# Patient Record
Sex: MALE | Race: WHITE | Employment: OTHER | URBAN - METROPOLITAN AREA
[De-identification: names, ages, dates, MRNs, and addresses within clinical notes are randomized per-mention and may not be internally consistent; named-entity substitution may affect disease eponyms.]

---

## 2018-05-14 LAB
CHOLESTEROL, TOTAL: 180 MG/DL
CHOLESTEROL/HDL RATIO: ABNORMAL
HDLC SERPL-MCNC: 69 MG/DL (ref 35–70)
LDL CHOLESTEROL CALCULATED: 100 MG/DL (ref 0–160)
TRIGL SERPL-MCNC: 54 MG/DL
VLDLC SERPL CALC-MCNC: 11 MG/DL

## 2018-10-22 ENCOUNTER — OFFICE VISIT (OUTPATIENT)
Dept: ORTHOPEDIC SURGERY | Age: 63
End: 2018-10-22
Payer: MEDICARE

## 2018-10-22 ENCOUNTER — TELEPHONE (OUTPATIENT)
Dept: ORTHOPEDIC SURGERY | Age: 63
End: 2018-10-22

## 2018-10-22 VITALS — WEIGHT: 142.8 LBS | OXYGEN SATURATION: 98 % | HEIGHT: 67 IN | HEART RATE: 78 BPM | BODY MASS INDEX: 22.41 KG/M2

## 2018-10-22 DIAGNOSIS — R52 PAIN: ICD-10-CM

## 2018-10-22 DIAGNOSIS — M19.011 LOCALIZED OSTEOARTHRITIS OF RIGHT SHOULDER: ICD-10-CM

## 2018-10-22 DIAGNOSIS — M75.81 ROTATOR CUFF TENDONITIS, RIGHT: Primary | ICD-10-CM

## 2018-10-22 PROCEDURE — 99203 OFFICE O/P NEW LOW 30 MIN: CPT | Performed by: ORTHOPAEDIC SURGERY

## 2018-10-22 ASSESSMENT — ENCOUNTER SYMPTOMS: COLOR CHANGE: 0

## 2018-11-01 ENCOUNTER — HOSPITAL ENCOUNTER (OUTPATIENT)
Dept: CT IMAGING | Age: 63
Discharge: HOME | End: 2018-11-01
Payer: MEDICARE

## 2018-11-01 DIAGNOSIS — M75.81 ROTATOR CUFF TENDONITIS, RIGHT: ICD-10-CM

## 2018-11-01 PROCEDURE — 73200 CT UPPER EXTREMITY W/O DYE: CPT

## 2018-11-06 ENCOUNTER — TELEPHONE (OUTPATIENT)
Dept: CARDIOLOGY CLINIC | Age: 63
End: 2018-11-06

## 2018-11-06 ENCOUNTER — OFFICE VISIT (OUTPATIENT)
Dept: CARDIOLOGY CLINIC | Age: 63
End: 2018-11-06
Payer: MEDICARE

## 2018-11-06 VITALS
HEART RATE: 67 BPM | WEIGHT: 146 LBS | BODY MASS INDEX: 23.46 KG/M2 | SYSTOLIC BLOOD PRESSURE: 120 MMHG | HEIGHT: 66 IN | DIASTOLIC BLOOD PRESSURE: 70 MMHG

## 2018-11-06 DIAGNOSIS — Z95.2 S/P AVR: Primary | ICD-10-CM

## 2018-11-06 PROCEDURE — 93000 ELECTROCARDIOGRAM COMPLETE: CPT | Performed by: INTERNAL MEDICINE

## 2018-11-06 RX ORDER — M-VIT,TX,IRON,MINS/CALC/FOLIC 27MG-0.4MG
1 TABLET ORAL DAILY
COMMUNITY
End: 2019-01-18

## 2018-11-06 RX ORDER — CHLORAL HYDRATE 500 MG
3000 CAPSULE ORAL 3 TIMES DAILY
COMMUNITY
End: 2019-01-18

## 2018-11-06 RX ORDER — OYSTER SHELL CALCIUM WITH VITAMIN D 500; 200 MG/1; [IU]/1
1 TABLET, FILM COATED ORAL DAILY
COMMUNITY
End: 2019-01-18

## 2018-11-09 ENCOUNTER — TELEPHONE (OUTPATIENT)
Dept: CARDIOLOGY CLINIC | Age: 63
End: 2018-11-09

## 2018-11-09 NOTE — TELEPHONE ENCOUNTER
Patient called stating he was here in office to see Dr. José Luis Vasquez but had to leave early before seeing Dr. José Luis Vasquez on 11/6/18 due to him having another appointment to get to. He wanted to know his result of his ekg. Please advise.  You can reach him at [phone]

## 2018-11-26 ENCOUNTER — OFFICE VISIT (OUTPATIENT)
Dept: ORTHOPEDIC SURGERY | Age: 63
End: 2018-11-26
Payer: MEDICARE

## 2018-11-26 ENCOUNTER — TELEPHONE (OUTPATIENT)
Dept: ORTHOPEDIC SURGERY | Age: 63
End: 2018-11-26

## 2018-11-26 VITALS — HEART RATE: 93 BPM | OXYGEN SATURATION: 95 % | WEIGHT: 146 LBS | BODY MASS INDEX: 23.57 KG/M2

## 2018-11-26 DIAGNOSIS — M19.011 LOCALIZED OSTEOARTHRITIS OF RIGHT SHOULDER: Primary | ICD-10-CM

## 2018-11-26 PROCEDURE — 99213 OFFICE O/P EST LOW 20 MIN: CPT | Performed by: ORTHOPAEDIC SURGERY

## 2018-11-26 ASSESSMENT — ENCOUNTER SYMPTOMS: COLOR CHANGE: 0

## 2018-11-26 NOTE — PROGRESS NOTES
Brooke Perez is a 60 y/o male coming in with right shoulder pain. Has been taking Tylenol for pain. Using [facility] for relief.

## 2018-12-04 ENCOUNTER — OFFICE VISIT (OUTPATIENT)
Dept: CARDIOLOGY CLINIC | Age: 63
End: 2018-12-04
Payer: MEDICARE

## 2018-12-04 VITALS
BODY MASS INDEX: 22.53 KG/M2 | HEIGHT: 66 IN | WEIGHT: 140.2 LBS | DIASTOLIC BLOOD PRESSURE: 84 MMHG | OXYGEN SATURATION: 96 % | SYSTOLIC BLOOD PRESSURE: 138 MMHG | HEART RATE: 72 BPM

## 2018-12-04 DIAGNOSIS — Z01.810 PREOP CARDIOVASCULAR EXAM: Primary | ICD-10-CM

## 2018-12-04 PROCEDURE — 99214 OFFICE O/P EST MOD 30 MIN: CPT | Performed by: INTERNAL MEDICINE

## 2018-12-04 PROCEDURE — G8484 FLU IMMUNIZE NO ADMIN: HCPCS | Performed by: INTERNAL MEDICINE

## 2018-12-04 PROCEDURE — 3017F COLORECTAL CA SCREEN DOC REV: CPT | Performed by: INTERNAL MEDICINE

## 2018-12-04 PROCEDURE — G8420 CALC BMI NORM PARAMETERS: HCPCS | Performed by: INTERNAL MEDICINE

## 2018-12-04 PROCEDURE — G8428 CUR MEDS NOT DOCUMENT: HCPCS | Performed by: INTERNAL MEDICINE

## 2018-12-04 NOTE — PROGRESS NOTES
Take 3,000 mg by mouth 3 times daily      warfarin (COUMADIN) 7.5 MG tablet Take by mouth daily 7.5 mg alternating with 5 mg per results of bld work      montelukast (SINGULAIR) 10 MG tablet Take 10 mg by mouth Two days in a row; then hold - take cetirizine hcl 10 mg. one tablet - then repeat.  albuterol (PROVENTIL) (2.5 MG/3ML) 0.083% nebulizer solution Take 2.5 mg by nebulization every 4 hours as needed for Wheezing      Albuterol Sulfate (PROAIR HFA IN) Inhale into the lungs       No current facility-administered medications for this visit. Review of Systems:   · Constitutional: No Fever or Weight Loss   · Eyes: No Decreased Vision  · ENT: No Headaches, Hearing Loss or Vertigo  · Cardiovascular: No chest pain, dyspnea on exertion, palpitations or loss of consciousness  · Respiratory: No cough or wheezing    · Gastrointestinal: No abdominal pain, appetite loss, blood in stools, constipation, diarrhea or heartburn  · Genitourinary: No dysuria, trouble voiding, or hematuria  · Musculoskeletal:  No gait disturbance, weakness or joint complaints  · Integumentary: No rash or pruritis  · Neurological: No TIA or stroke symptoms  · Psychiatric: No anxiety or depression  · Endocrine: No malaise, fatigue or temperature intolerance  · Hematologic/Lymphatic: No bleeding problems, blood clots or swollen lymph nodes  · Allergic/Immunologic: No nasal congestion or hives  All systems negative except as marked. ·   ·      Physical Examination:    Vitals:    12/04/18 1528   BP: 138/84   Pulse: 72   SpO2: 96%      Wt Readings from Last 3 Encounters:   12/04/18 140 lb 3.2 oz (63.6 kg)   11/26/18 146 lb (66.2 kg)   11/06/18 146 lb (66.2 kg)     Body mass index is 22.63 kg/m². General Appearance:  No distress, conversant    Constitutional:  Well developed, Well nourished, No acute distress, Non-toxic appearance.    HENT:  Normocephalic, Atraumatic, Bilateral external ears normal, Oropharynx moist, No oral exudates, Nose normal. Neck- Normal range of motion, No tenderness, Supple, No stridor,no apical-carotid delay, no carotid bruit  Eyes:  PERRL, EOMI, Conjunctiva normal, No discharge. Respiratory:  Normal breath sounds, No respiratory distress, No wheezing, No chest tenderness. ,no use of accessory muscles, diaphragm movement is normal  Cardiovascular: (PMI) apex non displaced,no lifts no thrills, no s3,no s4, Normal heart rate, Normal rhythm, No murmurs, No rubs, No gallops. Carotid arteries pulse and amplitude are normal no bruit, no abdominal bruit noted ( normal abdominal aorta ausculation), femoral arteries pulse and amplitude are normal no bruit, pedal pulses are normal  GI:  Bowel sounds normal, Soft, No tenderness, No masses, No pulsatile masses, no hepatosplenomegally, no bruits  : External genitalia appear normal, No masses or lesions. No discharge. No CVA tenderness. Musculoskeletal:  Intact distal pulses, No edema, No tenderness, No cyanosis, No clubbing. Good range of motion in all major joints. No tenderness to palpation or major deformities noted. Back- No tenderness. Integument:  Warm, Dry, No erythema, No rash. Skin: no rash, no ulcers  Lymphatic:  No lymphadenopathy noted. Neurologic:  Alert & oriented x 3, Normal motor function, Normal sensory function, No focal deficits noted. Psychiatric:  Affect normal, Judgment normal, Mood normal.   Lab Review   No results for input(s): WBC, HGB, HCT, PLT in the last 72 hours. No results for input(s): NA, K, CL, CO2, PHOS, BUN, CREATININE in the last 72 hours. Invalid input(s): CA  No results for input(s): AST, ALT, ALB, BILIDIR, BILITOT, ALKPHOS in the last 72 hours. No results for input(s): TROPONINI in the last 72 hours. No results found for: BNP  No results found for: INR, PROTIME      EKG:nsr    Chest Xray:pending    ECHO:pending  Labs, echo, meds reviewed  Assessment: 59 y. o.year old with PMH of  has a past medical history of COPD (chronic

## 2018-12-12 ENCOUNTER — PROCEDURE VISIT (OUTPATIENT)
Dept: CARDIOLOGY CLINIC | Age: 63
End: 2018-12-12
Payer: MEDICARE

## 2018-12-12 DIAGNOSIS — Z95.2 S/P AVR: ICD-10-CM

## 2018-12-12 DIAGNOSIS — Z01.810 PREOP CARDIOVASCULAR EXAM: Primary | ICD-10-CM

## 2018-12-12 LAB
LV EF: 48 %
LVEF MODALITY: NORMAL

## 2018-12-12 PROCEDURE — 93306 TTE W/DOPPLER COMPLETE: CPT | Performed by: INTERNAL MEDICINE

## 2018-12-19 ENCOUNTER — TELEPHONE (OUTPATIENT)
Dept: CARDIOLOGY CLINIC | Age: 63
End: 2018-12-19

## 2018-12-19 NOTE — TELEPHONE ENCOUNTER
Called patient for results.  Left voicemail.      Conclusions      Summary   Left ventricular systolic function is mildly depressed with an ejection   fraction of 45-50%.   Left Ventricular chamber size is dilated.   Normally functioning mechanical prosthetic valve in aortic position with a   mean pressure of 17 mmHg.   Doppler evaluation reveals mild aortic, moderate edmar, and mild tricuspid   regurgitation.   No evidence of pericardial effusion.      Signature

## 2019-01-10 ENCOUNTER — TELEPHONE (OUTPATIENT)
Dept: CARDIOLOGY CLINIC | Age: 64
End: 2019-01-10

## 2019-01-14 ENCOUNTER — TELEPHONE (OUTPATIENT)
Dept: CARDIOLOGY CLINIC | Age: 64
End: 2019-01-14

## 2019-01-15 ENCOUNTER — ANESTHESIA EVENT (OUTPATIENT)
Dept: OPERATING ROOM | Age: 64
End: 2019-01-15
Payer: MEDICARE

## 2019-01-17 ENCOUNTER — TELEPHONE (OUTPATIENT)
Dept: ORTHOPEDIC SURGERY | Age: 64
End: 2019-01-17

## 2019-01-18 ENCOUNTER — HOSPITAL ENCOUNTER (OUTPATIENT)
Dept: PREADMISSION TESTING | Age: 64
Discharge: HOME | End: 2019-01-22
Payer: MEDICARE

## 2019-01-18 VITALS
TEMPERATURE: 98.4 F | HEART RATE: 76 BPM | BODY MASS INDEX: 24.22 KG/M2 | HEIGHT: 65 IN | RESPIRATION RATE: 16 BRPM | OXYGEN SATURATION: 97 % | SYSTOLIC BLOOD PRESSURE: 156 MMHG | DIASTOLIC BLOOD PRESSURE: 80 MMHG | WEIGHT: 145.38 LBS

## 2019-01-18 LAB
ALBUMIN SERPL-MCNC: 4.8 GM/DL (ref 3.4–5)
ALP BLD-CCNC: 84 IU/L (ref 40–129)
ALT SERPL-CCNC: 25 U/L (ref 10–40)
ANION GAP SERPL CALCULATED.3IONS-SCNC: 15 MMOL/L (ref 4–16)
AST SERPL-CCNC: 41 IU/L (ref 15–37)
BASOPHILS ABSOLUTE: 0 K/CU MM
BASOPHILS RELATIVE PERCENT: 0.6 % (ref 0–1)
BILIRUB SERPL-MCNC: 0.6 MG/DL (ref 0–1)
BUN BLDV-MCNC: 11 MG/DL (ref 6–23)
CALCIUM SERPL-MCNC: 9.6 MG/DL (ref 8.3–10.6)
CHLORIDE BLD-SCNC: 96 MMOL/L (ref 99–110)
CO2: 23 MMOL/L (ref 21–32)
CREAT SERPL-MCNC: 0.8 MG/DL (ref 0.9–1.3)
DIFFERENTIAL TYPE: ABNORMAL
EOSINOPHILS ABSOLUTE: 0.1 K/CU MM
EOSINOPHILS RELATIVE PERCENT: 0.8 % (ref 0–3)
ERYTHROCYTE SEDIMENTATION RATE: 19 MM/HR (ref 0–20)
GFR AFRICAN AMERICAN: >60 ML/MIN/1.73M2
GFR NON-AFRICAN AMERICAN: >60 ML/MIN/1.73M2
GLUCOSE BLD-MCNC: 90 MG/DL (ref 70–99)
HCT VFR BLD CALC: 39.4 % (ref 42–52)
HEMOGLOBIN: 12.6 GM/DL (ref 13.5–18)
IMMATURE NEUTROPHIL %: 0.4 % (ref 0–0.43)
LYMPHOCYTES ABSOLUTE: 1.1 K/CU MM
LYMPHOCYTES RELATIVE PERCENT: 15.6 % (ref 24–44)
MCH RBC QN AUTO: 33.3 PG (ref 27–31)
MCHC RBC AUTO-ENTMCNC: 32 % (ref 32–36)
MCV RBC AUTO: 104.2 FL (ref 78–100)
MONOCYTES ABSOLUTE: 0.7 K/CU MM
MONOCYTES RELATIVE PERCENT: 9.9 % (ref 0–4)
NUCLEATED RBC %: 0 %
PDW BLD-RTO: 11.9 % (ref 11.7–14.9)
PLATELET # BLD: 272 K/CU MM (ref 140–440)
PMV BLD AUTO: 8.3 FL (ref 7.5–11.1)
POTASSIUM SERPL-SCNC: 4.2 MMOL/L (ref 3.5–5.1)
RBC # BLD: 3.78 M/CU MM (ref 4.6–6.2)
SEGMENTED NEUTROPHILS ABSOLUTE COUNT: 5.2 K/CU MM
SEGMENTED NEUTROPHILS RELATIVE PERCENT: 72.7 % (ref 36–66)
SODIUM BLD-SCNC: 134 MMOL/L (ref 135–145)
TOTAL IMMATURE NEUTOROPHIL: 0.03 K/CU MM
TOTAL NUCLEATED RBC: 0 K/CU MM
TOTAL PROTEIN: 7.7 GM/DL (ref 6.4–8.2)
WBC # BLD: 7.2 K/CU MM (ref 4–10.5)

## 2019-01-18 PROCEDURE — 85652 RBC SED RATE AUTOMATED: CPT

## 2019-01-18 PROCEDURE — 87086 URINE CULTURE/COLONY COUNT: CPT

## 2019-01-18 PROCEDURE — 36415 COLL VENOUS BLD VENIPUNCTURE: CPT

## 2019-01-18 PROCEDURE — 93005 ELECTROCARDIOGRAM TRACING: CPT

## 2019-01-18 PROCEDURE — 80053 COMPREHEN METABOLIC PANEL: CPT

## 2019-01-18 PROCEDURE — 85025 COMPLETE CBC W/AUTO DIFF WBC: CPT

## 2019-01-18 RX ORDER — CETIRIZINE HYDROCHLORIDE 10 MG/1
10 TABLET ORAL
COMMUNITY

## 2019-01-18 RX ORDER — SIMVASTATIN 40 MG
40 TABLET ORAL NIGHTLY
COMMUNITY
End: 2019-11-06 | Stop reason: SDUPTHER

## 2019-01-18 RX ORDER — MONTELUKAST SODIUM 10 MG/1
10 TABLET ORAL
COMMUNITY
End: 2019-07-31 | Stop reason: SDUPTHER

## 2019-01-18 RX ORDER — CEFAZOLIN SODIUM 1 G/50ML
1 INJECTION, SOLUTION INTRAVENOUS ONCE
Status: CANCELLED | OUTPATIENT
Start: 2019-01-29

## 2019-01-18 ASSESSMENT — ENCOUNTER SYMPTOMS: SHORTNESS OF BREATH: 1

## 2019-01-19 LAB
EKG ATRIAL RATE: 69 BPM
EKG DIAGNOSIS: NORMAL
EKG P AXIS: 71 DEGREES
EKG P-R INTERVAL: 146 MS
EKG Q-T INTERVAL: 392 MS
EKG QRS DURATION: 98 MS
EKG QTC CALCULATION (BAZETT): 420 MS
EKG R AXIS: 65 DEGREES
EKG T AXIS: 68 DEGREES
EKG VENTRICULAR RATE: 69 BPM

## 2019-01-20 LAB
CULTURE: NORMAL
Lab: NORMAL
REPORT STATUS: NORMAL
SPECIMEN: NORMAL

## 2019-01-21 ENCOUNTER — TELEPHONE (OUTPATIENT)
Dept: ORTHOPEDIC SURGERY | Age: 64
End: 2019-01-21

## 2019-01-23 ENCOUNTER — TELEPHONE (OUTPATIENT)
Dept: ORTHOPEDIC SURGERY | Age: 64
End: 2019-01-23

## 2019-01-28 ENCOUNTER — OFFICE VISIT (OUTPATIENT)
Dept: ORTHOPEDIC SURGERY | Age: 64
End: 2019-01-28
Payer: MEDICARE

## 2019-01-28 VITALS
BODY MASS INDEX: 24.79 KG/M2 | RESPIRATION RATE: 14 BRPM | OXYGEN SATURATION: 98 % | HEART RATE: 83 BPM | WEIGHT: 149 LBS

## 2019-01-28 DIAGNOSIS — M19.011 LOCALIZED OSTEOARTHRITIS OF RIGHT SHOULDER: Primary | ICD-10-CM

## 2019-01-28 PROCEDURE — 3017F COLORECTAL CA SCREEN DOC REV: CPT | Performed by: ORTHOPAEDIC SURGERY

## 2019-01-28 PROCEDURE — G8427 DOCREV CUR MEDS BY ELIG CLIN: HCPCS | Performed by: ORTHOPAEDIC SURGERY

## 2019-01-28 PROCEDURE — 4004F PT TOBACCO SCREEN RCVD TLK: CPT | Performed by: ORTHOPAEDIC SURGERY

## 2019-01-28 PROCEDURE — G8484 FLU IMMUNIZE NO ADMIN: HCPCS | Performed by: ORTHOPAEDIC SURGERY

## 2019-01-28 PROCEDURE — G8420 CALC BMI NORM PARAMETERS: HCPCS | Performed by: ORTHOPAEDIC SURGERY

## 2019-01-28 PROCEDURE — 99212 OFFICE O/P EST SF 10 MIN: CPT | Performed by: ORTHOPAEDIC SURGERY

## 2019-01-28 ASSESSMENT — ENCOUNTER SYMPTOMS: COLOR CHANGE: 0

## 2019-01-29 ENCOUNTER — APPOINTMENT (OUTPATIENT)
Dept: GENERAL RADIOLOGY | Age: 64
End: 2019-01-29
Attending: ORTHOPAEDIC SURGERY
Payer: MEDICARE

## 2019-01-29 ENCOUNTER — HOSPITAL ENCOUNTER (OUTPATIENT)
Age: 64
Setting detail: OBSERVATION
Discharge: HOME | End: 2019-01-30
Attending: ORTHOPAEDIC SURGERY | Admitting: ORTHOPAEDIC SURGERY
Payer: MEDICARE

## 2019-01-29 ENCOUNTER — ANESTHESIA (OUTPATIENT)
Dept: OPERATING ROOM | Age: 64
End: 2019-01-29
Payer: MEDICARE

## 2019-01-29 VITALS
DIASTOLIC BLOOD PRESSURE: 67 MMHG | SYSTOLIC BLOOD PRESSURE: 119 MMHG | OXYGEN SATURATION: 100 % | RESPIRATION RATE: 11 BRPM | TEMPERATURE: 96.6 F

## 2019-01-29 DIAGNOSIS — M19.011 LOCALIZED OSTEOARTHRITIS OF RIGHT SHOULDER: Primary | ICD-10-CM

## 2019-01-29 PROCEDURE — 3700000000 HC ANESTHESIA ATTENDED CARE: Performed by: ORTHOPAEDIC SURGERY

## 2019-01-29 PROCEDURE — 6360000002 HC RX W HCPCS: Performed by: ORTHOPAEDIC SURGERY

## 2019-01-29 PROCEDURE — 2500000003 HC RX 250 WO HCPCS: Performed by: ORTHOPAEDIC SURGERY

## 2019-01-29 PROCEDURE — 6360000002 HC RX W HCPCS: Performed by: ANESTHESIOLOGY

## 2019-01-29 PROCEDURE — 6370000000 HC RX 637 (ALT 250 FOR IP): Performed by: ORTHOPAEDIC SURGERY

## 2019-01-29 PROCEDURE — 94150 VITAL CAPACITY TEST: CPT

## 2019-01-29 PROCEDURE — 2700000000 HC OXYGEN THERAPY PER DAY

## 2019-01-29 PROCEDURE — 2709999900 HC NON-CHARGEABLE SUPPLY: Performed by: ORTHOPAEDIC SURGERY

## 2019-01-29 PROCEDURE — 6360000002 HC RX W HCPCS: Performed by: NURSE ANESTHETIST, CERTIFIED REGISTERED

## 2019-01-29 PROCEDURE — 6370000000 HC RX 637 (ALT 250 FOR IP): Performed by: NURSE PRACTITIONER

## 2019-01-29 PROCEDURE — 3600000005 HC SURGERY LEVEL 5 BASE: Performed by: ORTHOPAEDIC SURGERY

## 2019-01-29 PROCEDURE — 94010 BREATHING CAPACITY TEST: CPT

## 2019-01-29 PROCEDURE — 64415 NJX AA&/STRD BRCH PLXS IMG: CPT | Performed by: ANESTHESIOLOGY

## 2019-01-29 PROCEDURE — 2580000003 HC RX 258: Performed by: ORTHOPAEDIC SURGERY

## 2019-01-29 PROCEDURE — G0378 HOSPITAL OBSERVATION PER HR: HCPCS

## 2019-01-29 PROCEDURE — 86901 BLOOD TYPING SEROLOGIC RH(D): CPT

## 2019-01-29 PROCEDURE — 3700000001 HC ADD 15 MINUTES (ANESTHESIA): Performed by: ORTHOPAEDIC SURGERY

## 2019-01-29 PROCEDURE — 96372 THER/PROPH/DIAG INJ SC/IM: CPT

## 2019-01-29 PROCEDURE — 7100000000 HC PACU RECOVERY - FIRST 15 MIN: Performed by: ORTHOPAEDIC SURGERY

## 2019-01-29 PROCEDURE — 2580000003 HC RX 258

## 2019-01-29 PROCEDURE — 7100000001 HC PACU RECOVERY - ADDTL 15 MIN: Performed by: ORTHOPAEDIC SURGERY

## 2019-01-29 PROCEDURE — 23470 RECONSTRUCT SHOULDER JOINT: CPT | Performed by: ORTHOPAEDIC SURGERY

## 2019-01-29 PROCEDURE — 3600000015 HC SURGERY LEVEL 5 ADDTL 15MIN: Performed by: ORTHOPAEDIC SURGERY

## 2019-01-29 PROCEDURE — 94761 N-INVAS EAR/PLS OXIMETRY MLT: CPT

## 2019-01-29 PROCEDURE — 2500000003 HC RX 250 WO HCPCS: Performed by: NURSE ANESTHETIST, CERTIFIED REGISTERED

## 2019-01-29 PROCEDURE — L3650 SO 8 ABD RESTRAINT PRE OTS: HCPCS | Performed by: ORTHOPAEDIC SURGERY

## 2019-01-29 PROCEDURE — 73030 X-RAY EXAM OF SHOULDER: CPT

## 2019-01-29 PROCEDURE — C1776 JOINT DEVICE (IMPLANTABLE): HCPCS | Performed by: ORTHOPAEDIC SURGERY

## 2019-01-29 PROCEDURE — 86900 BLOOD TYPING SEROLOGIC ABO: CPT

## 2019-01-29 PROCEDURE — 86850 RBC ANTIBODY SCREEN: CPT

## 2019-01-29 DEVICE — IMPLANTABLE DEVICE: Type: IMPLANTABLE DEVICE | Site: SHOULDER | Status: FUNCTIONAL

## 2019-01-29 DEVICE — STEM HUM L83MM DIA15MM MINI UNIV CO CHROM POR PRI PRESSFIT: Type: IMPLANTABLE DEVICE | Site: SHOULDER | Status: FUNCTIONAL

## 2019-01-29 RX ORDER — ONDANSETRON 2 MG/ML
4 INJECTION INTRAMUSCULAR; INTRAVENOUS EVERY 6 HOURS PRN
Status: DISCONTINUED | OUTPATIENT
Start: 2019-01-29 | End: 2019-01-30 | Stop reason: HOSPADM

## 2019-01-29 RX ORDER — ACETAMINOPHEN 80 MG
TABLET,CHEWABLE ORAL
Status: DISPENSED
Start: 2019-01-29 | End: 2019-01-30

## 2019-01-29 RX ORDER — SIMVASTATIN 40 MG
40 TABLET ORAL NIGHTLY
Status: DISCONTINUED | OUTPATIENT
Start: 2019-01-30 | End: 2019-01-30 | Stop reason: HOSPADM

## 2019-01-29 RX ORDER — EPHEDRINE SULFATE 50 MG/ML
INJECTION INTRAVENOUS PRN
Status: DISCONTINUED | OUTPATIENT
Start: 2019-01-29 | End: 2019-01-29 | Stop reason: SDUPTHER

## 2019-01-29 RX ORDER — CEFAZOLIN SODIUM 2 G/100ML
2 INJECTION, SOLUTION INTRAVENOUS
Status: COMPLETED | OUTPATIENT
Start: 2019-01-29 | End: 2019-01-29

## 2019-01-29 RX ORDER — SODIUM CHLORIDE 0.9 % (FLUSH) 0.9 %
10 SYRINGE (ML) INJECTION PRN
Status: DISCONTINUED | OUTPATIENT
Start: 2019-01-29 | End: 2019-01-29 | Stop reason: HOSPADM

## 2019-01-29 RX ORDER — WARFARIN SODIUM 5 MG/1
5 TABLET ORAL DAILY
Status: DISCONTINUED | OUTPATIENT
Start: 2019-01-30 | End: 2019-01-30 | Stop reason: HOSPADM

## 2019-01-29 RX ORDER — ACETAMINOPHEN 325 MG/1
650 TABLET ORAL EVERY 4 HOURS PRN
Status: DISCONTINUED | OUTPATIENT
Start: 2019-01-29 | End: 2019-01-30 | Stop reason: HOSPADM

## 2019-01-29 RX ORDER — ROPIVACAINE HYDROCHLORIDE 5 MG/ML
INJECTION, SOLUTION EPIDURAL; INFILTRATION; PERINEURAL PRN
Status: DISCONTINUED | OUTPATIENT
Start: 2019-01-29 | End: 2019-01-29 | Stop reason: SDUPTHER

## 2019-01-29 RX ORDER — SODIUM CHLORIDE, SODIUM LACTATE, POTASSIUM CHLORIDE, CALCIUM CHLORIDE 600; 310; 30; 20 MG/100ML; MG/100ML; MG/100ML; MG/100ML
INJECTION, SOLUTION INTRAVENOUS
Status: COMPLETED
Start: 2019-01-29 | End: 2019-01-29

## 2019-01-29 RX ORDER — ROCURONIUM BROMIDE 10 MG/ML
INJECTION, SOLUTION INTRAVENOUS PRN
Status: DISCONTINUED | OUTPATIENT
Start: 2019-01-29 | End: 2019-01-29 | Stop reason: SDUPTHER

## 2019-01-29 RX ORDER — FAMOTIDINE 20 MG/1
10 TABLET, FILM COATED ORAL DAILY
Status: DISCONTINUED | OUTPATIENT
Start: 2019-01-29 | End: 2019-01-30 | Stop reason: HOSPADM

## 2019-01-29 RX ORDER — DIPHENHYDRAMINE HYDROCHLORIDE 50 MG/ML
12.5 INJECTION INTRAMUSCULAR; INTRAVENOUS
Status: DISCONTINUED | OUTPATIENT
Start: 2019-01-29 | End: 2019-01-29 | Stop reason: HOSPADM

## 2019-01-29 RX ORDER — FENTANYL CITRATE 50 UG/ML
50 INJECTION, SOLUTION INTRAMUSCULAR; INTRAVENOUS EVERY 5 MIN PRN
Status: DISCONTINUED | OUTPATIENT
Start: 2019-01-29 | End: 2019-01-29 | Stop reason: HOSPADM

## 2019-01-29 RX ORDER — LIDOCAINE HYDROCHLORIDE 20 MG/ML
INJECTION, SOLUTION INFILTRATION; PERINEURAL PRN
Status: DISCONTINUED | OUTPATIENT
Start: 2019-01-29 | End: 2019-01-29 | Stop reason: SDUPTHER

## 2019-01-29 RX ORDER — CETIRIZINE HYDROCHLORIDE 10 MG/1
10 TABLET ORAL DAILY
Status: DISCONTINUED | OUTPATIENT
Start: 2019-01-29 | End: 2019-01-30 | Stop reason: HOSPADM

## 2019-01-29 RX ORDER — SODIUM CHLORIDE 0.9 % (FLUSH) 0.9 %
10 SYRINGE (ML) INJECTION PRN
Status: DISCONTINUED | OUTPATIENT
Start: 2019-01-29 | End: 2019-01-30 | Stop reason: HOSPADM

## 2019-01-29 RX ORDER — CEFAZOLIN SODIUM 1 G/50ML
1 INJECTION, SOLUTION INTRAVENOUS ONCE
Status: DISCONTINUED | OUTPATIENT
Start: 2019-01-29 | End: 2019-01-29 | Stop reason: HOSPADM

## 2019-01-29 RX ORDER — HYDRALAZINE HYDROCHLORIDE 20 MG/ML
5 INJECTION INTRAMUSCULAR; INTRAVENOUS EVERY 10 MIN PRN
Status: DISCONTINUED | OUTPATIENT
Start: 2019-01-29 | End: 2019-01-29 | Stop reason: HOSPADM

## 2019-01-29 RX ORDER — HYDROMORPHONE HCL 110MG/55ML
0.5 PATIENT CONTROLLED ANALGESIA SYRINGE INTRAVENOUS EVERY 5 MIN PRN
Status: DISCONTINUED | OUTPATIENT
Start: 2019-01-29 | End: 2019-01-29 | Stop reason: HOSPADM

## 2019-01-29 RX ORDER — OXYCODONE HYDROCHLORIDE AND ACETAMINOPHEN 5; 325 MG/1; MG/1
1 TABLET ORAL EVERY 4 HOURS PRN
Status: DISCONTINUED | OUTPATIENT
Start: 2019-01-29 | End: 2019-01-30 | Stop reason: HOSPADM

## 2019-01-29 RX ORDER — FENTANYL CITRATE 50 UG/ML
INJECTION, SOLUTION INTRAMUSCULAR; INTRAVENOUS PRN
Status: DISCONTINUED | OUTPATIENT
Start: 2019-01-29 | End: 2019-01-29 | Stop reason: SDUPTHER

## 2019-01-29 RX ORDER — MIDAZOLAM HYDROCHLORIDE 1 MG/ML
INJECTION INTRAMUSCULAR; INTRAVENOUS PRN
Status: DISCONTINUED | OUTPATIENT
Start: 2019-01-29 | End: 2019-01-29 | Stop reason: SDUPTHER

## 2019-01-29 RX ORDER — LABETALOL HYDROCHLORIDE 5 MG/ML
5 INJECTION, SOLUTION INTRAVENOUS EVERY 10 MIN PRN
Status: DISCONTINUED | OUTPATIENT
Start: 2019-01-29 | End: 2019-01-29 | Stop reason: HOSPADM

## 2019-01-29 RX ORDER — SODIUM CHLORIDE, SODIUM LACTATE, POTASSIUM CHLORIDE, CALCIUM CHLORIDE 600; 310; 30; 20 MG/100ML; MG/100ML; MG/100ML; MG/100ML
INJECTION, SOLUTION INTRAVENOUS CONTINUOUS
Status: DISCONTINUED | OUTPATIENT
Start: 2019-01-29 | End: 2019-01-29

## 2019-01-29 RX ORDER — SODIUM CHLORIDE 0.9 % (FLUSH) 0.9 %
10 SYRINGE (ML) INJECTION EVERY 12 HOURS SCHEDULED
Status: DISCONTINUED | OUTPATIENT
Start: 2019-01-29 | End: 2019-01-29 | Stop reason: HOSPADM

## 2019-01-29 RX ORDER — DOCUSATE SODIUM 100 MG/1
100 CAPSULE, LIQUID FILLED ORAL 2 TIMES DAILY
Status: DISCONTINUED | OUTPATIENT
Start: 2019-01-29 | End: 2019-01-30 | Stop reason: HOSPADM

## 2019-01-29 RX ORDER — MONTELUKAST SODIUM 10 MG/1
10 TABLET ORAL NIGHTLY
Status: DISCONTINUED | OUTPATIENT
Start: 2019-01-30 | End: 2019-01-30 | Stop reason: HOSPADM

## 2019-01-29 RX ORDER — ALBUTEROL SULFATE 90 UG/1
1 AEROSOL, METERED RESPIRATORY (INHALATION) EVERY 4 HOURS PRN
Status: DISCONTINUED | OUTPATIENT
Start: 2019-01-29 | End: 2019-01-30 | Stop reason: HOSPADM

## 2019-01-29 RX ORDER — KETOROLAC TROMETHAMINE 30 MG/ML
15 INJECTION, SOLUTION INTRAMUSCULAR; INTRAVENOUS EVERY 6 HOURS
Status: DISCONTINUED | OUTPATIENT
Start: 2019-01-29 | End: 2019-01-30 | Stop reason: HOSPADM

## 2019-01-29 RX ORDER — ONDANSETRON 2 MG/ML
INJECTION INTRAMUSCULAR; INTRAVENOUS PRN
Status: DISCONTINUED | OUTPATIENT
Start: 2019-01-29 | End: 2019-01-29 | Stop reason: SDUPTHER

## 2019-01-29 RX ORDER — SODIUM CHLORIDE, SODIUM LACTATE, POTASSIUM CHLORIDE, CALCIUM CHLORIDE 600; 310; 30; 20 MG/100ML; MG/100ML; MG/100ML; MG/100ML
INJECTION, SOLUTION INTRAVENOUS CONTINUOUS
Status: DISCONTINUED | OUTPATIENT
Start: 2019-01-29 | End: 2019-01-30 | Stop reason: HOSPADM

## 2019-01-29 RX ORDER — CEFAZOLIN SODIUM 2 G/100ML
2 INJECTION, SOLUTION INTRAVENOUS EVERY 8 HOURS
Status: COMPLETED | OUTPATIENT
Start: 2019-01-29 | End: 2019-01-30

## 2019-01-29 RX ORDER — DEXAMETHASONE SODIUM PHOSPHATE 10 MG/ML
INJECTION, SOLUTION INTRAMUSCULAR; INTRAVENOUS PRN
Status: DISCONTINUED | OUTPATIENT
Start: 2019-01-29 | End: 2019-01-29 | Stop reason: SDUPTHER

## 2019-01-29 RX ORDER — MEPERIDINE HYDROCHLORIDE 25 MG/ML
12.5 INJECTION INTRAMUSCULAR; INTRAVENOUS; SUBCUTANEOUS EVERY 5 MIN PRN
Status: DISCONTINUED | OUTPATIENT
Start: 2019-01-29 | End: 2019-01-29 | Stop reason: HOSPADM

## 2019-01-29 RX ORDER — PROPOFOL 10 MG/ML
INJECTION, EMULSION INTRAVENOUS PRN
Status: DISCONTINUED | OUTPATIENT
Start: 2019-01-29 | End: 2019-01-29 | Stop reason: SDUPTHER

## 2019-01-29 RX ORDER — SODIUM CHLORIDE 0.9 % (FLUSH) 0.9 %
10 SYRINGE (ML) INJECTION EVERY 12 HOURS SCHEDULED
Status: DISCONTINUED | OUTPATIENT
Start: 2019-01-29 | End: 2019-01-30 | Stop reason: HOSPADM

## 2019-01-29 RX ORDER — PROMETHAZINE HYDROCHLORIDE 25 MG/ML
6.25 INJECTION, SOLUTION INTRAMUSCULAR; INTRAVENOUS
Status: DISCONTINUED | OUTPATIENT
Start: 2019-01-29 | End: 2019-01-29 | Stop reason: HOSPADM

## 2019-01-29 RX ORDER — ACETAMINOPHEN 10 MG/ML
1000 INJECTION, SOLUTION INTRAVENOUS ONCE
Status: COMPLETED | OUTPATIENT
Start: 2019-01-29 | End: 2019-01-29

## 2019-01-29 RX ADMIN — DEXAMETHASONE SODIUM PHOSPHATE 8 MG: 10 INJECTION, SOLUTION INTRAMUSCULAR; INTRAVENOUS at 07:33

## 2019-01-29 RX ADMIN — PHENYLEPHRINE HYDROCHLORIDE 100 MCG: 10 INJECTION INTRAVENOUS at 08:35

## 2019-01-29 RX ADMIN — EPHEDRINE SULFATE 10 MG: 50 INJECTION, SOLUTION INTRAVENOUS at 07:50

## 2019-01-29 RX ADMIN — EPHEDRINE SULFATE 10 MG: 50 INJECTION, SOLUTION INTRAVENOUS at 07:47

## 2019-01-29 RX ADMIN — CETIRIZINE HYDROCHLORIDE 10 MG: 10 TABLET, FILM COATED ORAL at 17:55

## 2019-01-29 RX ADMIN — ACETAMINOPHEN 1000 MG: 10 INJECTION, SOLUTION INTRAVENOUS at 07:46

## 2019-01-29 RX ADMIN — ROCURONIUM BROMIDE 20 MG: 50 INJECTION, SOLUTION INTRAVENOUS at 08:15

## 2019-01-29 RX ADMIN — SUGAMMADEX 100 MG: 100 INJECTION, SOLUTION INTRAVENOUS at 09:25

## 2019-01-29 RX ADMIN — SODIUM CHLORIDE, POTASSIUM CHLORIDE, SODIUM LACTATE AND CALCIUM CHLORIDE: 600; 310; 30; 20 INJECTION, SOLUTION INTRAVENOUS at 19:35

## 2019-01-29 RX ADMIN — OXYCODONE AND ACETAMINOPHEN 1 TABLET: 5; 325 TABLET ORAL at 20:32

## 2019-01-29 RX ADMIN — PROPOFOL 100 MG: 10 INJECTION, EMULSION INTRAVENOUS at 07:33

## 2019-01-29 RX ADMIN — KETOROLAC TROMETHAMINE 15 MG: 30 INJECTION, SOLUTION INTRAMUSCULAR; INTRAVENOUS at 17:56

## 2019-01-29 RX ADMIN — LIDOCAINE HYDROCHLORIDE 50 MG: 20 INJECTION, SOLUTION INFILTRATION; PERINEURAL at 07:33

## 2019-01-29 RX ADMIN — ROCURONIUM BROMIDE 10 MG: 50 INJECTION, SOLUTION INTRAVENOUS at 08:50

## 2019-01-29 RX ADMIN — ENOXAPARIN SODIUM 40 MG: 40 INJECTION SUBCUTANEOUS at 12:32

## 2019-01-29 RX ADMIN — DOCUSATE SODIUM 100 MG: 100 CAPSULE, LIQUID FILLED ORAL at 20:33

## 2019-01-29 RX ADMIN — CEFAZOLIN SODIUM 2 G: 2 INJECTION, SOLUTION INTRAVENOUS at 07:31

## 2019-01-29 RX ADMIN — SODIUM CHLORIDE, POTASSIUM CHLORIDE, SODIUM LACTATE AND CALCIUM CHLORIDE: 600; 310; 30; 20 INJECTION, SOLUTION INTRAVENOUS at 06:46

## 2019-01-29 RX ADMIN — ROCURONIUM BROMIDE 50 MG: 50 INJECTION, SOLUTION INTRAVENOUS at 07:33

## 2019-01-29 RX ADMIN — ROPIVACAINE HYDROCHLORIDE 150 MG: 5 INJECTION, SOLUTION EPIDURAL; INFILTRATION; PERINEURAL at 07:10

## 2019-01-29 RX ADMIN — TRANEXAMIC ACID 1 G: 100 INJECTION, SOLUTION INTRAVENOUS at 07:43

## 2019-01-29 RX ADMIN — SUGAMMADEX 100 MG: 100 INJECTION, SOLUTION INTRAVENOUS at 09:33

## 2019-01-29 RX ADMIN — FENTANYL CITRATE 100 MCG: 50 INJECTION INTRAMUSCULAR; INTRAVENOUS at 07:33

## 2019-01-29 RX ADMIN — EPHEDRINE SULFATE 10 MG: 50 INJECTION, SOLUTION INTRAVENOUS at 08:00

## 2019-01-29 RX ADMIN — ROCURONIUM BROMIDE 10 MG: 50 INJECTION, SOLUTION INTRAVENOUS at 08:26

## 2019-01-29 RX ADMIN — MIDAZOLAM HYDROCHLORIDE 2 MG: 1 INJECTION, SOLUTION INTRAMUSCULAR; INTRAVENOUS at 07:08

## 2019-01-29 RX ADMIN — CEFAZOLIN SODIUM 2 G: 2 INJECTION, SOLUTION INTRAVENOUS at 14:59

## 2019-01-29 RX ADMIN — SODIUM CHLORIDE, POTASSIUM CHLORIDE, SODIUM LACTATE AND CALCIUM CHLORIDE: 600; 310; 30; 20 INJECTION, SOLUTION INTRAVENOUS at 10:46

## 2019-01-29 RX ADMIN — FAMOTIDINE 10 MG: 20 TABLET, FILM COATED ORAL at 17:56

## 2019-01-29 RX ADMIN — ONDANSETRON HYDROCHLORIDE 4 MG: 2 SOLUTION INTRAMUSCULAR; INTRAVENOUS at 07:33

## 2019-01-29 RX ADMIN — EPHEDRINE SULFATE 10 MG: 50 INJECTION, SOLUTION INTRAVENOUS at 08:05

## 2019-01-29 RX ADMIN — DOCUSATE SODIUM 100 MG: 100 CAPSULE, LIQUID FILLED ORAL at 12:31

## 2019-01-29 RX ADMIN — PHENYLEPHRINE HYDROCHLORIDE 100 MCG: 10 INJECTION INTRAVENOUS at 09:10

## 2019-01-29 RX ADMIN — SODIUM CHLORIDE, POTASSIUM CHLORIDE, SODIUM LACTATE AND CALCIUM CHLORIDE: 600; 310; 30; 20 INJECTION, SOLUTION INTRAVENOUS at 07:31

## 2019-01-29 RX ADMIN — KETOROLAC TROMETHAMINE 15 MG: 30 INJECTION, SOLUTION INTRAMUSCULAR; INTRAVENOUS at 12:31

## 2019-01-29 ASSESSMENT — PULMONARY FUNCTION TESTS
PIF_VALUE: 21
PIF_VALUE: 17
PIF_VALUE: 15
PIF_VALUE: 17
PIF_VALUE: 18
PIF_VALUE: 4
PIF_VALUE: 17
PIF_VALUE: 18
PIF_VALUE: 17
PIF_VALUE: 18
PIF_VALUE: 18
PIF_VALUE: 17
PIF_VALUE: 18
PIF_VALUE: 2
PIF_VALUE: 16
PIF_VALUE: 16
PIF_VALUE: 17
PIF_VALUE: 21
PIF_VALUE: 21
PIF_VALUE: 2
PIF_VALUE: 17
PIF_VALUE: 17
PIF_VALUE: 19
PIF_VALUE: 20
PIF_VALUE: 16
PIF_VALUE: 31
PIF_VALUE: 17
PIF_VALUE: 19
PIF_VALUE: 15
PIF_VALUE: 19
PIF_VALUE: 18
PIF_VALUE: 17
PIF_VALUE: 17
PIF_VALUE: 15
PIF_VALUE: 17
PIF_VALUE: 17
PIF_VALUE: 16
PIF_VALUE: 15
PIF_VALUE: 17
PIF_VALUE: 17
PIF_VALUE: 18
PIF_VALUE: 15
PIF_VALUE: 16
PIF_VALUE: 15
PIF_VALUE: 18
PIF_VALUE: 17
PIF_VALUE: 19
PIF_VALUE: 2
PIF_VALUE: 15
PIF_VALUE: 18
PIF_VALUE: 17
PIF_VALUE: 18
PIF_VALUE: 16
PIF_VALUE: 1
PIF_VALUE: 6
PIF_VALUE: 17
PIF_VALUE: 18
PIF_VALUE: 16
PIF_VALUE: 17
PIF_VALUE: 16
PIF_VALUE: 17
PIF_VALUE: 19
PIF_VALUE: 20
PIF_VALUE: 17
PIF_VALUE: 1
PIF_VALUE: 16
PIF_VALUE: 17
PIF_VALUE: 19
PIF_VALUE: 18
PIF_VALUE: 17
PIF_VALUE: 7
PIF_VALUE: 18
PIF_VALUE: 20
PIF_VALUE: 21
PIF_VALUE: 17
PIF_VALUE: 18
PIF_VALUE: 17
PIF_VALUE: 4
PIF_VALUE: 16
PIF_VALUE: 15
PIF_VALUE: 21
PIF_VALUE: 17
PIF_VALUE: 1
PIF_VALUE: 17
PIF_VALUE: 1
PIF_VALUE: 15
PIF_VALUE: 17
PIF_VALUE: 15
PIF_VALUE: 17
PIF_VALUE: 5
PIF_VALUE: 17
PIF_VALUE: 21
PIF_VALUE: 16
PIF_VALUE: 18
PIF_VALUE: 17
PIF_VALUE: 16
PIF_VALUE: 17
PIF_VALUE: 0
PIF_VALUE: 19
PIF_VALUE: 18
PIF_VALUE: 19
PIF_VALUE: 15
PIF_VALUE: 20
PIF_VALUE: 16
PIF_VALUE: 17
PIF_VALUE: 0
PIF_VALUE: 17
PIF_VALUE: 17
PIF_VALUE: 16
PIF_VALUE: 16
PIF_VALUE: 17
PIF_VALUE: 15
PIF_VALUE: 17
PIF_VALUE: 15
PIF_VALUE: 15
PIF_VALUE: 17
PIF_VALUE: 19

## 2019-01-29 ASSESSMENT — PAIN SCALES - GENERAL
PAINLEVEL_OUTOF10: 0
PAINLEVEL_OUTOF10: 4
PAINLEVEL_OUTOF10: 0
PAINLEVEL_OUTOF10: 0
PAINLEVEL_OUTOF10: 2
PAINLEVEL_OUTOF10: 0

## 2019-01-29 ASSESSMENT — PAIN DESCRIPTION - ORIENTATION: ORIENTATION: RIGHT

## 2019-01-29 ASSESSMENT — PAIN DESCRIPTION - FREQUENCY: FREQUENCY: CONTINUOUS

## 2019-01-29 ASSESSMENT — PAIN DESCRIPTION - ONSET: ONSET: ON-GOING

## 2019-01-29 ASSESSMENT — PAIN - FUNCTIONAL ASSESSMENT
PAIN_FUNCTIONAL_ASSESSMENT: PREVENTS OR INTERFERES SOME ACTIVE ACTIVITIES AND ADLS
PAIN_FUNCTIONAL_ASSESSMENT: 0-10

## 2019-01-29 ASSESSMENT — PAIN DESCRIPTION - PROGRESSION: CLINICAL_PROGRESSION: RAPIDLY WORSENING

## 2019-01-29 ASSESSMENT — PAIN DESCRIPTION - PAIN TYPE: TYPE: ACUTE PAIN;SURGICAL PAIN

## 2019-01-29 ASSESSMENT — PAIN DESCRIPTION - DESCRIPTORS: DESCRIPTORS: ACHING;CONSTANT;DULL

## 2019-01-29 ASSESSMENT — PAIN DESCRIPTION - LOCATION: LOCATION: SHOULDER

## 2019-01-30 VITALS
HEART RATE: 60 BPM | BODY MASS INDEX: 24.22 KG/M2 | HEIGHT: 65 IN | DIASTOLIC BLOOD PRESSURE: 53 MMHG | RESPIRATION RATE: 14 BRPM | WEIGHT: 145.38 LBS | TEMPERATURE: 97.7 F | OXYGEN SATURATION: 97 % | SYSTOLIC BLOOD PRESSURE: 110 MMHG

## 2019-01-30 LAB
ANION GAP SERPL CALCULATED.3IONS-SCNC: 10 MMOL/L (ref 4–16)
BUN BLDV-MCNC: 20 MG/DL (ref 6–23)
CALCIUM SERPL-MCNC: 8 MG/DL (ref 8.3–10.6)
CHLORIDE BLD-SCNC: 101 MMOL/L (ref 99–110)
CO2: 24 MMOL/L (ref 21–32)
CREAT SERPL-MCNC: 0.7 MG/DL (ref 0.9–1.3)
GFR AFRICAN AMERICAN: >60 ML/MIN/1.73M2
GFR NON-AFRICAN AMERICAN: >60 ML/MIN/1.73M2
GLUCOSE BLD-MCNC: 116 MG/DL (ref 70–99)
HCT VFR BLD CALC: 27.2 % (ref 42–52)
HEMOGLOBIN: 8.5 GM/DL (ref 13.5–18)
INR BLD: 1.03 INDEX
POTASSIUM SERPL-SCNC: 4.3 MMOL/L (ref 3.5–5.1)
PROTHROMBIN TIME: 11.7 SECONDS (ref 9.12–12.5)
SODIUM BLD-SCNC: 135 MMOL/L (ref 135–145)

## 2019-01-30 PROCEDURE — 85014 HEMATOCRIT: CPT

## 2019-01-30 PROCEDURE — 6360000002 HC RX W HCPCS: Performed by: INTERNAL MEDICINE

## 2019-01-30 PROCEDURE — 36415 COLL VENOUS BLD VENIPUNCTURE: CPT

## 2019-01-30 PROCEDURE — 2580000003 HC RX 258: Performed by: ORTHOPAEDIC SURGERY

## 2019-01-30 PROCEDURE — G0378 HOSPITAL OBSERVATION PER HR: HCPCS

## 2019-01-30 PROCEDURE — 6360000002 HC RX W HCPCS: Performed by: ORTHOPAEDIC SURGERY

## 2019-01-30 PROCEDURE — 85610 PROTHROMBIN TIME: CPT

## 2019-01-30 PROCEDURE — 85018 HEMOGLOBIN: CPT

## 2019-01-30 PROCEDURE — 96374 THER/PROPH/DIAG INJ IV PUSH: CPT

## 2019-01-30 PROCEDURE — 96372 THER/PROPH/DIAG INJ SC/IM: CPT

## 2019-01-30 PROCEDURE — 6370000000 HC RX 637 (ALT 250 FOR IP): Performed by: ORTHOPAEDIC SURGERY

## 2019-01-30 PROCEDURE — 6370000000 HC RX 637 (ALT 250 FOR IP): Performed by: NURSE PRACTITIONER

## 2019-01-30 PROCEDURE — 80048 BASIC METABOLIC PNL TOTAL CA: CPT

## 2019-01-30 PROCEDURE — 96376 TX/PRO/DX INJ SAME DRUG ADON: CPT

## 2019-01-30 RX ORDER — OXYCODONE HYDROCHLORIDE AND ACETAMINOPHEN 5; 325 MG/1; MG/1
1 TABLET ORAL EVERY 4 HOURS PRN
Qty: 20 TABLET | Refills: 0 | Status: SHIPPED | OUTPATIENT
Start: 2019-01-30 | End: 2019-02-11 | Stop reason: SDUPTHER

## 2019-01-30 RX ADMIN — FAMOTIDINE 10 MG: 20 TABLET, FILM COATED ORAL at 08:15

## 2019-01-30 RX ADMIN — CETIRIZINE HYDROCHLORIDE 10 MG: 10 TABLET, FILM COATED ORAL at 08:14

## 2019-01-30 RX ADMIN — OXYCODONE AND ACETAMINOPHEN 1 TABLET: 5; 325 TABLET ORAL at 02:04

## 2019-01-30 RX ADMIN — OXYCODONE AND ACETAMINOPHEN 1 TABLET: 5; 325 TABLET ORAL at 08:48

## 2019-01-30 RX ADMIN — KETOROLAC TROMETHAMINE 15 MG: 30 INJECTION, SOLUTION INTRAMUSCULAR; INTRAVENOUS at 05:41

## 2019-01-30 RX ADMIN — CEFAZOLIN SODIUM 2 G: 2 INJECTION, SOLUTION INTRAVENOUS at 00:09

## 2019-01-30 RX ADMIN — SODIUM CHLORIDE, POTASSIUM CHLORIDE, SODIUM LACTATE AND CALCIUM CHLORIDE: 600; 310; 30; 20 INJECTION, SOLUTION INTRAVENOUS at 02:15

## 2019-01-30 RX ADMIN — ENOXAPARIN SODIUM 40 MG: 40 INJECTION SUBCUTANEOUS at 08:17

## 2019-01-30 RX ADMIN — DOCUSATE SODIUM 100 MG: 100 CAPSULE, LIQUID FILLED ORAL at 08:15

## 2019-01-30 RX ADMIN — ENOXAPARIN SODIUM 60 MG: 60 INJECTION SUBCUTANEOUS at 10:54

## 2019-01-30 RX ADMIN — KETOROLAC TROMETHAMINE 15 MG: 30 INJECTION, SOLUTION INTRAMUSCULAR; INTRAVENOUS at 00:12

## 2019-01-30 ASSESSMENT — PAIN SCALES - GENERAL
PAINLEVEL_OUTOF10: 4
PAINLEVEL_OUTOF10: 4
PAINLEVEL_OUTOF10: 5
PAINLEVEL_OUTOF10: 2

## 2019-01-30 ASSESSMENT — PAIN DESCRIPTION - ORIENTATION
ORIENTATION: RIGHT
ORIENTATION: RIGHT

## 2019-01-30 ASSESSMENT — PAIN DESCRIPTION - ONSET
ONSET: ON-GOING
ONSET: ON-GOING

## 2019-01-30 ASSESSMENT — PAIN DESCRIPTION - PROGRESSION
CLINICAL_PROGRESSION: NOT CHANGED
CLINICAL_PROGRESSION: NOT CHANGED

## 2019-01-30 ASSESSMENT — PAIN DESCRIPTION - PAIN TYPE
TYPE: ACUTE PAIN;SURGICAL PAIN
TYPE: ACUTE PAIN;SURGICAL PAIN

## 2019-01-30 ASSESSMENT — PAIN DESCRIPTION - DESCRIPTORS
DESCRIPTORS: ACHING;CONSTANT
DESCRIPTORS: ACHING;CONSTANT;DULL

## 2019-01-30 ASSESSMENT — PAIN DESCRIPTION - LOCATION
LOCATION: SHOULDER
LOCATION: SHOULDER

## 2019-01-30 ASSESSMENT — PAIN DESCRIPTION - FREQUENCY
FREQUENCY: CONTINUOUS
FREQUENCY: CONTINUOUS

## 2019-01-30 ASSESSMENT — PAIN - FUNCTIONAL ASSESSMENT
PAIN_FUNCTIONAL_ASSESSMENT: PREVENTS OR INTERFERES SOME ACTIVE ACTIVITIES AND ADLS
PAIN_FUNCTIONAL_ASSESSMENT: PREVENTS OR INTERFERES SOME ACTIVE ACTIVITIES AND ADLS

## 2019-02-05 ENCOUNTER — TELEPHONE (OUTPATIENT)
Dept: CARDIOLOGY CLINIC | Age: 64
End: 2019-02-05

## 2019-02-05 ENCOUNTER — OFFICE VISIT (OUTPATIENT)
Dept: ORTHOPEDIC SURGERY | Age: 64
End: 2019-02-05

## 2019-02-05 VITALS — BODY MASS INDEX: 23.54 KG/M2 | WEIGHT: 150 LBS | HEIGHT: 67 IN | RESPIRATION RATE: 14 BRPM

## 2019-02-05 DIAGNOSIS — Z09 POSTOP CHECK: ICD-10-CM

## 2019-02-05 DIAGNOSIS — Z96.611 S/P SHOULDER HEMIARTHROPLASTY, RIGHT: Primary | ICD-10-CM

## 2019-02-05 PROCEDURE — 99024 POSTOP FOLLOW-UP VISIT: CPT | Performed by: PHYSICIAN ASSISTANT

## 2019-02-05 RX ORDER — PAROXETINE HYDROCHLORIDE 20 MG/1
TABLET, FILM COATED ORAL
Refills: 0 | Status: ON HOLD | COMMUNITY
Start: 2019-01-15 | End: 2020-07-07 | Stop reason: HOSPADM

## 2019-02-05 RX ORDER — SODIUM CHLORIDE FOR INHALATION 3 %
VIAL, NEBULIZER (ML) INHALATION
COMMUNITY
Start: 2018-10-31 | End: 2022-03-30 | Stop reason: SDUPTHER

## 2019-02-05 RX ORDER — IBUPROFEN 200 MG/1
TABLET ORAL
Refills: 0 | COMMUNITY
Start: 2019-01-08 | End: 2019-08-02

## 2019-02-05 RX ORDER — POLYETHYLENE GLYCOL 3350, SODIUM CHLORIDE, SODIUM BICARBONATE, POTASSIUM CHLORIDE 420; 11.2; 5.72; 1.48 G/4L; G/4L; G/4L; G/4L
POWDER, FOR SOLUTION ORAL
Refills: 0 | COMMUNITY
Start: 2019-01-08 | End: 2019-08-02

## 2019-02-06 ENCOUNTER — TELEPHONE (OUTPATIENT)
Dept: ORTHOPEDIC SURGERY | Age: 64
End: 2019-02-06

## 2019-02-08 ENCOUNTER — TELEPHONE (OUTPATIENT)
Dept: ORTHOPEDIC SURGERY | Age: 64
End: 2019-02-08

## 2019-02-11 DIAGNOSIS — M19.011 LOCALIZED OSTEOARTHRITIS OF RIGHT SHOULDER: ICD-10-CM

## 2019-02-11 RX ORDER — OXYCODONE HYDROCHLORIDE AND ACETAMINOPHEN 5; 325 MG/1; MG/1
1 TABLET ORAL EVERY 6 HOURS PRN
Qty: 20 TABLET | Refills: 0 | Status: SHIPPED | OUTPATIENT
Start: 2019-02-11 | End: 2019-02-18

## 2019-02-18 ENCOUNTER — OFFICE VISIT (OUTPATIENT)
Dept: ORTHOPEDIC SURGERY | Age: 64
End: 2019-02-18

## 2019-02-18 VITALS
TEMPERATURE: 97.4 F | BODY MASS INDEX: 24.98 KG/M2 | WEIGHT: 149.91 LBS | OXYGEN SATURATION: 98 % | HEART RATE: 80 BPM | HEIGHT: 65 IN

## 2019-02-18 DIAGNOSIS — Z09 POSTOP CHECK: Primary | ICD-10-CM

## 2019-02-18 PROCEDURE — 99024 POSTOP FOLLOW-UP VISIT: CPT | Performed by: PHYSICIAN ASSISTANT

## 2019-02-19 ENCOUNTER — TELEPHONE (OUTPATIENT)
Dept: ORTHOPEDIC SURGERY | Age: 64
End: 2019-02-19

## 2019-03-04 ENCOUNTER — TELEPHONE (OUTPATIENT)
Dept: ORTHOPEDIC SURGERY | Age: 64
End: 2019-03-04

## 2019-03-07 ENCOUNTER — OFFICE VISIT (OUTPATIENT)
Dept: ORTHOPEDIC SURGERY | Age: 64
End: 2019-03-07

## 2019-03-07 DIAGNOSIS — Z09 POSTOP CHECK: Primary | ICD-10-CM

## 2019-03-07 PROCEDURE — 99024 POSTOP FOLLOW-UP VISIT: CPT | Performed by: PHYSICIAN ASSISTANT

## 2019-03-07 RX ORDER — DOXYCYCLINE HYCLATE 100 MG/1
100 CAPSULE ORAL 2 TIMES DAILY
COMMUNITY
End: 2019-07-31 | Stop reason: SDUPTHER

## 2019-03-11 ENCOUNTER — OFFICE VISIT (OUTPATIENT)
Dept: ORTHOPEDIC SURGERY | Age: 64
End: 2019-03-11

## 2019-03-11 VITALS — HEART RATE: 76 BPM | TEMPERATURE: 97.9 F | RESPIRATION RATE: 14 BRPM | OXYGEN SATURATION: 96 %

## 2019-03-11 VITALS — RESPIRATION RATE: 14 BRPM | OXYGEN SATURATION: 98 % | HEART RATE: 77 BPM

## 2019-03-11 DIAGNOSIS — Z96.611 S/P SHOULDER HEMIARTHROPLASTY, RIGHT: Primary | ICD-10-CM

## 2019-03-11 PROCEDURE — 99024 POSTOP FOLLOW-UP VISIT: CPT | Performed by: ORTHOPAEDIC SURGERY

## 2019-03-11 ASSESSMENT — ENCOUNTER SYMPTOMS: COLOR CHANGE: 0

## 2019-04-08 ENCOUNTER — OFFICE VISIT (OUTPATIENT)
Dept: CARDIOLOGY CLINIC | Age: 64
End: 2019-04-08
Payer: MEDICARE

## 2019-04-08 VITALS
SYSTOLIC BLOOD PRESSURE: 134 MMHG | HEIGHT: 66 IN | BODY MASS INDEX: 24.59 KG/M2 | WEIGHT: 153 LBS | HEART RATE: 68 BPM | DIASTOLIC BLOOD PRESSURE: 80 MMHG

## 2019-04-08 DIAGNOSIS — Z95.2 S/P AVR: ICD-10-CM

## 2019-04-08 DIAGNOSIS — R09.89 DEPRESSED LEFT VENTRICULAR EJECTION FRACTION: Primary | ICD-10-CM

## 2019-04-08 DIAGNOSIS — R06.02 SHORTNESS OF BREATH: ICD-10-CM

## 2019-04-08 PROCEDURE — G8420 CALC BMI NORM PARAMETERS: HCPCS | Performed by: INTERNAL MEDICINE

## 2019-04-08 PROCEDURE — 3017F COLORECTAL CA SCREEN DOC REV: CPT | Performed by: INTERNAL MEDICINE

## 2019-04-08 PROCEDURE — 99214 OFFICE O/P EST MOD 30 MIN: CPT | Performed by: INTERNAL MEDICINE

## 2019-04-08 PROCEDURE — 4004F PT TOBACCO SCREEN RCVD TLK: CPT | Performed by: INTERNAL MEDICINE

## 2019-04-08 PROCEDURE — G8427 DOCREV CUR MEDS BY ELIG CLIN: HCPCS | Performed by: INTERNAL MEDICINE

## 2019-04-08 NOTE — PROGRESS NOTES
(RHINOCORT ALLERGY) 32 MCG/ACT nasal spray 2 sprays by Nasal route daily      Acetaminophen (TYLENOL 8 HOUR PO) Take by mouth as needed Over The Counter      UNABLE TO FIND Take by mouth 2 times daily Over The Counter \"Oscal Calcium Supplement With Vitamin D3\"      UNABLE TO FIND Take by mouth daily Over The Counter \"Naure Made (Burb-Less) Ultra [place] 3 From Fish Oil (935 EPA +  mg + 252 DHA\"      Multiple Vitamins-Minerals (CENTRUM PO) Take by mouth daily      Albuterol Sulfate (PROAIR HFA IN) Inhale into the lungs as needed      Nebulizer MISC Inhale into the lungs as needed      RaNITidine HCl (ACID REDUCER PO) Take by mouth as needed Over The Counter       No current facility-administered medications for this visit. Allergies: Ciprofloxacin hcl; Levaquin [levofloxacin]; and Other  Past Medical History:   Diagnosis Date    Acid reflux     Alcohol abuse     \"I Drink Average 2 Beers A Day\"    Anesthesia     \" I get agitated\"    Anxiety     Arthritis     \"Right Shoulder, Neck, Left Knee\"    Atypical mycobacterial infection Dx 1996    Broken teeth     Upper And Lower     Bronchiectasis ([facility] [address] Dx 1996    COPD (chronic obstructive pulmonary disease) ([facility] [address]     Sees Dr. Jose Urban In [place], [address]     Cough     Frequent Cough With Green Sputum    H/O echocardiogram 05/22/2014    JH=>76-83%, LV systolic function is low normal, mild aortic valve calcification, no pericardial effusion    H/O echocardiogram 12/12/2018    EF 45-50%    History of 24 hour EKG monitoring 6/6/14    24 hr holter monitor. Ventricular ectopics were noted in single and couplet forms. Supraventricular ectopics were noted in single and pair forms.     [place] (hard of hearing)     Bilateral Ears    Hyperlipidemia     Hypertension     S/P AVR 12/2003    Mechanical valvue     Shortness of breath     Teeth missing     Upper And Lower     Past Surgical History:   Procedure Laterality Date    BRONCHOSCOPY 1996    \"Done Twice\"    CARDIAC VALVE REPLACEMENT  12/17/2003    \"Aortic Valve Replacement, Mechanical Valve\"    COLONOSCOPY  2006    Polyps Removed    DENTAL SURGERY      Teeth Extracted In Past    ENDOSCOPY, COLON, DIAGNOSTIC  In Past    HEMIARTHROPLASTY SHOULDER FRACTURE Right 1/29/2019    SHOULDER HEMIARTHROPLASTY performed by Mcihael Olivera DO at [facility] ARTHROSCOPY Left 1992    LUNG BIOPSY Right [facility]    \"Cauterized Because My Sperm Was Low\"    ROTATOR CUFF REPAIR Right 2008    TONSILLECTOMY  1959 Or 1960     Family History   Problem Relation Age of Onset    Cancer Mother         Lymphoma    Diabetes Mother     High Blood Pressure Mother     High Cholesterol Mother     Obesity Mother     Vision Loss Mother         Wore Glasses    Heart Disease Father         \"Heart Failure\"   [facility] COPD Father     Asthma Sister     High Blood Pressure Sister     High Cholesterol Sister     COPD Sister     Diabetes Sister         \"Pre Diabetes\"     Social History     Tobacco Use    Smoking status: Never Smoker    Smokeless tobacco: Current User     Types: Snuff, Chew   Substance Use Topics    Alcohol use: Yes     Comment:  \"Average 2 Beers A Day\"      [id number]  Review of Systems:   · Constitutional: No Fever or Weight Loss   · Eyes: No Decreased Vision  · ENT: No Headaches, Hearing Loss or Vertigo  · Cardiovascular: No chest pain, dyspnea on exertion, palpitations or loss of consciousness  · Respiratory: No cough or wheezing    · Gastrointestinal: No abdominal pain, appetite loss, blood in stools, constipation, diarrhea or heartburn  · Genitourinary: No dysuria, trouble voiding, or hematuria  · Musculoskeletal:  No gait disturbance, weakness or joint complaints  · Integumentary: No rash or pruritis  · Neurological: No TIA or stroke symptoms  · Psychiatric: No anxiety or depression  · Endocrine: No malaise, fatigue or temperature intolerance  · Hematologic/Lymphatic: No bleeding problems, blood clots or swollen lymph nodes  · Allergic/Immunologic: No nasal congestion or hives  All systems negative except as marked. Objective:  /80   Pulse 68   Ht 5' 6\" (1.676 m)   Wt 153 lb (69.4 kg)   BMI 24.69 kg/m²   Wt Readings from Last 3 Encounters:   04/08/19 153 lb (69.4 kg)   02/18/19 149 lb 14.6 oz (68 kg)   02/05/19 150 lb (68 kg)     Body mass index is 24.69 kg/m². GENERAL - Alert, oriented, pleasant, in no apparent distress,normal grooming  HEENT - pupils are reactive to light and accomodation, cornea intact, conjunctive normal color, ears are normal in exam,throat exam in normal, teeth, gum and palate are normal, oral mucosa is normal without any notation of pallor or cyanosis  Neck - Supple. No jugular venous distention noted. No carotid bruits, no apical -carotid delay  Respiratory:  Normal breath sounds, No respiratory distress, No wheezing, No chest tenderness. ,no use of accessory muscles, diaphragm movement is normal  Cardiovascular: (PMI) apex non displaced,no lifts no thrills, no s3,no s4, Normal heart rate, Normal rhythm, No murmurs, No rubs, No gallops. Carotid arteries pulse and amplitude are normal no bruit, no abdominal bruit noted ( normal abdominal aorta ausculation), femoral arteries pulse and amplitude are normal no bruit, pedal pulses are normal  Femoral pulses have normal amplitude, no bruits   Extremities - No cyanosis, clubbing, or significant edema, no varicose veins    Abdomen - No masses, tenderness, or organomegaly, no hepato-splenomegally, no bruits  Musculoskeletal - No significant edema, no kyphosis or scoliosis, no deformity in any extremity noted, muscle strength and tone are normal  Skin: no ulcer,no scar,no stasis dermatitis   Neurologic - alert oriented times 3,Cranial nerves II through XII are grossly intact. There were no gross focal neurologic abnormalities.  All sensory and motor nerves examined and were normal  Psychiatric: normal

## 2019-04-17 ENCOUNTER — TELEPHONE (OUTPATIENT)
Dept: CARDIOLOGY CLINIC | Age: 64
End: 2019-04-17

## 2019-04-17 NOTE — TELEPHONE ENCOUNTER
Called patient to schedule [place], no answer.  Left message for patient to call office to schedule [facility] Medicare AUTH# [id number] EXP 6/1/19 OK TO SCHEDULE

## 2019-04-18 LAB
INR BLD: 1.86 (ref 0.86–1.14)
PROTHROMBIN TIME: 21.2 SEC (ref 9.8–13)

## 2019-04-22 ENCOUNTER — TELEPHONE (OUTPATIENT)
Dept: CARDIOLOGY CLINIC | Age: 64
End: 2019-04-22

## 2019-04-22 NOTE — TELEPHONE ENCOUNTER
The patient called to set up [place] [id number] appointment the patient is schedule for 4/29/2019 @12:00 pm for this testing

## 2019-04-30 LAB
INR BLD: 2.21 (ref 0.86–1.14)
PROTHROMBIN TIME: 25.2 SEC (ref 9.8–13)

## 2019-05-06 ENCOUNTER — OFFICE VISIT (OUTPATIENT)
Dept: ORTHOPEDIC SURGERY | Age: 64
End: 2019-05-06
Payer: MEDICARE

## 2019-05-06 DIAGNOSIS — Z96.611 S/P SHOULDER HEMIARTHROPLASTY, RIGHT: Primary | ICD-10-CM

## 2019-05-06 PROBLEM — R04.2 HEMOPTYSIS: Status: ACTIVE | Noted: 2019-04-24

## 2019-05-06 PROBLEM — R06.00 DYSPNEA: Status: ACTIVE | Noted: 2018-02-23

## 2019-05-06 PROBLEM — I10 HTN (HYPERTENSION): Status: ACTIVE | Noted: 2018-02-20

## 2019-05-06 PROCEDURE — 3017F COLORECTAL CA SCREEN DOC REV: CPT | Performed by: ORTHOPAEDIC SURGERY

## 2019-05-06 PROCEDURE — 4004F PT TOBACCO SCREEN RCVD TLK: CPT | Performed by: ORTHOPAEDIC SURGERY

## 2019-05-06 PROCEDURE — G8420 CALC BMI NORM PARAMETERS: HCPCS | Performed by: ORTHOPAEDIC SURGERY

## 2019-05-06 PROCEDURE — 99212 OFFICE O/P EST SF 10 MIN: CPT | Performed by: ORTHOPAEDIC SURGERY

## 2019-05-06 PROCEDURE — G8428 CUR MEDS NOT DOCUMENT: HCPCS | Performed by: ORTHOPAEDIC SURGERY

## 2019-05-06 ASSESSMENT — ENCOUNTER SYMPTOMS: COLOR CHANGE: 0

## 2019-05-06 NOTE — PROGRESS NOTES
Subjective:      Patient ID: Pradeep Kinney is a 59 y.o. male. Lorrie Fitzgerald is here his follow up on his right shoulder. He comes in today for his 4 month postop recheck after right shoulder hemiarthroplasty. he states that overall he is feeling much better and is very happy with his progress at this point. In regards to his right shoulder he states that his pain is doing 100% better than it was before surgery. He is very happy with his progress at this point. He has been working on range of motion exercises on his. Patient denies any new injury to the involved extremity/ joint, denies numbness or tingling in the involved extremity and denies fever or chills. Shoulder Pain    Pertinent negatives include no fever or numbness. Review of Systems   Constitutional: Negative for activity change, chills and fever. Musculoskeletal: Negative for arthralgias, gait problem, joint swelling and myalgias. Skin: Negative for color change, pallor, rash and wound. Neurological: Negative for weakness and numbness. Objective:   Physical Exam   Constitutional: He is oriented to person, place, and time. Vital signs are normal. He appears well-developed and well-nourished. HENT:   Head: Normocephalic and atraumatic. Eyes: Pupils are equal, round, and reactive to light. Neck: Normal range of motion. Musculoskeletal: He exhibits no edema, tenderness or deformity. Right shoulder: He exhibits decreased range of motion. He exhibits no tenderness, no bony tenderness, no swelling, no effusion, no crepitus, no deformity, no laceration, no pain, no spasm, normal pulse and normal strength. Left shoulder: He exhibits normal range of motion, no tenderness, no bony tenderness, no swelling, no effusion, no crepitus, no deformity, no laceration, no pain, no spasm, normal pulse and normal strength. Neurological: He is alert and oriented to person, place, and time. He has normal strength.  No sensory deficit. Skin: Skin is warm and dry. No rash noted. No erythema. No pallor. Right shoulder-abduction 90°, forward flexion 140°, external rotation 45°, internal rotation 45°    Incision well healed, clean, dry, intact, with no erythema, no drainage, and no signs of infection. XRAY  X-ray 3 views of the right shoulder obtained and reviewed by me today in the office demonstrates a well-positioned right shoulder hemiarthroplasty, there is no change of position of components compared to postoperative x-rays, the humeral head remains well positioned on the glenoid, there does not appear to be any worsening of the wear or position of the glenoid fracture fragments seen intraoperatively, no acute osseous abnormalities. Assessment:      Right shoulder hemiarthroplasty, 3 months         Plan:      I discussed with him today his x-ray findings. I explained to him that his implants are stable and in good alignment. I discussed with him today that he is progressing extremely well. I discussed with the patient today that their are symptoms are normal and should improve with time. At this point he can resume all activities with no restrictions. Ice or heat as needed. Tylenol or Motrin as needed. Follow-up in 8 months for recheck with x-rays of the right shoulder.         Barbara Stone, DO

## 2019-05-06 NOTE — PATIENT INSTRUCTIONS
PROCEDURE:  1. Right shoulder hemiarthroplasty using Biomet comprehensive pressfit size 15 mini  humeral stem with size 54 mm x 21 mm humeral head.     FOLLOW UP IN 8 MONTHS

## 2019-05-06 NOTE — PROGRESS NOTES
Minnie Siemens is here today to follow up on his right shoulder pain. He had right shoulder thalia arthroplasty on 01/29/19. PROCEDURE:  1. Right shoulder hemiarthroplasty using Biomet comprehensive pressfit size 15 mini  humeral stem with size 54 mm x 21 mm humeral head. He is carrying a large briefcase in his right arm. He states he is doing well and needs to know what kind of metal is in his replacement as he is having heart surgery soon. Patient did not bring a med list and states no changes.

## 2019-05-14 ENCOUNTER — PROCEDURE VISIT (OUTPATIENT)
Dept: CARDIOLOGY CLINIC | Age: 64
End: 2019-05-14
Payer: MEDICARE

## 2019-05-14 DIAGNOSIS — R06.02 SHORTNESS OF BREATH: ICD-10-CM

## 2019-05-14 DIAGNOSIS — R09.89 DEPRESSED LEFT VENTRICULAR EJECTION FRACTION: ICD-10-CM

## 2019-05-14 DIAGNOSIS — Z95.2 S/P AVR: ICD-10-CM

## 2019-05-14 LAB
LV EF: 50 %
LVEF MODALITY: NORMAL

## 2019-05-14 PROCEDURE — 78472 GATED HEART PLANAR SINGLE: CPT | Performed by: INTERNAL MEDICINE

## 2019-05-14 PROCEDURE — A9560 TC99M LABELED RBC: HCPCS | Performed by: INTERNAL MEDICINE

## 2019-05-16 ENCOUNTER — TELEPHONE (OUTPATIENT)
Dept: CARDIOLOGY CLINIC | Age: 64
End: 2019-05-16

## 2019-05-16 ENCOUNTER — ANTI-COAG VISIT (OUTPATIENT)
Dept: FAMILY MEDICINE CLINIC | Age: 64
End: 2019-05-16
Payer: MEDICARE

## 2019-05-16 DIAGNOSIS — Z95.2 H/O AORTIC VALVE REPLACEMENT: Primary | ICD-10-CM

## 2019-05-16 LAB
INTERNATIONAL NORMALIZATION RATIO, POC: 1.1
INTERNATIONAL NORMALIZATION RATIO, POC: 1.1
PROTHROMBIN TIME, POC: ABNORMAL

## 2019-05-16 PROCEDURE — 85610 PROTHROMBIN TIME: CPT | Performed by: FAMILY MEDICINE

## 2019-05-23 DIAGNOSIS — F41.9 ANXIETY: ICD-10-CM

## 2019-05-23 DIAGNOSIS — Z92.89 H/O CARDIOVASCULAR STRESS TEST: ICD-10-CM

## 2019-05-23 DIAGNOSIS — F10.10 ALCOHOL ABUSE: ICD-10-CM

## 2019-05-23 DIAGNOSIS — R20.0 ANESTHESIA: ICD-10-CM

## 2019-05-23 DIAGNOSIS — M19.90 ARTHRITIS: ICD-10-CM

## 2019-05-23 DIAGNOSIS — A31.9 ATYPICAL MYCOBACTERIAL INFECTION: ICD-10-CM

## 2019-05-23 DIAGNOSIS — Z95.2 S/P AVR: ICD-10-CM

## 2019-05-23 DIAGNOSIS — R06.02 SHORTNESS OF BREATH: ICD-10-CM

## 2019-05-23 DIAGNOSIS — R05.9 COUGH: ICD-10-CM

## 2019-05-23 DIAGNOSIS — Z92.89: ICD-10-CM

## 2019-05-23 DIAGNOSIS — K08.109 TEETH MISSING: ICD-10-CM

## 2019-05-23 RX ORDER — MUPIROCIN CALCIUM 20 MG/G
CREAM TOPICAL 2 TIMES DAILY
COMMUNITY
End: 2019-08-02

## 2019-06-05 ENCOUNTER — NURSE ONLY (OUTPATIENT)
Dept: FAMILY MEDICINE CLINIC | Age: 64
End: 2019-06-05
Payer: MEDICARE

## 2019-06-05 DIAGNOSIS — Z79.01 LONG TERM (CURRENT) USE OF ANTICOAGULANTS: Primary | ICD-10-CM

## 2019-06-05 LAB
INTERNATIONAL NORMALIZATION RATIO, POC: 1.2
INTERNATIONAL NORMALIZATION RATIO, POC: 1.2
PROTHROMBIN TIME, POC: NORMAL

## 2019-06-05 PROCEDURE — 85610 PROTHROMBIN TIME: CPT | Performed by: PHYSICIAN ASSISTANT

## 2019-06-19 ENCOUNTER — NURSE ONLY (OUTPATIENT)
Dept: FAMILY MEDICINE CLINIC | Age: 64
End: 2019-06-19
Payer: MEDICARE

## 2019-06-19 DIAGNOSIS — Z95.2 AORTIC VALVE REPLACED: Primary | ICD-10-CM

## 2019-06-19 LAB — INTERNATIONAL NORMALIZATION RATIO, POC: 2.4

## 2019-06-19 PROCEDURE — 85610 PROTHROMBIN TIME: CPT | Performed by: FAMILY MEDICINE

## 2019-06-26 ENCOUNTER — TELEPHONE (OUTPATIENT)
Dept: FAMILY MEDICINE CLINIC | Age: 64
End: 2019-06-26

## 2019-06-26 DIAGNOSIS — I50.9 CONGESTIVE HEART FAILURE, UNSPECIFIED HF CHRONICITY, UNSPECIFIED HEART FAILURE TYPE (HCC): Primary | ICD-10-CM

## 2019-06-26 DIAGNOSIS — M19.90 ARTHRITIS: ICD-10-CM

## 2019-06-26 DIAGNOSIS — J44.9 CHRONIC OBSTRUCTIVE PULMONARY DISEASE, UNSPECIFIED COPD TYPE (HCC): ICD-10-CM

## 2019-06-26 DIAGNOSIS — M19.011 LOCALIZED OSTEOARTHRITIS OF RIGHT SHOULDER: ICD-10-CM

## 2019-06-26 NOTE — TELEPHONE ENCOUNTER
Patient called stating he only needs a Handi-cap placcard not a letter. Order signed and placed up front for pick-up. Patient advised.

## 2019-06-26 NOTE — TELEPHONE ENCOUNTER
----- Message from Gogo Ford sent at 6/26/2019 10:38 AM EDT -----  Contact: PATIENT  STATES HE NEEDS A LETTER TO [facility] TELLING WHY HE NEEDS HANDI CAP PLACARD SO HE CAN GET ONE.  ASKED IF HE MEANT A SCRIPT HE SAID HE NEEDED A LETTER STATING WHY HE NEEDS ONE?  [phone]

## 2019-07-02 ENCOUNTER — NURSE ONLY (OUTPATIENT)
Dept: FAMILY MEDICINE CLINIC | Age: 64
End: 2019-07-02
Payer: MEDICARE

## 2019-07-02 DIAGNOSIS — Z95.2 AORTIC VALVE REPLACED: Primary | ICD-10-CM

## 2019-07-02 DIAGNOSIS — Z79.01 LONG TERM (CURRENT) USE OF ANTICOAGULANTS: ICD-10-CM

## 2019-07-02 LAB
INTERNATIONAL NORMALIZATION RATIO, POC: 1.9
PROTHROMBIN TIME, POC: ABNORMAL

## 2019-07-02 PROCEDURE — 85610 PROTHROMBIN TIME: CPT | Performed by: FAMILY MEDICINE

## 2019-07-17 ENCOUNTER — NURSE ONLY (OUTPATIENT)
Dept: FAMILY MEDICINE CLINIC | Age: 64
End: 2019-07-17
Payer: MEDICARE

## 2019-07-17 DIAGNOSIS — Z79.01 LONG TERM (CURRENT) USE OF ANTICOAGULANTS: ICD-10-CM

## 2019-07-17 DIAGNOSIS — Z95.2 AORTIC VALVE REPLACED: ICD-10-CM

## 2019-07-17 LAB
INTERNATIONAL NORMALIZATION RATIO, POC: 1.7
PROTHROMBIN TIME, POC: ABNORMAL

## 2019-07-17 PROCEDURE — 85610 PROTHROMBIN TIME: CPT | Performed by: FAMILY MEDICINE

## 2019-07-31 ENCOUNTER — NURSE ONLY (OUTPATIENT)
Dept: FAMILY MEDICINE CLINIC | Age: 64
End: 2019-07-31
Payer: MEDICARE

## 2019-07-31 DIAGNOSIS — J30.1 SEASONAL ALLERGIC RHINITIS DUE TO POLLEN: ICD-10-CM

## 2019-07-31 DIAGNOSIS — Z79.01 ANTICOAGULANT LONG-TERM USE: Primary | ICD-10-CM

## 2019-07-31 LAB — INTERNATIONAL NORMALIZATION RATIO, POC: 2.5

## 2019-07-31 PROCEDURE — 85610 PROTHROMBIN TIME: CPT | Performed by: FAMILY MEDICINE

## 2019-07-31 RX ORDER — WARFARIN SODIUM 5 MG/1
TABLET ORAL
Qty: 60 TABLET | Refills: 1 | Status: SHIPPED | OUTPATIENT
Start: 2019-07-31 | End: 2019-10-20 | Stop reason: SDUPTHER

## 2019-07-31 RX ORDER — MONTELUKAST SODIUM 10 MG/1
10 TABLET ORAL NIGHTLY
Qty: 30 TABLET | Refills: 5 | Status: SHIPPED | OUTPATIENT
Start: 2019-07-31 | End: 2020-08-04 | Stop reason: SDUPTHER

## 2019-08-02 ENCOUNTER — OFFICE VISIT (OUTPATIENT)
Dept: FAMILY MEDICINE CLINIC | Age: 64
End: 2019-08-02
Payer: MEDICARE

## 2019-08-02 VITALS
SYSTOLIC BLOOD PRESSURE: 130 MMHG | DIASTOLIC BLOOD PRESSURE: 68 MMHG | OXYGEN SATURATION: 98 % | TEMPERATURE: 98.9 F | WEIGHT: 152.8 LBS | BODY MASS INDEX: 23.98 KG/M2 | HEIGHT: 67 IN | HEART RATE: 60 BPM

## 2019-08-02 DIAGNOSIS — J01.00 ACUTE MAXILLARY SINUSITIS, RECURRENCE NOT SPECIFIED: Primary | ICD-10-CM

## 2019-08-02 DIAGNOSIS — J30.1 SEASONAL ALLERGIC RHINITIS DUE TO POLLEN: ICD-10-CM

## 2019-08-02 PROCEDURE — 99213 OFFICE O/P EST LOW 20 MIN: CPT | Performed by: FAMILY MEDICINE

## 2019-08-02 RX ORDER — METHYLPREDNISOLONE 4 MG/1
TABLET ORAL
Qty: 1 KIT | Refills: 0 | Status: SHIPPED | OUTPATIENT
Start: 2019-08-02 | End: 2019-08-08

## 2019-08-02 RX ORDER — AMOXICILLIN AND CLAVULANATE POTASSIUM 875; 125 MG/1; MG/1
1 TABLET, FILM COATED ORAL 2 TIMES DAILY
Qty: 20 TABLET | Refills: 0 | Status: SHIPPED | OUTPATIENT
Start: 2019-08-02 | End: 2019-08-12

## 2019-08-02 RX ORDER — CALCIUM CARBONATE 200(500)MG
2 TABLET,CHEWABLE ORAL DAILY PRN
COMMUNITY
End: 2020-05-26

## 2019-08-02 ASSESSMENT — ENCOUNTER SYMPTOMS
SHORTNESS OF BREATH: 0
FACIAL SWELLING: 1
CHEST TIGHTNESS: 0
ABDOMINAL PAIN: 0

## 2019-08-21 ENCOUNTER — NURSE ONLY (OUTPATIENT)
Dept: FAMILY MEDICINE CLINIC | Age: 64
End: 2019-08-21
Payer: MEDICARE

## 2019-08-21 DIAGNOSIS — Z95.2 AORTIC VALVE REPLACED: ICD-10-CM

## 2019-08-21 DIAGNOSIS — Z79.01 LONG TERM (CURRENT) USE OF ANTICOAGULANTS: ICD-10-CM

## 2019-08-21 LAB
INTERNATIONAL NORMALIZATION RATIO, POC: 3.3
PROTHROMBIN TIME, POC: NORMAL

## 2019-08-21 PROCEDURE — 85610 PROTHROMBIN TIME: CPT | Performed by: FAMILY MEDICINE

## 2019-08-26 ENCOUNTER — OFFICE VISIT (OUTPATIENT)
Dept: FAMILY MEDICINE CLINIC | Age: 64
End: 2019-08-26
Payer: MEDICARE

## 2019-08-26 VITALS
HEIGHT: 67 IN | BODY MASS INDEX: 23.45 KG/M2 | DIASTOLIC BLOOD PRESSURE: 82 MMHG | HEART RATE: 76 BPM | SYSTOLIC BLOOD PRESSURE: 124 MMHG | WEIGHT: 149.4 LBS

## 2019-08-26 DIAGNOSIS — L24.5 IRRITANT CONTACT DERMATITIS DUE TO OTHER CHEMICAL PRODUCTS: ICD-10-CM

## 2019-08-26 PROCEDURE — 99213 OFFICE O/P EST LOW 20 MIN: CPT | Performed by: FAMILY MEDICINE

## 2019-08-26 RX ORDER — CALCIUM CARBONATE 500(1250)
500 TABLET ORAL DAILY
COMMUNITY
End: 2022-04-04

## 2019-08-26 RX ORDER — TRIAMCINOLONE ACETONIDE 1 MG/G
CREAM TOPICAL
Qty: 60 G | Refills: 1 | Status: SHIPPED | OUTPATIENT
Start: 2019-08-26 | End: 2020-11-09 | Stop reason: SDUPTHER

## 2019-08-26 SDOH — HEALTH STABILITY: MENTAL HEALTH: HOW MANY STANDARD DRINKS CONTAINING ALCOHOL DO YOU HAVE ON A TYPICAL DAY?: 1 OR 2

## 2019-08-26 ASSESSMENT — ENCOUNTER SYMPTOMS
SHORTNESS OF BREATH: 0
SINUS PRESSURE: 0
SORE THROAT: 0
CHEST TIGHTNESS: 0
RHINORRHEA: 0

## 2019-08-26 NOTE — PROGRESS NOTES
8/26/2019    Kesha Peralta    Chief Complaint   Patient presents with    Rash     red & burning sensation. right shin, bilatersl forearm. 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 rash occurred same day        HPI  Dutch Mercer is a 59 y.o. male who presents today with a 2 and half day history of rash noted above his being on both forearms and right shin. Patient has a history of reacting to concrete. He was working with concrete before the rash started. He is washed his arms off many times in the arms are doing much better. He was concerned about the appearance of the right lower leg. REVIEW OF SYMPTOMS  Review of Systems   Constitutional: Negative for chills and fever. HENT: Negative for ear pain, rhinorrhea, sinus pressure and sore throat. Respiratory: Negative for chest tightness and shortness of breath. Musculoskeletal: Negative for myalgias. PAST MEDICAL HISTORY  Past Medical History:   Diagnosis Date    Acid reflux     Alcohol abuse     \"I Drink Average 2 Beers A Day\"    Anesthesia     \" I get agitated\"    Anxiety     Arthritis     \"Right Shoulder, Neck, Left Knee\"    Broken teeth     Upper And Lower     CHF [facility]congestive heart failure) ([facility])     COPD (chronic obstructive pulmonary disease) ([facility])     Sees Dr. Jason Mistry [place], [address]     Cough     Frequent Cough With Aishwarya Fritch Sputum    Depression     H/O cardiovascular MUGA stress test 05/14/2019    EF 50%, NORMAL LVEF, NORMAL WALL MOTION.    H/O echocardiogram 05/22/2014    MN=>72-59%, LV systolic function is low normal, mild aortic valve calcification, no pericardial effusion    H/O echocardiogram 12/12/2018    EF 45-50%    History of 24 hour EKG monitoring 6/6/14    24 hr holter monitor. Ventricular ectopics were noted in single and couplet forms. Supraventricular ectopics were noted in single and pair forms.     [place] (hard of hearing)     Bilateral Ears    Hyperlipidemia     Hypertension     S/P Date    BRONCHOSCOPY  1996    \"Done Twice\"    CARDIAC VALVE REPLACEMENT  12/17/2003    \"Aortic Valve Replacement, Mechanical Valve\"    COLONOSCOPY  2006    Polyps Removed    DENTAL SURGERY      Teeth Extracted In Past    ENDOSCOPY, COLON, DIAGNOSTIC  In Past    HEMIARTHROPLASTY SHOULDER FRACTURE Right 1/29/2019    SHOULDER HEMIARTHROPLASTY performed by Ermias Martines DO at [address] [facility] Left 1992    LUNG BIOPSY Right 1996   [facility],  [address]    \"Cauterized Because My Sperm Was Low\"    ROTATOR CUFF REPAIR Right 2008    TONSILLECTOMY  1959 Or 1960       CURRENT MEDICATIONS  Current Outpatient Medications   Medication Sig Dispense Refill    calcium carbonate (OSCAL) 500 MG TABS tablet Take 500 mg by mouth daily      KRILL OIL OMEGA-3 PO Take by mouth      triamcinolone (KENALOG) 0.1 % cream Apply topically 2 times daily. 60 g 1    calcium carbonate (TUMS) 500 MG chewable tablet Take 2 tablets by mouth daily as needed for Heartburn      montelukast (SINGULAIR) 10 MG tablet Take 1 tablet by mouth nightly \"Alternate With Zyrtec\" 30 tablet 5    warfarin (COUMADIN) 5 MG tablet Take 1 or 2 daily as directed. 60 tablet 1    PARoxetine (PAXIL) 20 MG tablet take 1 tablet by mouth once daily  0    sodium chloride, Inhalant, 3 % nebulizer solution       simvastatin (ZOCOR) 40 MG tablet Take 40 mg by mouth nightly      cetirizine (ZYRTEC) 10 MG tablet Take 10 mg by mouth \"Alternate With Singulair\"      NASAL SPRAY SALINE NA by Nasal route daily Over The Counter      budesonide (RHINOCORT ALLERGY) 32 MCG/ACT nasal spray 2 sprays by Nasal route daily      Acetaminophen (TYLENOL 8 HOUR PO) Take by mouth as needed Over The Counter      Multiple Vitamins-Minerals (CENTRUM PO) Take by mouth daily      Albuterol Sulfate (PROAIR HFA IN) Inhale into the lungs as needed      Nebulizer MISC Inhale into the lungs as needed       No current facility-administered medications for this visit.

## 2019-09-11 ENCOUNTER — NURSE ONLY (OUTPATIENT)
Dept: FAMILY MEDICINE CLINIC | Age: 64
End: 2019-09-11
Payer: MEDICARE

## 2019-09-11 DIAGNOSIS — Z79.01 LONG TERM (CURRENT) USE OF ANTICOAGULANTS: ICD-10-CM

## 2019-09-11 DIAGNOSIS — Z95.2 AORTIC VALVE REPLACED: ICD-10-CM

## 2019-09-11 LAB
INTERNATIONAL NORMALIZATION RATIO, POC: 2.9
PROTHROMBIN TIME, POC: NORMAL

## 2019-09-11 PROCEDURE — 85610 PROTHROMBIN TIME: CPT | Performed by: FAMILY MEDICINE

## 2019-09-11 NOTE — PROGRESS NOTES
INR 2.9. CURRENT DOSE OF COUMADIN IS 7.5 MG X 4 DAYS AND 10 MG X 3 DAYS. CONTINUE WITH SAME DOSE AND RECHECK IN 3 WEEKS.

## 2019-09-17 ENCOUNTER — OFFICE VISIT (OUTPATIENT)
Dept: FAMILY MEDICINE CLINIC | Age: 64
End: 2019-09-17
Payer: MEDICARE

## 2019-09-17 VITALS
DIASTOLIC BLOOD PRESSURE: 60 MMHG | HEART RATE: 71 BPM | TEMPERATURE: 98 F | SYSTOLIC BLOOD PRESSURE: 124 MMHG | HEIGHT: 66 IN | WEIGHT: 150.8 LBS | BODY MASS INDEX: 24.23 KG/M2

## 2019-09-17 DIAGNOSIS — R35.0 URINARY FREQUENCY: Primary | ICD-10-CM

## 2019-09-17 LAB
BILIRUBIN, POC: NORMAL
BLOOD URINE, POC: NORMAL
CLARITY, POC: CLEAR
COLOR, POC: YELLOW
GLUCOSE URINE, POC: NORMAL
KETONES, POC: NORMAL
LEUKOCYTE EST, POC: NORMAL
NITRITE, POC: NORMAL
PH, POC: NORMAL
PROTEIN, POC: 30
SPECIFIC GRAVITY, POC: 1.01
UROBILINOGEN, POC: 0.2

## 2019-09-17 PROCEDURE — 3017F COLORECTAL CA SCREEN DOC REV: CPT | Performed by: NURSE PRACTITIONER

## 2019-09-17 PROCEDURE — G8420 CALC BMI NORM PARAMETERS: HCPCS | Performed by: NURSE PRACTITIONER

## 2019-09-17 PROCEDURE — G8427 DOCREV CUR MEDS BY ELIG CLIN: HCPCS | Performed by: NURSE PRACTITIONER

## 2019-09-17 PROCEDURE — 99213 OFFICE O/P EST LOW 20 MIN: CPT | Performed by: NURSE PRACTITIONER

## 2019-09-17 PROCEDURE — 4004F PT TOBACCO SCREEN RCVD TLK: CPT | Performed by: NURSE PRACTITIONER

## 2019-09-17 PROCEDURE — 81002 URINALYSIS NONAUTO W/O SCOPE: CPT | Performed by: NURSE PRACTITIONER

## 2019-09-17 ASSESSMENT — ENCOUNTER SYMPTOMS
VOMITING: 0
RESPIRATORY NEGATIVE: 1
DIARRHEA: 0
NAUSEA: 0

## 2019-09-18 LAB — URINE CULTURE, ROUTINE: NORMAL

## 2019-10-01 ENCOUNTER — NURSE ONLY (OUTPATIENT)
Dept: FAMILY MEDICINE CLINIC | Age: 64
End: 2019-10-01
Payer: MEDICARE

## 2019-10-01 DIAGNOSIS — Z95.2 AORTIC VALVE REPLACED: ICD-10-CM

## 2019-10-01 DIAGNOSIS — Z79.01 LONG TERM (CURRENT) USE OF ANTICOAGULANTS: ICD-10-CM

## 2019-10-01 LAB
INTERNATIONAL NORMALIZATION RATIO, POC: 3.7
INTERNATIONAL NORMALIZATION RATIO, POC: ABNORMAL
PROTHROMBIN TIME, POC: ABNORMAL

## 2019-10-01 PROCEDURE — 85610 PROTHROMBIN TIME: CPT | Performed by: FAMILY MEDICINE

## 2019-10-08 ENCOUNTER — NURSE ONLY (OUTPATIENT)
Dept: FAMILY MEDICINE CLINIC | Age: 64
End: 2019-10-08
Payer: MEDICARE

## 2019-10-08 ENCOUNTER — OFFICE VISIT (OUTPATIENT)
Dept: CARDIOLOGY CLINIC | Age: 64
End: 2019-10-08
Payer: MEDICARE

## 2019-10-08 VITALS
HEIGHT: 65 IN | HEART RATE: 70 BPM | SYSTOLIC BLOOD PRESSURE: 120 MMHG | DIASTOLIC BLOOD PRESSURE: 70 MMHG | BODY MASS INDEX: 25.33 KG/M2 | WEIGHT: 152 LBS

## 2019-10-08 DIAGNOSIS — Z95.2 AORTIC VALVE REPLACED: ICD-10-CM

## 2019-10-08 DIAGNOSIS — Z95.2 S/P AVR: Primary | ICD-10-CM

## 2019-10-08 DIAGNOSIS — Z79.01 LONG TERM (CURRENT) USE OF ANTICOAGULANTS: ICD-10-CM

## 2019-10-08 LAB
INTERNATIONAL NORMALIZATION RATIO, POC: 2.9
PROTHROMBIN TIME, POC: NORMAL

## 2019-10-08 PROCEDURE — G8419 CALC BMI OUT NRM PARAM NOF/U: HCPCS | Performed by: INTERNAL MEDICINE

## 2019-10-08 PROCEDURE — 99214 OFFICE O/P EST MOD 30 MIN: CPT | Performed by: INTERNAL MEDICINE

## 2019-10-08 PROCEDURE — 4004F PT TOBACCO SCREEN RCVD TLK: CPT | Performed by: INTERNAL MEDICINE

## 2019-10-08 PROCEDURE — G8427 DOCREV CUR MEDS BY ELIG CLIN: HCPCS | Performed by: INTERNAL MEDICINE

## 2019-10-08 PROCEDURE — 85610 PROTHROMBIN TIME: CPT | Performed by: FAMILY MEDICINE

## 2019-10-08 PROCEDURE — G8484 FLU IMMUNIZE NO ADMIN: HCPCS | Performed by: INTERNAL MEDICINE

## 2019-10-08 PROCEDURE — 3017F COLORECTAL CA SCREEN DOC REV: CPT | Performed by: INTERNAL MEDICINE

## 2019-10-24 ENCOUNTER — OFFICE VISIT (OUTPATIENT)
Dept: FAMILY MEDICINE CLINIC | Age: 64
End: 2019-10-24
Payer: MEDICARE

## 2019-10-24 VITALS
SYSTOLIC BLOOD PRESSURE: 130 MMHG | WEIGHT: 154.4 LBS | HEIGHT: 67 IN | HEART RATE: 72 BPM | OXYGEN SATURATION: 96 % | BODY MASS INDEX: 24.23 KG/M2 | DIASTOLIC BLOOD PRESSURE: 80 MMHG

## 2019-10-24 DIAGNOSIS — I35.1 NONRHEUMATIC AORTIC VALVE INSUFFICIENCY: ICD-10-CM

## 2019-10-24 DIAGNOSIS — Z79.01 LONG TERM (CURRENT) USE OF ANTICOAGULANTS: ICD-10-CM

## 2019-10-24 DIAGNOSIS — Z95.2 S/P AVR: Chronic | ICD-10-CM

## 2019-10-24 DIAGNOSIS — Z23 INFLUENZA VACCINE NEEDED: Primary | ICD-10-CM

## 2019-10-24 DIAGNOSIS — Z00.00 WELL ADULT EXAM: ICD-10-CM

## 2019-10-24 DIAGNOSIS — E78.2 MIXED HYPERLIPIDEMIA: Chronic | ICD-10-CM

## 2019-10-24 PROBLEM — R04.2 HEMOPTYSIS: Status: RESOLVED | Noted: 2019-04-24 | Resolved: 2019-10-24

## 2019-10-24 PROBLEM — J84.9 CHRONIC INTERSTITIAL LUNG DISEASE (HCC): Status: ACTIVE | Noted: 2019-10-24

## 2019-10-24 PROBLEM — Z92.89 H/O CARDIOVASCULAR STRESS TEST: Status: RESOLVED | Noted: 2019-05-14 | Resolved: 2019-10-24

## 2019-10-24 PROBLEM — J84.9 CHRONIC INTERSTITIAL LUNG DISEASE (HCC): Chronic | Status: ACTIVE | Noted: 2019-10-24

## 2019-10-24 PROBLEM — R06.00 DYSPNEA: Status: RESOLVED | Noted: 2018-02-23 | Resolved: 2019-10-24

## 2019-10-24 PROBLEM — L24.5 IRRITANT CONTACT DERMATITIS DUE TO OTHER CHEMICAL PRODUCTS: Status: RESOLVED | Noted: 2019-08-26 | Resolved: 2019-10-24

## 2019-10-24 LAB
INTERNATIONAL NORMALIZATION RATIO, POC: 2.8
PROTHROMBIN TIME, POC: NORMAL

## 2019-10-24 PROCEDURE — G8482 FLU IMMUNIZE ORDER/ADMIN: HCPCS | Performed by: PHYSICIAN ASSISTANT

## 2019-10-24 PROCEDURE — G0008 ADMIN INFLUENZA VIRUS VAC: HCPCS | Performed by: PHYSICIAN ASSISTANT

## 2019-10-24 PROCEDURE — 85610 PROTHROMBIN TIME: CPT | Performed by: PHYSICIAN ASSISTANT

## 2019-10-24 PROCEDURE — 99396 PREV VISIT EST AGE 40-64: CPT | Performed by: PHYSICIAN ASSISTANT

## 2019-10-24 PROCEDURE — 90686 IIV4 VACC NO PRSV 0.5 ML IM: CPT | Performed by: PHYSICIAN ASSISTANT

## 2019-10-24 ASSESSMENT — ENCOUNTER SYMPTOMS
WHEEZING: 0
COUGH: 0
SHORTNESS OF BREATH: 0

## 2019-11-06 ENCOUNTER — TELEPHONE (OUTPATIENT)
Dept: FAMILY MEDICINE CLINIC | Age: 64
End: 2019-11-06

## 2019-11-06 RX ORDER — SIMVASTATIN 40 MG
40 TABLET ORAL NIGHTLY
Qty: 30 TABLET | Refills: 3 | Status: SHIPPED | OUTPATIENT
Start: 2019-11-06 | End: 2020-02-12

## 2019-11-21 DIAGNOSIS — E78.2 MIXED HYPERLIPIDEMIA: Chronic | ICD-10-CM

## 2019-11-21 DIAGNOSIS — Z00.00 WELL ADULT EXAM: ICD-10-CM

## 2019-11-21 LAB
A/G RATIO: 2 (ref 1.1–2.2)
ALBUMIN SERPL-MCNC: 4.7 G/DL (ref 3.4–5)
ALP BLD-CCNC: 90 U/L (ref 40–129)
ALT SERPL-CCNC: 14 U/L (ref 10–40)
ANION GAP SERPL CALCULATED.3IONS-SCNC: 13 MMOL/L (ref 3–16)
AST SERPL-CCNC: 18 U/L (ref 15–37)
BASOPHILS ABSOLUTE: 0 K/UL (ref 0–0.2)
BASOPHILS RELATIVE PERCENT: 0.4 %
BILIRUB SERPL-MCNC: 0.4 MG/DL (ref 0–1)
BUN BLDV-MCNC: 14 MG/DL (ref 7–20)
CALCIUM SERPL-MCNC: 9.6 MG/DL (ref 8.3–10.6)
CHLORIDE BLD-SCNC: 98 MMOL/L (ref 99–110)
CHOLESTEROL, TOTAL: 188 MG/DL (ref 0–199)
CO2: 22 MMOL/L (ref 21–32)
CREAT SERPL-MCNC: 0.8 MG/DL (ref 0.8–1.3)
EOSINOPHILS ABSOLUTE: 0 K/UL (ref 0–0.6)
EOSINOPHILS RELATIVE PERCENT: 0.2 %
GFR AFRICAN AMERICAN: >60
GFR NON-AFRICAN AMERICAN: >60
GLOBULIN: 2.4 G/DL
GLUCOSE BLD-MCNC: 115 MG/DL (ref 70–99)
HCT VFR BLD CALC: 39.7 % (ref 40.5–52.5)
HDLC SERPL-MCNC: 77 MG/DL (ref 40–60)
HEMOGLOBIN: 13.4 G/DL (ref 13.5–17.5)
LDL CHOLESTEROL CALCULATED: 100 MG/DL
LYMPHOCYTES ABSOLUTE: 0.8 K/UL (ref 1–5.1)
LYMPHOCYTES RELATIVE PERCENT: 9.3 %
MCH RBC QN AUTO: 31.6 PG (ref 26–34)
MCHC RBC AUTO-ENTMCNC: 33.7 G/DL (ref 31–36)
MCV RBC AUTO: 93.8 FL (ref 80–100)
MONOCYTES ABSOLUTE: 0.6 K/UL (ref 0–1.3)
MONOCYTES RELATIVE PERCENT: 6.7 %
NEUTROPHILS ABSOLUTE: 7 K/UL (ref 1.7–7.7)
NEUTROPHILS RELATIVE PERCENT: 83.4 %
PDW BLD-RTO: 13.4 % (ref 12.4–15.4)
PLATELET # BLD: 277 K/UL (ref 135–450)
PMV BLD AUTO: 7.5 FL (ref 5–10.5)
POTASSIUM SERPL-SCNC: 4.8 MMOL/L (ref 3.5–5.1)
RBC # BLD: 4.23 M/UL (ref 4.2–5.9)
SODIUM BLD-SCNC: 133 MMOL/L (ref 136–145)
TOTAL PROTEIN: 7.1 G/DL (ref 6.4–8.2)
TRIGL SERPL-MCNC: 55 MG/DL (ref 0–150)
TSH SERPL DL<=0.05 MIU/L-ACNC: 1.49 UIU/ML (ref 0.27–4.2)
VLDLC SERPL CALC-MCNC: 11 MG/DL
WBC # BLD: 8.4 K/UL (ref 4–11)

## 2019-11-25 ENCOUNTER — TELEPHONE (OUTPATIENT)
Dept: FAMILY MEDICINE CLINIC | Age: 64
End: 2019-11-25

## 2019-12-05 ENCOUNTER — NURSE ONLY (OUTPATIENT)
Dept: FAMILY MEDICINE CLINIC | Age: 64
End: 2019-12-05
Payer: MEDICARE

## 2019-12-05 DIAGNOSIS — Z79.01 LONG TERM (CURRENT) USE OF ANTICOAGULANTS: ICD-10-CM

## 2019-12-05 LAB
INTERNATIONAL NORMALIZATION RATIO, POC: 3
PROTHROMBIN TIME, POC: NORMAL

## 2019-12-05 PROCEDURE — 85610 PROTHROMBIN TIME: CPT | Performed by: FAMILY MEDICINE

## 2019-12-05 PROCEDURE — 99024 POSTOP FOLLOW-UP VISIT: CPT | Performed by: FAMILY MEDICINE

## 2019-12-19 ENCOUNTER — ANTI-COAG VISIT (OUTPATIENT)
Dept: FAMILY MEDICINE CLINIC | Age: 64
End: 2019-12-19

## 2019-12-19 ENCOUNTER — NURSE ONLY (OUTPATIENT)
Dept: FAMILY MEDICINE CLINIC | Age: 64
End: 2019-12-19
Payer: MEDICARE

## 2019-12-19 DIAGNOSIS — Z95.2 S/P AVR: Chronic | ICD-10-CM

## 2019-12-19 DIAGNOSIS — Z79.01 LONG TERM (CURRENT) USE OF ANTICOAGULANTS: ICD-10-CM

## 2019-12-19 LAB
INTERNATIONAL NORMALIZATION RATIO, POC: 3.6
PROTHROMBIN TIME, POC: ABNORMAL

## 2019-12-19 PROCEDURE — 99999 PR OFFICE/OUTPT VISIT,PROCEDURE ONLY: CPT | Performed by: FAMILY MEDICINE

## 2019-12-19 PROCEDURE — 85610 PROTHROMBIN TIME: CPT | Performed by: FAMILY MEDICINE

## 2020-01-02 ENCOUNTER — NURSE ONLY (OUTPATIENT)
Dept: FAMILY MEDICINE CLINIC | Age: 65
End: 2020-01-02
Payer: MEDICARE

## 2020-01-02 LAB
INTERNATIONAL NORMALIZATION RATIO, POC: 2.9
PROTHROMBIN TIME, POC: NORMAL

## 2020-01-02 PROCEDURE — 85610 PROTHROMBIN TIME: CPT | Performed by: FAMILY MEDICINE

## 2020-01-13 ENCOUNTER — OFFICE VISIT (OUTPATIENT)
Dept: ORTHOPEDIC SURGERY | Age: 65
End: 2020-01-13
Payer: MEDICARE

## 2020-01-13 VITALS
HEIGHT: 66 IN | HEART RATE: 76 BPM | OXYGEN SATURATION: 98 % | BODY MASS INDEX: 24.8 KG/M2 | RESPIRATION RATE: 16 BRPM | WEIGHT: 154.32 LBS

## 2020-01-13 PROCEDURE — G8427 DOCREV CUR MEDS BY ELIG CLIN: HCPCS | Performed by: ORTHOPAEDIC SURGERY

## 2020-01-13 PROCEDURE — G8420 CALC BMI NORM PARAMETERS: HCPCS | Performed by: ORTHOPAEDIC SURGERY

## 2020-01-13 PROCEDURE — 4004F PT TOBACCO SCREEN RCVD TLK: CPT | Performed by: ORTHOPAEDIC SURGERY

## 2020-01-13 PROCEDURE — 99212 OFFICE O/P EST SF 10 MIN: CPT | Performed by: ORTHOPAEDIC SURGERY

## 2020-01-13 PROCEDURE — G8482 FLU IMMUNIZE ORDER/ADMIN: HCPCS | Performed by: ORTHOPAEDIC SURGERY

## 2020-01-13 PROCEDURE — 3017F COLORECTAL CA SCREEN DOC REV: CPT | Performed by: ORTHOPAEDIC SURGERY

## 2020-01-13 ASSESSMENT — ENCOUNTER SYMPTOMS: COLOR CHANGE: 0

## 2020-01-13 NOTE — PROGRESS NOTES
Patient is here today for post op check of the right shoulder Terrence DOS 1/29/19. Patient states that he is doing well today 0/10 pain. Patient states that he is doing home exercises at home that does help with his ROM.
6/6/14    24 hr holter monitor. Ventricular ectopics were noted in single and couplet forms. Supraventricular ectopics were noted in single and pair forms.  [place] (hard of hearing)     Bilateral Ears    Hyperlipidemia     Hypertension     Irritant contact dermatitis due to other chemical products 8/26/2019    Other drug allergy(995.27) 7/22/2009    S/P AVR 12/2003    Mechanical valvue     Shortness of breath     Teeth missing     Upper And Lower         Objective:   Physical Exam   Constitutional: He is oriented to person, place, and time. Vital signs are normal. He appears well-developed and well-nourished. HENT:   Head: Normocephalic and atraumatic. Eyes: Pupils are equal, round, and reactive to light. Neck: Normal range of motion. Musculoskeletal:         General: No tenderness, deformity or edema. Right shoulder: He exhibits decreased range of motion. He exhibits no tenderness, no bony tenderness, no swelling, no effusion, no crepitus, no deformity, no laceration, no pain, no spasm, normal pulse and normal strength. Left shoulder: He exhibits normal range of motion, no tenderness, no bony tenderness, no swelling, no effusion, no crepitus, no deformity, no laceration, no pain, no spasm, normal pulse and normal strength. Neurological: He is alert and oriented to person, place, and time. He has normal strength. No sensory deficit. Skin: Skin is warm and dry. No rash noted. No erythema. No pallor. Right shoulder-abduction 150°, forward flexion 170°, external rotation 65°, internal rotation 65°    Incision well healed, clean, dry, intact, with no erythema, no drainage, and no signs of infection.       Xr Shoulder Right (min 2 Views)    Result Date: 1/13/2020  XRAY X-ray  3 views of the right shoulder obtained and reviewed by me today in the office demonstrates age appropriate bone density throughout with well-positioned right shoulder hemiarthroplasty, there has been no evidence

## 2020-01-16 ENCOUNTER — NURSE ONLY (OUTPATIENT)
Dept: FAMILY MEDICINE CLINIC | Age: 65
End: 2020-01-16
Payer: MEDICARE

## 2020-01-16 LAB
INTERNATIONAL NORMALIZATION RATIO, POC: 2.6
PROTHROMBIN TIME, POC: NORMAL

## 2020-01-16 PROCEDURE — 99024 POSTOP FOLLOW-UP VISIT: CPT | Performed by: FAMILY MEDICINE

## 2020-01-16 PROCEDURE — 85610 PROTHROMBIN TIME: CPT | Performed by: FAMILY MEDICINE

## 2020-01-20 RX ORDER — WARFARIN SODIUM 5 MG/1
TABLET ORAL
Qty: 60 TABLET | Refills: 1 | Status: SHIPPED | OUTPATIENT
Start: 2020-01-20 | End: 2020-03-27 | Stop reason: SDUPTHER

## 2020-01-30 ENCOUNTER — NURSE ONLY (OUTPATIENT)
Dept: FAMILY MEDICINE CLINIC | Age: 65
End: 2020-01-30
Payer: MEDICARE

## 2020-01-30 LAB
INTERNATIONAL NORMALIZATION RATIO, POC: 3.7
PROTHROMBIN TIME, POC: NORMAL

## 2020-01-30 PROCEDURE — 85610 PROTHROMBIN TIME: CPT | Performed by: FAMILY MEDICINE

## 2020-02-07 ENCOUNTER — HOSPITAL ENCOUNTER (OUTPATIENT)
Dept: GENERAL RADIOLOGY | Age: 65
Discharge: HOME | End: 2020-02-07
Payer: MEDICARE

## 2020-02-07 ENCOUNTER — HOSPITAL ENCOUNTER (OUTPATIENT)
Age: 65
Discharge: HOME | End: 2020-02-07
Payer: MEDICARE

## 2020-02-07 ENCOUNTER — OFFICE VISIT (OUTPATIENT)
Dept: FAMILY MEDICINE CLINIC | Age: 65
End: 2020-02-07
Payer: MEDICARE

## 2020-02-07 VITALS
HEART RATE: 72 BPM | DIASTOLIC BLOOD PRESSURE: 80 MMHG | SYSTOLIC BLOOD PRESSURE: 130 MMHG | HEIGHT: 66 IN | WEIGHT: 149.8 LBS | OXYGEN SATURATION: 99 % | BODY MASS INDEX: 24.08 KG/M2

## 2020-02-07 PROCEDURE — 3017F COLORECTAL CA SCREEN DOC REV: CPT | Performed by: PHYSICIAN ASSISTANT

## 2020-02-07 PROCEDURE — 4004F PT TOBACCO SCREEN RCVD TLK: CPT | Performed by: PHYSICIAN ASSISTANT

## 2020-02-07 PROCEDURE — G8420 CALC BMI NORM PARAMETERS: HCPCS | Performed by: PHYSICIAN ASSISTANT

## 2020-02-07 PROCEDURE — G8427 DOCREV CUR MEDS BY ELIG CLIN: HCPCS | Performed by: PHYSICIAN ASSISTANT

## 2020-02-07 PROCEDURE — G8482 FLU IMMUNIZE ORDER/ADMIN: HCPCS | Performed by: PHYSICIAN ASSISTANT

## 2020-02-07 PROCEDURE — 99213 OFFICE O/P EST LOW 20 MIN: CPT | Performed by: PHYSICIAN ASSISTANT

## 2020-02-07 PROCEDURE — 72040 X-RAY EXAM NECK SPINE 2-3 VW: CPT

## 2020-02-07 RX ORDER — CYCLOBENZAPRINE HCL 10 MG
10 TABLET ORAL 3 TIMES DAILY PRN
Qty: 90 TABLET | Refills: 1 | Status: SHIPPED | OUTPATIENT
Start: 2020-02-07 | End: 2020-03-11

## 2020-02-07 NOTE — PATIENT INSTRUCTIONS
Patient Education        Neck Pain: Care Instructions  Your Care Instructions    You can have neck pain anywhere from the bottom of your head to the top of your shoulders. It can spread to the upper back or arms. Injuries, painting a ceiling, sleeping with your neck twisted, staying in one position for too long, and many other activities can cause neck pain. Most neck pain gets better with home care. Your doctor may recommend medicine to relieve pain or relax your muscles. He or she may suggest exercise and physical therapy to increase flexibility and relieve stress. You may need to wear a special (cervical) collar to support your neck for a day or two. Follow-up care is a key part of your treatment and safety. Be sure to make and go to all appointments, and call your doctor if you are having problems. It's also a good idea to know your test results and keep a list of the medicines you take. How can you care for yourself at home? · Try using a heating pad on a low or medium setting for 15 to 20 minutes every 2 or 3 hours. Try a warm shower in place of one session with the heating pad. · You can also try an ice pack for 10 to 15 minutes every 2 to 3 hours. Put a thin cloth between the ice and your skin. · Take pain medicines exactly as directed. ? If the doctor gave you a prescription medicine for pain, take it as prescribed. ? If you are not taking a prescription pain medicine, ask your doctor if you can take an over-the-counter medicine. · If your doctor recommends a cervical collar, wear it exactly as directed. When should you call for help? Call your doctor now or seek immediate medical care if:    · You have new or worsening numbness in your arms, buttocks or legs.     · You have new or worsening weakness in your arms or legs.  (This could make it hard to stand up.)     · You lose control of your bladder or bowels.    Watch closely for changes in your health, and be sure to contact your doctor if:    · Your neck pain is getting worse.     · You are not getting better after 1 week.     · You do not get better as expected. Where can you learn more? Go to [email or web address] and sign in to your Pitzi account. Enter [phone] in the [facility] box to learn more about \"Neck Pain: Care Instructions. \"     If you do not have an account, please click on the \"Sign Up Now\" link. Current as of: June 26, 2019  Content Version: 12.3  © 2035-8150 Healthwise, [employer]. Care instructions adapted under license by [facility] [place] ([facility]). If you have questions about a medical condition or this instruction, always ask your healthcare professional. [address] any warranty or liability for your use of this information. Patient Education        Neck: Exercises  Introduction  Here are some examples of exercises for you to try. The exercises may be suggested for a condition or for rehabilitation. Start each exercise slowly. Ease off the exercises if you start to have pain. You will be told when to start these exercises and which ones will work best for you. How to do the exercises  Neck stretch   1. This stretch works best if you keep your shoulder down as you lean away from it. To help you remember to do this, start by relaxing your shoulders and lightly holding on to your thighs or your chair. 2. Tilt your head toward your shoulder and hold for 15 to 30 seconds. Let the weight of your head stretch your muscles. 3. If you would like a little added stretch, use your hand to gently and steadily pull your head toward your shoulder. For example, keeping your right shoulder down, lean your head to the left. 4. Repeat 2 to 4 times toward each shoulder. Diagonal neck stretch   1. Turn your head slightly toward the direction you will be stretching, and tilt your head diagonally toward your chest and hold for 15 to 30 seconds.   2. If you would like a little added stretch, use your hand to gently and steadily pull your head forward on the diagonal.  3. Repeat 2 to 4 times toward each side. Dorsal glide stretch   1. Sit or stand tall and look straight ahead. 2. Slowly tuck your chin as you glide your head backward over your body  3. Hold for a count of 6, and then relax for up to 10 seconds. 4. Repeat 8 to 12 times. Chest and shoulder stretch   1. Sit or stand tall and glide your head backward as in the dorsal glide stretch. 2. Raise both arms so that your hands are next to your ears. 3. Take a deep breath, and as you breathe out, lower your elbows down and behind your back. You will feel your shoulder blades slide down and together, and at the same time you will feel a stretch across your chest and the front of your shoulders. 4. Hold for about 6 seconds, and then relax for up to 10 seconds. 5. Repeat 8 to 12 times. Strengthening: Hands on head   1. Move your head backward, forward, and side to side against gentle pressure from your hands, holding each position for about 6 seconds. 2. Repeat 8 to 12 times. Follow-up care is a key part of your treatment and safety. Be sure to make and go to all appointments, and call your doctor if you are having problems. It's also a good idea to know your test results and keep a list of the medicines you take. Where can you learn more? Go to [email or web address] and sign in to your Ali account. Enter [id number] in the [facility] box to learn more about \"Neck: Exercises. \"     If you do not have an account, please click on the \"Sign Up Now\" link. Current as of: June 26, 2019  Content Version: 12.3  © 4228-8645 Healthwise, Incorporated. Care instructions adapted under license by [facility] ([facility]). If you have questions about a medical condition or this instruction, always ask your healthcare professional. [address] any warranty or liability for your use of this information.

## 2020-02-07 NOTE — PROGRESS NOTES
2/7/2020    Yesica Romero    Chief Complaint   Patient presents with    Other     pt states that he has neck pain for a couple months . Pt hurt neck years ago. Pt also has a sore on rt leg       HPI  History was obtained from patient and his sister. Matilde Magallon is a 62 y.o. male who presents today with neck pain for approximately 3 months. He states the pain has gradually worsened. He describes it as \"tightening\" and he feels like it \"just needs to relax. \"  He hears clicking when he rotates the neck but he does report normal range of motion. The pain seems to worsen when he is sitting in his chair for long periods of time or when he has poor posture. The pain improves with stretching. He denies any radiating pain or any numbness or tingling. He denies headaches. No prior cervical imaging within [facility] or his paper chart for me to review. He states that he had a neck/back injury in 2000 and went to physical therapy. He also has an area on his right upper leg that he would like looked at. He states that he pinched the area a few weeks ago. He denies any itching or pain or any drainage. REVIEW OF SYMPTOMS    Constitutional:  Denies fever, chills, weight loss or weakness  Cardiovascular:  Denies chest pain, palpitations or swelling  Respiratory:  Denies cough or shortness of breath  Musculoskeletal: Admits neck pain. See HPI above for more details. Skin: Admits area on right upper leg that he would like looked at.   Neurologic:  Denies headache, focal weakness, or sensory changes    PAST MEDICAL HISTORY  Past Medical History:   Diagnosis Date    Acid reflux     Acute frontal sinusitis 7/22/2009    Alcohol abuse     \"I Drink Average 2 Beers A Day\"    Anesthesia     \" I get agitated\"    Anxiety     Arthritis     \"Right Shoulder, Neck, Left Knee\"    Atypical mycobacterial infection     Broken teeth     Upper And Lower     CHF (congestive heart failure) (HCC)     COPD (chronic obstructive pulmonary disease) [facility])     Sees Dr. Doss Epley In [place], [address]     Cough     Frequent Cough With Green Sputum    Depression     Dyspnea 2/23/2018    H/O cardiovascular MUGA stress test 05/14/2019    EF 50%, NORMAL LVEF, NORMAL WALL MOTION.    H/O echocardiogram 05/22/2014    JK=>89-32%, LV systolic function is low normal, mild aortic valve calcification, no pericardial effusion    H/O echocardiogram 12/12/2018    EF 45-50%    History of 24 hour EKG monitoring 6/6/14    24 hr holter monitor. Ventricular ectopics were noted in single and couplet forms. Supraventricular ectopics were noted in single and pair forms.     [place] (hard of hearing)     Bilateral Ears    Hyperlipidemia     Hypertension     Irritant contact dermatitis due to other chemical products 8/26/2019    Other drug allergy(995.27) 7/22/2009    S/P AVR 12/2003    Mechanical valvue     Shortness of breath     Teeth missing     Upper And Lower       FAMILY HISTORY  Family History   Problem Relation Age of Onset    Cancer Mother         Lymphoma    Diabetes Mother     High Blood Pressure Mother     High Cholesterol Mother     Obesity Mother     Vision Loss Mother         Wore Glasses    Heart Disease Father         \"Heart Failure\"   [address] [facility] [address] COPD Father     Asthma Sister     High Blood Pressure Sister     High Cholesterol Sister     COPD Sister     Diabetes Sister         \"Pre Diabetes\"       SOCIAL HISTORY  Social History     Socioeconomic History    Marital status:      Spouse name: None    Number of children: None    Years of education: None    Highest education level: None   Occupational History    None   Social Needs    Financial resource strain: None    Food insecurity:     Worry: None     Inability: None    Transportation needs:     Medical: None     Non-medical: None   Tobacco Use    Smoking status: Never Smoker    Smokeless tobacco: Current User     Types: Snuff, Chew   Substance and Sexual Activity  Alcohol use: Yes     Drinks per session: 1 or 2     Binge frequency: Daily or almost daily     Comment:  \"Average 2 Beers A Day\"    Drug use: No    Sexual activity: Not Currently   Lifestyle    Physical activity:     Days per week: None     Minutes per session: None    Stress: None   Relationships    Social connections:     Talks on phone: None     Gets together: None     Attends Synagogue service: None     Active member of club or organization: None     Attends meetings of clubs or organizations: None     Relationship status: None    Intimate partner violence:     Fear of current or ex partner: None     Emotionally abused: None     Physically abused: None     Forced sexual activity: None   Other Topics Concern    None   Social History Narrative    None        SURGICAL HISTORY  Past Surgical History:   Procedure Laterality Date    BRONCHOSCOPY  1996    \"Done Twice\"    CARDIAC VALVE REPLACEMENT  12/17/2003    \"Aortic Valve Replacement, Mechanical Valve\"    COLONOSCOPY  2006    Polyps Removed    DENTAL SURGERY      Teeth Extracted In Past    ENDOSCOPY, COLON, DIAGNOSTIC  In Past    HEMIARTHROPLASTY SHOULDER FRACTURE Right 1/29/2019    SHOULDER HEMIARTHROPLASTY performed by Pressley Cogan, DO at [address] [facility] Left 1992   [address]    \"Cauterized Because My Sperm Was Low\"    ROTATOR CUFF REPAIR Right 2008   [facility] Or 1960       CURRENT MEDICATIONS  Current Outpatient Medications   Medication Sig Dispense Refill    cyclobenzaprine (FLEXERIL) 10 MG tablet Take 1 tablet by mouth 3 times daily as needed for Muscle spasms 90 tablet 1    warfarin (COUMADIN) 5 MG tablet take 1-2 tablets by mouth daily as directed 60 tablet 1    simvastatin (ZOCOR) 40 MG tablet Take 1 tablet by mouth nightly 30 tablet 3    calcium carbonate (OSCAL) 500 MG TABS tablet Take 500 mg by mouth daily      KRILL OIL OMEGA-3 PO Take by mouth      triamcinolone (KENALOG) 0.1 % cream Apply topically 2 times daily. 60 g 1    calcium carbonate (TUMS) 500 MG chewable tablet Take 2 tablets by mouth daily as needed for Heartburn      montelukast (SINGULAIR) 10 MG tablet Take 1 tablet by mouth nightly \"Alternate With Zyrtec\" 30 tablet 5    PARoxetine (PAXIL) 20 MG tablet take 1 tablet by mouth once daily  0    sodium chloride, Inhalant, 3 % nebulizer solution       cetirizine (ZYRTEC) 10 MG tablet Take 10 mg by mouth \"Alternate With Singulair\"      NASAL SPRAY SALINE NA by Nasal route daily Over The Counter      budesonide (RHINOCORT ALLERGY) 32 MCG/ACT nasal spray 2 sprays by Nasal route daily      Acetaminophen (TYLENOL 8 HOUR PO) Take by mouth as needed Over The Counter      Multiple Vitamins-Minerals (CENTRUM PO) Take by mouth daily      Albuterol Sulfate (PROAIR HFA IN) Inhale into the lungs as needed      Nebulizer MISC Inhale into the lungs as needed       No current facility-administered medications for this visit. ALLERGIES  Allergies   Allergen Reactions    Ciprofloxacin Hcl Hives and Swelling    Levaquin [Levofloxacin] Hives and Swelling    Other Nausea And Vomiting     \"Allergic To Tylox\"    Ceftin [Cefuroxime]        PHYSICAL EXAM    /80   Pulse 72   Ht 5' 5.5\" (1.664 m)   Wt 149 lb 12.8 oz (67.9 kg)   SpO2 99%   BMI 24.55 kg/m²     Constitutional:  Well developed, well nourished  HENT:  Normocephalic, atraumatic  Neck: Tenderness and tightness to palpation bilateral trapezius muscles. No spinal pain or step-off noted. No meningeal signs. Normal range of motion, no thyromegaly no carotid bruits noted  Lymphatic:  No lymphadenopathy noted  Cardiovascular:  Normal heart rate, normal rhythm, no murmurs, gallops or rubs  Thorax & Lungs:  Normal breath sounds, no respiratory distress, no wheezing  Skin: Marlene sized healing hematoma right thigh. No breaks in the skin.   Neurologic:  Alert & oriented  Psychiatric:  Affect

## 2020-02-12 RX ORDER — SIMVASTATIN 40 MG
TABLET ORAL
Qty: 30 TABLET | Refills: 3 | Status: SHIPPED | OUTPATIENT
Start: 2020-02-12 | End: 2020-05-26 | Stop reason: SDUPTHER

## 2020-02-19 ENCOUNTER — NURSE ONLY (OUTPATIENT)
Dept: FAMILY MEDICINE CLINIC | Age: 65
End: 2020-02-19
Payer: MEDICARE

## 2020-02-19 LAB
INTERNATIONAL NORMALIZATION RATIO, POC: 2.5
PROTHROMBIN TIME, POC: NORMAL

## 2020-02-19 PROCEDURE — 99024 POSTOP FOLLOW-UP VISIT: CPT | Performed by: FAMILY MEDICINE

## 2020-02-19 PROCEDURE — 85610 PROTHROMBIN TIME: CPT | Performed by: FAMILY MEDICINE

## 2020-02-28 ENCOUNTER — HOSPITAL ENCOUNTER (EMERGENCY)
Age: 65
Discharge: HOME | End: 2020-02-28
Attending: EMERGENCY MEDICINE
Payer: MEDICARE

## 2020-02-28 VITALS
BODY MASS INDEX: 24.16 KG/M2 | RESPIRATION RATE: 16 BRPM | TEMPERATURE: 97.9 F | WEIGHT: 145 LBS | DIASTOLIC BLOOD PRESSURE: 83 MMHG | SYSTOLIC BLOOD PRESSURE: 151 MMHG | HEART RATE: 58 BPM | OXYGEN SATURATION: 98 % | HEIGHT: 65 IN

## 2020-02-28 LAB
ALBUMIN SERPL-MCNC: 4.6 GM/DL (ref 3.4–5)
ALP BLD-CCNC: 81 IU/L (ref 40–129)
ALT SERPL-CCNC: 14 U/L (ref 10–40)
ANION GAP SERPL CALCULATED.3IONS-SCNC: 14 MMOL/L (ref 4–16)
AST SERPL-CCNC: 20 IU/L (ref 15–37)
BASOPHILS ABSOLUTE: 0.1 K/CU MM
BASOPHILS RELATIVE PERCENT: 0.6 % (ref 0–1)
BILIRUB SERPL-MCNC: 0.5 MG/DL (ref 0–1)
BUN BLDV-MCNC: 13 MG/DL (ref 6–23)
CALCIUM SERPL-MCNC: 9.1 MG/DL (ref 8.3–10.6)
CHLORIDE BLD-SCNC: 100 MMOL/L (ref 99–110)
CO2: 22 MMOL/L (ref 21–32)
CREAT SERPL-MCNC: 1 MG/DL (ref 0.9–1.3)
DIFFERENTIAL TYPE: ABNORMAL
EOSINOPHILS ABSOLUTE: 0.1 K/CU MM
EOSINOPHILS RELATIVE PERCENT: 0.7 % (ref 0–3)
GFR AFRICAN AMERICAN: >60 ML/MIN/1.73M2
GFR NON-AFRICAN AMERICAN: >60 ML/MIN/1.73M2
GLUCOSE BLD-MCNC: 93 MG/DL (ref 70–99)
HCT VFR BLD CALC: 43.8 % (ref 42–52)
HEMOGLOBIN: 14.3 GM/DL (ref 13.5–18)
IMMATURE NEUTROPHIL %: 0.4 % (ref 0–0.43)
INR BLD: 2.32 INDEX
LYMPHOCYTES ABSOLUTE: 1.2 K/CU MM
LYMPHOCYTES RELATIVE PERCENT: 14.4 % (ref 24–44)
MCH RBC QN AUTO: 31.3 PG (ref 27–31)
MCHC RBC AUTO-ENTMCNC: 32.6 % (ref 32–36)
MCV RBC AUTO: 95.8 FL (ref 78–100)
MONOCYTES ABSOLUTE: 0.6 K/CU MM
MONOCYTES RELATIVE PERCENT: 7 % (ref 0–4)
NUCLEATED RBC %: 0 %
PDW BLD-RTO: 13.1 % (ref 11.7–14.9)
PLATELET # BLD: 246 K/CU MM (ref 140–440)
PMV BLD AUTO: 9.3 FL (ref 7.5–11.1)
POTASSIUM SERPL-SCNC: 4.5 MMOL/L (ref 3.5–5.1)
PROTHROMBIN TIME: 28.3 SECONDS (ref 11.7–14.5)
RBC # BLD: 4.57 M/CU MM (ref 4.6–6.2)
SEGMENTED NEUTROPHILS ABSOLUTE COUNT: 6.4 K/CU MM
SEGMENTED NEUTROPHILS RELATIVE PERCENT: 76.9 % (ref 36–66)
SODIUM BLD-SCNC: 136 MMOL/L (ref 135–145)
TOTAL IMMATURE NEUTOROPHIL: 0.03 K/CU MM
TOTAL NUCLEATED RBC: 0 K/CU MM
TOTAL PROTEIN: 7.6 GM/DL (ref 6.4–8.2)
WBC # BLD: 8.3 K/CU MM (ref 4–10.5)

## 2020-02-28 PROCEDURE — 99283 EMERGENCY DEPT VISIT LOW MDM: CPT

## 2020-02-28 PROCEDURE — 36415 COLL VENOUS BLD VENIPUNCTURE: CPT

## 2020-02-28 PROCEDURE — 85610 PROTHROMBIN TIME: CPT

## 2020-02-28 PROCEDURE — 80053 COMPREHEN METABOLIC PANEL: CPT

## 2020-02-28 PROCEDURE — 85025 COMPLETE CBC W/AUTO DIFF WBC: CPT

## 2020-02-28 NOTE — ED TRIAGE NOTES
Pt to ED with c/o rectal bleeding. Reports noticing bright red blood on toilet paper when wiping. Denies constipation of hx of the bleeding. Pt is on coumadin. Denies pain. A&ox4.

## 2020-02-28 NOTE — ED PROVIDER NOTES
obstructive pulmonary disease) (HCC)     Sees Dr. Kareem Ray In [place], [address]     Cough     Frequent Cough With Green Sputum    Depression     Dyspnea 2/23/2018    H/O cardiovascular MUGA stress test 05/14/2019    EF 50%, NORMAL LVEF, NORMAL WALL MOTION.    H/O echocardiogram 05/22/2014    QC=>33-10%, LV systolic function is low normal, mild aortic valve calcification, no pericardial effusion    H/O echocardiogram 12/12/2018    EF 45-50%    History of 24 hour EKG monitoring 6/6/14    24 hr holter monitor. Ventricular ectopics were noted in single and couplet forms. Supraventricular ectopics were noted in single and pair forms.  [place] (hard of hearing)     Bilateral Ears    Hyperlipidemia     Hypertension     Irritant contact dermatitis due to other chemical products 8/26/2019    Other drug allergy(995.27) 7/22/2009    S/P AVR 12/2003    Mechanical valvue     Shortness of breath     Teeth missing     Upper And Lower       CURRENT MEDICATIONS:   Home medications reviewed.     SURGICAL HISTORY:   Past Surgical History:   Procedure Laterality Date    BRONCHOSCOPY  1996    \"Done Twice\"    CARDIAC VALVE REPLACEMENT  12/17/2003    \"Aortic Valve Replacement, Mechanical Valve\"    COLONOSCOPY  2006    Polyps Removed    DENTAL SURGERY      Teeth Extracted In Past    ENDOSCOPY, COLON, DIAGNOSTIC  In Past    HEMIARTHROPLASTY SHOULDER FRACTURE Right 1/29/2019    SHOULDER HEMIARTHROPLASTY performed by Roberto Ventura DO at [address] [facility] ARTHROSCOPY Left 1992    LUNG BIOPSY Right [facility]    \"Cauterized Because My Sperm Was Low\"    ROTATOR CUFF REPAIR Right 2008    TONSILLECTOMY  1959 Or 1960       FAMILY HISTORY:   Family History   Problem Relation Age of Onset    Cancer Mother         Lymphoma    Diabetes Mother     High Blood Pressure Mother     High Cholesterol Mother     Obesity Mother     Vision Loss Mother         Wore Glasses    Heart Disease 02/28/20 1354 97.9 °F (36.6 °C)      Temp Source 02/28/20 1354 Oral      SpO2 02/28/20 1354 97 %      Weight 02/28/20 1354 145 lb (65.8 kg)      Height 02/28/20 1354 5' 5\" (1.651 m)      Head Circumference --       Peak Flow --       Pain Score --       Pain Loc --       Pain Edu? --       Excl. in [address]? --      Constitutional:  Non-toxic appearance  HENT: Normocephalic, Atraumatic, Bilateral external ears normal, Oropharynx moist, No oral exudates, Nose normal.  Eyes:  PERRL, EOMI, Conjunctiva normal, No discharge. Neck: Normal range of motion, No tenderness, Supple, No stridor, No lymphadenopathy. Cardiovascular:  Normal heart rate, Normal rhythm  Pulmonary/Chest:  Normal breath sounds, No respiratory distress, No wheezing  Abdomen: Bowel sounds normal, Soft, No tenderness, No masses, No pulsatile masses  Back:  No tenderness, No CVA tenderness  Extremities:  Normal range of motion, Intact distal pulses, No edema, No tenderness  Neurologic:  Alert & oriented x 3, Normal motor function, Sensation intact to light touch throughout, No focal deficits  Skin:  Warm, Dry, No erythema, No rash      EKG Interpretation  None    Cardiac Monitor Strip Interpretation  Interpreted by me  Monitor strip interpreted for greater than 10 seconds  Rhythm: normal sinus   Rate: normal  Ectopy: none  ST Segments: normal      Radiology / Procedures:  Labs Reviewed   CBC WITH AUTO DIFFERENTIAL - Abnormal; Notable for the following components:       Result Value    RBC 4.57 (*)     MCH 31.3 (*)     Segs Relative 76.9 (*)     Lymphocytes % 14.4 (*)     Monocytes % 7.0 (*)     All other components within normal limits   PROTIME-INR - Abnormal; Notable for the following components:    Protime 28.3 (*)     All other components within normal limits   COMPREHENSIVE METABOLIC PANEL W/ REFLEX TO MG FOR LOW K       ED COURSE & MEDICAL DECISION MAKING:  Pertinent Labs & Imaging studies reviewed.  (See chart for details)  On exam, the patient is afebrile and nontoxic appearing. He is hemodynamically stable and neurologically intact. Abdomen is benign. Labs are obtained and are significant for a hemoglobin of 14.3 and an INR of 2.32 with no other clinically significant lab abnormalities. I suspect that the patient had an episode of rectal bleeding after passing a hard stool. This may be due to bleeding hemorrhoids. Diverticular bleed is also considered, however the patient does not have a history of diverticular disease and has no abdominal pain. I have a low suspicion for upper GI bleed, hemorrhage, acute blood loss anemia, hemodynamic instability or shock. I feel that the patient is stable for outpatient management follow up in 2-3 days. He states that he has a gastroenterologist in town, but cannot remember his name. He has his information at home. The  patient is given return precautions. The patient verbalized understanding, was agreeable with plan, and the patient was discharged home in stable condition. Clinical Impression:  1. Rectal bleeding        Disposition referral (if applicable): Your gastroenterologist    Schedule an appointment as soon as possible for a visit in 3 days      [facility] Emergency Department  [address] [phone]  [phone]  Go to   If symptoms worsen      Disposition medications (if applicable):  Discharge Medication List as of 2/28/2020  5:21 PM            Comment: Please note this report has been produced using speech recognition software and may contain errors related to that system including errors in grammar, punctuation, and spelling, as well as words and phrases that may be inappropriate. If there are any questions or concerns please feel free to contact the dictating provider for clarification.         Sanchez Shaw MD  02/29/20 2409

## 2020-02-28 NOTE — ED PROVIDER NOTES
As physician assistant-in-triage, I performed a medical screening history and physical exam on this patient. HISTORY OF PRESENT ILLNESS  Ladarius Crowder is a 62 y.o. male who presents to the ED after an episode of bright red rectal bleeding. Patient reports that he thought need to have a bowel movement and was straining but he only passed bright red blood. He does not think he is still bleeding. Denies history of hemorrhoids. Of note, patient is on Coumadin for heart valve replacement. He reports his last Coumadin level was 2.5. His goal level is 2.5-3.5. Patient denies lightheadedness, dizziness, syncope, abdominal pain, rectal pain, melena, nausea, vomiting. PHYSICAL EXAM  BP (!) 144/82   Pulse 63   Temp 97.9 °F (36.6 °C) (Oral)   Resp 16   Ht 5' 5\" (1.651 m)   Wt 145 lb (65.8 kg)   SpO2 97%   BMI 24.13 kg/m²     On exam, the patient appears well-hydrated, well-nourished, and in no acute distress. Mucous membranes are moist. Speech is clear. Breathing is unlabored. Skin is dry. Mental status is normal. Moves all extremities, and is without facial droop. PLAN:  Diagnostics and treatment initiated as needed. Please see the full history, physical exam, and final disposition note by the other provider in the main emergency department. Comment: Please note this report has been produced using speech recognition software and may contain errors related to that system including errors in grammar, punctuation, and spelling, as well as words and phrases that may be inappropriate. If there are any questions or concerns please feel free to contact the dictating provider for clarification.            Joey Lau PA-C  02/28/20 [id number]

## 2020-02-28 NOTE — ED NOTES
Discharge instructions and follow up reviewed with patient. Patient verbalized understanding and denies questions. Patient appears to be in no distress, A/O x 4, respirations equal and unlabored. Patient denies pain. Patient with all belongings ambulated from the ED to the waiting area with a steady gait without incident.      Noreen Yanez RN  02/28/20 [id number]

## 2020-03-03 ENCOUNTER — ANTI-COAG VISIT (OUTPATIENT)
Dept: FAMILY MEDICINE CLINIC | Age: 65
End: 2020-03-03

## 2020-03-03 ENCOUNTER — NURSE ONLY (OUTPATIENT)
Dept: FAMILY MEDICINE CLINIC | Age: 65
End: 2020-03-03
Payer: MEDICARE

## 2020-03-03 LAB
INTERNATIONAL NORMALIZATION RATIO, POC: 2.3
PROTHROMBIN TIME, POC: ABNORMAL

## 2020-03-03 PROCEDURE — 85610 PROTHROMBIN TIME: CPT | Performed by: FAMILY MEDICINE

## 2020-03-03 NOTE — PROGRESS NOTES
Confirmed pt taking 7.5/7.5/10mg coumadin.  inr 2.3. per Dr Plata Batter change to 10/10/7.5mg recheck in 2 weeks

## 2020-03-11 RX ORDER — CYCLOBENZAPRINE HCL 10 MG
TABLET ORAL
Qty: 90 TABLET | Refills: 1 | Status: SHIPPED | OUTPATIENT
Start: 2020-03-11 | End: 2020-07-30 | Stop reason: ALTCHOICE

## 2020-03-17 ENCOUNTER — NURSE ONLY (OUTPATIENT)
Dept: FAMILY MEDICINE CLINIC | Age: 65
End: 2020-03-17
Payer: MEDICARE

## 2020-03-17 ENCOUNTER — ANTI-COAG VISIT (OUTPATIENT)
Dept: FAMILY MEDICINE CLINIC | Age: 65
End: 2020-03-17
Payer: MEDICARE

## 2020-03-17 LAB
INTERNATIONAL NORMALIZATION RATIO, POC: 2.3
PROTHROMBIN TIME, POC: ABNORMAL

## 2020-03-17 PROCEDURE — 85610 PROTHROMBIN TIME: CPT | Performed by: FAMILY MEDICINE

## 2020-03-17 PROCEDURE — 93793 ANTICOAG MGMT PT WARFARIN: CPT | Performed by: FAMILY MEDICINE

## 2020-03-17 NOTE — PROGRESS NOTES
Confirmed 10/10/7.5mg. inr 2.3. per florentin gallardo change to 10mg qd except 7.5mg on Sunday recheck in 1 week

## 2020-03-27 RX ORDER — WARFARIN SODIUM 5 MG/1
TABLET ORAL
Qty: 60 TABLET | Refills: 1 | Status: SHIPPED | OUTPATIENT
Start: 2020-03-27 | End: 2020-05-26 | Stop reason: SDUPTHER

## 2020-03-31 ENCOUNTER — ANTI-COAG VISIT (OUTPATIENT)
Dept: FAMILY MEDICINE CLINIC | Age: 65
End: 2020-03-31
Payer: MEDICARE

## 2020-03-31 ENCOUNTER — NURSE ONLY (OUTPATIENT)
Dept: FAMILY MEDICINE CLINIC | Age: 65
End: 2020-03-31
Payer: MEDICARE

## 2020-03-31 LAB
INTERNATIONAL NORMALIZATION RATIO, POC: 2.5
PROTHROMBIN TIME, POC: NORMAL

## 2020-03-31 PROCEDURE — 93793 ANTICOAG MGMT PT WARFARIN: CPT | Performed by: FAMILY MEDICINE

## 2020-03-31 PROCEDURE — 85610 PROTHROMBIN TIME: CPT | Performed by: FAMILY MEDICINE

## 2020-04-14 ENCOUNTER — NURSE ONLY (OUTPATIENT)
Dept: FAMILY MEDICINE CLINIC | Age: 65
End: 2020-04-14
Payer: MEDICARE

## 2020-04-14 LAB
INTERNATIONAL NORMALIZATION RATIO, POC: 2.8
PROTHROMBIN TIME, POC: NORMAL

## 2020-04-14 PROCEDURE — 85610 PROTHROMBIN TIME: CPT | Performed by: FAMILY MEDICINE

## 2020-04-14 PROCEDURE — 99024 POSTOP FOLLOW-UP VISIT: CPT | Performed by: FAMILY MEDICINE

## 2020-04-28 ENCOUNTER — NURSE ONLY (OUTPATIENT)
Dept: FAMILY MEDICINE CLINIC | Age: 65
End: 2020-04-28
Payer: MEDICARE

## 2020-04-28 LAB
INTERNATIONAL NORMALIZATION RATIO, POC: 2.6
PROTHROMBIN TIME, POC: NORMAL

## 2020-04-28 PROCEDURE — 99024 POSTOP FOLLOW-UP VISIT: CPT | Performed by: FAMILY MEDICINE

## 2020-04-28 PROCEDURE — 85610 PROTHROMBIN TIME: CPT | Performed by: FAMILY MEDICINE

## 2020-05-12 ENCOUNTER — NURSE ONLY (OUTPATIENT)
Dept: FAMILY MEDICINE CLINIC | Age: 65
End: 2020-05-12

## 2020-05-26 ENCOUNTER — NURSE ONLY (OUTPATIENT)
Dept: FAMILY MEDICINE CLINIC | Age: 65
End: 2020-05-26
Payer: MEDICARE

## 2020-05-26 ENCOUNTER — ANTI-COAG VISIT (OUTPATIENT)
Dept: FAMILY MEDICINE CLINIC | Age: 65
End: 2020-05-26

## 2020-05-26 LAB
INTERNATIONAL NORMALIZATION RATIO, POC: 2.6
PROTHROMBIN TIME, POC: NORMAL

## 2020-05-26 PROCEDURE — 85610 PROTHROMBIN TIME: CPT | Performed by: FAMILY MEDICINE

## 2020-05-26 RX ORDER — SIMVASTATIN 40 MG
TABLET ORAL
Qty: 30 TABLET | Refills: 2 | Status: SHIPPED | OUTPATIENT
Start: 2020-05-26 | End: 2020-07-14 | Stop reason: SDUPTHER

## 2020-05-26 RX ORDER — WARFARIN SODIUM 5 MG/1
TABLET ORAL
Qty: 60 TABLET | Refills: 1 | Status: SHIPPED | OUTPATIENT
Start: 2020-05-26 | End: 2020-07-14 | Stop reason: SDUPTHER

## 2020-05-27 RX ORDER — WARFARIN SODIUM 5 MG/1
TABLET ORAL
Qty: 60 TABLET | Refills: 1 | OUTPATIENT
Start: 2020-05-27

## 2020-05-28 ENCOUNTER — VIRTUAL VISIT (OUTPATIENT)
Dept: FAMILY MEDICINE CLINIC | Age: 65
End: 2020-05-28
Payer: MEDICARE

## 2020-05-28 ENCOUNTER — TELEPHONE (OUTPATIENT)
Dept: FAMILY MEDICINE CLINIC | Age: 65
End: 2020-05-28

## 2020-05-28 VITALS — TEMPERATURE: 97.6 F | HEIGHT: 65 IN | BODY MASS INDEX: 22.49 KG/M2 | WEIGHT: 135 LBS

## 2020-05-28 PROCEDURE — 4004F PT TOBACCO SCREEN RCVD TLK: CPT | Performed by: PHYSICIAN ASSISTANT

## 2020-05-28 PROCEDURE — G2012 BRIEF CHECK IN BY MD/QHP: HCPCS | Performed by: PHYSICIAN ASSISTANT

## 2020-05-28 PROCEDURE — G8427 DOCREV CUR MEDS BY ELIG CLIN: HCPCS | Performed by: PHYSICIAN ASSISTANT

## 2020-05-28 PROCEDURE — 99213 OFFICE O/P EST LOW 20 MIN: CPT | Performed by: PHYSICIAN ASSISTANT

## 2020-05-28 PROCEDURE — 3017F COLORECTAL CA SCREEN DOC REV: CPT | Performed by: PHYSICIAN ASSISTANT

## 2020-05-28 PROCEDURE — G8420 CALC BMI NORM PARAMETERS: HCPCS | Performed by: PHYSICIAN ASSISTANT

## 2020-05-28 RX ORDER — SUMATRIPTAN 100 MG/1
100 TABLET, FILM COATED ORAL PRN
Qty: 9 TABLET | Refills: 0 | Status: CANCELLED | OUTPATIENT
Start: 2020-05-28

## 2020-05-28 RX ORDER — IBUPROFEN 800 MG/1
800 TABLET ORAL EVERY 8 HOURS PRN
Qty: 90 TABLET | Refills: 0 | Status: CANCELLED | OUTPATIENT
Start: 2020-05-28

## 2020-05-28 NOTE — TELEPHONE ENCOUNTER
To Dr. Brianna Brothers--    Patient states he is dizzy---he has severe headache ---this started today.

## 2020-05-28 NOTE — PROGRESS NOTES
Jose Flores is a 62 y.o. male evaluated via telephone on 5/28/2020. Consent:  He and/or health care decision maker is aware that that he may receive a bill for this telephone service, depending on his insurance coverage, and has provided verbal consent to proceed: Yes    Documentation:  I communicated with the patient and/or health care decision maker about headache. Details of this discussion including any medical advice provided:     Tuesday night 5/26/20, he started having a headache. On Wednesday morning, his headache was worse. Denies fever or chills. He's taken Tylenol, two tablets once, which helped. \"It definitely took the tightness away,\" he states. \"It gets to the point where I feel wobbly on my feet. \" States that the pain is around his forehead and on the sides. Denies nausea and vomiting, diarrhea, constipation. Rates severity of pain as 4 out of 10. He is on warfarin daily following valve surgery and cannot take NSAIDs. Recommended Tylenol 1 gram every 6 hours (TID), as well as rest. If no improvement in several days, he should call back and let us know. I affirm this is a Patient Initiated Episode with a Patient who has not had a related appointment within my department in the past 7 days or scheduled within the next 24 hours.     Patient identification was verified at the start of the visit: Yes    Total Time: minutes: 5-10 minutes    Note: not billable if this call serves to triage the patient into an appointment for the relevant concern      [facility]

## 2020-06-10 ENCOUNTER — NURSE ONLY (OUTPATIENT)
Dept: FAMILY MEDICINE CLINIC | Age: 65
End: 2020-06-10
Payer: MEDICARE

## 2020-06-10 LAB
INTERNATIONAL NORMALIZATION RATIO, POC: 2.5
PROTHROMBIN TIME, POC: NORMAL

## 2020-06-10 PROCEDURE — 85610 PROTHROMBIN TIME: CPT | Performed by: FAMILY MEDICINE

## 2020-06-10 PROCEDURE — 99024 POSTOP FOLLOW-UP VISIT: CPT | Performed by: FAMILY MEDICINE

## 2020-06-23 ENCOUNTER — NURSE ONLY (OUTPATIENT)
Dept: FAMILY MEDICINE CLINIC | Age: 65
End: 2020-06-23

## 2020-06-23 ENCOUNTER — ANTI-COAG VISIT (OUTPATIENT)
Dept: FAMILY MEDICINE CLINIC | Age: 65
End: 2020-06-23
Payer: MEDICARE

## 2020-06-23 LAB
INTERNATIONAL NORMALIZATION RATIO, POC: 2.6
PROTHROMBIN TIME, POC: NORMAL

## 2020-06-23 PROCEDURE — 85610 PROTHROMBIN TIME: CPT | Performed by: FAMILY MEDICINE

## 2020-06-27 ENCOUNTER — APPOINTMENT (OUTPATIENT)
Dept: GENERAL RADIOLOGY | Age: 65
DRG: 641 | End: 2020-06-27
Payer: MEDICARE

## 2020-06-27 ENCOUNTER — HOSPITAL ENCOUNTER (INPATIENT)
Age: 65
LOS: 3 days | Discharge: PSYCHIATRIC OR SUBSTANCE USE FACILITY | DRG: 641 | End: 2020-06-30
Attending: EMERGENCY MEDICINE | Admitting: INTERNAL MEDICINE
Payer: MEDICARE

## 2020-06-27 ENCOUNTER — APPOINTMENT (OUTPATIENT)
Dept: CT IMAGING | Age: 65
DRG: 641 | End: 2020-06-27
Payer: MEDICARE

## 2020-06-27 PROBLEM — E87.1 HYPONATREMIA: Status: ACTIVE | Noted: 2020-06-27

## 2020-06-27 LAB
ACETAMINOPHEN LEVEL: <5 UG/ML (ref 15–30)
ALBUMIN SERPL-MCNC: 4.3 GM/DL (ref 3.4–5)
ALCOHOL SCREEN SERUM: <0.01 %WT/VOL
ALP BLD-CCNC: 68 IU/L (ref 40–128)
ALT SERPL-CCNC: 27 U/L (ref 10–40)
AMPHETAMINES: NEGATIVE
ANION GAP SERPL CALCULATED.3IONS-SCNC: 14 MMOL/L (ref 4–16)
APTT: 32.7 SECONDS (ref 25.1–37.1)
AST SERPL-CCNC: 53 IU/L (ref 15–37)
BACTERIA: NEGATIVE /HPF
BARBITURATE SCREEN URINE: NEGATIVE
BASOPHILS ABSOLUTE: 0 K/CU MM
BASOPHILS RELATIVE PERCENT: 0.1 % (ref 0–1)
BENZODIAZEPINE SCREEN, URINE: NEGATIVE
BILIRUB SERPL-MCNC: 0.8 MG/DL (ref 0–1)
BILIRUBIN URINE: NEGATIVE MG/DL
BLOOD, URINE: NEGATIVE
BUN BLDV-MCNC: 12 MG/DL (ref 6–23)
CALCIUM SERPL-MCNC: 9.4 MG/DL (ref 8.3–10.6)
CANNABINOID SCREEN URINE: NEGATIVE
CHLORIDE BLD-SCNC: 82 MMOL/L (ref 99–110)
CHLORIDE URINE RANDOM: 10 MMOL/L (ref 43–210)
CLARITY: CLEAR
CO2: 18 MMOL/L (ref 21–32)
COCAINE METABOLITE: NEGATIVE
COLOR: YELLOW
CREAT SERPL-MCNC: 1.1 MG/DL (ref 0.9–1.3)
DIFFERENTIAL TYPE: ABNORMAL
DOSE AMOUNT: ABNORMAL
DOSE AMOUNT: ABNORMAL
DOSE TIME: ABNORMAL
DOSE TIME: ABNORMAL
EOSINOPHILS ABSOLUTE: 0 K/CU MM
EOSINOPHILS RELATIVE PERCENT: 0 % (ref 0–3)
GFR AFRICAN AMERICAN: >60 ML/MIN/1.73M2
GFR NON-AFRICAN AMERICAN: >60 ML/MIN/1.73M2
GLUCOSE BLD-MCNC: 130 MG/DL (ref 70–99)
GLUCOSE, URINE: NEGATIVE MG/DL
HCT VFR BLD CALC: 35.4 % (ref 42–52)
HEMOGLOBIN: 12.6 GM/DL (ref 13.5–18)
IMMATURE NEUTROPHIL %: 0.7 % (ref 0–0.43)
INR BLD: 3.59 INDEX
KETONES, URINE: ABNORMAL MG/DL
LEUKOCYTE ESTERASE, URINE: NEGATIVE
LYMPHOCYTES ABSOLUTE: 1 K/CU MM
LYMPHOCYTES RELATIVE PERCENT: 4.6 % (ref 24–44)
MCH RBC QN AUTO: 30.9 PG (ref 27–31)
MCHC RBC AUTO-ENTMCNC: 35.6 % (ref 32–36)
MCV RBC AUTO: 86.8 FL (ref 78–100)
MONOCYTES ABSOLUTE: 1.5 K/CU MM
MONOCYTES RELATIVE PERCENT: 7.1 % (ref 0–4)
NITRITE URINE, QUANTITATIVE: NEGATIVE
NUCLEATED RBC %: 0 %
OPIATES, URINE: NEGATIVE
OSMOLALITY URINE: 188 MOS/L (ref 292–1090)
OSMOLALITY: 243 MOS/L (ref 280–300)
OXYCODONE: NEGATIVE
PDW BLD-RTO: 12.7 % (ref 11.7–14.9)
PH, URINE: 6 (ref 5–8)
PHENCYCLIDINE, URINE: NEGATIVE
PLATELET # BLD: 337 K/CU MM (ref 140–440)
PMV BLD AUTO: 9.1 FL (ref 7.5–11.1)
POTASSIUM SERPL-SCNC: 4.3 MMOL/L (ref 3.5–5.1)
POTASSIUM, UR: 18.8 MMOL/L (ref 22–119)
PROTEIN UA: NEGATIVE MG/DL
PROTHROMBIN TIME: 44 SECONDS (ref 11.7–14.5)
RBC # BLD: 4.08 M/CU MM (ref 4.6–6.2)
RBC URINE: 1 /HPF (ref 0–3)
SALICYLATE LEVEL: <0.3 MG/DL (ref 15–30)
SEGMENTED NEUTROPHILS ABSOLUTE COUNT: 18.4 K/CU MM
SEGMENTED NEUTROPHILS RELATIVE PERCENT: 87.5 % (ref 36–66)
SODIUM BLD-SCNC: 114 MMOL/L (ref 135–145)
SODIUM BLD-SCNC: 122 MMOL/L (ref 135–145)
SODIUM URINE: 10 MMOL/L (ref 35–167)
SPECIFIC GRAVITY UA: 1.01 (ref 1–1.03)
TOTAL IMMATURE NEUTOROPHIL: 0.14 K/CU MM
TOTAL NUCLEATED RBC: 0 K/CU MM
TOTAL PROTEIN: 7.3 GM/DL (ref 6.4–8.2)
TRICHOMONAS: ABNORMAL /HPF
UROBILINOGEN, URINE: NORMAL MG/DL (ref 0.2–1)
WBC # BLD: 21 K/CU MM (ref 4–10.5)
WBC UA: 1 /HPF (ref 0–2)

## 2020-06-27 PROCEDURE — 85610 PROTHROMBIN TIME: CPT

## 2020-06-27 PROCEDURE — 84300 ASSAY OF URINE SODIUM: CPT

## 2020-06-27 PROCEDURE — 85025 COMPLETE CBC W/AUTO DIFF WBC: CPT

## 2020-06-27 PROCEDURE — 82436 ASSAY OF URINE CHLORIDE: CPT

## 2020-06-27 PROCEDURE — 83935 ASSAY OF URINE OSMOLALITY: CPT

## 2020-06-27 PROCEDURE — 73100 X-RAY EXAM OF WRIST: CPT

## 2020-06-27 PROCEDURE — 80307 DRUG TEST PRSMV CHEM ANLYZR: CPT

## 2020-06-27 PROCEDURE — 6370000000 HC RX 637 (ALT 250 FOR IP): Performed by: EMERGENCY MEDICINE

## 2020-06-27 PROCEDURE — 83930 ASSAY OF BLOOD OSMOLALITY: CPT

## 2020-06-27 PROCEDURE — 84295 ASSAY OF SERUM SODIUM: CPT

## 2020-06-27 PROCEDURE — 81001 URINALYSIS AUTO W/SCOPE: CPT

## 2020-06-27 PROCEDURE — 84133 ASSAY OF URINE POTASSIUM: CPT

## 2020-06-27 PROCEDURE — 70498 CT ANGIOGRAPHY NECK: CPT

## 2020-06-27 PROCEDURE — 99285 EMERGENCY DEPT VISIT HI MDM: CPT

## 2020-06-27 PROCEDURE — 2580000003 HC RX 258: Performed by: EMERGENCY MEDICINE

## 2020-06-27 PROCEDURE — G0480 DRUG TEST DEF 1-7 CLASSES: HCPCS

## 2020-06-27 PROCEDURE — 85730 THROMBOPLASTIN TIME PARTIAL: CPT

## 2020-06-27 PROCEDURE — 6360000004 HC RX CONTRAST MEDICATION: Performed by: EMERGENCY MEDICINE

## 2020-06-27 PROCEDURE — 36415 COLL VENOUS BLD VENIPUNCTURE: CPT

## 2020-06-27 PROCEDURE — 1200000000 HC SEMI PRIVATE

## 2020-06-27 PROCEDURE — 80053 COMPREHEN METABOLIC PANEL: CPT

## 2020-06-27 RX ORDER — DOXYCYCLINE HYCLATE 100 MG
100 TABLET ORAL ONCE
Status: COMPLETED | OUTPATIENT
Start: 2020-06-27 | End: 2020-06-27

## 2020-06-27 RX ORDER — WARFARIN SODIUM 5 MG/1
5 TABLET ORAL DAILY
Status: DISCONTINUED | OUTPATIENT
Start: 2020-06-28 | End: 2020-06-28

## 2020-06-27 RX ORDER — ACETAMINOPHEN 325 MG/1
650 TABLET ORAL EVERY 6 HOURS PRN
Status: DISCONTINUED | OUTPATIENT
Start: 2020-06-27 | End: 2020-06-30 | Stop reason: HOSPADM

## 2020-06-27 RX ORDER — SODIUM CHLORIDE 0.9 % (FLUSH) 0.9 %
10 SYRINGE (ML) INJECTION EVERY 12 HOURS SCHEDULED
Status: DISCONTINUED | OUTPATIENT
Start: 2020-06-27 | End: 2020-06-30 | Stop reason: HOSPADM

## 2020-06-27 RX ORDER — POLYETHYLENE GLYCOL 3350 17 G/17G
17 POWDER, FOR SOLUTION ORAL DAILY PRN
Status: DISCONTINUED | OUTPATIENT
Start: 2020-06-27 | End: 2020-06-30 | Stop reason: HOSPADM

## 2020-06-27 RX ORDER — DOXYCYCLINE HYCLATE 100 MG
100 TABLET ORAL EVERY 12 HOURS SCHEDULED
Status: DISCONTINUED | OUTPATIENT
Start: 2020-06-28 | End: 2020-06-30 | Stop reason: HOSPADM

## 2020-06-27 RX ORDER — DIAPER,BRIEF,INFANT-TODD,DISP
EACH MISCELLANEOUS ONCE
Status: COMPLETED | OUTPATIENT
Start: 2020-06-27 | End: 2020-06-27

## 2020-06-27 RX ORDER — DIAPER,BRIEF,INFANT-TODD,DISP
EACH MISCELLANEOUS 2 TIMES DAILY
Status: DISCONTINUED | OUTPATIENT
Start: 2020-06-27 | End: 2020-06-30 | Stop reason: HOSPADM

## 2020-06-27 RX ORDER — LIDOCAINE HYDROCHLORIDE AND EPINEPHRINE 10; 10 MG/ML; UG/ML
20 INJECTION, SOLUTION INFILTRATION; PERINEURAL ONCE
Status: DISCONTINUED | OUTPATIENT
Start: 2020-06-27 | End: 2020-06-30 | Stop reason: HOSPADM

## 2020-06-27 RX ORDER — SODIUM CHLORIDE 9 MG/ML
INJECTION, SOLUTION INTRAVENOUS CONTINUOUS
Status: DISCONTINUED | OUTPATIENT
Start: 2020-06-27 | End: 2020-06-30 | Stop reason: HOSPADM

## 2020-06-27 RX ORDER — ACETAMINOPHEN 650 MG/1
650 SUPPOSITORY RECTAL EVERY 6 HOURS PRN
Status: DISCONTINUED | OUTPATIENT
Start: 2020-06-27 | End: 2020-06-30 | Stop reason: HOSPADM

## 2020-06-27 RX ORDER — SODIUM CHLORIDE 0.9 % (FLUSH) 0.9 %
10 SYRINGE (ML) INJECTION PRN
Status: DISCONTINUED | OUTPATIENT
Start: 2020-06-27 | End: 2020-06-30 | Stop reason: HOSPADM

## 2020-06-27 RX ORDER — PROMETHAZINE HYDROCHLORIDE 25 MG/1
12.5 TABLET ORAL EVERY 6 HOURS PRN
Status: DISCONTINUED | OUTPATIENT
Start: 2020-06-27 | End: 2020-06-30 | Stop reason: HOSPADM

## 2020-06-27 RX ORDER — SODIUM CHLORIDE 0.9 % (FLUSH) 0.9 %
10 SYRINGE (ML) INJECTION 2 TIMES DAILY
Status: DISCONTINUED | OUTPATIENT
Start: 2020-06-27 | End: 2020-06-30 | Stop reason: HOSPADM

## 2020-06-27 RX ORDER — DOXYCYCLINE HYCLATE 100 MG/1
100 CAPSULE ORAL EVERY 12 HOURS SCHEDULED
Status: DISCONTINUED | OUTPATIENT
Start: 2020-06-28 | End: 2020-06-27

## 2020-06-27 RX ORDER — ONDANSETRON 2 MG/ML
4 INJECTION INTRAMUSCULAR; INTRAVENOUS EVERY 6 HOURS PRN
Status: DISCONTINUED | OUTPATIENT
Start: 2020-06-27 | End: 2020-06-30 | Stop reason: HOSPADM

## 2020-06-27 RX ADMIN — SODIUM CHLORIDE: 9 INJECTION, SOLUTION INTRAVENOUS at 19:30

## 2020-06-27 RX ADMIN — SODIUM CHLORIDE, PRESERVATIVE FREE 10 ML: 5 INJECTION INTRAVENOUS at 19:32

## 2020-06-27 RX ADMIN — BACITRACIN: 500 OINTMENT TOPICAL at 19:15

## 2020-06-27 RX ADMIN — IOPAMIDOL 80 ML: 755 INJECTION, SOLUTION INTRAVENOUS at 19:32

## 2020-06-27 RX ADMIN — DOXYCYCLINE HYCLATE 100 MG: 100 TABLET, COATED ORAL at 20:43

## 2020-06-27 ASSESSMENT — PAIN DESCRIPTION - ORIENTATION: ORIENTATION: RIGHT;LEFT

## 2020-06-27 ASSESSMENT — PAIN SCALES - GENERAL: PAINLEVEL_OUTOF10: 4

## 2020-06-27 ASSESSMENT — PAIN DESCRIPTION - PAIN TYPE: TYPE: ACUTE PAIN

## 2020-06-27 NOTE — ED PROVIDER NOTES
H/O cardiovascular MUGA stress test 05/14/2019    EF 50%, NORMAL LVEF, NORMAL WALL MOTION.    H/O echocardiogram 05/22/2014    BX=>30-24%, LV systolic function is low normal, mild aortic valve calcification, no pericardial effusion    H/O echocardiogram 12/12/2018    EF 45-50%    History of 24 hour EKG monitoring 6/6/14    24 hr holter monitor. Ventricular ectopics were noted in single and couplet forms. Supraventricular ectopics were noted in single and pair forms.     [place] (hard of hearing)     Bilateral Ears    Hyperlipidemia     Hypertension     Irritant contact dermatitis due to other chemical products 8/26/2019    Other drug allergy(995.27) 7/22/2009    S/P AVR 12/2003    Mechanical valvue     Shortness of breath     Teeth missing     Upper And Lower     Past Surgical History:   Procedure Laterality Date    BRONCHOSCOPY  1996    \"Done Twice\"    CARDIAC VALVE REPLACEMENT  12/17/2003    \"Aortic Valve Replacement, Mechanical Valve\"    COLONOSCOPY  2006    Polyps Removed    DENTAL SURGERY      Teeth Extracted In Past    ENDOSCOPY, COLON, DIAGNOSTIC  In Past    HEMIARTHROPLASTY SHOULDER FRACTURE Right 1/29/2019    SHOULDER HEMIARTHROPLASTY performed by Justo Méndez DO at [address] [facility] [address] [facility]    \"Cauterized Because My Sperm Was Low\"    ROTATOR CUFF REPAIR Right 2008    TONSILLECTOMY  1959 Or 1960     Family History   Problem Relation Age of Onset    Cancer Mother         Lymphoma    Diabetes Mother     High Blood Pressure Mother     High Cholesterol Mother     Obesity Mother     Vision Loss Mother         Wore Glasses    Heart Disease Father         \"Heart Failure\"   [facility] COPD Father     Asthma Sister     High Blood Pressure Sister     High Cholesterol Sister     COPD Sister     Diabetes Sister         \"Pre Diabetes\"     Social History     Socioeconomic History    Marital status:      Spouse name: warfarin (COUMADIN) 5 MG tablet take 1-2 tablets by mouth daily as directed 60 tablet 1    cyclobenzaprine (FLEXERIL) 10 MG tablet take 1 tablet by mouth three times a day if needed for muscle spasm (Patient not taking: Reported on 5/28/2020) 90 tablet 1    calcium carbonate (OSCAL) 500 MG TABS tablet Take 500 mg by mouth daily      KRILL OIL OMEGA-3 PO Take by mouth      triamcinolone (KENALOG) 0.1 % cream Apply topically 2 times daily. (Patient not taking: Reported on 5/28/2020) 60 g 1    montelukast (SINGULAIR) 10 MG tablet Take 1 tablet by mouth nightly \"Alternate With Zyrtec\" (Patient not taking: Reported on 5/28/2020) 30 tablet 5    PARoxetine (PAXIL) 20 MG tablet take 1 tablet by mouth once daily  0    sodium chloride, Inhalant, 3 % nebulizer solution       cetirizine (ZYRTEC) 10 MG tablet Take 10 mg by mouth \"Alternate With Singulair\"      NASAL SPRAY SALINE NA by Nasal route daily Over The Counter      budesonide (RHINOCORT ALLERGY) 32 MCG/ACT nasal spray 2 sprays by Nasal route daily      Acetaminophen (TYLENOL 8 HOUR PO) Take by mouth as needed Over The Counter      Multiple Vitamins-Minerals (CENTRUM PO) Take by mouth daily      Albuterol Sulfate (PROAIR HFA IN) Inhale into the lungs as needed      Nebulizer MISC Inhale into the lungs as needed       Allergies   Allergen Reactions    Ciprofloxacin Hcl Hives and Swelling    Levaquin [Levofloxacin] Hives and Swelling    Other Nausea And Vomiting     \"Allergic To Tylox\"    Ceftin [Cefuroxime]        Nursing Notes Reviewed    Physical Exam:  ED Triage Vitals   Enc Vitals Group      BP       Pulse       Resp       Temp       Temp src       SpO2       Weight       Height       Head Circumference       Peak Flow       Pain Score       Pain Loc       Pain Edu? Excl. in [address]? General appearance:  No acute distress. Covered in dried blood. Very pleasant. Skin:  Warm.  Dry. 2 cm full-thickness laceration to left lateral neck with some venous oozing present. 4 cm full-thickness laceration horizontally across left distal forearm near the wrist.  No active bleeding. There is some surrounding erythema that is mild. No significant induration or underlying fluctuance. No pustular drainage. No tendon lacerations. No foreign bodies. Eye:  Extraocular movements intact. Pupils equal round react to light. Ears, nose, mouth and throat:  Oral mucosa moist.  No cephalhematoma, bolaños sign or raccoon eyes. Midface is stable. Neck:  Trachea midline. No bony midline cervical tenderness to palpation. Lacerations as above. No rapid expanding hematoma. Normal phonation and tolerating oral secretions. Extremity: Normal ROM. Extremities are nontender. Heart:  Regular  Perfusion:  Intact. Bilateral radials are intact. Capillary refill intact to bilateral hands. Respiratory:   Respirations nonlabored. Speaking clearly in full sentences. Abdominal:  Non distended. Neurological:  Alert and oriented times 3. No focal neuro deficits.              Psychiatric:  Flat, + depression, + suicidal ideations, + suicide attempt, no homicidal ideations, concrete thoughts, good eye contact    I have reviewed and interpreted all of the currently available lab results from this visit (if applicable):  Results for orders placed or performed during the hospital encounter of 06/27/20   CBC auto diff   Result Value Ref Range    WBC 21.0 (H) 4.0 - 10.5 K/CU MM    RBC 4.08 (L) 4.6 - 6.2 M/CU MM    Hemoglobin 12.6 (L) 13.5 - 18.0 GM/DL    Hematocrit 35.4 (L) 42 - 52 %    MCV 86.8 78 - 100 FL    MCH 30.9 27 - 31 PG    MCHC 35.6 32.0 - 36.0 %    RDW 12.7 11.7 - 14.9 %    Platelets 239 311 - 167 K/CU MM    MPV 9.1 7.5 - 11.1 FL    Differential Type AUTOMATED DIFFERENTIAL     Segs Relative 87.5 (H) 36 - 66 %    Lymphocytes % 4.6 (L) 24 - 44 %    Monocytes % 7.1 (H) 0 - 4 %    Eosinophils % 0.0 0 - 3 %    Basophils % 0.1 0 - 1 %    Segs Absolute 18.4 K/CU MM UA YELLOW YELLOW    Clarity, UA CLEAR CLEAR    Glucose, Urine NEGATIVE NEGATIVE MG/DL    Bilirubin Urine NEGATIVE NEGATIVE MG/DL    Ketones, Urine SMALL (A) NEGATIVE MG/DL    Specific Gravity, UA 1.009 1.001 - 1.035    Blood, Urine NEGATIVE NEGATIVE    pH, Urine 6.0 5.0 - 8.0    Protein, UA NEGATIVE NEGATIVE MG/DL    Urobilinogen, Urine NORMAL 0.2 - 1.0 MG/DL    Nitrite Urine, Quantitative NEGATIVE NEGATIVE    Leukocyte Esterase, Urine NEGATIVE NEGATIVE    RBC, UA 1 0 - 3 /HPF    WBC, UA 1 0 - 2 /HPF    Bacteria, UA NEGATIVE NEGATIVE /HPF    Trichomonas, UA NONE SEEN NONE SEEN /HPF   Electrolytes urine random   Result Value Ref Range    Sodium, Ur 10 (L) 35 - 167 MMOL/L    Potassium, Ur 18.8 (L) 22 - 119 MMOL/L    Chloride 10 (L) 43 - 210 MMOL/L   Osmolality, Urine   Result Value Ref Range    Osmolality, Ur 188 (L) 292 - 1090 MOS/L      Radiographs (if obtained):  [] The following radiograph was interpreted by myself in the absence of a radiologist:   [x] Radiologist's Report Reviewed:  CTA NECK W CONTRAST   Final Result   No acute arterial injury visualized. XR WRIST LEFT (2 VIEWS)   Final Result   No acute osseous or soft tissue abnormality. EKG (if obtained): (All EKG's are interpreted by myself in the absence of a cardiologist)    Chart review shows recent radiographs:  No results found. Procedure Note - Laceration repair (neck):  Questions were sought and answered and verbal consent was given by patient for the procedure. The area was prepped and draped in standard bedside fashion. The wound area was anesthetized with 3ml of Lidocaine 1% with epinephrine without added sodium bicarbonate. The wound was explored with No foreign bodies found. The wound was repaired with 5-0 Ethilon; 3 simple interrupted sutures were used. The patient tolerated the procedure well without complications and my repeat neurovascular exam post-procedure is unchanged.     Wound care and scar minimization education was provided. Instructions were given to return for increasing pain, redness, streaking, discharge, or any other worsening or worrisome concerns. Procedure Note - Laceration repair (wrist):  Questions were sought and answered and verbal consent was given by patient for the procedure. The area was prepped and draped in standard bedside fashion. The wound area was anesthetized with 6ml of Lidocaine 1% with epinephrine without added sodium bicarbonate. The wound was explored with No foreign bodies found. The wound was repaired with 4-0 Ethilon; 3 simple interrupted sutures were used (wound closed loosely given the delay in overall presentation but the need to approximate the gaping nature of the wound). The patient tolerated the procedure well without complications and my repeat neurovascular exam post-procedure is unchanged. Wound care and scar minimization education was provided. Instructions were given to return for increasing pain, redness, streaking, discharge, or any other worsening or worrisome concerns. MDM:  Patient presenting for depression as well as thoughts of suicide. The patient was placed in suicide precautions, patient's clothing and belongings were removed, documented and stored in the emergency department. Patient's workup was initiated lab results as above. CTA imaging of patient's head and neck was obtained. Wounds were cleansed. Despite the delay in presentation I did primarily close patient's left  neck wound as it is actively bleeding after thorough cleansing. Additionally, patient's left wrist laceration was closed loosely after significant irrigation and cleansing with Betadine as above as it was gaping. Doxycycline was given for antibiotic prophylaxis. CTA imaging of patient's neck is negative for acute process or vascular injury. CMP with severe hyponatremia and mild hyperglycemia and mild metabolic acidosis. Urinalysis is negative for urinary tract infection. INR is slightly elevated at 3.5. APTT is within normal limits. Urine drug screen negative. Acetaminophen and salicylate levels are negative. EtOH negative. CBC with marked leukocytosis and mild anemia. Patient will need admission for his severe hyponatremia. I did initiate IV fluid infusion of 50 cc an hour of normal saline after discussion with nephrology. I did speak with Dr. Vivian Ahn who will order additional labs and agrees with admission in the infusion. She will see the patient as an inpatient. Patient does tell me that he has had problems with low sodium secondary to increased water drinking and he thinks this might be going on again. Patient to be discussed with hospitalist team patient to be admitted to medicine. Patient will remain on suicide precautions with a sitter. CRITICAL CARE NOTE:  There was a high probability of clinically significant life-threatening deterioration of the patient's condition requiring my urgent intervention due to hyponatremia, SI with attempt. IV normal saline infusion management, subspecialty consultation, made assessment and bleeding control and frequent reassessments was performed to address this. Total critical care time is AT LEAST 45 minutes. This includes vital sign monitoring, pulse oximetry monitoring, telemetry monitoring, clinical response to the IV medications, reviewing the nursing notes, consultation time, dictation/documentation time, and interpretation of the lab work. This time excludes time spent performing procedures and separately billable procedures and family discussion time. Clinical Impression:  1. Hyponatremia    2. Laceration of neck, initial encounter    3. Laceration of left wrist, initial encounter      Disposition referral (if applicable):  No follow-up provider specified.   Disposition medications (if applicable):  New Prescriptions    No medications on file       Comment: Please note this report has been produced using speech recognition software and may contain errors related to that system including errors in grammar, punctuation, and spelling, as well as words and phrases that may be inappropriate. If there are any questions or concerns please feel free to contact the dictating provider for clarification.        Anabela Young MD  06/27/20 [id number]

## 2020-06-27 NOTE — PROGRESS NOTES
NEPHROLOGY:  - SODIUM 114  - SUiCIDAL ATTEMPTS BY WRIST CUTTING AND ANTERIOR NECK TOO  - OBTAIN SERUM OSMOLALITY, URINE OSMOLALITY AND ELECTROLYTES, URINALYSIS  - NS AT 50ML/HR FOR NOW AND SERUM SODIUM LEVEL EVERY 6HRS    FULL NOTE TO FOLLOW  THANK YOU

## 2020-06-27 NOTE — ED TRIAGE NOTES
Pt to ED after suicide attempt. Pt reports cutting bilateral sides of his neck and bilateral wrists with a knife. Pt states he was upset because he thought he could be losing his apartment and \"couldn't face telling my sister\".

## 2020-06-28 LAB
ALBUMIN SERPL-MCNC: 4 GM/DL (ref 3.4–5)
ALP BLD-CCNC: 63 IU/L (ref 40–128)
ALT SERPL-CCNC: 26 U/L (ref 10–40)
ANION GAP SERPL CALCULATED.3IONS-SCNC: 9 MMOL/L (ref 4–16)
AST SERPL-CCNC: 48 IU/L (ref 15–37)
BASOPHILS ABSOLUTE: 0 K/CU MM
BASOPHILS RELATIVE PERCENT: 0.2 % (ref 0–1)
BILIRUB SERPL-MCNC: 0.7 MG/DL (ref 0–1)
BUN BLDV-MCNC: 10 MG/DL (ref 6–23)
CALCIUM SERPL-MCNC: 8.8 MG/DL (ref 8.3–10.6)
CHLORIDE BLD-SCNC: 90 MMOL/L (ref 99–110)
CO2: 24 MMOL/L (ref 21–32)
CREAT SERPL-MCNC: 1 MG/DL (ref 0.9–1.3)
DIFFERENTIAL TYPE: ABNORMAL
EOSINOPHILS ABSOLUTE: 0 K/CU MM
EOSINOPHILS RELATIVE PERCENT: 0.1 % (ref 0–3)
GFR AFRICAN AMERICAN: >60 ML/MIN/1.73M2
GFR NON-AFRICAN AMERICAN: >60 ML/MIN/1.73M2
GLUCOSE BLD-MCNC: 88 MG/DL (ref 70–99)
HCT VFR BLD CALC: 32.1 % (ref 42–52)
HEMOGLOBIN: 11.3 GM/DL (ref 13.5–18)
IMMATURE NEUTROPHIL %: 0.9 % (ref 0–0.43)
INR BLD: 4.21 INDEX
INR BLD: 4.5 INDEX
LYMPHOCYTES ABSOLUTE: 1.1 K/CU MM
LYMPHOCYTES RELATIVE PERCENT: 10.1 % (ref 24–44)
MCH RBC QN AUTO: 31.3 PG (ref 27–31)
MCHC RBC AUTO-ENTMCNC: 35.2 % (ref 32–36)
MCV RBC AUTO: 88.9 FL (ref 78–100)
MONOCYTES ABSOLUTE: 1.4 K/CU MM
MONOCYTES RELATIVE PERCENT: 12.9 % (ref 0–4)
NUCLEATED RBC %: 0 %
PDW BLD-RTO: 13.3 % (ref 11.7–14.9)
PLATELET # BLD: 265 K/CU MM (ref 140–440)
PMV BLD AUTO: 9.1 FL (ref 7.5–11.1)
POTASSIUM SERPL-SCNC: 4.4 MMOL/L (ref 3.5–5.1)
PROTHROMBIN TIME: 51.7 SECONDS (ref 11.7–14.5)
PROTHROMBIN TIME: 55.3 SECONDS (ref 11.7–14.5)
RBC # BLD: 3.61 M/CU MM (ref 4.6–6.2)
SEGMENTED NEUTROPHILS ABSOLUTE COUNT: 8.3 K/CU MM
SEGMENTED NEUTROPHILS RELATIVE PERCENT: 75.8 % (ref 36–66)
SODIUM BLD-SCNC: 122 MMOL/L (ref 135–145)
SODIUM BLD-SCNC: 123 MMOL/L (ref 135–145)
SODIUM BLD-SCNC: 124 MMOL/L (ref 135–145)
SODIUM BLD-SCNC: 125 MMOL/L (ref 135–145)
TOTAL IMMATURE NEUTOROPHIL: 0.1 K/CU MM
TOTAL NUCLEATED RBC: 0 K/CU MM
TOTAL PROTEIN: 6.1 GM/DL (ref 6.4–8.2)
WBC # BLD: 11 K/CU MM (ref 4–10.5)

## 2020-06-28 PROCEDURE — 94761 N-INVAS EAR/PLS OXIMETRY MLT: CPT

## 2020-06-28 PROCEDURE — 85025 COMPLETE CBC W/AUTO DIFF WBC: CPT

## 2020-06-28 PROCEDURE — 85610 PROTHROMBIN TIME: CPT

## 2020-06-28 PROCEDURE — 6370000000 HC RX 637 (ALT 250 FOR IP): Performed by: INTERNAL MEDICINE

## 2020-06-28 PROCEDURE — 1200000000 HC SEMI PRIVATE

## 2020-06-28 PROCEDURE — 84295 ASSAY OF SERUM SODIUM: CPT

## 2020-06-28 PROCEDURE — 80053 COMPREHEN METABOLIC PANEL: CPT

## 2020-06-28 PROCEDURE — 94010 BREATHING CAPACITY TEST: CPT

## 2020-06-28 PROCEDURE — 2580000003 HC RX 258: Performed by: NURSE PRACTITIONER

## 2020-06-28 PROCEDURE — 36415 COLL VENOUS BLD VENIPUNCTURE: CPT

## 2020-06-28 PROCEDURE — 94150 VITAL CAPACITY TEST: CPT

## 2020-06-28 PROCEDURE — 6370000000 HC RX 637 (ALT 250 FOR IP): Performed by: NURSE PRACTITIONER

## 2020-06-28 RX ADMIN — SODIUM CHLORIDE, PRESERVATIVE FREE 10 ML: 5 INJECTION INTRAVENOUS at 11:41

## 2020-06-28 RX ADMIN — DOXYCYCLINE HYCLATE 100 MG: 100 TABLET, COATED ORAL at 11:41

## 2020-06-28 RX ADMIN — BACITRACIN ZINC: 500 OINTMENT TOPICAL at 11:41

## 2020-06-28 ASSESSMENT — PAIN DESCRIPTION - ORIENTATION: ORIENTATION: LEFT

## 2020-06-28 ASSESSMENT — PAIN SCALES - GENERAL: PAINLEVEL_OUTOF10: 4

## 2020-06-28 ASSESSMENT — PAIN DESCRIPTION - LOCATION: LOCATION: WRIST

## 2020-06-28 ASSESSMENT — PAIN DESCRIPTION - PAIN TYPE: TYPE: ACUTE PAIN

## 2020-06-28 NOTE — CARE COORDINATION
Psych consult pending at this time. If inpatient psych placement is recommended [facility] can be contacted to initiate transfer once deemed medically stable.

## 2020-06-28 NOTE — FLOWSHEET NOTE
Perfect serve message sent to Dr. Strauss Spittle him that patient takes albuterol, rhinocort, and singulair and is not ordered on any of them. Also notified that he has a very productive cough and had mild wheezing to rt. lung. Message shows that it was read, but no new orders placed into epic.

## 2020-06-28 NOTE — PROGRESS NOTES
Hospitalist Progress Note      Name:  Alma Scott /Age/Sex: 1955  (62 y.o. male)   MRN & CSN:  [id number] & [id number] Admission Date/Time: 2020  5:02 PM   [facility]  [facility] PCP: Nitin Leslie, [address] Day: 2    Assessment and Plan:   Alma Scott is a 62 y.o.  male  who presents with <principal problem not specified>    >Hyponatremia (114) on adm  - IVF, urine indices  - Nephrology consulted, fluid restriction  - Na improving     >Suicide attempt  -Psych consult  -Suicide precautions     >Laceration of neck, Laceration of left wrist, initial encounter  -Wound care  -Empiric antibiotics- Doxycyline                >VHD s/p AVR ()  -On chronic AC- coumadin  -Daily PT/INR  -Pharmacy to dose     >HFrEF- appears stable. Last TTE 2018 EF 45-50%, MR/TR     >COPD- stable continue PRN breathing treatments    Diet DIET GENERAL;   DVT Prophylaxis ? pt on warfarin   Code Status Full Code   Disposition  pending clinical improvement and consult rec     History of Present Illness:     Pt S&E. No chest pain, no dyspnea, no abd pain, no n/V, no F/C, admit depression and trying to kill himself. Has not been eating much due to depression    10-14 point ROS reviewed negative, unless as noted above    Objective:   No intake or output data in the 24 hours ending 20 0926   Vitals:   Vitals:    20 0234   BP: 115/64   Pulse: 69   Resp: 24   Temp: 98.2 °F (36.8 °C)   SpO2: 97%     Physical Exam:    GEN Awake male, cooperative, no apparent distress. RESP Clear to auscultation, no wheezes, rales or rhonchi. Symmetric chest movement . CARDIO/VASC S1/S2 auscultated. Regular rate. GI Abdomen is soft without significant tenderness, Bowel sounds are normoactive. MSK No gross joint deformities. Spontaneous movement of all extremities  SKIN  warm, dry. NEURO normal speech, no lateralizing weakness. PSYCH Awake, alert, oriented x 4.      Medications:   Medications:   

## 2020-06-28 NOTE — PROGRESS NOTES
Skin assessment completed upon arrival to room with Rell Church. Pt has wounds to bilateral wrists and sides of neck with dressings placed by the ED. Bilateral neck dressings changed at this time. Per ED, pt's belongings are downstairs with security. Paperwork for belongings placed in pt chart.

## 2020-06-28 NOTE — H&P
currently on Paxil. While in the emergency room he was found to be significantly hyponatremic. Ten point ROS: reviewed negative, unless as noted in above HPI. Objective:   No intake or output data in the 24 hours ending 06/27/20 2106     Vitals:   Vitals:    06/27/20 1728 06/27/20 1805 06/27/20 1830 06/27/20 1936   BP: 125/82 138/84 120/79    Pulse: 112 107 97 108   Resp: 18 13 19    Temp: 98 °F (36.7 °C)      TempSrc: Oral      SpO2: 96% 97% 96%    Weight: 135 lb (61.2 kg)      Height: 5' 5.5\" (1.664 m)          Physical Exam: 06/27/20     GEN -Awake nontoxic appearing male, sitting upright in bed , NAD. Normal body habitus. Appears given age. EYES -PERRLA. No scleral erythema, discharge, or conjunctivitis. HENT -MM are moist. Oral pharynx without exudates, no evidence of thrush. NECK -Supple, no apparent thyromegaly or masses. RESP -CTA, no wheezes, rales or rhonchi. Symmetric chest movement while on RA.   C/V -S1/S2 auscultated. RRR without appreciable M/R/G. No JVD or carotid bruits. Peripheral pulses equal bilaterally and palpable. Cap refill <3 sec. no peripheral edema. GI -Abdomen is soft non distended and without significant TTP. + BS. No masses or guarding. Rectal exam deferred. No HSM   -No CVA/ flank tenderness. De Leon catheter is not present. LYMPH-No palpable cervical lymphadenopathy and no hepatosplenomegaly. No petechiae or ecchymoses. MS -No gross joint deformities. SKIN -Normal coloration, warm, dry. Bilateral wrist laceration-repaired in emergency room. Bilateral neck wounds with current dressing in place continues to have some minor bleeding. NEURO-Cranial nerves appear grossly intact, normal speech, no lateralizing weakness. PSYC-Awake, alert, oriented x 4- person, place, time, situation, flat/depressed affect.     Past Medical History:      Past Medical History:   Diagnosis Date    Acid reflux     Acute frontal sinusitis 7/22/2009    Alcohol abuse     \"I Drink in his mother; Vision Loss in his mother. Soc HX:   Social History     Socioeconomic History    Marital status:      Spouse name: None    Number of children: None    Years of education: None    Highest education level: None   Occupational History    None   Social Needs    Financial resource strain: None    Food insecurity     Worry: None     Inability: None    Transportation needs     Medical: None     Non-medical: None   Tobacco Use    Smoking status: Never Smoker    Smokeless tobacco: Current User     Types: Snuff, Chew   Substance and Sexual Activity    Alcohol use: Yes     Drinks per session: 1 or 2     Binge frequency: Daily or almost daily     Comment: \"Average 2 Beers A Day\"    Drug use: No    Sexual activity: Not Currently   Lifestyle    Physical activity     Days per week: None     Minutes per session: None    Stress: None   Relationships    Social connections     Talks on phone: None     Gets together: None     Attends Yazidi service: None     Active member of club or organization: None     Attends meetings of clubs or organizations: None     Relationship status: None    Intimate partner violence     Fear of current or ex partner: None     Emotionally abused: None     Physically abused: None     Forced sexual activity: None   Other Topics Concern    None   Social History Narrative    None     TOBACCO:   reports that he has never smoked. His smokeless tobacco use includes snuff and chew. ETOH:   reports current alcohol use. Drugs:  reports no history of drug use. Allergies: Allergies   Allergen Reactions    Ciprofloxacin Hcl Hives and Swelling    Levaquin [Levofloxacin] Hives and Swelling    Other Nausea And Vomiting     \"Allergic To Tylox\"    Ceftin [Cefuroxime]        Home Medications:     Prior to Admission medications    Medication Sig Start Date End Date Taking?  Authorizing Provider   simvastatin (ZOCOR) 40 MG tablet take 1 tablet by mouth at bedtime 5/26/20 Gaurav Ornelas MD   warfarin (COUMADIN) 5 MG tablet take 1-2 tablets by mouth daily as directed 5/26/20   Gaurav Ornelas MD   cyclobenzaprine (FLEXERIL) 10 MG tablet take 1 tablet by mouth three times a day if needed for muscle spasm  Patient not taking: Reported on 5/28/2020 3/11/20   Gaurav Ornelas MD   calcium carbonate (OSCAL) 500 MG TABS tablet Take 500 mg by mouth daily    Historical Provider, MD   KRILL OIL OMEGA-3 PO Take by mouth    Historical Provider, MD   triamcinolone (KENALOG) 0.1 % cream Apply topically 2 times daily.   Patient not taking: Reported on 5/28/2020 8/26/19   Saurabh Wetzel MD   montelukast (SINGULAIR) 10 MG tablet Take 1 tablet by mouth nightly \"Alternate With Zyrtec\"  Patient not taking: Reported on 5/28/2020 7/31/19   Gaurav Ornelas MD   PARoxetine (PAXIL) 20 MG tablet take 1 tablet by mouth once daily 1/15/19   Historical Provider, MD   sodium chloride, Inhalant, 3 % nebulizer solution  10/31/18   Historical Provider, MD   cetirizine (ZYRTEC) 10 MG tablet Take 10 mg by mouth \"Alternate With Singulair\"    Historical Provider, MD   NASAL SPRAY SALINE NA by Nasal route daily Over The Counter    Historical Provider, MD   budesonide (RHINOCORT ALLERGY) 32 MCG/ACT nasal spray 2 sprays by Nasal route daily    Historical Provider, MD   Acetaminophen (TYLENOL 8 HOUR PO) Take by mouth as needed Over The Counter    Historical Provider, MD   Multiple Vitamins-Minerals (CENTRUM PO) Take by mouth daily    Historical Provider, MD   Albuterol Sulfate (PROAIR HFA IN) Inhale into the lungs as needed    Historical Provider, MD   Nebulizer MISC Inhale into the lungs as needed    Historical Provider, MD         Medications:   Medications:    lidocaine-EPINEPHrine  20 mL Intradermal Once    sodium chloride flush  10 mL Intravenous BID      Infusions:    sodium chloride 50 mL/hr at 06/27/20 1930     PRN Meds:      Data:     Laboratory this visit:  Reviewed  Recent Labs 06/27/20  1715   WBC 21.0*   HGB 12.6*   HCT 35.4*         Recent Labs     06/27/20  1715   *   K 4.3   CL 82*   CO2 18*   BUN 12   CREATININE 1.1     Recent Labs     06/27/20  1715   AST 53*   ALT 27   BILITOT 0.8   ALKPHOS 68     Recent Labs     06/27/20  1715   INR 3.59         Radiology this visit:  Reviewed. Xr Wrist Left (2 Views)    Result Date: 6/27/2020  EXAMINATION: 3 XRAY VIEWS OF THE LEFT WRIST 6/27/2020 5:21 pm COMPARISON: None. HISTORY: ORDERING SYSTEM PROVIDED HISTORY: deep laceration, si attempt, r/o fb TECHNOLOGIST PROVIDED HISTORY: Reason for exam:->deep laceration, si attempt, r/o fb Reason for Exam: deep laceration, si attempt, r/o fb Acuity: Acute Type of Exam: Initial FINDINGS: No fracture or dislocation. Normal carpal alignment is maintained. Severe atherosclerotic vascular calcifications. No soft tissue gas or foreign body. No acute osseous or soft tissue abnormality. Cta Neck W Contrast    Result Date: 6/27/2020  EXAMINATION: CTA OF THE NECK 6/27/2020 6:06 pm TECHNIQUE: CTA of the neck was performed with the administration of intravenous contrast. Multiplanar reformatted images are provided for review. MIP images are provided for review. Stenosis of the internal carotid arteries measured using NASCET criteria. Dose modulation, iterative reconstruction, and/or weight based adjustment of the mA/kV was utilized to reduce the radiation dose to as low as reasonably achievable. COMPARISON: None. HISTORY: ORDERING SYSTEM PROVIDED HISTORY: bilateral neck cuts in SI attempt, left side with venous bleeding present, r/o deep vessel/structure injury TECHNOLOGIST PROVIDED HISTORY: Reason for exam:->bilateral neck cuts in SI attempt, left side with venous bleeding present, r/o deep vessel/structure injury Has a \"code stroke\" or \"stroke alert\" been called? ->No Reason for Exam: attempted suicide Acuity: Acute Type of Exam: Initial Mechanism of Injury: bilateral neck cuts in

## 2020-06-28 NOTE — CONSULTS
Ventricular ectopics were noted in single and couplet forms. Supraventricular ectopics were noted in single and pair forms.  [place] (hard of hearing)     Bilateral Ears    Hyperlipidemia     Hypertension     Irritant contact dermatitis due to other chemical products 8/26/2019    Other drug allergy(995.27) 7/22/2009    S/P AVR 12/2003    Mechanical valvue     Shortness of breath     Teeth missing     Upper And Lower       Past Surgical History:        Procedure Laterality Date    BRONCHOSCOPY  1996    \"Done Twice\"    CARDIAC VALVE REPLACEMENT  12/17/2003    \"Aortic Valve Replacement, Mechanical Valve\"    COLONOSCOPY  2006    Polyps Removed    DENTAL SURGERY      Teeth Extracted In Past    ENDOSCOPY, COLON, DIAGNOSTIC  In Past    HEMIARTHROPLASTY SHOULDER FRACTURE Right 1/29/2019    SHOULDER HEMIARTHROPLASTY performed by Aaron Cason, DO at [facility] ARTHROSCOPY Left 1992    LUNG BIOPSY Right 1996   [address] [facility], [address]    \"Cauterized Because My Sperm Was Low\"    ROTATOR CUFF REPAIR Right 2008    TONSILLECTOMY  1959 Or 1960       Medications:   Prior to Admission medications    Medication Sig Start Date End Date Taking?  Authorizing Provider   simvastatin (ZOCOR) 40 MG tablet take 1 tablet by mouth at bedtime 5/26/20  Yes Vanita Pak MD   warfarin (COUMADIN) 5 MG tablet take 1-2 tablets by mouth daily as directed  Patient taking differently: 10 mg take 1-2 tablets by mouth daily as directed  INR 2.5-3.5 5/26/20  Yes Vanita Pak MD   calcium carbonate (OSCAL) 500 MG TABS tablet Take 500 mg by mouth daily   Yes Historical Provider, MD   KRILL OIL OMEGA-3 PO Take by mouth   Yes Historical Provider, MD   montelukast (SINGULAIR) 10 MG tablet Take 1 tablet by mouth nightly \"Alternate With Zyrtec\" 7/31/19  Yes Vanita Pak MD   PARoxetine (PAXIL) 20 MG tablet take 1 tablet by mouth once daily 1/15/19  Yes Historical Provider, MD   sodium chloride, Inhalant, 3 % nebulizer solution  10/31/18  Yes Historical Provider, MD   cetirizine (ZYRTEC) 10 MG tablet Take 10 mg by mouth \"Alternate With Singulair\"   Yes Historical Provider, MD   NASAL SPRAY SALINE NA by Nasal route daily Over The Counter   Yes Historical Provider, MD   budesonide (RHINOCORT ALLERGY) 32 MCG/ACT nasal spray 2 sprays by Nasal route daily   Yes Historical Provider, MD   Multiple Vitamins-Minerals (CENTRUM PO) Take by mouth daily   Yes Historical Provider, MD   Albuterol Sulfate (PROAIR HFA IN) Inhale into the lungs as needed   Yes Historical Provider, MD   cyclobenzaprine (FLEXERIL) 10 MG tablet take 1 tablet by mouth three times a day if needed for muscle spasm  Patient not taking: Reported on 5/28/2020 3/11/20   Shirley Reyes MD   triamcinolone (KENALOG) 0.1 % cream Apply topically 2 times daily. Patient not taking: Reported on 5/28/2020 8/26/19   Everlyn Essex, MD   Acetaminophen (TYLENOL 8 HOUR PO) Take by mouth as needed Over The Counter    Historical Provider, MD   Nebulizer MISC Inhale into the lungs as needed    Historical Provider, MD        Allergies:  Ciprofloxacin hcl; Levaquin [levofloxacin]; Other; and Ceftin [cefuroxime]    Social History:   TOBACCO:   reports that he has never smoked. His smokeless tobacco use includes snuff and chew. ETOH:   reports current alcohol use.   OCCUPATION:      Family History:       Problem Relation Age of Onset    Cancer Mother         Lymphoma    Diabetes Mother     High Blood Pressure Mother     High Cholesterol Mother     Obesity Mother     Vision Loss Mother         Wore Glasses    Heart Disease Father         \"Heart Failure\"   [place] COPD Father     Asthma Sister     High Blood Pressure Sister     High Cholesterol Sister     COPD Sister     Diabetes Sister         \"Pre Diabetes\"           Physical Exam:    Vitals: /64   Pulse 69   Temp 98.2 °F (36.8 °C) (Oral)   Resp 24   Ht 5' 5\" (1.651 m)   Wt 123 lb 7.3 oz (56 kg)   SpO2 97%   BMI 20.54 kg/m²   General appearance: in no acute distress, appears stated age  Skin: Skin color, texture, turgor normal. No rashes or lesions  HEENT: normocephalic, atraumatic, anterior neck covered with dressings  Neck: supple, trachea midline  Lungs: clear to auscultation bilaterally, breathing comfortably  Heart[de-identified] regular rate and rhythm, S1, S2 normal,  Abdomen: soft, non-tender; bowel sounds normal; no masses,   Extremities: extremities normal, atraumatic, no cyanosis or edema  Neurologic: Mental status: alert, oriented, interactive, following commands  Psychiatric: mood and affect appropriate    CBC:   Recent Labs     06/27/20 1715 06/28/20 0315   WBC 21.0* 11.0*   HGB 12.6* 11.3*    265     BMP:    Recent Labs     06/27/20 1715 06/27/20  2300 06/28/20 0315 06/28/20  0843   * 122* 123*  122* 124*   K 4.3  --  4.4  --    CL 82*  --  90*  --    CO2 18*  --  24  --    BUN 12  --  10  --    CREATININE 1.1  --  1.0  --    GLUCOSE 130*  --  88  --      Hepatic:   Recent Labs     06/27/20 1715 06/28/20 0315   AST 53* 48*   ALT 27 26   BILITOT 0.8 0.7   ALKPHOS 68 63     Troponin: No results for input(s): TROPONINI in the last 72 hours. BNP: No results for input(s): BNP in the last 72 hours. Lipids: No results for input(s): CHOL, HDL in the last 72 hours. Invalid input(s): LDLCALCU  ABGs: No results found for: PHART, PO2ART, ECF5PJG  INR:   Recent Labs     06/27/20 1715 06/28/20 0315   INR 3.59 4.21       No intake/output data recorded. No intake/output data recorded.      -----------------------------------------------------------------      Assessment and Recommendations     Patient Active Problem List   Diagnosis    Localized osteoarthritis of right shoulder    S/P AVR    Hyperlipidemia    [place] (hard of hearing)    Depression    COPD (chronic obstructive pulmonary disease) (HCC)    Bronchiectasis (HCC)    Anxiety    Alcohol abuse    Acid reflux    Chronic interstitial lung disease (HCC)    Hyponatremia     - Severe hyponatremia; sodium 114 on admission//ddx; volume depletion vs polydipsia//Sosm 243, Uosm 188, Abby 10, UCl 10, USG 1.009  - Suicide attempts with wrist and neck cuttings  - AVR; on coumadin    Suggest:  - Sodium  124 as of 8am from 114 yesterday at 5pm so will hold NS for now  - Repeat sodium level at 3pm and will decide if there is a need to resume the NS or not  - encouraged him to eat well and not overdrink  - continue sodium level monitoring  - close follow up     Thank you        Electronically signed by Alta Nava DO on 6/28/2020 at 10:24 AM    [address] MD  [id number]  [address], DO [address],  [address]  [facility], [place] [address]  PHONE: [phone]  FAX: [phone]

## 2020-06-28 NOTE — FLOWSHEET NOTE
Perfect serve message sent to Stacie Padilla NP for consult, she replied and informed this nurse that Dr. Marisol Blair was on call, but she would let him know.

## 2020-06-28 NOTE — FLOWSHEET NOTE
Perfect serve message sent to Dr. Ronit Torres verifying that he received the information for the consult on patient. Message read viewed at [phone], but no reply.

## 2020-06-29 PROBLEM — F33.2 SEVERE EPISODE OF RECURRENT MAJOR DEPRESSIVE DISORDER, WITHOUT PSYCHOTIC FEATURES (HCC): Chronic | Status: ACTIVE | Noted: 2020-06-29

## 2020-06-29 LAB
BASOPHILS ABSOLUTE: 0 K/CU MM
BASOPHILS RELATIVE PERCENT: 0.3 % (ref 0–1)
DIFFERENTIAL TYPE: ABNORMAL
EOSINOPHILS ABSOLUTE: 0 K/CU MM
EOSINOPHILS RELATIVE PERCENT: 0.2 % (ref 0–3)
HCT VFR BLD CALC: 34.2 % (ref 42–52)
HEMOGLOBIN: 11.8 GM/DL (ref 13.5–18)
IMMATURE NEUTROPHIL %: 0.6 % (ref 0–0.43)
INR BLD: 2.14 INDEX
LYMPHOCYTES ABSOLUTE: 1.2 K/CU MM
LYMPHOCYTES RELATIVE PERCENT: 12.1 % (ref 24–44)
MCH RBC QN AUTO: 31.4 PG (ref 27–31)
MCHC RBC AUTO-ENTMCNC: 34.5 % (ref 32–36)
MCV RBC AUTO: 91 FL (ref 78–100)
MONOCYTES ABSOLUTE: 1.2 K/CU MM
MONOCYTES RELATIVE PERCENT: 11.9 % (ref 0–4)
NUCLEATED RBC %: 0 %
PDW BLD-RTO: 13.8 % (ref 11.7–14.9)
PLATELET # BLD: 286 K/CU MM (ref 140–440)
PMV BLD AUTO: 9.4 FL (ref 7.5–11.1)
PROTHROMBIN TIME: 26.1 SECONDS (ref 11.7–14.5)
RBC # BLD: 3.76 M/CU MM (ref 4.6–6.2)
SEGMENTED NEUTROPHILS ABSOLUTE COUNT: 7.4 K/CU MM
SEGMENTED NEUTROPHILS RELATIVE PERCENT: 74.9 % (ref 36–66)
SODIUM BLD-SCNC: 127 MMOL/L (ref 135–145)
SODIUM BLD-SCNC: 127 MMOL/L (ref 135–145)
SODIUM BLD-SCNC: 128 MMOL/L (ref 135–145)
SODIUM BLD-SCNC: 128 MMOL/L (ref 135–145)
TOTAL IMMATURE NEUTOROPHIL: 0.06 K/CU MM
TOTAL NUCLEATED RBC: 0 K/CU MM
WBC # BLD: 9.9 K/CU MM (ref 4–10.5)

## 2020-06-29 PROCEDURE — 6370000000 HC RX 637 (ALT 250 FOR IP): Performed by: NURSE PRACTITIONER

## 2020-06-29 PROCEDURE — 2580000003 HC RX 258: Performed by: NURSE PRACTITIONER

## 2020-06-29 PROCEDURE — 84295 ASSAY OF SERUM SODIUM: CPT

## 2020-06-29 PROCEDURE — 2580000003 HC RX 258: Performed by: INTERNAL MEDICINE

## 2020-06-29 PROCEDURE — U0002 COVID-19 LAB TEST NON-CDC: HCPCS

## 2020-06-29 PROCEDURE — 36415 COLL VENOUS BLD VENIPUNCTURE: CPT

## 2020-06-29 PROCEDURE — 85025 COMPLETE CBC W/AUTO DIFF WBC: CPT

## 2020-06-29 PROCEDURE — 99221 1ST HOSP IP/OBS SF/LOW 40: CPT | Performed by: NURSE PRACTITIONER

## 2020-06-29 PROCEDURE — 6370000000 HC RX 637 (ALT 250 FOR IP): Performed by: INTERNAL MEDICINE

## 2020-06-29 PROCEDURE — 2580000003 HC RX 258: Performed by: EMERGENCY MEDICINE

## 2020-06-29 PROCEDURE — 85610 PROTHROMBIN TIME: CPT

## 2020-06-29 PROCEDURE — 1200000000 HC SEMI PRIVATE

## 2020-06-29 PROCEDURE — 6370000000 HC RX 637 (ALT 250 FOR IP): Performed by: HOSPITALIST

## 2020-06-29 RX ORDER — SENNA AND DOCUSATE SODIUM 50; 8.6 MG/1; MG/1
1 TABLET, FILM COATED ORAL 2 TIMES DAILY
Status: DISCONTINUED | OUTPATIENT
Start: 2020-06-29 | End: 2020-06-30 | Stop reason: HOSPADM

## 2020-06-29 RX ORDER — SODIUM CHLORIDE 1000 MG
1 TABLET, SOLUBLE MISCELLANEOUS
Status: DISCONTINUED | OUTPATIENT
Start: 2020-06-29 | End: 2020-06-30 | Stop reason: HOSPADM

## 2020-06-29 RX ADMIN — BACITRACIN ZINC: 500 OINTMENT TOPICAL at 11:09

## 2020-06-29 RX ADMIN — BACITRACIN ZINC: 500 OINTMENT TOPICAL at 00:23

## 2020-06-29 RX ADMIN — DOXYCYCLINE HYCLATE 100 MG: 100 TABLET, COATED ORAL at 09:02

## 2020-06-29 RX ADMIN — SODIUM CHLORIDE, PRESERVATIVE FREE 10 ML: 5 INJECTION INTRAVENOUS at 21:16

## 2020-06-29 RX ADMIN — DOXYCYCLINE HYCLATE 100 MG: 100 TABLET, COATED ORAL at 00:23

## 2020-06-29 RX ADMIN — SODIUM CHLORIDE: 9 INJECTION, SOLUTION INTRAVENOUS at 16:56

## 2020-06-29 RX ADMIN — ACETAMINOPHEN 650 MG: 325 TABLET ORAL at 09:04

## 2020-06-29 RX ADMIN — Medication 1 G: at 16:56

## 2020-06-29 RX ADMIN — SODIUM CHLORIDE: 9 INJECTION, SOLUTION INTRAVENOUS at 11:20

## 2020-06-29 RX ADMIN — SODIUM CHLORIDE, PRESERVATIVE FREE 10 ML: 5 INJECTION INTRAVENOUS at 09:08

## 2020-06-29 RX ADMIN — Medication 1 G: at 11:42

## 2020-06-29 RX ADMIN — POLYETHYLENE GLYCOL (3350) 17 G: 17 POWDER, FOR SOLUTION ORAL at 09:04

## 2020-06-29 RX ADMIN — SODIUM CHLORIDE, PRESERVATIVE FREE 10 ML: 5 INJECTION INTRAVENOUS at 00:24

## 2020-06-29 RX ADMIN — SENNOSIDES AND DOCUSATE SODIUM 1 TABLET: 8.6; 5 TABLET ORAL at 13:56

## 2020-06-29 RX ADMIN — BACITRACIN ZINC: 500 OINTMENT TOPICAL at 21:16

## 2020-06-29 RX ADMIN — DOXYCYCLINE HYCLATE 100 MG: 100 TABLET, COATED ORAL at 21:16

## 2020-06-29 RX ADMIN — SENNOSIDES AND DOCUSATE SODIUM 1 TABLET: 8.6; 5 TABLET ORAL at 21:16

## 2020-06-29 ASSESSMENT — PAIN DESCRIPTION - ORIENTATION
ORIENTATION: RIGHT;LEFT
ORIENTATION: LEFT;RIGHT

## 2020-06-29 ASSESSMENT — PAIN SCALES - GENERAL
PAINLEVEL_OUTOF10: 0
PAINLEVEL_OUTOF10: 3
PAINLEVEL_OUTOF10: 4
PAINLEVEL_OUTOF10: 0

## 2020-06-29 ASSESSMENT — PAIN DESCRIPTION - PAIN TYPE
TYPE: ACUTE PAIN
TYPE: ACUTE PAIN

## 2020-06-29 ASSESSMENT — PAIN DESCRIPTION - LOCATION
LOCATION: NECK;WRIST
LOCATION: WRIST

## 2020-06-29 NOTE — PROGRESS NOTES
Telepsych computer placed in room and plugged in. Camera pointed toward patient. Miquel Palencia to meet with patient over telepsych at 1230.

## 2020-06-29 NOTE — CARE COORDINATION
Pt has been seen by Psych and they are recommending inpt MH.  LSW spoke with the doctor and pt is not medically stable at this time.

## 2020-06-29 NOTE — FLOWSHEET NOTE
Pt states that he felt bad and ashamed to continue asking for help from family and friends. Additionally pt was frustrated with physical health problems, disabilities, and emotional isolation from the virus that he was attempting to call out by cutting himself. Want to reconnect to [employer] community and become stronger by any means.  offered prayer and spiritual, emotional support. Pt could benefit from mental health evaluation, but don't he will try and hurt himself again. Pt promised that he will reach out to the staff for any needs.

## 2020-06-29 NOTE — CONSULTS
 Heart Disease Father         \"Heart Failure\"   Harjinder Bach COPD Father     Asthma Sister     High Blood Pressure Sister     High Cholesterol Sister     COPD Sister     Diabetes Sister         \"Pre Diabetes\"        Allergies:   Allergies   Allergen Reactions    Ciprofloxacin Hcl Hives and Swelling    Levaquin [Levofloxacin] Hives and Swelling    Other Nausea And Vomiting     \"Allergic To Tylox\"    Ceftin [Cefuroxime]         OBJECTIVE  Vital Signs:  Vitals:    06/29/20 1053   BP:    Pulse: 65   Resp:    Temp:    SpO2:        Labs:  Recent Results (from the past 48 hour(s))   CBC auto diff    Collection Time: 06/27/20  5:15 PM   Result Value Ref Range    WBC 21.0 (H) 4.0 - 10.5 K/CU MM    RBC 4.08 (L) 4.6 - 6.2 M/CU MM    Hemoglobin 12.6 (L) 13.5 - 18.0 GM/DL    Hematocrit 35.4 (L) 42 - 52 %    MCV 86.8 78 - 100 FL    MCH 30.9 27 - 31 PG    MCHC 35.6 32.0 - 36.0 %    RDW 12.7 11.7 - 14.9 %    Platelets 547 475 - 775 K/CU MM    MPV 9.1 7.5 - 11.1 FL    Differential Type AUTOMATED DIFFERENTIAL     Segs Relative 87.5 (H) 36 - 66 %    Lymphocytes % 4.6 (L) 24 - 44 %    Monocytes % 7.1 (H) 0 - 4 %    Eosinophils % 0.0 0 - 3 %    Basophils % 0.1 0 - 1 %    Segs Absolute 18.4 K/CU MM    Lymphocytes Absolute 1.0 K/CU MM    Monocytes Absolute 1.5 K/CU MM    Eosinophils Absolute 0.0 K/CU MM    Basophils Absolute 0.0 K/CU MM    Nucleated RBC % 0.0 %    Total Nucleated RBC 0.0 K/CU MM    Total Immature Neutrophil 0.14 K/CU MM    Immature Neutrophil % 0.7 (H) 0 - 0.43 %   CMP    Collection Time: 06/27/20  5:15 PM   Result Value Ref Range    Sodium 114 (LL) 135 - 145 MMOL/L    Potassium 4.3 3.5 - 5.1 MMOL/L    Chloride 82 (L) 99 - 110 mMol/L    CO2 18 (L) 21 - 32 MMOL/L    BUN 12 6 - 23 MG/DL    CREATININE 1.1 0.9 - 1.3 MG/DL    Glucose 130 (H) 70 - 99 MG/DL    Calcium 9.4 8.3 - 10.6 MG/DL    Alb 4.3 3.4 - 5.0 GM/DL    Total Protein 7.3 6.4 - 8.2 GM/DL    Total Bilirubin 0.8 0.0 - 1.0 MG/DL    ALT 27 10 - 40 U/L    AST 53 (H) 15 - 37 IU/L    Alkaline Phosphatase 68 40 - 128 IU/L    GFR Non-African American >60 >60 mL/min/1.73m2    GFR African American >60 >60 mL/min/1.73m2    Anion Gap 14 4 - 16   ETOH Blood    Collection Time: 06/27/20  5:15 PM   Result Value Ref Range    Alcohol Scrn <0.01 <3.25 %WT/VOL   Salicylate Level    Collection Time: 06/27/20  5:15 PM   Result Value Ref Range    Salicylate Lvl <3.0 (L) 15 - 30 MG/DL    DOSE AMOUNT DOSE AMT. GIVEN - UNKNOWN     DOSE TIME DOSE TIME GIVEN - UNKNOWN    Acetaminophen Level    Collection Time: 06/27/20  5:15 PM   Result Value Ref Range    Acetaminophen Level <5.0 (L) 15 - 30 ug/ml    DOSE AMOUNT DOSE AMT.  GIVEN - UNKNOWN     DOSE TIME DOSE TIME GIVEN - UNKNOWN    PT - INR    Collection Time: 06/27/20  5:15 PM   Result Value Ref Range    Protime 44.0 (H) 11.7 - 14.5 SECONDS    INR 3.59 INDEX   PTT    Collection Time: 06/27/20  5:15 PM   Result Value Ref Range    aPTT 32.7 25.1 - 37.1 SECONDS   Osmolality    Collection Time: 06/27/20  5:15 PM   Result Value Ref Range    Osmolality 243 (L) 280 - 300 MOS/L   Drug screen multi urine    Collection Time: 06/27/20  7:30 PM   Result Value Ref Range    Cannabinoid Scrn, Ur NEGATIVE NEGATIVE    Amphetamines NEGATIVE NEGATIVE    Cocaine Metabolite NEGATIVE NEGATIVE    Benzodiazepine Screen, Urine NEGATIVE NEGATIVE    Barbiturate Screen, Ur NEGATIVE NEGATIVE    Opiates, Urine NEGATIVE NEGATIVE    Phencyclidine, Urine NEGATIVE NEGATIVE    Oxycodone NEGATIVE NEGATIVE   Urinalysis    Collection Time: 06/27/20  7:30 PM   Result Value Ref Range    Color, UA YELLOW YELLOW    Clarity, UA CLEAR CLEAR    Glucose, Urine NEGATIVE NEGATIVE MG/DL    Bilirubin Urine NEGATIVE NEGATIVE MG/DL    Ketones, Urine SMALL (A) NEGATIVE MG/DL    Specific Gravity, UA 1.009 1.001 - 1.035    Blood, Urine NEGATIVE NEGATIVE    pH, Urine 6.0 5.0 - 8.0    Protein, UA NEGATIVE NEGATIVE MG/DL    Urobilinogen, Urine NORMAL 0.2 - 1.0 MG/DL    Nitrite Urine, Quantitative NEGATIVE NEGATIVE    Leukocyte Esterase, Urine NEGATIVE NEGATIVE    RBC, UA 1 0 - 3 /HPF    WBC, UA 1 0 - 2 /HPF    Bacteria, UA NEGATIVE NEGATIVE /HPF    Trichomonas, UA NONE SEEN NONE SEEN /HPF   Electrolytes urine random    Collection Time: 06/27/20  7:30 PM   Result Value Ref Range    Sodium, Ur 10 (L) 35 - 167 MMOL/L    Potassium, Ur 18.8 (L) 22 - 119 MMOL/L    Chloride 10 (L) 43 - 210 MMOL/L   Osmolality, Urine    Collection Time: 06/27/20  7:30 PM   Result Value Ref Range    Osmolality, Ur 188 (L) 292 - 1090 MOS/L   Sodium    Collection Time: 06/27/20 11:00 PM   Result Value Ref Range    Sodium 122 (L) 135 - 145 MMOL/L   Comprehensive Metabolic Panel w/ Reflex to MG    Collection Time: 06/28/20  3:15 AM   Result Value Ref Range    Sodium 123 (L) 135 - 145 MMOL/L    Potassium 4.4 3.5 - 5.1 MMOL/L    Chloride 90 (L) 99 - 110 mMol/L    CO2 24 21 - 32 MMOL/L    BUN 10 6 - 23 MG/DL    CREATININE 1.0 0.9 - 1.3 MG/DL    Glucose 88 70 - 99 MG/DL    Calcium 8.8 8.3 - 10.6 MG/DL    Alb 4.0 3.4 - 5.0 GM/DL    Total Protein 6.1 (L) 6.4 - 8.2 GM/DL    Total Bilirubin 0.7 0.0 - 1.0 MG/DL    ALT 26 10 - 40 U/L    AST 48 (H) 15 - 37 IU/L    Alkaline Phosphatase 63 40 - 128 IU/L    GFR Non-African American >60 >60 mL/min/1.73m2    GFR African American >60 >60 mL/min/1.73m2    Anion Gap 9 4 - 16   CBC auto differential    Collection Time: 06/28/20  3:15 AM   Result Value Ref Range    WBC 11.0 (H) 4.0 - 10.5 K/CU MM    RBC 3.61 (L) 4.6 - 6.2 M/CU MM    Hemoglobin 11.3 (L) 13.5 - 18.0 GM/DL    Hematocrit 32.1 (L) 42 - 52 %    MCV 88.9 78 - 100 FL    MCH 31.3 (H) 27 - 31 PG    MCHC 35.2 32.0 - 36.0 %    RDW 13.3 11.7 - 14.9 %    Platelets 183 084 - 902 K/CU MM    MPV 9.1 7.5 - 11.1 FL    Differential Type AUTOMATED DIFFERENTIAL     Segs Relative 75.8 (H) 36 - 66 %    Lymphocytes % 10.1 (L) 24 - 44 %    Monocytes % 12.9 (H) 0 - 4 %    Eosinophils % 0.1 0 - 3 %    Basophils % 0.2 0 - 1 %    Segs Absolute 8.3 K/CU MM    Lymphocytes Absolute 1.1 K/CU MM    Monocytes Absolute 1.4 K/CU MM    Eosinophils Absolute 0.0 K/CU MM    Basophils Absolute 0.0 K/CU MM    Nucleated RBC % 0.0 %    Total Nucleated RBC 0.0 K/CU MM    Total Immature Neutrophil 0.10 K/CU MM    Immature Neutrophil % 0.9 (H) 0 - 0.43 %   Protime-INR    Collection Time: 06/28/20  3:15 AM   Result Value Ref Range    Protime 51.7 (H) 11.7 - 14.5 SECONDS    INR 4.21 INDEX   Sodium    Collection Time: 06/28/20  3:15 AM   Result Value Ref Range    Sodium 122 (L) 135 - 145 MMOL/L   Protime-INR    Collection Time: 06/28/20  3:15 AM   Result Value Ref Range    Protime 55.3 (H) 11.7 - 14.5 SECONDS    INR 4.50 INDEX   Sodium    Collection Time: 06/28/20  8:43 AM   Result Value Ref Range    Sodium 124 (L) 135 - 145 MMOL/L   Sodium    Collection Time: 06/28/20  8:24 PM   Result Value Ref Range    Sodium 125 (L) 135 - 145 MMOL/L   Sodium    Collection Time: 06/29/20 12:57 AM   Result Value Ref Range    Sodium 128 (L) 135 - 145 MMOL/L   CBC auto differential    Collection Time: 06/29/20 12:57 AM   Result Value Ref Range    WBC 9.9 4.0 - 10.5 K/CU MM    RBC 3.76 (L) 4.6 - 6.2 M/CU MM    Hemoglobin 11.8 (L) 13.5 - 18.0 GM/DL    Hematocrit 34.2 (L) 42 - 52 %    MCV 91.0 78 - 100 FL    MCH 31.4 (H) 27 - 31 PG    MCHC 34.5 32.0 - 36.0 %    RDW 13.8 11.7 - 14.9 %    Platelets 794 517 - 829 K/CU MM    MPV 9.4 7.5 - 11.1 FL    Differential Type AUTOMATED DIFFERENTIAL     Segs Relative 74.9 (H) 36 - 66 %    Lymphocytes % 12.1 (L) 24 - 44 %    Monocytes % 11.9 (H) 0 - 4 %    Eosinophils % 0.2 0 - 3 %    Basophils % 0.3 0 - 1 %    Segs Absolute 7.4 K/CU MM    Lymphocytes Absolute 1.2 K/CU MM    Monocytes Absolute 1.2 K/CU MM    Eosinophils Absolute 0.0 K/CU MM    Basophils Absolute 0.0 K/CU MM    Nucleated RBC % 0.0 %    Total Nucleated RBC 0.0 K/CU MM    Total Immature Neutrophil 0.06 K/CU MM    Immature Neutrophil % 0.6 (H) 0 - 0.43 %   Protime-INR    Collection Time: 06/29/20 12:57 AM   Result Value Ref Range Protime 26.1 (H) 11.7 - 14.5 SECONDS    INR 2.14 INDEX   Sodium    Collection Time: 06/29/20  6:43 AM   Result Value Ref Range    Sodium 128 (L) 135 - 145 MMOL/L       Review of Systems:  Reports of no current cardiovascular, respiratory, gastrointestinal, genitourinary, integumentary, neurological, musculoskeletal, or immunological symptoms today. PSYCHIATRIC: See HPI above. PSYCHIATRIC EXAMINATION / MENTAL STATUS EXAM    CONSTITUTIONAL:    Vitals:   Vitals:    06/29/20 1053   BP:    Pulse: 65   Resp:    Temp:    SpO2:       General appearance: [x] appears age, []  appears older than stated age,               [x]  adequately dressed and groomed, [] disheveled,               [x]  in no acute distress, [] appears mildly distressed, [] other           MUSCULOSKELETAL:   Gait:   [] normal, [] antalgic, [] unsteady, [x] gait not evaluated   Station:             [] erect, [] sitting, [] recumbent, [] other        Strength/tone:  [x] muscle strength and tone appear consistent with age and condition     [] atrophy      [] abnormal movements  PSYCHIATRIC:    Appearance: Appears stated age. Alert and oriented to person, place, time & situation. No acute distress. Adequate grooming and hygiene. Good eye contact. No prominent physical abnormalities. Attitude: Manner is cooperative and pleasant  Motor: No psychomotor agitation, retardation or abnormal movements noted  Speech: Clearly articulated; normal rate, volume, tone & amount. Language: intact understanding and production  Mood: depressed  Affect: depressed, decreased range, non-labile, congruent with mood and content of speech  Thought Production: Spontaneous. Thought Form: Coherent, linear, logical & goal-directed. No tangentiality or circumstantiality. No flight of ideas or loosening of associations.   Thought Content/Perceptions: No SWETA, no AVH, no delusion  Insight: limited  Judgment: limited  Memory: Immediate, recent, and remote appear intact, though not formally tested. Attention: maintained throughout interview  Fund of knowledge: Average  Gait/Balance: not assessed    Impression:   Severe episode of recurrent major depressive disorder, without psychotic features    Problem List:   Hyponatremia    Plan:  1. Patient is at very high risk of self harm. Maintain sitter and suicide precautions  2. Once medically stable, patient is willing to go to inpatient psychiatric unit voluntarily  3. If patient should become unwilling, please call Psychiatry immediately at [phone]  4. Please call [facility] to arrange transfer to [facility]  5. Agree with current medications    Thank you very much for the consult.     Electronically signed by VANGIE Barr CNP on 6/29/2020 at 11:59 AM

## 2020-06-29 NOTE — PROGRESS NOTES
Nephrology Progress Note        [address], [address] [facility], [place] [address]  Phone: [phone]  Office Hours: 8:30AM - 4:30PM  Monday - Friday       [facility]  MD Abhinav Guerrero DO [address]  [place], [place] [address]  PHONE: [phone]  FAX: [phone]    6/29/2020 8:18 AM  Subjective:   Admit Date: 6/27/2020  PCP: Micah Bragg MD  Interval History: doing ok on room air    Diet: DIET GENERAL; Daily Fluid Restriction: 1800 ml      Data:   Scheduled Meds:   warfarin (COUMADIN) daily dosing (placeholder)   Other RX Placeholder    lidocaine-EPINEPHrine  20 mL Intradermal Once    sodium chloride flush  10 mL Intravenous BID    bacitracin zinc   Topical BID    sodium chloride flush  10 mL Intravenous 2 times per day    doxycycline hyclate  100 mg Oral 2 times per day     Continuous Infusions:   [Held by provider] sodium chloride 50 mL/hr at 06/27/20 1930     PRN Meds:sodium chloride flush, acetaminophen **OR** acetaminophen, polyethylene glycol, promethazine **OR** ondansetron  I/O last 3 completed shifts: In: 240 [P.O.:240]  Out: 0   I/O this shift: In: 480 [P.O.:480]  Out: -     Intake/Output Summary (Last 24 hours) at 6/29/2020 0818  Last data filed at 6/29/2020 0752  Gross per 24 hour   Intake 720 ml   Output 0 ml   Net 720 ml       CBC:   Recent Labs     06/27/20 1715 06/28/20 0315 06/29/20  0057   WBC 21.0* 11.0* 9.9   HGB 12.6* 11.3* 11.8*    265 286       BMP:    Recent Labs     06/27/20 1715 06/28/20 0315 06/28/20  0843 06/28/20 2024 06/29/20  0057   *   < > 123*  122* 124* 125* 128*   K 4.3  --  4.4  --   --   --    CL 82*  --  90*  --   --   --    CO2 18*  --  24  --   --   --    BUN 12  --  10  --   --   --    CREATININE 1.1  --  1.0  --   --   --    GLUCOSE 130*  --  88  --   --   --     < > = values in this interval not displayed.      Hepatic:   Recent Labs     06/27/20  3182 06/28/20  0315   AST 53* 48*   ALT 27 26   BILITOT 0.8 0.7   ALKPHOS 68 63     Troponin: No results for input(s): TROPONINI in the last 72 hours. BNP: No results for input(s): BNP in the last 72 hours. Lipids: No results for input(s): CHOL, HDL in the last 72 hours.     Invalid input(s): LDLCALCU  ABGs: No results found for: PHART, PO2ART, [id number]  INR:   Recent Labs     06/27/20  1715 06/28/20  0315 06/29/20  0057   INR 3.59 4.50  4.21 2.14       Objective:   Vitals: /75   Pulse 58   Temp 98.2 °F (36.8 °C) (Oral)   Resp 24   Ht 5' 5\" (1.651 m)   Wt 123 lb 6.4 oz (56 kg)   SpO2 98%   BMI 20.53 kg/m²   General appearance: alert and cooperative with exam, in no acute distress  HEENT: normocephalic, atraumatic,   Neck: supple, trachea midline  Lungs:breathing comfortably on room air  Abdomen: non distended,   Extremities: extremities atraumatic, no cyanosis or edema  Neurologic: alert, oriented, follows commands, interactive    Assessment and Plan:     Patient Active Problem List   Diagnosis    Localized osteoarthritis of right shoulder    S/P AVR    Hyperlipidemia    [place] (hard of hearing)    Depression    [facility]ch[facility]) ([facility])    Bronchiectasis ([facility] [address]    Anxiety    Alcohol abuse    Acid reflux    Chronic interstitial lung disease (HCC)    Hyponatremia      - Severe hyponatremia; sodium 114 on admission//ddx; volume depletion vs polydipsia//Sosm 243, Uosm 188, Abby 10, UCl 10, USG 1.009  - Suicide attempts with wrist and neck cuttings  - AVR; on coumadin     Suggest:  - sodium 128 at midnight  - as long as sodium level is better this am then ok to dc to psych  - encouraged him to eat well   - limit oral fluids to 1800-2000ml per day                         Electronically signed by Alex Cash DO on 6/29/2020 at 8:18 AM    [address] MD  [address]  [place] ,  [place]

## 2020-06-29 NOTE — PLAN OF CARE
Problem: Skin Integrity:  Goal: Will show no infection signs and symptoms  Description: Will show no infection signs and symptoms  Outcome: Ongoing  Goal: Absence of new skin breakdown  Description: Absence of new skin breakdown  Outcome: Ongoing     Problem: Suicide risk  Goal: Provide patient with safe environment  Description: Provide patient with safe environment  Outcome: Ongoing     Problem: Pain:  Goal: Pain level will decrease  Description: Pain level will decrease  Outcome: Ongoing  Goal: Control of acute pain  Description: Control of acute pain  Outcome: Ongoing  Goal: Control of chronic pain  Description: Control of chronic pain  Outcome: Ongoing

## 2020-06-29 NOTE — PROGRESS NOTES
Hospitalist Progress Note      Name:  Ashok Hawley /Age/Sex: 1955  (62 y.o. male)   MRN & CSN:  [id number] & [id number] Admission Date/Time: 2020  5:02 PM   [facility]  [facility] [place] [place] PCP: Goldie Nam, [address] Day: 3    Assessment and Plan:   Ashok Hawley is a 62 y.o.  male  who presents with Hyponatremia    >Hyponatremia (114) on adm  - IVF, urine indices  - Nephrology consulted, fluid restriction  - Na 128 today,      >Suicide attempt  -Psych consult  -Suicide precautions  - to [facility] once medically cleared pending improving sodium above 128     >Laceration of neck, Laceration of left wrist, initial encounter  -Wound care  -Empiric antibiotics- Doxycyline                >VHD s/p AVR ()  -On chronic AC- coumadin  -Daily PT/INR  -Pharmacy to dose     >HFrEF- appears stable. Last TTE 2018 EF 45-50%, MR/TR     >COPD- stable continue PRN breathing treatments    Diet DIET GENERAL; Daily Fluid Restriction: 1800 ml   DVT Prophylaxis ? pt on warfarin   Code Status Full Code   Disposition  to [facility] pending nephrology clearance. Anticipate tomorrow . Screening covid pending     History of Present Illness:     Pt S&E. Reports constipation, will order senna-s, no chest pain, feels depressed, no abd pain, no N/V, no F/C.     10-14 point ROS reviewed negative, unless as noted above    Objective: Intake/Output Summary (Last 24 hours) at 2020 1249  Last data filed at 2020 0908  Gross per 24 hour   Intake 730 ml   Output 0 ml   Net 730 ml      Vitals:   Vitals:    20 1053   BP:    Pulse: 65   Resp:    Temp:    SpO2:      Physical Exam:    GEN Awake male, cooperative, no apparent distress. RESP Clear to auscultation, no wheezes, rales or rhonchi. Symmetric chest movement . CARDIO/VASC S1/S2 auscultated. Regular rate. GI Abdomen is soft without significant tenderness, Bowel sounds are normoactive. MSK No gross joint deformities.  Spontaneous movement of all extremities  SKIN  warm, dry. NEURO normal speech, no lateralizing weakness. PSYCH Awake, alert, oriented x 4.      Medications:   Medications:    sodium chloride  1 g Oral TID WC    warfarin (COUMADIN) daily dosing (placeholder)   Other RX Placeholder    lidocaine-EPINEPHrine  20 mL Intradermal Once    sodium chloride flush  10 mL Intravenous BID    bacitracin zinc   Topical BID    sodium chloride flush  10 mL Intravenous 2 times per day    doxycycline hyclate  100 mg Oral 2 times per day      Infusions:    sodium chloride 50 mL/hr at 06/29/20 1120     PRN Meds: sodium chloride flush, 10 mL, PRN  acetaminophen, 650 mg, Q6H PRN    Or  acetaminophen, 650 mg, Q6H PRN  polyethylene glycol, 17 g, Daily PRN  promethazine, 12.5 mg, Q6H PRN    Or  ondansetron, 4 mg, Q6H PRN        Electronically signed by Candy Schultz MD on 6/29/2020 at 12:49 PM

## 2020-06-29 NOTE — PROGRESS NOTES
PHARMACY ANTICOAGULATION MONITORING SERVICE    Jordin Castillo is a 62 y.o. male on warfarin therapy for AVR. Pharmacy consulted by Dr. Melvin Roy for monitoring and adjustment of treatment. Indication for anticoagulation: s/p AVR  INR goal: 2.5-3.5  Warfarin dose prior to admission: Reported as 10 mg daily    Pertinent Laboratory Values   Recent Labs     06/27/20  1715 06/28/20  0315 06/29/20  0057   INR 3.59 4.50  4.21 2.14   HGB 12.6* 11.3* 11.8*   HCT 35.4* 32.1* 34.2*    265 286       Assessment/Plan:   Drug Interactions: Doxycycline - may increase INR   INR was slightly supra-therapeutic on admission. Warfarin held the last 2 days and is now subtherapeutic @2. 14.   Resume warfarin 10mg daily tonight. If INR does not respond quickly, may need to consider bridge therapy until therapeutic. [address] [place] [address] will continue to monitor and adjust warfarin therapy as indicated    Thank you for the consult. Jayna Nolasco  6/29/2020 12:07 PM

## 2020-06-30 ENCOUNTER — HOSPITAL ENCOUNTER (INPATIENT)
Age: 65
LOS: 7 days | Discharge: HOME | DRG: 885 | End: 2020-07-07
Attending: PSYCHIATRY & NEUROLOGY | Admitting: PSYCHIATRY & NEUROLOGY
Payer: MEDICARE

## 2020-06-30 VITALS
TEMPERATURE: 97.4 F | OXYGEN SATURATION: 97 % | HEART RATE: 66 BPM | SYSTOLIC BLOOD PRESSURE: 128 MMHG | RESPIRATION RATE: 21 BRPM | DIASTOLIC BLOOD PRESSURE: 60 MMHG | BODY MASS INDEX: 20.57 KG/M2 | HEIGHT: 65 IN | WEIGHT: 123.46 LBS

## 2020-06-30 PROBLEM — F32.2 SEVERE MAJOR DEPRESSION, SINGLE EPISODE, WITHOUT PSYCHOTIC FEATURES (HCC): Status: ACTIVE | Noted: 2020-06-30

## 2020-06-30 PROBLEM — F33.2 SEVERE RECURRENT MAJOR DEPRESSION WITHOUT PSYCHOTIC FEATURES (HCC): Chronic | Status: ACTIVE | Noted: 2020-06-30

## 2020-06-30 PROBLEM — I05.1 RHEUMATIC MITRAL REGURGITATION: Chronic | Status: ACTIVE | Noted: 2020-06-30

## 2020-06-30 PROBLEM — I06.9 RHEUMATIC AORTIC VALVE DISEASE: Status: ACTIVE | Noted: 2020-06-30

## 2020-06-30 PROBLEM — I50.22 CHRONIC SYSTOLIC HEART FAILURE (HCC): Chronic | Status: ACTIVE | Noted: 2020-06-30

## 2020-06-30 LAB
BASOPHILS ABSOLUTE: 0 K/CU MM
BASOPHILS RELATIVE PERCENT: 0.4 % (ref 0–1)
DIFFERENTIAL TYPE: ABNORMAL
EOSINOPHILS ABSOLUTE: 0 K/CU MM
EOSINOPHILS RELATIVE PERCENT: 0.4 % (ref 0–3)
HCT VFR BLD CALC: 29.7 % (ref 42–52)
HEMOGLOBIN: 9.9 GM/DL (ref 13.5–18)
IMMATURE NEUTROPHIL %: 1 % (ref 0–0.43)
INR BLD: 1.33 INDEX
LYMPHOCYTES ABSOLUTE: 1.3 K/CU MM
LYMPHOCYTES RELATIVE PERCENT: 17.7 % (ref 24–44)
MCH RBC QN AUTO: 30.9 PG (ref 27–31)
MCHC RBC AUTO-ENTMCNC: 33.3 % (ref 32–36)
MCV RBC AUTO: 92.8 FL (ref 78–100)
MONOCYTES ABSOLUTE: 0.8 K/CU MM
MONOCYTES RELATIVE PERCENT: 10.9 % (ref 0–4)
NUCLEATED RBC %: 0 %
PDW BLD-RTO: 13.8 % (ref 11.7–14.9)
PLATELET # BLD: 231 K/CU MM (ref 140–440)
PMV BLD AUTO: 9 FL (ref 7.5–11.1)
PROTHROMBIN TIME: 16.2 SECONDS (ref 11.7–14.5)
RBC # BLD: 3.2 M/CU MM (ref 4.6–6.2)
SARS-COV-2, NAAT: NOT DETECTED
SARS-COV-2: NOT DETECTED
SEGMENTED NEUTROPHILS ABSOLUTE COUNT: 5 K/CU MM
SEGMENTED NEUTROPHILS RELATIVE PERCENT: 69.6 % (ref 36–66)
SODIUM BLD-SCNC: 130 MMOL/L (ref 135–145)
SODIUM BLD-SCNC: 132 MMOL/L (ref 135–145)
SOURCE: NORMAL
TOTAL IMMATURE NEUTOROPHIL: 0.07 K/CU MM
TOTAL NUCLEATED RBC: 0 K/CU MM
WBC # BLD: 7.2 K/CU MM (ref 4–10.5)

## 2020-06-30 PROCEDURE — 90792 PSYCH DIAG EVAL W/MED SRVCS: CPT | Performed by: NURSE PRACTITIONER

## 2020-06-30 PROCEDURE — 6370000000 HC RX 637 (ALT 250 FOR IP): Performed by: INTERNAL MEDICINE

## 2020-06-30 PROCEDURE — 36415 COLL VENOUS BLD VENIPUNCTURE: CPT

## 2020-06-30 PROCEDURE — 6370000000 HC RX 637 (ALT 250 FOR IP): Performed by: PSYCHIATRY & NEUROLOGY

## 2020-06-30 PROCEDURE — 1240000000 HC EMOTIONAL WELLNESS R&B

## 2020-06-30 PROCEDURE — 85025 COMPLETE CBC W/AUTO DIFF WBC: CPT

## 2020-06-30 PROCEDURE — 84295 ASSAY OF SERUM SODIUM: CPT

## 2020-06-30 PROCEDURE — 6370000000 HC RX 637 (ALT 250 FOR IP): Performed by: NURSE PRACTITIONER

## 2020-06-30 PROCEDURE — 6370000000 HC RX 637 (ALT 250 FOR IP): Performed by: HOSPITALIST

## 2020-06-30 PROCEDURE — 85610 PROTHROMBIN TIME: CPT

## 2020-06-30 PROCEDURE — U0002 COVID-19 LAB TEST NON-CDC: HCPCS

## 2020-06-30 RX ORDER — ACETAMINOPHEN 325 MG/1
650 TABLET ORAL EVERY 6 HOURS PRN
Status: DISCONTINUED | OUTPATIENT
Start: 2020-06-30 | End: 2020-06-30

## 2020-06-30 RX ORDER — ACETAMINOPHEN 325 MG/1
325 TABLET ORAL EVERY 6 HOURS PRN
Status: DISCONTINUED | OUTPATIENT
Start: 2020-06-30 | End: 2020-07-07 | Stop reason: HOSPADM

## 2020-06-30 RX ORDER — SENNA AND DOCUSATE SODIUM 50; 8.6 MG/1; MG/1
1 TABLET, FILM COATED ORAL 2 TIMES DAILY
Status: CANCELLED | OUTPATIENT
Start: 2020-06-30

## 2020-06-30 RX ORDER — ACETAMINOPHEN 650 MG/1
650 SUPPOSITORY RECTAL EVERY 6 HOURS PRN
Status: CANCELLED | OUTPATIENT
Start: 2020-06-30

## 2020-06-30 RX ORDER — DIAPER,BRIEF,INFANT-TODD,DISP
EACH MISCELLANEOUS 2 TIMES DAILY
Status: DISCONTINUED | OUTPATIENT
Start: 2020-06-30 | End: 2020-06-30 | Stop reason: CLARIF

## 2020-06-30 RX ORDER — GUAIFENESIN 600 MG/1
600 TABLET, EXTENDED RELEASE ORAL 2 TIMES DAILY
Status: DISCONTINUED | OUTPATIENT
Start: 2020-06-30 | End: 2020-07-07 | Stop reason: HOSPADM

## 2020-06-30 RX ORDER — ONDANSETRON 2 MG/ML
4 INJECTION INTRAMUSCULAR; INTRAVENOUS EVERY 6 HOURS PRN
Status: DISCONTINUED | OUTPATIENT
Start: 2020-06-30 | End: 2020-07-07 | Stop reason: HOSPADM

## 2020-06-30 RX ORDER — ACETAMINOPHEN 650 MG/1
650 SUPPOSITORY RECTAL EVERY 6 HOURS PRN
Status: DISCONTINUED | OUTPATIENT
Start: 2020-06-30 | End: 2020-07-07 | Stop reason: HOSPADM

## 2020-06-30 RX ORDER — WARFARIN SODIUM 10 MG/1
10 TABLET ORAL
Status: DISCONTINUED | OUTPATIENT
Start: 2020-06-30 | End: 2020-06-30 | Stop reason: HOSPADM

## 2020-06-30 RX ORDER — PAROXETINE HYDROCHLORIDE 20 MG/1
20 TABLET, FILM COATED ORAL DAILY
Status: DISCONTINUED | OUTPATIENT
Start: 2020-07-01 | End: 2020-07-04

## 2020-06-30 RX ORDER — WARFARIN SODIUM 10 MG/1
10 TABLET ORAL DAILY
Status: DISCONTINUED | OUTPATIENT
Start: 2020-06-30 | End: 2020-07-02 | Stop reason: DRUGHIGH

## 2020-06-30 RX ORDER — LORAZEPAM 1 MG/1
1 TABLET ORAL EVERY 4 HOURS PRN
Status: DISCONTINUED | OUTPATIENT
Start: 2020-06-30 | End: 2020-07-07 | Stop reason: HOSPADM

## 2020-06-30 RX ORDER — DIPHENHYDRAMINE HYDROCHLORIDE 50 MG/ML
50 INJECTION INTRAMUSCULAR; INTRAVENOUS EVERY 4 HOURS PRN
Status: DISCONTINUED | OUTPATIENT
Start: 2020-06-30 | End: 2020-07-07 | Stop reason: HOSPADM

## 2020-06-30 RX ORDER — CYCLOBENZAPRINE HCL 10 MG
10 TABLET ORAL 3 TIMES DAILY PRN
Status: DISCONTINUED | OUTPATIENT
Start: 2020-06-30 | End: 2020-07-07 | Stop reason: HOSPADM

## 2020-06-30 RX ORDER — CETIRIZINE HYDROCHLORIDE 10 MG/1
10 TABLET ORAL DAILY
Status: DISCONTINUED | OUTPATIENT
Start: 2020-07-01 | End: 2020-07-07 | Stop reason: HOSPADM

## 2020-06-30 RX ORDER — DOXYCYCLINE HYCLATE 100 MG
100 TABLET ORAL EVERY 12 HOURS SCHEDULED
Status: CANCELLED | OUTPATIENT
Start: 2020-06-30 | End: 2020-07-05

## 2020-06-30 RX ORDER — ACETAMINOPHEN 650 MG/1
650 SUPPOSITORY RECTAL EVERY 6 HOURS PRN
Status: DISCONTINUED | OUTPATIENT
Start: 2020-06-30 | End: 2020-06-30

## 2020-06-30 RX ORDER — HALOPERIDOL 5 MG
5 TABLET ORAL EVERY 4 HOURS PRN
Status: DISCONTINUED | OUTPATIENT
Start: 2020-06-30 | End: 2020-07-07 | Stop reason: HOSPADM

## 2020-06-30 RX ORDER — ACETAMINOPHEN 325 MG/1
650 TABLET ORAL EVERY 4 HOURS PRN
Status: DISCONTINUED | OUTPATIENT
Start: 2020-06-30 | End: 2020-07-07 | Stop reason: HOSPADM

## 2020-06-30 RX ORDER — LORAZEPAM 2 MG/ML
1 INJECTION INTRAMUSCULAR EVERY 4 HOURS PRN
Status: DISCONTINUED | OUTPATIENT
Start: 2020-06-30 | End: 2020-07-07 | Stop reason: HOSPADM

## 2020-06-30 RX ORDER — DIAPER,BRIEF,INFANT-TODD,DISP
EACH MISCELLANEOUS 2 TIMES DAILY
Status: CANCELLED | OUTPATIENT
Start: 2020-06-30

## 2020-06-30 RX ORDER — DOXYCYCLINE HYCLATE 100 MG
100 TABLET ORAL EVERY 12 HOURS SCHEDULED
Status: DISPENSED | OUTPATIENT
Start: 2020-06-30 | End: 2020-07-05

## 2020-06-30 RX ORDER — TRIAMCINOLONE ACETONIDE 1 MG/G
CREAM TOPICAL 2 TIMES DAILY
Status: DISCONTINUED | OUTPATIENT
Start: 2020-07-01 | End: 2020-07-04

## 2020-06-30 RX ORDER — M-VIT,TX,IRON,MINS/CALC/FOLIC 27MG-0.4MG
1 TABLET ORAL DAILY
Status: DISCONTINUED | OUTPATIENT
Start: 2020-07-01 | End: 2020-07-07 | Stop reason: HOSPADM

## 2020-06-30 RX ORDER — FLUTICASONE PROPIONATE 50 MCG
2 SPRAY, SUSPENSION (ML) NASAL DAILY
Status: DISCONTINUED | OUTPATIENT
Start: 2020-07-01 | End: 2020-07-07 | Stop reason: HOSPADM

## 2020-06-30 RX ORDER — ACETAMINOPHEN 500 MG
500 TABLET ORAL EVERY 4 HOURS PRN
Status: DISCONTINUED | OUTPATIENT
Start: 2020-06-30 | End: 2020-07-07 | Stop reason: HOSPADM

## 2020-06-30 RX ORDER — ACETAMINOPHEN 325 MG/1
650 TABLET ORAL EVERY 6 HOURS PRN
Status: CANCELLED | OUTPATIENT
Start: 2020-06-30

## 2020-06-30 RX ORDER — HALOPERIDOL 5 MG/ML
5 INJECTION INTRAMUSCULAR EVERY 4 HOURS PRN
Status: DISCONTINUED | OUTPATIENT
Start: 2020-06-30 | End: 2020-07-07 | Stop reason: HOSPADM

## 2020-06-30 RX ORDER — SENNA AND DOCUSATE SODIUM 50; 8.6 MG/1; MG/1
1 TABLET, FILM COATED ORAL 2 TIMES DAILY
Status: DISCONTINUED | OUTPATIENT
Start: 2020-06-30 | End: 2020-07-07 | Stop reason: HOSPADM

## 2020-06-30 RX ORDER — OMEGA-3-ACID ETHYL ESTERS 1 G/1
2 CAPSULE, LIQUID FILLED ORAL 2 TIMES DAILY
Status: DISCONTINUED | OUTPATIENT
Start: 2020-07-01 | End: 2020-07-07 | Stop reason: HOSPADM

## 2020-06-30 RX ORDER — PROMETHAZINE HYDROCHLORIDE 12.5 MG/1
12.5 TABLET ORAL EVERY 6 HOURS PRN
Status: DISCONTINUED | OUTPATIENT
Start: 2020-06-30 | End: 2020-07-07 | Stop reason: HOSPADM

## 2020-06-30 RX ORDER — PROMETHAZINE HYDROCHLORIDE 25 MG/1
12.5 TABLET ORAL EVERY 6 HOURS PRN
Status: CANCELLED | OUTPATIENT
Start: 2020-06-30

## 2020-06-30 RX ORDER — MONTELUKAST SODIUM 10 MG/1
10 TABLET ORAL NIGHTLY
Status: DISCONTINUED | OUTPATIENT
Start: 2020-06-30 | End: 2020-07-07 | Stop reason: HOSPADM

## 2020-06-30 RX ORDER — CALCIUM CARBONATE 500(1250)
500 TABLET ORAL DAILY
Status: DISCONTINUED | OUTPATIENT
Start: 2020-07-01 | End: 2020-07-07 | Stop reason: HOSPADM

## 2020-06-30 RX ORDER — GINSENG 100 MG
CAPSULE ORAL 2 TIMES DAILY
Status: DISCONTINUED | OUTPATIENT
Start: 2020-06-30 | End: 2020-07-07 | Stop reason: HOSPADM

## 2020-06-30 RX ORDER — WARFARIN SODIUM 5 MG/1
5 TABLET ORAL DAILY
Status: DISCONTINUED | OUTPATIENT
Start: 2020-07-01 | End: 2020-06-30 | Stop reason: ALTCHOICE

## 2020-06-30 RX ORDER — POLYETHYLENE GLYCOL 3350 17 G/17G
17 POWDER, FOR SOLUTION ORAL DAILY PRN
Status: DISCONTINUED | OUTPATIENT
Start: 2020-06-30 | End: 2020-07-07 | Stop reason: HOSPADM

## 2020-06-30 RX ORDER — POLYETHYLENE GLYCOL 3350 17 G/17G
17 POWDER, FOR SOLUTION ORAL DAILY PRN
Status: CANCELLED | OUTPATIENT
Start: 2020-06-30

## 2020-06-30 RX ORDER — TRAZODONE HYDROCHLORIDE 50 MG/1
50 TABLET ORAL NIGHTLY
Status: DISCONTINUED | OUTPATIENT
Start: 2020-06-30 | End: 2020-07-07 | Stop reason: HOSPADM

## 2020-06-30 RX ORDER — ONDANSETRON 2 MG/ML
4 INJECTION INTRAMUSCULAR; INTRAVENOUS EVERY 6 HOURS PRN
Status: CANCELLED | OUTPATIENT
Start: 2020-06-30

## 2020-06-30 RX ORDER — SIMVASTATIN 40 MG
40 TABLET ORAL NIGHTLY
Status: DISCONTINUED | OUTPATIENT
Start: 2020-06-30 | End: 2020-07-07 | Stop reason: HOSPADM

## 2020-06-30 RX ADMIN — SENNOSIDES AND DOCUSATE SODIUM 1 TABLET: 8.6; 5 TABLET ORAL at 08:45

## 2020-06-30 RX ADMIN — WARFARIN SODIUM 10 MG: 5 TABLET ORAL at 21:01

## 2020-06-30 RX ADMIN — Medication 1 G: at 08:45

## 2020-06-30 RX ADMIN — SIMVASTATIN 40 MG: 40 TABLET, FILM COATED ORAL at 21:01

## 2020-06-30 RX ADMIN — BACITRACIN ZINC 14 G: 500 OINTMENT TOPICAL at 21:00

## 2020-06-30 RX ADMIN — TRAZODONE HYDROCHLORIDE 50 MG: 50 TABLET ORAL at 21:01

## 2020-06-30 RX ADMIN — DOXYCYCLINE HYCLATE 100 MG: 100 TABLET, COATED ORAL at 08:45

## 2020-06-30 RX ADMIN — BACITRACIN ZINC: 500 OINTMENT TOPICAL at 08:45

## 2020-06-30 ASSESSMENT — PAIN SCALES - GENERAL
PAINLEVEL_OUTOF10: 0
PAINLEVEL_OUTOF10: 0

## 2020-06-30 ASSESSMENT — SLEEP AND FATIGUE QUESTIONNAIRES
AVERAGE NUMBER OF SLEEP HOURS: 8
DO YOU HAVE DIFFICULTY SLEEPING: NO
DO YOU USE A SLEEP AID: NO

## 2020-06-30 ASSESSMENT — PATIENT HEALTH QUESTIONNAIRE - PHQ9: SUM OF ALL RESPONSES TO PHQ QUESTIONS 1-9: 11

## 2020-06-30 ASSESSMENT — LIFESTYLE VARIABLES: HISTORY_ALCOHOL_USE: NO

## 2020-06-30 NOTE — DISCHARGE SUMMARY
Discharge Summary    Name:  Abdullahi Cruz /Age/Sex: 1955  (62 y.o. male)   MRN & CSN:  [id number] & [id number] Admission Date/Time: 2020  5:02 PM   Attending:  Kevin Bernheim, MD Discharging Physician: Kevin Bernheim, MD     Hospital Course:   Abdullahi Cruz is a 62 y.o.  male  who presents with Hyponatremia    --- Asymptomatic severe hyponatremia (114) on admission -improving at discharge. Suspected to be due to poor p.o. intake. Sodium levels gradually improved with careful resuscitation with normal saline. Sodium was at 130 on day of discharge. He was prescribed salt tablets for another 3 days after discharge. Nephrologist was on board.     --- Suicide attempt -he inflicted lacerations to his bilateral wrist and neck at home prior to hospitalization. CT of the neck was negative for any vascular injury. He was monitored with a sitter at bedside and other suicide precautions were instituted. Psychiatry was consulted and recommended inpatient psych when medically stable.     --- Laceration of neck, Laceration of left wrist, initial encounter  -Wound care  -Empiric antibiotics- Doxycyline X 7 days. --- Acute on chronic anemia due to hemodilution  He p/w Hb 11.8 g/dL which trended down to 9.9 g/dL with IV fluids. There was no concern for bleeding.     --- VHD s/p AVR ()  -On chronic AC- coumadin  -Daily PT/INR  -Pharmacy to dose warfarin.     >HFrEF- appears stable. Last TTE 2018 EF 45-50%, MR/TR     >COPD- stable continue PRN breathing treatments    The patient expressed appropriate understanding of and agreement with the discharge recommendations, medications, and plan. Consults this admission:  IP CONSULT TO NEPHROLOGY  IP CONSULT TO HOSPITALIST  IP CONSULT TO PHARMACY  IP CONSULT TO SPIRITUAL SERVICES  IP CONSULT TO SOCIAL WORK  IP CONSULT TO PSYCHIATRY  IP CONSULT TO PHARMACY    Discharge Instruction:   Follow up appointments:    To be arranged after discharge from inpatient psych    Diet:  regular diet   Activity: activity as tolerated  Disposition: Discharged to:   [x] Inpatient psych  Condition on discharge: Stable    Discharge Medications:     Current Facility-Administered Medications   Medication Dose Route Frequency Provider Last Rate Last Dose    sodium chloride tablet 1 g  1 g Oral TID WC Stanley Richard, DO   1 g at 06/30/20 0845    sennosides-docusate sodium (SENOKOT-S) 8.6-50 MG tablet 1 tablet  1 tablet Oral BID Ronda Smith MD   1 tablet at 06/30/20 0845    warfarin (COUMADIN) daily dosing (placeholder)   Other RX Placeholder Richard Ware MD   Stopped at 06/28/20 1854    lidocaine-EPINEPHrine 1 percent-[id number] injection 20 mL  20 mL Intradermal Once VANGIE Hollis - CNP        0.9 % sodium chloride infusion   Intravenous Continuous Stanley Richard,  100 mL/hr at 06/30/20 0528      sodium chloride flush 0.9 % injection 10 mL  10 mL Intravenous BID VANGIE Hollis - CNP   10 mL at 06/29/20 2116    bacitracin zinc ointment   Topical BID VANGIE Hollis - CNP        sodium chloride flush 0.9 % injection 10 mL  10 mL Intravenous 2 times per day VANGIE Hollis - CNP   10 mL at 06/29/20 2116    sodium chloride flush 0.9 % injection 10 mL  10 mL Intravenous PRN Petra Bond APRN - CNP        acetaminophen (TYLENOL) tablet 650 mg  650 mg Oral Q6H PRN Petra Bond APRN - CNP   650 mg at 06/29/20 9311    Or    acetaminophen (TYLENOL) suppository 650 mg  650 mg Rectal Q6H PRN Petra Bond APRN - CNP        polyethylene glycol (GLYCOLAX) packet 17 g  17 g Oral Daily PRN Petra Bond APRN - CNP   17 g at 06/29/20 0904    promethazine (PHENERGAN) tablet 12.5 mg  12.5 mg Oral Q6H PRN Petra Bond APRN - CNP        Or    ondansetron (ZOFRAN) injection 4 mg  4 mg Intravenous Q6H PRN Petra Bond APRN - CNP        doxycycline hyclate (VIBRA-TABS) tablet 100 mg  100 mg Oral 2 times per day Richard Ware MD   100 mg at 06/30/20 0845 Objective Findings at Discharge:   /67   Pulse 74   Temp 97.7 °F (36.5 °C) (Oral)   Resp 12   Ht 5' 5\" (1.651 m)   Wt 123 lb 7.3 oz (56 kg)   SpO2 97%   BMI 20.54 kg/m²            PHYSICAL EXAM   GEN    Awake male, cooperative, no apparent distress. RESP  Clear to auscultation, no wheezes, rales or rhonchi. Symmetric chest movement . CARDIO/VASC           S1/S2 auscultated. Regular rate. GI        Abdomen is soft without significant tenderness, Bowel sounds are normoactive. MSK    No gross joint deformities. Spontaneous movement of all extremities  SKIN     warm, dry. NEURO           normal speech, no lateralizing weakness. PSYCH            Awake, alert, oriented x 4. BMP/CBC  Recent Labs     06/27/20  1715  06/28/20  0315  06/29/20  0057  06/29/20  1204 06/29/20  1940 06/30/20  0301   *   < > 123*  122*   < > 128*   < > 127* 127* 130*   K 4.3  --  4.4  --   --   --   --   --   --    CL 82*  --  90*  --   --   --   --   --   --    CO2 18*  --  24  --   --   --   --   --   --    BUN 12  --  10  --   --   --   --   --   --    CREATININE 1.1  --  1.0  --   --   --   --   --   --    WBC 21.0*  --  11.0*  --  9.9  --   --   --  7.2   HCT 35.4*  --  32.1*  --  34.2*  --   --   --  29.7*     --  265  --  286  --   --   --  231    < > = values in this interval not displayed. IMAGING:  CTA neck with contrast  No acute arterial injury visualized.     Discharge Time of 35 minutes    Electronically signed by Monie Bolton MD on 6/30/2020 at 10:20 AM

## 2020-06-30 NOTE — PROGRESS NOTES
- doing ok  - sodium 130 as of 3am, better  - ok to dc on salt tab 1g bid for 3 days  - continue oral fluid restriction 1800ml per day upon dc  - needs good oral intake of foods      A&P:  Patient Active Problem List   Diagnosis    Localized osteoarthritis of right shoulder    S/P AVR    Hyperlipidemia    [place] (hard of hearing)    Depression    COPD (chronic obstructive pulmonary disease) (HCC)    Bronchiectasis (HCC)    Anxiety    Alcohol abuse    Acid reflux    Chronic interstitial lung disease (HCC)    Hyponatremia      - Severe hyponatremia; sodium 114 on admission//ddx; volume depletion vs polydipsia//Sosm 243, Uosm 188, Abby 10, UCl 10, USG 1.009  - Suicide attempts with wrist and neck cuttings  - AVR; on coumadin

## 2020-06-30 NOTE — CARE COORDINATION
LSW was informed that pt needs another bag of fluids and will possibly be ready to discharge today. LSW called [facility] and they can take pt once stable.

## 2020-06-30 NOTE — H&P
Initial Psychiatric History and Physical    Kathy Pillai  [id number]  6/30/2020 06/30/20    ID: Patient is a 62 yrs y.o. male    CC: I am severely depressed     HPI: Jacque uBrnett a 62 y. o. male who presents after suicide attempt.  The patient reports feeling shame/embarrassment over social situations to include losing apartment again. [facility] has self-inflicted lacerations to bilateral wrists and bilateral sides of neck.  He states 2 different attempts yesterday and upon waking up in the bathtub after second attempt called family to seek some help. The patient denies any drug or alcohol use.  Patient denies previous suicide attempts. [facility] does report increased depression and currently on Paxil. While in the emergency room he was found to be significantly hyponatremic.     During today's interview the patient was alert & oriented x 3. He denies SI/HI, but notes \"I feel confused. \" States \"I feel ashamed about how I've treated other people. \" He denies AVH. He endorses depression as \"7\" on a scale of 0 to 10 with 0 being none and 10 being horrible. He rates anxiety as \"7\" on the same scale. He notes he is having trouble sleeping and that his appetite is \"so-so. \" He endorses a history of issues with his temper and engaging in risky behaviors as well as having multiple episodes where he had a decreased need for sleep. He also notes that he is on disability for interstitial lung disease. He does not currently require any oxygen. During the interview, patient's eye contact was fleeting. Cognitive processing appears to be slowed. It took time for the patient to think of his responses. Thought process was at times tangential.     He has been  x 1,  x 1. He has one son. He has 2 sisters. He is estranged from one sister Raulito Chaney). Highest level of education was high school graduate. Pt noted he currently feels safe and comfortable on the unit.   Pt was in agreement with treatment team.  Pt was polite and cordial - 18.0 GM/DL    Hematocrit 34.2 (L) 42 - 52 %    MCV 91.0 78 - 100 FL    MCH 31.4 (H) 27 - 31 PG    MCHC 34.5 32.0 - 36.0 %    RDW 13.8 11.7 - 14.9 %    Platelets 835 895 - 915 K/CU MM    MPV 9.4 7.5 - 11.1 FL    Differential Type AUTOMATED DIFFERENTIAL     Segs Relative 74.9 (H) 36 - 66 %    Lymphocytes % 12.1 (L) 24 - 44 %    Monocytes % 11.9 (H) 0 - 4 %    Eosinophils % 0.2 0 - 3 %    Basophils % 0.3 0 - 1 %    Segs Absolute 7.4 K/CU MM    Lymphocytes Absolute 1.2 K/CU MM    Monocytes Absolute 1.2 K/CU MM    Eosinophils Absolute 0.0 K/CU MM    Basophils Absolute 0.0 K/CU MM    Nucleated RBC % 0.0 %    Total Nucleated RBC 0.0 K/CU MM    Total Immature Neutrophil 0.06 K/CU MM    Immature Neutrophil % 0.6 (H) 0 - 0.43 %   Protime-INR    Collection Time: 06/29/20 12:57 AM   Result Value Ref Range    Protime 26.1 (H) 11.7 - 14.5 SECONDS    INR 2.14 INDEX   Sodium    Collection Time: 06/29/20  6:43 AM   Result Value Ref Range    Sodium 128 (L) 135 - 145 MMOL/L   Sodium    Collection Time: 06/29/20 12:04 PM   Result Value Ref Range    Sodium 127 (L) 135 - 145 MMOL/L   Sodium    Collection Time: 06/29/20  7:40 PM   Result Value Ref Range    Sodium 127 (L) 135 - 145 MMOL/L   Sodium    Collection Time: 06/30/20  3:01 AM   Result Value Ref Range    Sodium 130 (L) 135 - 145 MMOL/L   CBC auto differential    Collection Time: 06/30/20  3:01 AM   Result Value Ref Range    WBC 7.2 4.0 - 10.5 K/CU MM    RBC 3.20 (L) 4.6 - 6.2 M/CU MM    Hemoglobin 9.9 (L) 13.5 - 18.0 GM/DL    Hematocrit 29.7 (L) 42 - 52 %    MCV 92.8 78 - 100 FL    MCH 30.9 27 - 31 PG    MCHC 33.3 32.0 - 36.0 %    RDW 13.8 11.7 - 14.9 %    Platelets 506 659 - 029 K/CU MM    MPV 9.0 7.5 - 11.1 FL    Differential Type AUTOMATED DIFFERENTIAL     Segs Relative 69.6 (H) 36 - 66 %    Lymphocytes % 17.7 (L) 24 - 44 %    Monocytes % 10.9 (H) 0 - 4 %    Eosinophils % 0.4 0 - 3 %    Basophils % 0.4 0 - 1 %    Segs Absolute 5.0 K/CU MM    Lymphocytes Absolute 1.3 K/CU MM Monocytes Absolute 0.8 K/CU MM    Eosinophils Absolute 0.0 K/CU MM    Basophils Absolute 0.0 K/CU MM    Nucleated RBC % 0.0 %    Total Nucleated RBC 0.0 K/CU MM    Total Immature Neutrophil 0.07 K/CU MM    Immature Neutrophil % 1.0 (H) 0 - 0.43 %   Protime-INR    Collection Time: 06/30/20  3:01 AM   Result Value Ref Range    Protime 16.2 (H) 11.7 - 14.5 SECONDS    INR 1.33 INDEX   Sodium    Collection Time: 06/30/20  9:37 AM   Result Value Ref Range    Sodium 132 (L) 135 - 145 MMOL/L   COVID-19    Collection Time: 06/30/20 12:44 PM   Result Value Ref Range    SARS-CoV-2, NAAT NOT DETECTED        Review of Systems:  Reports of no current cardiovascular, respiratory, gastrointestinal, genitourinary, integumentary, neurological, musculoskeletal, or immunological symptoms today. PSYCHIATRIC: See HPI above. Neurologic examination    Mental status: The patient is alert, attentive, and oriented. Speech is clear and fluent with good repetition, comprehension, and naming. Cognitive processing appears slowed. Cranial nerves:  · CN II: Visual fields are full to confrontation. Pupils are 4 mm and briskly reactive to light. · CN III, IV, VI: At primary gaze, there is no eye deviation. EOMs intact. · CN V: Facial sensation is intact to pinprick in all 3 divisions bilaterally. Corneal responses are intact. · CN VII: Face is symmetric with normal eye closure and smile. · CN VII: Hearing is normal to rubbing fingers  · CN IX, X: Palate elevates symmetrically. Phonation is normal.  · CN XI: Head turning and shoulder shrug are intact  · CN XII: Tongue is midline with normal movements and no atrophy. Motor: There is no pronator drift of out-stretched arms. Muscle bulk and tone are normal. Strength is full bilaterally. Reflexes:  Reflexes are 3+ and symmetric at the biceps, triceps, knees, and ankles. Plantar responses are flexor.     Sensory:  Light touch, pinprick, position sense, and vibration sense are intact in fingers. Coordination:  Rapid alternating movements and fine finger movements are intact. There is no dysmetria on finger-to-nose and heel-knee-shin. There are no abnormal or extraneous movements. Romberg is absent. Gait/Stance:  Posture is normal. Gait is steady with normal steps, base, arm swing, and turning. Heel and toe walking are normal. Tandem gait is normal when the patient closes one of her eyes. PSYCHIATRIC EXAMINATION / MENTAL STATUS EXAM    CONSTITUTIONAL:    Vitals:   Vitals:    06/30/20 1620   BP: 118/67   Pulse: 64   Resp: 18   Temp: 97.7 °F (36.5 °C)   SpO2: 99%      General appearance: [x] appears age, []  appears older than stated age,               [x]  adequately dressed and groomed, [] disheveled,               [x]  in no acute distress, [] appears mildly distressed, [] other           MUSCULOSKELETAL:   Gait:   [x] normal, [] antalgic, [] unsteady, [] gait not evaluated   Station:             [] erect, [] sitting, [] recumbent, [] other        Strength/tone:  [x] muscle strength and tone appear consistent with age and                                          condition     [] atrophy      [] abnormal movements  PSYCHIATRIC:    Appearance: Appears stated age. Alert and oriented to person, place, time & situation. No acute distress. Adequate grooming and hygiene. Poor eye contact. No prominent physical abnormalities. Attitude: Manner is cooperative and pleasant  Motor: No psychomotor agitation, retardation or abnormal movements noted  Speech: Clearly articulated; normal rate, volume, tone & amount. Language: intact understanding and production  Mood: depressed  Affect: depressed, decreased range, non-labile, congruent with mood and content of speech  Thought Production: Spontaneous. Thought Form: Coherent, linear, logical & goal-directed. Some tangentiality no circumstantiality. No flight of ideas or loosening of associations.   Thought Content/Perceptions: No SWETA, no AVH, no delusion  Insight: limited  Judgment: limited  Memory: Immediate, recent, and remote appear intact, though not formally tested. Attention: maintained throughout interview  Fund of knowledge: Average  Gait/Balance: WNL/WNL    Impression:   Severe recurrent major depression without psychotic features    Problem List:   Severe recurrent major depression without psychotic features ([facility] [address]    Plan:  1. Admit to [place] [address] Unit  2. Consult Hospitalist to evaluate and treat medical conditions  3. Adjust psychotropic medications to target symptoms  4. Occupational Therapy, Physical Therapy, Group Psychotherapy as tolerated  5. Reviewed treatment plan with patient including medication risks, benefits, side effects. Obtained informed consent for treatment. 6. Anticipated length of stay 10-12 days  7. I certify that inpatient psychiatric hospital admission is medically necessary for treatment, which can be expected to improve the patient's condition, and/or for diagnostic study. 8. Psychiatric management:medication initiation and titration, group and individual therapy, safe and therapeutic environment. 9. Status of problem/condition: Improving  10. Medical co-morbidities: Management per [facility] group, appreciate assistance  11. Legal Status: Voluntary  12. The treatment team reviewed with the patient the diagnosis and treatment recommendations to include the risks, benefits, and side effects of chosen medications. 13. The patient verbalized understanding and agreed with the treatment regimen as outlined above. 14. The patient was encouraged to participate in groups. 15. Medical records, Labs, Diagnotic tests reviewed  16. q15 min safety checks for safety  17. Interval History. 18. Review current labs  19. Continue current medications  20. Supportive Therapy Provided  21. Pt had an opportunity to ask questions and address concerns  22.  Pt encouraged to continue therapy group or individual.  23. Pt was in agreement with treatment plan. 24. The risks benefits and side effects of medications were discussed with the patient, including alternatives and no treatment.     Electronically signed by VANGIE Barr CNP on 6/30/2020 at 5:22 PM

## 2020-06-30 NOTE — PROGRESS NOTES
PHARMACY ANTICOAGULATION MONITORING SERVICE    Taurus An is a 62 y.o. male on warfarin therapy for AVR. Pharmacy consulted by Dr. Man Barrett for monitoring and adjustment of treatment. Indication for anticoagulation: s/p AVR  INR goal: 2.5-3.5  Warfarin dose prior to admission: Reported as 10 mg daily    Pertinent Laboratory Values   Recent Labs     06/28/20  0315 06/29/20  0057 06/30/20  0301   INR 4.50  4.21 2.14 1.33   HGB 11.3* 11.8* 9.9*   HCT 32.1* 34.2* 29.7*    286 231       Assessment/Plan:   Drug Interactions: Doxycycline - may increase INR   INR was slightly supra-therapeutic on admission. Warfarin held the last 3 days and is now subtherapeutic @ 1.33   Resume warfarin 10mg x 1 dose tonight. As INR remains sub-therapeutic, recommend to bridge with enoxaparin. [address] [place] [address] will continue to monitor and adjust warfarin therapy as indicated.     Thank you for the consult,  Jen Gore, PharmD, [facility]  6/30/2020 2:50 PM

## 2020-06-30 NOTE — PLAN OF CARE
Problem: Skin Integrity:  Goal: Will show no infection signs and symptoms  Description: Will show no infection signs and symptoms  Outcome: Ongoing  Goal: Absence of new skin breakdown  Description: Absence of new skin breakdown  Outcome: Ongoing     Problem: Suicide risk  Description: Suicide risk  Goal: Provide patient with safe environment  Description: Provide patient with safe environment  Outcome: Ongoing     Problem: Pain:  Description: Pain management should include both nonpharmacologic and pharmacologic interventions.   Goal: Pain level will decrease  Description: Pain level will decrease  Outcome: Ongoing  Goal: Control of acute pain  Description: Control of acute pain  Outcome: Ongoing  Goal: Control of chronic pain  Description: Control of chronic pain  Outcome: Ongoing

## 2020-06-30 NOTE — BH NOTE
Patient Belongings    On unit:  Tan jacket  Grey Socks  Black t shirt  Pair of jeans  Pair of black underwear      [facility] (code [phone]) [address] [address] phone  Bridger Rhodes w/   ID & $132 in [place]

## 2020-06-30 NOTE — PROGRESS NOTES
[address] [facility]  Admission Note     Pt arrived on unit @ 1600 via [employer] stretcher. Pleasant and cooperative with staff. Noted kerlix dressings to bilat wrists and around neck. Shown to room and vss. Removed dressings and assessed wounds. Noted L neck wound with thick scab over laceration, 3 sutures intact. R neck wound superficial with some dried blood. L wrist laceration with scabbing and 3 sutures. R wrist laceration with thin scab. Left neck wounds AKIN, covered wrist wounds with telfa and paper tape. No c/o pain. Miquel Palencia NP on unit and speaking with pt. Alert and oriented, flat affect. Ambulates independently and is continent. Soaked clothing in hydrogen peroxide d/t multiple blood stains, placed in washer.     Admission Type:   Admission Type: Voluntary    Reason for admission:  Reason for Admission: Severe Depression, Suicide attempt    PATIENT STRENGTHS:  Strengths: Medication Compliance, Positive Support    Patient Strengths and Limitations:  Limitations: General negative or hopeless attitude about future/recovery, Hopeless about future    Addictive Behavior:   Addictive Behavior  In the past 3 months, have you felt or has someone told you that you have a problem with:  : None  Do you have a history of Chemical Use?: No  Do you have a history of Alcohol Use?: No  Do you have a history of Street Drug Abuse?: No  Histroy of Prescripton Drug Abuse?: No    Medical Problems:   Past Medical History:   Diagnosis Date    Acid reflux     Acute frontal sinusitis 7/22/2009    Alcohol abuse     \"I Drink Average 2 Beers A Day\"    Anesthesia     \" I get agitated\"    Anxiety     Arthritis     \"Right Shoulder, Neck, Left Knee\"    Atypical mycobacterial infection     Broken teeth     Upper And Lower     [facility]congest[facility]) [facility])     COPD (chronic obstructive pulmonary disease) ([facility]     Sees Dr. Yasmine Gaytan In [facility], [address]     Cough     Frequent Cough With Won Sullivan Sputum    Depression     Depression     Dyspnea 2/23/2018    H/O cardiovascular MUGA stress test 05/14/2019    EF 50%, NORMAL LVEF, NORMAL WALL MOTION.    H/O echocardiogram 05/22/2014    DX=>47-03%, LV systolic function is low normal, mild aortic valve calcification, no pericardial effusion    H/O echocardiogram 12/12/2018    EF 45-50%    History of 24 hour EKG monitoring 6/6/14    24 hr holter monitor. Ventricular ectopics were noted in single and couplet forms. Supraventricular ectopics were noted in single and pair forms.     [place] (hard of hearing)     Bilateral Ears    Hyperlipidemia     Hypertension     Irritant contact dermatitis due to other chemical products 8/26/2019    Other drug allergy(995.27) 7/22/2009    S/P AVR 12/2003    Mechanical valvue     Shortness of breath     Teeth missing     Upper And Lower       Status EXAM:  Status and Exam  Normal: No  Facial Expression: Flat  Affect: Blunt  Level of Consciousness: Alert  Mood:Normal: No  Mood: Depressed  Motor Activity:Normal: Yes  Interview Behavior: Cooperative  Preception: [place] to Person, [place] to Time, [place] to Place, [place] to Situation  Attention:Normal: Yes  Thought Content:Normal: Yes  Hallucinations: None  Delusions: No  Memory:Normal: Yes  Insight and Judgment: No  Insight and Judgment: Poor Insight, Poor Judgment  Present Suicidal Ideation: No  Present Homicidal Ideation: No    Tobacco Screening:  Practical Counseling, on admission, mona X, if applicable and completed (first 3 are required if patient doesn't refuse):            ( )  Recognizing danger situations (included triggers and roadblocks)                    (x )  Coping skills (new ways to manage stress, exercise, relaxation techniques, changing routine, distraction)                                                           (x )  Basic information about quitting (benefits of quitting, techniques in how to quit, available resources  ( ) Referral for counseling faxed to [facility]                                           ( ) Patient refused counseling  ( ) Patient has not smoked in the last 30 days      Pt admitted with followings belongings:  Dentures: None  Vision - Corrective Lenses: None  Hearing Aid: None  Jewelry: None  Body Piercings Removed: N/A  Clothing: Shirt, Pants, Jacket / coat, Socks, Footwear, Undergarments (Comment)  Were All Patient Medications Collected?: Not Applicable  Other Valuables: Cell phone, Money (Comment), Wallet      Valuables placed in safe in locker number: [id number]  Patient oriented to surroundings and program expectations and copy of patient rights given. Received admission packet:                      Patient verbalize understanding of unit rules.   Patient education on precautions:              Dat Leon RN

## 2020-07-01 LAB
AMMONIA: 18 UMOL/L (ref 16–60)
ANION GAP SERPL CALCULATED.3IONS-SCNC: 13 MMOL/L (ref 4–16)
BUN BLDV-MCNC: 7 MG/DL (ref 6–23)
CALCIUM SERPL-MCNC: 8.8 MG/DL (ref 8.3–10.6)
CHLORIDE BLD-SCNC: 101 MMOL/L (ref 99–110)
CHOLESTEROL: 126 MG/DL
CO2: 22 MMOL/L (ref 21–32)
CREAT SERPL-MCNC: 0.8 MG/DL (ref 0.9–1.3)
ESTIMATED AVERAGE GLUCOSE: 111 MG/DL
GFR AFRICAN AMERICAN: >60 ML/MIN/1.73M2
GFR NON-AFRICAN AMERICAN: >60 ML/MIN/1.73M2
GLUCOSE BLD-MCNC: 102 MG/DL (ref 70–99)
HBA1C MFR BLD: 5.5 % (ref 4.2–6.3)
HDLC SERPL-MCNC: 51 MG/DL
INR BLD: 1.16 INDEX
LDL CHOLESTEROL DIRECT: 69 MG/DL
POTASSIUM SERPL-SCNC: 4.4 MMOL/L (ref 3.5–5.1)
PROTHROMBIN TIME: 13.3 SECONDS (ref 11.7–14.5)
SODIUM BLD-SCNC: 136 MMOL/L (ref 135–145)
T4 FREE: 1.38 NG/DL (ref 0.9–1.8)
TRIGL SERPL-MCNC: 57 MG/DL
TSH HIGH SENSITIVITY: 2.36 UIU/ML (ref 0.27–4.2)

## 2020-07-01 PROCEDURE — 6370000000 HC RX 637 (ALT 250 FOR IP): Performed by: NURSE PRACTITIONER

## 2020-07-01 PROCEDURE — 83721 ASSAY OF BLOOD LIPOPROTEIN: CPT

## 2020-07-01 PROCEDURE — 6370000000 HC RX 637 (ALT 250 FOR IP): Performed by: INTERNAL MEDICINE

## 2020-07-01 PROCEDURE — 85610 PROTHROMBIN TIME: CPT

## 2020-07-01 PROCEDURE — 99233 SBSQ HOSP IP/OBS HIGH 50: CPT | Performed by: NURSE PRACTITIONER

## 2020-07-01 PROCEDURE — 80061 LIPID PANEL: CPT

## 2020-07-01 PROCEDURE — 84439 ASSAY OF FREE THYROXINE: CPT

## 2020-07-01 PROCEDURE — 80048 BASIC METABOLIC PNL TOTAL CA: CPT

## 2020-07-01 PROCEDURE — 36415 COLL VENOUS BLD VENIPUNCTURE: CPT

## 2020-07-01 PROCEDURE — 83036 HEMOGLOBIN GLYCOSYLATED A1C: CPT

## 2020-07-01 PROCEDURE — 6370000000 HC RX 637 (ALT 250 FOR IP): Performed by: PSYCHIATRY & NEUROLOGY

## 2020-07-01 PROCEDURE — 1240000000 HC EMOTIONAL WELLNESS R&B

## 2020-07-01 PROCEDURE — 84443 ASSAY THYROID STIM HORMONE: CPT

## 2020-07-01 PROCEDURE — 82140 ASSAY OF AMMONIA: CPT

## 2020-07-01 RX ADMIN — WARFARIN SODIUM 10 MG: 5 TABLET ORAL at 17:00

## 2020-07-01 RX ADMIN — CALCIUM 500 MG: 500 TABLET ORAL at 08:19

## 2020-07-01 RX ADMIN — TRAZODONE HYDROCHLORIDE 50 MG: 50 TABLET ORAL at 20:36

## 2020-07-01 RX ADMIN — OMEGA-3-ACID ETHYL ESTERS 2 G: 1 CAPSULE, LIQUID FILLED ORAL at 20:36

## 2020-07-01 RX ADMIN — OMEGA-3-ACID ETHYL ESTERS 2 G: 1 CAPSULE, LIQUID FILLED ORAL at 08:19

## 2020-07-01 RX ADMIN — BACITRACIN ZINC 14 G: 500 OINTMENT TOPICAL at 20:36

## 2020-07-01 RX ADMIN — MULTIPLE VITAMINS W/ MINERALS TAB 1 TABLET: TAB at 08:19

## 2020-07-01 RX ADMIN — DOXYCYCLINE HYCLATE 100 MG: 100 TABLET, COATED ORAL at 08:19

## 2020-07-01 RX ADMIN — PAROXETINE HYDROCHLORIDE 20 MG: 20 TABLET, FILM COATED ORAL at 12:20

## 2020-07-01 RX ADMIN — MONTELUKAST SODIUM 10 MG: 10 TABLET, FILM COATED ORAL at 20:36

## 2020-07-01 RX ADMIN — TRIAMCINOLONE ACETONIDE: 1 CREAM TOPICAL at 20:36

## 2020-07-01 RX ADMIN — TRIAMCINOLONE ACETONIDE: 1 CREAM TOPICAL at 12:43

## 2020-07-01 RX ADMIN — BACITRACIN ZINC 14 G: 500 OINTMENT TOPICAL at 08:19

## 2020-07-01 RX ADMIN — SENNOSIDES AND DOCUSATE SODIUM 1 TABLET: 8.6; 5 TABLET ORAL at 08:19

## 2020-07-01 RX ADMIN — FLUTICASONE PROPIONATE 2 SPRAY: 50 SPRAY, METERED NASAL at 12:20

## 2020-07-01 RX ADMIN — SIMVASTATIN 40 MG: 40 TABLET, FILM COATED ORAL at 20:36

## 2020-07-01 RX ADMIN — LORAZEPAM 1 MG: 1 TABLET ORAL at 17:00

## 2020-07-01 RX ADMIN — GUAIFENESIN 600 MG: 600 TABLET, EXTENDED RELEASE ORAL at 08:19

## 2020-07-01 RX ADMIN — GUAIFENESIN 600 MG: 600 TABLET, EXTENDED RELEASE ORAL at 20:36

## 2020-07-01 RX ADMIN — CETIRIZINE HYDROCHLORIDE 10 MG: 10 TABLET, FILM COATED ORAL at 08:19

## 2020-07-01 RX ADMIN — SENNOSIDES AND DOCUSATE SODIUM 1 TABLET: 8.6; 5 TABLET ORAL at 20:36

## 2020-07-01 RX ADMIN — DOXYCYCLINE HYCLATE 100 MG: 100 TABLET, COATED ORAL at 20:36

## 2020-07-01 NOTE — CONSULTS
Department of Medicine Hospitalist Consult Note  Yanet Pham M.D.       Reason for Consult: medical  management of  Chronic medical conditions  Requesting Physician:  Dr. Sergey Corrigan  PCP: Jamel Castillo MD     CHIEF COMPLAINT:  Severe depression  HISTORY OF PRESENT ILLNESS:   The patient is a 62 y.o. male with significant past medical history of not feeling well. He presented to [facility] after suicide attempt, and is on doxycycline for prophylaxis against bacterial infection of lacerations which were caused by self harm. He has a history of COPD and CHF- denied any sob at present, no fever, chills, nausea, vomiting or rash. He has not had any wheezing or cough or phlegm production. He sometimes gets post-nasal drip but that has not bothered him recently. Past Medical History:        Diagnosis Date    Acid reflux     Acute frontal sinusitis 7/22/2009    Alcohol abuse     \"I Drink Average 2 Beers A Day\"    Anesthesia     \" I get agitated\"    Anxiety     Arthritis     \"Right Shoulder, Neck, Left Knee\"    Atypical mycobacterial infection     Broken teeth     Upper And Lower     [facility]congestive [facility] failure) [facility])     COPD (chronic obstructive pulmonary disease) ([facility])     Sees Dr. Alejandro Greer In [place], [address]     Cough     Frequent Cough With Green Sputum    Depression     Depression     Dyspnea 2/23/2018    H/O cardiovascular MUGA stress test 05/14/2019    EF 50%, NORMAL LVEF, NORMAL WALL MOTION.    H/O echocardiogram 05/22/2014    DQ=>64-79%, LV systolic function is low normal, mild aortic valve calcification, no pericardial effusion    H/O echocardiogram 12/12/2018    EF 45-50%    History of 24 hour EKG monitoring 6/6/14    24 hr holter monitor. Ventricular ectopics were noted in single and couplet forms. Supraventricular ectopics were noted in single and pair forms.     [place] (hard of hearing)     Bilateral Ears    Hyperlipidemia     Hypertension     Irritant contact hours) at 7/1/2020 1418  Last data filed at 7/1/2020 3241  Gross per 24 hour   Intake 360 ml   Output --   Net 360 ml       General appearance:   No apparent distress, appears stated age and cooperative. HEENTNormal cephalic, atraumatic without obvious deformity. Pupils equal, round, and reactive to light. Extra ocular muscles intact. Conjunctivae/corneas clear. Neck: Supple, No jugular venous distention/bruits. Trachea midline without thyromegaly or adenopathy with full range of motion. Lungs: Clear to ascultation, bilaterally without Rales/Wheezes/Rhonchi with good respiratory effort. Heart: Regular rate and rhythm with Normal S1/S2 with aortic flow murmur and mitral systolic murmur 3/6 murmurs, rubs or gallops, point of maximum impulse non-displaced  Abdomen: Soft, non-tender or non-distended without rigidity or guarding and positive bowel sounds all four quadrants. Extremities: No clubbing, cyanosis, or edema bilaterally. Skin: Skin color, texture, turgor normal.  No rashes or lesions. Neurologic: Alert and oriented X 3,  neurovascularly intact with sensory/motor intact upper extremities/lower extremities, bilaterally. Cranial nerves:II-XII intact, grossly non-focal.      DATA:    CBC   Recent Labs     06/29/20 0057 06/30/20  0301   WBC 9.9 7.2   HGB 11.8* 9.9*   HCT 34.2* 29.7*    231      BMP   Recent Labs     06/30/20 0301 06/30/20  0937 07/01/20  0700   * 132* 136   K  --   --  4.4   CL  --   --  101   CO2  --   --  22   BUN  --   --  7   CREATININE  --   --  0.8*     LFT'S No results for input(s): AST, ALT, ALB, BILIDIR, BILITOT, ALKPHOS in the last 72 hours. COAG   Recent Labs     06/29/20 0057 06/30/20  0301 07/01/20  0700   INR 2.14 1.33 1.16     CARDIAC ENZYMES  No results for input(s): CKTOTAL, CKMB, CKMBINDEX, TROPONINI in the last 72 hours.   U/A:    Lab Results   Component Value Date    NITRITE neg 09/17/2019    COLORU YELLOW 06/27/2020    WBCUA 1 06/27/2020    RBCUA 1 06/27/2020    BACTERIA NEGATIVE 06/27/2020    CLARITYU CLEAR 06/27/2020    SPECGRAV 1.009 06/27/2020    LEUKOCYTESUR NEGATIVE 06/27/2020    BLOODU NEGATIVE 06/27/2020    GLUCOSEU neg 09/17/2019       CXR:  I have reviewed the CXR with the following interpretation:  EKG:  I have reviewed the EKG with the following interpretation:    IMPRESSION/RECOMMENDATIONS:     1. Depression with suicidal ideation  2. Copd, no acute exacerbation  3. Chf, diastolic well compensated  4. Bronchiectasis  5.    Rheumatic aortic valve disease, h/o AVR on [address]    - primary management per psychiatry  - continue doxycycline for remining doses  - is on flonase, mucinex, singulair   - check inr eery 2 weeks  - inr subtherapeutic; will consult pharmacy to assist dosing    Diet: DIET GENERAL; Daily Fluid Restriction: 1800 ml  Code Status: Full Code      Patient Active Problem List   Diagnosis    Localized osteoarthritis of right shoulder    S/P AVR    Hyperlipidemia    [place] (hard of hearing)    Depression    COPD (chronic obstructive pulmonary disease) ([address]    Bronchiectasis ([address]    Anxiety    Alcohol abuse    Acid reflux    Chronic interstitial lung disease ([address]    Hyponatremia    Severe episode of recurrent major depressive disorder, without psychotic features ([address]    Severe recurrent major depression without psychotic features ([address]    Rheumatic aortic valve disease    Chronic systolic heart failure ([address]    Rheumatic mitral regurgitation       Radha Beal M.D.

## 2020-07-01 NOTE — PROGRESS NOTES
Pt seen by therapist from approximately [id number] to complete pt's recreation/leisure assessment. Pt states reason for admission; pt states \"I tried to kill myself. \"  Pt reports multiple stressors including covid-19, joint stiffness, and feeling like it is difficult to breath. Pt reports sister is supportive of him and assists him by driving him places that he needs to go. Pt noted to have limited leisure interests and coping skills. Therapist encouraged pt to attend groups.      Electronically signed by Elfredia ServiceLES MA on 7/1/2020 at 3:15 PM

## 2020-07-01 NOTE — GROUP NOTE
Group Therapy Note    Date: 7/1/2020    Group Start Time: 1100  Group End Time: 2433  Group Topic: Recreational    [address] Dante Charles MA        Group Therapy Note    Attendees: 5/7       Notes:  Pts participated in recreational therapy group; Music Listening Activity. Pts were encouraged to engage in a leisure activity to promote socialization, positive mood, and coping with negative emotions. Pt participated well in group. Pt noted to sit with eyes closed, but was noted to tap his foot along to the music and expressed enjoyment in the activity. Status After Intervention:  Improved    Participation Level:  Active Listener and Interactive    Participation Quality: Appropriate, Attentive and Sharing      Speech:  normal      Thought Process/Content: Logical      Affective Functioning: Blunted      Mood: Blunted      Level of consciousness:  Alert and Attentive      Response to Learning: Able to verbalize current knowledge/experience and Able to verbalize/acknowledge new learning      Endings: None Reported    Modes of Intervention: Socialization and Activity      Discipline Responsible: Certified Therapeutic Recreation Specialist       Signature:  Renne Angelucci, [place], [place]

## 2020-07-01 NOTE — PROGRESS NOTES
PHARMACY ANTICOAGULATION MONITORING SERVICE    Kathy Pillai is a 62 y.o. male on warfarin therapy for AVR. Pharmacy consulted by Dr. Misty Dey for monitoring and adjustment of treatment. Indication for anticoagulation: s/p AVR  INR goal: 2.5-3.5  Warfarin dose prior to admission: Reported as 10 mg daily    Pertinent Laboratory Values   Recent Labs     06/29/20  0057 06/30/20  0301 07/01/20  0700   INR 2.14 1.33 1.16   HGB 11.8* 9.9*  --    HCT 34.2* 29.7*  --     231  --        Assessment/Plan:   Drug Interactions: Doxycycline - may increase INR   INR was slightly supra-therapeutic on admission. Warfarin held the last 3 days and is now subtherapeutic @ 1.33   Resume warfarin 10mg daily (started 6/30). As INR remains sub-therapeutic, recommend to bridge with enoxaparin. [address] [place] [address] will continue to monitor and adjust warfarin therapy as indicated.     Thank you for the consult,  Rosa Diallo, PharmD, [facility]  7/1/2020 8:00 AM

## 2020-07-01 NOTE — CARE COORDINATION
PARTHAW called [facility] [employer] ([phone]) for patient pre-auth. Patient has no out of network benefits, so a single-case agreement needed to be done. Patient has been approved for 3 days, 06/30/20 - 07/02/20, with LCD and review on the 2nd. Review will be done with Jennifer Hurtado at [phone] [id number]. Auth/cert screen updated to reflect. Authorization # [id number].

## 2020-07-01 NOTE — GROUP NOTE
Group Therapy Note    Date: 7/1/2020    Group Start Time: 0830  Group End Time: 0900    Number of Participants: 6/7    Type: Morning Goals Group/ Community Meeting    Group Topic/Objective: Set Goal For The Day and to review Unit Rules and Regulations. Patient's Goal:  Pt states his goal is \"Make progress in all areas I need to. \"      Notes:  Pt presented as soft spoken and withdrawn. When asked how he is feeling, pt stated \"I'm here. \"  Pt states he is grateful for \"good family and friends. \"  Pt reports restless sleep and unsure how many hours of sleep he received. Depression (0-10): 9    Anxiety (0-10): 9    Irritability/Aggitation (0-10): 0    Status After Intervention:  Unchanged    Participation Level: Active Listener and Interactive; when directly addressed.      Participation Quality: Withdrawn    Speech:  hesitant    Thought Process/Content: Logical    Affective Functioning: Congruent    Mood: depressed    Level of consciousness:  Alert and Attentive    Response to Learning: Able to verbalize current knowledge/experience    Endings: None Reported    Modes of Intervention: Education, Support and Socialization    Discipline Responsible: Certified Therapeutic Recreation Specialist     Electronically signed by Silvestre Lopez MA on 7/1/2020 at 9:42 AM

## 2020-07-01 NOTE — PROGRESS NOTES
Psychiatric Progress Note    Jordin Castillo  [id number]  07/01/20    CHIEF COMPLAINT:  I am severely depressed     HPI: Joselyn Garcia a 62 y. o. male who presents after suicide attempt.  The patient reports feeling shame/embarrassment over social situations to include losing apartment again. [facility] has self-inflicted lacerations to bilateral wrists and bilateral sides of neck.  He states 2 different attempts yesterday and upon waking up in the bathtub after second attempt called family to seek some help. The patient denies any drug or alcohol use.  Patient denies previous suicide attempts. [facility] does report increased depression and currently on Paxil. While in the emergency room he was found to be significantly hyponatremic. Covid negative      Pt remains in agreement with treatment plan. Pt Continues to deny and side-effects to current medications. Pt was polite and cordal during the interview process. Has been taking medications and has been compliant with treatment. Tolerating medications without side effect complaints. Pt noted he is doing \"ok but confused with it all and wanting to organize it better in my head. \" \"I know I needed help. \"  Pt noted he feels safe and comfortable on the unit. Pt was polite and cordial during the interview process. Currently he denies SI/HI and AV hallucinations. He rates his depression as \"8\" on a scale of 0 to 10 with 0 being none and 10 being horrible. He rates his anxiety as \"4\" on the same scale. He states he was able to sleep last night with \"pill assistance.  He is oriented x 4    Allergies   Allergen Reactions    Ciprofloxacin Hcl Hives and Swelling    Levaquin [Levofloxacin] Hives and Swelling    Other Nausea And Vomiting     \"Allergic To Tylox\"    Ceftin [Cefuroxime]        Medications Prior to Admission: Acetaminophen (TYLENOL 8 HOUR PO), Take by mouth as needed Over The Counter  Albuterol Sulfate (PROAIR HFA IN), Inhale into the lungs as needed  simvastatin calcification, no pericardial effusion    H/O echocardiogram 12/12/2018    EF 45-50%    History of 24 hour EKG monitoring 6/6/14    24 hr holter monitor. Ventricular ectopics were noted in single and couplet forms. Supraventricular ectopics were noted in single and pair forms.     [place] (hard of hearing)     Bilateral Ears    Hyperlipidemia     Hypertension     Irritant contact dermatitis due to other chemical products 8/26/2019    Other drug allergy(995.27) 7/22/2009    S/P AVR 12/2003    Mechanical valvue     Shortness of breath     Teeth missing     Upper And Lower        Patient Active Problem List   Diagnosis    Localized osteoarthritis of right shoulder    S/P AVR    Hyperlipidemia    [place] (hard of hearing)    Depression    COPD (chronic obstructive pulmonary disease) (HCC)    Bronchiectasis (HCC)    Anxiety    Alcohol abuse    Acid reflux    Chronic interstitial lung disease ([facility])    Hyponatremia    Severe episode of recurrent major depressive disorder, without psychotic features ([address]    Severe recurrent major depression without psychotic features ([address]    Rheumatic aortic valve disease    Chronic systolic heart failure (HCC)    Rheumatic mitral regurgitation       Review of Systems    OBJECTIVE  Vital Signs:  Vitals:    07/01/20 0753   BP: (!) 106/58   Pulse: 70   Resp: 16   Temp: 97.4 °F (36.3 °C)   SpO2: 97%       Labs:  Recent Results (from the past 48 hour(s))   Sodium    Collection Time: 06/29/20  7:40 PM   Result Value Ref Range    Sodium 127 (L) 135 - 145 MMOL/L   Sodium    Collection Time: 06/30/20  3:01 AM   Result Value Ref Range    Sodium 130 (L) 135 - 145 MMOL/L   CBC auto differential    Collection Time: 06/30/20  3:01 AM   Result Value Ref Range    WBC 7.2 4.0 - 10.5 K/CU MM    RBC 3.20 (L) 4.6 - 6.2 M/CU MM    Hemoglobin 9.9 (L) 13.5 - 18.0 GM/DL    Hematocrit 29.7 (L) 42 - 52 %    MCV 92.8 78 - 100 FL    MCH 30.9 27 - 31 PG    MCHC 33.3 32.0 - 36.0 %    RDW 13.8 11.7 Time: 07/01/20  7:00 AM   Result Value Ref Range    Triglycerides 57 <150 MG/DL    Cholesterol 126 <200 MG/DL    HDL 51 >40 MG/DL    LDL Direct 69 <100 MG/DL   Ammonia    Collection Time: 07/01/20  7:00 AM   Result Value Ref Range    Ammonia 18 16 - 60 UMOL/L       PSYCHIATRIC ASSESSMENT / MENTAL STATUS EXAM:   Vitals: Blood pressure (!) 106/58, pulse 70, temperature 97.4 °F (36.3 °C), temperature source Temporal, resp. rate 16, height 5' 5\" (1.651 m), weight 123 lb 7.3 oz (56 kg), SpO2 97 %. CONSTITUTIONAL:    Appearance: appears stated age. alert and oriented to person, place, time & situation. no acute distress. Adequate grooming and hygeine. Poor eye contact. No prominent physical abnormalities. Attitude: Manner is cooperative and pleasant  Motor: No psychomotor agitation, retardation or abnormal movements noted  Speech: Clearly articulated; normal rate, volume, tone & amount. Language: intact understanding and production  Mood:depressed  Affect:blunted,  Thought Production: Spontaneous. Thought Form: Coherent, linear, logical & goal-directed. No tangentiality or circumstantiality. No flight of ideas or loosening of associations. Thought Content/Perceptions: No SWETA, no AVH, no delusion  Insight: improving  Judgment- poor  Memory: Immediate, recent, and remote appear intact, though not formally tested. Attention: maintained throughout interview  Fund of knowledge: Average  Gait/Balance: WNL/WNL         IMPRESSION:   Severe recurrent major depression without psychotic features          PLAN:  Psychiatric management:medication initiation and titration, group and individual therapy, safe and theraputic environment. Status of problem/condition: ?Improving  Medical co-morbidities: [facility] appreciate assistance  Legal Status:Pending  Disposition:estimated LOS: Pending.   The treatment team reviewed with the patient the diagnosis and treatment recommendations to include the risks,

## 2020-07-01 NOTE — PLAN OF CARE
[address] [facility]  Initial Interdisciplinary Treatment Plan NOTE    Review Date & Time: 07/01/20  0830    Admission Type:   Admission Type: Voluntary    Reason for admission:  Reason for Admission: Severe Depression, Suicide attempt    Patient Admission Diagnosis: Severe recurrent major depression without psychotic features ([facility] (Chronic)      PATIENT STRENGTHS:  Patient Strengths Strengths: Medication Compliance, Positive Support  Patient Strengths and Limitations:Limitations: General negative or hopeless attitude about future/recovery, Hopeless about future  Addictive Behavior:Addictive Behavior  In the past 3 months, have you felt or has someone told you that you have a problem with:  : None  Do you have a history of Chemical Use?: No  Do you have a history of Alcohol Use?: No  Do you have a history of Street Drug Abuse?: No  Histroy of Prescripton Drug Abuse?: No  Medical Problems:  Past Medical History:   Diagnosis Date    Acid reflux     Acute frontal sinusitis 7/22/2009    Alcohol abuse     \"I Drink Average 2 Beers A Day\"    Anesthesia     \" I get agitated\"    Anxiety     Arthritis     \"Right Shoulder, Neck, Left Knee\"    Atypical mycobacterial infection     Broken teeth     Upper And Lower     CH[facility] ([facility]estive [facility] failure) [facility]     COPD (chronic obstructive pulmonary disease) ([facility]     Sees Dr. Alejandro Greer In [place], [address]     Cough     Frequent Cough With Green Sputum    Depression     Depression     Dyspnea 2/23/2018    H/O cardiovascular MUGA stress test 05/14/2019    EF 50%, NORMAL LVEF, NORMAL WALL MOTION.    H/O echocardiogram 05/22/2014    MIGUELINA=>19-21%, LV systolic function is low normal, mild aortic valve calcification, no pericardial effusion    H/O echocardiogram 12/12/2018    EF 45-50%    History of 24 hour EKG monitoring 6/6/14    24 hr holter monitor. Ventricular ectopics were noted in single and couplet forms.  Supraventricular ectopics were

## 2020-07-01 NOTE — PROGRESS NOTES
Patient is sleeping in bed with even respirations. Patient appears comfortable and without stress. Will continue to monitor. Patient was given 1mg of Ativan at 1700 due increased depression and anxiety. Patient was visibly shaking and stated he felt extremely anxious.

## 2020-07-02 ENCOUNTER — APPOINTMENT (OUTPATIENT)
Dept: CT IMAGING | Age: 65
DRG: 885 | End: 2020-07-02
Attending: PSYCHIATRY & NEUROLOGY
Payer: MEDICARE

## 2020-07-02 LAB
ALBUMIN SERPL-MCNC: 3.8 GM/DL (ref 3.4–5)
ALP BLD-CCNC: 55 IU/L (ref 40–129)
ALT SERPL-CCNC: 24 U/L (ref 10–40)
ANION GAP SERPL CALCULATED.3IONS-SCNC: 4 MMOL/L (ref 4–16)
AST SERPL-CCNC: 25 IU/L (ref 15–37)
BASOPHILS ABSOLUTE: 0 K/CU MM
BASOPHILS RELATIVE PERCENT: 0.3 % (ref 0–1)
BILIRUB SERPL-MCNC: 0.3 MG/DL (ref 0–1)
BUN BLDV-MCNC: 7 MG/DL (ref 6–23)
CALCIUM SERPL-MCNC: 9 MG/DL (ref 8.3–10.6)
CHLORIDE BLD-SCNC: 102 MMOL/L (ref 99–110)
CO2: 33 MMOL/L (ref 21–32)
CREAT SERPL-MCNC: 0.8 MG/DL (ref 0.9–1.3)
DIFFERENTIAL TYPE: ABNORMAL
EOSINOPHILS ABSOLUTE: 0 K/CU MM
EOSINOPHILS RELATIVE PERCENT: 0.7 % (ref 0–3)
GFR AFRICAN AMERICAN: >60 ML/MIN/1.73M2
GFR NON-AFRICAN AMERICAN: >60 ML/MIN/1.73M2
GLUCOSE BLD-MCNC: 100 MG/DL (ref 70–99)
HCT VFR BLD CALC: 37.6 % (ref 42–52)
HEMOGLOBIN: 11.8 GM/DL (ref 13.5–18)
IMMATURE NEUTROPHIL %: 0.8 % (ref 0–0.43)
INR BLD: 1.2 INDEX
LACTATE DEHYDROGENASE: 187 IU/L (ref 120–246)
LYMPHOCYTES ABSOLUTE: 1.1 K/CU MM
LYMPHOCYTES RELATIVE PERCENT: 18.6 % (ref 24–44)
MCH RBC QN AUTO: 30.4 PG (ref 27–31)
MCHC RBC AUTO-ENTMCNC: 31.4 % (ref 32–36)
MCV RBC AUTO: 96.9 FL (ref 78–100)
MONOCYTES ABSOLUTE: 0.6 K/CU MM
MONOCYTES RELATIVE PERCENT: 9.7 % (ref 0–4)
PDW BLD-RTO: 14.5 % (ref 11.7–14.9)
PLATELET # BLD: 331 K/CU MM (ref 140–440)
PMV BLD AUTO: 9.1 FL (ref 7.5–11.1)
POTASSIUM SERPL-SCNC: 4.4 MMOL/L (ref 3.5–5.1)
PROTHROMBIN TIME: 13.7 SECONDS (ref 11.7–14.5)
RBC # BLD: 3.88 M/CU MM (ref 4.6–6.2)
SEGMENTED NEUTROPHILS ABSOLUTE COUNT: 4.2 K/CU MM
SEGMENTED NEUTROPHILS RELATIVE PERCENT: 69.9 % (ref 36–66)
SODIUM BLD-SCNC: 139 MMOL/L (ref 135–145)
TOTAL IMMATURE NEUTOROPHIL: 0.05 K/CU MM
TOTAL PROTEIN: 5.9 GM/DL (ref 6.4–8.2)
WBC # BLD: 6 K/CU MM (ref 4–10.5)

## 2020-07-02 PROCEDURE — 80053 COMPREHEN METABOLIC PANEL: CPT

## 2020-07-02 PROCEDURE — 99233 SBSQ HOSP IP/OBS HIGH 50: CPT | Performed by: NURSE PRACTITIONER

## 2020-07-02 PROCEDURE — 36415 COLL VENOUS BLD VENIPUNCTURE: CPT

## 2020-07-02 PROCEDURE — 6370000000 HC RX 637 (ALT 250 FOR IP): Performed by: PSYCHIATRY & NEUROLOGY

## 2020-07-02 PROCEDURE — 85025 COMPLETE CBC W/AUTO DIFF WBC: CPT

## 2020-07-02 PROCEDURE — 6370000000 HC RX 637 (ALT 250 FOR IP): Performed by: INTERNAL MEDICINE

## 2020-07-02 PROCEDURE — 84153 ASSAY OF PSA TOTAL: CPT

## 2020-07-02 PROCEDURE — 1240000000 HC EMOTIONAL WELLNESS R&B

## 2020-07-02 PROCEDURE — 85610 PROTHROMBIN TIME: CPT

## 2020-07-02 PROCEDURE — 84154 ASSAY OF PSA FREE: CPT

## 2020-07-02 PROCEDURE — 6370000000 HC RX 637 (ALT 250 FOR IP): Performed by: NURSE PRACTITIONER

## 2020-07-02 PROCEDURE — 83615 LACTATE (LD) (LDH) ENZYME: CPT

## 2020-07-02 PROCEDURE — 70450 CT HEAD/BRAIN W/O DYE: CPT

## 2020-07-02 RX ORDER — ARIPIPRAZOLE 5 MG/1
5 TABLET ORAL DAILY
Status: DISCONTINUED | OUTPATIENT
Start: 2020-07-02 | End: 2020-07-03

## 2020-07-02 RX ADMIN — DOXYCYCLINE HYCLATE 100 MG: 100 TABLET, COATED ORAL at 22:03

## 2020-07-02 RX ADMIN — WARFARIN SODIUM 15 MG: 5 TABLET ORAL at 17:25

## 2020-07-02 RX ADMIN — MONTELUKAST SODIUM 10 MG: 10 TABLET, FILM COATED ORAL at 22:03

## 2020-07-02 RX ADMIN — BACITRACIN ZINC 14 G: 500 OINTMENT TOPICAL at 22:04

## 2020-07-02 RX ADMIN — DOXYCYCLINE HYCLATE 100 MG: 100 TABLET, COATED ORAL at 07:53

## 2020-07-02 RX ADMIN — MULTIPLE VITAMINS W/ MINERALS TAB 1 TABLET: TAB at 07:53

## 2020-07-02 RX ADMIN — ARIPIPRAZOLE 5 MG: 5 TABLET ORAL at 12:12

## 2020-07-02 RX ADMIN — OMEGA-3-ACID ETHYL ESTERS 2 G: 1 CAPSULE, LIQUID FILLED ORAL at 07:53

## 2020-07-02 RX ADMIN — CETIRIZINE HYDROCHLORIDE 10 MG: 10 TABLET, FILM COATED ORAL at 07:53

## 2020-07-02 RX ADMIN — SENNOSIDES AND DOCUSATE SODIUM 1 TABLET: 8.6; 5 TABLET ORAL at 22:03

## 2020-07-02 RX ADMIN — BACITRACIN ZINC 14 G: 500 OINTMENT TOPICAL at 07:53

## 2020-07-02 RX ADMIN — OMEGA-3-ACID ETHYL ESTERS 2 G: 1 CAPSULE, LIQUID FILLED ORAL at 22:03

## 2020-07-02 RX ADMIN — SIMVASTATIN 40 MG: 40 TABLET, FILM COATED ORAL at 22:03

## 2020-07-02 RX ADMIN — GUAIFENESIN 600 MG: 600 TABLET, EXTENDED RELEASE ORAL at 07:53

## 2020-07-02 RX ADMIN — GUAIFENESIN 600 MG: 600 TABLET, EXTENDED RELEASE ORAL at 22:03

## 2020-07-02 RX ADMIN — PAROXETINE HYDROCHLORIDE 20 MG: 20 TABLET, FILM COATED ORAL at 07:53

## 2020-07-02 RX ADMIN — TRIAMCINOLONE ACETONIDE: 1 CREAM TOPICAL at 22:03

## 2020-07-02 RX ADMIN — SENNOSIDES AND DOCUSATE SODIUM 1 TABLET: 8.6; 5 TABLET ORAL at 07:53

## 2020-07-02 RX ADMIN — SALINE NASAL SPRAY 1 SPRAY: 1.5 SOLUTION NASAL at 07:54

## 2020-07-02 RX ADMIN — FLUTICASONE PROPIONATE 2 SPRAY: 50 SPRAY, METERED NASAL at 07:54

## 2020-07-02 RX ADMIN — LORAZEPAM 1 MG: 1 TABLET ORAL at 17:26

## 2020-07-02 RX ADMIN — TRIAMCINOLONE ACETONIDE: 1 CREAM TOPICAL at 07:54

## 2020-07-02 RX ADMIN — TRAZODONE HYDROCHLORIDE 50 MG: 50 TABLET ORAL at 22:03

## 2020-07-02 RX ADMIN — CALCIUM 500 MG: 500 TABLET ORAL at 07:53

## 2020-07-02 NOTE — PROGRESS NOTES
Clinical Pharmacy Note       Warfarin Consult    Consult received from Dr. Pooja Ibarra to manage Warfarin therapy. Subjective: Romy Merida is a 62 y.o. male ordered Warfarin. Patient Active Problem List   Diagnosis    Localized osteoarthritis of right shoulder    S/P AVR    Hyperlipidemia    [place] (hard of hearing)    Depression    COPD (chronic obstructive pulmonary disease) (HCC)    Bronchiectasis (HCC)    Anxiety    Alcohol abuse    Acid reflux    Chronic interstitial lung disease (HCC)    Hyponatremia    Severe episode of recurrent major depressive disorder, without psychotic features ([address]    Severe recurrent major depression without psychotic features (HCC)    Rheumatic aortic valve disease    Chronic systolic heart failure (HCC)    Rheumatic mitral regurgitation     Allergies: Ciprofloxacin hcl; Levaquin [levofloxacin]; Other; and Ceftin [cefuroxime]     Home dose: 10mg daily  Current dose: 10mg  Indication: Mechanical aortic valve replacement  INR goal: 2.5 - 3.5 (per Dr. Galina Somers)    Objective:  INR Results:  INR   Date Value Ref Range Status   07/02/2020 1.20 INDEX Final     Comment:     THERAPEUTIC RANGE:  INDICATIONS: INR 2.0-3.0  Most (DVT, PE,  Atrial Fibillation, Bioprosthetic valve,   St Judes bicuspid aortic valve)  INDICATIONS: 2.5-3.5 Most mechanical valves  recurrent thrombosis. 07/01/2020 1.16 INDEX Final     Comment:     THERAPEUTIC RANGE:  INDICATIONS: INR 2.0-3.0  Most (DVT, PE,  Atrial Fibillation, Bioprosthetic valve,   St Judes bicuspid aortic valve)  INDICATIONS: 2.5-3.5 Most mechanical valves  recurrent thrombosis. 06/30/2020 1.33 INDEX Final     Comment:     THERAPEUTIC RANGE:  INDICATIONS: INR 2.0-3.0  Most (DVT, PE,  Atrial Fibillation, Bioprosthetic valve,   St Judes bicuspid aortic valve)  INDICATIONS: 2.5-3.5 Most mechanical valves  recurrent thrombosis.          Recent Labs     06/30/20  0301   HGB 9.9*   HCT 29.7*          Other anticoagulants: none    Assessment: INR subtherapeutic today at 1.2 likely due to previously held doses for supratherapeutic INR. Will give one time booster dose today and monitor closely. Daily INR ordered? yes    Plan: Warfarin 15mg tonight. Thank you for the consult. Pharmacy will continue to follow.     Manuel Zhang PharmD, [facility] 7/2/2020 at 8:07 AM

## 2020-07-02 NOTE — PROGRESS NOTES
Consult received for weight loss/possible food insecurity at home. Pt unable to tell me how much weight he has lost, but says he knows he probably wasn't eating well at home. Inquired about food security at home - states he is on disability and \"qualifies for assistance\". Also that his sister is very helpful as well. Agreed to try ensure clear with his meals. Will follow up/be available if needed.

## 2020-07-02 NOTE — PROGRESS NOTES
This nurse spent time with patient this afternoon talking about making amends with family and friends. Patient states he feels like he has done irreparable damage that is unforgivable. I encouraged patient to take the time he is here to work on healing his body, improving his nutrition and take small steps. We talked about writing letters versus in person talks with family members so that they can have a chance to process things. He called his sister and told him what he had done and stated that she told him she loved him and would be there for him when he got out. Patient felt encouraged by that conversation and was grateful that he made the call. At supper patient stated he felt anxious and shaky, 1mg of Ativan given po with his supper medications. After supper patient went to bed for the night.

## 2020-07-02 NOTE — PROGRESS NOTES
Pt sister called and said she would bring in some more clothing and slip on tennis shoes for pt, also will take pt cell phone, keys, wallet and boots home with her if pt would like her to. Spoke with pt and he said he would like her to take his belongings home and check on his apartment. Sister Sherice Lebron) states she will come sometime this afternoon with belongings.

## 2020-07-02 NOTE — PROGRESS NOTES
Assessment completed. Pt sleeping when RN woke him up for medication and assessment, slow to arouse. Pt oriented x 4, denies pain. Pt does state he is having thoughts of trying to hurt himself again, but does not have a plan or intent and contracts for safety. Pt denies AV but does state he sees flickering. He rates his anxiety and his depression a 5/10 and states it has improved considerably. Incisions on neck and wrist are approximated and healing. No needs at this time.

## 2020-07-02 NOTE — BH NOTE
Pt sister brought belongings to pt.      White, Black and [employer], Light blue and [employer] Briefs  Two pairs of jeans  Three pairs of long white faye socks   One pair of of black slip on sandals       Blue, and Red Handkerchief  5 more pair of socks placed in pt locker [phone]

## 2020-07-02 NOTE — PROGRESS NOTES
Psychiatric Progress Note    Ashok Hawley  [id number]  07/02/20    CHIEF COMPLAINT:  I am severely depressed     HPI: Gayatri Quintero a 62 y. o. male who presents after suicide attempt.  The patient reports feeling shame/embarrassment over social situations to include losing apartment again. José Miguel Valentine has self-inflicted lacerations to bilateral wrists and bilateral sides of neck.  He states 2 different attempts yesterday and upon waking up in the bathtub after second attempt called family to seek some help. The patient denies any drug or alcohol use.  Patient denies previous suicide attempts. José Miguel Valentine does report increased depression and currently on Paxil. While in the emergency room he was found to be significantly hyponatremic. Covid negative    Pt remains in agreement with treatment plan. Pt continues to deny and side-effects to current medications. Pt was polite and cordial during the interview process. Has been taking medications and has been compliant with treatment. Tolerating medications without side effect complaints. Pt noted he is \"not doing good today\" Pt noted he feels safe and comfortable on the unit. Pt was polite and cordial during the interview process. Currently he endorses SI but denies plan. He denies HI and AV hallucinations. He rates his depression as \"10\" on a scale of 0 to 10 with 0 being none and 10 being horrible. He rates his anxiety as \"10\" on the same scale. He states he was able to sleep last night with \"sleep aid\" assistance. He is oriented x 4 He is complaining of a \"throbbing, pounding headache\" which has been going on and off for the last several days. Due to cognitive impairment Home Health Care is needed for psychiatric nursing, and medication management.     Allergies   Allergen Reactions    Ciprofloxacin Hcl Hives and Swelling    Levaquin [Levofloxacin] Hives and Swelling    Other Nausea And Vomiting     \"Allergic To Tylox\"    Ceftin [Cefuroxime]        Medications Prior to Admission: Acetaminophen (TYLENOL 8 HOUR PO), Take by mouth as needed Over The Counter  Albuterol Sulfate (PROAIR HFA IN), Inhale into the lungs as needed  simvastatin (ZOCOR) 40 MG tablet, take 1 tablet by mouth at bedtime  warfarin (COUMADIN) 5 MG tablet, take 1-2 tablets by mouth daily as directed (Patient taking differently: 10 mg take 1-2 tablets by mouth daily as directed INR 2.5-3.5)  cyclobenzaprine (FLEXERIL) 10 MG tablet, take 1 tablet by mouth three times a day if needed for muscle spasm (Patient not taking: Reported on 5/28/2020)  calcium carbonate (OSCAL) 500 MG TABS tablet, Take 500 mg by mouth daily  KRILL OIL OMEGA-3 PO, Take by mouth  triamcinolone (KENALOG) 0.1 % cream, Apply topically 2 times daily.  (Patient not taking: Reported on 5/28/2020)  montelukast (SINGULAIR) 10 MG tablet, Take 1 tablet by mouth nightly \"Alternate With Zyrtec\"  PARoxetine (PAXIL) 20 MG tablet, take 1 tablet by mouth once daily  sodium chloride, Inhalant, 3 % nebulizer solution,   cetirizine (ZYRTEC) 10 MG tablet, Take 10 mg by mouth \"Alternate With Singulair\"  NASAL SPRAY SALINE NA, by Nasal route daily Over The Counter  budesonide (RHINOCORT ALLERGY) 32 MCG/ACT nasal spray, 2 sprays by Nasal route daily  Multiple Vitamins-Minerals (CENTRUM PO), Take by mouth daily  Nebulizer MISC, Inhale into the lungs as needed    Past Medical History:   Diagnosis Date    Acid reflux     Acute frontal sinusitis 7/22/2009    Alcohol abuse     \"I Drink Average 2 Beers A Day\"    Anesthesia     \" I get agitated\"    Anxiety     Arthritis     \"Right Shoulder, Neck, Left Knee\"    Atypical mycobacterial infection     Broken teeth     Upper And Lower     CHF [facility]congestive heart failure) ([facility])     COPD (chronic obstructive pulmonary disease) ([facility])     Sees Dr. Jayla Howell [place], [address]     Cough     Frequent Cough With Green Sputum    Depression     Depression     Dyspnea 2/23/2018    H/O cardiovascular MUGA stress test 05/14/2019    EF 50%, NORMAL LVEF, NORMAL WALL MOTION.    H/O echocardiogram 05/22/2014    ED=>84-78%, LV systolic function is low normal, mild aortic valve calcification, no pericardial effusion    H/O echocardiogram 12/12/2018    EF 45-50%    History of 24 hour EKG monitoring 6/6/14    24 hr holter monitor. Ventricular ectopics were noted in single and couplet forms. Supraventricular ectopics were noted in single and pair forms.     [place] (hard of hearing)     Bilateral Ears    Hyperlipidemia     Hypertension     Irritant contact dermatitis due to other chemical products 8/26/2019    Other drug allergy(995.27) 7/22/2009    S/P AVR 12/2003    Mechanical valvue     Shortness of breath     Teeth missing     Upper And Lower        Patient Active Problem List   Diagnosis    Localized osteoarthritis of right shoulder    S/P AVR    Hyperlipidemia    [place] (hard of hearing)    Depression    COPD (chronic obstructive pulmonary disease) (HCC)    Bronchiectasis (HCC)    Anxiety    Alcohol abuse    Acid reflux    Chronic interstitial lung disease (HCC)    Hyponatremia    Severe episode of recurrent major depressive disorder, without psychotic features ([address]    Severe recurrent major depression without psychotic features ([address]    Rheumatic aortic valve disease    Chronic systolic heart failure ([address]    Rheumatic mitral regurgitation       Review of Systems    OBJECTIVE  Vital Signs:  Vitals:    07/02/20 0745   BP: 116/66   Pulse: 65   Resp: 17   Temp: 97.3 °F (36.3 °C)   SpO2: 100%       Labs:  Recent Results (from the past 48 hour(s))   COVID-19    Collection Time: 06/30/20 12:44 PM   Result Value Ref Range    SARS-CoV-2, NAAT NOT DETECTED    Protime-INR    Collection Time: 07/01/20  7:00 AM   Result Value Ref Range    Protime 13.3 11.7 - 14.5 SECONDS    INR 1.16 INDEX   Basic Metabolic Panel w/ Reflex to MG    Collection Time: 07/01/20  7:00 AM   Result Value Ref Range    Sodium 136 135 - 145 MMOL/L    Potassium 4.4 3.5 - 5.1 MMOL/L    Chloride 101 99 - 110 mMol/L    CO2 22 21 - 32 MMOL/L    Anion Gap 13 4 - 16    BUN 7 6 - 23 MG/DL    CREATININE 0.8 (L) 0.9 - 1.3 MG/DL    Glucose 102 (H) 70 - 99 MG/DL    Calcium 8.8 8.3 - 10.6 MG/DL    GFR Non-African American >60 >60 mL/min/1.73m2    GFR African American >60 >60 mL/min/1.73m2   Hemoglobin A1c    Collection Time: 07/01/20  7:00 AM   Result Value Ref Range    Hemoglobin A1C 5.5 4.2 - 6.3 %    eAG 111 mg/dL   TSH without Reflex    Collection Time: 07/01/20  7:00 AM   Result Value Ref Range    TSH, High Sensitivity 2.360 0.270 - 4.20 uIu/ml   T4, free    Collection Time: 07/01/20  7:00 AM   Result Value Ref Range    T4 Free 1.38 0.9 - 1.8 NG/DL   Lipid panel - fasting    Collection Time: 07/01/20  7:00 AM   Result Value Ref Range    Triglycerides 57 <150 MG/DL    Cholesterol 126 <200 MG/DL    HDL 51 >40 MG/DL    LDL Direct 69 <100 MG/DL   Ammonia    Collection Time: 07/01/20  7:00 AM   Result Value Ref Range    Ammonia 18 16 - 60 UMOL/L   Protime-INR    Collection Time: 07/02/20  7:00 AM   Result Value Ref Range    Protime 13.7 11.7 - 14.5 SECONDS    INR 1.20 INDEX       PSYCHIATRIC ASSESSMENT / MENTAL STATUS EXAM:   Vitals: Blood pressure 116/66, pulse 65, temperature 97.3 °F (36.3 °C), temperature source Temporal, resp. rate 17, height 5' 5\" (1.651 m), weight 123 lb 7.3 oz (56 kg), SpO2 100 %. CONSTITUTIONAL:    Appearance: appears stated age. alert and oriented to person, place, time & situation. no acute distress. Adequate grooming and hygeine. Poor eye contact. No prominent physical abnormalities. Attitude: Manner is cooperative and pleasant  Motor: No psychomotor agitation, retardation or abnormal movements noted  Speech: Clearly articulated; normal rate, volume, tone & amount. Language: intact understanding and production  Mood: profoundly depressed  Affect: blunted,  Thought Production: Spontaneous.   Thought Form: Coherent, linear, logical & goal-directed. No tangentiality or circumstantiality. No flight of ideas or loosening of associations. Thought Content/Perceptions:  Endorses SI, no plan, no HI, no AVH, no delusion  Insight: improving  Judgment: poor  Memory: Immediate, recent, and remote appear intact, though not formally tested. Attention: maintained throughout interview  Fund of knowledge: Average  Gait/Balance: WNL/WNL    IMPRESSION:   Severe recurrent major depression without psychotic features        PLAN:  Psychiatric management:medication initiation and titration, group and individual therapy, safe and theraputic environment. Status of problem/condition: Improving  Medical co-morbidities: [facility], appreciate assistance  Legal Status: Pending  Disposition:estimated LOS: Pending. The treatment team reviewed with the patient the diagnosis and treatment recommendations to include the risks, benefits, and side effects of chosen medications. The patient verbalized understanding and agreed with the treatment regimen as outlined above. The patient was encouraged to participate in groups. Medical records, Labs, Diagnotic tests reviewed  q15 min safety checks for safety  Interval History. Review current labs - check CMP, CBC, LDH, PSA  CT head for headache over past 3 days  Dietary consult due to weight loss > 20 pounds over past month  Continue current medications - add Abilify 5 mg daily  Supportive Therapy Provided  Pt had an opportunity to ask questions and address concerns  Pt encouraged to continue therapy group or individual.  Pt was in agreement with treatment plan. The risks benefits and side effects of medications were discussed with the patient, including alternatives and no treatment.     Electronically signed by VANGIE Braxton CNP on 7/2/2020 at 10:30 AM

## 2020-07-02 NOTE — GROUP NOTE
Group Therapy Note    Date: 7/2/2020    Group Start Time: 0992  Group End Time: 1600    Number of Participants: 3/8    Type: Exercise/Recreation Group    Group Topic/Objective: Chair Exercises     Notes:  Pt encouraged to attend, pt declined.      Discipline Responsible: Certified Therapeutic Recreation Specialist     Electronically signed by Timi Salmeron, [address] [place] on 7/2/2020 at 4:16 PM

## 2020-07-02 NOTE — GROUP NOTE
Group Therapy Note    Date: 7/2/2020    Group Start Time: 1115  Group End Time: 1150  Group Topic: Psychoeducation    [address] [place], [place]        Group Therapy Note    Attendees: 6/8       Notes:  Pts participated in recreational therapy group; Theme Song Quiz. Pts were given various tv show theme songs from the 76s. As they listened they had to guess the show the song came from. Purpose was to encourage reminiscence discussion. Pt encouraged to attend, pt declined.        Discipline Responsible: Certified Therapeutic Recreation Specialist       Signature:  Pat Sebastian, [address] [place] [address]

## 2020-07-02 NOTE — GROUP NOTE
Group Therapy Note    Date: 7/2/2020    Group Start Time: 6846  Group End Time: 8884    Number of Participants: 4/8    Type: Spirituality    Group Topic/Objective: Religous discussion with [employer]. Notes:  Pt arrived to group ~15 minutes late d/t being off unit. Pt in and out of group d/t not wanting to cough in group. Status After Intervention:  Unchanged    Participation Level: Minimal    Participation Quality: Pt in and out of group.      Speech:  normal    Thought Process/Content: Logical    Affective Functioning: Blunted    Mood: Blunted    Level of consciousness:  Alert    Response to Learning: Able to verbalize current knowledge/experience    Endings: None Reported    Modes of Intervention: Education, Support and Socialization    Discipline Responsible: Certified Therapeutic Recreation Specialist     Electronically signed by Ada Souza MA on 7/2/2020 at 2:04 PM

## 2020-07-02 NOTE — GROUP NOTE
Group Therapy Note    Date: 7/2/2020    Group Start Time: 0830  Group End Time: 5468    Number of Participants: 4/8    Type: Morning Goals Group/ Community Meeting    Group Topic/Objective: Set Goal For The Day and to review Unit Rules and Regulations. Notes:  Pt encouraged to attend, pt declined.      Discipline Responsible: Certified Therapeutic Recreation Specialist     Electronically signed by Sierra Hunter [place] on 7/2/2020 at 9:05 AM

## 2020-07-02 NOTE — CARE COORDINATION
7/1/20, 3:00 PM EDT    DISCHARGE BARRIERS    Reason for Referral: [facility] initial assessment  Mental Status: alert and oriented  Decision Making Ability: Yes  Family/Social/Home Environment: spoke with patient who states the patient currently resides with Alone  in an apartment   and their support system includes  Family Members, Friends/Neighbors  Current Services/ Equipment:   None  Patient Income source:Government aid, and Income meets expenses. Concerns or Barriers to Discharge: no  Patient and/or family verbalized understanding of the plan of care and contributed to goal setting. Social/ Functional History    Lives With:  Alone  Type of Home: Apartment  Home Layout: One level  Home Access: Level entry  Bathroom Shower/ Tub: Tub/Shower unit  Bathroom Toilet: Standard  Other [address]:    Home Equipment[de-identified]    ADL Assistance: Independent  Homemaking Assistance: Independent  Ambulation Assistance: Independent  Transfer Assistance: Independent  Additional Comments:      Anticipated Needs per Patient:     Potential Assistance Needed: N/A   Type of Home Care Services: None  Patient expects to be discharged to: Home        Discharge Plan:  D/C home, no needs  F/U [facility], no transport needed.     Cristiano Beckwith, MSW, LSW

## 2020-07-03 LAB
INR BLD: 1.32 INDEX
PROSTATE SPECIFIC ANTIGEN FREE: 0.1 NG/ML
PROSTATE SPECIFIC ANTIGEN PERCENT FREE: 20 %
PROSTATE SPECIFIC ANTIGEN: 0.5 NG/ML (ref 0–4)
PROTHROMBIN TIME: 15.1 SECONDS (ref 11.7–14.5)

## 2020-07-03 PROCEDURE — 6370000000 HC RX 637 (ALT 250 FOR IP): Performed by: NURSE PRACTITIONER

## 2020-07-03 PROCEDURE — 6370000000 HC RX 637 (ALT 250 FOR IP): Performed by: INTERNAL MEDICINE

## 2020-07-03 PROCEDURE — 85610 PROTHROMBIN TIME: CPT

## 2020-07-03 PROCEDURE — 36415 COLL VENOUS BLD VENIPUNCTURE: CPT

## 2020-07-03 PROCEDURE — 6370000000 HC RX 637 (ALT 250 FOR IP): Performed by: PSYCHIATRY & NEUROLOGY

## 2020-07-03 PROCEDURE — 1240000000 HC EMOTIONAL WELLNESS R&B

## 2020-07-03 PROCEDURE — 99232 SBSQ HOSP IP/OBS MODERATE 35: CPT | Performed by: PSYCHIATRY & NEUROLOGY

## 2020-07-03 RX ORDER — ARIPIPRAZOLE 10 MG/1
10 TABLET ORAL DAILY
Status: DISCONTINUED | OUTPATIENT
Start: 2020-07-04 | End: 2020-07-04

## 2020-07-03 RX ORDER — WARFARIN SODIUM 10 MG/1
10 TABLET ORAL
Status: COMPLETED | OUTPATIENT
Start: 2020-07-03 | End: 2020-07-03

## 2020-07-03 RX ADMIN — BACITRACIN ZINC 14 G: 500 OINTMENT TOPICAL at 08:54

## 2020-07-03 RX ADMIN — TRAZODONE HYDROCHLORIDE 50 MG: 50 TABLET ORAL at 20:14

## 2020-07-03 RX ADMIN — GUAIFENESIN 600 MG: 600 TABLET, EXTENDED RELEASE ORAL at 20:14

## 2020-07-03 RX ADMIN — BACITRACIN ZINC 14 G: 500 OINTMENT TOPICAL at 20:13

## 2020-07-03 RX ADMIN — SENNOSIDES AND DOCUSATE SODIUM 1 TABLET: 8.6; 5 TABLET ORAL at 20:14

## 2020-07-03 RX ADMIN — ARIPIPRAZOLE 5 MG: 5 TABLET ORAL at 08:55

## 2020-07-03 RX ADMIN — GUAIFENESIN 600 MG: 600 TABLET, EXTENDED RELEASE ORAL at 08:55

## 2020-07-03 RX ADMIN — PAROXETINE HYDROCHLORIDE 20 MG: 20 TABLET, FILM COATED ORAL at 08:55

## 2020-07-03 RX ADMIN — FLUTICASONE PROPIONATE 2 SPRAY: 50 SPRAY, METERED NASAL at 08:54

## 2020-07-03 RX ADMIN — TRIAMCINOLONE ACETONIDE: 1 CREAM TOPICAL at 08:54

## 2020-07-03 RX ADMIN — OMEGA-3-ACID ETHYL ESTERS 2 G: 1 CAPSULE, LIQUID FILLED ORAL at 08:55

## 2020-07-03 RX ADMIN — ACETAMINOPHEN 650 MG: 325 TABLET ORAL at 20:49

## 2020-07-03 RX ADMIN — CETIRIZINE HYDROCHLORIDE 10 MG: 10 TABLET, FILM COATED ORAL at 08:55

## 2020-07-03 RX ADMIN — DOXYCYCLINE HYCLATE 100 MG: 100 TABLET, COATED ORAL at 08:55

## 2020-07-03 RX ADMIN — SENNOSIDES AND DOCUSATE SODIUM 1 TABLET: 8.6; 5 TABLET ORAL at 08:55

## 2020-07-03 RX ADMIN — MULTIPLE VITAMINS W/ MINERALS TAB 1 TABLET: TAB at 08:55

## 2020-07-03 RX ADMIN — MONTELUKAST SODIUM 10 MG: 10 TABLET, FILM COATED ORAL at 20:14

## 2020-07-03 RX ADMIN — DOXYCYCLINE HYCLATE 100 MG: 100 TABLET, COATED ORAL at 20:14

## 2020-07-03 RX ADMIN — SIMVASTATIN 40 MG: 40 TABLET, FILM COATED ORAL at 20:14

## 2020-07-03 RX ADMIN — OMEGA-3-ACID ETHYL ESTERS 2 G: 1 CAPSULE, LIQUID FILLED ORAL at 20:14

## 2020-07-03 RX ADMIN — TRIAMCINOLONE ACETONIDE: 1 CREAM TOPICAL at 20:14

## 2020-07-03 RX ADMIN — LORAZEPAM 1 MG: 1 TABLET ORAL at 20:49

## 2020-07-03 RX ADMIN — WARFARIN SODIUM 10 MG: 10 TABLET ORAL at 17:06

## 2020-07-03 RX ADMIN — CALCIUM 500 MG: 500 TABLET ORAL at 08:55

## 2020-07-03 ASSESSMENT — PAIN SCALES - GENERAL
PAINLEVEL_OUTOF10: 0
PAINLEVEL_OUTOF10: 9

## 2020-07-03 ASSESSMENT — PAIN DESCRIPTION - LOCATION: LOCATION: HEAD

## 2020-07-03 ASSESSMENT — PAIN DESCRIPTION - PAIN TYPE: TYPE: ACUTE PAIN

## 2020-07-03 NOTE — PROGRESS NOTES
Clinical Pharmacy Note       Warfarin Consult    Consult received from Dr. Sena Gaucher to manage Warfarin therapy. Subjective: Mc Khanna is a 62 y.o. male ordered Warfarin. Patient Active Problem List   Diagnosis    Localized osteoarthritis of right shoulder    S/P AVR    Hyperlipidemia    [place] (hard of hearing)    Depression    COPD (chronic obstructive pulmonary disease) (HCC)    Bronchiectasis (HCC)    Anxiety    Alcohol abuse    Acid reflux    Chronic interstitial lung disease (HCC)    Hyponatremia    Severe episode of recurrent major depressive disorder, without psychotic features ([address]    Severe recurrent major depression without psychotic features ([address]    Rheumatic aortic valve disease    Chronic systolic heart failure ([address]    Rheumatic mitral regurgitation     Allergies: Ciprofloxacin hcl; Levaquin [levofloxacin]; Other; and Ceftin [cefuroxime]     Home dose: 10mg daily  Current dose: 10mg  Indication: Mechanical aortic valve replacement  INR goal: 2.5 - 3.5 (per Dr. Nanci Pineda)    Objective:  INR Results:  INR   Date Value Ref Range Status   07/03/2020 1.32 INDEX Final     Comment:     THERAPEUTIC RANGE:  INDICATIONS: INR 2.0-3.0  Most (DVT, PE,  Atrial Fibillation, Bioprosthetic valve,   St Judes bicuspid aortic valve)  INDICATIONS: 2.5-3.5 Most mechanical valves  recurrent thrombosis. 07/02/2020 1.20 INDEX Final     Comment:     THERAPEUTIC RANGE:  INDICATIONS: INR 2.0-3.0  Most (DVT, PE,  Atrial Fibillation, Bioprosthetic valve,   St Judes bicuspid aortic valve)  INDICATIONS: 2.5-3.5 Most mechanical valves  recurrent thrombosis. 07/01/2020 1.16 INDEX Final     Comment:     THERAPEUTIC RANGE:  INDICATIONS: INR 2.0-3.0  Most (DVT, PE,  Atrial Fibillation, Bioprosthetic valve,   St Judes bicuspid aortic valve)  INDICATIONS: 2.5-3.5 Most mechanical valves  recurrent thrombosis.          Recent Labs     07/02/20  0730   HGB 11.8*   HCT 37.6*          Other anticoagulants: none    Assessment: INR subtherapeutic today at 1.32 likely due to previously held doses for supratherapeutic INR. A 15mg booster dose was given yesterday. Daily INR ordered? yes    Plan: Warfarin 10mg tonight and check INR on 7/4/20. Thank you for the consult. Pharmacy will continue to follow.     Chyna Vidal PharmD, [facility] 7/3/2020 at 8:18 AM

## 2020-07-03 NOTE — GROUP NOTE
Group Therapy Note    Date: 7/3/2020    Group Start Time: 1030  Group End Time: 1130  Group Topic: Psychoeducation    [address]    True Shruti, GRACIE SALDANA        Group Therapy Note    Attendees: 4/8       Notes:  Pts participated in recreational therapy group; Self-Care Discussion. Pts and therapist reviewed a handout that discussed ways to increase positive self-care. These tips included sleep hygiene, healthy eating, importance of exercise, taking time for themselves each day, speaking up for themselves, and the benefits of leisure. Discussion centered around how each self-care tip can help with their mental health . Pt encouraged to attend, pt declined.        Discipline Responsible: Certified Therapeutic Recreation Specialist       Signature:  Simone [place]

## 2020-07-03 NOTE — GROUP NOTE
Group Therapy Note    Date: 7/3/2020    Group Start Time: 0830  Group End Time: 6255    Number of Participants: 4/8    Type: Morning Goals Group/ Community Meeting    Group Topic/Objective: Set Goal For The Day and to review Unit Rules and Regulations. Notes:  Pt encouraged to attend, pt declined.      Discipline Responsible: Certified Therapeutic Recreation Specialist     Electronically signed by Jenetta Grandy, [place], [address] [place] on 7/3/2020 at 9:45 AM

## 2020-07-03 NOTE — PROGRESS NOTES
Psychiatric Progress Note    Rui Rivero  [id number]  07/03/20    CHIEF COMPLAINT:  I am severely depressed     HPI: Jalen Bello a 62 y. o. male who presents after suicide attempt.  The patient reports feeling shame/embarrassment over social situations to include losing apartment again. Lynn Benjamin has self-inflicted lacerations to bilateral wrists and bilateral sides of neck.  He states 2 different attempts yesterday and upon waking up in the bathtub after second attempt called family to seek some help. The patient denies any drug or alcohol use.  Patient denies previous suicide attempts. Lynn Benjamin does report increased depression and currently on Paxil. While in the emergency room he was found to be significantly hyponatremic. Covid negative    Pt noted he is \"better today\" Pt noted he feels safe and comfortable on the unit. Pt was polite and cordial during the interview process. Currently he endorses SI but denies plan. He denies HI and AV hallucinations. He rates his depression as \"9\" on a scale of 0 to 10 with 0 being none and 10 being horrible. He rates his anxiety as \"9\" on the same scale. He states he was able to sleep last night with \"sleep aid\" assistance. He is oriented x 4. Due to cognitive impairment Home Health Care is needed for psychiatric nursing, and medication management.     Allergies   Allergen Reactions    Ciprofloxacin Hcl Hives and Swelling    Levaquin [Levofloxacin] Hives and Swelling    Other Nausea And Vomiting     \"Allergic To Tylox\"    Ceftin [Cefuroxime]        Medications Prior to Admission: Acetaminophen (TYLENOL 8 HOUR PO), Take by mouth as needed Over The Counter  Albuterol Sulfate (PROAIR HFA IN), Inhale into the lungs as needed  simvastatin (ZOCOR) 40 MG tablet, take 1 tablet by mouth at bedtime  warfarin (COUMADIN) 5 MG tablet, take 1-2 tablets by mouth daily as directed (Patient taking differently: 10 mg take 1-2 tablets by mouth daily as directed INR 2.5-3.5)  cyclobenzaprine were noted in single and pair forms.     [place] (hard of hearing)     Bilateral Ears    Hyperlipidemia     Hypertension     Irritant contact dermatitis due to other chemical products 8/26/2019    Other drug allergy(995.27) 7/22/2009    S/P AVR 12/2003    Mechanical valvue     Shortness of breath     Teeth missing     Upper And Lower        Patient Active Problem List   Diagnosis    Localized osteoarthritis of right shoulder    S/P AVR    Hyperlipidemia    [place] (hard of hearing)    Depression    COPD (chronic obstructive pulmonary disease) (HCC)    Bronchiectasis (HCC)    Anxiety    Alcohol abuse    Acid reflux    Chronic interstitial lung disease (HCC)    Hyponatremia    Severe episode of recurrent major depressive disorder, without psychotic features ([address]    Severe recurrent major depression without psychotic features ([address]    Rheumatic aortic valve disease    Chronic systolic heart failure (HCC)    Rheumatic mitral regurgitation       Review of Systems    OBJECTIVE  Vital Signs:  Vitals:    07/03/20 0803   BP: (!) 95/48   Pulse: 63   Resp: 16   Temp: 97.7 °F (36.5 °C)   SpO2: 100%       Labs:  Recent Results (from the past 48 hour(s))   Protime-INR    Collection Time: 07/02/20  7:00 AM   Result Value Ref Range    Protime 13.7 11.7 - 14.5 SECONDS    INR 1.20 INDEX   Comprehensive Metabolic Panel w/ Reflex to MG    Collection Time: 07/02/20  7:30 AM   Result Value Ref Range    Sodium 139 135 - 145 MMOL/L    Potassium 4.4 3.5 - 5.1 MMOL/L    Chloride 102 99 - 110 mMol/L    CO2 33 (H) 21 - 32 MMOL/L    BUN 7 6 - 23 MG/DL    CREATININE 0.8 (L) 0.9 - 1.3 MG/DL    Glucose 100 (H) 70 - 99 MG/DL    Calcium 9.0 8.3 - 10.6 MG/DL    Alb 3.8 3.4 - 5.0 GM/DL    Total Protein 5.9 (L) 6.4 - 8.2 GM/DL    Total Bilirubin 0.3 0.0 - 1.0 MG/DL    ALT 24 10 - 40 U/L    AST 25 15 - 37 IU/L    Alkaline Phosphatase 55 40 - 129 IU/L    GFR Non-African American >60 >60 mL/min/1.73m2    GFR African American >60 >60 mL/min/1.73m2    Anion Gap 4 4 - 16   Lactate dehydrogenase    Collection Time: 07/02/20  7:30 AM   Result Value Ref Range     120 - 246 IU/L   CBC auto differential    Collection Time: 07/02/20  7:30 AM   Result Value Ref Range    WBC 6.0 4.0 - 10.5 K/CU MM    RBC 3.88 (L) 4.6 - 6.2 M/CU MM    Hemoglobin 11.8 (L) 13.5 - 18.0 GM/DL    Hematocrit 37.6 (L) 42 - 52 %    MCV 96.9 78 - 100 FL    MCH 30.4 27 - 31 PG    MCHC 31.4 (L) 32.0 - 36.0 %    RDW 14.5 11.7 - 14.9 %    Platelets 567 422 - 118 K/CU MM    MPV 9.1 7.5 - 11.1 FL    Differential Type AUTOMATED DIFFERENTIAL     Segs Relative 69.9 (H) 36 - 66 %    Lymphocytes % 18.6 (L) 24 - 44 %    Monocytes % 9.7 (H) 0 - 4 %    Eosinophils % 0.7 0 - 3 %    Basophils % 0.3 0 - 1 %    Segs Absolute 4.2 K/CU MM    Lymphocytes Absolute 1.1 K/CU MM    Monocytes Absolute 0.6 K/CU MM    Eosinophils Absolute 0.0 K/CU MM    Basophils Absolute 0.0 K/CU MM    Immature Neutrophil % 0.8 (H) 0 - 0.43 %    Total Immature Neutrophil 0.05 K/CU MM   Protime-INR    Collection Time: 07/03/20  7:00 AM   Result Value Ref Range    Protime 15.1 (H) 11.7 - 14.5 SECONDS    INR 1.32 INDEX       PSYCHIATRIC ASSESSMENT / MENTAL STATUS EXAM:   Vitals: Blood pressure (!) 95/48, pulse 63, temperature 97.7 °F (36.5 °C), temperature source Temporal, resp. rate 16, height 5' 5\" (1.651 m), weight 123 lb 7.3 oz (56 kg), SpO2 100 %. CONSTITUTIONAL:    Appearance: appears stated age. alert and oriented to person, place, time & situation. no acute distress. Adequate grooming and hygeine. Poor eye contact. No prominent physical abnormalities. Attitude: Manner is cooperative and pleasant  Motor: No psychomotor agitation, retardation or abnormal movements noted  Speech: Clearly articulated; normal rate, volume, tone & amount. Language: intact understanding and production  Mood: profoundly depressed  Affect: blunted,  Thought Production: Spontaneous.   Thought Form: Coherent, linear, logical & goal-directed. No tangentiality or circumstantiality. No flight of ideas or loosening of associations. Thought Content/Perceptions:  Endorses SI, no plan, no HI, no AVH, no delusion  Insight: improving  Judgment: poor  Memory: Immediate, recent, and remote appear intact, though not formally tested. Attention: maintained throughout interview  Fund of knowledge: Average  Gait/Balance: WNL/WNL    IMPRESSION:   Severe recurrent major depression without psychotic features        PLAN:  Psychiatric management:medication initiation and titration, group and individual therapy, safe and theraputic environment. Status of problem/condition: Improving  Medical co-morbidities: [facility] appreciate assistance  Legal Status: Pending  Disposition:estimated LOS: Pending. The treatment team reviewed with the patient the diagnosis and treatment recommendations to include the risks, benefits, and side effects of chosen medications. The patient verbalized understanding and agreed with the treatment regimen as outlined above. The patient was encouraged to participate in groups. Medical records, Labs, Diagnotic tests reviewed  q15 min safety checks for safety  Interval History. Review current labs - check CMP, CBC, LDH, PSA  CT head for headache over past 3 days  Dietary consult due to weight loss > 20 pounds over past month  Continue current medications - add Abilify 5 mg daily  Supportive Therapy Provided  Pt had an opportunity to ask questions and address concerns  Pt encouraged to continue therapy group or individual.  Pt was in agreement with treatment plan. The risks benefits and side effects of medications were discussed with the patient, including alternatives and no treatment.     Electronically signed by Gaby Prado DO on 7/3/2020 at 11:32 AM

## 2020-07-03 NOTE — PLAN OF CARE
[facility]  Day 3 Interdisciplinary Treatment Plan NOTE    Review Date & Time: 07/03/20  0830    Admission Type:   Admission Type: Voluntary    Reason for admission:  Reason for Admission: Severe Depression, Suicide attempt    Patient Diagnosis: Severe recurrent major depression without psychotic features ([facility] (Chronic)    PATIENT STRENGTHS:  Patient Strengths Strengths: Medication Compliance, Positive Support  Patient Strengths and Limitations:Limitations: General negative or hopeless attitude about future/recovery, Hopeless about future  Addictive Behavior:Addictive Behavior  In the past 3 months, have you felt or has someone told you that you have a problem with:  : None  Do you have a history of Chemical Use?: No  Do you have a history of Alcohol Use?: No  Do you have a history of Street Drug Abuse?: No  Histroy of Prescripton Drug Abuse?: No  Medical Problems:  Past Medical History:   Diagnosis Date    Acid reflux     Acute frontal sinusitis 7/22/2009    Alcohol abuse     \"I Drink Average 2 Beers A Day\"    Anesthesia     \" I get agitated\"    Anxiety     Arthritis     \"Right Shoulder, Neck, Left Knee\"    Atypical mycobacterial infection     Broken teeth     Upper And Lower     CHF (congestive heart failure) ([facility]     COPD (chronic obstructive pulmonary disease) ([facility]     Sees Dr. Colletta Berger In [place], [address]     Cough     Frequent Cough With Green Sputum    Depression     Depression     Dyspnea 2/23/2018    H/O cardiovascular MUGA stress test 05/14/2019    EF 50%, NORMAL LVEF, NORMAL WALL MOTION.    H/O echocardiogram 05/22/2014    YA=>37-78%, LV systolic function is low normal, mild aortic valve calcification, no pericardial effusion    H/O echocardiogram 12/12/2018    EF 45-50%    History of 24 hour EKG monitoring 6/6/14    24 hr holter monitor. Ventricular ectopics were noted in single and couplet forms.  Supraventricular ectopics were noted in single and pair forms.  [place] (hard of hearing)     Bilateral Ears    Hyperlipidemia     Hypertension     Irritant contact dermatitis due to other chemical products 8/26/2019    Other drug allergy(995.27) 7/22/2009    S/P AVR 12/2003    Mechanical valvue     Shortness of breath     Teeth missing     Upper And Lower       Risk:  Fall Risk Total: 65  Kam Scale Kam Scale Score: 21  BVC Total: 0    Status EXAM:   Status and Exam  Normal: No  Facial Expression: Sad  Affect: Blunt  Level of Consciousness: Alert  Mood:Normal: No  Mood: Depressed, Anxious, Sad, Worthless, low self-esteem  Motor Activity:Normal: No  Motor Activity: Decreased  Interview Behavior: Cooperative  Preception: [place] to Person, [place] to Time, [place] to Place, [place] to Situation  Attention:Normal: Yes  Thought Content:Normal: Yes  Thought Content: Poverty of Content  Hallucinations: None  Delusions: No  Memory:Normal: Yes  Insight and Judgment: No  Insight and Judgment: Poor Judgment, Poor Insight  Present Suicidal Ideation: No  Present Homicidal Ideation: No    Daily Assessment Last Entry:   Daily Sleep (WDL): Within Defined Limits  Patient Currently in Pain: Denies  Daily Nutrition (WDL): Within Defined Limits    Patient Monitoring:  Frequency of Checks: 4 times per hour, close    Psychiatric Symptoms:   Depression Symptoms  Depression Symptoms: Feelings of hopelessess, Feelings of worthlessness, Psychomotor retardation, Loss of interest  Anxiety Symptoms  Anxiety Symptoms: Generalized  Angie Symptoms  Angie Symptoms: No problems reported or observed. Psychosis Symptoms  Delusion Type: No problems reported or observed.     Suicide Risk CSSR-S:  1) Within the past month, have you wished you were dead or wished you could go to sleep and not wake up? : Yes  2) Have you actually had any thoughts of killing yourself? : Yes  3) Have you been thinking about how you might kill yourself? : Yes  4) Have you had these thoughts and had some intention of acting on them? : Yes  5) Have you started to work out or worked out the details of how to kill yourself?  Do you intend to carry out this plan? : Yes  6) Have you ever done anything, started to do anything, or prepared to do anything to end your life?: Yes      EDUCATION:   Learner Progress Toward Treatment Goals: Reviewed goals and plan of care    Method: Group    Outcome: Verbalized understanding    PATIENT GOALS: Med titration, compliance with unit programming    PLAN/TREATMENT RECOMMENDATIONS UPDATE: Med titration    Estimated Length of Stay Update:  4 days  Estimated Discharge Date Update: 07/07/20  Discharge criteria: Med titration      SHORT-TERM GOALS UPDATE:   Time frame for Short-Term Goals: 4 days    LONG-TERM GOALS UPDATE:   Time frame for Long-Term Goals: 4 days  Members Present in Team Meeting: See Laura Kaur MSW, LSW

## 2020-07-04 LAB
INR BLD: 1.6 INDEX
PROTHROMBIN TIME: 18.4 SECONDS (ref 11.7–14.5)

## 2020-07-04 PROCEDURE — 6370000000 HC RX 637 (ALT 250 FOR IP): Performed by: NURSE PRACTITIONER

## 2020-07-04 PROCEDURE — 6370000000 HC RX 637 (ALT 250 FOR IP): Performed by: PSYCHIATRY & NEUROLOGY

## 2020-07-04 PROCEDURE — 1240000000 HC EMOTIONAL WELLNESS R&B

## 2020-07-04 PROCEDURE — 36415 COLL VENOUS BLD VENIPUNCTURE: CPT

## 2020-07-04 PROCEDURE — 99232 SBSQ HOSP IP/OBS MODERATE 35: CPT | Performed by: PSYCHIATRY & NEUROLOGY

## 2020-07-04 PROCEDURE — 6370000000 HC RX 637 (ALT 250 FOR IP): Performed by: INTERNAL MEDICINE

## 2020-07-04 PROCEDURE — 85610 PROTHROMBIN TIME: CPT

## 2020-07-04 RX ORDER — ARIPIPRAZOLE 5 MG/1
5 TABLET ORAL DAILY
Status: DISCONTINUED | OUTPATIENT
Start: 2020-07-05 | End: 2020-07-07 | Stop reason: HOSPADM

## 2020-07-04 RX ORDER — WARFARIN SODIUM 2.5 MG/1
2.5 TABLET ORAL
Status: COMPLETED | OUTPATIENT
Start: 2020-07-04 | End: 2020-07-04

## 2020-07-04 RX ORDER — METHYLPHENIDATE HYDROCHLORIDE 5 MG/1
10 TABLET ORAL EVERY MORNING
Status: DISCONTINUED | OUTPATIENT
Start: 2020-07-04 | End: 2020-07-05

## 2020-07-04 RX ADMIN — DOXYCYCLINE HYCLATE 100 MG: 100 TABLET, COATED ORAL at 20:44

## 2020-07-04 RX ADMIN — WARFARIN SODIUM 2.5 MG: 2.5 TABLET ORAL at 17:08

## 2020-07-04 RX ADMIN — OMEGA-3-ACID ETHYL ESTERS 2 G: 1 CAPSULE, LIQUID FILLED ORAL at 08:46

## 2020-07-04 RX ADMIN — CETIRIZINE HYDROCHLORIDE 10 MG: 10 TABLET, FILM COATED ORAL at 08:47

## 2020-07-04 RX ADMIN — FLUTICASONE PROPIONATE 2 SPRAY: 50 SPRAY, METERED NASAL at 08:47

## 2020-07-04 RX ADMIN — TRAZODONE HYDROCHLORIDE 50 MG: 50 TABLET ORAL at 20:45

## 2020-07-04 RX ADMIN — SIMVASTATIN 40 MG: 40 TABLET, FILM COATED ORAL at 20:45

## 2020-07-04 RX ADMIN — ARIPIPRAZOLE 10 MG: 10 TABLET ORAL at 08:47

## 2020-07-04 RX ADMIN — SENNOSIDES AND DOCUSATE SODIUM 1 TABLET: 8.6; 5 TABLET ORAL at 20:45

## 2020-07-04 RX ADMIN — BACITRACIN ZINC 14 G: 500 OINTMENT TOPICAL at 20:45

## 2020-07-04 RX ADMIN — MONTELUKAST SODIUM 10 MG: 10 TABLET, FILM COATED ORAL at 20:45

## 2020-07-04 RX ADMIN — CALCIUM 500 MG: 500 TABLET ORAL at 08:46

## 2020-07-04 RX ADMIN — SENNOSIDES AND DOCUSATE SODIUM 1 TABLET: 8.6; 5 TABLET ORAL at 08:46

## 2020-07-04 RX ADMIN — DOXYCYCLINE HYCLATE 100 MG: 100 TABLET, COATED ORAL at 08:47

## 2020-07-04 RX ADMIN — BACITRACIN ZINC 14 G: 500 OINTMENT TOPICAL at 08:47

## 2020-07-04 RX ADMIN — GUAIFENESIN 600 MG: 600 TABLET, EXTENDED RELEASE ORAL at 08:47

## 2020-07-04 RX ADMIN — MULTIPLE VITAMINS W/ MINERALS TAB 1 TABLET: TAB at 08:47

## 2020-07-04 RX ADMIN — GUAIFENESIN 600 MG: 600 TABLET, EXTENDED RELEASE ORAL at 20:44

## 2020-07-04 RX ADMIN — PAROXETINE HYDROCHLORIDE 20 MG: 20 TABLET, FILM COATED ORAL at 08:47

## 2020-07-04 RX ADMIN — OMEGA-3-ACID ETHYL ESTERS 2 G: 1 CAPSULE, LIQUID FILLED ORAL at 20:44

## 2020-07-04 ASSESSMENT — PAIN SCALES - GENERAL: PAINLEVEL_OUTOF10: 0

## 2020-07-04 NOTE — PROGRESS NOTES
Psychiatric Progress Note    Fidelia Mcdaniel  [id number]  07/04/20    CHIEF COMPLAINT:  I am severely depressed     HPI: Jerri Gee a 62 y. o. male who presents after suicide attempt.  The patient reports feeling shame/embarrassment over social situations to include losing apartment again. Gretchen Palomares has self-inflicted lacerations to bilateral wrists and bilateral sides of neck.  He states 2 different attempts yesterday and upon waking up in the bathtub after second attempt called family to seek some help. The patient denies any drug or alcohol use.  Patient denies previous suicide attempts. Gretchen Palomares does report increased depression and currently on Paxil. While in the emergency room he was found to be significantly hyponatremic. Covid negative    Pt noted he is \"better today\" Pt noted he feels safe and comfortable on the unit. Pt was polite and cordial during the interview process. Currently he endorses SI but denies plan. He denies HI and AV hallucinations. He rates his depression as \"9\" on a scale of 0 to 10 with 0 being none and 10 being the worst. He rates his anxiety as \"9\" on the same scale. He states he was able to sleep last night with \"sleep aid\" assistance. He is oriented x 4. Due to cognitive impairment Home Health Care is needed for psychiatric nursing, and medication management.     Allergies   Allergen Reactions    Ciprofloxacin Hcl Hives and Swelling    Levaquin [Levofloxacin] Hives and Swelling    Other Nausea And Vomiting     \"Allergic To Tylox\"    Ceftin [Cefuroxime]        Medications Prior to Admission: Acetaminophen (TYLENOL 8 HOUR PO), Take by mouth as needed Over The Counter  Albuterol Sulfate (PROAIR HFA IN), Inhale into the lungs as needed  simvastatin (ZOCOR) 40 MG tablet, take 1 tablet by mouth at bedtime  warfarin (COUMADIN) 5 MG tablet, take 1-2 tablets by mouth daily as directed (Patient taking differently: 10 mg take 1-2 tablets by mouth daily as directed INR 2.5-3.5)  cyclobenzaprine (FLEXERIL) 10 MG tablet, take 1 tablet by mouth three times a day if needed for muscle spasm (Patient not taking: Reported on 5/28/2020)  calcium carbonate (OSCAL) 500 MG TABS tablet, Take 500 mg by mouth daily  KRILL OIL OMEGA-3 PO, Take by mouth  triamcinolone (KENALOG) 0.1 % cream, Apply topically 2 times daily. (Patient not taking: Reported on 5/28/2020)  montelukast (SINGULAIR) 10 MG tablet, Take 1 tablet by mouth nightly \"Alternate With Zyrtec\"  PARoxetine (PAXIL) 20 MG tablet, take 1 tablet by mouth once daily  sodium chloride, Inhalant, 3 % nebulizer solution,   cetirizine (ZYRTEC) 10 MG tablet, Take 10 mg by mouth \"Alternate With Singulair\"  NASAL SPRAY SALINE NA, by Nasal route daily Over The Counter  budesonide (RHINOCORT ALLERGY) 32 MCG/ACT nasal spray, 2 sprays by Nasal route daily  Multiple Vitamins-Minerals (CENTRUM PO), Take by mouth daily  Nebulizer MISC, Inhale into the lungs as needed    Past Medical History:   Diagnosis Date    Acid reflux     Acute frontal sinusitis 7/22/2009    Alcohol abuse     \"I Drink Average 2 Beers A Day\"    Anesthesia     \" I get agitated\"    Anxiety     Arthritis     \"Right Shoulder, Neck, Left Knee\"    Atypical mycobacterial infection     Broken teeth     Upper And Lower     CHF (congestive heart failure) (HCC)     COPD (chronic obstructive pulmonary disease) ([facility]     Sees Dr. Allen Urrutia In [place], [address] [facility]     Cough     Frequent Cough With Green Sputum    Depression     Depression     Dyspnea 2/23/2018    H/O cardiovascular MUGA stress test 05/14/2019    EF 50%, NORMAL LVEF, NORMAL WALL MOTION.    H/O echocardiogram 05/22/2014    WM=>59-02%, LV systolic function is low normal, mild aortic valve calcification, no pericardial effusion    H/O echocardiogram 12/12/2018    EF 45-50%    History of 24 hour EKG monitoring 6/6/14    24 hr holter monitor. Ventricular ectopics were noted in single and couplet forms. Supraventricular ectopics were noted in single and pair forms.  [place] (hard of hearing)     Bilateral Ears    Hyperlipidemia     Hypertension     Irritant contact dermatitis due to other chemical products 8/26/2019    Other drug allergy(995.27) 7/22/2009    S/P AVR 12/2003    Mechanical valvue     Shortness of breath     Teeth missing     Upper And Lower        Patient Active Problem List   Diagnosis    Localized osteoarthritis of right shoulder    S/P AVR    Hyperlipidemia    [place] (hard of hearing)    Depression    COPD (chronic obstructive pulmonary disease) (HCC)    Bronchiectasis (HCC)    Anxiety    Alcohol abuse    Acid reflux    Chronic interstitial lung disease (HCC)    Hyponatremia    Severe episode of recurrent major depressive disorder, without psychotic features ([address]    Severe recurrent major depression without psychotic features ([address]    Rheumatic aortic valve disease    Chronic systolic heart failure (HCC)    Rheumatic mitral regurgitation       Review of Systems    OBJECTIVE  Vital Signs:  Vitals:    07/04/20 0750   BP: (!) 110/56   Pulse: 57   Resp: 20   Temp: 98.1 °F (36.7 °C)   SpO2: 100%       Labs:  Recent Results (from the past 48 hour(s))   Protime-INR    Collection Time: 07/03/20  7:00 AM   Result Value Ref Range    Protime 15.1 (H) 11.7 - 14.5 SECONDS    INR 1.32 INDEX   Protime-INR    Collection Time: 07/04/20  5:42 AM   Result Value Ref Range    Protime 18.4 (H) 11.7 - 14.5 SECONDS    INR 1.60 INDEX       PSYCHIATRIC ASSESSMENT / MENTAL STATUS EXAM:   Vitals: Blood pressure (!) 110/56, pulse 57, temperature 98.1 °F (36.7 °C), temperature source Temporal, resp. rate 20, height 5' 5\" (1.651 m), weight 123 lb 7.3 oz (56 kg), SpO2 100 %. CONSTITUTIONAL:    Appearance: appears stated age. alert and oriented to person, place, time & situation. no acute distress. Adequate grooming and hygeine. Poor eye contact. No prominent physical abnormalities. Attitude:  Manner is cooperative and pleasant  Motor: No psychomotor agitation, retardation or abnormal movements noted  Speech: Clearly articulated; normal rate, volume, tone & amount. Language: intact understanding and production  Mood: profoundly depressed  Affect: blunted,  Thought Production: Spontaneous. Thought Form: Coherent, linear, logical & goal-directed. No tangentiality or circumstantiality. No flight of ideas or loosening of associations. Thought Content/Perceptions:  Endorses SI, no plan, no HI, no AVH, no delusion  Insight: improving  Judgment: poor  Memory: Immediate, recent, and remote appear intact, though not formally tested. Attention: maintained throughout interview  Fund of knowledge: Average  Gait/Balance: WNL/WNL    IMPRESSION:   Severe recurrent major depression without psychotic features        PLAN:  Psychiatric management:medication initiation and titration, group and individual therapy, safe and theraputic environment. Status of problem/condition: Improving  Medical co-morbidities: [facility], appreciate assistance  Legal Status: Pending  Disposition:estimated LOS: Pending. The treatment team reviewed with the patient the diagnosis and treatment recommendations to include the risks, benefits, and side effects of chosen medications. The patient verbalized understanding and agreed with the treatment regimen as outlined above. The patient was encouraged to participate in groups. Medical records, Labs, Diagnotic tests reviewed  q15 min safety checks for safety  Interval History. Review current labs - check CMP, CBC, LDH, PSA  CT head for headache over past 3 days  Dietary consult due to weight loss > 20 pounds over past month  Continue current medications - reduce Abilify to 5 mg daily with plan to D/C start methylphenidate 10 mg PO QAM for mood.   Supportive Therapy Provided  Pt had an opportunity to ask questions and address concerns  Pt encouraged to continue therapy group

## 2020-07-04 NOTE — GROUP NOTE
Group Therapy Note    Date: 7/4/2020    Group Start Time: 1115  Group End Time: 1200  Group Topic: Recreational    [address], [place]    Group Therapy Note    Attendees: 7/8       Patient's Goal: Rowdy Pop a better day then yesterday\"    Notes:  Patient participated in group. Open discussion with peers about music and mental health. Listened to music with peers.      Status After Intervention:  Improved    Participation Level: Interactive    Participation Quality: Appropriate    Speech:  normal    Thought Process/Content: Linear    Affective Functioning: Flat    Mood: unchanged    Level of consciousness:  Alert and Attentive    Response to Learning: Able to verbalize current knowledge/experience    Endings: None Reported    Modes of Intervention: Socialization and Activity    Discipline Responsible: /Counselor    Signature: MAINE Stroud LSW

## 2020-07-04 NOTE — BH NOTE
Group Therapy Note    Date: 7/3/2020  Start Time: 1930   End Time:  2000  Number of Participants: 1      Type of Group: Nursing Education      Notes:  Discussed medications and side effects, discussed coping mechanisms and family supports. Status After Intervention:  Improved    Participation Level:  Active Listener and Interactive    Participation Quality: Appropriate and Attentive    Speech:  hesitant    Thought Process/Content: Logical    Affective Functioning: Blunted and Flat    Mood: depressed    Level of consciousness:  Oriented x4    Response to Learning: Able to verbalize current knowledge/experience    Endings: None Reported    Modes of Intervention: Education and Support      Discipline Responsible: Registered Nurse      Signature:  Mary Vaughan RN

## 2020-07-04 NOTE — BH NOTE
Pt alert and oriented, flat affect. States depression 8 anxiety 9. Denies current feelings of SI, HI, AVH. Has been isolating in room. Bacitracin applied to bilat wrists and neck wounds.  Complains of headache, tylenol given as well as Ativan 1mg per request to help rest.

## 2020-07-04 NOTE — GROUP NOTE
Group Therapy Note    Date: 7/4/2020    Group Start Time: 0830  Group End Time: 0930  Group Topic: Community Meeting/ AM Goals Group    [facility]    MAINE Restrepo LSW    Group Therapy Note    Attendees: 8/8       Patient's Goal: [place] a better day then yesterday\"    Notes: Patient participated in group. Patient stated that he was feeling \"wheezy\" and grateful \"for being here\". Patient reported getting restful sleep last night after \"taking the med\". Patient rated his depression and anxiety as a 9, and irritability/agitation as \"getting up there\", on a scale of 0 to 10, with 10 being the worst and 0 being not at all. Reviewed goal, meal selection, and unit schedule for the day.      Status After Intervention:  Unchanged    Participation Level: Interactive    Participation Quality: Appropriate    Speech:  normal    Thought Process/Content: Linear    Affective Functioning: Congruent    Mood: irritable    Level of consciousness:  Alert and Attentive    Response to Learning: Able to verbalize current knowledge/experience    Endings: None Reported    Modes of Intervention: Exploration and Clarifying    Discipline Responsible: /Counselor    Signature: MAINE Restrepo LSW

## 2020-07-04 NOTE — BH NOTE
Pt rested quietly rest of shift, no distress noted. Lab in to draw blood this am, pt denies any needs from this RN.

## 2020-07-05 LAB
INR BLD: 1.45 INDEX
PROTHROMBIN TIME: 16.6 SECONDS (ref 11.7–14.5)

## 2020-07-05 PROCEDURE — 36415 COLL VENOUS BLD VENIPUNCTURE: CPT

## 2020-07-05 PROCEDURE — 6370000000 HC RX 637 (ALT 250 FOR IP): Performed by: NURSE PRACTITIONER

## 2020-07-05 PROCEDURE — 1240000000 HC EMOTIONAL WELLNESS R&B

## 2020-07-05 PROCEDURE — 6370000000 HC RX 637 (ALT 250 FOR IP): Performed by: INTERNAL MEDICINE

## 2020-07-05 PROCEDURE — 85610 PROTHROMBIN TIME: CPT

## 2020-07-05 PROCEDURE — 99232 SBSQ HOSP IP/OBS MODERATE 35: CPT | Performed by: PSYCHIATRY & NEUROLOGY

## 2020-07-05 PROCEDURE — 6370000000 HC RX 637 (ALT 250 FOR IP): Performed by: PSYCHIATRY & NEUROLOGY

## 2020-07-05 RX ORDER — WARFARIN SODIUM 5 MG/1
5 TABLET ORAL
Status: COMPLETED | OUTPATIENT
Start: 2020-07-05 | End: 2020-07-05

## 2020-07-05 RX ORDER — METHYLPHENIDATE HYDROCHLORIDE 5 MG/1
10 TABLET ORAL EVERY MORNING
Status: DISCONTINUED | OUTPATIENT
Start: 2020-07-06 | End: 2020-07-07 | Stop reason: HOSPADM

## 2020-07-05 RX ORDER — WARFARIN SODIUM 10 MG/1
10 TABLET ORAL
Status: COMPLETED | OUTPATIENT
Start: 2020-07-05 | End: 2020-07-05

## 2020-07-05 RX ADMIN — CETIRIZINE HYDROCHLORIDE 10 MG: 10 TABLET, FILM COATED ORAL at 08:32

## 2020-07-05 RX ADMIN — WARFARIN SODIUM 5 MG: 5 TABLET ORAL at 17:43

## 2020-07-05 RX ADMIN — SALINE NASAL SPRAY 1 SPRAY: 1.5 SOLUTION NASAL at 08:32

## 2020-07-05 RX ADMIN — ACETAMINOPHEN 650 MG: 325 TABLET ORAL at 15:17

## 2020-07-05 RX ADMIN — WARFARIN SODIUM 10 MG: 10 TABLET ORAL at 17:42

## 2020-07-05 RX ADMIN — MONTELUKAST SODIUM 10 MG: 10 TABLET, FILM COATED ORAL at 21:01

## 2020-07-05 RX ADMIN — BACITRACIN ZINC 14 G: 500 OINTMENT TOPICAL at 08:32

## 2020-07-05 RX ADMIN — OMEGA-3-ACID ETHYL ESTERS 2 G: 1 CAPSULE, LIQUID FILLED ORAL at 21:01

## 2020-07-05 RX ADMIN — TRAZODONE HYDROCHLORIDE 50 MG: 50 TABLET ORAL at 21:02

## 2020-07-05 RX ADMIN — CALCIUM 500 MG: 500 TABLET ORAL at 08:32

## 2020-07-05 RX ADMIN — SENNOSIDES AND DOCUSATE SODIUM 1 TABLET: 8.6; 5 TABLET ORAL at 08:32

## 2020-07-05 RX ADMIN — ARIPIPRAZOLE 5 MG: 5 TABLET ORAL at 08:32

## 2020-07-05 RX ADMIN — PAROXETINE HYDROCHLORIDE 30 MG: 20 TABLET, FILM COATED ORAL at 08:32

## 2020-07-05 RX ADMIN — SIMVASTATIN 40 MG: 40 TABLET, FILM COATED ORAL at 21:01

## 2020-07-05 RX ADMIN — BACITRACIN ZINC 14 G: 500 OINTMENT TOPICAL at 21:01

## 2020-07-05 RX ADMIN — METHYLPHENIDATE HYDROCHLORIDE 10 MG: 5 TABLET ORAL at 12:32

## 2020-07-05 RX ADMIN — SENNOSIDES AND DOCUSATE SODIUM 1 TABLET: 8.6; 5 TABLET ORAL at 21:02

## 2020-07-05 RX ADMIN — GUAIFENESIN 600 MG: 600 TABLET, EXTENDED RELEASE ORAL at 21:01

## 2020-07-05 RX ADMIN — MULTIPLE VITAMINS W/ MINERALS TAB 1 TABLET: TAB at 08:32

## 2020-07-05 RX ADMIN — FLUTICASONE PROPIONATE 2 SPRAY: 50 SPRAY, METERED NASAL at 08:32

## 2020-07-05 RX ADMIN — GUAIFENESIN 600 MG: 600 TABLET, EXTENDED RELEASE ORAL at 08:32

## 2020-07-05 RX ADMIN — OMEGA-3-ACID ETHYL ESTERS 2 G: 1 CAPSULE, LIQUID FILLED ORAL at 08:32

## 2020-07-05 ASSESSMENT — PAIN SCALES - GENERAL
PAINLEVEL_OUTOF10: 7
PAINLEVEL_OUTOF10: 0
PAINLEVEL_OUTOF10: 2

## 2020-07-05 NOTE — GROUP NOTE
Group Therapy Note    Date: 7/5/2020    Group Start Time: 5937  Group End Time: 1600  Group Topic: Healthy Living/Wellness/ Exercise Group    [address]    MAINE Leach LSW    Group Therapy Note    Attendees: 7/8       Patient's Goal: \"to enjoy the day and not just exist\"    Notes:  Patient participated in group. Participated in a card exercise game with peers.      Status After Intervention:  Improved    Participation Level: Interactive    Participation Quality: Appropriate    Speech:  normal    Thought Process/Content: Linear    Affective Functioning: Congruent    Mood: elevated    Level of consciousness:  Alert and Attentive    Response to Learning: Able to verbalize current knowledge/experience    Endings: None Reported    Modes of Intervention: Activity and Movement    Discipline Responsible: /Counselor    Signature: MAINE Leach LSW

## 2020-07-05 NOTE — GROUP NOTE
Group Therapy Note    Date: 7/5/2020    Group Start Time: 1400  Group End Time: 1500  Group Topic: Psychotherapy    [address] [facility]    MAINE Thakur LSW    Group Therapy Note    Attendees: 6/8       Patient's Goal: \"to enjoy the day and not just exist\"    Notes: Patient participated in group. Open discussion with peers about ADLs and maintaining mobility. Participated in a game of [id number] with peers. Status After Intervention:  Improved    Participation Level:  Active Listener and Interactive    Participation Quality: Appropriate, Attentive, Sharing and Supportive    Speech:  normal    Thought Process/Content: Linear    Affective Functioning: Congruent    Mood: elevated    Level of consciousness:  Alert and Attentive    Response to Learning: Able to verbalize current knowledge/experience    Endings: None Reported    Modes of Intervention: Education, Support, Socialization and Activity    Discipline Responsible: /Counselor    Signature: MAINE Thakur LSW

## 2020-07-05 NOTE — GROUP NOTE
Group Therapy Note    Date: 7/5/2020    Group Start Time: 4332  Group End Time: 1200  Group Topic: Recreational    [address], MSW, [place]    Group Therapy Note    Attendees: 6/8       Patient's Goal: \"to enjoy the day and not just exist\"    Notes: Patient was offered group. Patient declined to participate and remained in his room during session.      Discipline Responsible: /Counselor    Signature: MAINE Arana, CARLOS

## 2020-07-05 NOTE — PLAN OF CARE
Problem: Depressive Behavior With or Without Suicide Precautions:  Goal: Able to verbalize acceptance of life and situations over which he or she has no control  Description: Able to verbalize acceptance of life and situations over which he or she has no control  7/5/2020 1007 by Spike Tellez LPN  Outcome: Ongoing  7/4/2020 2223 by Luís Campos LPN  Outcome: Ongoing  Goal: Able to verbalize and/or display a decrease in depressive symptoms  Description: Able to verbalize and/or display a decrease in depressive symptoms  7/5/2020 1007 by Spike Tellez LPN  Outcome: Ongoing  7/4/2020 2223 by Luís Campos LPN  Outcome: Ongoing  Goal: Ability to disclose and discuss suicidal ideas will improve  Description: Ability to disclose and discuss suicidal ideas will improve  7/5/2020 1007 by Spike Tellez LPN  Outcome: Ongoing  7/4/2020 2223 by Luís Campos LPN  Outcome: Ongoing  Goal: Able to verbalize support systems  Description: Able to verbalize support systems  7/5/2020 1007 by Spike Tellez LPN  Outcome: Ongoing  7/4/2020 2223 by Luís Campos LPN  Outcome: Ongoing  Goal: Absence of self-harm  Description: Absence of self-harm  7/5/2020 1007 by Spike Tellez LPN  Outcome: Ongoing  7/4/2020 2223 by Luís Campos LPN  Outcome: Met This Shift  Goal: Patient specific goal  Description: Patient specific goal  7/5/2020 1007 by Spike Tellez LPN  Outcome: Ongoing  7/4/2020 2223 by Luís Campos LPN  Outcome: Ongoing  Goal: Participates in care planning  Description: Participates in care planning  7/5/2020 1007 by Spike Tellez LPN  Outcome: Ongoing  7/4/2020 2223 by Luís Campos LPN  Outcome: Ongoing

## 2020-07-05 NOTE — GROUP NOTE
Group Therapy Note    Date: 7/5/2020    Group Start Time: 0830  Group End Time: 0930  Group Topic: Community Meeting/ AM [address] [facility]    MAINE Rodarte LSW    Group Therapy Note    Attendees: 7/8       Patient's Goal: \"to enjoy the day and not just exist\"    Notes: Patient participated in group. Patient stated he was feeling \"better than I did\" and grateful for \"being on this earth, help here, my family\". Patient reported restless and \"I don't think I did\" get any sleep last night. Patient rated his depression, anxiety, and irritability/agitation as an 8, on a scale of 0 to 10, with 10 being the worst and 0 being not at all. Reviewed goal, meal selection, and unit schedule for the day. Status After Intervention:  Unchanged    Participation Level:  Active Listener and Interactive    Participation Quality: Appropriate and Sharing    Speech:  normal    Thought Process/Content: Linear    Affective Functioning: Flat    Mood: Indifferent    Level of consciousness:  Alert and Attentive    Response to Learning: Able to verbalize current knowledge/experience    Endings: None Reported    Modes of Intervention: Exploration and Clarifying    Discipline Responsible: /Counselor    Signature: MAINE Rodarte LSW

## 2020-07-05 NOTE — PROGRESS NOTES
Psychiatric Progress Note    Magdalena Marte  [id number]  07/05/20    CHIEF COMPLAINT:  I am severely depressed     HPI: Sherif Rivas a 62 y. o. male who presents after suicide attempt.  The patient reports feeling shame/embarrassment over social situations to include losing apartment again. [facility] has self-inflicted lacerations to bilateral wrists and bilateral sides of neck.  He states 2 different attempts yesterday and upon waking up in the bathtub after second attempt called family to seek some help. The patient denies any drug or alcohol use.  Patient denies previous suicide attempts. [facility] does report increased depression and currently on Paxil. While in the emergency room he was found to be significantly hyponatremic. Covid negative    Pt noted he is \"better today\" Pt noted he feels safe and comfortable on the unit. Pt was polite and cordial during the interview process. Currently he endorses SI but denies plan. He denies HI and AV hallucinations. He rates his depression as \"8\" on a scale of 0 to 10 with 0 being none and 10 being the worst. He rates his anxiety as \"8\" on the same scale. He states he was able to sleep last night with \"sleep aid\" assistance. He is oriented x 4. Due to cognitive impairment Home Health Care is needed for psychiatric nursing, and medication management.     Allergies   Allergen Reactions    Ciprofloxacin Hcl Hives and Swelling    Levaquin [Levofloxacin] Hives and Swelling    Other Nausea And Vomiting     \"Allergic To Tylox\"    Ceftin [Cefuroxime]        Medications Prior to Admission: Acetaminophen (TYLENOL 8 HOUR PO), Take by mouth as needed Over The Counter  Albuterol Sulfate (PROAIR HFA IN), Inhale into the lungs as needed  simvastatin (ZOCOR) 40 MG tablet, take 1 tablet by mouth at bedtime  warfarin (COUMADIN) 5 MG tablet, take 1-2 tablets by mouth daily as directed (Patient taking differently: 10 mg take 1-2 tablets by mouth daily as directed INR 2.5-3.5)  cyclobenzaprine (FLEXERIL) 10 MG tablet, take 1 tablet by mouth three times a day if needed for muscle spasm (Patient not taking: Reported on 5/28/2020)  calcium carbonate (OSCAL) 500 MG TABS tablet, Take 500 mg by mouth daily  KRILL OIL OMEGA-3 PO, Take by mouth  triamcinolone (KENALOG) 0.1 % cream, Apply topically 2 times daily. (Patient not taking: Reported on 5/28/2020)  montelukast (SINGULAIR) 10 MG tablet, Take 1 tablet by mouth nightly \"Alternate With Zyrtec\"  PARoxetine (PAXIL) 20 MG tablet, take 1 tablet by mouth once daily  sodium chloride, Inhalant, 3 % nebulizer solution,   cetirizine (ZYRTEC) 10 MG tablet, Take 10 mg by mouth \"Alternate With Singulair\"  NASAL SPRAY SALINE NA, by Nasal route daily Over The Counter  budesonide (RHINOCORT ALLERGY) 32 MCG/ACT nasal spray, 2 sprays by Nasal route daily  Multiple Vitamins-Minerals (CENTRUM PO), Take by mouth daily  Nebulizer MISC, Inhale into the lungs as needed    Past Medical History:   Diagnosis Date    Acid reflux     Acute frontal sinusitis 7/22/2009    Alcohol abuse     \"I Drink Average 2 Beers A Day\"    Anesthesia     \" I get agitated\"    Anxiety     Arthritis     \"Right Shoulder, Neck, Left Knee\"    Atypical mycobacterial infection     Broken teeth     Upper And Lower     CHF (congestive heart failure) (HCC)     COPD (chronic obstructive pulmonary disease) ([facility]     Sees Dr. Brianne Vargas In [place] [address] [address] [facility]     Cough     Frequent Cough With Green Sputum    Depression     Depression     Dyspnea 2/23/2018    H/O cardiovascular MUGA stress test 05/14/2019    EF 50%, NORMAL LVEF, NORMAL WALL MOTION.    H/O echocardiogram 05/22/2014    CV=>08-25%, LV systolic function is low normal, mild aortic valve calcification, no pericardial effusion    H/O echocardiogram 12/12/2018    EF 45-50%    History of 24 hour EKG monitoring 6/6/14    24 hr holter monitor. Ventricular ectopics were noted in single and couplet forms. Supraventricular ectopics were noted in single and pair forms.  [place] (hard of hearing)     Bilateral Ears    Hyperlipidemia     Hypertension     Irritant contact dermatitis due to other chemical products 8/26/2019    Other drug allergy(995.27) 7/22/2009    S/P AVR 12/2003    Mechanical valvue     Shortness of breath     Teeth missing     Upper And Lower        Patient Active Problem List   Diagnosis    Localized osteoarthritis of right shoulder    S/P AVR    Hyperlipidemia    [place] (hard of hearing)    Depression    COPD (chronic obstructive pulmonary disease) (HCC)    Bronchiectasis (HCC)    Anxiety    Alcohol abuse    Acid reflux    Chronic interstitial lung disease (HCC)    Hyponatremia    Severe episode of recurrent major depressive disorder, without psychotic features ([address]    Severe recurrent major depression without psychotic features ([address]    Rheumatic aortic valve disease    Chronic systolic heart failure (HCC)    Rheumatic mitral regurgitation       Review of Systems    OBJECTIVE  Vital Signs:  Vitals:    07/05/20 0745   BP: (!) 94/53   Pulse: 64   Resp: 18   Temp: 97.9 °F (36.6 °C)   SpO2: 99%       Labs:  Recent Results (from the past 48 hour(s))   Protime-INR    Collection Time: 07/04/20  5:42 AM   Result Value Ref Range    Protime 18.4 (H) 11.7 - 14.5 SECONDS    INR 1.60 INDEX   Protime-INR    Collection Time: 07/05/20  5:12 AM   Result Value Ref Range    Protime 16.6 (H) 11.7 - 14.5 SECONDS    INR 1.45 INDEX       PSYCHIATRIC ASSESSMENT / MENTAL STATUS EXAM:   Vitals: Blood pressure (!) 94/53, pulse 64, temperature 97.9 °F (36.6 °C), temperature source Temporal, resp. rate 18, height 5' 5\" (1.651 m), weight 123 lb 7.3 oz (56 kg), SpO2 99 %. CONSTITUTIONAL:    Appearance: appears stated age. alert and oriented to person, place, time & situation. no acute distress. Adequate grooming and hygeine. Poor eye contact. No prominent physical abnormalities. Attitude:  Manner is cooperative and pleasant  Motor: No psychomotor agitation, retardation or abnormal movements noted  Speech: Clearly articulated; normal rate, volume, tone & amount. Language: intact understanding and production  Mood: profoundly depressed  Affect: blunted,  Thought Production: Spontaneous. Thought Form: Coherent, linear, logical & goal-directed. No tangentiality or circumstantiality. No flight of ideas or loosening of associations. Thought Content/Perceptions:  Endorses SI, no plan, no HI, no AVH, no delusion  Insight: improving  Judgment: poor  Memory: Immediate, recent, and remote appear intact, though not formally tested. Attention: maintained throughout interview  Fund of knowledge: Average  Gait/Balance: WNL/WNL    IMPRESSION:   Severe recurrent major depression without psychotic features        PLAN:  Psychiatric management:medication initiation and titration, group and individual therapy, safe and theraputic environment. Status of problem/condition: Improving  Medical co-morbidities: [facility], appreciate assistance  Legal Status: Pending  Disposition:estimated LOS: Pending. The treatment team reviewed with the patient the diagnosis and treatment recommendations to include the risks, benefits, and side effects of chosen medications. The patient verbalized understanding and agreed with the treatment regimen as outlined above. The patient was encouraged to participate in groups. Medical records, Labs, Diagnotic tests reviewed  q15 min safety checks for safety  Interval History. Review current labs - check CMP, CBC, LDH, PSA  CT head for headache over past 3 days  Dietary consult due to weight loss > 20 pounds over past month  Continue current medications - reduce Abilify to 5 mg daily with plan to D/C start methylphenidate 10 mg PO QAM for mood.   Supportive Therapy Provided  Pt had an opportunity to ask questions and address concerns  Pt encouraged to continue therapy group or individual.  Pt was in agreement with treatment plan. The risks benefits and side effects of medications were discussed with the patient, including alternatives and no treatment.     Electronically signed by Luis Coleman DO on 7/5/2020 at 9:09 AM

## 2020-07-05 NOTE — PLAN OF CARE
Problem: Depressive Behavior With or Without Suicide Precautions:  Goal: Absence of self-harm  Description: Absence of self-harm  7/4/2020 2223 by Bassam Ramirez LPN  Outcome: Met This Shift  7/4/2020 1311 by Gabi Marie RN  Outcome: Ongoing     Problem: Depressive Behavior With or Without Suicide Precautions:  Goal: Able to verbalize acceptance of life and situations over which he or she has no control  Description: Able to verbalize acceptance of life and situations over which he or she has no control  7/4/2020 2223 by Bassam Ramirez LPN  Outcome: Ongoing  7/4/2020 1311 by Gabi Marie RN  Outcome: Ongoing  Goal: Able to verbalize and/or display a decrease in depressive symptoms  Description: Able to verbalize and/or display a decrease in depressive symptoms  7/4/2020 2223 by Bassam Ramirez LPN  Outcome: Ongoing  7/4/2020 1311 by Gabi Marie RN  Outcome: Ongoing  Goal: Ability to disclose and discuss suicidal ideas will improve  Description: Ability to disclose and discuss suicidal ideas will improve  7/4/2020 2223 by Bassam Ramirez LPN  Outcome: Ongoing  7/4/2020 1311 by Gabi Marie RN  Outcome: Ongoing  Goal: Able to verbalize support systems  Description: Able to verbalize support systems  7/4/2020 2223 by Bassam Ramirez LPN  Outcome: Ongoing  7/4/2020 1311 by Gabi Marie RN  Outcome: Ongoing  Goal: Patient specific goal  Description: Patient specific goal  7/4/2020 2223 by Bassam Ramirez LPN  Outcome: Ongoing  7/4/2020 1311 by Gabi Marie RN  Outcome: Ongoing  Goal: Participates in care planning  Description: Participates in care planning  7/4/2020 [address] by Bassma Ramirez LPN  Outcome: Ongoing  7/4/2020 1311 by Gabi Marie RN  Outcome: Ongoing

## 2020-07-05 NOTE — BH NOTE
Patient has been in bed the entire shift. He has been pleasant and took his meds without any problem. Alert an oriented x 4. He rated his depression an anxiety as an 8 on a 0-10 scale with zero being none and ten being the worst.  Patient is flat and appears sad. Laceration to left side of neck and left wrist maintain three sutures to each site. They are opened to air. Laceration to right side of neck and right wrist are scabbed an opened to air. He denies SI,HI AVH. Denies pain and has call light with reach. Rounds continue for safety.

## 2020-07-05 NOTE — BH NOTE
Group Therapy Note    Date: 7/4/2020  Start Time: 1930  End Time:  2000  Number of Participants: 1      Type of Group: Nursing Education      Notes:  Educated on safety     Status After Intervention:  Unchanged    Participation Level:  Active Listener    Participation Quality: Appropriate      Speech:  normal      Thought Process/Content: Logical      Affective Functioning: Congruent      Mood: depressed      Level of consciousness:  Alert      Response to Learning: Able to verbalize current knowledge/experience      Endings: None Reported    Modes of Intervention: Education      Discipline Responsible: Licensed Practical Nurse      Signature:  Luís Campos LPN

## 2020-07-06 ENCOUNTER — TELEPHONE (OUTPATIENT)
Dept: FAMILY MEDICINE CLINIC | Age: 65
End: 2020-07-06

## 2020-07-06 LAB
INR BLD: 1.32 INDEX
PROTHROMBIN TIME: 15.1 SECONDS (ref 11.7–14.5)

## 2020-07-06 PROCEDURE — 6370000000 HC RX 637 (ALT 250 FOR IP): Performed by: NURSE PRACTITIONER

## 2020-07-06 PROCEDURE — 6370000000 HC RX 637 (ALT 250 FOR IP): Performed by: INTERNAL MEDICINE

## 2020-07-06 PROCEDURE — 6360000002 HC RX W HCPCS: Performed by: INTERNAL MEDICINE

## 2020-07-06 PROCEDURE — 1240000000 HC EMOTIONAL WELLNESS R&B

## 2020-07-06 PROCEDURE — 36415 COLL VENOUS BLD VENIPUNCTURE: CPT

## 2020-07-06 PROCEDURE — 6370000000 HC RX 637 (ALT 250 FOR IP): Performed by: PSYCHIATRY & NEUROLOGY

## 2020-07-06 PROCEDURE — 85610 PROTHROMBIN TIME: CPT

## 2020-07-06 PROCEDURE — 99233 SBSQ HOSP IP/OBS HIGH 50: CPT | Performed by: NURSE PRACTITIONER

## 2020-07-06 RX ADMIN — LORAZEPAM 1 MG: 1 TABLET ORAL at 22:47

## 2020-07-06 RX ADMIN — BACITRACIN ZINC 14 G: 500 OINTMENT TOPICAL at 07:57

## 2020-07-06 RX ADMIN — ENOXAPARIN SODIUM 60 MG: 60 INJECTION SUBCUTANEOUS at 20:50

## 2020-07-06 RX ADMIN — ARIPIPRAZOLE 5 MG: 5 TABLET ORAL at 07:56

## 2020-07-06 RX ADMIN — FLUTICASONE PROPIONATE 2 SPRAY: 50 SPRAY, METERED NASAL at 07:57

## 2020-07-06 RX ADMIN — METHYLPHENIDATE HYDROCHLORIDE 10 MG: 5 TABLET ORAL at 08:04

## 2020-07-06 RX ADMIN — OMEGA-3-ACID ETHYL ESTERS 2 G: 1 CAPSULE, LIQUID FILLED ORAL at 07:56

## 2020-07-06 RX ADMIN — SENNOSIDES AND DOCUSATE SODIUM 1 TABLET: 8.6; 5 TABLET ORAL at 20:47

## 2020-07-06 RX ADMIN — ENOXAPARIN SODIUM 60 MG: 60 INJECTION SUBCUTANEOUS at 08:04

## 2020-07-06 RX ADMIN — SIMVASTATIN 40 MG: 40 TABLET, FILM COATED ORAL at 20:47

## 2020-07-06 RX ADMIN — CETIRIZINE HYDROCHLORIDE 10 MG: 10 TABLET, FILM COATED ORAL at 07:57

## 2020-07-06 RX ADMIN — GUAIFENESIN 600 MG: 600 TABLET, EXTENDED RELEASE ORAL at 07:57

## 2020-07-06 RX ADMIN — WARFARIN SODIUM 15 MG: 5 TABLET ORAL at 16:59

## 2020-07-06 RX ADMIN — MONTELUKAST SODIUM 10 MG: 10 TABLET, FILM COATED ORAL at 20:47

## 2020-07-06 RX ADMIN — BACITRACIN ZINC 14 G: 500 OINTMENT TOPICAL at 20:49

## 2020-07-06 RX ADMIN — MULTIPLE VITAMINS W/ MINERALS TAB 1 TABLET: TAB at 07:57

## 2020-07-06 RX ADMIN — CALCIUM 500 MG: 500 TABLET ORAL at 07:57

## 2020-07-06 RX ADMIN — TRAZODONE HYDROCHLORIDE 50 MG: 50 TABLET ORAL at 20:47

## 2020-07-06 RX ADMIN — GUAIFENESIN 600 MG: 600 TABLET, EXTENDED RELEASE ORAL at 20:47

## 2020-07-06 RX ADMIN — OMEGA-3-ACID ETHYL ESTERS 2 G: 1 CAPSULE, LIQUID FILLED ORAL at 20:47

## 2020-07-06 RX ADMIN — PAROXETINE HYDROCHLORIDE 30 MG: 20 TABLET, FILM COATED ORAL at 07:56

## 2020-07-06 RX ADMIN — SENNOSIDES AND DOCUSATE SODIUM 1 TABLET: 8.6; 5 TABLET ORAL at 07:56

## 2020-07-06 ASSESSMENT — PAIN SCALES - GENERAL: PAINLEVEL_OUTOF10: 0

## 2020-07-06 NOTE — GROUP NOTE
Group Therapy Note    Date: 7/6/2020    Group Start Time: 1030  Group End Time: 1050  Group Topic: Psychoeducation    [address] [place], [place]        Group Therapy Note    Attendees: 7/9       Notes:  Pts participated in recreational therapy group; Safety Plan. Pts worked on creating safety plans that helped them identify negative behaviors/thoughts they engage in when not well mentally, coping skills they can utilized, and people they can talk to when they need help. Discussion centered around pts responses. Pt participated actively in the group. Pt showed insight into how recognizing his s/s quicker will help him improve his mental health. Pt reports feeling worksheet will be beneficial to him. Status After Intervention:  Improved    Participation Level:  Active Listener and Interactive    Participation Quality: Appropriate, Attentive and Sharing      Speech:  normal      Thought Process/Content: Logical      Affective Functioning: Congruent      Mood: Bright      Level of consciousness:  Alert and Attentive      Response to Learning: Able to verbalize current knowledge/experience, Able to verbalize/acknowledge new learning and Able to retain information      Endings: None Reported    Modes of Intervention: Education, Support, Socialization, Exploration and Activity      Discipline Responsible: Certified Therapeutic Recreation Specialist       Signature:  Peggy Oneil

## 2020-07-06 NOTE — BH NOTE
Patient has been in room this shift. Alert an oriented x 4 and pleasant. Took meds without any issues. Rated his depression and his anxiety as an 8 on an 0-10 scale with zero being none and ten being the worst.  He says he had a good day today and denies pain,SI,HI,AVH. His right neck is healed and left has three sutures. Bilateral wrists are scabbed and look good. Pt is in bed call light is within reach and rounds done to assure safety.

## 2020-07-06 NOTE — TELEPHONE ENCOUNTER
Patient is at the [facility] in [facility] ----admitted last Tuesday. Betty Alvarenga    Patient tried to take his own life 2 weeks ago. (Patient was extremely scared of getting the Covid-19 virus----very stressed out about the virus. Patient had a CAT scan---doing blood work----had a Negative Result for the COVID-19. Trang had to cancel his Protime appt.  Here (Trang thinks this facility is a part of [facility] ([facility]    Phone:  [phone]

## 2020-07-06 NOTE — BH NOTE
Group Therapy Note    Date: 7/5/2020  Start Time: 1930  End Time:  2000  Number of Participants: 1      Type of Group: Nursing Education      Notes:  Educated on medication    Status After Intervention:  Unchanged    Participation Level:  Active Listener    Participation Quality: Appropriate      Speech:  normal      Thought Process/Content: Logical      Affective Functioning: Congruent      Mood: euthymic      Level of consciousness:  Alert and Oriented x4      Response to Learning: Able to verbalize current knowledge/experience      Endings: None Reported    Modes of Intervention: Education      Discipline Responsible: Licensed Practical Nurse      Signature:  Adrian Willson LPN

## 2020-07-06 NOTE — BH NOTE
Group Therapy Note    Date: 7/5/2020  Start Time: 2000  End Time:  2030  Number of Participants: 1      Type of Group: Wrap Up    Patient's Goal:  To enjoy the day    Notes:  Has been in his room this shift    Status After Intervention:  Unchanged    Participation Level:  Active Listener and Interactive    Participation Quality: Appropriate      Speech:  normal      Thought Process/Content: Logical      Affective Functioning: Congruent      Mood: euthymic      Level of consciousness:  Alert and Oriented x4      Response to Learning: Able to verbalize current knowledge/experience      Endings: None Reported    Modes of Intervention: Support and Socialization      Discipline Responsible: Licensed Practical Nurse      Signature:  Teresa Gandara LPN

## 2020-07-06 NOTE — PLAN OF CARE
Problem: Depressive Behavior With or Without Suicide Precautions:  Goal: Absence of self-harm  Description: Absence of self-harm  7/5/2020 2356 by Emil Jones LPN  Outcome: Met This Shift  7/5/2020 1007 by Neema Contreras LPN  Outcome: Ongoing     Problem: Depressive Behavior With or Without Suicide Precautions:  Goal: Able to verbalize acceptance of life and situations over which he or she has no control  Description: Able to verbalize acceptance of life and situations over which he or she has no control  7/5/2020 2356 by Emil Jones LPN  Outcome: Ongoing  7/5/2020 1007 by Neema Contreras LPN  Outcome: Ongoing  Goal: Able to verbalize and/or display a decrease in depressive symptoms  Description: Able to verbalize and/or display a decrease in depressive symptoms  7/5/2020 2356 by Emil Jones LPN  Outcome: Ongoing  7/5/2020 1007 by Neema Contreras LPN  Outcome: Ongoing  Goal: Ability to disclose and discuss suicidal ideas will improve  Description: Ability to disclose and discuss suicidal ideas will improve  7/5/2020 2356 by Emil Jones LPN  Outcome: Ongoing  7/5/2020 1007 by Neema Contreras LPN  Outcome: Ongoing  Goal: Able to verbalize support systems  Description: Able to verbalize support systems  7/5/2020 2356 by Emil Jones LPN  Outcome: Ongoing  7/5/2020 1007 by Neema Contreras LPN  Outcome: Ongoing  Goal: Patient specific goal  Description: Patient specific goal  7/5/2020 2356 by Emil Jones LPN  Outcome: Ongoing  7/5/2020 1007 by Neema Contreras LPN  Outcome: Ongoing  Goal: Participates in care planning  Description: Participates in care planning  7/5/2020 2356 by Emil Jones LPN  Outcome: Ongoing  7/5/2020 1007 by Neema Contreras LPN  Outcome: Ongoing

## 2020-07-06 NOTE — GROUP NOTE
Group Therapy Note    Date: 7/6/2020    Group Start Time: 0830  Group End Time: 7821    Number of Participants: 7/8    Type: Morning Goals Group/ Community Meeting    Group Topic/Objective: Set Goal For The Day and to review Unit Rules and Regulations. Patient's Goal:  Pt states his goal is \"Get a little rest.\"    Notes:  Pt arrived ~20 minutes late to group. When asked how he was feeling; pt stated \"having problems with the physical\" and states he is grateful \"I'm here. \"\"  Pt reports he did not sleep all night. When asked to rate his depression and anxiety, pt talked around the questions and would not directly answer. Depression (0-10): Pt would not give an answer the the question. Anxiety (0-10): Pt would not give an answer the the question. Irritability/Aggitation (0-10): 10    Status After Intervention:  Unchanged    Participation Level:  Active Listener and Interactive    Participation Quality: Appropriate and Attentive    Speech:  normal    Thought Process/Content: Logical    Affective Functioning: Congruent    Mood: depressed    Level of consciousness:  Alert and Attentive    Response to Learning: Able to verbalize current knowledge/experience    Endings: None Reported    Modes of Intervention: Education, Support and Socialization    Discipline Responsible: Certified Therapeutic Recreation Specialist     Electronically signed by Kourtney Hampton MA on 7/6/2020 at 9:43 AM

## 2020-07-06 NOTE — GROUP NOTE
Group Therapy Note    Date: 7/6/2020    Group Start Time: 1540  Group End Time: 7539    Number of Participants: 7/8    Type: Exercise/Recreation Group    Group Topic/Objective: Chair Exercises     Notes:  Pt participated actively in group. Pt completed all exercises. Pt noted to express interest in learning how exercise helps with his mental health. Status After Intervention:  Improved    Participation Level:  Active Listener and Interactive    Participation Quality: Appropriate, Attentive and Sharing    Speech:  normal    Thought Process/Content: Logical    Affective Functioning: Congruent    Mood: Bright    Level of consciousness:  Alert and Attentive    Response to Learning: Able to verbalize current knowledge/experience, Able to verbalize/acknowledge new learning and Able to retain information    Endings: None Reported    Modes of Intervention: Activity and Movement    Discipline Responsible: Certified Therapeutic Recreation Specialist     Electronically signed by Marva Gusman MA on 7/6/2020 at 4:15 PM

## 2020-07-06 NOTE — GROUP NOTE
Group Therapy Note    Date: 7/6/2020    Group Start Time: 1050  Group End Time: 1120  Group Topic: Recreational    [address] GRACIE [address]        Group Therapy Note    Attendees: 7/8       Notes:  Pts participated in recreational therapy group; Bingo Activity. Pts were encouraged to engage in a leisure activity to promote socialization, positive mood, and coping with negative emotions. Pt participated actively in the group and noted to socialize well with peers. Status After Intervention:  Improved    Participation Level:  Active Listener and Interactive    Participation Quality: Appropriate, Attentive and Sharing      Speech:  normal      Thought Process/Content: Logical      Affective Functioning: Congruent      Mood: Bright      Level of consciousness:  Alert and Attentive      Response to Learning: Able to verbalize current knowledge/experience and Able to verbalize/acknowledge new learning      Endings: None Reported    Modes of Intervention: Socialization and Activity      Discipline Responsible: Certified Therapeutic Recreation Specialist       Signature:  Henri Silva [place], [place]

## 2020-07-06 NOTE — PROGRESS NOTES
Psychiatric Progress Note    Mita Haines  [id number]  07/06/20    CHIEF COMPLAINT:  I am severely depressed     HPI: Pradeep Diallo a 62 y. o. male who presents after suicide attempt.  The patient reports feeling shame/embarrassment over social situations to include losing apartment again. [facility] has self-inflicted lacerations to bilateral wrists and bilateral sides of neck.  He states 2 different attempts yesterday and upon waking up in the bathtub after second attempt called family to seek some help. The patient denies any drug or alcohol use.  Patient denies previous suicide attempts. [facility] does report increased depression and currently on Paxil. While in the emergency room he was found to be significantly hyponatremic. Covid negative    Pt noted he is \"getting better\" Pt noted he feels safe and comfortable on the unit. Pt was polite and cordial during the interview process. Currently he denies SI/HI and AV hallucinations. He rates his depression as \"8\" on a scale of 0 to 10 with 0 being none and 10 being the worst. He rates his anxiety as \"8\" on the same scale. He states he was able to sleep last night with \"sleep aid\" assistance. He is oriented x 4. He is agreeable to stay on the [facility] on a voluntary basis for continued acute psychiatric stabilization. He signed a form for voluntary stay. His affect is brighter since Ritalin has been started and his cognitive processing is not as slowed. He does continue to complain of an intermittent headache which he describes as \"a tight band around my head. \" Review of the CT scan of the head does not show any acute process. He does note feeling chilled and fatigued. Due to cognitive impairment Home Health Care is needed for psychiatric nursing, and medication management.     Allergies   Allergen Reactions    Ciprofloxacin Hcl Hives and Swelling    Levaquin [Levofloxacin] Hives and Swelling    Other Nausea And Vomiting     \"Allergic To Tylox\"    Ceftin [Cefuroxime] H/O cardiovascular MUGA stress test 05/14/2019    EF 50%, NORMAL LVEF, NORMAL WALL MOTION.    H/O echocardiogram 05/22/2014    GG=>25-98%, LV systolic function is low normal, mild aortic valve calcification, no pericardial effusion    H/O echocardiogram 12/12/2018    EF 45-50%    History of 24 hour EKG monitoring 6/6/14    24 hr holter monitor. Ventricular ectopics were noted in single and couplet forms. Supraventricular ectopics were noted in single and pair forms.     [place] (hard of hearing)     Bilateral Ears    Hyperlipidemia     Hypertension     Irritant contact dermatitis due to other chemical products 8/26/2019    Other drug allergy(995.27) 7/22/2009    S/P AVR 12/2003    Mechanical valvue     Shortness of breath     Teeth missing     Upper And Lower        Patient Active Problem List   Diagnosis    Localized osteoarthritis of right shoulder    S/P AVR    Hyperlipidemia    [place] [employer] (hard of hearing)    Depression    COPD (chronic obstructive pulmonary disease) (HCC)    Bronchiectasis (HCC)    Anxiety    Alcohol abuse    Acid reflux    Chronic interstitial lung disease (HCC)    Hyponatremia    Severe episode of recurrent major depressive disorder, without psychotic features ([address]    Severe recurrent major depression without psychotic features ([address]    Rheumatic aortic valve disease    Chronic systolic heart failure (HCC)    Rheumatic mitral regurgitation       Review of Systems    OBJECTIVE  Vital Signs:  Vitals:    07/06/20 0735   BP: 112/60   Pulse: 53   Resp: 16   Temp: 98 °F (36.7 °C)   SpO2: 98%       Labs:  Recent Results (from the past 48 hour(s))   Protime-INR    Collection Time: 07/05/20  5:12 AM   Result Value Ref Range    Protime 16.6 (H) 11.7 - 14.5 SECONDS    INR 1.45 INDEX   Protime-INR    Collection Time: 07/06/20  6:11 AM   Result Value Ref Range    Protime 15.1 (H) 11.7 - 14.5 SECONDS    INR 1.32 INDEX       PSYCHIATRIC ASSESSMENT / MENTAL STATUS EXAM:   Vitals: Blood pressure 112/60, pulse 53, temperature 98 °F (36.7 °C), temperature source Temporal, resp. rate 16, height 5' 5\" (1.651 m), weight 123 lb 7.3 oz (56 kg), SpO2 98 %. CONSTITUTIONAL:    Appearance: appears stated age. alert and oriented to person, place, time & situation. no acute distress. Adequate grooming and hygeine. Poor eye contact. No prominent physical abnormalities. Attitude: Manner is cooperative and pleasant  Motor: No psychomotor agitation, retardation or abnormal movements noted  Speech: Clearly articulated; normal rate, volume, tone & amount. Language: intact understanding and production  Mood: profoundly depressed  Affect: blunted,  Thought Production: Spontaneous. Thought Form: Coherent, linear, logical & goal-directed. No tangentiality or circumstantiality. No flight of ideas or loosening of associations. Thought Content/Perceptions:  Endorses SI, no plan, no HI, no AVH, no delusion  Insight: improving  Judgment: improving  Memory: Immediate, recent, and remote appear intact, though not formally tested. Attention: maintained throughout interview  Fund of knowledge: Average  Gait/Balance: WNL/WNL    IMPRESSION:   Severe recurrent major depression without psychotic features        PLAN:  Psychiatric management:medication initiation and titration, group and individual therapy, safe and theraputic environment. Status of problem/condition: Improving  Medical co-morbidities: [facility], appreciate assistance  Legal Status: Voluntary  Disposition:estimated LOS: Pending. The treatment team reviewed with the patient the diagnosis and treatment recommendations to include the risks, benefits, and side effects of chosen medications. The patient verbalized understanding and agreed with the treatment regimen as outlined above. The patient was encouraged to participate in groups.   Medical records, Labs, Diagnotic tests reviewed  q15 min safety checks for safety  Interval History. Review current labs  Dietary consult due to weight loss > 20 pounds over past month  Continue current medications  Supportive Therapy Provided  Pt had an opportunity to ask questions and address concerns  Pt encouraged to continue therapy group or individual.  Pt was in agreement with treatment plan. The risks benefits and side effects of medications were discussed with the patient, including alternatives and no treatment.     Electronically signed by VANGIE Kaur CNP on 7/6/2020 at 11:07 AM

## 2020-07-06 NOTE — PROGRESS NOTES
Clinical Pharmacy Note       Warfarin Consult    Consult received from Dr. Fredo Mejía to manage Warfarin therapy. Subjective: Francisca Eddy is a 62 y.o. male ordered Warfarin. Patient Active Problem List   Diagnosis    Localized osteoarthritis of right shoulder    S/P AVR    Hyperlipidemia    [place] (hard of hearing)    Depression    COPD (chronic obstructive pulmonary disease) (HCC)    Bronchiectasis (HCC)    Anxiety    Alcohol abuse    Acid reflux    Chronic interstitial lung disease (HCC)    Hyponatremia    Severe episode of recurrent major depressive disorder, without psychotic features ([facility]    Severe recurrent major depression without psychotic features (HCC)    Rheumatic aortic valve disease    Chronic systolic heart failure (HCC)    Rheumatic mitral regurgitation     Allergies: Ciprofloxacin hcl; Levaquin [levofloxacin]; Other; and Ceftin [cefuroxime]     Home dose: 10mg daily  Current dose: 15mg  Indication: Mechanical aortic valve replacement  INR goal: 2.5-3.5 (per Dr. Serge Caruso)    Objective:  INR Results:  INR   Date Value Ref Range Status   07/06/2020 1.32 INDEX Final     Comment:     THERAPEUTIC RANGE:  INDICATIONS: INR 2.0-3.0  Most (DVT, PE,  Atrial Fibillation, Bioprosthetic valve,   [place] bicuspid aortic valve)  INDICATIONS: 2.5-3.5 Most mechanical valves  recurrent thrombosis. 07/05/2020 1.45 INDEX Final     Comment:     THERAPEUTIC RANGE:  INDICATIONS: INR 2.0-3.0  Most (DVT, PE,  Atrial Fibillation, Bioprosthetic valve,   [place] bicuspid aortic valve)  INDICATIONS: 2.5-3.5 Most mechanical valves  recurrent thrombosis. 07/04/2020 1.60 INDEX Final     Comment:     THERAPEUTIC RANGE:  INDICATIONS: INR 2.0-3.0  Most (DVT, PE,  Atrial Fibillation, Bioprosthetic valve,   [place] bicuspid aortic valve)  INDICATIONS: 2.5-3.5 Most mechanical valves  recurrent thrombosis. No results for input(s): HGB, HCT, PLT in the last 72 hours.     Other anticoagulants: Lovenox 1mg/kg until INR therapeutic. Assessment: INR remains subtherapeutic today at 1.32. Partial dose was given 7/4 which is likely impacting the INR. Will give another booster dose today and monitor closely. Continue Lovenox until INR therapeutic. Daily INR ordered? yes    Plan: Warfarin 15mg tonight. Thank you for the consult. Pharmacy will continue to follow.     Herve BaldwinD, [facility] 7/6/2020 at 7:26 AM

## 2020-07-07 VITALS
RESPIRATION RATE: 16 BRPM | DIASTOLIC BLOOD PRESSURE: 53 MMHG | SYSTOLIC BLOOD PRESSURE: 95 MMHG | HEIGHT: 65 IN | WEIGHT: 123.46 LBS | BODY MASS INDEX: 20.57 KG/M2 | OXYGEN SATURATION: 98 % | HEART RATE: 56 BPM | TEMPERATURE: 98.7 F

## 2020-07-07 PROBLEM — F33.41 RECURRENT MAJOR DEPRESSIVE DISORDER, IN PARTIAL REMISSION (HCC): Chronic | Status: ACTIVE | Noted: 2020-06-30

## 2020-07-07 LAB
INR BLD: 1.9 INDEX
PROTHROMBIN TIME: 22 SECONDS (ref 11.7–14.5)

## 2020-07-07 PROCEDURE — 6360000002 HC RX W HCPCS: Performed by: INTERNAL MEDICINE

## 2020-07-07 PROCEDURE — 6370000000 HC RX 637 (ALT 250 FOR IP): Performed by: INTERNAL MEDICINE

## 2020-07-07 PROCEDURE — 99239 HOSP IP/OBS DSCHRG MGMT >30: CPT | Performed by: NURSE PRACTITIONER

## 2020-07-07 PROCEDURE — 6370000000 HC RX 637 (ALT 250 FOR IP): Performed by: NURSE PRACTITIONER

## 2020-07-07 PROCEDURE — 85610 PROTHROMBIN TIME: CPT

## 2020-07-07 PROCEDURE — 36415 COLL VENOUS BLD VENIPUNCTURE: CPT

## 2020-07-07 PROCEDURE — 6370000000 HC RX 637 (ALT 250 FOR IP): Performed by: PSYCHIATRY & NEUROLOGY

## 2020-07-07 RX ORDER — TRAZODONE HYDROCHLORIDE 100 MG/1
100 TABLET ORAL NIGHTLY
Qty: 30 TABLET | Refills: 1 | Status: SHIPPED | OUTPATIENT
Start: 2020-07-07 | End: 2020-07-30 | Stop reason: ALTCHOICE

## 2020-07-07 RX ORDER — POLYETHYLENE GLYCOL 3350 17 G/17G
17 POWDER, FOR SOLUTION ORAL DAILY PRN
Qty: 527 G | Refills: 1 | Status: SHIPPED | OUTPATIENT
Start: 2020-07-07 | End: 2020-10-08 | Stop reason: CLARIF

## 2020-07-07 RX ORDER — GINSENG 100 MG
CAPSULE ORAL
Qty: 28 G | Refills: 1 | Status: SHIPPED | OUTPATIENT
Start: 2020-07-07 | End: 2020-07-17

## 2020-07-07 RX ORDER — WARFARIN SODIUM 10 MG/1
10 TABLET ORAL
Status: DISCONTINUED | OUTPATIENT
Start: 2020-07-07 | End: 2020-07-07 | Stop reason: HOSPADM

## 2020-07-07 RX ORDER — METHYLPHENIDATE HYDROCHLORIDE 10 MG/1
10 TABLET ORAL EVERY MORNING
Qty: 30 TABLET | Refills: 0 | Status: SHIPPED | OUTPATIENT
Start: 2020-07-08 | End: 2020-07-30 | Stop reason: ALTCHOICE

## 2020-07-07 RX ORDER — PAROXETINE 30 MG/1
30 TABLET, FILM COATED ORAL DAILY
Qty: 30 TABLET | Refills: 1 | Status: SHIPPED | OUTPATIENT
Start: 2020-07-08 | End: 2021-06-11 | Stop reason: ALTCHOICE

## 2020-07-07 RX ORDER — ARIPIPRAZOLE 5 MG/1
5 TABLET ORAL DAILY
Qty: 30 TABLET | Refills: 1 | Status: SHIPPED | OUTPATIENT
Start: 2020-07-08 | End: 2021-09-14

## 2020-07-07 RX ORDER — GUAIFENESIN 600 MG/1
600 TABLET, EXTENDED RELEASE ORAL 2 TIMES DAILY
Qty: 60 TABLET | Refills: 1 | Status: SHIPPED | OUTPATIENT
Start: 2020-07-07 | End: 2020-09-22

## 2020-07-07 RX ADMIN — ARIPIPRAZOLE 5 MG: 5 TABLET ORAL at 08:40

## 2020-07-07 RX ADMIN — OMEGA-3-ACID ETHYL ESTERS 2 G: 1 CAPSULE, LIQUID FILLED ORAL at 08:40

## 2020-07-07 RX ADMIN — FLUTICASONE PROPIONATE 2 SPRAY: 50 SPRAY, METERED NASAL at 08:42

## 2020-07-07 RX ADMIN — MULTIPLE VITAMINS W/ MINERALS TAB 1 TABLET: TAB at 08:40

## 2020-07-07 RX ADMIN — BACITRACIN ZINC 14 G: 500 OINTMENT TOPICAL at 08:41

## 2020-07-07 RX ADMIN — GUAIFENESIN 600 MG: 600 TABLET, EXTENDED RELEASE ORAL at 08:40

## 2020-07-07 RX ADMIN — PAROXETINE HYDROCHLORIDE 30 MG: 20 TABLET, FILM COATED ORAL at 08:40

## 2020-07-07 RX ADMIN — SENNOSIDES AND DOCUSATE SODIUM 1 TABLET: 8.6; 5 TABLET ORAL at 08:40

## 2020-07-07 RX ADMIN — CETIRIZINE HYDROCHLORIDE 10 MG: 10 TABLET, FILM COATED ORAL at 08:39

## 2020-07-07 RX ADMIN — CALCIUM 500 MG: 500 TABLET ORAL at 08:39

## 2020-07-07 RX ADMIN — ENOXAPARIN SODIUM 60 MG: 60 INJECTION SUBCUTANEOUS at 08:41

## 2020-07-07 RX ADMIN — METHYLPHENIDATE HYDROCHLORIDE 10 MG: 5 TABLET ORAL at 08:43

## 2020-07-07 RX ADMIN — SALINE NASAL SPRAY 1 SPRAY: 1.5 SOLUTION NASAL at 08:41

## 2020-07-07 ASSESSMENT — PAIN SCALES - GENERAL: PAINLEVEL_OUTOF10: 0

## 2020-07-07 NOTE — PLAN OF CARE
[facility] Interdisciplinary Treatment Plan Note     Review Date & Time: 07/07/20  0830    Admission Type:   Admission Type: Voluntary    Reason for admission:  Reason for Admission: Severe Depression, Suicide attempt    Patient Diagnosis: Severe recurrent major depression without psychotic features ([facility] (Chronic)      PATIENT STRENGTHS:  Patient Strengths:Strengths: Medication Compliance, Positive Support  Patient Strengths and Limitations:Limitations: General negative or hopeless attitude about future/recovery, Hopeless about future  Addictive Behavior:Addictive Behavior  In the past 3 months, have you felt or has someone told you that you have a problem with:  : None  Do you have a history of Chemical Use?: No  Do you have a history of Alcohol Use?: No  Do you have a history of Street Drug Abuse?: No  Histroy of Prescripton Drug Abuse?: No  Medical Problems:   Past Medical History:   Diagnosis Date    Acid reflux     Acute frontal sinusitis 7/22/2009    Alcohol abuse     \"I Drink Average 2 Beers A Day\"    Anesthesia     \" I get agitated\"    Anxiety     Arthritis     \"Right Shoulder, Neck, Left Knee\"    Atypical mycobacterial infection     Broken teeth     Upper And Lower     CHF (congestive heart failure) ([facility]     COPD (chronic obstructive pulmonary disease) ([facility]     Sees Dr. Romana Busing In [place], [address]     Cough     Frequent Cough With Green Sputum    Depression     Depression     Dyspnea 2/23/2018    H/O cardiovascular MUGA stress test 05/14/2019    EF 50%, NORMAL LVEF, NORMAL WALL MOTION.    H/O echocardiogram 05/22/2014    JU=>11-33%, LV systolic function is low normal, mild aortic valve calcification, no pericardial effusion    H/O echocardiogram 12/12/2018    EF 45-50%    History of 24 hour EKG monitoring 6/6/14    24 hr holter monitor. Ventricular ectopics were noted in single and couplet forms.  Supraventricular ectopics were noted in single and pair forms.  [place] (hard of hearing)     Bilateral Ears    Hyperlipidemia     Hypertension     Irritant contact dermatitis due to other chemical products 8/26/2019    Other drug allergy(995.27) 7/22/2009    S/P AVR 12/2003    Mechanical valvue     Shortness of breath     Teeth missing     Upper And Lower       Risk:  Fall RiskTotal: 69  Kam Scale Kam Scale Score: 22  BVC Total: 0    Status EXAM:   Status and Exam  Normal: Yes  Facial Expression: Brightened  Affect: Appropriate  Level of Consciousness: Alert  Mood:Normal: No  Mood: Depressed  Motor Activity:Normal: Yes  Motor Activity: Decreased  Interview Behavior: Cooperative  Preception: [place] to Person, Peggyann Dynes to Time, [place] to Place, [place] to Situation  Attention:Normal: Yes  Thought Processes: Circumstantial  Thought Content:Normal: Yes  Thought Content: Poverty of Content  Hallucinations: None  Delusions: No  Memory:Normal: Yes  Memory: Poor Recent  Insight and Judgment: Yes  Insight and Judgment: Unmotivated  Present Suicidal Ideation: No  Present Homicidal Ideation: No    Daily Assessment Last Entry:   Daily Sleep (WDL): Within Defined Limits  Patient Currently in Pain: Denies  Daily Nutrition (WDL): Within Defined Limits    Patient Monitoring:  Frequency of Checks: 4 times per hour, close    Psychiatric Symptoms:   Depression Symptoms  Depression Symptoms: Change in energy level, Sleep disturbance  Anxiety Symptoms  Anxiety Symptoms: No problems reported or observed. Angie Symptoms  Angie Symptoms: No problems reported or observed. Psychosis Symptoms  Delusion Type: No problems reported or observed.     Suicide Risk CSSR-S:  1) Within the past month, have you wished you were dead or wished you could go to sleep and not wake up? : Yes  2) Have you actually had any thoughts of killing yourself? : Yes  3) Have you been thinking about how you might kill yourself? : Yes  4) Have you had these thoughts and had some intention of acting on

## 2020-07-07 NOTE — DISCHARGE INSTR - COC
Continuity of Care Form    Patient Name: Alma Scott   :  1955  MRN:  [id number]    Admit date:  2020  Discharge date:  2020    Code Status Order: Full Code   Advance Directives:   Advance Care Flowsheet Documentation     Date/Time Healthcare Directive Type of Healthcare Directive Copy in [address] Agent's Name Healthcare Agent's Phone Number    20 1529  No, patient does not have an advance directive for healthcare treatment -- -- -- -- --          Admitting Physician:  Jerri Cameron DO  PCP: Nitin Leslie MD    Discharging Nurse: [facility] Unit/Room#: 7058/0579-71  Discharging Unit Phone Number: ***    Emergency Contact:   Extended Emergency Contact Information  Primary Emergency Contact: Jovanna Bailey [address] Phone: [phone]  Mobile Phone: [phone]  Relation: Brother/Sister  Secondary Emergency Contact: Michael Walters  [place] Phone: [phone]  Relation: Child   needed?  No    Past Surgical History:  Past Surgical History:   Procedure Laterality Date    BRONCHOSCOPY      \"Done Twice\"    CARDIAC VALVE REPLACEMENT  2003    \"Aortic Valve Replacement, Mechanical Valve\"    COLONOSCOPY  2006    Polyps Removed    DENTAL SURGERY      Teeth Extracted In Past    ENDOSCOPY, COLON, DIAGNOSTIC  In Past    HEMIARTHROPLASTY SHOULDER FRACTURE Right 2019    SHOULDER HEMIARTHROPLASTY performed by Rajinder Roberson DO at [facility] ARTHROSCOPY Left     LUNG BIOPSY Right    [facility], [address]    \"Cauterized Because My Sperm Was Low\"    ROTATOR CUFF REPAIR Right     TONSILLECTOMY   Or        Immunization History:   Immunization History   Administered Date(s) Administered    DTaP, 5 Pertussis Antigens (Daptacel) 2009, 2018    Influenza Virus Vaccine 2016, 2016, 10/06/2017, 10/01/2018    Influenza, Quadv, IM, PF (6 mo and older Fluzone, Flulaval, Fluarix, and 3 yrs and older Afluria) 10/01/2018, 10/24/2019    Pneumococcal Conjugate 13-valent (Drsprkt79) 05/11/2017    Pneumococcal Polysaccharide (Jdtpttmpd01) 01/01/2007    Tdap (Boostrix, Adacel) 07/01/2018       Active Problems:  Patient Active Problem List   Diagnosis Code    Localized osteoarthritis of right shoulder M19.011    S/P AVR Z95.2    Hyperlipidemia E78.5    [place] (hard of hearing) H91.90    COPD (chronic obstructive pulmonary disease) ([facility] J44.9    Bronchiectasis ([facility]) J47.9    Anxiety F41.9    Alcohol abuse F10.10    Acid reflux K21.9    Chronic interstitial lung disease ([facility]) J84.9    Hyponatremia E87.1    Severe episode of recurrent major depressive disorder, without psychotic features ([facility] F33.2    Recurrent major depressive disorder, in partial remission ([facility] F33.41    Rheumatic aortic valve disease I06.9    Chronic systolic heart failure ([facility]) I50.22    Rheumatic mitral regurgitation I05.1       Isolation/Infection:   Isolation          No Isolation        Patient Infection Status     Infection Onset Added Last Indicated Last Indicated By Review Planned Expiration Resolved Resolved By    None active    Resolved    COVID-19 Rule Out 06/29/20 06/29/20 06/29/20 Covid-19 Ambulatory (Ordered)   06/30/20 Rule-Out Test Resulted    C-diff Rule Out 06/27/20 06/27/20 06/27/20 C difficile Molecular/PCR (Ordered)   07/04/20 Lm Bangura LPN          Nurse Assessment:  Last Vital Signs: BP (!) 95/53   Pulse 68   Temp 98.7 °F (37.1 °C) (Oral)   Resp 16   Ht 5' 5\" (1.651 m)   Wt 123 lb 7.3 oz (56 kg)   SpO2 98%   BMI 20.54 kg/m²     Last documented pain score (0-10 scale): Pain Level: 0  Last Weight:   Wt Readings from Last 1 Encounters:   06/30/20 123 lb 7.3 oz (56 kg)     Mental Status:  {IP PT MENTAL STATUS:16800}    IV Access:  {[facility] ACCESS:172696386}    Nursing Mobility/ADLs:  Walking   {[facility] DME PEIN:690797441}  Transfer  {[facility] Drainage Description Serosanguinous 7/3/2020  8:20 PM   Odor None 7/3/2020  8:20 PM   Margins Attached edges 7/3/2020  8:20 PM   Number of days: 9       Wound 06/27/20 Wrist Anterior;Right (Active)   Wound Traumatic 6/29/2020 12:24 AM   Dressing Status Clean;Dry; Intact 7/3/2020  8:20 PM   Dressing Changed Changed/New 7/3/2020  8:20 PM   Dressing/Treatment Antibacterial ointment 7/3/2020  8:20 PM   Wound Cleansed Rinsed/Irrigated with saline 6/29/2020 11:30 AM   Dressing Change Due 07/01/20 7/3/2020  8:20 PM   Wound Assessment Clean;Dry; Intact;Edges well approximated 7/3/2020  8:20 PM   Drainage Amount None 7/3/2020  8:20 PM   Drainage Description Serosanguinous 7/3/2020  8:20 PM   Odor None 7/3/2020  8:20 PM   Number of days: 9        Elimination:  Continence:   · Bowel: {YES / BJ:20606}  · Bladder: {YES / QM:64322}  Urinary Catheter: {Urinary Catheter:713375498}   Colostomy/Ileostomy/Ileal Conduit: {YES / AP:37986}       Date of Last BM: ***    Intake/Output Summary (Last 24 hours) at 7/7/2020 1120  Last data filed at 7/7/2020 0929  Gross per 24 hour   Intake 240 ml   Output --   Net 240 ml     I/O last 3 completed shifts:   In: 240 [P.O.:240]  Out: -     Safety Concerns:     [employer] Safety Concerns:469482757}    Impairments/Disabilities:      [employer] Impairments/Disabilities:278924468}    Nutrition Therapy:  Current Nutrition Therapy:   [employer] Diet List:096569282}    Routes of Feeding: {CHP DME Other Feedings:487744000}  Liquids: {Slp liquid thickness:11038}  Daily Fluid Restriction: {CHP DME Yes amt example:569531945}  Last Modified Barium Swallow with Video (Video Swallowing Test): {Done Not Done NRXC:798573699}    Treatments at the Time of Hospital Discharge:   Respiratory Treatments: ***  Oxygen Therapy:  {Therapy; copd oxygen:55837}  Ventilator:    { CC Vent UCQL:531753375}    Rehab Therapies: {THERAPEUTIC INTERVENTION:3160770287}  Weight Bearing Status/Restrictions: { CC Weight Bearin}  Other Medical Equipment (for information only, NOT a DME order):  {EQUIPMENT:739723314}  Other Treatments: ***    Patient's personal belongings (please select all that are sent with patient):  {CHP DME Belongings:902900961}    RN SIGNATURE:  {Esignature:172105879}    CASE MANAGEMENT/SOCIAL WORK SECTION    Inpatient Status Date: ***    Readmission Risk Assessment Score:  Readmission Risk              Risk of Unplanned Readmission:        19           Discharging to Facility/ Agency   · Name:   · Address:  · Phone:  · Fax:    Dialysis Facility (if applicable)   · Name:  · Address:  · Dialysis Schedule:  · Phone:  · Fax:    / signature: {Esignature:515443529}    PHYSICIAN SECTION    Prognosis: Good    Condition at Discharge: Stable    Rehab Potential (if transferring to Rehab): Good    Recommended Labs or Other Treatments After Discharge: as recommended by PCP    Physician Certification: I certify the above information and transfer of Rubio Morse  is necessary for the continuing treatment of the diagnosis listed and that he requires [address] for greater 30 days.      Update Admission H&P: No change in H&P    PHYSICIAN SIGNATURE:  Electronically signed by VANGIE Barr CNP on 20 at 11:20 AM EDT

## 2020-07-07 NOTE — BH NOTE
Group Therapy Note    Date: 7/6/2020  Start Time: 1930  End Time:  2000  Number of Participants: 1      Type of Group: Nursing Education      Notes:  Reviewed medication with Марина Torres and he voiced understanding. Discussed coping skills and he spoke of what he has learned here in groups. Status After Intervention:  Improved    Participation Level:  Active Listener and Interactive    Participation Quality: Appropriate, Attentive and Sharing      Speech:  normal      Thought Process/Content: Logical      Affective Functioning: Congruent      Mood: depressed and euthymic      Level of consciousness:  Alert, Oriented x4 and Attentive      Response to Learning: Able to verbalize current knowledge/experience, Able to verbalize/acknowledge new learning, Able to retain information, Capable of insight and Progressing to goal      Endings: None Reported    Modes of Intervention: Education      Discipline Responsible: Registered Nurse      Signature:  Jett Caceres RN

## 2020-07-07 NOTE — PROGRESS NOTES
Type and Reason for Visit: Consult    Nutrition Screen:   · Have you recently lost weight without trying? - 0 to 1 pound (0 points)   · Have you been eating poorly because of a decreased appetite? - No (0 points)   · Malnutrition Screening Tool Score - 0    Dietitian Assessment of Nutrition Re-Screen: Consult completed for weight loss, Mr Ramos Lin has lost weight of approximately 20# but unable to determine time frame. He was only eating 1-2 meals daily at home, \"just not taking care of myself\". We discussed the importance of 3 meals daily, use of whole milk, and peanut butter crackers between meals. His sister picks him up for doctor appointments and takes him to the grocery store. He uses frozen meals for most lunch and dinners and supplements additional meals from his friend that brings meals and takes him out for meals. Discussed the use of a timer to make sure he doesn't forget to eat; therefore, skipping meals.        Electronically signed by Lloyd Dejesus RD, LD on 7/7/20 at 9:12 AM EDT    Contact Number: [phone]

## 2020-07-07 NOTE — DISCHARGE SUMMARY
Department of Psychiatry    Discharge Summary    Joseph Mckay  [id number]    Admission date:   6/30/2020    Discharge:   Date: 07/07/20   Location: [facility]    Inpatient Provider: Jazmyne Garrett D.O. and Feliciano VENCES CNP-BC  Unit: [facility]    Diagnosis on Discharge: Active Hospital Problems    Diagnosis Date Noted    Recurrent major depressive disorder, in partial remission ([facility] [F33.41] 06/30/2020     Priority: High     Class: Acute    Rheumatic aortic valve disease [I06.9] 06/30/2020     Priority: Medium    Chronic interstitial lung disease ([facility] [J84.9] 10/24/2019     Priority: Medium    COPD (chronic obstructive pulmonary disease) ([facility] [J44.9]      Priority: Medium    Anxiety [F41.9]      Priority: Medium    S/P AVR [Z95.2] 12/01/2003     Priority: Medium    Chronic systolic heart failure ([facility] [I50.22] 06/30/2020     Priority: Low     Class: Chronic    Rheumatic mitral regurgitation [I05.1] 06/30/2020     Priority: Low     Class: Chronic    Hyperlipidemia [E78.5]      Priority: Low       Reason for Admission:  Severe episode of recurrent major depressive disorder, without psychotic features    Hospital Course:   Patient was seen and evaluated by a multidisciplinary treatment team, in this evaluation patient was able to contribute freely. Patient was able to provide informed consent and outline the risks and benefits of medications. Joseph Mckay was always compliant with medications. Pt noted a significant reduction in his depression and anxiety during his  stay on [facility]. Pt noted that he slowly improved to the point that he   was comfortable and safe to return home. Pt noted he felt his medications were  working well and denied any current side effects. Treatment team encouraged  patient to stay out of bed and try to find activities to do, verbalized  understanding. Patient reported that he felt safe on the unit and comfortable  for discharge.  Pt Denied suicidal/homicidal ideations, denied any problems  or concerns with medications or side effects. patient voiced progression towards  treatment goals and was offered a copy of updated treatment plan completed  during visit today. Denied any immediate needs or concerns. Pt throughout his stay on [facility] pt felt like his medications were working and  felt comfortable being discharged on these medications. Pt was advised to take  all medications as prescribed, follow up with all scheduled appointments and  abstain from any alcohol or illicit substances. Pt was in agreement. Pt felt  safe and comfortable to be discharge and to follow up with outpatient mental health. Pt was very optimistic about his D/C and returning to his home. Pt stated that he   was doing \"good\" today. Pt stated that he slept \"OK\" last night. Pt stated that his appetite is \"good. \"  Pt stated that he rates his depression a  \"4-5\" on a scale of 0-10 with 10 being the worst and 0 being none. Pt stated  that he rates his anxiety an \"5\" on the same scale. Pt denies any auditory  or visual hallucinations. Pt denied any thoughts to harm himself or anyone else. Pt felt safe and comfortable for D/C. The Pt was educated primarily by verbal means about his diagnoses and their  manifestations in his life. The option for treatment including group individual  therapy programming was offered to him and the use of medications with all their  potential risks, benefits, and side-effects were discussed with the pt at  length. Pt was given the opportunity to ask questions and he participated in the  treatment and planning process. Pt felt ready and eager to be discharged from  the from the [facility] unit. Pt felt he was safe for this disposition. Pt was considered to be able to  participate in informed consent and decision-making with respect to medical,  legal and financial issues at the time of his discharge from the [facility].     Complications: none    Treatment options, medications and alternatives reviewed with Mita Haines and they agree with the plan to discharge. Discharge on regular diet, continue activity as tolerated. Patient appears to be in stable condition and close to their baseline functioning. The patient denies suicidal or homicidal ideations and is showing future orientation. Patient no longer presented an imminent risk of danger to themselves and/or others. At the time of discharge it appears that the patient has received the maximum medical benefit from this hospitalization and can be appropriately managed with community treatment. Medication List      START taking these medications    ARIPiprazole 5 MG tablet  Commonly known as:  ABILIFY  Take 1 tablet by mouth daily  Start taking on:  July 8, 2020     bacitracin 500 UNIT/GM ointment  Apply topically 2 times daily. guaiFENesin 600 MG extended release tablet  Commonly known as:  MUCINEX  Take 1 tablet by mouth 2 times daily     methylphenidate 10 MG tablet  Commonly known as:  RITALIN  Take 1 tablet by mouth every morning for 30 days. Start taking on:  July 8, 2020     polyethylene glycol 17 g packet  Commonly known as:  GLYCOLAX  Take 17 g by mouth daily as needed for Constipation     traZODone 100 MG tablet  Commonly known as:  DESYREL  Take 1 tablet by mouth nightly        CHANGE how you take these medications    PARoxetine 30 MG tablet  Commonly known as:  PAXIL  Take 1 tablet by mouth daily  Start taking on:  July 8, 2020  What changed:    · medication strength  · See the new instructions. warfarin 5 MG tablet  Commonly known as:  COUMADIN  Take as directed. If you are unsure how to take this medication, talk to your nurse or doctor.   Original instructions:  take 1-2 tablets by mouth daily as directed  What changed:    · how much to take  · additional instructions        CONTINUE taking these medications    calcium carbonate 500 MG Tabs tablet  Commonly known as:  OSCAL CENTRUM PO     cetirizine 10 MG tablet  Commonly known as:  ZYRTEC     cyclobenzaprine 10 MG tablet  Commonly known as:  FLEXERIL  take 1 tablet by mouth three times a day if needed for muscle spasm     KRILL OIL OMEGA-3 PO     montelukast 10 MG tablet  Commonly known as:  SINGULAIR  Take 1 tablet by mouth nightly \"Alternate With Zyrtec\"     NASAL SPRAY SALINE NA     Nebulizer Misc     PROAIR HFA IN     Rhinocort Allergy 32 MCG/ACT nasal spray  Generic drug:  budesonide     simvastatin 40 MG tablet  Commonly known as:  ZOCOR  take 1 tablet by mouth at bedtime     sodium chloride (Inhalant) 3 % nebulizer solution     triamcinolone 0.1 % cream  Commonly known as:  KENALOG  Apply topically 2 times daily. TYLENOL 8 HOUR PO           Where to Get Your Medications      These medications were sent to [address] [facility] [phone]  [address] [place] [id number]    Phone:  [phone]   · ARIPiprazole 5 MG tablet  · bacitracin 500 UNIT/GM ointment  · guaiFENesin 600 MG extended release tablet  · methylphenidate 10 MG tablet  · PARoxetine 30 MG tablet  · polyethylene glycol 17 g packet  · traZODone 100 MG tablet         Social History     Socioeconomic History    Marital status:      Spouse name: Not on file    Number of children: 1    Years of education: 12    Highest education level: Not on file   Occupational History    Not on file   Social Needs    Financial resource strain: Not on file    Food insecurity     Worry: Not on file     Inability: Not on file   [employer] needs     Medical: Not on file     Non-medical: Not on file   Tobacco Use    Smoking status: Never Smoker    Smokeless tobacco: Current User     Types: Snuff, Chew   Substance and Sexual Activity    Alcohol use: Yes     Drinks per session: 1 or 2     Binge frequency: Daily or almost daily     Comment:  \"Average 2 Beers A Day\"  Drug use: No    Sexual activity: Not Currently   Lifestyle    Physical activity     Days per week: Not on file     Minutes per session: Not on file    Stress: Not on file   Relationships    Social connections     Talks on phone: Not on file     Gets together: Not on file     Attends Roman [employer] service: Not on file     Active member of club or organization: Not on file     Attends meetings of clubs or organizations: Not on file     Relationship status: Not on file    Intimate partner violence     Fear of current or ex partner: Not on file     Emotionally abused: Not on file     Physically abused: Not on file     Forced sexual activity: Not on file   Other Topics Concern    Not on file   Social History Narrative    Not on file       Past Medical History:   Diagnosis Date    Acid reflux     Acute frontal sinusitis 7/22/2009    Alcohol abuse     \"I Drink Average 2 Beers A Day\"    Anesthesia     \" I get agitated\"    Anxiety     Arthritis     \"Right Shoulder, Neck, Left Knee\"    Atypical mycobacterial infection     Broken teeth     Upper And Lower     [facility]congestive [facility] failure) [facility]     COPD (chronic obstructive pulmonary disease) ([facility]     Sees Dr. Miquel Crowe [place], [facility]     Cough     Frequent Cough With Green Sputum    Depression     Depression     Dyspnea 2/23/2018    H/O cardiovascular MUGA stress test 05/14/2019    EF 50%, NORMAL LVEF, NORMAL WALL MOTION.    H/O echocardiogram 05/22/2014    HILTON=>65-14%, LV systolic function is low normal, mild aortic valve calcification, no pericardial effusion    H/O echocardiogram 12/12/2018    EF 45-50%    History of 24 hour EKG monitoring 6/6/14    24 hr holter monitor. Ventricular ectopics were noted in single and couplet forms. Supraventricular ectopics were noted in single and pair forms.     [place] (hard of hearing)     Bilateral Ears    Hyperlipidemia     Hypertension     Irritant contact dermatitis due to other chemical products 8/26/2019    Other drug allergy(995.27) 7/22/2009    S/P AVR 12/2003    Mechanical valvue     Shortness of breath     Teeth missing     Upper And Lower      Past Surgical History:   Procedure Laterality Date    BRONCHOSCOPY  1996    \"Done Twice\"    CARDIAC VALVE REPLACEMENT  12/17/2003    \"Aortic Valve Replacement, Mechanical Valve\"    COLONOSCOPY  2006    Polyps Removed    DENTAL SURGERY      Teeth Extracted In Past    ENDOSCOPY, COLON, DIAGNOSTIC  In Past    HEMIARTHROPLASTY SHOULDER FRACTURE Right 1/29/2019    SHOULDER HEMIARTHROPLASTY performed by Rajinder Roberson DO at [facility] ARTHROSCOPY Left 1992    LUNG BIOPSY Right [facility]    \"Cauterized Because My Sperm Was Low\"    ROTATOR CUFF REPAIR Right 2008    TONSILLECTOMY  1959 Or 1960      Social History     Tobacco Use    Smoking status: Never Smoker    Smokeless tobacco: Current User     Types: Snuff, Chew   Substance Use Topics    Alcohol use: Yes     Drinks per session: 1 or 2     Binge frequency: Daily or almost daily     Comment:  \"Average 2 Beers A Day\"      Family History   Problem Relation Age of Onset    Cancer Mother         Lymphoma    Diabetes Mother     High Blood Pressure Mother     High Cholesterol Mother     Obesity Mother     Vision Loss Mother         Wore Glasses    Heart Disease Father         \"Heart Failure\"   [facility] COPD Father     Asthma Sister     High Blood Pressure Sister     High Cholesterol Sister     Other Sister         pre diabetic    COPD Sister     Diabetes Sister         \"Pre Diabetes\"    No Known Problems Son         Medications Prior to Admission: Acetaminophen (TYLENOL 8 HOUR PO), Take by mouth as needed Over The Counter  Albuterol Sulfate (PROAIR HFA IN), Inhale into the lungs as needed  simvastatin (ZOCOR) 40 MG tablet, take 1 tablet by mouth at bedtime  warfarin (COUMADIN) 5 MG tablet, take 1-2 tablets by mouth daily as directed (Patient taking Relative 87.5 (H) 36 - 66 %    Lymphocytes % 4.6 (L) 24 - 44 %    Monocytes % 7.1 (H) 0 - 4 %    Eosinophils % 0.0 0 - 3 %    Basophils % 0.1 0 - 1 %    Segs Absolute 18.4 K/CU MM    Lymphocytes Absolute 1.0 K/CU MM    Monocytes Absolute 1.5 K/CU MM    Eosinophils Absolute 0.0 K/CU MM    Basophils Absolute 0.0 K/CU MM    Nucleated RBC % 0.0 %    Total Nucleated RBC 0.0 K/CU MM    Total Immature Neutrophil 0.14 K/CU MM    Immature Neutrophil % 0.7 (H) 0 - 0.43 %   CMP    Collection Time: 06/27/20  5:15 PM   Result Value Ref Range    Sodium 114 (LL) 135 - 145 MMOL/L    Potassium 4.3 3.5 - 5.1 MMOL/L    Chloride 82 (L) 99 - 110 mMol/L    CO2 18 (L) 21 - 32 MMOL/L    BUN 12 6 - 23 MG/DL    CREATININE 1.1 0.9 - 1.3 MG/DL    Glucose 130 (H) 70 - 99 MG/DL    Calcium 9.4 8.3 - 10.6 MG/DL    Alb 4.3 3.4 - 5.0 GM/DL    Total Protein 7.3 6.4 - 8.2 GM/DL    Total Bilirubin 0.8 0.0 - 1.0 MG/DL    ALT 27 10 - 40 U/L    AST 53 (H) 15 - 37 IU/L    Alkaline Phosphatase 68 40 - 128 IU/L    GFR Non-African American >60 >60 mL/min/1.73m2    GFR African American >60 >60 mL/min/1.73m2    Anion Gap 14 4 - 16   ETOH Blood    Collection Time: 06/27/20  5:15 PM   Result Value Ref Range    Alcohol Scrn <0.01 <2.09 %WT/VOL   Salicylate Level    Collection Time: 06/27/20  5:15 PM   Result Value Ref Range    Salicylate Lvl <3.0 (L) 15 - 30 MG/DL    DOSE AMOUNT DOSE AMT. GIVEN - UNKNOWN     DOSE TIME DOSE TIME GIVEN - UNKNOWN    Acetaminophen Level    Collection Time: 06/27/20  5:15 PM   Result Value Ref Range    Acetaminophen Level <5.0 (L) 15 - 30 ug/ml    DOSE AMOUNT DOSE AMT.  GIVEN - UNKNOWN     DOSE TIME DOSE TIME GIVEN - UNKNOWN    PT - INR    Collection Time: 06/27/20  5:15 PM   Result Value Ref Range    Protime 44.0 (H) 11.7 - 14.5 SECONDS    INR 3.59 INDEX   PTT    Collection Time: 06/27/20  5:15 PM   Result Value Ref Range    aPTT 32.7 25.1 - 37.1 SECONDS   Osmolality    Collection Time: 06/27/20  5:15 PM   Result Value Ref Range Osmolality 243 (L) 280 - 300 MOS/L   Drug screen multi urine    Collection Time: 06/27/20  7:30 PM   Result Value Ref Range    Cannabinoid Scrn, Ur NEGATIVE NEGATIVE    Amphetamines NEGATIVE NEGATIVE    Cocaine Metabolite NEGATIVE NEGATIVE    Benzodiazepine Screen, Urine NEGATIVE NEGATIVE    Barbiturate Screen, Ur NEGATIVE NEGATIVE    Opiates, Urine NEGATIVE NEGATIVE    Phencyclidine, Urine NEGATIVE NEGATIVE    Oxycodone NEGATIVE NEGATIVE   Urinalysis    Collection Time: 06/27/20  7:30 PM   Result Value Ref Range    Color, UA YELLOW YELLOW    Clarity, UA CLEAR CLEAR    Glucose, Urine NEGATIVE NEGATIVE MG/DL    Bilirubin Urine NEGATIVE NEGATIVE MG/DL    Ketones, Urine SMALL (A) NEGATIVE MG/DL    Specific Gravity, UA 1.009 1.001 - 1.035    Blood, Urine NEGATIVE NEGATIVE    pH, Urine 6.0 5.0 - 8.0    Protein, UA NEGATIVE NEGATIVE MG/DL    Urobilinogen, Urine NORMAL 0.2 - 1.0 MG/DL    Nitrite Urine, Quantitative NEGATIVE NEGATIVE    Leukocyte Esterase, Urine NEGATIVE NEGATIVE    RBC, UA 1 0 - 3 /HPF    WBC, UA 1 0 - 2 /HPF    Bacteria, UA NEGATIVE NEGATIVE /HPF    Trichomonas, UA NONE SEEN NONE SEEN /HPF   Electrolytes urine random    Collection Time: 06/27/20  7:30 PM   Result Value Ref Range    Sodium, Ur 10 (L) 35 - 167 MMOL/L    Potassium, Ur 18.8 (L) 22 - 119 MMOL/L    Chloride 10 (L) 43 - 210 MMOL/L   Osmolality, Urine    Collection Time: 06/27/20  7:30 PM   Result Value Ref Range    Osmolality, Ur 188 (L) 292 - 1090 MOS/L   Sodium    Collection Time: 06/27/20 11:00 PM   Result Value Ref Range    Sodium 122 (L) 135 - 145 MMOL/L   Comprehensive Metabolic Panel w/ Reflex to MG    Collection Time: 06/28/20  3:15 AM   Result Value Ref Range    Sodium 123 (L) 135 - 145 MMOL/L    Potassium 4.4 3.5 - 5.1 MMOL/L    Chloride 90 (L) 99 - 110 mMol/L    CO2 24 21 - 32 MMOL/L    BUN 10 6 - 23 MG/DL    CREATININE 1.0 0.9 - 1.3 MG/DL    Glucose 88 70 - 99 MG/DL    Calcium 8.8 8.3 - 10.6 MG/DL    Alb 4.0 3.4 - 5.0 GM/DL    Total Protein 6.1 (L) 6.4 - 8.2 GM/DL    Total Bilirubin 0.7 0.0 - 1.0 MG/DL    ALT 26 10 - 40 U/L    AST 48 (H) 15 - 37 IU/L    Alkaline Phosphatase 63 40 - 128 IU/L    GFR Non-African American >60 >60 mL/min/1.73m2    GFR African American >60 >60 mL/min/1.73m2    Anion Gap 9 4 - 16   CBC auto differential    Collection Time: 06/28/20  3:15 AM   Result Value Ref Range    WBC 11.0 (H) 4.0 - 10.5 K/CU MM    RBC 3.61 (L) 4.6 - 6.2 M/CU MM    Hemoglobin 11.3 (L) 13.5 - 18.0 GM/DL    Hematocrit 32.1 (L) 42 - 52 %    MCV 88.9 78 - 100 FL    MCH 31.3 (H) 27 - 31 PG    MCHC 35.2 32.0 - 36.0 %    RDW 13.3 11.7 - 14.9 %    Platelets 111 546 - 627 K/CU MM    MPV 9.1 7.5 - 11.1 FL    Differential Type AUTOMATED DIFFERENTIAL     Segs Relative 75.8 (H) 36 - 66 %    Lymphocytes % 10.1 (L) 24 - 44 %    Monocytes % 12.9 (H) 0 - 4 %    Eosinophils % 0.1 0 - 3 %    Basophils % 0.2 0 - 1 %    Segs Absolute 8.3 K/CU MM    Lymphocytes Absolute 1.1 K/CU MM    Monocytes Absolute 1.4 K/CU MM    Eosinophils Absolute 0.0 K/CU MM    Basophils Absolute 0.0 K/CU MM    Nucleated RBC % 0.0 %    Total Nucleated RBC 0.0 K/CU MM    Total Immature Neutrophil 0.10 K/CU MM    Immature Neutrophil % 0.9 (H) 0 - 0.43 %   Protime-INR    Collection Time: 06/28/20  3:15 AM   Result Value Ref Range    Protime 51.7 (H) 11.7 - 14.5 SECONDS    INR 4.21 INDEX   Sodium    Collection Time: 06/28/20  3:15 AM   Result Value Ref Range    Sodium 122 (L) 135 - 145 MMOL/L   Protime-INR    Collection Time: 06/28/20  3:15 AM   Result Value Ref Range    Protime 55.3 (H) 11.7 - 14.5 SECONDS    INR 4.50 INDEX   Sodium    Collection Time: 06/28/20  8:43 AM   Result Value Ref Range    Sodium 124 (L) 135 - 145 MMOL/L   Sodium    Collection Time: 06/28/20  8:24 PM   Result Value Ref Range    Sodium 125 (L) 135 - 145 MMOL/L   Sodium    Collection Time: 06/29/20 12:57 AM   Result Value Ref Range    Sodium 128 (L) 135 - 145 MMOL/L   CBC auto differential    Collection Time: 06/29/20 12:57 AM   Result Value Ref Range    WBC 9.9 4.0 - 10.5 K/CU MM    RBC 3.76 (L) 4.6 - 6.2 M/CU MM    Hemoglobin 11.8 (L) 13.5 - 18.0 GM/DL    Hematocrit 34.2 (L) 42 - 52 %    MCV 91.0 78 - 100 FL    MCH 31.4 (H) 27 - 31 PG    MCHC 34.5 32.0 - 36.0 %    RDW 13.8 11.7 - 14.9 %    Platelets 535 517 - 533 K/CU MM    MPV 9.4 7.5 - 11.1 FL    Differential Type AUTOMATED DIFFERENTIAL     Segs Relative 74.9 (H) 36 - 66 %    Lymphocytes % 12.1 (L) 24 - 44 %    Monocytes % 11.9 (H) 0 - 4 %    Eosinophils % 0.2 0 - 3 %    Basophils % 0.3 0 - 1 %    Segs Absolute 7.4 K/CU MM    Lymphocytes Absolute 1.2 K/CU MM    Monocytes Absolute 1.2 K/CU MM    Eosinophils Absolute 0.0 K/CU MM    Basophils Absolute 0.0 K/CU MM    Nucleated RBC % 0.0 %    Total Nucleated RBC 0.0 K/CU MM    Total Immature Neutrophil 0.06 K/CU MM    Immature Neutrophil % 0.6 (H) 0 - 0.43 %   Protime-INR    Collection Time: 06/29/20 12:57 AM   Result Value Ref Range    Protime 26.1 (H) 11.7 - 14.5 SECONDS    INR 2.14 INDEX   Sodium    Collection Time: 06/29/20  6:43 AM   Result Value Ref Range    Sodium 128 (L) 135 - 145 MMOL/L   Sodium    Collection Time: 06/29/20 12:04 PM   Result Value Ref Range    Sodium 127 (L) 135 - 145 MMOL/L   Covid-19 Ambulatory    Collection Time: 06/29/20  2:10 PM   Result Value Ref Range    Source VIRAL SWAB     SARS-CoV-2 NOT DETECTED NOT DETECTED   Sodium    Collection Time: 06/29/20  7:40 PM   Result Value Ref Range    Sodium 127 (L) 135 - 145 MMOL/L   Sodium    Collection Time: 06/30/20  3:01 AM   Result Value Ref Range    Sodium 130 (L) 135 - 145 MMOL/L   CBC auto differential    Collection Time: 06/30/20  3:01 AM   Result Value Ref Range    WBC 7.2 4.0 - 10.5 K/CU MM    RBC 3.20 (L) 4.6 - 6.2 M/CU MM    Hemoglobin 9.9 (L) 13.5 - 18.0 GM/DL    Hematocrit 29.7 (L) 42 - 52 %    MCV 92.8 78 - 100 FL    MCH 30.9 27 - 31 PG    MCHC 33.3 32.0 - 36.0 %    RDW 13.8 11.7 - 14.9 %    Platelets 831 334 - 738 K/CU MM    MPV 9.0 7.5 - 11.1 FL Differential Type AUTOMATED DIFFERENTIAL     Segs Relative 69.6 (H) 36 - 66 %    Lymphocytes % 17.7 (L) 24 - 44 %    Monocytes % 10.9 (H) 0 - 4 %    Eosinophils % 0.4 0 - 3 %    Basophils % 0.4 0 - 1 %    Segs Absolute 5.0 K/CU MM    Lymphocytes Absolute 1.3 K/CU MM    Monocytes Absolute 0.8 K/CU MM    Eosinophils Absolute 0.0 K/CU MM    Basophils Absolute 0.0 K/CU MM    Nucleated RBC % 0.0 %    Total Nucleated RBC 0.0 K/CU MM    Total Immature Neutrophil 0.07 K/CU MM    Immature Neutrophil % 1.0 (H) 0 - 0.43 %   Protime-INR    Collection Time: 06/30/20  3:01 AM   Result Value Ref Range    Protime 16.2 (H) 11.7 - 14.5 SECONDS    INR 1.33 INDEX   Sodium    Collection Time: 06/30/20  9:37 AM   Result Value Ref Range    Sodium 132 (L) 135 - 145 MMOL/L   COVID-19    Collection Time: 06/30/20 12:44 PM   Result Value Ref Range    SARS-CoV-2, NAAT NOT DETECTED    Protime-INR    Collection Time: 07/01/20  7:00 AM   Result Value Ref Range    Protime 13.3 11.7 - 14.5 SECONDS    INR 1.16 INDEX   Basic Metabolic Panel w/ Reflex to MG    Collection Time: 07/01/20  7:00 AM   Result Value Ref Range    Sodium 136 135 - 145 MMOL/L    Potassium 4.4 3.5 - 5.1 MMOL/L    Chloride 101 99 - 110 mMol/L    CO2 22 21 - 32 MMOL/L    Anion Gap 13 4 - 16    BUN 7 6 - 23 MG/DL    CREATININE 0.8 (L) 0.9 - 1.3 MG/DL    Glucose 102 (H) 70 - 99 MG/DL    Calcium 8.8 8.3 - 10.6 MG/DL    GFR Non-African American >60 >60 mL/min/1.73m2    GFR African American >60 >60 mL/min/1.73m2   Hemoglobin A1c    Collection Time: 07/01/20  7:00 AM   Result Value Ref Range    Hemoglobin A1C 5.5 4.2 - 6.3 %    eAG 111 mg/dL   TSH without Reflex    Collection Time: 07/01/20  7:00 AM   Result Value Ref Range    TSH, High Sensitivity 2.360 0.270 - 4.20 uIu/ml   T4, free    Collection Time: 07/01/20  7:00 AM   Result Value Ref Range    T4 Free 1.38 0.9 - 1.8 NG/DL   Lipid panel - fasting    Collection Time: 07/01/20  7:00 AM   Result Value Ref Range    Triglycerides 57 <150 MG/DL    Cholesterol 126 <200 MG/DL    HDL 51 >40 MG/DL    LDL Direct 69 <100 MG/DL   Ammonia    Collection Time: 07/01/20  7:00 AM   Result Value Ref Range    Ammonia 18 16 - 60 UMOL/L   Protime-INR    Collection Time: 07/02/20  7:00 AM   Result Value Ref Range    Protime 13.7 11.7 - 14.5 SECONDS    INR 1.20 INDEX   Comprehensive Metabolic Panel w/ Reflex to MG    Collection Time: 07/02/20  7:30 AM   Result Value Ref Range    Sodium 139 135 - 145 MMOL/L    Potassium 4.4 3.5 - 5.1 MMOL/L    Chloride 102 99 - 110 mMol/L    CO2 33 (H) 21 - 32 MMOL/L    BUN 7 6 - 23 MG/DL    CREATININE 0.8 (L) 0.9 - 1.3 MG/DL    Glucose 100 (H) 70 - 99 MG/DL    Calcium 9.0 8.3 - 10.6 MG/DL    Alb 3.8 3.4 - 5.0 GM/DL    Total Protein 5.9 (L) 6.4 - 8.2 GM/DL    Total Bilirubin 0.3 0.0 - 1.0 MG/DL    ALT 24 10 - 40 U/L    AST 25 15 - 37 IU/L    Alkaline Phosphatase 55 40 - 129 IU/L    GFR Non-African American >60 >60 mL/min/1.73m2    GFR African American >60 >60 mL/min/1.73m2    Anion Gap 4 4 - 16   PSA, total and free    Collection Time: 07/02/20  7:30 AM   Result Value Ref Range    PSA, Free 0.1 ng/mL    PSA 0.5 0.0 - 4.0 ng/mL    PSA, Free Pct 20 %   Lactate dehydrogenase    Collection Time: 07/02/20  7:30 AM   Result Value Ref Range     120 - 246 IU/L   CBC auto differential    Collection Time: 07/02/20  7:30 AM   Result Value Ref Range    WBC 6.0 4.0 - 10.5 K/CU MM    RBC 3.88 (L) 4.6 - 6.2 M/CU MM    Hemoglobin 11.8 (L) 13.5 - 18.0 GM/DL    Hematocrit 37.6 (L) 42 - 52 %    MCV 96.9 78 - 100 FL    MCH 30.4 27 - 31 PG    MCHC 31.4 (L) 32.0 - 36.0 %    RDW 14.5 11.7 - 14.9 %    Platelets 617 586 - 582 K/CU MM    MPV 9.1 7.5 - 11.1 FL    Differential Type AUTOMATED DIFFERENTIAL     Segs Relative 69.9 (H) 36 - 66 %    Lymphocytes % 18.6 (L) 24 - 44 %    Monocytes % 9.7 (H) 0 - 4 %    Eosinophils % 0.7 0 - 3 %    Basophils % 0.3 0 - 1 %    Segs Absolute 4.2 K/CU MM    Lymphocytes Absolute 1.1 K/CU MM    Monocytes Absolute 0.6 K/CU MM    Eosinophils Absolute 0.0 K/CU MM    Basophils Absolute 0.0 K/CU MM    Immature Neutrophil % 0.8 (H) 0 - 0.43 %    Total Immature Neutrophil 0.05 K/CU MM   Protime-INR    Collection Time: 07/03/20  7:00 AM   Result Value Ref Range    Protime 15.1 (H) 11.7 - 14.5 SECONDS    INR 1.32 INDEX   Protime-INR    Collection Time: 07/04/20  5:42 AM   Result Value Ref Range    Protime 18.4 (H) 11.7 - 14.5 SECONDS    INR 1.60 INDEX   Protime-INR    Collection Time: 07/05/20  5:12 AM   Result Value Ref Range    Protime 16.6 (H) 11.7 - 14.5 SECONDS    INR 1.45 INDEX   Protime-INR    Collection Time: 07/06/20  6:11 AM   Result Value Ref Range    Protime 15.1 (H) 11.7 - 14.5 SECONDS    INR 1.32 INDEX   Protime-INR    Collection Time: 07/07/20  6:47 AM   Result Value Ref Range    Protime 22.0 (H) 11.7 - 14.5 SECONDS    INR 1.90 INDEX       40 Minutes    Electronically signed by VANGIE Leblanc CNP on 7/7/2020 at 11:10 AM

## 2020-07-07 NOTE — PLAN OF CARE
Problem: Depressive Behavior With or Without Suicide Precautions:  Goal: Able to verbalize acceptance of life and situations over which he or she has no control  Description: Able to verbalize acceptance of life and situations over which he or she has no control  7/7/2020 1324 by Ramiro Galvan RN  Outcome: Completed  7/7/2020 0201 by Bismark Taveras RN  Outcome: Ongoing  Goal: Able to verbalize and/or display a decrease in depressive symptoms  Description: Able to verbalize and/or display a decrease in depressive symptoms  7/7/2020 1324 by Ramiro Galvan RN  Outcome: Completed  7/7/2020 0201 by Bismark Taveras RN  Outcome: Met This Shift  Goal: Ability to disclose and discuss suicidal ideas will improve  Description: Ability to disclose and discuss suicidal ideas will improve  7/7/2020 1324 by Ramiro Galvan RN  Outcome: Completed  7/7/2020 0201 by Bismark Taveras RN  Outcome: Met This Shift  Goal: Able to verbalize support systems  Description: Able to verbalize support systems  7/7/2020 1324 by Ramiro Galvan RN  Outcome: Completed  7/7/2020 0201 by Bismark Taveras RN  Outcome: Ongoing  Goal: Absence of self-harm  Description: Absence of self-harm  7/7/2020 1324 by Ramiro Galvan RN  Outcome: Completed  7/7/2020 0201 by Bismark Taveras RN  Outcome: Met This Shift  Goal: Patient specific goal  Description: Patient specific goal  7/7/2020 1324 by Ramiro Galvan RN  Outcome: Completed  7/7/2020 0201 by Bismark Taveras RN  Outcome: Ongoing  Goal: Participates in care planning  Description: Participates in care planning  7/7/2020 1324 by Ramiro Galvan RN  Outcome: Completed  7/7/2020 0201 by Bismark Taveras RN  Outcome: Ongoing

## 2020-07-07 NOTE — CARE COORDINATION
LSW called [facility] to schedule patient follow-ups. Voicemail left and LSW awaits call back. 12:25 PM  Patient follow-ups set with [facility]. Patient has a med management telehealth with Dr. Jose Ramon Flood on 07/30/20 at 11:15 AM and a counseling appointment on 08/06/20 at 1 PM with [facility]. If any counseling spots open before this, they will contact patient to move him up. AVS updated to reflect. 1:00 PM  Discharge summary faxed to [facility]. 3:30 PM  Discharge clinicals left on confidential voicemail of Bon Lieberman with [facility] at [phone].

## 2020-07-07 NOTE — CARE COORDINATION
LSW called [facility] to schedule patient follow-ups. Voicemail left and LSW awaits call back.

## 2020-07-07 NOTE — GROUP NOTE
Group Therapy Note    Date: 7/7/2020    Group Start Time: 0830  Group End Time: 2598    Number of Participants: 8/9    Type: Morning Goals Group/ Community Meeting    Group Topic/Objective: Set Goal For The Day and to review Unit Rules and Regulations. Patient's Goal:  Pt stats his goal \"Relax and try to soak it in. \"    Notes:  Pt states he is feeling \"good\" d/t sleeping well and is grateful for \"caring people in my life. \"  Pt reports restful sleep, but is unsure how many hours. Depression (0-10): Pt declined to rate. Anxiety (0-10): Pt declined to rate    Irritability/Aggitation (0-10): Pt declined to rate. Status After Intervention:  Improved    Participation Level:  Active Listener and Interactive    Participation Quality: Appropriate, Attentive and Sharing    Speech:  normal    Thought Process/Content: Logical    Affective Functioning: Congruent    Mood: Bright    Level of consciousness:  Alert and Attentive    Response to Learning: Able to verbalize current knowledge/experience    Endings: None Reported    Modes of Intervention: Education, Support and Socialization    Discipline Responsible: Certified Therapeutic Recreation Specialist     Electronically signed by Samantha Hodgkin, MA on 7/7/2020 at 9:46 AM

## 2020-07-07 NOTE — GROUP NOTE
Group Therapy Note    Date: 7/7/2020    Group Start Time: 1030  Group End Time: 0355  Group Topic: Recreational    [address]    Ivana Stauffer, PAULETTES, MA        Group Therapy Note    Attendees: 7/9       Notes:  Pts participated in recreational therapy group; Relaxation Through Music. Pts were each allowed to pick a song they could listen to that will help them relax. Pts were encouraged to relax and socialize as the music was playing. Pt participated actively in the group. Pt noted to be frustrated with female peer. Pt expressed enjoyment in listening to music. Status After Intervention:  Improved    Participation Level:  Active Listener and Interactive    Participation Quality: Appropriate, Attentive and Sharing      Speech:  normal      Thought Process/Content: Logical      Affective Functioning: Congruent      Mood: Bright      Level of consciousness:  Alert and Attentive      Response to Learning: Able to verbalize current knowledge/experience and Able to verbalize/acknowledge new learning      Endings: None Reported    Modes of Intervention: Socialization and Activity      Discipline Responsible: Certified Therapeutic Recreation Specialist       Signature:  Ivana Stauffer [place], [place]

## 2020-07-07 NOTE — PROGRESS NOTES
General Observations: Pt appears to be Cooperative and Pleasant during the shift today Pt has been compliant with medication and denies side effects     Amount of sleep: 7.5 hours    Appetite: normal    Suicidal Ideations: No    Homicidal Ideations: No    Hallucinations:  absent      Delusions:  absent          Taylor Inman RN

## 2020-07-07 NOTE — PROGRESS NOTES
INR therapeutic    Assessment: INR subtherapeutic today at 1.9 but rising appropriately after 2 booster doses. Return to home dose and will monitor closely. Daily INR ordered? yes    Plan: Warfarin 10mg tonight. Thank you for the consult. Pharmacy will continue to follow.     Mayra BaldwinD, [facility] 7/7/2020 at 8:46 AM

## 2020-07-07 NOTE — PROGRESS NOTES
Discharge instructions given to patient, all questions answered, verbalized understanding, instructed to call PCP for lab monitoring and instructions on coumadin, he states \"he has been on coumadin for a while now and understands the dangers of high and low INR's \" , skin assessment complete with Alana PCA, no changes since morning assessment.

## 2020-07-09 ENCOUNTER — NURSE ONLY (OUTPATIENT)
Dept: FAMILY MEDICINE CLINIC | Age: 65
End: 2020-07-09
Payer: MEDICARE

## 2020-07-09 LAB
INTERNATIONAL NORMALIZATION RATIO, POC: 1.5
PROTHROMBIN TIME, POC: NORMAL

## 2020-07-09 PROCEDURE — 85610 PROTHROMBIN TIME: CPT | Performed by: FAMILY MEDICINE

## 2020-07-14 ENCOUNTER — OFFICE VISIT (OUTPATIENT)
Dept: FAMILY MEDICINE CLINIC | Age: 65
End: 2020-07-14
Payer: MEDICARE

## 2020-07-14 VITALS
BODY MASS INDEX: 20.39 KG/M2 | TEMPERATURE: 98.2 F | OXYGEN SATURATION: 92 % | HEART RATE: 58 BPM | SYSTOLIC BLOOD PRESSURE: 130 MMHG | WEIGHT: 126.9 LBS | HEIGHT: 66 IN | DIASTOLIC BLOOD PRESSURE: 70 MMHG

## 2020-07-14 PROCEDURE — 99214 OFFICE O/P EST MOD 30 MIN: CPT | Performed by: FAMILY MEDICINE

## 2020-07-14 PROCEDURE — G8420 CALC BMI NORM PARAMETERS: HCPCS | Performed by: FAMILY MEDICINE

## 2020-07-14 PROCEDURE — 1111F DSCHRG MED/CURRENT MED MERGE: CPT | Performed by: FAMILY MEDICINE

## 2020-07-14 PROCEDURE — 3017F COLORECTAL CA SCREEN DOC REV: CPT | Performed by: FAMILY MEDICINE

## 2020-07-14 PROCEDURE — 4004F PT TOBACCO SCREEN RCVD TLK: CPT | Performed by: FAMILY MEDICINE

## 2020-07-14 PROCEDURE — G8427 DOCREV CUR MEDS BY ELIG CLIN: HCPCS | Performed by: FAMILY MEDICINE

## 2020-07-14 RX ORDER — WARFARIN SODIUM 5 MG/1
TABLET ORAL
Qty: 60 TABLET | Refills: 1 | Status: SHIPPED | OUTPATIENT
Start: 2020-07-14 | End: 2020-09-15 | Stop reason: SDUPTHER

## 2020-07-14 RX ORDER — SIMVASTATIN 40 MG
TABLET ORAL
Qty: 90 TABLET | Refills: 0 | Status: SHIPPED | OUTPATIENT
Start: 2020-07-14 | End: 2020-11-24 | Stop reason: SDUPTHER

## 2020-07-14 ASSESSMENT — ENCOUNTER SYMPTOMS
ABDOMINAL PAIN: 0
SHORTNESS OF BREATH: 1
COUGH: 1

## 2020-07-14 NOTE — PROGRESS NOTES
7/14/2020     Adela Lutz is a 62 y.o. male who presents today with:  Chief Complaint   Patient presents with    Other     pt here to have stitches removed in his wrists and the ones in his neck he got out using a razor . Pt started lovenox . Pt states he is on new meds from mental health and is doing better. .    /70   Pulse 58   Temp 98.2 °F (36.8 °C)   Ht 5' 5.5\" (1.664 m)   Wt 126 lb 14.4 oz (57.6 kg)   SpO2 92%   BMI 20.80 kg/m²     HPI  This is a follow-up from patient's stay in an inpatient mental facility. History was obtained from the pt. Reports that he is \"healing mentally and physically. \"  States that he went to the ED on Saturday, 6/27/20, due to a suicide attempt where he tried to slit his wrists and his neck. He did receive stitches in the wound on his left wrist and in his left neck. He reports that he is unsure of how many stitches were placed in those wounds. Believes only 2 were placed in his left neck and 3 in his left wrist.  He notes that the stitches and the laceration on the left side of his neck once came out when he was shaving. Denies suture placement for the right sided wounds. The last wrist does not look like the wound has completely closed but is well-healing with no signs of infection. He is still using Bactroban cream on the areas. While staying inpatient, he was started on Paxil 30mg 1 tablet po qd, Abilify 5mg, 1 tablet po daily, Ritalin 10mg, 1 tablet po daily, and trazodone 100mg 1 tablet p.o. nightly to help with his sleep. Patient states that he is doing much better since starting these medications. He states that he still has some anxiety particularly with the state of the world during this pandemic, but denies any suicidal ideations, homicidal ideations, or thoughts of self harm.     I did confirm that he had follow-up with mental health, and per the patient in his discharge papers, he is to call mental health of [address] [place] [address] on 7/30/20 for a medication check telehealth visit. 8/6/20 has an appointment Mike rosenthal at 1 pm.     Patient is on warfarin typically, but is currently being bridged to Lovenox in preparation for colonoscopy that is to be completed later this week  By Dr. Blanca Velazquez, gastroenterology. Will need to recheck his Coumadin level approximately 1 week after Dr. Blanca Velazquez allows the patient to restart Coumadin. We will reviewing his ROS, patient complains of a chronic cough due to his history of COPD as well as shortness of breath which is at baseline for him also due to COPD. He does report palpitations and had bouts of tachycardia while staying at the inpatient psych facility. He is following up with cardiology on 7/23/2020. Does have intermittent sinus headaches and lightheadedness due to allergies which is helped with Tylenol. Denies any fevers, chills, increased fatigue, blurred vision or double vision, chest pain, leg swelling, abdominal pain, or dizziness. REVIEW OF SYMPTOMS    Review of Systems   Constitutional: Negative for chills, fatigue and fever. Eyes: Negative for visual disturbance (no blurred or double vision. ). Respiratory: Positive for cough (chronic - COPD hx.) and shortness of breath (at baseline for COPD). Cardiovascular: Positive for palpitations (had bouts of tachycardia - following with cardiology 7/23/20). Negative for chest pain and leg swelling. Gastrointestinal: Negative for abdominal pain. Neurological: Positive for light-headedness (sinus HA) and headaches (allergies - helped with tylenol). Negative for dizziness. Psychiatric/Behavioral: Negative for self-injury, sleep disturbance (improved from previous stay. ) and suicidal ideas. The patient is nervous/anxious (still present but improved since inpatient stay). No homicidal ideations.        PAST MEDICAL HISTORY  Past Medical History:   Diagnosis Date    Acid reflux     Acute frontal sinusitis 7/22/2009    Alcohol abuse     \"I Drink Average 2 Beers A Day\"    Anesthesia     \" I get agitated\"    Anxiety     Arthritis     \"Right Shoulder, Neck, Left Knee\"    Atypical mycobacterial infection     Broken teeth     Upper And Lower     CHF [facility]congestive heart failure) [facility])     COPD (chronic obstructive pulmonary disease) ([facility])     Se[place] Dr. Quoc Mcclain [place], [address]     Cough     Frequent Cough With Green Sputum    Depression     Depression     Dyspnea 2/23/2018    H/O cardiovascular MUGA stress test 05/14/2019    EF 50%, NORMAL LVEF, NORMAL WALL MOTION.    H/O echocardiogram 05/22/2014    TRACY=>48-91%, LV systolic function is low normal, mild aortic valve calcification, no pericardial effusion    H/O echocardiogram 12/12/2018    EF 45-50%    History of 24 hour EKG monitoring 6/6/14    24 hr holter monitor. Ventricular ectopics were noted in single and couplet forms. Supraventricular ectopics were noted in single and pair forms.     [place] (hard of hearing)     Bilateral Ears    Hyperlipidemia     Hypertension     Irritant contact dermatitis due to other chemical products 8/26/2019    Other drug allergy(995.27) 7/22/2009    S/P AVR 12/2003    Mechanical valvue     Shortness of breath     Teeth missing     Upper And Lower       FAMILY HISTORY  Family History   Problem Relation Age of Onset    Cancer Mother         Lymphoma    Diabetes Mother     High Blood Pressure Mother     High Cholesterol Mother     Obesity Mother     Vision Loss Mother         Wore Glasses    Heart Disease Father         \"Heart Failure\"   [facility] COPD Father     Asthma Sister     High Blood Pressure Sister     High Cholesterol Sister     Other Sister         pre diabetic    COPD Sister     Diabetes Sister         \"Pre Diabetes\"    No Known Problems Son        SOCIAL HISTORY  Social History     Socioeconomic History    Marital status:      Spouse name: None    Number of children: 1    Years of education: 12    Highest education level: None   Occupational History    None   Social Needs    Financial resource strain: None    Food insecurity     Worry: None     Inability: None    Transportation needs     Medical: None     Non-medical: None   Tobacco Use    Smoking status: Never Smoker    Smokeless tobacco: Current User     Types: Snuff, Chew   Substance and Sexual Activity    Alcohol use: Yes     Drinks per session: 1 or 2     Binge frequency: Daily or almost daily     Comment:  \"Average 2 Beers A Day\"    Drug use: No    Sexual activity: Not Currently   Lifestyle    Physical activity     Days per week: None     Minutes per session: None    Stress: None   Relationships    Social connections     Talks on phone: None     Gets together: None     Attends Rastafari service: None     Active member of club or organization: None     Attends meetings of clubs or organizations: None     Relationship status: None    Intimate partner violence     Fear of current or ex partner: None     Emotionally abused: None     Physically abused: None     Forced sexual activity: None   Other Topics Concern    None   Social History Narrative    None        SURGICAL HISTORY  Past Surgical History:   Procedure Laterality Date    BRONCHOSCOPY  1996    \"Done Twice\"    CARDIAC VALVE REPLACEMENT  12/17/2003    \"Aortic Valve Replacement, Mechanical Valve\"    COLONOSCOPY  2006    Polyps Removed    DENTAL SURGERY      Teeth Extracted In Past    ENDOSCOPY, COLON, DIAGNOSTIC  In Past    HEMIARTHROPLASTY SHOULDER FRACTURE Right 1/29/2019    SHOULDER HEMIARTHROPLASTY performed by Tiki Skinner DO at [facility] ARTHROSCOPY Left 1992   [address]    \"Cauterized Because My Sperm Was Low\"    ROTATOR CUFF REPAIR Right 2008    TONSILLECTOMY  1959 Or 1960       CURRENT MEDICATIONS  Current Outpatient Medications   Medication Sig Dispense Refill    enoxaparin (LOVENOX) 80 MG/0.8ML injection Inject 0.7 mg/kg into the skin 2 times daily      PARoxetine (PAXIL) 30 MG tablet Take 1 tablet by mouth daily 30 tablet 1    traZODone (DESYREL) 100 MG tablet Take 1 tablet by mouth nightly 30 tablet 1    ARIPiprazole (ABILIFY) 5 MG tablet Take 1 tablet by mouth daily 30 tablet 1    guaiFENesin (MUCINEX) 600 MG extended release tablet Take 1 tablet by mouth 2 times daily 60 tablet 1    bacitracin 500 UNIT/GM ointment Apply topically 2 times daily. 28 g 1    polyethylene glycol (GLYCOLAX) 17 g packet Take 17 g by mouth daily as needed for Constipation 527 g 1    methylphenidate (RITALIN) 10 MG tablet Take 1 tablet by mouth every morning for 30 days. 30 tablet 0    simvastatin (ZOCOR) 40 MG tablet take 1 tablet by mouth at bedtime 30 tablet 2    calcium carbonate (OSCAL) 500 MG TABS tablet Take 500 mg by mouth daily      KRILL OIL OMEGA-3 PO Take by mouth      montelukast (SINGULAIR) 10 MG tablet Take 1 tablet by mouth nightly \"Alternate With Zyrtec\" 30 tablet 5    sodium chloride, Inhalant, 3 % nebulizer solution       cetirizine (ZYRTEC) 10 MG tablet Take 10 mg by mouth \"Alternate With Singulair\"      NASAL SPRAY SALINE NA by Nasal route daily Over The Counter      budesonide (RHINOCORT ALLERGY) 32 MCG/ACT nasal spray 2 sprays by Nasal route daily      Acetaminophen (TYLENOL 8 HOUR PO) Take by mouth as needed Over The Counter      Multiple Vitamins-Minerals (CENTRUM PO) Take by mouth daily      Albuterol Sulfate (PROAIR HFA IN) Inhale into the lungs as needed      Nebulizer MISC Inhale into the lungs as needed      warfarin (COUMADIN) 5 MG tablet take 1-2 tablets by mouth daily as directed (Patient not taking: Reported on 7/14/2020) 60 tablet 1    cyclobenzaprine (FLEXERIL) 10 MG tablet take 1 tablet by mouth three times a day if needed for muscle spasm (Patient not taking: Reported on 5/28/2020) 90 tablet 1    triamcinolone (KENALOG) 0.1 % cream Apply topically 2 times daily.  (Patient not taking: Reported on 5/28/2020) 60 g 1     No current facility-administered medications for this visit. ALLERGIES  Allergies   Allergen Reactions    Ciprofloxacin Hcl Hives and Swelling    Levaquin [Levofloxacin] Hives and Swelling    Other Nausea And Vomiting     \"Allergic To Tylox\"    Ceftin [Cefuroxime]        PHYSICAL EXAM    Physical Exam  Vitals signs and nursing note reviewed. Constitutional:       General: He is not in acute distress. Appearance: He is well-developed. He is not diaphoretic. HENT:      Head: Normocephalic and atraumatic. Cardiovascular:      Rate and Rhythm: Normal rate and regular rhythm. Heart sounds: Normal heart sounds. Pulmonary:      Effort: Pulmonary effort is normal. No respiratory distress. Breath sounds: Normal breath sounds. Abdominal:      General: Bowel sounds are normal.      Palpations: Abdomen is soft. Tenderness: There is no abdominal tenderness. Skin:     General: Skin is warm and dry. Findings: Laceration present. Comments: Well-healing lacerations of his right wrist and right side of his neck as well as the left side of his neck. The left wrist appears to be healing with no signs of infection, but did not have good healing with approximation of the wound on his left wrist.  No signs of cellulitis or other infection. No warmth to the touch of the area. Neurological:      Mental Status: He is alert and oriented to person, place, and time. Psychiatric:         Attention and Perception: Attention normal.         Mood and Affect: Mood normal.         Speech: Speech normal.         Behavior: Behavior normal.         Thought Content: Thought content normal.         ASSESSMENT & PLAN    1. Hospital discharge follow-up  Patient had 3 sutures removed from his left anterior wrist, and Steri-Strips were placed to help keep the area closed and clean.   Patient was instructed to allow these to fall off on their own and he was to keep the area clean and dry. Patient verbalized understanding of and agreement with current POC for the assessment and plan dictated above. 2. Encounter for removal of sutures  See above (#1). 3. Severe episode of recurrent major depressive disorder, without psychotic features ([address]  Continue following with mental health as discussed above. 4. Anxiety  Continue following with mental health as discussed above. 5. Anticoagulant long-term use  Patient is to continue holding warfarin until instructed to restart from his gastroenterologist status post colonoscopy. We will recheck his INR approximately 1 week after he restarts the warfarin.  - warfarin (COUMADIN) 5 MG tablet; take 1-2 tablets by mouth daily as directed  Dispense: 60 tablet; Refill: 1    6. Mixed hyperlipidemia  Issue is stable on current medications. Continue taking medications as prescribed and refills will be provided as necessary.  - simvastatin (ZOCOR) 40 MG tablet; take 1 tablet by mouth at bedtime  Dispense: 90 tablet; Refill: 0      Patient is to continue following with mental health as scheduled, and is to follow-up with  this office in approximately as scheduled with Dr. Akil Pacheco in approximately 2 months, unless otherwise needed in that time. Pt is to call with any questions, concerns, or increase in symptoms. Pt verbalized understanding of and agreement with current plan of care. Electronically signed by MICHAEL Serrano on 7/14/2020      Please note that this chart was generated using dragon dictation software. Although every effort was made to ensure the accuracy of this automated transcription, some errors in transcription may have occurred.

## 2020-07-28 ENCOUNTER — NURSE ONLY (OUTPATIENT)
Dept: FAMILY MEDICINE CLINIC | Age: 65
End: 2020-07-28
Payer: MEDICARE

## 2020-07-28 LAB
INTERNATIONAL NORMALIZATION RATIO, POC: 1.1
PROTHROMBIN TIME, POC: NORMAL

## 2020-07-28 PROCEDURE — 85610 PROTHROMBIN TIME: CPT | Performed by: FAMILY MEDICINE

## 2020-07-28 PROCEDURE — 99024 POSTOP FOLLOW-UP VISIT: CPT | Performed by: FAMILY MEDICINE

## 2020-07-29 ENCOUNTER — TELEPHONE (OUTPATIENT)
Dept: FAMILY MEDICINE CLINIC | Age: 65
End: 2020-07-29

## 2020-07-29 NOTE — TELEPHONE ENCOUNTER
Pt was here yesterday for his PT INR and he has no more orders in his chart for this so I can put his info in . Could you please put the orders in for this .  Thank you , Sugar Mckinney

## 2020-07-30 ENCOUNTER — VIRTUAL VISIT (OUTPATIENT)
Dept: CARDIOLOGY CLINIC | Age: 65
End: 2020-07-30
Payer: MEDICARE

## 2020-07-30 PROBLEM — I38 VALVULAR HEART DISEASE: Status: ACTIVE | Noted: 2020-07-30

## 2020-07-30 PROCEDURE — 1111F DSCHRG MED/CURRENT MED MERGE: CPT | Performed by: NURSE PRACTITIONER

## 2020-07-30 PROCEDURE — G8420 CALC BMI NORM PARAMETERS: HCPCS | Performed by: NURSE PRACTITIONER

## 2020-07-30 PROCEDURE — 4004F PT TOBACCO SCREEN RCVD TLK: CPT | Performed by: NURSE PRACTITIONER

## 2020-07-30 PROCEDURE — G8427 DOCREV CUR MEDS BY ELIG CLIN: HCPCS | Performed by: NURSE PRACTITIONER

## 2020-07-30 PROCEDURE — 3017F COLORECTAL CA SCREEN DOC REV: CPT | Performed by: NURSE PRACTITIONER

## 2020-07-30 PROCEDURE — 99214 OFFICE O/P EST MOD 30 MIN: CPT | Performed by: NURSE PRACTITIONER

## 2020-07-30 ASSESSMENT — ENCOUNTER SYMPTOMS
WHEEZING: 0
SHORTNESS OF BREATH: 1
CHEST TIGHTNESS: 0

## 2020-07-30 NOTE — PROGRESS NOTES
[facility]iologist of [place] and [place]                    2020    TELEHEALTH EVALUATION -- Audio/Visual (During O- public health emergency)    HPI:    Taurus An (:  1955) has requested an audio/video evaluation for : mechanical valve, chf, HLD. Patient was recently hospitalized for suicide attempt. Reports that he is doing much better now. Review of Systems   Constitutional: Negative for unexpected weight change. HENT: Negative for congestion. Eyes: Negative for visual disturbance. Respiratory: Positive for shortness of breath. Negative for chest tightness and wheezing. Cardiovascular: Negative for chest pain, palpitations and leg swelling. Endocrine: Negative for polyuria. Genitourinary: Negative for difficulty urinating. Musculoskeletal: Positive for arthralgias. Neurological: Negative for dizziness. Prior to Visit Medications    Medication Sig Taking? Authorizing Provider   simvastatin (ZOCOR) 40 MG tablet take 1 tablet by mouth at bedtime Yes Claudeen Angles, PA   warfarin (COUMADIN) 5 MG tablet take 1-2 tablets by mouth daily as directed  Patient taking differently: 10 mg take 1-2 tablets by mouth daily as directed Yes Claudeen Angles, PA   PARoxetine (PAXIL) 30 MG tablet Take 1 tablet by mouth daily Yes VANGIE Cesar CNP   ARIPiprazole (ABILIFY) 5 MG tablet Take 1 tablet by mouth daily Yes VANGIE Cesar CNP   guaiFENesin (MUCINEX) 600 MG extended release tablet Take 1 tablet by mouth 2 times daily Yes VANGIE Cesar CNP   polyethylene glycol (GLYCOLAX) 17 g packet Take 17 g by mouth daily as needed for Constipation Yes VANGIE Cesar CNP   calcium carbonate (OSCAL) 500 MG TABS tablet Take 500 mg by mouth daily Yes Historical Provider, MD   KRILL OIL OMEGA-3 PO Take by mouth Yes Historical Provider, MD   triamcinolone (KENALOG) 0.1 % cream Apply topically 2 times daily.  Yes MD jose Woods (SINGULAIR) 10 MG tablet Take 1 tablet by mouth nightly \"Alternate With Zyrtec\" Yes Mary Richards MD   sodium chloride, Inhalant, 3 % nebulizer solution  Yes Historical Provider, MD   cetirizine (ZYRTEC) 10 MG tablet Take 10 mg by mouth \"Alternate With Singulair\" Yes Historical Provider, MD   NASAL SPRAY SALINE NA by Nasal route daily Over The Counter Yes Historical Provider, MD   budesonide (RHINOCORT ALLERGY) 32 MCG/ACT nasal spray 2 sprays by Nasal route daily Yes Historical Provider, MD   Acetaminophen (TYLENOL 8 HOUR PO) Take by mouth as needed Over The Counter Yes Historical Provider, MD   Multiple Vitamins-Minerals (CENTRUM PO) Take by mouth daily Yes Historical Provider, MD   Nebulizer MISC Inhale into the lungs as needed Yes Historical Provider, MD   Albuterol Sulfate (PROAIR HFA IN) Inhale into the lungs as needed  Historical Provider, MD       Social History     Tobacco Use    Smoking status: Never Smoker    Smokeless tobacco: Current User     Types: Snuff, Chew   Substance Use Topics    Alcohol use: Yes     Drinks per session: 1 or 2     Binge frequency: Daily or almost daily     Comment:  \"Average 2 Beers A Day\"    Drug use: No        Allergies   Allergen Reactions    Ciprofloxacin Hcl Hives and Swelling    Levaquin [Levofloxacin] Hives and Swelling    Other Nausea And Vomiting     \"Allergic To Tylox\"    Ceftin [Cefuroxime]    ,   Past Medical History:   Diagnosis Date    Acid reflux     Acute frontal sinusitis 7/22/2009    Alcohol abuse     \"I Drink Average 2 Beers A Day\"    Anesthesia     \" I get agitated\"    Anxiety     Arthritis     \"Right Shoulder, Neck, Left Knee\"    Atypical mycobacterial infection     Broken teeth     Upper And Lower     [facility]     COPD (chronic obstructive pulmonary disease) ([facility])     Sees Dr. Erihc Chaney In [place], [address]     Cough     Frequent Cough With Green Sputum    Depression     Depression     Dyspnea 2/23/2018    H/O cardiovascular MUGA stress test 05/14/2019    EF 50%, NORMAL LVEF, NORMAL WALL MOTION.    H/O echocardiogram 05/22/2014    HC=>45-73%, LV systolic function is low normal, mild aortic valve calcification, no pericardial effusion    H/O echocardiogram 12/12/2018    EF 45-50%    History of 24 hour EKG monitoring 6/6/14    24 hr holter monitor. Ventricular ectopics were noted in single and couplet forms. Supraventricular ectopics were noted in single and pair forms.     [place] (hard of hearing)     Bilateral Ears    Hyperlipidemia     Hypertension     Irritant contact dermatitis due to other chemical products 8/26/2019    Other drug allergy(995.27) 7/22/2009    S/P AVR 12/2003    Mechanical valvue     Shortness of breath     Teeth missing     Upper And Lower   ,   Family History   Problem Relation Age of Onset    Cancer Mother         Lymphoma    Diabetes Mother     High Blood Pressure Mother     High Cholesterol Mother     Obesity Mother     Vision Loss Mother         Wore Glasses    Heart Disease Father         \"Heart Failure\"   [facility] COPD Father     Asthma Sister     High Blood Pressure Sister     High Cholesterol Sister     Other Sister         pre diabetic    COPD Sister     Diabetes Sister         \"Pre Diabetes\"    No Known Problems Son    ,   Health Maintenance   Topic Date Due    Colon cancer screen colonoscopy  10/29/2006    Annual Wellness Visit (AWV)  06/19/2019    Shingles Vaccine (1 of 2) 10/24/2020 (Originally 10/29/2006)    Flu vaccine (1) 09/01/2020    Lipid screen  07/01/2021    DTaP/Tdap/Td vaccine (4 - Td) 09/14/2028    Pneumococcal 0-64 years Vaccine  Completed    Hepatitis A vaccine  Aged Out    Hepatitis B vaccine  Aged Out    Hib vaccine  Aged Out    Meningococcal (ACWY) vaccine  Aged Out    Hepatitis C screen  Discontinued    HIV screen  Discontinued       PHYSICAL EXAMINATION:  [ INSTRUCTIONS:  \"[x]\" Indicates a positive item  \"[]\" Indicates a negative item  -- DELETE ALL ITEMS NOT EXAMINED]  Vital Signs: (As obtained by patient/caregiver or practitioner observation)      Constitutional: [x] Appears well-developed and well-nourished [] No apparent distress      [x] Abnormal-   Mental status  [x] Alert and awake  [x] Oriented to person/place/time [x]Able to follow commands      Eyes:  EOM    [x]  Normal  [] Abnormal-  Sclera  [x]  Normal  [] Abnormal -         Discharge [x]  None visible  [] Abnormal -    HENT:   [x] Normocephalic, atraumatic. [] Abnormal   [x] Mouth/Throat: Mucous membranes are moist.     External Ears [x] Normal  [] Abnormal-     Neck: [x] No visualized mass     Pulmonary/Chest: [x] Respiratory effort normal.  [x] No visualized signs of difficulty breathing or respiratory distress        [] Abnormal-      Musculoskeletal:   [x] Normal gait with no signs of ataxia         [x] Normal range of motion of neck        [] Abnormal-       Neurological:        [] No Facial Asymmetry (Cranial nerve 7 motor function) (limited exam to video visit)          [x] No gaze palsy        [] Abnormal-         Skin:        [x] No significant exanthematous lesions or discoloration noted on facial skin         [] Abnormal-            Psychiatric:       [x] Normal Affect [x] No Hallucinations        [] Abnormal-     Other pertinent observable physical exam findings-     Due to this being a TeleHealth encounter, evaluation of the following organ systems is limited: Vitals/Constitutional/EENT/Resp/CV/GI//MS/Neuro/Skin/Heme-Lymph-Imm. Echo: 12/12/18   Left ventricular systolic function is mildly depressed with an ejection   fraction of 45-50%. Left Ventricular chamber size is dilated. Normally functioning mechanical prosthetic valve in aortic position with a   mean pressure of 17 mmHg. Doppler evaluation reveals mild aortic, moderate edmar, and mild tricuspid   regurgitation. No evidence of pericardial effusion.     Stress test:

## 2020-08-13 ENCOUNTER — NURSE ONLY (OUTPATIENT)
Dept: FAMILY MEDICINE CLINIC | Age: 65
End: 2020-08-13
Payer: MEDICARE

## 2020-08-13 LAB
INTERNATIONAL NORMALIZATION RATIO, POC: 5.5
PROTHROMBIN TIME, POC: NORMAL

## 2020-08-13 PROCEDURE — 85610 PROTHROMBIN TIME: CPT | Performed by: FAMILY MEDICINE

## 2020-08-13 NOTE — PROGRESS NOTES
INR @ 5.5 today, per Dr Toscano Michaela hold X 3 days then take 7.5 mg daily and re check in 2 weeks.

## 2020-08-18 ENCOUNTER — HOSPITAL ENCOUNTER (OUTPATIENT)
Dept: CT IMAGING | Age: 65
Discharge: HOME | End: 2020-08-18
Payer: MEDICARE

## 2020-08-18 PROCEDURE — 71250 CT THORAX DX C-: CPT

## 2020-08-27 ENCOUNTER — ANTI-COAG VISIT (OUTPATIENT)
Dept: FAMILY MEDICINE CLINIC | Age: 65
End: 2020-08-27

## 2020-08-27 ENCOUNTER — NURSE ONLY (OUTPATIENT)
Dept: FAMILY MEDICINE CLINIC | Age: 65
End: 2020-08-27
Payer: MEDICARE

## 2020-08-27 LAB
INTERNATIONAL NORMALIZATION RATIO, POC: 1.8
PROTHROMBIN TIME, POC: ABNORMAL

## 2020-08-27 PROCEDURE — 85610 PROTHROMBIN TIME: CPT | Performed by: FAMILY MEDICINE

## 2020-09-08 ENCOUNTER — NURSE ONLY (OUTPATIENT)
Dept: FAMILY MEDICINE CLINIC | Age: 65
End: 2020-09-08
Payer: MEDICARE

## 2020-09-08 LAB
INTERNATIONAL NORMALIZATION RATIO, POC: 3.1
PROTHROMBIN TIME, POC: NORMAL

## 2020-09-08 PROCEDURE — 85610 PROTHROMBIN TIME: CPT | Performed by: FAMILY MEDICINE

## 2020-09-15 RX ORDER — WARFARIN SODIUM 5 MG/1
TABLET ORAL
Qty: 60 TABLET | Refills: 2 | Status: SHIPPED | OUTPATIENT
Start: 2020-09-15 | End: 2021-02-03 | Stop reason: SDUPTHER

## 2020-09-22 ENCOUNTER — OFFICE VISIT (OUTPATIENT)
Dept: FAMILY MEDICINE CLINIC | Age: 65
End: 2020-09-22
Payer: MEDICARE

## 2020-09-22 VITALS
HEART RATE: 63 BPM | BODY MASS INDEX: 24.66 KG/M2 | SYSTOLIC BLOOD PRESSURE: 128 MMHG | WEIGHT: 148 LBS | DIASTOLIC BLOOD PRESSURE: 78 MMHG | OXYGEN SATURATION: 96 % | HEIGHT: 65 IN | TEMPERATURE: 98.2 F

## 2020-09-22 PROCEDURE — G0438 PPPS, INITIAL VISIT: HCPCS | Performed by: FAMILY MEDICINE

## 2020-09-22 PROCEDURE — 3017F COLORECTAL CA SCREEN DOC REV: CPT | Performed by: FAMILY MEDICINE

## 2020-09-22 ASSESSMENT — PATIENT HEALTH QUESTIONNAIRE - PHQ9
SUM OF ALL RESPONSES TO PHQ QUESTIONS 1-9: 0
1. LITTLE INTEREST OR PLEASURE IN DOING THINGS: 0
SUM OF ALL RESPONSES TO PHQ QUESTIONS 1-9: 0
2. FEELING DOWN, DEPRESSED OR HOPELESS: 0
SUM OF ALL RESPONSES TO PHQ9 QUESTIONS 1 & 2: 0

## 2020-09-22 ASSESSMENT — LIFESTYLE VARIABLES
HOW OFTEN DURING THE LAST YEAR HAVE YOU BEEN UNABLE TO REMEMBER WHAT HAPPENED THE NIGHT BEFORE BECAUSE YOU HAD BEEN DRINKING: NEVER
HOW OFTEN DO YOU HAVE A DRINK CONTAINING ALCOHOL: TWO TO THREE TIMES A WEEK
HOW OFTEN DURING THE LAST YEAR HAVE YOU HAD A FEELING OF GUILT OR REMORSE AFTER DRINKING: 0
AUDIT TOTAL SCORE: 0
HOW MANY STANDARD DRINKS CONTAINING ALCOHOL DO YOU HAVE ON A TYPICAL DAY: 0
HOW OFTEN DURING THE LAST YEAR HAVE YOU NEEDED AN ALCOHOLIC DRINK FIRST THING IN THE MORNING TO GET YOURSELF GOING AFTER A NIGHT OF HEAVY DRINKING: NEVER
HAS A RELATIVE, FRIEND, DOCTOR, OR ANOTHER HEALTH PROFESSIONAL EXPRESSED CONCERN ABOUT YOUR DRINKING OR SUGGESTED YOU CUT DOWN: 0
HAS A RELATIVE, FRIEND, DOCTOR, OR ANOTHER HEALTH PROFESSIONAL EXPRESSED CONCERN ABOUT YOUR DRINKING OR SUGGESTED YOU CUT DOWN: NO
AUDIT-C TOTAL SCORE: 0
HOW OFTEN DO YOU HAVE A DRINK CONTAINING ALCOHOL: 3
AUDIT-C TOTAL SCORE: 4
HOW OFTEN DURING THE LAST YEAR HAVE YOU FAILED TO DO WHAT WAS NORMALLY EXPECTED FROM YOU BECAUSE OF DRINKING: NEVER
HOW OFTEN DO YOU HAVE SIX OR MORE DRINKS ON ONE OCCASION: 1
HAVE YOU OR SOMEONE ELSE BEEN INJURED AS A RESULT OF YOUR DRINKING: 0
HOW OFTEN DURING THE LAST YEAR HAVE YOU BEEN UNABLE TO REMEMBER WHAT HAPPENED THE NIGHT BEFORE BECAUSE YOU HAD BEEN DRINKING: 0
HOW OFTEN DURING THE LAST YEAR HAVE YOU NEEDED AN ALCOHOLIC DRINK FIRST THING IN THE MORNING TO GET YOURSELF GOING AFTER A NIGHT OF HEAVY DRINKING: 0
HOW OFTEN DURING THE LAST YEAR HAVE YOU FAILED TO DO WHAT WAS NORMALLY EXPECTED FROM YOU BECAUSE OF DRINKING: 0
AUDIT TOTAL SCORE: 4
HOW OFTEN DURING THE LAST YEAR HAVE YOU FOUND THAT YOU WERE NOT ABLE TO STOP DRINKING ONCE YOU HAD STARTED: 0
HOW OFTEN DURING THE LAST YEAR HAVE YOU HAD A FEELING OF GUILT OR REMORSE AFTER DRINKING: NEVER
HOW MANY STANDARD DRINKS CONTAINING ALCOHOL DO YOU HAVE ON A TYPICAL DAY: ONE OR TWO
HOW OFTEN DO YOU HAVE SIX OR MORE DRINKS ON ONE OCCASION: LESS THAN MONTHLY
HAVE YOU OR SOMEONE ELSE BEEN INJURED AS A RESULT OF YOUR DRINKING: NO
HOW OFTEN DURING THE LAST YEAR HAVE YOU FOUND THAT YOU WERE NOT ABLE TO STOP DRINKING ONCE YOU HAD STARTED: NEVER

## 2020-09-22 NOTE — PROGRESS NOTES
Medicare Annual Wellness Visit  Name: Alcario Barthel Date: 2020   MRN: [id number] Sex: Male   Age: 62 y.o. Ethnicity: Non-/Non    : 1955 Race: Jose Manuel Banda is here for Medicare AWV (PT 2.2  Wants bloodwork done) and Flu Vaccine    Screenings for behavioral, psychosocial and functional/safety risks, and cognitive dysfunction are all negative except as indicated below. These results, as well as other patient data from the [address] form, are documented in Flowsheets linked to this Encounter. Allergies   Allergen Reactions    Ciprofloxacin Hcl Hives and Swelling    Levaquin [Levofloxacin] Hives and Swelling    Other Nausea And Vomiting     \"Allergic To Tylox\"    Ceftin [Cefuroxime]        Prior to Visit Medications    Medication Sig Taking?  Authorizing Provider   warfarin (COUMADIN) 5 MG tablet take 1-2 tablets by mouth daily as directed Yes MICHAEL Cuevas   ipratropium-albuterol (DUONEB) 0.5-2.5 (3) MG/3ML SOLN nebulizer solution Inhale 3 mLs into the lungs 4 times daily Yes Radha Gibson MD   guaiFENesin (MUCINEX) 600 MG extended release tablet Take 1 tablet by mouth 3 times daily Yes Radha Gibson MD   traZODone (DESYREL) 100 MG tablet Take 100 mg by mouth nightly Yes Historical Provider, MD   albuterol (ACCUNEB) 0.63 MG/3ML nebulizer solution Take 1 ampule by nebulization every 6 hours as needed for Wheezing Yes Historical Provider, MD   albuterol sulfate HFA (PROAIR HFA) 108 (90 Base) MCG/ACT inhaler Inhale 2 puffs into the lungs every 4 hours as needed for Wheezing or Shortness of Breath Yes Radha Gibson MD   montelukast (SINGULAIR) 10 MG tablet Take 1 tablet by mouth nightly \"Alternate With Zyrtec\" Yes Radha Gibson MD   simvastatin (ZOCOR) 40 MG tablet take 1 tablet by mouth at bedtime Yes MICHAEL Cuevas   PARoxetine (PAXIL) 30 MG tablet Take 1 tablet by mouth daily Yes Cassandra Rubalcava, APRN - CNP ARIPiprazole (ABILIFY) 5 MG tablet Take 1 tablet by mouth daily Yes VANGIE Demarco CNP   calcium carbonate (OSCAL) 500 MG TABS tablet Take 500 mg by mouth daily Yes Historical Provider, MD   KRILL OIL OMEGA-3 PO Take by mouth Yes Historical Provider, MD   cetirizine (ZYRTEC) 10 MG tablet Take 10 mg by mouth \"Alternate With Singulair\" Yes Historical Provider, MD   NASAL SPRAY SALINE NA by Nasal route daily Over The Counter Yes Historical Provider, MD   budesonide (RHINOCORT ALLERGY) 32 MCG/ACT nasal spray 2 sprays by Nasal route daily Yes Historical Provider, MD   Acetaminophen (TYLENOL 8 HOUR PO) Take by mouth as needed Over The Counter Yes Historical Provider, MD   Multiple Vitamins-Minerals (CENTRUM PO) Take by mouth daily Yes Historical Provider, MD   cyclobenzaprine (FLEXERIL) 10 MG tablet Take 10 mg by mouth 3 times daily as needed for Muscle spasms  Historical Provider, MD   polyethylene glycol (GLYCOLAX) 17 g packet Take 17 g by mouth daily as needed for Constipation  Patient not taking: Reported on 9/22/2020  VANGIE Demarco CNP   triamcinolone (KENALOG) 0.1 % cream Apply topically 2 times daily.   Nelly Steve MD   sodium chloride, Inhalant, 3 % nebulizer solution   Historical Provider, MD   Nebulizer MISC Inhale into the lungs as needed  Historical Provider, MD       Past Medical History:   Diagnosis Date    Acid reflux     Acute frontal sinusitis 7/22/2009    Alcohol abuse     \"I Drink Average 2 Beers A Day\"    Anesthesia     \" I get agitated\"    Anxiety     Arthritis     \"Right Shoulder, Neck, Left Knee\"    Atypical mycobacterial infection     Broken teeth     Upper And Lower     [facility] failure) [facility] [address]     COPD (chronic obstructive pulmonary disease) ([facility] [address]     Se[facility] Dr. Helen Galvan In [facility], [address] [facility]     Cough     Frequent Cough With Green Sputum    Depression     Depression     Dyspnea 2/23/2018    H/O cardiovascular MUGA stress test 05/14/2019    EF 50%, NORMAL LVEF, NORMAL WALL MOTION.    H/O echocardiogram 05/22/2014    GG=>28-99%, LV systolic function is low normal, mild aortic valve calcification, no pericardial effusion    H/O echocardiogram 12/12/2018    EF 45-50%    History of 24 hour EKG monitoring 6/6/14    24 hr holter monitor. Ventricular ectopics were noted in single and couplet forms. Supraventricular ectopics were noted in single and pair forms.     [place] (hard of hearing)     Bilateral Ears    Hyperlipidemia     Hypertension     Irritant contact dermatitis due to other chemical products 8/26/2019    Other drug allergy(995.27) 7/22/2009    S/P AVR 12/2003    Mechanical valvue     Shortness of breath     Teeth missing     Upper And Lower       Past Surgical History:   Procedure Laterality Date    BRONCHOSCOPY  1996    \"Done Twice\"    CARDIAC VALVE REPLACEMENT  12/17/2003    \"Aortic Valve Replacement, Mechanical Valve\"    COLONOSCOPY  2006    Polyps Removed    DENTAL SURGERY      Teeth Extracted In Past    ENDOSCOPY, COLON, DIAGNOSTIC  In Past    HEMIARTHROPLASTY SHOULDER FRACTURE Right 1/29/2019    SHOULDER HEMIARTHROPLASTY performed by Karel Pedro DO at [address] [facility] [address] [facility] [address] [facility]    \"Cauterized Because My Sperm Was Low\"    ROTATOR CUFF REPAIR Right 2008    TONSILLECTOMY  1959 Or 1960       Family History   Problem Relation Age of Onset    Cancer Mother         Lymphoma    Diabetes Mother     High Blood Pressure Mother     High Cholesterol Mother     Obesity Mother     Vision Loss Mother         Wore Glasses    Heart Disease Father         \"Heart Failure\"   Mad[facility] Guard COPD Father     Asthma Sister     High Blood Pressure Sister     High Cholesterol Sister     Other Sister         pre diabetic    COPD Sister     Diabetes Sister         \"Pre Diabetes\"    No Known Problems Son        CareTeam (Including outside providers/suppliers regularly involved in providing care):   Patient Care Team:  Laura Wynne MD as PCP - General (Family Medicine)  Laura Wynne MD as PCP - Suresh Kendrick Provider    Wt Readings from Last 3 Encounters:   09/22/20 148 lb (67.1 kg)   08/27/20 132 lb (59.9 kg)   08/04/20 134 lb (60.8 kg)     Vitals:    09/22/20 1402   BP: 128/78   Site: Right Upper Arm   Position: Sitting   Cuff Size: Large Adult   Pulse: 63   Temp: 98.2 °F (36.8 °C)   SpO2: 96%   Weight: 148 lb (67.1 kg)   Height: 5' 5\" (1.651 m)     Body mass index is 24.63 kg/m². Based upon direct observation of the patient, evaluation of cognition reveals recent and remote memory intact. Patient's complete Health Risk Assessment and screening values have been reviewed and are found in Flowsheets. The following problems were reviewed today and where indicated follow up appointments were made and/or referrals ordered. Positive Risk Factor Screenings with Interventions:     Substance History:  Social History     Tobacco History     Smoking Status  Never Smoker    Smokeless Tobacco Use  Current User Smokeless Tobacco Type  Snuff, Chew          Alcohol History     Alcohol Use Status  Yes Comment  \"Average 2 Beers A Day\"          Drug Use     Drug Use Status  No          Sexual Activity     Sexually Active  Not Currently               Alcohol Screening: Audit-C Score: 4  Total Score: 4    A score of 8 or more is associated with harmful or hazardous drinking. A score of 13 or more in women, and 15 or more in men, is likely to indicate alcohol dependence. Substance Abuse Interventions:  · Tobacco abuse:  stop dippin tobacco    General Health:  General  In general, how would you say your health is?: Very Good  In the past 7 days, have you experienced any of the following?  New or Increased Pain, New or Increased Fatigue, Loneliness, Social Isolation, Stress or Anger?: None of These  Do you get the social and emotional support that you need?: Yes  Do you have a Living Will?: (!) No  General Health Risk Interventions:  · adviced drawing up Living Will    Health Habits/Nutrition:  Health Habits/Nutrition  Do you exercise for at least 20 minutes 2-3 times per week?: Yes  Have you lost any weight without trying in the past 3 months?: No  Do you eat fewer than 2 meals per day?: No  Have you seen a dentist within the past year?: (!) No  Body mass index is 24.63 kg/m². Health Habits/Nutrition Interventions:  · work on exercise and healthy life style.     Hearing/Vision:  No exam data present  Hearing/Vision  Do you or your family notice any trouble with your hearing?: No  Do you have difficulty driving, watching TV, or doing any of your daily activities because of your eyesight?: No  Have you had an eye exam within the past year?: (!) No  Hearing/Vision Interventions:  · to get eye exam and dental exam    Personalized Preventive Plan   Current Health Maintenance Status  Immunization History   Administered Date(s) Administered    DTaP, 5 Pertussis Antigens (Daptacel) 06/26/2009, 09/14/2018    Influenza Virus Vaccine 09/18/2016, 12/02/2016, 10/06/2017, 10/01/2018    Influenza, Quadv, IM, PF (6 mo and older Fluzone, Flulaval, Fluarix, and 3 yrs and older Afluria) 10/01/2018, 10/24/2019    Pneumococcal Conjugate 13-valent ([id number]) 05/11/2017    Pneumococcal Polysaccharide ([id number]) 01/01/2007    Tdap (Boostrix, Adacel) 07/01/2018        Health Maintenance   Topic Date Due    Colon cancer screen colonoscopy  10/29/2006    Annual Wellness Visit (AWV)  06/19/2019    Flu vaccine (1) 09/01/2020    Shingles Vaccine (1 of 2) 10/24/2020 (Originally 10/29/2006)    Lipid screen  07/01/2021    DTaP/Tdap/Td vaccine (4 - Td) 09/14/2028    Pneumococcal 0-64 years Vaccine  Completed    Hepatitis A vaccine  Aged Out    Hepatitis B vaccine  Aged Out    Hib vaccine  Aged Out    Meningococcal (ACWY) vaccine  Aged Out    Hepatitis C screen  Discontinued   

## 2020-09-22 NOTE — PATIENT INSTRUCTIONS
Personalized Preventive Plan for Joseph Mckay - 9/22/2020  Medicare offers a range of preventive health benefits. Some of the tests and screenings are paid in full while other may be subject to a deductible, co-insurance, and/or copay. Some of these benefits include a comprehensive review of your medical history including lifestyle, illnesses that may run in your family, and various assessments and screenings as appropriate. After reviewing your medical record and screening and assessments performed today your provider may have ordered immunizations, labs, imaging, and/or referrals for you. A list of these orders (if applicable) as well as your Preventive Care list are included within your After Visit Summary for your review. Other Preventive Recommendations:    · A preventive eye exam performed by an eye specialist is recommended every 1-2 years to screen for glaucoma; cataracts, macular degeneration, and other eye disorders. · A preventive dental visit is recommended every 6 months. · Try to get at least 150 minutes of exercise per week or 10,000 steps per day on a pedometer . · Order or download the FREE \"Exercise & Physical Activity: Your Everyday Guide\" from The [employer]. Call 4-876.472.7736 or search The [employer] online. · You need 9762-8969 mg of calcium and 9723-2264 IU of vitamin D per day. It is possible to meet your calcium requirement with diet alone, but a vitamin D supplement is usually necessary to meet this goal.  · When exposed to the sun, use a sunscreen that protects against both UVA and UVB radiation with an SPF of 30 or greater. Reapply every 2 to 3 hours or after sweating, drying off with a towel, or swimming. · Always wear a seat belt when traveling in a car. Always wear a helmet when riding a bicycle or motorcycle.

## 2020-09-23 PROCEDURE — G0008 ADMIN INFLUENZA VIRUS VAC: HCPCS | Performed by: FAMILY MEDICINE

## 2020-09-23 PROCEDURE — 90686 IIV4 VACC NO PRSV 0.5 ML IM: CPT | Performed by: FAMILY MEDICINE

## 2020-09-25 ENCOUNTER — HOSPITAL ENCOUNTER (OUTPATIENT)
Dept: GENERAL RADIOLOGY | Age: 65
Discharge: HOME | End: 2020-09-25
Payer: MEDICARE

## 2020-09-25 PROCEDURE — 74248 X-RAY SM INT F-THRU STD: CPT

## 2020-10-06 ENCOUNTER — ANTI-COAG VISIT (OUTPATIENT)
Dept: FAMILY MEDICINE CLINIC | Age: 65
End: 2020-10-06

## 2020-10-06 ENCOUNTER — NURSE ONLY (OUTPATIENT)
Dept: FAMILY MEDICINE CLINIC | Age: 65
End: 2020-10-06
Payer: MEDICARE

## 2020-10-06 DIAGNOSIS — Z95.2 S/P AVR: Chronic | ICD-10-CM

## 2020-10-06 DIAGNOSIS — F33.41 RECURRENT MAJOR DEPRESSIVE DISORDER, IN PARTIAL REMISSION (HCC): Chronic | ICD-10-CM

## 2020-10-06 LAB
A/G RATIO: 1.8 (ref 1.1–2.2)
ALBUMIN SERPL-MCNC: 4.8 G/DL (ref 3.4–5)
ALP BLD-CCNC: 98 U/L (ref 40–129)
ALT SERPL-CCNC: 20 U/L (ref 10–40)
ANION GAP SERPL CALCULATED.3IONS-SCNC: 11 MMOL/L (ref 3–16)
AST SERPL-CCNC: 28 U/L (ref 15–37)
BILIRUB SERPL-MCNC: 0.5 MG/DL (ref 0–1)
BUN BLDV-MCNC: 18 MG/DL (ref 7–20)
CALCIUM SERPL-MCNC: 9.4 MG/DL (ref 8.3–10.6)
CHLORIDE BLD-SCNC: 97 MMOL/L (ref 99–110)
CO2: 26 MMOL/L (ref 21–32)
CREAT SERPL-MCNC: 0.9 MG/DL (ref 0.8–1.3)
GFR AFRICAN AMERICAN: >60
GFR NON-AFRICAN AMERICAN: >60
GLOBULIN: 2.6 G/DL
GLUCOSE BLD-MCNC: 93 MG/DL (ref 70–99)
HCT VFR BLD CALC: 41.5 % (ref 40.5–52.5)
HEMOGLOBIN: 13.9 G/DL (ref 13.5–17.5)
INTERNATIONAL NORMALIZATION RATIO, POC: 2.5
INTERNATIONAL NORMALIZATION RATIO, POC: NORMAL
MCH RBC QN AUTO: 31.7 PG (ref 26–34)
MCHC RBC AUTO-ENTMCNC: 33.5 G/DL (ref 31–36)
MCV RBC AUTO: 94.5 FL (ref 80–100)
PDW BLD-RTO: 14.4 % (ref 12.4–15.4)
PLATELET # BLD: 279 K/UL (ref 135–450)
PMV BLD AUTO: 7.6 FL (ref 5–10.5)
POTASSIUM SERPL-SCNC: 4.5 MMOL/L (ref 3.5–5.1)
PROTHROMBIN TIME, POC: NORMAL
PROTHROMBIN TIME, POC: NORMAL
RBC # BLD: 4.4 M/UL (ref 4.2–5.9)
SODIUM BLD-SCNC: 134 MMOL/L (ref 136–145)
TOTAL PROTEIN: 7.4 G/DL (ref 6.4–8.2)
WBC # BLD: 9.2 K/UL (ref 4–11)

## 2020-10-06 PROCEDURE — 85610 PROTHROMBIN TIME: CPT | Performed by: FAMILY MEDICINE

## 2020-11-03 ENCOUNTER — ANTI-COAG VISIT (OUTPATIENT)
Dept: FAMILY MEDICINE CLINIC | Age: 65
End: 2020-11-03

## 2020-11-03 ENCOUNTER — TELEPHONE (OUTPATIENT)
Dept: FAMILY MEDICINE CLINIC | Age: 65
End: 2020-11-03

## 2020-11-03 ENCOUNTER — NURSE ONLY (OUTPATIENT)
Dept: FAMILY MEDICINE CLINIC | Age: 65
End: 2020-11-03
Payer: MEDICARE

## 2020-11-03 LAB
INTERNATIONAL NORMALIZATION RATIO, POC: 1.9
PROTHROMBIN TIME, POC: ABNORMAL

## 2020-11-03 PROCEDURE — 85610 PROTHROMBIN TIME: CPT | Performed by: FAMILY MEDICINE

## 2020-11-03 NOTE — PROGRESS NOTES
Current coumadin 7.5/7.5/10mg. inr 1.9. per dr Massimo Sahu change coumadin to 7.5/10mg recheck in 2 weeks

## 2020-11-03 NOTE — TELEPHONE ENCOUNTER
[facility]-- he had a rash-- Pt stated he needed the Dr. Gabo Roa to get the test

## 2020-11-04 ENCOUNTER — HOSPITAL ENCOUNTER (OUTPATIENT)
Age: 65
Setting detail: SPECIMEN
Discharge: HOME | End: 2020-11-04
Payer: MEDICARE

## 2020-11-04 PROCEDURE — U0002 COVID-19 LAB TEST NON-CDC: HCPCS

## 2020-11-05 LAB
SARS-COV-2: NOT DETECTED
SOURCE: NORMAL

## 2020-11-09 ENCOUNTER — OFFICE VISIT (OUTPATIENT)
Dept: FAMILY MEDICINE CLINIC | Age: 65
End: 2020-11-09
Payer: MEDICARE

## 2020-11-09 VITALS
HEART RATE: 74 BPM | DIASTOLIC BLOOD PRESSURE: 80 MMHG | BODY MASS INDEX: 27.02 KG/M2 | HEIGHT: 65 IN | OXYGEN SATURATION: 96 % | WEIGHT: 162.2 LBS | SYSTOLIC BLOOD PRESSURE: 112 MMHG | TEMPERATURE: 99.2 F

## 2020-11-09 PROCEDURE — 99213 OFFICE O/P EST LOW 20 MIN: CPT | Performed by: PHYSICIAN ASSISTANT

## 2020-11-09 RX ORDER — AMMONIUM LACTATE 12 G/100G
LOTION TOPICAL
Qty: 225 G | Refills: 1 | Status: SHIPPED | OUTPATIENT
Start: 2020-11-09 | End: 2022-03-11 | Stop reason: SDUPTHER

## 2020-11-09 RX ORDER — TRIAMCINOLONE ACETONIDE 1 MG/G
CREAM TOPICAL
Qty: 453.6 G | Refills: 1 | Status: SHIPPED | OUTPATIENT
Start: 2020-11-09 | End: 2022-03-08 | Stop reason: SDUPTHER

## 2020-11-09 NOTE — PROGRESS NOTES
11/9/2020    Rhea Swanson    Chief Complaint   Patient presents with    Rash     pt has a rash on both arms and both legs  and has been there 2weeks and has got worse . Jade Rodas Pt states it itches . Pt has used cortisone cream and it did not seem to do much . HPI  History was obtained from the patient. Herbert Vázquez is a 60 y.o. male who presents today for SOV. Rash: Patient complains of rash involving the bilateral arm, bilateral hand and bilateral lower leg. Rash started 2 weeks ago. Appearance of rash at onset: Color of lesion(s): light red, Texture of lesion(s): slightly raised and rough. Rash has changed over time Initial distribution: right hand. Has progressed to having splitting skin over knuckles. Discomfort associated with rash: is pruritic. Associated symptoms: none. Denies: fever, arthralgia, myalgia. Patient has not had previous evaluation of rash. Patient has not had previous treatment aside from trying cortisone 10 cream OTC. Response to treatment: none. Patient has not had contacts with similar rash. Patient has not identified precipitant. Patient has had new exposures (rubber gloves he wears when he disinfects items in his home, but has been wearing them since march; also switched soaps but has already switched back)      REVIEW OF SYMPTOMS    Review of Systems   Constitutional: Negative for activity change, chills and fever. Musculoskeletal: Negative for arthralgias, joint swelling and myalgias. Skin: Positive for rash. Negative for wound.        SOCIAL HISTORY  Social History     Socioeconomic History    Marital status:      Spouse name: None    Number of children: 1    Years of education: 12    Highest education level: None   Occupational History    None   Social Needs    Financial resource strain: None    Food insecurity     Worry: None     Inability: None    Transportation needs     Medical: None     Non-medical: None   Tobacco Use    Smoking status: Never Smoker    Smokeless tobacco: Current User     Types: Snuff, Chew   Substance and Sexual Activity    Alcohol use: Yes     Drinks per session: 1 or 2     Binge frequency: Daily or almost daily     Comment: \"Average 2 Beers A Day\"    Drug use: No    Sexual activity: Not Currently   Lifestyle    Physical activity     Days per week: None     Minutes per session: None    Stress: None   Relationships    Social connections     Talks on phone: None     Gets together: None     Attends Orthodoxy service: None     Active member of club or organization: None     Attends meetings of clubs or organizations: None     Relationship status: None    Intimate partner violence     Fear of current or ex partner: None     Emotionally abused: None     Physically abused: None     Forced sexual activity: None   Other Topics Concern    None   Social History Narrative    None        CURRENT MEDICATIONS  Current Outpatient Medications   Medication Sig Dispense Refill    triamcinolone (KENALOG) 0.1 % cream Apply topically 2 times daily. 453.6 g 1    ammonium lactate (LAC-HYDRIN) 12 % lotion Apply topically daily.  225 g 1    warfarin (COUMADIN) 5 MG tablet take 1-2 tablets by mouth daily as directed 60 tablet 2    ipratropium-albuterol (DUONEB) 0.5-2.5 (3) MG/3ML SOLN nebulizer solution Inhale 3 mLs into the lungs 4 times daily 360 mL 5    traZODone (DESYREL) 100 MG tablet Take 100 mg by mouth nightly      cyclobenzaprine (FLEXERIL) 10 MG tablet Take 10 mg by mouth 3 times daily as needed for Muscle spasms      albuterol (ACCUNEB) 0.63 MG/3ML nebulizer solution Take 1 ampule by nebulization every 6 hours as needed for Wheezing      albuterol sulfate HFA (PROAIR HFA) 108 (90 Base) MCG/ACT inhaler Inhale 2 puffs into the lungs every 4 hours as needed for Wheezing or Shortness of Breath 1 Inhaler 3    montelukast (SINGULAIR) 10 MG tablet Take 1 tablet by mouth nightly \"Alternate With Zyrtec\" 30 tablet 5    simvastatin (ZOCOR) 40 MG tablet take 1 tablet by mouth at bedtime 90 tablet 0    PARoxetine (PAXIL) 30 MG tablet Take 1 tablet by mouth daily 30 tablet 1    ARIPiprazole (ABILIFY) 5 MG tablet Take 1 tablet by mouth daily 30 tablet 1    calcium carbonate (OSCAL) 500 MG TABS tablet Take 500 mg by mouth daily      KRILL OIL OMEGA-3 PO Take by mouth      sodium chloride, Inhalant, 3 % nebulizer solution       cetirizine (ZYRTEC) 10 MG tablet Take 10 mg by mouth \"Alternate With Singulair\"      NASAL SPRAY SALINE NA by Nasal route daily Over The Counter      budesonide (RHINOCORT ALLERGY) 32 MCG/ACT nasal spray 2 sprays by Nasal route daily      Acetaminophen (TYLENOL 8 HOUR PO) Take by mouth as needed Over The Counter      Multiple Vitamins-Minerals (CENTRUM PO) Take by mouth daily      Nebulizer MISC Inhale into the lungs as needed       No current facility-administered medications for this visit. PHYSICAL EXAM    /80   Pulse 74   Temp 99.2 °F (37.3 °C)   Ht 5' 5\" (1.651 m)   Wt 162 lb 3.2 oz (73.6 kg)   SpO2 96%   BMI 26.99 kg/m²     Physical Exam  Vitals signs and nursing note reviewed. Constitutional:       Appearance: Normal appearance. HENT:      Head: Normocephalic and atraumatic. Eyes:      Extraocular Movements: Extraocular movements intact. Conjunctiva/sclera: Conjunctivae normal.   Cardiovascular:      Rate and Rhythm: Normal rate. Pulmonary:      Effort: Pulmonary effort is normal.   Skin:     Findings: Erythema and rash present. Rash is scaling. Comments: Fissures over knuckles. Distribution extends from fingers up past the wrist, sparing palms. There is scattered patches over upper arms and shins. Skin over all looks very dry. Neurological:      General: No focal deficit present. Mental Status: He is alert and oriented to person, place, and time. Psychiatric:         Mood and Affect: Mood normal.         Behavior: Behavior normal.         ASSESSMENT & PLAN    1.

## 2020-11-18 ENCOUNTER — NURSE ONLY (OUTPATIENT)
Dept: FAMILY MEDICINE CLINIC | Age: 65
End: 2020-11-18
Payer: MEDICARE

## 2020-11-18 LAB
INTERNATIONAL NORMALIZATION RATIO, POC: 2.4
PROTHROMBIN TIME, POC: ABNORMAL

## 2020-11-18 PROCEDURE — 85610 PROTHROMBIN TIME: CPT | Performed by: FAMILY MEDICINE

## 2020-11-24 RX ORDER — SIMVASTATIN 40 MG
TABLET ORAL
Qty: 90 TABLET | Refills: 0 | Status: SHIPPED | OUTPATIENT
Start: 2020-11-24 | End: 2021-03-01 | Stop reason: SDUPTHER

## 2020-12-01 ENCOUNTER — ANTI-COAG VISIT (OUTPATIENT)
Dept: FAMILY MEDICINE CLINIC | Age: 65
End: 2020-12-01
Payer: MEDICARE

## 2020-12-01 ENCOUNTER — NURSE ONLY (OUTPATIENT)
Dept: FAMILY MEDICINE CLINIC | Age: 65
End: 2020-12-01
Payer: MEDICARE

## 2020-12-01 LAB
INTERNATIONAL NORMALIZATION RATIO, POC: 2.7
PROTHROMBIN TIME, POC: NORMAL

## 2020-12-01 PROCEDURE — 85610 PROTHROMBIN TIME: CPT | Performed by: FAMILY MEDICINE

## 2020-12-01 PROCEDURE — 93793 ANTICOAG MGMT PT WARFARIN: CPT | Performed by: FAMILY MEDICINE

## 2020-12-30 ENCOUNTER — NURSE ONLY (OUTPATIENT)
Dept: FAMILY MEDICINE CLINIC | Age: 65
End: 2020-12-30
Payer: MEDICARE

## 2020-12-30 LAB
INTERNATIONAL NORMALIZATION RATIO, POC: 4
PROTHROMBIN TIME, POC: NORMAL

## 2020-12-30 PROCEDURE — 85610 PROTHROMBIN TIME: CPT | Performed by: FAMILY MEDICINE

## 2021-02-03 ENCOUNTER — NURSE ONLY (OUTPATIENT)
Dept: FAMILY MEDICINE CLINIC | Age: 66
End: 2021-02-03
Payer: MEDICARE

## 2021-02-03 DIAGNOSIS — Z95.2 S/P AVR: Chronic | ICD-10-CM

## 2021-02-03 DIAGNOSIS — Z79.01 ANTICOAGULANT LONG-TERM USE: ICD-10-CM

## 2021-02-03 LAB
INTERNATIONAL NORMALIZATION RATIO, POC: 1.5
PROTHROMBIN TIME, POC: NORMAL

## 2021-02-03 PROCEDURE — 99024 POSTOP FOLLOW-UP VISIT: CPT | Performed by: FAMILY MEDICINE

## 2021-02-03 PROCEDURE — 85610 PROTHROMBIN TIME: CPT | Performed by: FAMILY MEDICINE

## 2021-02-03 RX ORDER — WARFARIN SODIUM 5 MG/1
TABLET ORAL
Qty: 60 TABLET | Refills: 2 | Status: SHIPPED | OUTPATIENT
Start: 2021-02-03 | End: 2021-04-26 | Stop reason: SDUPTHER

## 2021-02-17 ENCOUNTER — NURSE ONLY (OUTPATIENT)
Dept: FAMILY MEDICINE CLINIC | Age: 66
End: 2021-02-17
Payer: MEDICARE

## 2021-02-17 DIAGNOSIS — Z95.2 S/P AVR: Chronic | ICD-10-CM

## 2021-02-17 LAB
INTERNATIONAL NORMALIZATION RATIO, POC: 3.4
PROTHROMBIN TIME, POC: NORMAL

## 2021-02-17 PROCEDURE — 85610 PROTHROMBIN TIME: CPT | Performed by: FAMILY MEDICINE

## 2021-02-17 PROCEDURE — 99024 POSTOP FOLLOW-UP VISIT: CPT | Performed by: FAMILY MEDICINE

## 2021-03-01 DIAGNOSIS — E78.2 MIXED HYPERLIPIDEMIA: ICD-10-CM

## 2021-03-01 RX ORDER — SIMVASTATIN 40 MG
TABLET ORAL
Qty: 90 TABLET | Refills: 0 | Status: SHIPPED | OUTPATIENT
Start: 2021-03-01 | End: 2021-06-11 | Stop reason: SDUPTHER

## 2021-03-03 ENCOUNTER — NURSE ONLY (OUTPATIENT)
Dept: FAMILY MEDICINE CLINIC | Age: 66
End: 2021-03-03
Payer: MEDICARE

## 2021-03-03 DIAGNOSIS — Z95.2 S/P AVR: Chronic | ICD-10-CM

## 2021-03-03 LAB
INTERNATIONAL NORMALIZATION RATIO, POC: 3.9
PROTHROMBIN TIME, POC: NORMAL

## 2021-03-03 PROCEDURE — 99024 POSTOP FOLLOW-UP VISIT: CPT | Performed by: FAMILY MEDICINE

## 2021-03-03 PROCEDURE — 85610 PROTHROMBIN TIME: CPT | Performed by: FAMILY MEDICINE

## 2021-03-17 ENCOUNTER — NURSE ONLY (OUTPATIENT)
Dept: FAMILY MEDICINE CLINIC | Age: 66
End: 2021-03-17
Payer: MEDICARE

## 2021-03-17 DIAGNOSIS — Z95.2 S/P AVR: Chronic | ICD-10-CM

## 2021-03-17 LAB
INTERNATIONAL NORMALIZATION RATIO, POC: 2.9
PROTHROMBIN TIME, POC: NORMAL

## 2021-03-17 PROCEDURE — 85610 PROTHROMBIN TIME: CPT | Performed by: FAMILY MEDICINE

## 2021-03-31 ENCOUNTER — NURSE ONLY (OUTPATIENT)
Dept: FAMILY MEDICINE CLINIC | Age: 66
End: 2021-03-31
Payer: MEDICARE

## 2021-03-31 DIAGNOSIS — Z95.2 S/P AVR: ICD-10-CM

## 2021-03-31 LAB
INTERNATIONAL NORMALIZATION RATIO, POC: 3.4
PROTHROMBIN TIME, POC: NORMAL

## 2021-03-31 PROCEDURE — 99024 POSTOP FOLLOW-UP VISIT: CPT | Performed by: FAMILY MEDICINE

## 2021-03-31 PROCEDURE — 85610 PROTHROMBIN TIME: CPT | Performed by: FAMILY MEDICINE

## 2021-04-14 ENCOUNTER — NURSE ONLY (OUTPATIENT)
Dept: FAMILY MEDICINE CLINIC | Age: 66
End: 2021-04-14
Payer: MEDICARE

## 2021-04-14 DIAGNOSIS — Z95.2 S/P AVR: Chronic | ICD-10-CM

## 2021-04-14 LAB
INTERNATIONAL NORMALIZATION RATIO, POC: 3.6
PROTHROMBIN TIME, POC: ABNORMAL

## 2021-04-14 PROCEDURE — 85610 PROTHROMBIN TIME: CPT | Performed by: FAMILY MEDICINE

## 2021-04-26 DIAGNOSIS — Z79.01 ANTICOAGULANT LONG-TERM USE: ICD-10-CM

## 2021-04-26 RX ORDER — WARFARIN SODIUM 5 MG/1
TABLET ORAL
Qty: 60 TABLET | Refills: 2 | Status: SHIPPED | OUTPATIENT
Start: 2021-04-26 | End: 2021-07-21 | Stop reason: SDUPTHER

## 2021-04-28 ENCOUNTER — NURSE ONLY (OUTPATIENT)
Dept: FAMILY MEDICINE CLINIC | Age: 66
End: 2021-04-28
Payer: MEDICARE

## 2021-04-28 DIAGNOSIS — Z95.2 S/P AVR: Chronic | ICD-10-CM

## 2021-04-28 LAB
INTERNATIONAL NORMALIZATION RATIO, POC: 4.4
PROTHROMBIN TIME, POC: NORMAL

## 2021-04-28 PROCEDURE — 85610 PROTHROMBIN TIME: CPT | Performed by: FAMILY MEDICINE

## 2021-04-28 PROCEDURE — 99024 POSTOP FOLLOW-UP VISIT: CPT | Performed by: FAMILY MEDICINE

## 2021-05-12 ENCOUNTER — NURSE ONLY (OUTPATIENT)
Dept: FAMILY MEDICINE CLINIC | Age: 66
End: 2021-05-12
Payer: MEDICARE

## 2021-05-12 DIAGNOSIS — Z95.2 S/P AVR: Chronic | ICD-10-CM

## 2021-05-12 LAB
INTERNATIONAL NORMALIZATION RATIO, POC: 3.7
PROTHROMBIN TIME, POC: ABNORMAL

## 2021-05-12 PROCEDURE — 85610 PROTHROMBIN TIME: CPT | Performed by: FAMILY MEDICINE

## 2021-05-26 ENCOUNTER — NURSE ONLY (OUTPATIENT)
Dept: FAMILY MEDICINE CLINIC | Age: 66
End: 2021-05-26
Payer: MEDICARE

## 2021-05-26 DIAGNOSIS — Z95.2 S/P AVR: Chronic | ICD-10-CM

## 2021-05-26 LAB
INTERNATIONAL NORMALIZATION RATIO, POC: 3.5
PROTHROMBIN TIME, POC: NORMAL

## 2021-05-26 PROCEDURE — 85610 PROTHROMBIN TIME: CPT | Performed by: FAMILY MEDICINE

## 2021-06-01 DIAGNOSIS — Z79.01 ANTICOAGULANT LONG-TERM USE: ICD-10-CM

## 2021-06-01 DIAGNOSIS — E78.2 MIXED HYPERLIPIDEMIA: ICD-10-CM

## 2021-06-01 RX ORDER — SIMVASTATIN 40 MG
TABLET ORAL
Qty: 90 TABLET | Refills: 0 | OUTPATIENT
Start: 2021-06-01

## 2021-06-01 RX ORDER — WARFARIN SODIUM 5 MG/1
TABLET ORAL
Qty: 60 TABLET | Refills: 2 | OUTPATIENT
Start: 2021-06-01

## 2021-06-11 ENCOUNTER — OFFICE VISIT (OUTPATIENT)
Dept: FAMILY MEDICINE CLINIC | Age: 66
End: 2021-06-11
Payer: MEDICARE

## 2021-06-11 VITALS
WEIGHT: 188.9 LBS | HEART RATE: 68 BPM | SYSTOLIC BLOOD PRESSURE: 120 MMHG | BODY MASS INDEX: 31.47 KG/M2 | DIASTOLIC BLOOD PRESSURE: 84 MMHG | HEIGHT: 65 IN | OXYGEN SATURATION: 95 %

## 2021-06-11 DIAGNOSIS — R53.83 FATIGUE, UNSPECIFIED TYPE: Primary | ICD-10-CM

## 2021-06-11 DIAGNOSIS — J44.9 CHRONIC OBSTRUCTIVE PULMONARY DISEASE, UNSPECIFIED COPD TYPE (HCC): ICD-10-CM

## 2021-06-11 DIAGNOSIS — E78.2 MIXED HYPERLIPIDEMIA: ICD-10-CM

## 2021-06-11 DIAGNOSIS — F33.41 RECURRENT MAJOR DEPRESSIVE DISORDER, IN PARTIAL REMISSION (HCC): ICD-10-CM

## 2021-06-11 DIAGNOSIS — R53.83 FATIGUE, UNSPECIFIED TYPE: ICD-10-CM

## 2021-06-11 DIAGNOSIS — Z79.01 ANTICOAGULANT LONG-TERM USE: ICD-10-CM

## 2021-06-11 DIAGNOSIS — F51.01 PRIMARY INSOMNIA: ICD-10-CM

## 2021-06-11 DIAGNOSIS — I50.22 CHRONIC SYSTOLIC HEART FAILURE (HCC): ICD-10-CM

## 2021-06-11 LAB
A/G RATIO: 1.5 (ref 1.1–2.2)
ALBUMIN SERPL-MCNC: 4.5 G/DL (ref 3.4–5)
ALP BLD-CCNC: 96 U/L (ref 40–129)
ALT SERPL-CCNC: 10 U/L (ref 10–40)
ANION GAP SERPL CALCULATED.3IONS-SCNC: 14 MMOL/L (ref 3–16)
AST SERPL-CCNC: 21 U/L (ref 15–37)
BASOPHILS ABSOLUTE: 0 K/UL (ref 0–0.2)
BASOPHILS RELATIVE PERCENT: 0.5 %
BILIRUB SERPL-MCNC: 0.4 MG/DL (ref 0–1)
BUN BLDV-MCNC: 12 MG/DL (ref 7–20)
CALCIUM SERPL-MCNC: 9.2 MG/DL (ref 8.3–10.6)
CHLORIDE BLD-SCNC: 103 MMOL/L (ref 99–110)
CHOLESTEROL, FASTING: 200 MG/DL (ref 0–199)
CO2: 21 MMOL/L (ref 21–32)
CREAT SERPL-MCNC: 0.8 MG/DL (ref 0.8–1.3)
EOSINOPHILS ABSOLUTE: 0 K/UL (ref 0–0.6)
EOSINOPHILS RELATIVE PERCENT: 0.4 %
GFR AFRICAN AMERICAN: >60
GFR NON-AFRICAN AMERICAN: >60
GLOBULIN: 3 G/DL
GLUCOSE BLD-MCNC: 89 MG/DL (ref 70–99)
HCT VFR BLD CALC: 42 % (ref 40.5–52.5)
HDLC SERPL-MCNC: 45 MG/DL (ref 40–60)
HEMOGLOBIN: 14.7 G/DL (ref 13.5–17.5)
LDL CHOLESTEROL CALCULATED: 133 MG/DL
LYMPHOCYTES ABSOLUTE: 1 K/UL (ref 1–5.1)
LYMPHOCYTES RELATIVE PERCENT: 11.4 %
MCH RBC QN AUTO: 31.8 PG (ref 26–34)
MCHC RBC AUTO-ENTMCNC: 34.9 G/DL (ref 31–36)
MCV RBC AUTO: 91.1 FL (ref 80–100)
MONOCYTES ABSOLUTE: 0.6 K/UL (ref 0–1.3)
MONOCYTES RELATIVE PERCENT: 7.4 %
NEUTROPHILS ABSOLUTE: 7 K/UL (ref 1.7–7.7)
NEUTROPHILS RELATIVE PERCENT: 80.3 %
PDW BLD-RTO: 14 % (ref 12.4–15.4)
PLATELET # BLD: 250 K/UL (ref 135–450)
PMV BLD AUTO: 7.2 FL (ref 5–10.5)
POTASSIUM SERPL-SCNC: 3.9 MMOL/L (ref 3.5–5.1)
RBC # BLD: 4.61 M/UL (ref 4.2–5.9)
SODIUM BLD-SCNC: 138 MMOL/L (ref 136–145)
T4 FREE: 1.4 NG/DL (ref 0.9–1.8)
TOTAL PROTEIN: 7.5 G/DL (ref 6.4–8.2)
TRIGLYCERIDE, FASTING: 110 MG/DL (ref 0–150)
TSH SERPL DL<=0.05 MIU/L-ACNC: 1.57 UIU/ML (ref 0.27–4.2)
VLDLC SERPL CALC-MCNC: 22 MG/DL
WBC # BLD: 8.7 K/UL (ref 4–11)

## 2021-06-11 PROCEDURE — 99214 OFFICE O/P EST MOD 30 MIN: CPT | Performed by: PHYSICIAN ASSISTANT

## 2021-06-11 RX ORDER — SIMVASTATIN 40 MG
TABLET ORAL
Qty: 90 TABLET | Refills: 1 | Status: SHIPPED | OUTPATIENT
Start: 2021-06-11 | End: 2022-02-07 | Stop reason: SDUPTHER

## 2021-06-11 RX ORDER — TRAZODONE HYDROCHLORIDE 100 MG/1
100 TABLET ORAL NIGHTLY
Qty: 90 TABLET | Refills: 1 | Status: SHIPPED | OUTPATIENT
Start: 2021-06-11 | End: 2021-09-05

## 2021-06-11 ASSESSMENT — ENCOUNTER SYMPTOMS
SINUS PRESSURE: 0
BLOOD IN STOOL: 0
DIARRHEA: 0
SHORTNESS OF BREATH: 0
SORE THROAT: 0
FACIAL SWELLING: 0
WHEEZING: 0
EYE PAIN: 0
CONSTIPATION: 0
RHINORRHEA: 0
VOMITING: 0
NAUSEA: 0
COUGH: 0
ABDOMINAL PAIN: 0
EYE REDNESS: 0
BACK PAIN: 0
CHEST TIGHTNESS: 0
TROUBLE SWALLOWING: 0

## 2021-06-11 NOTE — PROGRESS NOTES
6/11/2021    [facility]    Chief Complaint   Patient presents with    Check-Up    Dizziness     started when he started to gain more weight.  Fatigue     no energy        HPI  History was obtained from pt. Jh Harvey is a 60 y.o. male with a PMHx as listed below who presents today for med refill/check up  - pt reports large weight gain over past year, thinks his diet has changed also very fatigued and lightheaded. Lightheadedness is better when seated, worse with heat and humidity. Does not say he has any orthostatic symptoms-  no dizziness or syncope. Patient denies history of thyroid issue. No swelling or edema, no fevers or recent illnesses. [facility]    Has been depressed - isnt taking paxil, not sure why he stopped taking it. Stopped taking trazadone and not sure why, but has been having a lot of trouble getting to sleep - not sleeping well at all    Patient's breathing has been okay, he is taking his inhalers as prescribed. He denies any increase shortness of breath also denies any chest pain ,  Wheezing or lower extremity edema. Follows with [facility]. Had colonoscopy last summer. Repeat q5 years. Haven't had shingles vaccine, is going to get it. 1. Fatigue, unspecified type    2. Anticoagulant long-term use    3. Mixed hyperlipidemia    4. Chronic obstructive pulmonary disease, unspecified COPD type ([address]    5. Chronic systolic heart failure ([address]    6. Recurrent major depressive disorder, in partial remission ([address]    7. Primary insomnia         REVIEW OF SYMPTOMS    Review of Systems   Constitutional: Positive for fatigue and unexpected weight change. Negative for fever. HENT: Negative for congestion, dental problem, facial swelling, hearing loss, rhinorrhea, sinus pressure, sore throat and trouble swallowing. Eyes: Negative for pain, redness and visual disturbance. Respiratory: Negative for cough, chest tightness, shortness of breath and wheezing. Cardiovascular: Negative for chest pain, palpitations and leg swelling. Gastrointestinal: Negative for abdominal pain, blood in stool, constipation, diarrhea, nausea and vomiting. Endocrine: Negative for cold intolerance, heat intolerance, polydipsia and polyuria. Genitourinary: Negative for dysuria, flank pain, frequency, genital sores, hematuria and urgency. Musculoskeletal: Negative for arthralgias, back pain, gait problem, joint swelling, neck pain and neck stiffness. Skin: Negative for rash. Allergic/Immunologic: Negative for environmental allergies, food allergies and immunocompromised state. Neurological: Positive for light-headedness. Negative for dizziness, seizures, syncope, weakness, numbness and headaches. Hematological: Negative for adenopathy. Does not bruise/bleed easily. Psychiatric/Behavioral: Positive for dysphoric mood and sleep disturbance. Negative for confusion and suicidal ideas. The patient is not nervous/anxious. PAST MEDICAL HISTORY  Past Medical History:   Diagnosis Date    Acid reflux     Acute frontal sinusitis 7/22/2009    Alcohol abuse     \"I Drink Average 2 Beers A Day\"    Anesthesia     \" I get agitated\"    Anxiety     Arthritis     \"Right Shoulder, Neck, Left Knee\"    Atypical mycobacterial infection     Broken teeth     Upper And Lower     CH[facility]congest[facility] failure) ([facility]     COPD (chronic obstructive pulmonary disease) ([facility])     Sees Dr. Kirkland Aubrey In [place], [address] Cough     Frequent Cough With Green Sputum    Depression     Depression     Dyspnea 2/23/2018    H/O cardiovascular MUGA stress test 05/14/2019    EF 50%, NORMAL LVEF, NORMAL WALL MOTION.    H/O echocardiogram 05/22/2014    VK=>27-91%, LV systolic function is low normal, mild aortic valve calcification, no pericardial effusion    H/O echocardiogram 12/12/2018    EF 45-50%    History of 24 hour EKG monitoring 6/6/14    24 hr holter monitor. MG/3ML nebulizer solution Take 1 ampule by nebulization every 6 hours as needed for Wheezing      albuterol sulfate HFA (PROAIR HFA) 108 (90 Base) MCG/ACT inhaler Inhale 2 puffs into the lungs every 4 hours as needed for Wheezing or Shortness of Breath 1 Inhaler 3    montelukast (SINGULAIR) 10 MG tablet Take 1 tablet by mouth nightly \"Alternate With Zyrtec\" 30 tablet 5    ARIPiprazole (ABILIFY) 5 MG tablet Take 1 tablet by mouth daily 30 tablet 1    calcium carbonate (OSCAL) 500 MG TABS tablet Take 500 mg by mouth daily      KRILL OIL OMEGA-3 PO Take by mouth      sodium chloride, Inhalant, 3 % nebulizer solution       cetirizine (ZYRTEC) 10 MG tablet Take 10 mg by mouth \"Alternate With Singulair\"      NASAL SPRAY SALINE NA by Nasal route daily Over The Counter      budesonide (RHINOCORT ALLERGY) 32 MCG/ACT nasal spray 2 sprays by Nasal route daily      Acetaminophen (TYLENOL 8 HOUR PO) Take by mouth as needed Over The Counter      Multiple Vitamins-Minerals (CENTRUM PO) Take by mouth daily      Nebulizer MISC Inhale into the lungs as needed       No current facility-administered medications for this visit. ALLERGIES  Allergies   Allergen Reactions    Ciprofloxacin Hcl Hives and Swelling    Levaquin [Levofloxacin] Hives and Swelling    Other Nausea And Vomiting     \"Allergic To Tylox\"    Ceftin [Cefuroxime]        PHYSICAL EXAM    /84 (Site: Right Upper Arm, Position: Sitting, Cuff Size: Medium Adult)   Pulse 68   Ht 5' 5\" (1.651 m)   Wt 188 lb 14.4 oz (85.7 kg)   SpO2 95%   BMI 31.43 kg/m²     Physical Exam  Constitutional:       Appearance: Normal appearance. He is obese. HENT:      Head: Normocephalic and atraumatic. Right Ear: Tympanic membrane and external ear normal.      Left Ear: Tympanic membrane and external ear normal.      Nose: No rhinorrhea. Mouth/Throat:      Mouth: Mucous membranes are moist.      Pharynx: Oropharynx is clear.  No oropharyngeal exudate or posterior oropharyngeal erythema. Eyes:      General: No scleral icterus. Extraocular Movements: Extraocular movements intact. Conjunctiva/sclera: Conjunctivae normal.      Pupils: Pupils are equal, round, and reactive to light. Cardiovascular:      Rate and Rhythm: Normal rate and regular rhythm. Pulses: Normal pulses. Heart sounds: Normal heart sounds. No murmur heard. No friction rub. No gallop. Pulmonary:      Effort: Pulmonary effort is normal.      Breath sounds: Normal breath sounds. No wheezing, rhonchi or rales. Abdominal:      General: Bowel sounds are normal. There is no distension. Palpations: Abdomen is soft. There is no mass. Tenderness: There is no abdominal tenderness. There is no right CVA tenderness, left CVA tenderness, guarding or rebound. Musculoskeletal:         General: No deformity. Normal range of motion. Cervical back: Normal range of motion and neck supple. No rigidity. No muscular tenderness. Right lower leg: No edema. Left lower leg: No edema. Skin:     General: Skin is warm and dry. Capillary Refill: Capillary refill takes less than 2 seconds. Findings: No bruising, erythema or rash. Neurological:      General: No focal deficit present. Mental Status: He is alert and oriented to person, place, and time. Coordination: Coordination normal.      Gait: Gait normal.   Psychiatric:         Behavior: Behavior normal.      Comments: Mood seemed depressed, flat affect         ASSESSMENT & PLAN    1. Anticoagulant long-term use  Monthly PT and INR check    2. Mixed hyperlipidemia  Refill meds redo labs  - simvastatin (ZOCOR) 40 MG tablet; take 1 tablet by mouth at bedtime  Dispense: 90 tablet; Refill: 1  - Lipid, Fasting; Future    3.  Fatigue, unspecified type  Work-up to include blood count, chemistries and thyroid function, I think it is likely related to recent weight gain, depression and sleep disturbance, possibly a confluence of interrelated factors  - CBC Auto Differential; Future  - Comprehensive Metabolic Panel; Future  - TSH without Reflex; Future  - T4, Free; Future    4. Chronic obstructive pulmonary disease, unspecified COPD type ([facility]  Stable    5. Chronic systolic heart failure (HCC)  Stable    6. Recurrent major depressive disorder, in partial remission ([facility]  Discussed restarting Paxil with another sleep aid like hydroxyzine but decided on trazodone to see if we can get antidepressive and sleep benefit at the same time. Can go to [facility] with the trazadone if he gets some benefit and wants to increase dose. He will call and we can order the extra pills. 7. Primary insomnia  Discussed sleep hygiene  - traZODone (DESYREL) 100 MG tablet; Take 1 tablet by mouth nightly  Dispense: 90 tablet; Refill: 1      Return in about 6 months (around 12/11/2021). Electronically signed by MICHAEL Graham on 6/11/2021      Comment: Please note this report has been produced using speech recognition software and may contain errors related to that system including errors in grammar, punctuation, and spelling, as well as words and phrases that may be inappropriate. If there are any questions or concerns please feel free to contact the dictating provider for clarification.

## 2021-06-23 ENCOUNTER — NURSE ONLY (OUTPATIENT)
Dept: FAMILY MEDICINE CLINIC | Age: 66
End: 2021-06-23
Payer: MEDICARE

## 2021-06-23 DIAGNOSIS — Z95.2 S/P AVR: Chronic | ICD-10-CM

## 2021-06-23 LAB
INTERNATIONAL NORMALIZATION RATIO, POC: 3.2
PROTHROMBIN TIME, POC: NORMAL

## 2021-06-23 PROCEDURE — 85610 PROTHROMBIN TIME: CPT | Performed by: FAMILY MEDICINE

## 2021-07-21 ENCOUNTER — OFFICE VISIT (OUTPATIENT)
Dept: FAMILY MEDICINE CLINIC | Age: 66
End: 2021-07-21
Payer: MEDICARE

## 2021-07-21 VITALS
SYSTOLIC BLOOD PRESSURE: 152 MMHG | BODY MASS INDEX: 29.96 KG/M2 | DIASTOLIC BLOOD PRESSURE: 90 MMHG | HEART RATE: 101 BPM | WEIGHT: 179.8 LBS | OXYGEN SATURATION: 96 % | HEIGHT: 65 IN

## 2021-07-21 DIAGNOSIS — R03.0 ELEVATED BLOOD PRESSURE READING: ICD-10-CM

## 2021-07-21 DIAGNOSIS — F33.41 RECURRENT MAJOR DEPRESSIVE DISORDER, IN PARTIAL REMISSION (HCC): Primary | ICD-10-CM

## 2021-07-21 DIAGNOSIS — Z95.2 S/P AVR: Chronic | ICD-10-CM

## 2021-07-21 DIAGNOSIS — Z79.01 ANTICOAGULANT LONG-TERM USE: ICD-10-CM

## 2021-07-21 LAB
INTERNATIONAL NORMALIZATION RATIO, POC: 5
PROTHROMBIN TIME, POC: ABNORMAL

## 2021-07-21 PROCEDURE — 99214 OFFICE O/P EST MOD 30 MIN: CPT | Performed by: PHYSICIAN ASSISTANT

## 2021-07-21 PROCEDURE — 85610 PROTHROMBIN TIME: CPT | Performed by: FAMILY MEDICINE

## 2021-07-21 RX ORDER — VENLAFAXINE HYDROCHLORIDE 75 MG/1
75 CAPSULE, EXTENDED RELEASE ORAL DAILY
Qty: 90 CAPSULE | Refills: 0 | Status: SHIPPED | OUTPATIENT
Start: 2021-07-21 | End: 2021-09-05

## 2021-07-21 RX ORDER — WARFARIN SODIUM 5 MG/1
TABLET ORAL
Qty: 60 TABLET | Refills: 2 | Status: SHIPPED | OUTPATIENT
Start: 2021-07-21 | End: 2022-02-07 | Stop reason: SDUPTHER

## 2021-07-21 NOTE — PROGRESS NOTES
Current coumadin 7.5mg QD. INR 5.0 per Dr. Rhys Wells he is to hold for one day and recheck on Friday.

## 2021-07-21 NOTE — PROGRESS NOTES
7/21/2021    Cristin Vero    Chief Complaint   Patient presents with    Depression     noticed it getting worse a couple months ago.  Office Visit for Anticoagulation Management     5.0       HPI  History was obtained from pt. Jessica Morgan is a 60 y.o. male with a PMHx as listed below who presents today for worsening depression and for elevated PT/INR. He is post.  2.5-3.5 but he is at a 5.0. Patient has had depression anxiety in the past, he was on Paxil and for that he was on Prozac, he had taken himself off Paxil for couple of months to see how he would do off the medicine but has started taking it every other day for the past couple of weeks due to increasing symptoms. He says he has both depression symptoms and anxiety and he is to see Dr. Manny Valdivia at [facility]. Also his blood pressure is elevated today. He thinks he was on blood pressure medicine earlier in his life when he was obese but has not been an issue since he has lost weight. He denies any headache, blurry vision, chest pain or shortness of breath. 1. Recurrent major depressive disorder, in partial remission ([facility]    2. S/P AVR    3.  Anticoagulant long-term use           REVIEW OF SYMPTOMS    Review of Systems    PAST MEDICAL HISTORY  Past Medical History:   Diagnosis Date    Acid reflux     Acute frontal sinusitis 7/22/2009    Alcohol abuse     \"I Drink Average 2 Beers A Day\"    Anesthesia     \" I get agitated\"    Anxiety     Arthritis     \"Right Shoulder, Neck, Left Knee\"    Atypical mycobacterial infection     Broken teeth     Upper And Lower     [facility]     COPD (chronic obstructive pulmonary disease) ([facility])     Sees Dr. Valarie Munoz In [place], [address]     Cough     Frequent Cough With Green Sputum    Depression     Depression     Dyspnea 2/23/2018    H/O cardiovascular MUGA stress test 05/14/2019    EF 50%, NORMAL LVEF, NORMAL WALL MOTION.    H/O echocardiogram 05/22/2014    XY=>19-05%, LV systolic function is low normal, mild aortic valve calcification, no pericardial effusion    H/O echocardiogram 12/12/2018    EF 45-50%    History of 24 hour EKG monitoring 6/6/14    24 hr holter monitor. Ventricular ectopics were noted in single and couplet forms. Supraventricular ectopics were noted in single and pair forms.  [place] (hard of hearing)     Bilateral Ears    Hyperlipidemia     Hypertension     Irritant contact dermatitis due to other chemical products 8/26/2019    Other drug allergy(995.27) 7/22/2009    S/P AVR 12/2003    Mechanical valvue     Shortness of breath     Teeth missing     Upper And Lower       FAMILY HISTORY  Family History   Problem Relation Age of Onset    Cancer Mother         Lymphoma    Diabetes Mother     High Blood Pressure Mother     High Cholesterol Mother     Obesity Mother     Vision Loss Mother         Wore Glasses    Heart Disease Father         \"Heart Failure\"   [employer] COPD Father     Asthma Sister     High Blood Pressure Sister     High Cholesterol Sister     Other Sister         pre diabetic    COPD Sister    [employer] Diabetes Sister         \"Pre Diabetes\"    No Known Problems Son        SOCIAL HISTORY  Social History     Socioeconomic History    Marital status:      Spouse name: Not on file    Number of children: 1    Years of education: 15    Highest education level: Not on file   Occupational History    Not on file   Tobacco Use    Smoking status: Never Smoker    Smokeless tobacco: Current User     Types: Snuff, Chew   Vaping Use    Vaping Use: Never used   Substance and Sexual Activity    Alcohol use: Yes     Comment:  \"Average 2 Beers A Day\"    Drug use: No    Sexual activity: Not Currently   Other Topics Concern    Not on file   Social History Narrative    Not on file     Social Determinants of Health     Financial Resource Strain:     Difficulty of Paying Living Expenses:    Food tablet by mouth nightly 90 tablet 1    triamcinolone (KENALOG) 0.1 % cream Apply topically 2 times daily. 453.6 g 1    ammonium lactate (LAC-HYDRIN) 12 % lotion Apply topically daily. 225 g 1    ipratropium-albuterol (DUONEB) 0.5-2.5 (3) MG/3ML SOLN nebulizer solution Inhale 3 mLs into the lungs 4 times daily 360 mL 5    cyclobenzaprine (FLEXERIL) 10 MG tablet Take 10 mg by mouth 3 times daily as needed for Muscle spasms      albuterol (ACCUNEB) 0.63 MG/3ML nebulizer solution Take 1 ampule by nebulization every 6 hours as needed for Wheezing      albuterol sulfate HFA (PROAIR HFA) 108 (90 Base) MCG/ACT inhaler Inhale 2 puffs into the lungs every 4 hours as needed for Wheezing or Shortness of Breath 1 Inhaler 3    montelukast (SINGULAIR) 10 MG tablet Take 1 tablet by mouth nightly \"Alternate With Zyrtec\" 30 tablet 5    ARIPiprazole (ABILIFY) 5 MG tablet Take 1 tablet by mouth daily 30 tablet 1    calcium carbonate (OSCAL) 500 MG TABS tablet Take 500 mg by mouth daily      KRILL OIL OMEGA-3 PO Take by mouth      sodium chloride, Inhalant, 3 % nebulizer solution       cetirizine (ZYRTEC) 10 MG tablet Take 10 mg by mouth \"Alternate With Singulair\"      NASAL SPRAY SALINE NA by Nasal route daily Over The Counter      budesonide (RHINOCORT ALLERGY) 32 MCG/ACT nasal spray 2 sprays by Nasal route daily      Acetaminophen (TYLENOL 8 HOUR PO) Take by mouth as needed Over The Counter      Multiple Vitamins-Minerals (CENTRUM PO) Take by mouth daily      Nebulizer MISC Inhale into the lungs as needed       No current facility-administered medications for this visit.        ALLERGIES  Allergies   Allergen Reactions    Ciprofloxacin Hcl Hives and Swelling    Levaquin [Levofloxacin] Hives and Swelling    Other Nausea And Vomiting     \"Allergic To Tylox\"    Ceftin [Cefuroxime]        PHYSICAL EXAM    BP (!) 152/90   Pulse 101   Ht 5' 5\" (1.651 m)   Wt 179 lb 12.8 oz (81.6 kg)   SpO2 96%   BMI 29.92 kg/m² Physical Exam    ASSESSMENT & PLAN    1. S/P AVR  INR elevated, patient to hold dose tomorrow and come in Friday for recheck  - POCT INR    2. Anticoagulant long-term use  Patient to hold dose of Coumadin tomorrow and come in Friday for recheck of INR  - warfarin (COUMADIN) 5 MG tablet; take 1-2 tablets by mouth daily as directed  Dispense: 60 tablet; Refill: 2    3. Recurrent major depressive disorder, in partial remission (HCC)  D/c paxil  - venlafaxine (EFFEXOR XR) 75 MG extended release capsule; Take 1 capsule by mouth daily Take 1 capsule daily for 7 days, if tolerated add 1 pill per week up to a max of 3 capsules daily  Dispense: 90 capsule; Refill: 0    4. Elevated blood pressure reading  Nurse visit for blood pressure check Friday    Return in about 2 days (around 7/23/2021), or for INR and blood pressure check. Electronically signed by MICHAEL Dumont on 7/21/2021      Comment: Please note this report has been produced using speech recognition software and may contain errors related to that system including errors in grammar, punctuation, and spelling, as well as words and phrases that may be inappropriate. If there are any questions or concerns please feel free to contact the dictating provider for clarification.

## 2021-07-23 ENCOUNTER — NURSE ONLY (OUTPATIENT)
Dept: FAMILY MEDICINE CLINIC | Age: 66
End: 2021-07-23
Payer: MEDICARE

## 2021-07-23 VITALS — HEART RATE: 88 BPM | DIASTOLIC BLOOD PRESSURE: 92 MMHG | OXYGEN SATURATION: 96 % | SYSTOLIC BLOOD PRESSURE: 148 MMHG

## 2021-07-23 DIAGNOSIS — Z95.2 S/P AVR: Chronic | ICD-10-CM

## 2021-07-23 LAB
INTERNATIONAL NORMALIZATION RATIO, POC: 3.7
PROTHROMBIN TIME, POC: ABNORMAL

## 2021-07-23 PROCEDURE — 85610 PROTHROMBIN TIME: CPT | Performed by: FAMILY MEDICINE

## 2021-07-23 NOTE — PATIENT INSTRUCTIONS
Current coumadin 7.5mg QD.  INR 3.7 per Dr. Kristofer Gan pt to decrease dose to 7.5/7.5/2.5mg alternating and recheck INR 3 weeks

## 2021-08-11 ENCOUNTER — NURSE ONLY (OUTPATIENT)
Dept: FAMILY MEDICINE CLINIC | Age: 66
End: 2021-08-11
Payer: MEDICARE

## 2021-08-11 DIAGNOSIS — Z95.2 S/P AVR: Chronic | ICD-10-CM

## 2021-08-11 LAB
INTERNATIONAL NORMALIZATION RATIO, POC: 2.1
PROTHROMBIN TIME, POC: NORMAL

## 2021-08-11 PROCEDURE — 85610 PROTHROMBIN TIME: CPT | Performed by: FAMILY MEDICINE

## 2021-08-11 PROCEDURE — 99024 POSTOP FOLLOW-UP VISIT: CPT | Performed by: FAMILY MEDICINE

## 2021-08-25 ENCOUNTER — NURSE ONLY (OUTPATIENT)
Dept: FAMILY MEDICINE CLINIC | Age: 66
End: 2021-08-25
Payer: MEDICARE

## 2021-08-25 DIAGNOSIS — Z95.2 S/P AVR: Chronic | ICD-10-CM

## 2021-08-25 LAB
INTERNATIONAL NORMALIZATION RATIO, POC: 2.8
PROTHROMBIN TIME, POC: NORMAL

## 2021-08-25 PROCEDURE — 85610 PROTHROMBIN TIME: CPT | Performed by: FAMILY MEDICINE

## 2021-08-25 PROCEDURE — 99024 POSTOP FOLLOW-UP VISIT: CPT | Performed by: FAMILY MEDICINE

## 2021-09-02 ENCOUNTER — TELEPHONE (OUTPATIENT)
Dept: FAMILY MEDICINE CLINIC | Age: 66
End: 2021-09-02

## 2021-09-02 NOTE — TELEPHONE ENCOUNTER
Patient is having a sleeping issue and feels that is worse on the Venlafaxine 75 patient wants a call back from [facility] today 1-18-21

## 2021-09-02 NOTE — TELEPHONE ENCOUNTER
----- Message from Rommel Woodruff sent at 8/31/2021  4:10 PM EDT -----  Subject: Medication Problem    QUESTIONS  Name of Medication? venlafaxine (EFFEXOR XR) 75 MG extended release   capsule  Patient-reported dosage and instructions? 1 qd  What question or problem do you have with the medication? Pt wants to   discuss this medication. Also wants to discuss Trazodone, 100mg tab qd. Preferred Pharmacy? [address] [place] [address]  Pharmacy phone number (if available)? [phone]  Additional Information for Provider?   ---------------------------------------------------------------------------  --------------  [address]  What is the best way for the office to contact you? OK to leave message on   voicemail  Preferred Call Back Phone Number? [phone]  ---------------------------------------------------------------------------  --------------  SCRIPT ANSWERS  Relationship to Patient?  Self

## 2021-09-03 ENCOUNTER — TELEPHONE (OUTPATIENT)
Dept: FAMILY MEDICINE CLINIC | Age: 66
End: 2021-09-03

## 2021-09-03 NOTE — TELEPHONE ENCOUNTER
Have patient make an appointment to be seen. He can try an OTC sleep aid like doxylamine or melatonin.

## 2021-09-03 NOTE — TELEPHONE ENCOUNTER
Patient is not sleeping taking his \"depression medicine\"-----he is taking Venlafaxine 75 mg--------please help him understand what he should do----he thinks that not being able to sleep might be contributing to his depression.     [facility] on [id number]

## 2021-09-03 NOTE — TELEPHONE ENCOUNTER
Spoke to patient regarding colon screening and he voiced he had his colonoscopy on 6/2020. He does not remember who had done but knew he was on [address].

## 2021-09-05 ENCOUNTER — HOSPITAL ENCOUNTER (EMERGENCY)
Age: 66
Discharge: HOME | End: 2021-09-05
Payer: MEDICARE

## 2021-09-05 VITALS
TEMPERATURE: 97.6 F | HEART RATE: 105 BPM | RESPIRATION RATE: 19 BRPM | WEIGHT: 178 LBS | SYSTOLIC BLOOD PRESSURE: 180 MMHG | BODY MASS INDEX: 29.66 KG/M2 | OXYGEN SATURATION: 99 % | HEIGHT: 65 IN | DIASTOLIC BLOOD PRESSURE: 96 MMHG

## 2021-09-05 DIAGNOSIS — G47.00 INSOMNIA, UNSPECIFIED TYPE: Primary | ICD-10-CM

## 2021-09-05 PROCEDURE — 93005 ELECTROCARDIOGRAM TRACING: CPT | Performed by: PHYSICIAN ASSISTANT

## 2021-09-05 PROCEDURE — 99283 EMERGENCY DEPT VISIT LOW MDM: CPT

## 2021-09-05 RX ORDER — VENLAFAXINE HYDROCHLORIDE 150 MG/1
150 CAPSULE, EXTENDED RELEASE ORAL DAILY
Qty: 7 CAPSULE | Refills: 0 | Status: SHIPPED | OUTPATIENT
Start: 2021-09-05 | End: 2021-11-16

## 2021-09-05 RX ORDER — TRAZODONE HYDROCHLORIDE 100 MG/1
200 TABLET ORAL NIGHTLY PRN
Qty: 14 TABLET | Refills: 0 | Status: SHIPPED | OUTPATIENT
Start: 2021-09-05 | End: 2021-09-14 | Stop reason: SDUPTHER

## 2021-09-05 NOTE — ED PROVIDER NOTES
EKG Interpretation    Interpreted by emergency department physician    Sinus tachycardia with ventricular rate of 100 bpm.  Normal axis. Normal R wave progression. Flattening of the T wave in aVL. Previous EKG from 11/6/2018 shows normal sinus rhythm.       Clinical Impression: sinus tachycardia    DO Jerome Lizarraga DO  09/05/21 1444

## 2021-09-05 NOTE — ED PROVIDER NOTES
Patient Identification  Los Mo is a 60 y.o. male    Chief Complaint  Anxiety      HPI  (History provided by patient)  This is a 60 y.o. male who was brought in by self for chief complaint of anxiety and insomnia. Patient reports a longstanding history of anxiety, also has been having insomnia for the last several months. He was started on trazodone 100 mg nightly and venlafaxine. States that trazodone did help calm him down but still was not getting more than a few hours of sleep. He was supposed to be escalating his venlafaxine dose but states that due to his lack of sleep he has been unable to do so, currently on 75 mg and was told to increase it to 150 and then to 225 if symptoms not controlled. Has been trying to contact PCP repeatedly throughout the week without success until Friday when he spoke with a nurse who changed his trazodone to melatonin. He denies any suicidal or homicidal ideation. REVIEW OF SYSTEMS    Constitutional:  Denies fever, chills  HENT:  Denies sore throat or ear pain   Eyes: Denies vision changes, eye pain  Cardiovascular:  Denies chest pain, syncope  Respiratory:  Denies shortness of breath, cough   GI:  Denies abdominal pain, nausea, vomiting  :  Denies dysuria, discharge  Musculoskeletal:  Denies back pain, joint pain  Skin:  Denies rash, pruritis  Neurologic:  Denies headache, focal weakness, or sensory changes     See HPI and nursing notes for additional information     I have reviewed the following nursing documentation:  Allergies:    Allergies   Allergen Reactions    Ciprofloxacin Hcl Hives and Swelling    Levaquin [Levofloxacin] Hives and Swelling    Other Nausea And Vomiting     \"Allergic To Tylox\"    Ceftin [Cefuroxime]        Past medical history:  has a past medical history of Acid reflux, Acute frontal sinusitis (7/22/2009), Alcohol abuse, Anesthesia, Anxiety, Arthritis, Atypical mycobacterial infection, Broken teeth, CHF (congestive heart failure) [facility]), COPD (chronic obstructive pulmonary disease) ([facility] [address] Cough, Depression, Depression, Dyspnea (2/23/2018), H/O cardiovascular MUGA stress test (05/14/2019), H/O echocardiogram (05/22/2014), H/O echocardiogram (12/12/2018), History of 24 hour EKG monitoring (6/6/14), [place] (hard of hearing), Hyperlipidemia, Hypertension, Irritant contact dermatitis due to other chemical products (8/26/2019), Other drug allergy(995.27) (7/22/2009), S/P AVR (12/2003), Shortness of breath, and Teeth missing. Past surgical history:  has a past surgical history that includes Lung biopsy (Right, 1996); Rotator cuff repair (Right, 2008); Dental surgery; Cardiac valve replacement (12/17/2003); Colonoscopy (2006); Endoscopy, colon, diagnostic (In Past); Tonsillectomy (1959 Or 1960); bronchoscopy (1996); Knee arthroscopy (Left, 1992); other surgical history (1992); and HEMIARTHROPLASTY SHOULDER FRACTURE (Right, 1/29/2019). Home medications:   Prior to Admission medications    Medication Sig Start Date End Date Taking? Authorizing Provider   venlafaxine (EFFEXOR XR) 150 MG extended release capsule Take 1 capsule by mouth daily for 7 days 9/5/21 9/12/21 Yes Yfn Coleman PA-C   traZODone (DESYREL) 100 MG tablet Take 2 tablets by mouth nightly as needed for Sleep 9/5/21 9/12/21 Yes Yfn Coleman PA-C   warfarin (COUMADIN) 5 MG tablet take 1-2 tablets by mouth daily as directed 7/21/21   MICHAEL Howard   simvastatin (ZOCOR) 40 MG tablet take 1 tablet by mouth at bedtime 6/11/21   MICHAEL Howard   triamcinolone (KENALOG) 0.1 % cream Apply topically 2 times daily. 11/9/20   Rachel Gutierrez PA-C   ammonium lactate (LAC-HYDRIN) 12 % lotion Apply topically daily.  11/9/20   Rachel Gutierrez PA-C   ipratropium-albuterol (DUONEB) 0.5-2.5 (3) MG/3ML SOLN nebulizer solution Inhale 3 mLs into the lungs 4 times daily 8/27/20   Andre Maynard MD   cyclobenzaprine (FLEXERIL) 10 MG tablet Take 10 mg by mouth 3 times daily as needed for Muscle spasms    Historical Provider, MD   albuterol (ACCUNEB) 0.63 MG/3ML nebulizer solution Take 1 ampule by nebulization every 6 hours as needed for Wheezing    Historical Provider, MD   albuterol sulfate HFA (PROAIR HFA) 108 (90 Base) MCG/ACT inhaler Inhale 2 puffs into the lungs every 4 hours as needed for Wheezing or Shortness of Breath 8/4/20   Iraj Cowart MD   montelukast (SINGULAIR) 10 MG tablet Take 1 tablet by mouth nightly \"Alternate With Zyrtec\" 8/4/20   Iraj Cowart MD   ARIPiprazole (ABILIFY) 5 MG tablet Take 1 tablet by mouth daily 7/8/20   VANGIE Arriola CNP   calcium carbonate (OSCAL) 500 MG TABS tablet Take 500 mg by mouth daily    Historical Provider, MD   KRILL OIL OMEGA-3 PO Take by mouth    Historical Provider, MD   sodium chloride, Inhalant, 3 % nebulizer solution  10/31/18   Historical Provider, MD   cetirizine (ZYRTEC) 10 MG tablet Take 10 mg by mouth \"Alternate With Singulair\"    Historical Provider, MD   NASAL SPRAY SALINE NA by Nasal route daily Over The Counter    Historical Provider, MD   budesonide (RHINOCORT ALLERGY) 32 MCG/ACT nasal spray 2 sprays by Nasal route daily    Historical Provider, MD   Acetaminophen (TYLENOL 8 HOUR PO) Take by mouth as needed Over The Counter    Historical Provider, MD   Multiple Vitamins-Minerals (CENTRUM PO) Take by mouth daily    Historical Provider, MD   Nebulizer MISC Inhale into the lungs as needed    Historical Provider, MD       Social history:  reports that he has never smoked. His smokeless tobacco use includes snuff and chew. He reports current alcohol use. He reports that he does not use drugs.     Family history:    Family History   Problem Relation Age of Onset    Cancer Mother         Lymphoma    Diabetes Mother     High Blood Pressure Mother     High Cholesterol Mother     Obesity Mother     Vision Loss Mother         Wore Glasses    Heart Disease Father         \"Heart Failure\"    COPD Father     Asthma Sister     High Blood Pressure Sister     High Cholesterol Sister     Other Sister         pre diabetic    COPD Sister     Diabetes Sister         \"Pre Diabetes\"    No Known Problems Son          Exam  BP (!) 180/96   Pulse 105   Temp 97.6 °F (36.4 °C) (Oral)   Resp 19   Ht 5' 5\" (1.651 m)   Wt 178 lb (80.7 kg)   SpO2 99%   BMI 29.62 kg/m²   Nursing note and vitals reviewed. Constitutional: Well developed, well nourished. No acute distress. HENT:      Head: Normocephalic and atraumatic. Ears: External ears normal.      Nose: Nose normal.     Mouth: Membrane mucosa moist and pink. No posterior oropharynx erythema or tonsillar edema  Eyes: Anicteric sclera. No discharge, PERRL  Neck: Supple. Trachea midline. Cardiovascular: RRR, no murmurs, rubs, or gallops, radial pulses 2+ bilaterally. Pulmonary/Chest: Effort normal. No respiratory distress. CTAB. No stridor. No wheezes. No rales. Abdominal: Soft. Nontender to palpation. No distension. No guarding, rebound tenderness, or evidence of ascites. : No CVA tenderness. Musculoskeletal: Moves all extremities. No gross deformity. Neurological: Alert and oriented to person, place, and time. Normal muscle tone. Skin: Warm and dry. No rash. Psychiatric: Anxious, disorganized thoughts. EKG   Please see Dr. Tanya Vincent note for EKG read. Radiographs (if obtained):  [] The following radiograph was interpreted by myself in the absence of a radiologist:   [x] Radiologist's Report Reviewed:  No orders to display          Labs  No results found for this visit on 09/05/21. MDM  Patient presents for anxiety and insomnia. He has no other complaints with me. EKG was performed by triage staff for unknown reason, shows sinus tachycardia rate of 100 with some flattening of aVL. Patient admits the trazodone was calming him down but was not providing full assistance with sleeping, then was changed to melatonin.   Symptoms seem primarily related to anxiety, I think trazodone is a better choice given that he was having some improvement, will escalate dose to 200 mg nightly, discontinue melatonin. Also instructed to follow PCP plan for escalating his venlafaxine dose, now should be at 150 mg daily. Patient is comfortable with this plan. Return to ED for any new or worsening symptoms. Follow-up with PCP, call Monday for earliest appointment, currently has appointment on the 14th. Blood pressure elevated here, no history of this in the past per patient but does have HTN listed in medical record, very anxious, recommended recheck with PCP on next visit. I have independently evaluated this patient. Final Impression  1. Insomnia, unspecified type        Blood pressure (!) 180/96, pulse 105, temperature 97.6 °F (36.4 °C), temperature source Oral, resp. rate 19, height 5' 5\" (1.651 m), weight 178 lb (80.7 kg), SpO2 99 %. Disposition:  Discharge to home in stable condition. Patient was given scripts for the following medications. I counseled patient how to take these medications. Discharge Medication List as of 9/5/2021  2:59 PM          This chart was generated using the [address] [place] [address] dictation system. I created this record but it may contain dictation errors given the limitations of this technology.        Erick Hatfield PA-C  09/05/21 6208

## 2021-09-07 LAB
EKG ATRIAL RATE: 100 BPM
EKG DIAGNOSIS: NORMAL
EKG P AXIS: 79 DEGREES
EKG P-R INTERVAL: 134 MS
EKG Q-T INTERVAL: 378 MS
EKG QRS DURATION: 98 MS
EKG QTC CALCULATION (BAZETT): 487 MS
EKG R AXIS: 57 DEGREES
EKG T AXIS: 69 DEGREES
EKG VENTRICULAR RATE: 100 BPM

## 2021-09-07 PROCEDURE — 93010 ELECTROCARDIOGRAM REPORT: CPT | Performed by: INTERNAL MEDICINE

## 2021-09-08 ENCOUNTER — NURSE ONLY (OUTPATIENT)
Dept: FAMILY MEDICINE CLINIC | Age: 66
End: 2021-09-08
Payer: MEDICARE

## 2021-09-08 DIAGNOSIS — Z95.2 S/P AVR: Chronic | ICD-10-CM

## 2021-09-08 LAB
INTERNATIONAL NORMALIZATION RATIO, POC: 2.2
PROTHROMBIN TIME, POC: NORMAL

## 2021-09-08 PROCEDURE — 99024 POSTOP FOLLOW-UP VISIT: CPT | Performed by: FAMILY MEDICINE

## 2021-09-08 PROCEDURE — 85610 PROTHROMBIN TIME: CPT | Performed by: FAMILY MEDICINE

## 2021-09-14 ENCOUNTER — TELEPHONE (OUTPATIENT)
Dept: FAMILY MEDICINE CLINIC | Age: 66
End: 2021-09-14

## 2021-09-14 ENCOUNTER — OFFICE VISIT (OUTPATIENT)
Dept: FAMILY MEDICINE CLINIC | Age: 66
End: 2021-09-14
Payer: MEDICARE

## 2021-09-14 VITALS
SYSTOLIC BLOOD PRESSURE: 152 MMHG | WEIGHT: 162 LBS | HEART RATE: 115 BPM | OXYGEN SATURATION: 93 % | DIASTOLIC BLOOD PRESSURE: 82 MMHG | HEIGHT: 65 IN | BODY MASS INDEX: 26.99 KG/M2

## 2021-09-14 DIAGNOSIS — J44.9 CHRONIC OBSTRUCTIVE PULMONARY DISEASE, UNSPECIFIED COPD TYPE (HCC): Chronic | ICD-10-CM

## 2021-09-14 DIAGNOSIS — F41.9 ANXIETY: Chronic | ICD-10-CM

## 2021-09-14 DIAGNOSIS — F33.41 RECURRENT MAJOR DEPRESSIVE DISORDER, IN PARTIAL REMISSION (HCC): Primary | Chronic | ICD-10-CM

## 2021-09-14 DIAGNOSIS — J40 BRONCHITIS: ICD-10-CM

## 2021-09-14 DIAGNOSIS — F51.04 PSYCHOPHYSIOLOGICAL INSOMNIA: ICD-10-CM

## 2021-09-14 DIAGNOSIS — Z95.2 S/P AVR: Chronic | ICD-10-CM

## 2021-09-14 PROCEDURE — G8427 DOCREV CUR MEDS BY ELIG CLIN: HCPCS | Performed by: FAMILY MEDICINE

## 2021-09-14 PROCEDURE — 3017F COLORECTAL CA SCREEN DOC REV: CPT | Performed by: FAMILY MEDICINE

## 2021-09-14 PROCEDURE — G8926 SPIRO NO PERF OR DOC: HCPCS | Performed by: FAMILY MEDICINE

## 2021-09-14 PROCEDURE — 4004F PT TOBACCO SCREEN RCVD TLK: CPT | Performed by: FAMILY MEDICINE

## 2021-09-14 PROCEDURE — 99214 OFFICE O/P EST MOD 30 MIN: CPT | Performed by: FAMILY MEDICINE

## 2021-09-14 PROCEDURE — 3023F SPIROM DOC REV: CPT | Performed by: FAMILY MEDICINE

## 2021-09-14 PROCEDURE — G8417 CALC BMI ABV UP PARAM F/U: HCPCS | Performed by: FAMILY MEDICINE

## 2021-09-14 RX ORDER — AZITHROMYCIN 500 MG/1
500 TABLET, FILM COATED ORAL DAILY
Qty: 1 TABLET | Refills: 0 | Status: SHIPPED | OUTPATIENT
Start: 2021-09-14 | End: 2021-09-21

## 2021-09-14 RX ORDER — TRAZODONE HYDROCHLORIDE 100 MG/1
200 TABLET ORAL NIGHTLY
Qty: 30 TABLET | Refills: 3 | Status: SHIPPED | OUTPATIENT
Start: 2021-09-14 | End: 2021-11-16

## 2021-09-14 ASSESSMENT — ENCOUNTER SYMPTOMS
NAUSEA: 0
TROUBLE SWALLOWING: 0
EYE PAIN: 0
ABDOMINAL PAIN: 0
BLOOD IN STOOL: 0
SHORTNESS OF BREATH: 0
VOMITING: 0
COUGH: 1
WHEEZING: 1
CHEST TIGHTNESS: 0
DIARRHEA: 0

## 2021-09-14 NOTE — PROGRESS NOTES
9/14/2021    [facility]    Chief Complaint   Patient presents with    Follow-up     sob    Cough     productive, gets up flem    Medication Problem     trazadone prob.  Medication Problem     jittery, shakey, flight of thought since med change. venlafaxine and trazadone. HPI  History was obtained from the patient and his sister although a bit jittery today he certainly looks better and less angry than many visits in the past.  Still fighting some insomnia is anxiety depression is actually doing a bit better. He has a history of COPD and is now bringing up some purulent sputum. Patient has had Covid vaccinations. He admits he is upset because he often does not take his medicine correctly and this adds to his problems and change in mood. We went over his proper dosing the fact he needs to really work on healthy lifestyle, diet, and some exercise. He needs to get be back in touch with his counselor at mental health. He does have a as needed inhaler for use at home. Patient does get regular INRs because of history of aortic valve replacement on Coumadin. Labs in June of this year for lipids thyroid and CMP and CBC looked reasonable. REVIEW OF SYMPTOMS    Review of Systems   Constitutional: Negative for activity change and fatigue. HENT: Negative for congestion, hearing loss, mouth sores and trouble swallowing. Eyes: Negative for pain and visual disturbance. Respiratory: Positive for cough and wheezing. Negative for chest tightness and shortness of breath. Cardiovascular: Negative for chest pain and palpitations. Gastrointestinal: Negative for abdominal pain, blood in stool, diarrhea, nausea and vomiting. Endocrine: Negative for polydipsia and polyuria. Genitourinary: Negative for dysuria, frequency and urgency. Musculoskeletal: Negative for arthralgias, gait problem and neck stiffness. Skin: Negative for rash. Allergic/Immunologic: Negative for environmental allergies. Neurological: Negative for dizziness, seizures, speech difficulty and weakness. Hematological: Does not bruise/bleed easily. Psychiatric/Behavioral: Positive for dysphoric mood. Negative for agitation, confusion, hallucinations and suicidal ideas. The patient is nervous/anxious. Patient not as jittery or angry as normal.  Has been trying to take medications a bit more reliably. PAST MEDICAL HISTORY  Past Medical History:   Diagnosis Date    Acid reflux     Acute frontal sinusitis 7/22/2009    Alcohol abuse     \"I Drink Average 2 Beers A Day\"    Anesthesia     \" I get agitated\"    Anxiety     Arthritis     \"Right Shoulder, Neck, Left Knee\"    Atypical mycobacterial infection     Broken teeth     Upper And Lower     [facility]congest[facility]) [facility]     COPD (chronic obstructive pulmonary disease) ([facility])     Sees Dr. Adali Oakes In [place], [facility] Cough     Frequent Cough With Green Sputum    Depression     Depression     Dyspnea 2/23/2018    H/O cardiovascular MUGA stress test 05/14/2019    EF 50%, NORMAL LVEF, NORMAL WALL MOTION.    H/O echocardiogram 05/22/2014    ZA=>30-84%, LV systolic function is low normal, mild aortic valve calcification, no pericardial effusion    H/O echocardiogram 12/12/2018    EF 45-50%    History of 24 hour EKG monitoring 6/6/14    24 hr holter monitor. Ventricular ectopics were noted in single and couplet forms. Supraventricular ectopics were noted in single and pair forms.     [place] (hard of hearing)     Bilateral Ears    Hyperlipidemia     Hypertension     Irritant contact dermatitis due to other chemical products 8/26/2019    Other drug allergy(995.27) 7/22/2009    S/P AVR 12/2003    Mechanical valvue     Shortness of breath     Teeth missing     Upper And Lower       FAMILY HISTORY  Family History   Problem Relation Age of Onset    Cancer Mother         Lymphoma    Diabetes Mother     High Blood Pressure Mother     High Cholesterol Mother     Obesity Mother     Vision Loss Mother         Wore Glasses    Heart Disease Father         \"Heart Failure\"   [employer] COPD Father     Asthma Sister     High Blood Pressure Sister     High Cholesterol Sister     Other Sister         pre diabetic    COPD Sister    [employer] Diabetes Sister         \"Pre Diabetes\"    No Known Problems Son        SOCIAL HISTORY  Social History     Socioeconomic History    Marital status:      Spouse name: None    Number of children: 1    Years of education: 15    Highest education level: None   Occupational History    None   Tobacco Use    Smoking status: Never Smoker    Smokeless tobacco: Current User     Types: Snuff, Chew   Vaping Use    Vaping Use: Never used   Substance and Sexual Activity    Alcohol use: Yes     Comment: \"Average 2 Beers A Day\"    Drug use: No    Sexual activity: Not Currently   Other Topics Concern    None   Social History Narrative    None     Social Determinants of Health     Financial Resource Strain:     Difficulty of Paying Living Expenses:    Food Insecurity:     Worried About Running Out of Food in the Last Year:     Ran Out of Food in the Last Year:    Transportation Needs:     Lack of Transportation (Medical):      Lack of Transportation (Non-Medical):    Physical Activity:     Days of Exercise per Week:     Minutes of Exercise per Session:    Stress:     Feeling of Stress :    Social Connections:     Frequency of Communication with Friends and Family:     Frequency of Social Gatherings with Friends and Family:     Attends Nondenominational Services:     Active Member of Clubs or Organizations:     Attends Club or Organization Meetings:     Marital Status:    Intimate Partner Violence:     Fear of Current or Ex-Partner:     Emotionally Abused:     Physically Abused:     Sexually Abused:         SURGICAL HISTORY  Past Surgical History:   Procedure Laterality Date   [address]    \"Done Twice\"    CARDIAC VALVE REPLACEMENT  12/17/2003    \"Aortic Valve Replacement, Mechanical Valve\"    COLONOSCOPY  2006    Polyps Removed    DENTAL SURGERY      Teeth Extracted In Past    ENDOSCOPY, COLON, DIAGNOSTIC  In Past    HEMIARTHROPLASTY SHOULDER FRACTURE Right 1/29/2019    SHOULDER HEMIARTHROPLASTY performed by Na Barrios DO at [facility] Left 1992    LUNG BIOPSY Right [facility]    \"Cauterized Because My Sperm Was Low\"    ROTATOR CUFF REPAIR Right 2008    TONSILLECTOMY  1959 Or 1960                 CURRENT MEDICATIONS  Current Outpatient Medications   Medication Sig Dispense Refill    traZODone (DESYREL) 100 MG tablet Take 2 tablets by mouth nightly 30 tablet 3    azithromycin (ZITHROMAX) 500 MG tablet Take 1 tablet by mouth daily for 7 days 1 tablet 0    warfarin (COUMADIN) 5 MG tablet take 1-2 tablets by mouth daily as directed 60 tablet 2    simvastatin (ZOCOR) 40 MG tablet take 1 tablet by mouth at bedtime 90 tablet 1    triamcinolone (KENALOG) 0.1 % cream Apply topically 2 times daily. 453.6 g 1    ammonium lactate (LAC-HYDRIN) 12 % lotion Apply topically daily.  225 g 1    ipratropium-albuterol (DUONEB) 0.5-2.5 (3) MG/3ML SOLN nebulizer solution Inhale 3 mLs into the lungs 4 times daily 360 mL 5    cyclobenzaprine (FLEXERIL) 10 MG tablet Take 10 mg by mouth 3 times daily as needed for Muscle spasms      albuterol (ACCUNEB) 0.63 MG/3ML nebulizer solution Take 1 ampule by nebulization every 6 hours as needed for Wheezing      albuterol sulfate HFA (PROAIR HFA) 108 (90 Base) MCG/ACT inhaler Inhale 2 puffs into the lungs every 4 hours as needed for Wheezing or Shortness of Breath 1 Inhaler 3    montelukast (SINGULAIR) 10 MG tablet Take 1 tablet by mouth nightly \"Alternate With Zyrtec\" 30 tablet 5    calcium carbonate (OSCAL) 500 MG TABS tablet Take 500 mg by mouth daily      KRILL OIL OMEGA-3 PO Take by mouth      sodium chloride, Inhalant, 3 % cranial nerve deficit. Motor: No weakness. Gait: Gait normal.   Psychiatric:         Mood and Affect: Mood normal.         Behavior: Behavior normal.         Thought Content: Thought content normal.         ASSESSMENT & PLAN     Diagnosis Orders   1. Recurrent major depressive disorder, in partial remission ([facility] [address]     2. Psychophysiological insomnia     3. Anxiety     4. Chronic obstructive pulmonary disease, unspecified COPD type ([facility] [address]     5. S/P AVR     6. Bronchitis  azithromycin (ZITHROMAX) 500 MG tablet     Encouraged to restart counseling through mental health patient and sister do have the name of the counselor. They will contact that office. He is to work on healthy lifestyle and increase in exercise. Take his meds as directed instructed to get a med minder that might be helpful for them. He is to get a flu shot next month we will treat with Z-Emanuel for bronchitis. He is to push fluids, rest, take Tylenol as needed. Refillson other meds provided as needed also. If cough and wheezing worsens-he will need to go to urgent care walk-in clinic for Covid testing. Recheck on INR as per Coumadin flowsheet. Return in about 3 months (around 12/14/2021), or if symptoms worsen or fail to improve.          Electronically signed by Gabriel Davis MD on 9/14/2021

## 2021-09-17 ENCOUNTER — NURSE ONLY (OUTPATIENT)
Dept: FAMILY MEDICINE CLINIC | Age: 66
End: 2021-09-17
Payer: MEDICARE

## 2021-09-17 DIAGNOSIS — Z95.2 S/P AVR: Chronic | ICD-10-CM

## 2021-09-17 LAB — INTERNATIONAL NORMALIZATION RATIO, POC: 3.6

## 2021-09-17 PROCEDURE — 85610 PROTHROMBIN TIME: CPT | Performed by: FAMILY MEDICINE

## 2021-09-17 NOTE — PROGRESS NOTES
Pt INR today was 3.6  pt to do 5mg on Monday and Friday and 7.5mg all other days and recheck in a week Per Dr. Devon Smith.

## 2021-09-24 ENCOUNTER — NURSE ONLY (OUTPATIENT)
Dept: FAMILY MEDICINE CLINIC | Age: 66
End: 2021-09-24
Payer: MEDICARE

## 2021-09-24 DIAGNOSIS — Z95.2 S/P AVR: Chronic | ICD-10-CM

## 2021-09-24 LAB — INTERNATIONAL NORMALIZATION RATIO, POC: 2.4

## 2021-09-24 PROCEDURE — 85610 PROTHROMBIN TIME: CPT | Performed by: FAMILY MEDICINE

## 2021-09-24 NOTE — PROGRESS NOTES
Pt INR today was 2.4 pt to do 5mg on Monday and Friday and 7.5mg all other days and recheck in 2 weeks.  Julienne Hairston

## 2021-10-06 ENCOUNTER — NURSE ONLY (OUTPATIENT)
Dept: FAMILY MEDICINE CLINIC | Age: 66
End: 2021-10-06
Payer: MEDICARE

## 2021-10-06 DIAGNOSIS — Z95.2 S/P AVR: Chronic | ICD-10-CM

## 2021-10-06 LAB
INTERNATIONAL NORMALIZATION RATIO, POC: 1.6
PROTHROMBIN TIME, POC: ABNORMAL

## 2021-10-06 PROCEDURE — 85610 PROTHROMBIN TIME: CPT | Performed by: FAMILY MEDICINE

## 2021-10-22 ENCOUNTER — HOSPITAL ENCOUNTER (INPATIENT)
Age: 66
LOS: 9 days | Discharge: NURSING, REHAB OR LONG-TERM CARE | DRG: 522 | End: 2021-11-03
Attending: HOSPITALIST | Admitting: INTERNAL MEDICINE
Payer: OTHER GOVERNMENT

## 2021-10-22 ENCOUNTER — APPOINTMENT (OUTPATIENT)
Dept: GENERAL RADIOLOGY | Age: 66
DRG: 522 | End: 2021-10-22
Payer: OTHER GOVERNMENT

## 2021-10-22 DIAGNOSIS — E87.1 HYPONATREMIA: ICD-10-CM

## 2021-10-22 DIAGNOSIS — S72.031A: Primary | ICD-10-CM

## 2021-10-22 PROBLEM — M84.353A FEMORAL NECK STRESS FRACTURE, INITIAL ENCOUNTER: Status: ACTIVE | Noted: 2021-10-22

## 2021-10-22 LAB
ALBUMIN SERPL-MCNC: 4.2 GM/DL (ref 3.4–5)
ALP BLD-CCNC: 79 IU/L (ref 40–129)
ALT SERPL-CCNC: 16 U/L (ref 10–40)
ANION GAP SERPL CALCULATED.3IONS-SCNC: 16 MMOL/L (ref 4–16)
APTT: 35.2 SECONDS (ref 25.1–37.1)
APTT: 37.8 SECONDS (ref 25.1–37.1)
AST SERPL-CCNC: 28 IU/L (ref 15–37)
BASOPHILS ABSOLUTE: 0 K/CU MM
BASOPHILS RELATIVE PERCENT: 0.2 % (ref 0–1)
BILIRUB SERPL-MCNC: 0.5 MG/DL (ref 0–1)
BUN BLDV-MCNC: 15 MG/DL (ref 6–23)
CALCIUM SERPL-MCNC: 9.3 MG/DL (ref 8.3–10.6)
CHLORIDE BLD-SCNC: 92 MMOL/L (ref 99–110)
CO2: 19 MMOL/L (ref 21–32)
CREAT SERPL-MCNC: 0.5 MG/DL (ref 0.9–1.3)
DIFFERENTIAL TYPE: ABNORMAL
EOSINOPHILS ABSOLUTE: 0 K/CU MM
EOSINOPHILS RELATIVE PERCENT: 0 % (ref 0–3)
GFR AFRICAN AMERICAN: >60 ML/MIN/1.73M2
GFR NON-AFRICAN AMERICAN: >60 ML/MIN/1.73M2
GLUCOSE BLD-MCNC: 109 MG/DL (ref 70–99)
HCT VFR BLD CALC: 37.7 % (ref 42–52)
HCT VFR BLD CALC: 42.9 % (ref 42–52)
HEMOGLOBIN: 12.5 GM/DL (ref 13.5–18)
HEMOGLOBIN: 13.9 GM/DL (ref 13.5–18)
IMMATURE NEUTROPHIL %: 0.5 % (ref 0–0.43)
INR BLD: 2.99 INDEX
LYMPHOCYTES ABSOLUTE: 0.8 K/CU MM
LYMPHOCYTES RELATIVE PERCENT: 6.5 % (ref 24–44)
MCH RBC QN AUTO: 30.5 PG (ref 27–31)
MCH RBC QN AUTO: 31.1 PG (ref 27–31)
MCHC RBC AUTO-ENTMCNC: 32.4 % (ref 32–36)
MCHC RBC AUTO-ENTMCNC: 33.2 % (ref 32–36)
MCV RBC AUTO: 93.8 FL (ref 78–100)
MCV RBC AUTO: 94.3 FL (ref 78–100)
MONOCYTES ABSOLUTE: 1.4 K/CU MM
MONOCYTES RELATIVE PERCENT: 11 % (ref 0–4)
NUCLEATED RBC %: 0 %
PDW BLD-RTO: 14.6 % (ref 11.7–14.9)
PDW BLD-RTO: 14.6 % (ref 11.7–14.9)
PLATELET # BLD: 377 K/CU MM (ref 140–440)
PLATELET # BLD: 393 K/CU MM (ref 140–440)
PMV BLD AUTO: 8.7 FL (ref 7.5–11.1)
PMV BLD AUTO: 8.8 FL (ref 7.5–11.1)
POTASSIUM SERPL-SCNC: 3.8 MMOL/L (ref 3.5–5.1)
PROTHROMBIN TIME: 39 SECONDS (ref 11.7–14.5)
RBC # BLD: 4.02 M/CU MM (ref 4.6–6.2)
RBC # BLD: 4.55 M/CU MM (ref 4.6–6.2)
SEGMENTED NEUTROPHILS ABSOLUTE COUNT: 10.3 K/CU MM
SEGMENTED NEUTROPHILS RELATIVE PERCENT: 81.8 % (ref 36–66)
SODIUM BLD-SCNC: 127 MMOL/L (ref 135–145)
TOTAL IMMATURE NEUTOROPHIL: 0.06 K/CU MM
TOTAL NUCLEATED RBC: 0 K/CU MM
TOTAL PROTEIN: 7.3 GM/DL (ref 6.4–8.2)
WBC # BLD: 12.6 K/CU MM (ref 4–10.5)
WBC # BLD: 9.9 K/CU MM (ref 4–10.5)

## 2021-10-22 PROCEDURE — 80053 COMPREHEN METABOLIC PANEL: CPT

## 2021-10-22 PROCEDURE — 96366 THER/PROPH/DIAG IV INF ADDON: CPT

## 2021-10-22 PROCEDURE — 6370000000 HC RX 637 (ALT 250 FOR IP): Performed by: HOSPITALIST

## 2021-10-22 PROCEDURE — G0378 HOSPITAL OBSERVATION PER HR: HCPCS

## 2021-10-22 PROCEDURE — 6360000002 HC RX W HCPCS: Performed by: PHYSICIAN ASSISTANT

## 2021-10-22 PROCEDURE — 85025 COMPLETE CBC W/AUTO DIFF WBC: CPT

## 2021-10-22 PROCEDURE — 2580000003 HC RX 258: Performed by: HOSPITALIST

## 2021-10-22 PROCEDURE — 71045 X-RAY EXAM CHEST 1 VIEW: CPT

## 2021-10-22 PROCEDURE — 94640 AIRWAY INHALATION TREATMENT: CPT

## 2021-10-22 PROCEDURE — 94761 N-INVAS EAR/PLS OXIMETRY MLT: CPT

## 2021-10-22 PROCEDURE — 85610 PROTHROMBIN TIME: CPT

## 2021-10-22 PROCEDURE — 99285 EMERGENCY DEPT VISIT HI MDM: CPT

## 2021-10-22 PROCEDURE — 72100 X-RAY EXAM L-S SPINE 2/3 VWS: CPT

## 2021-10-22 PROCEDURE — 73502 X-RAY EXAM HIP UNI 2-3 VIEWS: CPT

## 2021-10-22 PROCEDURE — 96365 THER/PROPH/DIAG IV INF INIT: CPT

## 2021-10-22 PROCEDURE — 85027 COMPLETE CBC AUTOMATED: CPT

## 2021-10-22 PROCEDURE — 85730 THROMBOPLASTIN TIME PARTIAL: CPT

## 2021-10-22 RX ORDER — SODIUM CHLORIDE 9 MG/ML
25 INJECTION, SOLUTION INTRAVENOUS PRN
Status: DISCONTINUED | OUTPATIENT
Start: 2021-10-22 | End: 2021-10-25

## 2021-10-22 RX ORDER — SODIUM CHLORIDE 9 MG/ML
INJECTION, SOLUTION INTRAVENOUS CONTINUOUS
Status: DISCONTINUED | OUTPATIENT
Start: 2021-10-22 | End: 2021-10-22

## 2021-10-22 RX ORDER — HEPARIN SODIUM 1000 [USP'U]/ML
30 INJECTION, SOLUTION INTRAVENOUS; SUBCUTANEOUS PRN
Status: CANCELLED | OUTPATIENT
Start: 2021-10-22

## 2021-10-22 RX ORDER — ATORVASTATIN CALCIUM 40 MG/1
40 TABLET, FILM COATED ORAL DAILY
Status: DISCONTINUED | OUTPATIENT
Start: 2021-10-22 | End: 2021-11-03 | Stop reason: HOSPADM

## 2021-10-22 RX ORDER — HEPARIN SODIUM 1000 [USP'U]/ML
60 INJECTION, SOLUTION INTRAVENOUS; SUBCUTANEOUS PRN
Status: DISCONTINUED | OUTPATIENT
Start: 2021-10-22 | End: 2021-10-24 | Stop reason: ALTCHOICE

## 2021-10-22 RX ORDER — SODIUM CHLORIDE 9 MG/ML
INJECTION, SOLUTION INTRAVENOUS CONTINUOUS
Status: DISCONTINUED | OUTPATIENT
Start: 2021-10-22 | End: 2021-10-24

## 2021-10-22 RX ORDER — ONDANSETRON 2 MG/ML
4 INJECTION INTRAMUSCULAR; INTRAVENOUS EVERY 6 HOURS PRN
Status: DISCONTINUED | OUTPATIENT
Start: 2021-10-22 | End: 2021-11-03 | Stop reason: HOSPADM

## 2021-10-22 RX ORDER — ONDANSETRON 4 MG/1
4 TABLET, ORALLY DISINTEGRATING ORAL EVERY 8 HOURS PRN
Status: DISCONTINUED | OUTPATIENT
Start: 2021-10-22 | End: 2021-11-03 | Stop reason: HOSPADM

## 2021-10-22 RX ORDER — HEPARIN SODIUM 1000 [USP'U]/ML
30 INJECTION, SOLUTION INTRAVENOUS; SUBCUTANEOUS PRN
Status: DISCONTINUED | OUTPATIENT
Start: 2021-10-22 | End: 2021-10-24 | Stop reason: ALTCHOICE

## 2021-10-22 RX ORDER — ACETAMINOPHEN 325 MG/1
650 TABLET ORAL EVERY 6 HOURS PRN
Status: DISCONTINUED | OUTPATIENT
Start: 2021-10-22 | End: 2021-10-25 | Stop reason: SDUPTHER

## 2021-10-22 RX ORDER — HYDRALAZINE HYDROCHLORIDE 20 MG/ML
10 INJECTION INTRAMUSCULAR; INTRAVENOUS EVERY 4 HOURS PRN
Status: DISCONTINUED | OUTPATIENT
Start: 2021-10-22 | End: 2021-11-03 | Stop reason: HOSPADM

## 2021-10-22 RX ORDER — HEPARIN SODIUM 10000 [USP'U]/100ML
5-30 INJECTION, SOLUTION INTRAVENOUS CONTINUOUS
Status: CANCELLED | OUTPATIENT
Start: 2021-10-22

## 2021-10-22 RX ORDER — HEPARIN SODIUM 10000 [USP'U]/100ML
5-30 INJECTION, SOLUTION INTRAVENOUS CONTINUOUS
Status: DISCONTINUED | OUTPATIENT
Start: 2021-10-22 | End: 2021-10-24

## 2021-10-22 RX ORDER — CYCLOBENZAPRINE HCL 10 MG
10 TABLET ORAL 3 TIMES DAILY PRN
Status: DISCONTINUED | OUTPATIENT
Start: 2021-10-22 | End: 2021-11-03 | Stop reason: HOSPADM

## 2021-10-22 RX ORDER — IPRATROPIUM BROMIDE AND ALBUTEROL SULFATE 2.5; .5 MG/3ML; MG/3ML
1 SOLUTION RESPIRATORY (INHALATION)
Status: DISCONTINUED | OUTPATIENT
Start: 2021-10-22 | End: 2021-10-24

## 2021-10-22 RX ORDER — IPRATROPIUM BROMIDE AND ALBUTEROL SULFATE 2.5; .5 MG/3ML; MG/3ML
1 SOLUTION RESPIRATORY (INHALATION) 4 TIMES DAILY
Status: DISCONTINUED | OUTPATIENT
Start: 2021-10-22 | End: 2021-10-22 | Stop reason: SDUPTHER

## 2021-10-22 RX ORDER — MORPHINE SULFATE 4 MG/ML
4 INJECTION, SOLUTION INTRAMUSCULAR; INTRAVENOUS EVERY 30 MIN PRN
Status: DISCONTINUED | OUTPATIENT
Start: 2021-10-22 | End: 2021-11-03 | Stop reason: HOSPADM

## 2021-10-22 RX ORDER — POLYETHYLENE GLYCOL 3350 17 G/17G
17 POWDER, FOR SOLUTION ORAL DAILY PRN
Status: DISCONTINUED | OUTPATIENT
Start: 2021-10-22 | End: 2021-11-03 | Stop reason: HOSPADM

## 2021-10-22 RX ORDER — MONTELUKAST SODIUM 10 MG/1
10 TABLET ORAL NIGHTLY
Status: DISCONTINUED | OUTPATIENT
Start: 2021-10-22 | End: 2021-11-03 | Stop reason: HOSPADM

## 2021-10-22 RX ORDER — HEPARIN SODIUM 1000 [USP'U]/ML
60 INJECTION, SOLUTION INTRAVENOUS; SUBCUTANEOUS ONCE
Status: CANCELLED | OUTPATIENT
Start: 2021-10-22 | End: 2021-10-22

## 2021-10-22 RX ORDER — SODIUM CHLORIDE 0.9 % (FLUSH) 0.9 %
5-40 SYRINGE (ML) INJECTION EVERY 12 HOURS SCHEDULED
Status: DISCONTINUED | OUTPATIENT
Start: 2021-10-22 | End: 2021-11-03 | Stop reason: HOSPADM

## 2021-10-22 RX ORDER — CALCIUM CARBONATE 500(1250)
500 TABLET ORAL DAILY
Status: DISCONTINUED | OUTPATIENT
Start: 2021-10-22 | End: 2021-11-03 | Stop reason: HOSPADM

## 2021-10-22 RX ORDER — MORPHINE SULFATE/0.9% NACL/PF 1 MG/ML
2 SYRINGE (ML) INJECTION EVERY 4 HOURS PRN
Status: DISCONTINUED | OUTPATIENT
Start: 2021-10-22 | End: 2021-11-03 | Stop reason: HOSPADM

## 2021-10-22 RX ORDER — VENLAFAXINE HYDROCHLORIDE 150 MG/1
150 CAPSULE, EXTENDED RELEASE ORAL DAILY
Status: DISCONTINUED | OUTPATIENT
Start: 2021-10-22 | End: 2021-11-03 | Stop reason: HOSPADM

## 2021-10-22 RX ORDER — ACETAMINOPHEN 650 MG/1
650 SUPPOSITORY RECTAL EVERY 6 HOURS PRN
Status: DISCONTINUED | OUTPATIENT
Start: 2021-10-22 | End: 2021-11-03 | Stop reason: HOSPADM

## 2021-10-22 RX ORDER — HEPARIN SODIUM 1000 [USP'U]/ML
60 INJECTION, SOLUTION INTRAVENOUS; SUBCUTANEOUS PRN
Status: CANCELLED | OUTPATIENT
Start: 2021-10-22

## 2021-10-22 RX ORDER — ACETAMINOPHEN 325 MG/1
650 TABLET ORAL ONCE
Status: COMPLETED | OUTPATIENT
Start: 2021-10-22 | End: 2021-11-02

## 2021-10-22 RX ORDER — SODIUM CHLORIDE 0.9 % (FLUSH) 0.9 %
5-40 SYRINGE (ML) INJECTION PRN
Status: DISCONTINUED | OUTPATIENT
Start: 2021-10-22 | End: 2021-11-03 | Stop reason: HOSPADM

## 2021-10-22 RX ADMIN — HEPARIN SODIUM 12 UNITS/KG/HR: 10000 INJECTION, SOLUTION INTRAVENOUS at 18:56

## 2021-10-22 RX ADMIN — IPRATROPIUM BROMIDE AND ALBUTEROL SULFATE 1 AMPULE: .5; 3 SOLUTION RESPIRATORY (INHALATION) at 19:30

## 2021-10-22 RX ADMIN — ACETAMINOPHEN 650 MG: 325 TABLET ORAL at 20:09

## 2021-10-22 RX ADMIN — SODIUM CHLORIDE: 9 INJECTION, SOLUTION INTRAVENOUS at 18:56

## 2021-10-22 RX ADMIN — VENLAFAXINE HYDROCHLORIDE 150 MG: 150 CAPSULE, EXTENDED RELEASE ORAL at 21:57

## 2021-10-22 ASSESSMENT — ENCOUNTER SYMPTOMS
CHEST TIGHTNESS: 0
BACK PAIN: 0
COLOR CHANGE: 0

## 2021-10-22 ASSESSMENT — PAIN SCALES - GENERAL
PAINLEVEL_OUTOF10: 4
PAINLEVEL_OUTOF10: 10
PAINLEVEL_OUTOF10: 2

## 2021-10-22 ASSESSMENT — PAIN DESCRIPTION - PAIN TYPE: TYPE: ACUTE PAIN

## 2021-10-22 NOTE — CONSULTS
in his father and sister; Cancer in his mother; Diabetes in his mother and sister; Heart Disease in his father; High Blood Pressure in his mother and sister; High Cholesterol in his mother and sister; No Known Problems in his son; Obesity in his mother; Other in his sister; Vision Loss in his mother.     Allergies   Allergen Reactions    Ciprofloxacin Hcl Hives and Swelling    Levaquin [Levofloxacin] Hives and Swelling    Other Nausea And Vomiting     \"Allergic To Tylox\"    Ceftin [Cefuroxime]        acetaminophen (TYLENOL) tablet 650 mg, Once  morphine sulfate (PF) injection 4 mg, Q30 Min PRN  calcium elemental (OSCAL) tablet 500 mg, Daily  cyclobenzaprine (FLEXERIL) tablet 10 mg, TID PRN  montelukast (SINGULAIR) tablet 10 mg, Nightly  atorvastatin (LIPITOR) tablet 40 mg, Daily  venlafaxine (EFFEXOR XR) extended release capsule 150 mg, Daily  sodium chloride flush 0.9 % injection 5-40 mL, 2 times per day  sodium chloride flush 0.9 % injection 5-40 mL, PRN  0.9 % sodium chloride infusion, PRN  ondansetron (ZOFRAN-ODT) disintegrating tablet 4 mg, Q8H PRN   Or  ondansetron (ZOFRAN) injection 4 mg, Q6H PRN  polyethylene glycol (GLYCOLAX) packet 17 g, Daily PRN  acetaminophen (TYLENOL) tablet 650 mg, Q6H PRN   Or  acetaminophen (TYLENOL) suppository 650 mg, Q6H PRN  heparin (porcine) injection 4,360 Units, PRN  heparin (porcine) injection 2,180 Units, PRN  heparin 25,000 units in dextrose 5% 250 mL (premix) infusion, Continuous  morphine injection 2 mg, Q4H PRN  0.9 % sodium chloride infusion, Continuous  ipratropium-albuterol (DUONEB) nebulizer solution 1 ampule, Q4H WA  hydrALAZINE (APRESOLINE) injection 10 mg, Q4H PRN      Current Facility-Administered Medications   Medication Dose Route Frequency Provider Last Rate Last Admin    acetaminophen (TYLENOL) tablet 650 mg  650 mg Oral Once Korinne Lusterio, PA-C        morphine sulfate (PF) injection 4 mg  4 mg IntraVENous Q30 Min PRN Greg Gómez PA-C        calcium elemental (OSCAL) tablet 500 mg  500 mg Oral Daily David Pacheco MD        cyclobenzaprine (FLEXERIL) tablet 10 mg  10 mg Oral TID PRN David Pacheco MD        montelukast (SINGULAIR) tablet 10 mg  10 mg Oral Nightly David Pacheco MD        atorvastatin (LIPITOR) tablet 40 mg  40 mg Oral Daily David Pacheco MD        venlafaxine [facility]) extended release capsule 150 mg  150 mg Oral Daily David Pacheco MD   150 mg at 10/22/21 2157    sodium chloride flush 0.9 % injection 5-40 mL  5-40 mL IntraVENous 2 times per day David Pacheco MD        sodium chloride flush 0.9 % injection 5-40 mL  5-40 mL IntraVENous PRN David Pacheco MD        0.9 % sodium chloride infusion  25 mL IntraVENous PRN David Pacheco MD        ondansetron (ZOFRAN-ODT) disintegrating tablet 4 mg  4 mg Oral Q8H PRN David Pacheco MD        Or    ondansetron [facility]) injection 4 mg  4 mg IntraVENous Q6H PRN David Pacheco MD        polyethylene glycol [facility]) packet 17 g  17 g Oral Daily PRN David Pacheco MD        acetaminophen (TYLENOL) tablet 650 mg  650 mg Oral Q6H PRN David Pacheco MD   650 mg at 10/23/21 0459    Or    acetaminophen (TYLENOL) suppository 650 mg  650 mg Rectal Q6H PRN David Pacheco MD        heparin (porcine) injection 4,360 Units  60 Units/kg IntraVENous PRN Chaav Olivo PA-C        heparin (porcine) injection 2,180 Units  30 Units/kg IntraVENous PRN Chava Olivo PA-C   2,180 Units at 10/23/21 0001    heparin 25,000 units in dextrose 5% 250 mL (premix) infusion  5-30 Units/kg/hr IntraVENous Continuous Korinne Lusterio, PA-C 10.9 mL/hr at 10/23/21 0026 15 Units/kg/hr at 10/23/21 0026    morphine injection 2 mg  2 mg IntraVENous Q4H PRN David Pacheco MD        0.9 % sodium chloride infusion   IntraVENous Continuous David Pacheco MD 75 mL/hr at 10/23/21 0026 Rate Verify at 10/23/21 0026    ipratropium-albuterol (DUONEB) nebulizer solution 1 ampule  1 ampule Inhalation Q4H DACIA Pacheco MD   1 ampule at 10/22/21 1930    hydrALAZINE (APRESOLINE) injection 10 mg  10 mg IntraVENous Q4H PRN Chas Dominique MD         Review of Systems:  All 14 systems reviewed, all negative except for  fall    Physical Examination:    /61   Pulse 76   Temp 98 °F (36.7 °C) (Oral)   Resp 18   Ht 5' 4.5\" (1.638 m)   Wt 160 lb (72.6 kg)   SpO2 96%   BMI 27.04 kg/m²      Wt Readings from Last 3 Encounters:   10/22/21 160 lb (72.6 kg)   09/14/21 162 lb (73.5 kg)   09/05/21 178 lb (80.7 kg)     Body mass index is 27.04 kg/m². General Appearance:  fair  Head: normocephalic     Eyes: normal, noninjected conjunctiva    ENT: normal mucosa, noninjected throat, normal     NECK: No JVP  No thyromegaly        Cardiovascular: No thrills palpated   Auscultation: Normal S1 and S2,  no murmur   carotid bruit no   Abdominal Aorta no bruit    Respiratory:    Breath sounds Clear = 0    Extremities:  none Edema clubbing ,   no cyanosis    SKIN: Warm and well perfused, no pallor or cyanosis    Vascular exam:  Pedal Pulses: palp  bilaterally        Abdomen:  No masses or tenderness. No organomegaly noted. Neurological:  Oriented to time, place, and person   No focal neurological deficit noted. Psychiatric:normal mood, no anxiety    Lab Review   Recent Labs     10/22/21  1821   WBC 9.9   HGB 13.9   HCT 42.9         Recent Labs     10/22/21  1428   *   K 3.8   CL 92*   CO2 19*   BUN 15   CREATININE 0.5*     Recent Labs     10/22/21  1428   AST 28   ALT 16   BILITOT 0.5   ALKPHOS 79     No results for input(s): TROPONINI in the last 72 hours.   No results found for: BNP  Lab Results   Component Value Date    INR 2.99 10/22/2021    PROTIME 39.0 (H) 10/22/2021           Assessment/Recommendations:     - preop: moderate cardiac risk for surgery  - can hold coumadin, on heparin  - neede SBE prophylaxis  - No further cardiac rothman needed         Pawan Pike MD, 10/23/2021 5:55 AM

## 2021-10-22 NOTE — ED PROVIDER NOTES
eMERGENCY dEPARTMENT eNCOUnter         [address] [facility]    PCP: Dallas Mayo MD    [address] [facility]    Chief Complaint   Patient presents with    Hip Pain     right hip goes to right leg/thigh       HPI    Regla Owens is a 60 y.o. male who presents with Right hip pain. Onset was x6 days ago. The context was patient states that he was excited in his apartment and fell, landed on his right hip/leg. Denies any head injury loss of conscious. Was able to actually stand and weight-bear but has had progressive worsening pain and difficulty weightbearing on the right leg. Has been utilizing a grocery cart provided by his apartment building however sister did bring him a 4 wheeled walker today. Is having difficulty lifting his right hip secondary to pain. No ipsilateral knee, ankle or foot pain. No previous history of right hip fracture surgery. Does have history of previous mechanical aortic valve replacement and is currently on Coumadin, unknown last INR. Denying any headache, chest pain, shortness of breath, nausea or vomiting. No other pain trauma or injury to the other extremities. He is not currently established with orthopedics. The pain is aggravated by hip movement, rotation, and weightbearing. REVIEW OF SYSTEMS    General: Denies fever or chills  Cardiac: Denies chest pain  Pulmonary: Denies shortness of breath  GI: Denies abdominal pain, vomiting, or diarrhea  : No dysuria or hematuria  Neuro:  Denies preceding or subsequent numbness, tingling, weakness. Denies headache  Denies any other muscles skeletal injuries, including chest wall and back.     All other review of systems are negative  See HPI and nursing notes for additional information       PAST MEDICAL & SURGICAL HISTORY    Past Medical History:   Diagnosis Date    Acid reflux     Acute frontal sinusitis 7/22/2009    Alcohol abuse     \"I Drink Average 2 Beers A Day\"    Anesthesia     \" I get agitated\"    Anxiety     Arthritis     \"Right Shoulder, Neck, Left Knee\"    Atypical mycobacterial infection     Broken teeth     Upper And Lower     [facility]congestive heart failure[facility]     COPD (chronic obstructive pulmonary disease) ([facility])     Sees Dr. Carmen Late In [place], [address]     Cough     Frequent Cough With Green Sputum    Depression     Depression     Dyspnea 2/23/2018    H/O cardiovascular MUGA stress test 05/14/2019    EF 50%, NORMAL LVEF, NORMAL WALL MOTION.    H/O echocardiogram 05/22/2014    TD=>97-89%, LV systolic function is low normal, mild aortic valve calcification, no pericardial effusion    H/O echocardiogram 12/12/2018    EF 45-50%    History of 24 hour EKG monitoring 6/6/14    24 hr holter monitor. Ventricular ectopics were noted in single and couplet forms. Supraventricular ectopics were noted in single and pair forms.     Kaw (hard of hearing)     Bilateral Ears    Hyperlipidemia     Hypertension     Irritant contact dermatitis due to other chemical products 8/26/2019    Other drug allergy(995.27) 7/22/2009    S/P AVR 12/2003    Mechanical valvue     Shortness of breath     Teeth missing     Upper And Lower     Past Surgical History:   Procedure Laterality Date    BRONCHOSCOPY  1996    \"Done Twice\"    CARDIAC VALVE REPLACEMENT  12/17/2003    \"Aortic Valve Replacement, Mechanical Valve\"    COLONOSCOPY  2006    Polyps Removed    DENTAL SURGERY      Teeth Extracted In Past    ENDOSCOPY, COLON, DIAGNOSTIC  In Past    HEMIARTHROPLASTY SHOULDER FRACTURE Right 1/29/2019    SHOULDER HEMIARTHROPLASTY performed by David Sneed DO at [address] [facility]ROS[facility] Left 1992    LUNG BIOPSY Right 1996   [address] [facility], [address]    \"Cauterized Because My Sperm Was Low\"    ROTATOR CUFF REPAIR Right 2008    TONSILLECTOMY  1959 Or 1960       CURRENT MEDICATIONS    Current Outpatient Rx   Medication Sig Dispense Refill    traZODone (DESYREL) 100 MG tablet Take 2 tablets by mouth nightly 30 tablet 3    venlafaxine (EFFEXOR XR) 150 MG extended release capsule Take 1 capsule by mouth daily for 7 days 7 capsule 0    warfarin (COUMADIN) 5 MG tablet take 1-2 tablets by mouth daily as directed 60 tablet 2    simvastatin (ZOCOR) 40 MG tablet take 1 tablet by mouth at bedtime 90 tablet 1    triamcinolone (KENALOG) 0.1 % cream Apply topically 2 times daily. 453.6 g 1    ammonium lactate (LAC-HYDRIN) 12 % lotion Apply topically daily.  225 g 1    ipratropium-albuterol (DUONEB) 0.5-2.5 (3) MG/3ML SOLN nebulizer solution Inhale 3 mLs into the lungs 4 times daily 360 mL 5    cyclobenzaprine (FLEXERIL) 10 MG tablet Take 10 mg by mouth 3 times daily as needed for Muscle spasms      albuterol (ACCUNEB) 0.63 MG/3ML nebulizer solution Take 1 ampule by nebulization every 6 hours as needed for Wheezing      albuterol sulfate HFA (PROAIR HFA) 108 (90 Base) MCG/ACT inhaler Inhale 2 puffs into the lungs every 4 hours as needed for Wheezing or Shortness of Breath 1 Inhaler 3    montelukast (SINGULAIR) 10 MG tablet Take 1 tablet by mouth nightly \"Alternate With Zyrtec\" 30 tablet 5    calcium carbonate (OSCAL) 500 MG TABS tablet Take 500 mg by mouth daily      KRILL OIL OMEGA-3 PO Take by mouth      sodium chloride, Inhalant, 3 % nebulizer solution       cetirizine (ZYRTEC) 10 MG tablet Take 10 mg by mouth \"Alternate With Singulair\"      NASAL SPRAY SALINE NA by Nasal route daily Over The Counter      budesonide (RHINOCORT ALLERGY) 32 MCG/ACT nasal spray 2 sprays by Nasal route daily      Acetaminophen (TYLENOL 8 HOUR PO) Take by mouth as needed Over The Counter      Multiple Vitamins-Minerals (CENTRUM PO) Take by mouth daily      Nebulizer MISC Inhale into the lungs as needed         ALLERGIES    Allergies   Allergen Reactions    Ciprofloxacin Hcl Hives and Swelling    Levaquin [Levofloxacin] Hives and Swelling    Other Nausea And Vomiting     \"Allergic To Tylox\"    Ceftin [Cefuroxime]        SOCIAL & FAMILY HISTORY    Social History     Socioeconomic History    Marital status:      Spouse name: None    Number of children: 1    Years of education: 12    Highest education level: None   Occupational History    None   Tobacco Use    Smoking status: Never Smoker    Smokeless tobacco: Current User     Types: Snuff, Chew   Vaping Use    Vaping Use: Never used   Substance and Sexual Activity    Alcohol use: Yes     Comment: \"Average 2 Beers A Day\"    Drug use: No    Sexual activity: Not Currently   Other Topics Concern    None   Social History Narrative    None     Social Determinants of Health     Financial Resource Strain:     Difficulty of Paying Living Expenses:    Food Insecurity:     Worried About Running Out of Food in the Last Year:     Ran Out of Food in the Last Year:    Transportation Needs:     Lack of Transportation (Medical):      Lack of Transportation (Non-Medical):    Physical Activity:     Days of Exercise per Week:     Minutes of Exercise per Session:    Stress:     Feeling of Stress :    Social Connections:     Frequency of Communication with Friends and Family:     Frequency of Social Gatherings with Friends and Family:     Attends [employer] Services:     Active Member of Clubs or Organizations:     Attends Club or Organization Meetings:     Marital Status:    Intimate Partner Violence:     Fear of Current or Ex-Partner:     Emotionally Abused:     Physically Abused:     Sexually Abused:      Family History   Problem Relation Age of Onset    Cancer Mother         Lymphoma    Diabetes Mother     High Blood Pressure Mother     High Cholesterol Mother     Obesity Mother     Vision Loss Mother         Wore Glasses    Heart Disease Father         \"Heart Failure\"    COPD Father     Asthma Sister     High Blood Pressure Sister     High Cholesterol Sister     Other Sister         pre diabetic    COPD Sister     Diabetes Sister         \"Pre Diabetes\"    No Known Problems Son            PHYSICAL EXAM    VITAL SIGNS: BP (!) 173/88   Pulse 89   Temp 98.5 °F (36.9 °C) (Oral)   Resp 17   Ht 5' 4.5\" (1.638 m)   Wt 160 lb (72.6 kg)   SpO2 98%   BMI 27.04 kg/m²   Constitutional:  Well developed, Appears comfortable    Cardiac: Regular rate and rhythm. Normal S1/S2. Lungs: 99% on room air. Speaking full sentence without difficulty. Nonlabored breathing. Lungs clear to auscultation. Abdomen: No gross discoloration or bruising. No flank discoloration. Abdomen soft nondistended nontender. Musculoskeletal:    Right HIP exam:   -Inspection:   Skin intact. No gross bony deformities. Affected leg held in appropriate position in wheelchair, no abnormal rotation or limb shortening of the right lower leg compared to the left. Position   - Palpation: + Moderate to severe tenderness palpation over the anterolateral right hip, greatest over the greater trochanteric region  without palpable bony or soft tissue defects. No redness, or warmth or edema. Compartments are soft throughout the right  lower extremity. -ROM:  Very limited active range of motion secondary pain especially hip flexion and rotation    No swelling, discoloration, or tenderness to palpation or ROM deficits of the ipsilateral knee    Vascular: Distal pulses (DP, PT) intact on affected side. Integument:  Well hydrated, no rash   Neurologic:  Awake and alert, normal flow ofspeech. Cranial nerves II through XII intact. No obvious focal deficits. Distal sensation, motor, capillary refill intact. No foot drop of the affected extremity. DTRs and distal sensation intact/equal.  5/5 dorsi/plantar flexion against resistance.   Psychiatric: Cooperative, pleasant affect        LABS:  Results for orders placed or performed during the hospital encounter of 10/22/21   PT - INR   Result Value Ref Range    Protime 39.0 (H) 11.7 - 14.5 SECONDS    INR 2.99 INDEX   CBC auto diff   Result Value Ref Range    WBC 12.6 (H) 4.0 - 10.5 K/CU MM    RBC 4.02 (L) 4.6 - 6.2 M/CU MM    Hemoglobin 12.5 (L) 13.5 - 18.0 GM/DL    Hematocrit 37.7 (L) 42 - 52 %    MCV 93.8 78 - 100 FL    MCH 31.1 (H) 27 - 31 PG    MCHC 33.2 32.0 - 36.0 %    RDW 14.6 11.7 - 14.9 %    Platelets 113 764 - 257 K/CU MM    MPV 8.7 7.5 - 11.1 FL    Differential Type AUTOMATED DIFFERENTIAL     Segs Relative 81.8 (H) 36 - 66 %    Lymphocytes % 6.5 (L) 24 - 44 %    Monocytes % 11.0 (H) 0 - 4 %    Eosinophils % 0.0 0 - 3 %    Basophils % 0.2 0 - 1 %    Segs Absolute 10.3 K/CU MM    Lymphocytes Absolute 0.8 K/CU MM    Monocytes Absolute 1.4 K/CU MM    Eosinophils Absolute 0.0 K/CU MM    Basophils Absolute 0.0 K/CU MM    Nucleated RBC % 0.0 %    Total Nucleated RBC 0.0 K/CU MM    Total Immature Neutrophil 0.06 K/CU MM    Immature Neutrophil % 0.5 (H) 0 - 0.43 %   CMP   Result Value Ref Range    Sodium 127 (L) 135 - 145 MMOL/L    Potassium 3.8 3.5 - 5.1 MMOL/L    Chloride 92 (L) 99 - 110 mMol/L    CO2 19 (L) 21 - 32 MMOL/L    BUN 15 6 - 23 MG/DL    CREATININE 0.5 (L) 0.9 - 1.3 MG/DL    Glucose 109 (H) 70 - 99 MG/DL    Calcium 9.3 8.3 - 10.6 MG/DL    Albumin 4.2 3.4 - 5.0 GM/DL    Total Protein 7.3 6.4 - 8.2 GM/DL    Total Bilirubin 0.5 0.0 - 1.0 MG/DL    ALT 16 10 - 40 U/L    AST 28 15 - 37 IU/L    Alkaline Phosphatase 79 40 - 129 IU/L    GFR Non-African American >60 >60 mL/min/1.73m2    GFR African American >60 >60 mL/min/1.73m2    Anion Gap 16 4 - 16       RADIOLOGY/PROCEDURES        XR CHEST PORTABLE (Final result)  Result time 10/22/21 16:04:22  Final result by Gilson Merida (10/22/21 16:04:22)                Impression:    No evidence of acute cardiopulmonary disease.              Narrative:    EXAMINATION:   ONE XRAY VIEW OF THE CHEST     10/22/2021 3:27 pm     COMPARISON:   05/05/2018 chest radiograph     HISTORY:   ORDERING SYSTEM PROVIDED HISTORY: hip fracture   TECHNOLOGIST PROVIDED HISTORY:   Reason for exam:->hip fracture   Reason for Exam: hip fracture   Acuity: Acute   Type of Exam: Initial     FINDINGS:   Prior median sternotomy and aortic valve replacement.  The cardiomediastinal   silhouette is normal in size and contour.  Atherosclerotic calcifications of   the aortic arch.  No focal airspace disease.  No pleural effusion or   pneumothorax.  Bilateral remote rib fractures.  Right shoulder   hemiarthroplasty.                     XR LUMBAR SPINE (2-3 VIEWS) (Final result)  Result time 10/22/21 16:09:30  Final result by Yahaira Raygoza (10/22/21 16:09:30)                Impression:    Age-indeterminate anterior compression deformity of the L4 vertebral body. Mild anterior wedging of the T12 and L1 vertebral bodies could represent   additional age-indeterminate compression deformities, although wedging at   these levels could represent normal variants.  Correlate with clinical   symptoms. Multilevel degenerative changes of the spine. Narrative:    EXAMINATION:   THREE XRAY VIEWS OF THE LUMBAR SPINE     10/22/2021 2:12 pm     COMPARISON:   None. HISTORY:   ORDERING SYSTEM PROVIDED HISTORY: fall, trauma   TECHNOLOGIST PROVIDED HISTORY:   Reason for exam:->fall, trauma   Reason for Exam: fall trauma, xtable due to hip fracture   Acuity: Acute   Type of Exam: Initial     FINDINGS:   Five non-rib-bearing lumbar type vertebral bodies are identified.  Multilevel   degenerative changes of spine, including mild-to-moderate disc height loss at   L3-L4 and L4-L5.  There is an age-indeterminate compression deformity of the   L4 vertebral body.  Mild anterior wedging of the T12 and L1 vertebral bodies   could also represent compression deformities, although wedging at these   levels could represent normal variants.                     XR HIP 2-3 VW W PELVIS RIGHT (Final result)  Result time 10/22/21 15:11:58  Final result by Masood Aquino MD (10/22/21 15:11:58) Impression:    1. Acute and displaced transcervical right femoral neck fracture. Narrative:    EXAMINATION:   ONE XRAY VIEW OF THE PELVIS AND TWO XRAY VIEWS RIGHT HIP     10/22/2021 2:11 pm     COMPARISON:   None. HISTORY:   ORDERING SYSTEM PROVIDED HISTORY: fall, pain, trauma   TECHNOLOGIST PROVIDED HISTORY:   Reason for exam:->fall, pain, trauma   Reason for Exam: right hip pain   Acuity: Acute   Type of Exam: Initial   Mechanism of Injury: fall   Relevant Medical/Surgical History: na     FINDINGS:   Three views of the pelvis and right hip were reviewed. Luke Miranda is an acute   transcervical femoral neck fracture of the right femur with displacement at   the fracture site.  No additional fractures are identified.  Multilevel   degenerative changes of the imaged lower lumbar spine.  Atherosclerotic   vascular calcifications noted.  Surgical clips project over the region of the   left hip.                         ED COURSE & MEDICAL DECISION MAKING       Vital signs and nursing notes reviewed during ED course. I have independently evaluated this patient . Supervising MD - Dr Cadence Carter - present in the Emergency Department, available for consultation, throughout entirety of  patient care. All pertinent Lab data and radiographic results reviewed with patient at bedside. The patient and/or the family were informed of the results of any tests/labs/imaging, the treatment plan, and time was allotted to answer questions. Differential diagnosis: includes but not limited to ligamentous injury, tendon injury, soft tissue contusion/hematoma, fracture, dislocation, Infection, Neurologic Deficit/Injury, dislocation, Arterial Injury/Ischemia. Clinical  IMPRESSION    1. Closed transcervical fracture of right femur, initial encounter ([facility] [address]    2. Hyponatremia        Patient presents with right hip pain progressively worsening following fall x5 days ago.   On exam, pleasant 78-year-old no acute did consult with hospitalist pharmacy who recommends low-dose heparin infusion and discontinuing the 60 mg/kg one-time bolus of heparin as he is therapeutic on his Coumadin at this time. In consideration of current COVID19 pandemic, with effort to minimize unnecessary provider exposure, this patient was seen at bedside by me independently. However, in compliance with current hospital VICENTE/ED protocol, prior to admission I did discuss this patient case with emergency department physician, Dr. Jonathan Cardona, who did agree with ED workup/evaluation and plan for admission. Of note, this Pt was NOT admitted to the ICU. Diagnosis and plan discussed in detail with patient who understands and agrees. Comment: Please note this report has been produced using speech recognition software and may contain errors related to that system including errors in grammar, punctuation, and spelling, as well as words and phrases that may be inappropriate. If there are any questions or concerns please feel free to contact the dictating provider for clarification.         Agnieszka Cain PA-C  10/22/21 4169

## 2021-10-22 NOTE — ED NOTES
Verified with korinne Pa to start heparin. Stated that okay to start Heparin at this time.       Lois Rodriguez RN  10/22/21 0831

## 2021-10-22 NOTE — H&P
History and Physical      Name:  Sherrie Dodson /Age/Sex: 1955  (60 y.o. male)   MRN & CSN:  [id number] & [id number] Admission Date/Time: 10/22/2021  2:21 PM   Location:  ED25/ED-25 PCP: Marie Moya Day: 1    Assessment and Plan:   Sherrie Dodson is a 60 y.o.  male  who presents with <principal problem not specified>    Right Femoral neck fracture likely due to fall  Mechanical fall  mechanical aortic valve replacement   Primary hypertension: Uncontrolled  Hyponatremia likely hypovolemic  COPD: Doubt acute exacerbation      Plan  Please presents after a fall resulting right femoral neck fracture  Hx of aortic mechanical value: anticoagulated with coumadin  Hold coumadin and start heparin ggt: cardiology consulted for anticoagulation and cardiac risk stratification  bronchodilaters  Start normal saline  Morphine for pain control  Hydralazine as needed for systolic more than 758  Ortho consulted for potential surgical repaire. Patient remains moderate/nonprohibitive risk for any orthopedic procedures. Cardiology consulted for mechanical: Aortic valve and cardiac clearance. Diet ADULT DIET; Regular; Low Sodium (2 gm)   DVT Prophylaxis [] Lovenox, [x]  Heparin, [] SCDs, [] Ambulation   GI Prophylaxis [x] PPI,  [] H2 Blocker,  [] Carafate,  [] Diet/Tube Feeds   Code Status Full Code   Disposition Patient requires continued admission due to cardiology and orthopedic evaluation   MDM [] Low, [] Moderate,[x]  High  Patient's risk as above due to      History of Present Illness:     Chief Complaint: <principal problem not specified>  Sherrie Dodson is a 60 y.o.  male  who presents with       History of Presenting Illness. Patient is a 78-year-old male past medical history of COPD, hypertension, mechanical aortic valve replacement who presents to the ED with complaints of right hip pain. Patient states he may have fell this . He states he was taking ibuprofen. He further states that his sister brought him here because of the pain. In the ER patient was found to have a right femoral neck fracture. Lab evaluation showed hyponatremia. Patient was admitted for further evaluation. Ten point ROS reviewed negative, unless as noted above    Objective:   No intake or output data in the 24 hours ending 10/22/21 1644   Vitals:   Vitals:    10/22/21 1602   BP: (!) 173/88   Pulse:    Resp:    Temp:    SpO2: 98%     Physical Exam:   GEN Awake , alert, in no apparant distress  HEENT Normocephalic, atraumtic, PERRLA, EOMI  NECK Supple, no apparent lymphadenopaty  RESP Clear lungs to auculation. CARDIO/VAS:  Normal S1/S2. RRRR. GI Soft, nontener. BS+.  No costovertebral angle tenderness. MSK No gross joint deformities. SKIN Normal coloration, warm, dry. NEURO Aox3. Cranial nerves appear grossly intact, NO focal motor or sensory deficit. PSYCH Awake, alert, Affect appropriate. Past Medical History:      Past Medical History:   Diagnosis Date    Acid reflux     Acute frontal sinusitis 7/22/2009    Alcohol abuse     \"I Drink Average 2 Beers A Day\"    Anesthesia     \" I get agitated\"    Anxiety     Arthritis     \"Right Shoulder, Neck, Left Knee\"    Atypical mycobacterial infection     Broken teeth     Upper And Lower     [facility]congestive [facility] failure) ([facility]     COPD (chronic obstructive pulmonary disease) ([facility])     Sees Dr. Alexander Arteaga In [place], [facility] Cough     Frequent Cough With Green Sputum    Depression     Depression     Dyspnea 2/23/2018    H/O cardiovascular MUGA stress test 05/14/2019    EF 50%, NORMAL LVEF, NORMAL WALL MOTION.    H/O echocardiogram 05/22/2014    MS=>05-57%, LV systolic function is low normal, mild aortic valve calcification, no pericardial effusion    H/O echocardiogram 12/12/2018    EF 45-50%    History of 24 hour EKG monitoring 6/6/14    24 hr holter monitor.   Ventricular ectopics were noted in single and couplet forms. Supraventricular ectopics were noted in single and pair forms.  [place] (hard of hearing)     Bilateral Ears    Hyperlipidemia     Hypertension     Irritant contact dermatitis due to other chemical products 8/26/2019    Other drug allergy(995.27) 7/22/2009    S/P AVR 12/2003    Mechanical valvue     Shortness of breath     Teeth missing     Upper And Lower     PSHX:  has a past surgical history that includes Lung biopsy (Right, 1996); Rotator cuff repair (Right, 2008); Dental surgery; Cardiac valve replacement (12/17/2003); Colonoscopy (2006); Endoscopy, colon, diagnostic (In Past); Tonsillectomy (1959 Or 1960); bronchoscopy (1996); Knee arthroscopy (Left, 1992); other surgical history (1992); and HEMIARTHROPLASTY SHOULDER FRACTURE (Right, 1/29/2019). Allergies: Allergies   Allergen Reactions    Ciprofloxacin Hcl Hives and Swelling    Levaquin [Levofloxacin] Hives and Swelling    Other Nausea And Vomiting     \"Allergic To Tylox\"    Ceftin [Cefuroxime]        FAM HX: family history includes Asthma in his sister; COPD in his father and sister; Cancer in his mother; Diabetes in his mother and sister; Heart Disease in his father; High Blood Pressure in his mother and sister; High Cholesterol in his mother and sister; No Known Problems in his son; Obesity in his mother; Other in his sister; Vision Loss in his mother. Soc HX:   Social History     Socioeconomic History    Marital status:      Spouse name: None    Number of children: 1    Years of education: 15    Highest education level: None   Occupational History    None   Tobacco Use    Smoking status: Never Smoker    Smokeless tobacco: Current User     Types: Snuff, Chew   Vaping Use    Vaping Use: Never used   Substance and Sexual Activity    Alcohol use: Yes     Comment:  \"Average 2 Beers A Day\"    Drug use: No    Sexual activity: Not Currently   Other Topics Concern    None   Social History Narrative    None     Social Determinants of Health     Financial Resource Strain:     Difficulty of Paying Living Expenses:    Food Insecurity:     Worried About Running Out of Food in the Last Year:     [address] in the Last Year:    Transportation Needs:     Lack of Transportation (Medical):      Lack of Transportation (Non-Medical):    Physical Activity:     Days of Exercise per Week:     Minutes of Exercise per Session:    Stress:     Feeling of Stress :    Social Connections:     Frequency of Communication with Friends and Family:     Frequency of Social Gatherings with Friends and Family:     Attends Zoroastrianism Services:     Active Member of Clubs or Organizations:     Attends Club or Organization Meetings:     Marital Status:    Intimate Partner Violence:     Fear of Current or Ex-Partner:     Emotionally Abused:     Physically Abused:     Sexually Abused:        Medications:   Medications:    acetaminophen  650 mg Oral Once    sodium chloride flush  5-40 mL IntraVENous 2 times per day      Infusions:    sodium chloride      sodium chloride       PRN Meds: morphine, 4 mg, Q30 Min PRN  sodium chloride flush, 5-40 mL, PRN  sodium chloride, 25 mL, PRN  ondansetron, 4 mg, Q8H PRN   Or  ondansetron, 4 mg, Q6H PRN  polyethylene glycol, 17 g, Daily PRN  acetaminophen, 650 mg, Q6H PRN   Or  acetaminophen, 650 mg, Q6H PRN          Electronically signed by Baylee Agarwal MD on 10/22/2021 at 4:44 PM      Medications:    acetaminophen  650 mg Oral Once    sodium chloride flush  5-40 mL IntraVENous 2 times per day      Infusions:    sodium chloride      sodium chloride       PRN Meds: morphine, 4 mg, Q30 Min PRN  sodium chloride flush, 5-40 mL, PRN  sodium chloride, 25 mL, PRN  ondansetron, 4 mg, Q8H PRN   Or  ondansetron, 4 mg, Q6H PRN  polyethylene glycol, 17 g, Daily PRN  acetaminophen, 650 mg, Q6H PRN   Or  acetaminophen, 650 mg, Q6H PRN          Electronically signed by Baylee Agarwal MD on 10/22/2021 at 4:44 PM

## 2021-10-22 NOTE — CONSULTS
Consult to Orthopedic Surgery  Consult performed by: Ye Phelan DO  Consult ordered by: Donnal Aase, PA-C  Reason for consult: Right hip fracture  Assessment/Recommendations:       Right femoral neck fracture    I discussed with him today his x-ray findings. I explained to him he does have a fracture in his right hip which will require surgical treatment. Given the fracture pattern and with his age I recommend surgical treatment in the form of right total hip arthroplasty. I discussed with him today performing right total hip arthroplasty. I explained risks, benefits, possible complications of the procedure and answered all questions for the patient. I explained postoperative rehabilitation protocol and expectations with the patient today. The patient understands and consents to the procedure. Continue bedrest.  Remain nonweightbearing on right lower extremity. Pain medication as needed. Ice as needed. He will require reversal of his INR prior to surgery and we will plan on proceeding with surgical treatment Monday morning. Hospitalist and cardiologist will provide preoperative clearance. Michael Saldana is a 58-year-old male well-known to me who sustained a fall about 6 days ago. He states that since that time he has been having slowly worsening pain in the right hip. He states that the pain became severe enough that he could no longer bear weight today so he presented to the ED. X-rays were obtained and he was diagnosed with a right hip fracture. He was subsequently admitted to the hospitalist for medical management and I was consulted for evaluation and treatment of his right hip fracture. He is currently complaining of deep, aching and throbbing pain globally in his right hip and groin. Patient denies any prior injury to the involved extremity/ joint, denies numbness or tingling in the involved extremity and denies fever or chills.         Review of Systems   Constitutional: Negative for activity change, chills and fever. Respiratory: Negative for chest tightness. Cardiovascular: Negative for chest pain. Musculoskeletal: Positive for arthralgias, gait problem, joint swelling and myalgias. Negative for back pain. Skin: Negative for color change, pallor, rash and wound. Neurological: Negative for weakness and numbness. Past Medical History:   Diagnosis Date    Acid reflux     Acute frontal sinusitis 7/22/2009    Alcohol abuse     \"I Drink Average 2 Beers A Day\"    Anesthesia     \" I get agitated\"    Anxiety     Arthritis     \"Right Shoulder, Neck, Left Knee\"    Atypical mycobacterial infection     Broken teeth     Upper And Lower     [facility]     COPD (chronic obstructive pulmonary disease) ([facility])     Sees Dr. Mallorie Davis In [place], [address]     Cough     Frequent Cough With Green Sputum    Depression     Depression     Dyspnea 2/23/2018    H/O cardiovascular MUGA stress test 05/14/2019    EF 50%, NORMAL LVEF, NORMAL WALL MOTION.    H/O echocardiogram 05/22/2014    XI=>54-28%, LV systolic function is low normal, mild aortic valve calcification, no pericardial effusion    H/O echocardiogram 12/12/2018    EF 45-50%    History of 24 hour EKG monitoring 6/6/14    24 hr holter monitor. Ventricular ectopics were noted in single and couplet forms. Supraventricular ectopics were noted in single and pair forms.  [place] (hard of hearing)     Bilateral Ears    Hyperlipidemia     Hypertension     Irritant contact dermatitis due to other chemical products 8/26/2019    Other drug allergy(995.27) 7/22/2009    S/P AVR 12/2003    Mechanical valvue     Shortness of breath     Teeth missing     Upper And Lower     No current facility-administered medications on file prior to encounter.      Current Outpatient Medications on File Prior to Encounter   Medication Sig Dispense Refill    traZODone (DESYREL) 100 MG tablet Take 2 tablets by mouth nightly 30 tablet 3    venlafaxine (EFFEXOR XR) 150 MG extended release capsule Take 1 capsule by mouth daily for 7 days 7 capsule 0    warfarin (COUMADIN) 5 MG tablet take 1-2 tablets by mouth daily as directed 60 tablet 2    simvastatin (ZOCOR) 40 MG tablet take 1 tablet by mouth at bedtime 90 tablet 1    triamcinolone (KENALOG) 0.1 % cream Apply topically 2 times daily. 453.6 g 1    ammonium lactate (LAC-HYDRIN) 12 % lotion Apply topically daily.  225 g 1    ipratropium-albuterol (DUONEB) 0.5-2.5 (3) MG/3ML SOLN nebulizer solution Inhale 3 mLs into the lungs 4 times daily 360 mL 5    cyclobenzaprine (FLEXERIL) 10 MG tablet Take 10 mg by mouth 3 times daily as needed for Muscle spasms      albuterol (ACCUNEB) 0.63 MG/3ML nebulizer solution Take 1 ampule by nebulization every 6 hours as needed for Wheezing      albuterol sulfate HFA (PROAIR HFA) 108 (90 Base) MCG/ACT inhaler Inhale 2 puffs into the lungs every 4 hours as needed for Wheezing or Shortness of Breath 1 Inhaler 3    montelukast (SINGULAIR) 10 MG tablet Take 1 tablet by mouth nightly \"Alternate With Zyrtec\" 30 tablet 5    calcium carbonate (OSCAL) 500 MG TABS tablet Take 500 mg by mouth daily      KRILL OIL OMEGA-3 PO Take by mouth      sodium chloride, Inhalant, 3 % nebulizer solution       cetirizine (ZYRTEC) 10 MG tablet Take 10 mg by mouth \"Alternate With Singulair\"      NASAL SPRAY SALINE NA by Nasal route daily Over The Counter      budesonide (RHINOCORT ALLERGY) 32 MCG/ACT nasal spray 2 sprays by Nasal route daily      Acetaminophen (TYLENOL 8 HOUR PO) Take by mouth as needed Over The Counter      Multiple Vitamins-Minerals (CENTRUM PO) Take by mouth daily      Nebulizer MISC Inhale into the lungs as needed       Allergies   Allergen Reactions    Ciprofloxacin Hcl Hives and Swelling    Levaquin [Levofloxacin] Hives and Swelling    Other Nausea And Vomiting     \"Allergic To Tylox\"    Ceftin [Cefuroxime]      Past Surgical History:   Procedure Laterality Date    BRONCHOSCOPY  1996    \"Done Twice\"    CARDIAC VALVE REPLACEMENT  12/17/2003    \"Aortic Valve Replacement, Mechanical Valve\"    COLONOSCOPY  2006    Polyps Removed    DENTAL SURGERY      Teeth Extracted In Past    ENDOSCOPY, COLON, DIAGNOSTIC  In Past    HEMIARTHROPLASTY SHOULDER FRACTURE Right 1/29/2019    SHOULDER HEMIARTHROPLASTY performed by Alex Blackman DO at [facility]. ARTHROSCOPY Left 1992    LUNG BIOPSY Right [facility]    \"Cauterized Because My Sperm Was Low\"    ROTATOR CUFF REPAIR Right 2008    TONSILLECTOMY  1959 Or 1960     Social History     Tobacco Use    Smoking status: Never Smoker    Smokeless tobacco: Current User     Types: Snuff, Chew   Vaping Use    Vaping Use: Never used   Substance Use Topics    Alcohol use: Yes     Comment: \"Average 2 Beers A Day\"    Drug use: No     Family History   Problem Relation Age of Onset    Cancer Mother         Lymphoma    Diabetes Mother     High Blood Pressure Mother     High Cholesterol Mother     Obesity Mother     Vision Loss Mother         Wore Glasses    Heart Disease Father         \"Heart Failure\"   [place] COPD Father     Asthma Sister     High Blood Pressure Sister     High Cholesterol Sister     Other Sister         pre diabetic    COPD Sister     Diabetes Sister         \"Pre Diabetes\"    No Known Problems Son        Right Knee Exam     Tenderness   The patient is experiencing no tenderness. Range of Motion   The patient has normal right knee ROM. Other   Erythema: absent  Sensation: normal  Pulse: present  Swelling: none  Effusion: no effusion present      Left Knee Exam     Muscle Strength   The patient has normal left knee strength. Tenderness   The patient is experiencing no tenderness. Range of Motion   The patient has normal left knee ROM.     Other   Erythema: absent  Sensation: normal  Pulse: present  Swelling: none  Effusion: no effusion present      Right Hip Exam     Tenderness   The patient is experiencing tenderness in the anterior, greater trochanter, lateral and posterior. Other   Erythema: absent  Sensation: normal  Pulse: present    Comments:  Right hip-Skin intact with no erythema, ecchymosis or lacerations present. Full range of motion not assessed due to recent injury. Intact sensation motor function to all nerve distributions of the extremity. +2 DP  Compartment soft. Severe pain with attempted range of motion of the right hip. Right leg is held in a position of shortening and external rotation compared to contralateral side. Left Hip Exam     Tenderness   The patient is experiencing no tenderness. Range of Motion   The patient has normal left hip ROM. Muscle Strength   The patient has normal left hip strength. Other   Erythema: absent  Sensation: normal  Pulse: present            BP (!) 173/88   Pulse 89   Temp 98.5 °F (36.9 °C) (Oral)   Resp 17   Ht 5' 4.5\" (1.638 m)   Wt 160 lb (72.6 kg)   SpO2 98%   BMI 27.04 kg/m²         XR HIP 2-3 VW W PELVIS RIGHT    Result Date: 10/22/2021  EXAMINATION: ONE XRAY VIEW OF THE PELVIS AND TWO XRAY VIEWS RIGHT HIP 10/22/2021 2:11 pm COMPARISON: None. HISTORY: ORDERING SYSTEM PROVIDED HISTORY: fall, pain, trauma TECHNOLOGIST PROVIDED HISTORY: Reason for exam:->fall, pain, trauma Reason for Exam: right hip pain Acuity: Acute Type of Exam: Initial Mechanism of Injury: fall Relevant Medical/Surgical History: na FINDINGS: Three views of the pelvis and right hip were reviewed. There is an acute transcervical femoral neck fracture of the right femur with displacement at the fracture site. No additional fractures are identified. Multilevel degenerative changes of the imaged lower lumbar spine. Atherosclerotic vascular calcifications noted. Surgical clips project over the region of the left hip. 1. Acute and displaced transcervical right femoral neck fracture.

## 2021-10-23 LAB
APTT: 71.1 SECONDS (ref 25.1–37.1)
APTT: 86.3 SECONDS (ref 25.1–37.1)

## 2021-10-23 PROCEDURE — 99254 IP/OBS CNSLTJ NEW/EST MOD 60: CPT | Performed by: INTERNAL MEDICINE

## 2021-10-23 PROCEDURE — 6370000000 HC RX 637 (ALT 250 FOR IP): Performed by: HOSPITALIST

## 2021-10-23 PROCEDURE — 36415 COLL VENOUS BLD VENIPUNCTURE: CPT

## 2021-10-23 PROCEDURE — 96376 TX/PRO/DX INJ SAME DRUG ADON: CPT

## 2021-10-23 PROCEDURE — 94640 AIRWAY INHALATION TREATMENT: CPT

## 2021-10-23 PROCEDURE — 85730 THROMBOPLASTIN TIME PARTIAL: CPT

## 2021-10-23 PROCEDURE — 96366 THER/PROPH/DIAG IV INF ADDON: CPT

## 2021-10-23 PROCEDURE — G0378 HOSPITAL OBSERVATION PER HR: HCPCS

## 2021-10-23 PROCEDURE — 6370000000 HC RX 637 (ALT 250 FOR IP): Performed by: NURSE PRACTITIONER

## 2021-10-23 PROCEDURE — 94761 N-INVAS EAR/PLS OXIMETRY MLT: CPT

## 2021-10-23 PROCEDURE — 96375 TX/PRO/DX INJ NEW DRUG ADDON: CPT

## 2021-10-23 PROCEDURE — 2580000003 HC RX 258: Performed by: HOSPITALIST

## 2021-10-23 PROCEDURE — 6360000002 HC RX W HCPCS: Performed by: PHYSICIAN ASSISTANT

## 2021-10-23 PROCEDURE — 93005 ELECTROCARDIOGRAM TRACING: CPT | Performed by: INTERNAL MEDICINE

## 2021-10-23 RX ORDER — NICOTINE 21 MG/24HR
1 PATCH, TRANSDERMAL 24 HOURS TRANSDERMAL DAILY
Status: DISCONTINUED | OUTPATIENT
Start: 2021-10-23 | End: 2021-11-03

## 2021-10-23 RX ADMIN — IPRATROPIUM BROMIDE AND ALBUTEROL SULFATE 1 AMPULE: .5; 3 SOLUTION RESPIRATORY (INHALATION) at 20:35

## 2021-10-23 RX ADMIN — MORPHINE SULFATE 4 MG: 4 INJECTION INTRAVENOUS at 14:55

## 2021-10-23 RX ADMIN — SODIUM CHLORIDE, PRESERVATIVE FREE 10 ML: 5 INJECTION INTRAVENOUS at 09:18

## 2021-10-23 RX ADMIN — SODIUM CHLORIDE: 9 INJECTION, SOLUTION INTRAVENOUS at 20:38

## 2021-10-23 RX ADMIN — ATORVASTATIN CALCIUM 40 MG: 40 TABLET, FILM COATED ORAL at 08:21

## 2021-10-23 RX ADMIN — ACETAMINOPHEN 650 MG: 325 TABLET ORAL at 04:59

## 2021-10-23 RX ADMIN — IPRATROPIUM BROMIDE AND ALBUTEROL SULFATE 1 AMPULE: .5; 3 SOLUTION RESPIRATORY (INHALATION) at 11:06

## 2021-10-23 RX ADMIN — IPRATROPIUM BROMIDE AND ALBUTEROL SULFATE 1 AMPULE: .5; 3 SOLUTION RESPIRATORY (INHALATION) at 07:28

## 2021-10-23 RX ADMIN — MORPHINE SULFATE 4 MG: 4 INJECTION INTRAVENOUS at 20:35

## 2021-10-23 RX ADMIN — MONTELUKAST 10 MG: 10 TABLET, FILM COATED ORAL at 20:35

## 2021-10-23 RX ADMIN — SODIUM CHLORIDE, PRESERVATIVE FREE 10 ML: 5 INJECTION INTRAVENOUS at 20:45

## 2021-10-23 RX ADMIN — Medication 500 MG: at 08:21

## 2021-10-23 RX ADMIN — HEPARIN SODIUM 15 UNITS/KG/HR: 10000 INJECTION, SOLUTION INTRAVENOUS at 20:37

## 2021-10-23 RX ADMIN — VENLAFAXINE HYDROCHLORIDE 150 MG: 150 CAPSULE, EXTENDED RELEASE ORAL at 08:21

## 2021-10-23 RX ADMIN — HEPARIN SODIUM 2180 UNITS: 1000 INJECTION INTRAVENOUS; SUBCUTANEOUS at 00:01

## 2021-10-23 ASSESSMENT — PAIN SCALES - GENERAL
PAINLEVEL_OUTOF10: 6
PAINLEVEL_OUTOF10: 5
PAINLEVEL_OUTOF10: 6
PAINLEVEL_OUTOF10: 3
PAINLEVEL_OUTOF10: 6

## 2021-10-23 ASSESSMENT — ENCOUNTER SYMPTOMS
COUGH: 0
NAUSEA: 0
SHORTNESS OF BREATH: 0
VOMITING: 0

## 2021-10-23 NOTE — PROGRESS NOTES
Progress Note  Date:10/23/2021       Room:40224022-A  Patient Name:Manas Carmichael     YOB: 1955     Age:65 y.o. Seen and examined in the room. Right leg mild pain. No numbness, tingling, or coldness. Subjective    Subjective:  Symptoms:  Stable. No shortness of breath, malaise, cough, chest pain, weakness, headache, chest pressure or anorexia. Diet:  Adequate intake. No nausea or vomiting. Activity level: Normal.    Pain:  He reports no pain. Review of Systems   Constitutional: Negative for fever. Respiratory: Negative for cough and shortness of breath. Cardiovascular: Negative for chest pain. Gastrointestinal: Negative for anorexia, nausea and vomiting. Neurological: Negative for weakness. Objective         Vitals Last 24 Hours:  TEMPERATURE:  Temp  Av.2 °F (36.8 °C)  Min: 97.8 °F (36.6 °C)  Max: 98.5 °F (36.9 °C)  RESPIRATIONS RANGE: Resp  Av.8  Min: 17  Max: 28  PULSE OXIMETRY RANGE: SpO2  Av.4 %  Min: 95 %  Max: 99 %  PULSE RANGE: Pulse  Av.8  Min: 76  Max: 94  BLOOD PRESSURE RANGE: Systolic (08KKP), NUA:619 , Min:115 , HPJ:291   ; Diastolic (05LDX), NOO:71, Min:61, Max:90    I/O (24Hr): Intake/Output Summary (Last 24 hours) at 10/23/2021 1118  Last data filed at 10/23/2021 0815  Gross per 24 hour   Intake 432.98 ml   Output 1225 ml   Net -792.02 ml     Objective:  General Appearance: In no acute distress. Vital signs: (most recent): Blood pressure 130/82, pulse 82, temperature 97.8 °F (36.6 °C), temperature source Oral, resp. rate 20, height 5' 4.5\" (1.638 m), weight 160 lb (72.6 kg), SpO2 98 %. Vital signs are normal.  No fever. Output: Producing urine and producing stool. Lungs:  Normal effort and normal respiratory rate. Breath sounds clear to auscultation. Heart: Normal rate. Regular rhythm. S1 normal.    Abdomen: Abdomen is soft. Bowel sounds are normal.   There is no abdominal tenderness.      Extremities: Normal range of motion. Neurological: Patient is alert. Skin:  Warm. Labs/Imaging/Diagnostics    Labs:  CBC:  Recent Labs     10/22/21  1428 10/22/21  1821   WBC 12.6* 9.9   RBC 4.02* 4.55*   HGB 12.5* 13.9   HCT 37.7* 42.9   MCV 93.8 94.3   RDW 14.6 14.6    393     CHEMISTRIES:  Recent Labs     10/22/21  1428   *   K 3.8   CL 92*   CO2 19*   BUN 15   CREATININE 0.5*   GLUCOSE 109*     PT/INR:  Recent Labs     10/22/21  1415   PROTIME 39.0*   INR 2.99     APTT:  Recent Labs     10/22/21  1821 10/22/21  2302 10/23/21  0450   APTT 35.2 37.8* 86.3*     LIVER PROFILE:  Recent Labs     10/22/21  1428   AST 28   ALT 16   BILITOT 0.5   ALKPHOS 79       Imaging Last 24 Hours:  XR LUMBAR SPINE (2-3 VIEWS)    Result Date: 10/22/2021  EXAMINATION: THREE XRAY VIEWS OF THE LUMBAR SPINE 10/22/2021 2:12 pm COMPARISON: None. HISTORY: ORDERING SYSTEM PROVIDED HISTORY: fall, trauma TECHNOLOGIST PROVIDED HISTORY: Reason for exam:->fall, trauma Reason for Exam: fall trauma, xtable due to hip fracture Acuity: Acute Type of Exam: Initial FINDINGS: Five non-rib-bearing lumbar type vertebral bodies are identified. Multilevel degenerative changes of spine, including mild-to-moderate disc height loss at L3-L4 and L4-L5. There is an age-indeterminate compression deformity of the L4 vertebral body. Mild anterior wedging of the T12 and L1 vertebral bodies could also represent compression deformities, although wedging at these levels could represent normal variants. Age-indeterminate anterior compression deformity of the L4 vertebral body. Mild anterior wedging of the T12 and L1 vertebral bodies could represent additional age-indeterminate compression deformities, although wedging at these levels could represent normal variants. Correlate with clinical symptoms. Multilevel degenerative changes of the spine.      XR CHEST PORTABLE    Result Date: 10/22/2021  EXAMINATION: ONE XRAY VIEW OF THE CHEST 10/22/2021 3:27 pm COMPARISON: 05/05/2018 chest radiograph HISTORY: ORDERING SYSTEM PROVIDED HISTORY: hip fracture TECHNOLOGIST PROVIDED HISTORY: Reason for exam:->hip fracture Reason for Exam: hip fracture Acuity: Acute Type of Exam: Initial FINDINGS: Prior median sternotomy and aortic valve replacement. The cardiomediastinal silhouette is normal in size and contour. Atherosclerotic calcifications of the aortic arch. No focal airspace disease. No pleural effusion or pneumothorax. Bilateral remote rib fractures. Right shoulder hemiarthroplasty. No evidence of acute cardiopulmonary disease. XR HIP 2-3 VW W PELVIS RIGHT    Result Date: 10/22/2021  EXAMINATION: ONE XRAY VIEW OF THE PELVIS AND TWO XRAY VIEWS RIGHT HIP 10/22/2021 2:11 pm COMPARISON: None. HISTORY: ORDERING SYSTEM PROVIDED HISTORY: fall, pain, trauma TECHNOLOGIST PROVIDED HISTORY: Reason for exam:->fall, pain, trauma Reason for Exam: right hip pain Acuity: Acute Type of Exam: Initial Mechanism of Injury: fall Relevant Medical/Surgical History: na FINDINGS: Three views of the pelvis and right hip were reviewed. There is an acute transcervical femoral neck fracture of the right femur with displacement at the fracture site. No additional fractures are identified. Multilevel degenerative changes of the imaged lower lumbar spine. Atherosclerotic vascular calcifications noted. Surgical clips project over the region of the left hip. 1. Acute and displaced transcervical right femoral neck fracture. Assessment//Plan           Hospital Problems         Last Modified POA    Femoral neck stress fracture, initial encounter 10/22/2021 Yes    Closed transcervical fracture of right femur ([address] 10/23/2021 Yes        Assessment & Plan     Right femoral neck fracture secondary to fall  Cardiology consulted, patient cleared for surgery. Orthopedics following, plan for surgery on Monday.   Patient on heparin drip due to history of mechanical valve replacement. Mechanical aortic valve replacement  On Coumadin at home, Coumadin on hold, started on heparin drip. Continue daily PT/INR. Hyponatremia  Please secondary to hypovolemic, continue IV fluid, continue trending sodium. COPD  DuoNeb as needed for shortness of breath. Depression  Continue home medication.     Electronically signed by VANGIE Frey CNP on 10/23/21 at 11:18 AM EDT

## 2021-10-23 NOTE — PROGRESS NOTES
Jaquelin Stone is a 60 y.o. male patient. 1. Closed transcervical fracture of right femur, initial encounter ([facility] [address]    2. Hyponatremia      Past Medical History:   Diagnosis Date    Acid reflux     Acute frontal sinusitis 7/22/2009    Alcohol abuse     \"I Drink Average 2 Beers A Day\"    Anesthesia     \" I get agitated\"    Anxiety     Arthritis     \"Right Shoulder, Neck, Left Knee\"    Atypical mycobacterial infection     Broken teeth     Upper And Lower     CHF [facility]congestive [facility] failure) ([facility]     COPD (chronic obstructive pulmonary disease) ([facility])     Sees Dr. Mika Fink In [place], [address]     Cough     Frequent Cough With Green Sputum    Depression     Depression     Dyspnea 2/23/2018    H/O cardiovascular MUGA stress test 05/14/2019    EF 50%, NORMAL LVEF, NORMAL WALL MOTION.    H/O echocardiogram 05/22/2014    JN=>45-55%, LV systolic function is low normal, mild aortic valve calcification, no pericardial effusion    H/O echocardiogram 12/12/2018    EF 45-50%    History of 24 hour EKG monitoring 6/6/14    24 hr holter monitor. Ventricular ectopics were noted in single and couplet forms. Supraventricular ectopics were noted in single and pair forms.  [place] (hard of hearing)     Bilateral Ears    Hyperlipidemia     Hypertension     Irritant contact dermatitis due to other chemical products 8/26/2019    Other drug allergy(995.27) 7/22/2009    S/P AVR 12/2003    Mechanical valvue     Shortness of breath     Teeth missing     Upper And Lower     No past surgical history pertinent negatives on file.   Scheduled Meds:   acetaminophen  650 mg Oral Once    calcium elemental  500 mg Oral Daily    montelukast  10 mg Oral Nightly    atorvastatin  40 mg Oral Daily    venlafaxine  150 mg Oral Daily    sodium chloride flush  5-40 mL IntraVENous 2 times per day    ipratropium-albuterol  1 ampule Inhalation Q4H WA     Continuous Infusions:   sodium chloride      heparin (PORCINE) Infusion 15 Units/kg/hr (10/23/21 0026)    sodium chloride 75 mL/hr at 10/23/21 0026     PRN Meds:morphine, cyclobenzaprine, sodium chloride flush, sodium chloride, ondansetron **OR** ondansetron, polyethylene glycol, acetaminophen **OR** acetaminophen, heparin (porcine), heparin (porcine), morphine, hydrALAZINE    Allergies   Allergen Reactions    Ciprofloxacin Hcl Hives and Swelling    Levaquin [Levofloxacin] Hives and Swelling    Other Nausea And Vomiting     \"Allergic To Tylox\"    Ceftin [Cefuroxime]      Active Problems:    Femoral neck stress fracture, initial encounter    Closed transcervical fracture of right femur ([facility] [address]  Resolved Problems:    * No resolved hospital problems. *    Blood pressure 130/82, pulse 82, temperature 97.8 °F (36.6 °C), temperature source Oral, resp. rate 20, height 5' 4.5\" (1.638 m), weight 160 lb (72.6 kg), SpO2 98 %. Subjective   Patient seen and examined, resting in bed comfortably, pain controlled, no new complaints. Continued pain in the right hip but doing well as long as he does not move much. Objective   RLE - Skin intact with no erythema, ecchymosis or lacerations present. Severe pain in the right hip with attempted range of motion. Assessment & Plan  Right femoral neck fracture. Continue bedrest.  Remain nonweightbearing on right lower extremity. Pain medication as needed. Ice as needed. We will plan on proceeding with surgical treatment on Monday morning.   Awaiting on INR reversal below 1.7.  10/23/2021

## 2021-10-24 ENCOUNTER — ANESTHESIA (OUTPATIENT)
Dept: OPERATING ROOM | Age: 66
DRG: 522 | End: 2021-10-24
Payer: OTHER GOVERNMENT

## 2021-10-24 ENCOUNTER — ANESTHESIA EVENT (OUTPATIENT)
Dept: OPERATING ROOM | Age: 66
DRG: 522 | End: 2021-10-24
Payer: OTHER GOVERNMENT

## 2021-10-24 PROBLEM — R45.851 PASSIVE SUICIDAL IDEATIONS: Status: ACTIVE | Noted: 2021-10-24

## 2021-10-24 PROBLEM — F33.3 SEVERE EPISODE OF RECURRENT MAJOR DEPRESSIVE DISORDER, WITH PSYCHOTIC FEATURES (HCC): Status: ACTIVE | Noted: 2020-06-29

## 2021-10-24 PROBLEM — F22 PARANOIA (HCC): Status: ACTIVE | Noted: 2021-10-24

## 2021-10-24 LAB
ANION GAP SERPL CALCULATED.3IONS-SCNC: 11 MMOL/L (ref 4–16)
APTT: 96.6 SECONDS (ref 25.1–37.1)
BUN BLDV-MCNC: 19 MG/DL (ref 6–23)
CALCIUM SERPL-MCNC: 8.5 MG/DL (ref 8.3–10.6)
CHLORIDE BLD-SCNC: 102 MMOL/L (ref 99–110)
CO2: 21 MMOL/L (ref 21–32)
CREAT SERPL-MCNC: 0.4 MG/DL (ref 0.9–1.3)
EKG ATRIAL RATE: 112 BPM
EKG DIAGNOSIS: NORMAL
EKG P AXIS: 45 DEGREES
EKG P-R INTERVAL: 142 MS
EKG Q-T INTERVAL: 334 MS
EKG QRS DURATION: 94 MS
EKG QTC CALCULATION (BAZETT): 455 MS
EKG R AXIS: 57 DEGREES
EKG T AXIS: 67 DEGREES
EKG VENTRICULAR RATE: 112 BPM
GFR AFRICAN AMERICAN: >60 ML/MIN/1.73M2
GFR NON-AFRICAN AMERICAN: >60 ML/MIN/1.73M2
GLUCOSE BLD-MCNC: 120 MG/DL (ref 70–99)
HCT VFR BLD CALC: 25 % (ref 42–52)
HCT VFR BLD CALC: 26 % (ref 42–52)
HEMOGLOBIN: 8.1 GM/DL (ref 13.5–18)
HEMOGLOBIN: 8.4 GM/DL (ref 13.5–18)
INR BLD: 3.48 INDEX
MCH RBC QN AUTO: 30.6 PG (ref 27–31)
MCHC RBC AUTO-ENTMCNC: 32.4 % (ref 32–36)
MCV RBC AUTO: 94.3 FL (ref 78–100)
PDW BLD-RTO: 14.6 % (ref 11.7–14.9)
PLATELET # BLD: 264 K/CU MM (ref 140–440)
PMV BLD AUTO: 8.8 FL (ref 7.5–11.1)
POTASSIUM SERPL-SCNC: 4 MMOL/L (ref 3.5–5.1)
PROTHROMBIN TIME: 45.5 SECONDS (ref 11.7–14.5)
RBC # BLD: 2.65 M/CU MM (ref 4.6–6.2)
SARS-COV-2, NAAT: NOT DETECTED
SODIUM BLD-SCNC: 134 MMOL/L (ref 135–145)
SOURCE: NORMAL
WBC # BLD: 5.9 K/CU MM (ref 4–10.5)

## 2021-10-24 PROCEDURE — 6370000000 HC RX 637 (ALT 250 FOR IP): Performed by: NURSE PRACTITIONER

## 2021-10-24 PROCEDURE — 85730 THROMBOPLASTIN TIME PARTIAL: CPT

## 2021-10-24 PROCEDURE — 96367 TX/PROPH/DG ADDL SEQ IV INF: CPT

## 2021-10-24 PROCEDURE — 85018 HEMOGLOBIN: CPT

## 2021-10-24 PROCEDURE — 96376 TX/PRO/DX INJ SAME DRUG ADON: CPT

## 2021-10-24 PROCEDURE — 85014 HEMATOCRIT: CPT

## 2021-10-24 PROCEDURE — 85027 COMPLETE CBC AUTOMATED: CPT

## 2021-10-24 PROCEDURE — G0378 HOSPITAL OBSERVATION PER HR: HCPCS

## 2021-10-24 PROCEDURE — 99221 1ST HOSP IP/OBS SF/LOW 40: CPT | Performed by: NURSE PRACTITIONER

## 2021-10-24 PROCEDURE — 94761 N-INVAS EAR/PLS OXIMETRY MLT: CPT

## 2021-10-24 PROCEDURE — 2580000003 HC RX 258: Performed by: HOSPITALIST

## 2021-10-24 PROCEDURE — 2000000000 HC ICU R&B

## 2021-10-24 PROCEDURE — 6370000000 HC RX 637 (ALT 250 FOR IP): Performed by: HOSPITALIST

## 2021-10-24 PROCEDURE — 6360000002 HC RX W HCPCS: Performed by: PHYSICIAN ASSISTANT

## 2021-10-24 PROCEDURE — 96375 TX/PRO/DX INJ NEW DRUG ADDON: CPT

## 2021-10-24 PROCEDURE — 80048 BASIC METABOLIC PNL TOTAL CA: CPT

## 2021-10-24 PROCEDURE — 6360000002 HC RX W HCPCS: Performed by: ORTHOPAEDIC SURGERY

## 2021-10-24 PROCEDURE — 96366 THER/PROPH/DIAG IV INF ADDON: CPT

## 2021-10-24 PROCEDURE — 99232 SBSQ HOSP IP/OBS MODERATE 35: CPT | Performed by: INTERNAL MEDICINE

## 2021-10-24 PROCEDURE — 2580000003 HC RX 258: Performed by: ORTHOPAEDIC SURGERY

## 2021-10-24 PROCEDURE — APPSS30 APP SPLIT SHARED TIME 16-30 MINUTES: Performed by: NURSE PRACTITIONER

## 2021-10-24 PROCEDURE — 85610 PROTHROMBIN TIME: CPT

## 2021-10-24 PROCEDURE — 87635 SARS-COV-2 COVID-19 AMP PRB: CPT

## 2021-10-24 PROCEDURE — 93010 ELECTROCARDIOGRAM REPORT: CPT | Performed by: INTERNAL MEDICINE

## 2021-10-24 RX ORDER — TRAZODONE HYDROCHLORIDE 50 MG/1
200 TABLET ORAL NIGHTLY
Status: DISCONTINUED | OUTPATIENT
Start: 2021-10-24 | End: 2021-11-03 | Stop reason: HOSPADM

## 2021-10-24 RX ORDER — IPRATROPIUM BROMIDE AND ALBUTEROL SULFATE 2.5; .5 MG/3ML; MG/3ML
1 SOLUTION RESPIRATORY (INHALATION) EVERY 4 HOURS PRN
Status: DISCONTINUED | OUTPATIENT
Start: 2021-10-24 | End: 2021-11-03 | Stop reason: HOSPADM

## 2021-10-24 RX ORDER — DIPHENHYDRAMINE HYDROCHLORIDE 50 MG/ML
50 INJECTION INTRAMUSCULAR; INTRAVENOUS EVERY 6 HOURS PRN
Status: DISCONTINUED | OUTPATIENT
Start: 2021-10-24 | End: 2021-11-03 | Stop reason: HOSPADM

## 2021-10-24 RX ORDER — SODIUM CHLORIDE 9 MG/ML
50 INJECTION, SOLUTION INTRAVENOUS ONCE
Status: COMPLETED | OUTPATIENT
Start: 2021-10-24 | End: 2021-10-25

## 2021-10-24 RX ORDER — HALOPERIDOL 1 MG/1
2 TABLET ORAL ONCE
Status: COMPLETED | OUTPATIENT
Start: 2021-10-24 | End: 2021-10-24

## 2021-10-24 RX ORDER — HYDROXYZINE PAMOATE 25 MG/1
50 CAPSULE ORAL ONCE
Status: DISCONTINUED | OUTPATIENT
Start: 2021-10-24 | End: 2021-10-24

## 2021-10-24 RX ORDER — LORAZEPAM 2 MG/ML
2 INJECTION INTRAMUSCULAR EVERY 6 HOURS PRN
Status: DISCONTINUED | OUTPATIENT
Start: 2021-10-24 | End: 2021-11-03 | Stop reason: HOSPADM

## 2021-10-24 RX ORDER — HALOPERIDOL 5 MG/ML
5 INJECTION INTRAMUSCULAR EVERY 6 HOURS PRN
Status: DISCONTINUED | OUTPATIENT
Start: 2021-10-24 | End: 2021-11-03 | Stop reason: HOSPADM

## 2021-10-24 RX ADMIN — ATORVASTATIN CALCIUM 40 MG: 40 TABLET, FILM COATED ORAL at 08:09

## 2021-10-24 RX ADMIN — SODIUM CHLORIDE, PRESERVATIVE FREE 10 ML: 5 INJECTION INTRAVENOUS at 11:17

## 2021-10-24 RX ADMIN — SODIUM CHLORIDE, PRESERVATIVE FREE 10 ML: 5 INJECTION INTRAVENOUS at 21:36

## 2021-10-24 RX ADMIN — TRAZODONE HYDROCHLORIDE 200 MG: 50 TABLET ORAL at 21:35

## 2021-10-24 RX ADMIN — Medication 500 MG: at 08:08

## 2021-10-24 RX ADMIN — MORPHINE SULFATE 4 MG: 4 INJECTION INTRAVENOUS at 21:35

## 2021-10-24 RX ADMIN — MORPHINE SULFATE 4 MG: 4 INJECTION INTRAVENOUS at 15:11

## 2021-10-24 RX ADMIN — Medication 1012 UNITS: at 11:37

## 2021-10-24 RX ADMIN — HALOPERIDOL 2 MG: 1 TABLET ORAL at 08:57

## 2021-10-24 RX ADMIN — MORPHINE SULFATE 4 MG: 4 INJECTION INTRAVENOUS at 05:00

## 2021-10-24 RX ADMIN — TRAZODONE HYDROCHLORIDE 200 MG: 50 TABLET ORAL at 00:47

## 2021-10-24 RX ADMIN — MONTELUKAST 10 MG: 10 TABLET, FILM COATED ORAL at 21:35

## 2021-10-24 RX ADMIN — PHYTONADIONE 10 MG: 10 INJECTION, EMULSION INTRAMUSCULAR; INTRAVENOUS; SUBCUTANEOUS at 11:08

## 2021-10-24 RX ADMIN — VENLAFAXINE HYDROCHLORIDE 150 MG: 150 CAPSULE, EXTENDED RELEASE ORAL at 08:09

## 2021-10-24 ASSESSMENT — PAIN DESCRIPTION - FREQUENCY: FREQUENCY: CONTINUOUS

## 2021-10-24 ASSESSMENT — PAIN SCALES - GENERAL
PAINLEVEL_OUTOF10: 0
PAINLEVEL_OUTOF10: 0
PAINLEVEL_OUTOF10: 8
PAINLEVEL_OUTOF10: 6
PAINLEVEL_OUTOF10: 0
PAINLEVEL_OUTOF10: 8
PAINLEVEL_OUTOF10: 4

## 2021-10-24 ASSESSMENT — ENCOUNTER SYMPTOMS
SHORTNESS OF BREATH: 0
NAUSEA: 0
DIARRHEA: 0
COUGH: 0
SHORTNESS OF BREATH: 1

## 2021-10-24 ASSESSMENT — PAIN DESCRIPTION - PROGRESSION: CLINICAL_PROGRESSION: NOT CHANGED

## 2021-10-24 ASSESSMENT — PAIN DESCRIPTION - ORIENTATION: ORIENTATION: RIGHT

## 2021-10-24 ASSESSMENT — PAIN DESCRIPTION - PAIN TYPE: TYPE: ACUTE PAIN

## 2021-10-24 ASSESSMENT — PAIN DESCRIPTION - LOCATION: LOCATION: HIP

## 2021-10-24 ASSESSMENT — PAIN DESCRIPTION - DESCRIPTORS: DESCRIPTORS: ACHING;CONSTANT;DISCOMFORT

## 2021-10-24 ASSESSMENT — PAIN DESCRIPTION - ONSET: ONSET: GRADUAL

## 2021-10-24 ASSESSMENT — PAIN - FUNCTIONAL ASSESSMENT: PAIN_FUNCTIONAL_ASSESSMENT: PREVENTS OR INTERFERES SOME ACTIVE ACTIVITIES AND ADLS

## 2021-10-24 NOTE — PROGRESS NOTES
Pt's HR jumped up into the 140's. RN went into room to check on pt. Pt was lying in bed awake, but explained hefelt nervous. RN took an EKG and vitals on pt. Pt denied CP, SOB, and nausea. Pt states he was seeing things that he should not be seeing, but felt better now that the RN was present.

## 2021-10-24 NOTE — PROGRESS NOTES
Mayo Garcia is a 60 y.o. male patient. 1. Closed transcervical fracture of right femur, initial encounter ([facility] [address]    2. Hyponatremia      Past Medical History:   Diagnosis Date    Acid reflux     Acute frontal sinusitis 7/22/2009    Alcohol abuse     \"I Drink Average 2 Beers A Day\"    Anesthesia     \" I get agitated\"    Anxiety     Arthritis     \"Right Shoulder, Neck, Left Knee\"    Atypical mycobacterial infection     Broken teeth     Upper And Lower     CH[facility]congestive heart failure) ([facility]     COPD (chronic obstructive pulmonary disease) ([facility])     Sees Dr. Leti Rodriguez In [place], [address]     Cough     Frequent Cough With Green Sputum    Depression     Depression     Dyspnea 2/23/2018    H/O cardiovascular MUGA stress test 05/14/2019    EF 50%, NORMAL LVEF, NORMAL WALL MOTION.    H/O echocardiogram 05/22/2014    HS=>12-81%, LV systolic function is low normal, mild aortic valve calcification, no pericardial effusion    H/O echocardiogram 12/12/2018    EF 45-50%    History of 24 hour EKG monitoring 6/6/14    24 hr holter monitor. Ventricular ectopics were noted in single and couplet forms. Supraventricular ectopics were noted in single and pair forms.  [place] (hard of hearing)     Bilateral Ears    Hyperlipidemia     Hypertension     Irritant contact dermatitis due to other chemical products 8/26/2019    Other drug allergy(995.27) 7/22/2009    S/P AVR 12/2003    Mechanical valvue     Shortness of breath     Teeth missing     Upper And Lower     No past surgical history pertinent negatives on file.   Scheduled Meds:   traZODone  200 mg Oral Nightly    nicotine  1 patch TransDERmal Daily    acetaminophen  650 mg Oral Once    calcium elemental  500 mg Oral Daily    montelukast  10 mg Oral Nightly    atorvastatin  40 mg Oral Daily    venlafaxine  150 mg Oral Daily    sodium chloride flush  5-40 mL IntraVENous 2 times per day     Continuous Infusions:   sodium chloride  sodium chloride      sodium chloride 75 mL/hr at 10/23/21 2044     PRN Meds:ipratropium-albuterol, morphine, cyclobenzaprine, sodium chloride flush, sodium chloride, ondansetron **OR** ondansetron, polyethylene glycol, acetaminophen **OR** acetaminophen, heparin (porcine), heparin (porcine), morphine, hydrALAZINE    Allergies   Allergen Reactions    Ciprofloxacin Hcl Hives and Swelling    Levaquin [Levofloxacin] Hives and Swelling    Other Nausea And Vomiting     \"Allergic To Tylox\"    Ceftin [Cefuroxime]      Active Problems:    Femoral neck stress fracture, initial encounter    Closed transcervical fracture of right femur ([address]  Resolved Problems:    * No resolved hospital problems. *    Blood pressure (!) 142/74, pulse 94, temperature 98.6 °F (37 °C), temperature source Oral, resp. rate 18, height 5' 4.5\" (1.638 m), weight 160 lb (72.6 kg), SpO2 94 %. Subjective   Patient seen and examined, resting in bed comfortably, pain controlled, no new complaints. Continued pain in the right hip but doing well as long as he does not move much. Objective:  Vital signs (most recent): Blood pressure (!) 142/74, pulse 94, temperature 98.6 °F (37 °C), temperature source Oral, resp. rate 18, height 5' 4.5\" (1.638 m), weight 160 lb (72.6 kg), SpO2 94 %. RLE - Skin intact with no erythema, ecchymosis or lacerations present. Severe pain in the right hip with attempted range of motion. Lab Results   Component Value Date    INR 3.48 10/24/2021    INR 2.99 10/22/2021    INR 1.6 10/06/2021    PROTIME 45.5 (H) 10/24/2021    PROTIME 39.0 (H) 10/22/2021    PROTIME 22.0 (H) 07/07/2020         Assessment & Plan  Right femoral neck fracture. Continue bedrest.  Remain nonweightbearing on right lower extremity. Pain medication as needed. Ice as needed. We will plan on proceeding with surgical treatment on Monday morning. Awaiting on INR reversal below 1.7. I did order Kcentra and vitamin K today.   He will be kept

## 2021-10-24 NOTE — PROGRESS NOTES
Cardiology Progress Note     Today's Plan CPM    Admit Date:  10/22/2021    Consult reason/ Seen today for: pre op clearance     Subjective and  Overnight Events:  Resting in bed- denies chest pain or shortness of breath     Assessment / Plan / Recommendation:     1. Pre op - moderate risk risk  2. VHD- h/o AVR- stable - AC on hold for surgery- resume once able   S/p fall with fracture of right femoral neck -for surgical repair in am      History of Presenting Illness:    Chief complain on admission : 60 y. o.year old who is admitted for  Chief Complaint   Patient presents with    Hip Pain     right hip goes to right leg/thigh        Past medical history:    has a past medical history of Acid reflux, Acute frontal sinusitis, Alcohol abuse, Anesthesia, Anxiety, Arthritis, Atypical mycobacterial infection, Broken teeth, CHF [facility]congestive heart failure) ([facility]), COPD (chronic obstructive pulmonary disease) ([facility] [address] Cough, Depression, Depression, Dyspnea, H/O cardiovascular MUGA stress test, H/O echocardiogram, H/O echocardiogram, History of 24 hour EKG monitoring, [place] (hard of hearing), Hyperlipidemia, Hypertension, Irritant contact dermatitis due to other chemical products, Other drug allergy(995.27), S/P AVR, Shortness of breath, and Teeth missing. Past surgical history:   has a past surgical history that includes Lung biopsy (Right, 1996); Rotator cuff repair (Right, 2008); Dental surgery; Cardiac valve replacement (12/17/2003); Colonoscopy (2006); Endoscopy, colon, diagnostic (In Past); Tonsillectomy (1959 Or 1960); bronchoscopy (1996); Knee arthroscopy (Left, 1992); other surgical history (1992); and HEMIARTHROPLASTY SHOULDER FRACTURE (Right, 1/29/2019). Social History:   reports that he has never smoked. His smokeless tobacco use includes snuff and chew. He reports current alcohol use. He reports that he does not use drugs.   Family history:  family history includes Asthma in his sister; COPD in his father and sister; Cancer in his mother; Diabetes in his mother and sister; Heart Disease in his father; High Blood Pressure in his mother and sister; High Cholesterol in his mother and sister; No Known Problems in his son; Obesity in his mother; Other in his sister; Vision Loss in his mother. Allergies   Allergen Reactions    Ciprofloxacin Hcl Hives and Swelling    Levaquin [Levofloxacin] Hives and Swelling    Other Nausea And Vomiting     \"Allergic To Tylox\"    Ceftin [Cefuroxime]        Review of Systems:   All 14 systems were reviewed and are negative  Except for the positive findings which are documented     /60   Pulse 93   Temp 98.9 °F (37.2 °C) (Oral)   Resp 18   Ht 5' 4.5\" (1.638 m)   Wt 160 lb (72.6 kg)   SpO2 98%   BMI 27.04 kg/m²       Intake/Output Summary (Last 24 hours) at 10/24/2021 0754  Last data filed at 10/23/2021 2044  Gross per 24 hour   Intake 1972.42 ml   Output 1300 ml   Net 672.42 ml       Physical Exam:  Physical Exam  Vitals reviewed. Cardiovascular:      Rate and Rhythm: Normal rate and regular rhythm. Pulses: Normal pulses. Heart sounds: Normal heart sounds. Pulmonary:      Effort: Pulmonary effort is normal.      Breath sounds: Normal breath sounds. Musculoskeletal:      Right lower leg: No edema. Left lower leg: No edema. Skin:     Capillary Refill: Capillary refill takes less than 2 seconds. Neurological:      Mental Status: He is oriented to person, place, and time.           Medications:    traZODone  200 mg Oral Nightly    haloperidol  2 mg Oral Once    nicotine  1 patch TransDERmal Daily    acetaminophen  650 mg Oral Once    calcium elemental  500 mg Oral Daily    montelukast  10 mg Oral Nightly    atorvastatin  40 mg Oral Daily    venlafaxine  150 mg Oral Daily    sodium chloride flush  5-40 mL IntraVENous 2 times per day      sodium chloride      sodium chloride 75 mL/hr at 10/23/21 2044     ipratropium-albuterol, morphine, cyclobenzaprine, sodium chloride flush, sodium chloride, ondansetron **OR** ondansetron, polyethylene glycol, acetaminophen **OR** acetaminophen, heparin (porcine), heparin (porcine), morphine, hydrALAZINE    Lab Data:  CBC:   Recent Labs     10/22/21  1428 10/22/21  1821 10/24/21  0626   WBC 12.6* 9.9 5.9   HGB 12.5* 13.9 8.1*   HCT 37.7* 42.9 25.0*   MCV 93.8 94.3 94.3    393 264     BMP:   Recent Labs     10/22/21  1428   *   K 3.8   CL 92*   CO2 19*   BUN 15   CREATININE 0.5*     PT/INR:   Recent Labs     10/22/21  1415 10/24/21  0626   PROTIME 39.0* 45.5*   INR 2.99 3.48     BNP:  No results for input(s): PROBNP in the last 72 hours. TROPONIN: No results for input(s): TROPONINT in the last 72 hours. Impression:  Active Problems:    Femoral neck stress fracture, initial encounter    Closed transcervical fracture of right femur (HCC)  Resolved Problems:    * No resolved hospital problems. *       All labs, medications and tests reviewed by myself, continue all other medications of all above medical condition listed as is except for changes mentioned above. Thank you   Please call with questions. Electronically signed by Ritta Felty, APRN - CNP on 10/24/2021 at 7:54 AM  I have seen ,spoken to  and examined this patient personally, independently of the nurse practitioner. I have reviewed the hospital care given to date and reviewed all pertinent labs and imaging. The plan was developed mutually at the time of the visit with the patient,  NP  and myself. I have spoken with patient, nursing staff and provided written and verbal instructions . The above note has been reviewed and I agree with the assessment, diagnosis, and treatment plan with changes made by me as follows     CARDIOLOGY ATTENDING ADDENDUM    HPI:  I have reviewed the above HPI  And agree with above   Arleen Mccoy is a 60 y. o.year old who and presents with had concerns including Hip Pain (right hip goes to right leg/thigh). Chief Complaint   Patient presents with    Hip Pain     right hip goes to right leg/thigh     Interval history:  No cp    Physical Exam:  General:  Awake, alert, NAD  Head:normal  Eye:normal  Neck:  No JVD   Chest:  Clear to auscultation, respiration easy  Cardiovascular:  RRR S1S2  Abdomen:   nontender  Extremities:  tr edema  Pulses; palpable  Neuro: grossly normal      MEDICAL DECISION MAKING;    I agree with the above plan, which was planned by myself and discussed with NP.   For hip surgery        Prakash Jaimes MD [facility]

## 2021-10-24 NOTE — ANESTHESIA PRE PROCEDURE
Department of Anesthesiology  Preprocedure Note       Name:  Bhavik Martínez   Age:  60 y.o.  :  1955                                          MRN:  [id number]         Date:  10/24/2021      Surgeon: * No surgeons listed *    Procedure: Procedure Not Yet Scheduled    Medications prior to admission:   Prior to Admission medications    Medication Sig Start Date End Date Taking? Authorizing Provider   traZODone (DESYREL) 100 MG tablet Take 2 tablets by mouth nightly 9/14/21 10/14/21  Charity Erazo MD   venlafaxine (EFFEXOR XR) 150 MG extended release capsule Take 1 capsule by mouth daily for 7 days 21  Mahnaz Coleman PA-C   warfarin (COUMADIN) 5 MG tablet take 1-2 tablets by mouth daily as directed 21   MICHAEL Miranda   simvastatin (ZOCOR) 40 MG tablet take 1 tablet by mouth at bedtime 21   MICHAEL Miranda   triamcinolone (KENALOG) 0.1 % cream Apply topically 2 times daily. 20   Al Vázquez PA-C   ammonium lactate (LAC-HYDRIN) 12 % lotion Apply topically daily.  20   Al Vázquez PA-C   ipratropium-albuterol (DUONEB) 0.5-2.5 (3) MG/3ML SOLN nebulizer solution Inhale 3 mLs into the lungs 4 times daily 20   Andre Anguiano MD   cyclobenzaprine (FLEXERIL) 10 MG tablet Take 10 mg by mouth 3 times daily as needed for Muscle spasms    Historical Provider, MD   albuterol (ACCUNEB) 0.63 MG/3ML nebulizer solution Take 1 ampule by nebulization every 6 hours as needed for Wheezing    Historical Provider, MD   albuterol sulfate HFA (PROAIR HFA) 108 (90 Base) MCG/ACT inhaler Inhale 2 puffs into the lungs every 4 hours as needed for Wheezing or Shortness of Breath 20   Marisol Zavaleta MD   montelukast (SINGULAIR) 10 MG tablet Take 1 tablet by mouth nightly \"Alternate With Zyrtec\" 20   Marisol Zavaleta MD   calcium carbonate (OSCAL) 500 MG TABS tablet Take 500 mg by mouth daily    Historical Provider, MD YOUNGBLOOD OIL OMEGA-3 PO Take by Blair De Santiago MD   40 mg at 10/24/21 0809    venlafaxine (EFFEXOR XR) extended release capsule 150 mg  150 mg Oral Daily Blair De Santiago MD   150 mg at 10/24/21 0809    sodium chloride flush 0.9 % injection 5-40 mL  5-40 mL IntraVENous 2 times per day Blair De Santiago MD   10 mL at 10/24/21 1117    sodium chloride flush 0.9 % injection 5-40 mL  5-40 mL IntraVENous PRN Blair De Santiago MD        0.9 % sodium chloride infusion  25 mL IntraVENous PRN Blair De Santiago MD        ondansetron (ZOFRAN-ODT) disintegrating tablet 4 mg  4 mg Oral Q8H PRN Blair De Santiago MD        Or    [facility]ron [facility] injection 4 mg  4 mg IntraVENous Q6H PRN Blair De Santiago MD        polyethylene glycol [facility]) packet 17 g  17 g Oral Daily PRN Blair De Santiago MD        acetaminophen (TYLENOL) tablet 650 mg  650 mg Oral Q6H PRN Blair De Santiago MD   650 mg at 10/23/21 0459    Or    acetaminophen (TYLENOL) suppository 650 mg  650 mg Rectal Q6H PRN Blair De Santiago MD        heparin (porcine) injection 4,360 Units  60 Units/kg IntraVENous PRN Agnieszka Cain PA-C        heparin (porcine) injection 2,180 Units  30 Units/kg IntraVENous PRN Agnieszka Cain PA-C   2,180 Units at 10/23/21 0001    morphine injection 2 mg  2 mg IntraVENous Q4H PRN Blair De Santiago MD        0.9 % sodium chloride infusion   IntraVENous Continuous Blair De Santiago MD 75 mL/hr at 10/23/21 2044 Rate Verify at 10/23/21 2044    hydrALAZINE (APRESOLINE) injection 10 mg  10 mg IntraVENous Q4H PRN Blair De Santiago MD           Allergies:     Allergies   Allergen Reactions    Ciprofloxacin Hcl Hives and Swelling    Levaquin [Levofloxacin] Hives and Swelling    Other Nausea And Vomiting     \"Allergic To Tylox\"    Ceftin [Cefuroxime]        Problem List:    Patient Active Problem List   Diagnosis Code    Localized osteoarthritis of right shoulder M19.011    S/P AVR Z95.2    Hyperlipidemia E78.5    [place] (hard of hearing) H91.90    [facility] J44.9    Bronchiectasis ([facility] J47.9    Anxiety F41.9    Alcohol abuse F10.10    Acid reflux K21.9    Chronic interstitial lung disease (HCC) J84.9    Hyponatremia E87.1    Severe episode of recurrent major depressive disorder, without psychotic features ([facility] F33.2    Recurrent major depressive disorder, in partial remission ([facility] F33.41    Rheumatic aortic valve disease I06.9    Chronic systolic heart failure ([facility]) I50.22    Rheumatic mitral regurgitation I05.1    Valvular heart disease I38    Psychophysiological insomnia F51.04    Femoral neck stress fracture, initial encounter M84.353A    Closed transcervical fracture of right femur ([facility] S72.031A       Past Medical History:        Diagnosis Date    Acid reflux     Acute frontal sinusitis 7/22/2009    Alcohol abuse     \"I Drink Average 2 Beers A Day\"    Anesthesia     \" I get agitated\"    Anxiety     Arthritis     \"Right Shoulder, Neck, Left Knee\"    Atypical mycobacterial infection     Broken teeth     Upper And Lower     CHF (congestive heart failure) ([facility]     COPD (chronic obstructive pulmonary disease) ([facility])     Sees Dr. Shanon Todd [place], [address]     Cough     Frequent Cough With Green Sputum    Depression     Depression     Dyspnea 2/23/2018    H/O cardiovascular MUGA stress test 05/14/2019    EF 50%, NORMAL LVEF, NORMAL WALL MOTION.    H/O echocardiogram 05/22/2014    JL=>56-27%, LV systolic function is low normal, mild aortic valve calcification, no pericardial effusion    H/O echocardiogram 12/12/2018    EF 45-50%    History of 24 hour EKG monitoring 6/6/14    24 hr holter monitor. Ventricular ectopics were noted in single and couplet forms. Supraventricular ectopics were noted in single and pair forms.     [place] (hard of hearing)     Bilateral Ears    Hyperlipidemia     Hypertension     Irritant contact dermatitis due to other chemical products 8/26/2019    Other drug allergy(995.27) 7/22/2009    S/P AVR 12/2003 Mechanical valvue     Shortness of breath     Teeth missing     Upper And Lower       Past Surgical History:        Procedure Laterality Date    BRONCHOSCOPY  1996    \"Done Twice\"    CARDIAC VALVE REPLACEMENT  12/17/2003    \"Aortic Valve Replacement, Mechanical Valve\"    COLONOSCOPY  2006    Polyps Removed    DENTAL SURGERY      Teeth Extracted In Past    ENDOSCOPY, COLON, DIAGNOSTIC  In Past    HEMIARTHROPLASTY SHOULDER FRACTURE Right 1/29/2019    SHOULDER HEMIARTHROPLASTY performed by David Sneed DO at [facility] ARTHROSCOPY Left 1992    LUNG BIOPSY Right [facility]    \"Cauterized Because My Sperm Was Low\"    ROTATOR CUFF REPAIR Right 2008    TONSILLECTOMY  1959 Or 1960       Social History:    Social History     Tobacco Use    Smoking status: Never Smoker    Smokeless tobacco: Current User     Types: Snuff, Chew   Substance Use Topics    Alcohol use: Yes     Comment: \"Average 2 Beers A Day\"                                Ready to quit: Not Answered  Counseling given: Not Answered      Vital Signs (Current): There were no vitals filed for this visit.                                            BP Readings from Last 3 Encounters:   10/24/21 (!) 142/74   09/14/21 (!) 152/82   09/05/21 (!) 180/96       NPO Status:                                                                                 BMI:   Wt Readings from Last 3 Encounters:   10/22/21 160 lb (72.6 kg)   09/14/21 162 lb (73.5 kg)   09/05/21 178 lb (80.7 kg)     There is no height or weight on file to calculate BMI.    CBC:   Lab Results   Component Value Date    WBC 5.9 10/24/2021    RBC 2.65 10/24/2021    HGB 8.1 10/24/2021    HCT 25.0 10/24/2021    MCV 94.3 10/24/2021    RDW 14.6 10/24/2021     10/24/2021       CMP:   Lab Results   Component Value Date     10/22/2021    K 3.8 10/22/2021    CL 92 10/22/2021    CO2 19 10/22/2021    BUN 15 10/22/2021    CREATININE 0.5 10/22/2021    GFRAA >60 10/22/2021    AGRATIO 1.5 06/11/2021    LABGLOM >60 10/22/2021    GLUCOSE 109 10/22/2021    PROT 7.3 10/22/2021    CALCIUM 9.3 10/22/2021    BILITOT 0.5 10/22/2021    ALKPHOS 79 10/22/2021    AST 28 10/22/2021    ALT 16 10/22/2021       POC Tests: No results for input(s): POCGLU, POCNA, POCK, POCCL, POCBUN, POCHEMO, POCHCT in the last 72 hours. Coags:   Lab Results   Component Value Date    PROTIME 45.5 10/24/2021    INR 3.48 10/24/2021    APTT 96.6 10/24/2021       HCG (If Applicable): No results found for: PREGTESTUR, PREGSERUM, HCG, HCGQUANT     ABGs: No results found for: PHART, PO2ART, PLA6WEH, GFK1KUZ, BEART, W6RVQVDG     Type & Screen (If Applicable):  No results found for: LABABO, [address]    Anesthesia Evaluation  Patient summary reviewed history of anesthetic complications:   Airway: Mallampati: II  TM distance: >3 FB   Neck ROM: full  Mouth opening: > = 3 FB Dental:      Comment: Poor dentition    Pulmonary:   (+) COPD:  shortness of breath:                             Cardiovascular:  Exercise tolerance: good (>4 METS),   (+) hypertension:, valvular problems/murmurs (s/p AVR): AI and MR, CHF:, hyperlipidemia         Beta Blocker:  Not on Beta Blocker      ROS comment: Sinus tachycardia   Otherwise normal ECG   When compared with ECG of 05-SEP-2021 14:34,   No significant change was found  P      Resulting Agency  [facility]      Specimen Collected: 10/23/21 23:50 Last Resulted: 10/24/21 06:41      Echo pending      Summary   Left ventricular systolic function is mildly depressed with an ejection   fraction of 45-50%. Left Ventricular chamber size is dilated. Normally functioning mechanical prosthetic valve in aortic position with a   mean pressure of 17 mmHg. Doppler evaluation reveals mild aortic, moderate edmar, and mild tricuspid   regurgitation. No evidence of pericardial effusion.       Signature      ------------------------------------------------------------------   Electronically signed by Feliciano Dailey MD   (Interpreting physician) on 12/19/2018 at 09:17 AM   ------------------------------------------------------------------     Cleared by brittany 10/24/21 mod risk      Neuro/Psych:   (+) psychiatric history (hx of ETOH abuse):depression/anxiety             GI/Hepatic/Renal:   (+) GERD: well controlled,           Endo/Other:    (+) blood dyscrasia: anticoagulation therapy, arthritis: OA., electrolyte abnormalities, . Abdominal:             Vascular: negative vascular ROS. Other Findings:             Anesthesia Plan      general     ASA 3     (Chart review only 10/24/21  covid pending )  Induction: intravenous.                         VANGIE Eric - CARLOS   10/24/2021

## 2021-10-24 NOTE — PROGRESS NOTES
Progress Note  Date:10/24/2021       Room:40224022-  Patient Name:Manas Reid     YOB: 1955     Age:65 y.o.    Bedside nurse reported patient had a hallucination yesterday, also had a paranoid thoughts, he stated that he suspects somebody put poison in his hospital food. Patient has no suicidal ideation. Subjective    Subjective:  Symptoms:  Stable. No shortness of breath, malaise, cough, chest pain, weakness, headache, chest pressure, anorexia or diarrhea. Diet:  Adequate intake. No nausea. Activity level: Normal.    Pain:  He complains of pain that is mild. He reports pain is unchanged. Pain is well controlled. Review of Systems   Constitutional: Negative for fever. Respiratory: Negative for cough and shortness of breath. Cardiovascular: Negative for chest pain. Gastrointestinal: Negative for anorexia, diarrhea and nausea. Neurological: Negative for weakness. Objective         Vitals Last 24 Hours:  TEMPERATURE:  Temp  Av.5 °F (36.9 °C)  Min: 98.2 °F (36.8 °C)  Max: 98.9 °F (37.2 °C)  RESPIRATIONS RANGE: Resp  Av  Min: 18  Max: 22  PULSE OXIMETRY RANGE: SpO2  Av.5 %  Min: 94 %  Max: 98 %  PULSE RANGE: Pulse  Av.6  Min: 93  Max: 123  BLOOD PRESSURE RANGE: Systolic (11OQR), JMD:529 , Min:102 , WUA:061   ; Diastolic (38QMA), VQH:46, Min:60, Max:80    I/O (24Hr): Intake/Output Summary (Last 24 hours) at 10/24/2021 0929  Last data filed at 10/23/2021 2044  Gross per 24 hour   Intake 1972.42 ml   Output 1050 ml   Net 922.42 ml     Objective:  General Appearance: In no acute distress. Vital signs: (most recent): Blood pressure (!) 142/74, pulse 94, temperature 98.6 °F (37 °C), temperature source Oral, resp. rate 18, height 5' 4.5\" (1.638 m), weight 160 lb (72.6 kg), SpO2 94 %. Vital signs are normal.  No fever. Output: Producing urine and producing stool. Lungs:  Normal effort and normal respiratory rate.   Breath sounds clear to auscultation. Heart: Normal rate. Regular rhythm. S1 normal.    Abdomen: Abdomen is soft. Bowel sounds are normal.   There is no abdominal tenderness. Extremities: Normal range of motion. Neurological: Patient is alert. Skin:  Warm. Labs/Imaging/Diagnostics     Labs:  CBC:  Recent Labs     10/22/21  1428 10/22/21  1821 10/24/21  0626   WBC 12.6* 9.9 5.9   RBC 4.02* 4.55* 2.65*   HGB 12.5* 13.9 8.1*   HCT 37.7* 42.9 25.0*   MCV 93.8 94.3 94.3   RDW 14.6 14.6 14.6    393 264     CHEMISTRIES:  Recent Labs     10/22/21  1428   *   K 3.8   CL 92*   CO2 19*   BUN 15   CREATININE 0.5*   GLUCOSE 109*     PT/INR:  Recent Labs     10/22/21  1415 10/24/21  0626   PROTIME 39.0* 45.5*   INR 2.99 3.48     APTT:  Recent Labs     10/23/21  0450 10/23/21  1100 10/24/21  0626   APTT 86.3* 71.1* 96.6*     LIVER PROFILE:  Recent Labs     10/22/21  1428   AST 28   ALT 16   BILITOT 0.5   ALKPHOS 79       Imaging Last 24 Hours:  XR LUMBAR SPINE (2-3 VIEWS)    Result Date: 10/22/2021  EXAMINATION: THREE XRAY VIEWS OF THE LUMBAR SPINE 10/22/2021 2:12 pm COMPARISON: None. HISTORY: ORDERING SYSTEM PROVIDED HISTORY: fall, trauma TECHNOLOGIST PROVIDED HISTORY: Reason for exam:->fall, trauma Reason for Exam: fall trauma, xtable due to hip fracture Acuity: Acute Type of Exam: Initial FINDINGS: Five non-rib-bearing lumbar type vertebral bodies are identified. Multilevel degenerative changes of spine, including mild-to-moderate disc height loss at L3-L4 and L4-L5. There is an age-indeterminate compression deformity of the L4 vertebral body. Mild anterior wedging of the T12 and L1 vertebral bodies could also represent compression deformities, although wedging at these levels could represent normal variants. Age-indeterminate anterior compression deformity of the L4 vertebral body.  Mild anterior wedging of the T12 and L1 vertebral bodies could represent additional age-indeterminate compression deformities, although wedging at these levels could represent normal variants. Correlate with clinical symptoms. Multilevel degenerative changes of the spine. XR CHEST PORTABLE    Result Date: 10/22/2021  EXAMINATION: ONE XRAY VIEW OF THE CHEST 10/22/2021 3:27 pm COMPARISON: 05/05/2018 chest radiograph HISTORY: ORDERING SYSTEM PROVIDED HISTORY: hip fracture TECHNOLOGIST PROVIDED HISTORY: Reason for exam:->hip fracture Reason for Exam: hip fracture Acuity: Acute Type of Exam: Initial FINDINGS: Prior median sternotomy and aortic valve replacement. The cardiomediastinal silhouette is normal in size and contour. Atherosclerotic calcifications of the aortic arch. No focal airspace disease. No pleural effusion or pneumothorax. Bilateral remote rib fractures. Right shoulder hemiarthroplasty. No evidence of acute cardiopulmonary disease. XR HIP 2-3 VW W PELVIS RIGHT    Result Date: 10/22/2021  EXAMINATION: ONE XRAY VIEW OF THE PELVIS AND TWO XRAY VIEWS RIGHT HIP 10/22/2021 2:11 pm COMPARISON: None. HISTORY: ORDERING SYSTEM PROVIDED HISTORY: fall, pain, trauma TECHNOLOGIST PROVIDED HISTORY: Reason for exam:->fall, pain, trauma Reason for Exam: right hip pain Acuity: Acute Type of Exam: Initial Mechanism of Injury: fall Relevant Medical/Surgical History: na FINDINGS: Three views of the pelvis and right hip were reviewed. There is an acute transcervical femoral neck fracture of the right femur with displacement at the fracture site. No additional fractures are identified. Multilevel degenerative changes of the imaged lower lumbar spine. Atherosclerotic vascular calcifications noted. Surgical clips project over the region of the left hip. 1. Acute and displaced transcervical right femoral neck fracture.      Assessment//Plan           Hospital Problems         Last Modified POA    Femoral neck stress fracture, initial encounter 10/22/2021 Yes    Closed transcervical fracture of right femur ([address] 10/23/2021 Yes Assessment & Plan  #  Right femoral neck fracture secondary to fall  Cardiology consulted, patient cleared for surgery. Orthopedics following, plan for surgery on Monday. Patient was heparin drip due to history of mechanical valve replacement.     #  Mechanical aortic valve replacement  On Coumadin at home, Coumadin on hold, INR 3.48 today, hold heparin drip for now. Continue daily PT/INR. Restarting heparin drip once INR falls below 2.5. #  Anemia  Hemoglobin 8.1 today from 13.9 yesterday. Patient reported no bloody stool, hematuria, hematemesis, or bloody sputum. Hold heparin drip for now. Repeat CBC this afternoon hgb 8.4. Received Vit K 10 mg and one dose\s of Kcentra per ortho. Continue serial H/H. PT/INR in am.    #  Hyponatremia  Secondary to hypovolemic, continue IV fluid, sodium 134 today.     #  COPD  DuoNeb as needed for shortness of breath.     #  Depression/bipolar  Patient also reported hallucination and paranoid thoughts. He also endorsed history of psych admission. We will give 1 dose of Haldol for hallucination. Continue home medication Effexor.   Psych consult, appreciate help.     Patient seen and examined independently by me, attending physician is available for consultation    Electronically signed by VANGIE Ortez CNP on 10/24/21 at 9:29 AM EDT

## 2021-10-24 NOTE — CONSULTS
Initial Psychiatric Consult    Macario Romero  [id number]  10/22/2021  10/24/21    ID: Patient is a 60 yrs y.o. male    CC: \"My hip is broken. \"    HPI: Patient is a 30-year-old male past medical history of COPD, hypertension, mechanical aortic valve replacement who presents to the ED with complaints of right hip pain. Patient states he may have fallen this Sunday. He states he was taking ibuprofen. He further states that his sister brought him here because of the pain. In the ER patient was found to have a right femoral neck fracture. Lab evaluation showed hyponatremia. Patient was admitted for further evaluation. Psychiatry was consulted for history of bipolar disorder    During today's interview, the patient was alert and oriented x 3. He admits to passive SI and alludes to a plan, but will not specify what the plan is. He denies HI. His speech is disjointed, non-linear and illogical. Answers do not correspond to questions asked. Affect anxious and constricted. Patient kept attempting to bring the interview back to his chief complaint of the broken hip. When this interviewer continued to ask questions, the patient terminated the interview. Patient's bedside RN Bert Michaud states patient has been very paranoid and refuses care/lab draws from everyone except her and one night shift RN. She reports patient having auditory hallucinations.     Past Psychiatric History: depression, inpatient psychiatric hospitalization June 2020 for depression    Family Psychiatric History:   Family History   Problem Relation Age of Onset    Cancer Mother         Lymphoma    Diabetes Mother     High Blood Pressure Mother     High Cholesterol Mother     Obesity Mother     Vision Loss Mother         Wore Glasses    Heart Disease Father         \"Heart Failure\"   Ania Jarquin COPD Father     Asthma Sister     High Blood Pressure Sister     High Cholesterol Sister     Other Sister         pre diabetic    COPD Sister     Diabetes Sister         \"Pre Diabetes\"    No Known Problems Son         Allergies:   Allergies   Allergen Reactions    Ciprofloxacin Hcl Hives and Swelling    Levaquin [Levofloxacin] Hives and Swelling    Other Nausea And Vomiting     \"Allergic To Tylox\"    Ceftin [Cefuroxime]         OBJECTIVE  Vital Signs:  Vitals:    10/24/21 0822   BP:    Pulse: 94   Resp:    Temp:    SpO2:        Labs:  Recent Results (from the past 48 hour(s))   APTT    Collection Time: 10/22/21  6:21 PM   Result Value Ref Range    aPTT 35.2 25.1 - 37.1 SECONDS   CBC    Collection Time: 10/22/21  6:21 PM   Result Value Ref Range    WBC 9.9 4.0 - 10.5 K/CU MM    RBC 4.55 (L) 4.6 - 6.2 M/CU MM    Hemoglobin 13.9 13.5 - 18.0 GM/DL    Hematocrit 42.9 42 - 52 %    MCV 94.3 78 - 100 FL    MCH 30.5 27 - 31 PG    MCHC 32.4 32.0 - 36.0 %    RDW 14.6 11.7 - 14.9 %    Platelets 505 093 - 259 K/CU MM    MPV 8.8 7.5 - 11.1 FL   APTT    Collection Time: 10/22/21 11:02 PM   Result Value Ref Range    aPTT 37.8 (H) 25.1 - 37.1 SECONDS   APTT    Collection Time: 10/23/21  4:50 AM   Result Value Ref Range    aPTT 86.3 (H) 25.1 - 37.1 SECONDS   APTT    Collection Time: 10/23/21 11:00 AM   Result Value Ref Range    aPTT 71.1 (H) 25.1 - 37.1 SECONDS   EKG 12 Lead    Collection Time: 10/23/21 11:50 PM   Result Value Ref Range    Ventricular Rate 112 BPM    Atrial Rate 112 BPM    P-R Interval 142 ms    QRS Duration 94 ms    Q-T Interval 334 ms    QTc Calculation (Bazett) 455 ms    P Axis 45 degrees    R Axis 57 degrees    T Axis 67 degrees    Diagnosis       Sinus tachycardia  Otherwise normal ECG  When compared with ECG of 05-SEP-2021 14:34,  No significant change was found     CBC    Collection Time: 10/24/21  6:26 AM   Result Value Ref Range    WBC 5.9 4.0 - 10.5 K/CU MM    RBC 2.65 (L) 4.6 - 6.2 M/CU MM    Hemoglobin 8.1 (L) 13.5 - 18.0 GM/DL    Hematocrit 25.0 (L) 42 - 52 %    MCV 94.3 78 - 100 FL    MCH 30.6 27 - 31 PG    MCHC 32.4 32.0 - 36.0 %    RDW 14.6 11.7 - 14.9 %    Platelets 319 738 - 519 K/CU MM    MPV 8.8 7.5 - 11.1 FL   Protime-INR    Collection Time: 10/24/21  6:26 AM   Result Value Ref Range    Protime 45.5 (H) 11.7 - 14.5 SECONDS    INR 3.48 INDEX   APTT    Collection Time: 10/24/21  6:26 AM   Result Value Ref Range    aPTT 96.6 (H) 25.1 - 37.1 SECONDS       Review of Systems:  Reports of no current cardiovascular, respiratory, gastrointestinal, genitourinary, integumentary, neurological, muscuoskeletal, or immunological symptoms today. PSYCHIATRIC: See HPI above. PSYCHIATRIC EXAMINATION / MENTAL STATUS EXAM    CONSTITUTIONAL:    Vitals:   Vitals:    10/24/21 0822   BP:    Pulse: 94   Resp:    Temp:    SpO2:       General appearance: [x] appears age, []  appears older than stated age,               [x]  adequately dressed and groomed, [] disheveled,               [x]  in no acute distress, [] appears mildly distressed, [] other           MUSCULOSKELETAL:   Gait:   [] normal, [] antalgic, [] unsteady, [x] gait not evaluated   Station:             [] erect, [x] sitting, [] recumbent, [] other        Strength/tone:  [x] muscle strength and tone appear consistent with age and condition     [] atrophy      [] abnormal movements  PSYCHIATRIC:    Appearance: Appears stated age. Alert and oriented to person, place, time & situation. No acute distress. Adequate grooming and hygiene. Poor eye contact. No prominent physical abnormalities. Attitude: Manner is uncooperative but pleasant  Motor: No psychomotor agitation, retardation or abnormal movements noted  Speech: Clearly articulated; normal rate, volume, tone & amount. Language: intact understanding and production  Mood: depressed  Affect: depressed, anxious, decreased range, non-labile, congruent with mood and content of speech  Thought Production: Spontaneous. Thought Form: Coherent, non-linear, illogical & not goal-directed. No tangentiality or circumstantiality.  No flight of ideas; loosening of associations present. Thought Content/Perceptions: passive SI, no HI, Auditory hallucinations present, no VH, paranoid delusions  Insight: poor  Judgment: poor  Memory: Immediate, recent, and remote do notappear intact, though not formally tested. Attention: maintained throughout interview  Fund of knowledge: Average  Gait/Balance: not assessed    Impression:   Severe episode of recurrent major depressive disorder, with psychotic features  Passive SI  Paranoia  Anxiety    Problem List:   Closed transcervical fracture of right femur ([address]    Plan:  1. Initiate suicide precautions and sitter  2. Once medically stable, admit to [facility]  3. [facility] has been [facility] with [facility]  4. Patient will need to be transferred to the [facility] on an involuntary basis as he represents a danger to himself  5. Attending physician to complete pink slip  6. Prior to transfer, RN to RN report will need to be called to [phone]  7. For episodes of agitation, give Benadryl 50 mg AND Haldol 5 mg AND Ativan 2 mg all IM q 6 hours PRN  8. Will consult Hospitalist to evaluate and treat medical conditions  9. Will adjust psychotropic medications to target symptoms  10. Occupational Therapy, Physical Therapy, Group Psychotherapy as tolerated  11. Anticipated length of stay to be determined  12. I certify that inpatient psychiatric hospital admission is medically necessary for treatment, which can be expected to improve the patient's condition, and/or for diagnostic study.      Electronically signed by VANGIE Hernandez CNP on 10/24/2021 at 3:49 PM

## 2021-10-24 NOTE — PROGRESS NOTES
Breathing treatment changed to PRN per his request. He uses them at home as needed. An his HR is currently running high. Attending Attestation (For Attendings USE Only)...

## 2021-10-24 NOTE — PROGRESS NOTES
Pt frequently is talking to self, claiming to be, \"talking to someone that only they know. \" Very anxious of noises, and the location of the nurse.

## 2021-10-25 ENCOUNTER — APPOINTMENT (OUTPATIENT)
Dept: GENERAL RADIOLOGY | Age: 66
DRG: 522 | End: 2021-10-25
Payer: OTHER GOVERNMENT

## 2021-10-25 ENCOUNTER — APPOINTMENT (OUTPATIENT)
Dept: CT IMAGING | Age: 66
DRG: 522 | End: 2021-10-25
Payer: OTHER GOVERNMENT

## 2021-10-25 ENCOUNTER — TELEPHONE (OUTPATIENT)
Dept: FAMILY MEDICINE CLINIC | Age: 66
End: 2021-10-25

## 2021-10-25 VITALS
SYSTOLIC BLOOD PRESSURE: 120 MMHG | RESPIRATION RATE: 3 BRPM | OXYGEN SATURATION: 83 % | TEMPERATURE: 97.7 F | DIASTOLIC BLOOD PRESSURE: 109 MMHG

## 2021-10-25 PROBLEM — Z95.2 H/O MECHANICAL AORTIC VALVE REPLACEMENT: Status: ACTIVE | Noted: 2021-10-25

## 2021-10-25 PROBLEM — M48.46XD: Status: ACTIVE | Noted: 2021-10-25

## 2021-10-25 PROBLEM — S72.001A CLOSED RIGHT HIP FRACTURE, INITIAL ENCOUNTER (HCC): Status: ACTIVE | Noted: 2021-10-25

## 2021-10-25 LAB
ALBUMIN SERPL-MCNC: 4 GM/DL (ref 3.4–5)
ALP BLD-CCNC: 60 IU/L (ref 40–128)
ALT SERPL-CCNC: 17 U/L (ref 10–40)
ANION GAP SERPL CALCULATED.3IONS-SCNC: 11 MMOL/L (ref 4–16)
ANION GAP SERPL CALCULATED.3IONS-SCNC: 12 MMOL/L (ref 4–16)
AST SERPL-CCNC: 28 IU/L (ref 15–37)
BASOPHILS ABSOLUTE: 0 K/CU MM
BASOPHILS RELATIVE PERCENT: 0.2 % (ref 0–1)
BILIRUB SERPL-MCNC: 0.4 MG/DL (ref 0–1)
BUN BLDV-MCNC: 14 MG/DL (ref 6–23)
BUN BLDV-MCNC: 14 MG/DL (ref 6–23)
CALCIUM SERPL-MCNC: 9.2 MG/DL (ref 8.3–10.6)
CALCIUM SERPL-MCNC: 9.5 MG/DL (ref 8.3–10.6)
CHLORIDE BLD-SCNC: 100 MMOL/L (ref 99–110)
CHLORIDE BLD-SCNC: 99 MMOL/L (ref 99–110)
CO2: 24 MMOL/L (ref 21–32)
CO2: 25 MMOL/L (ref 21–32)
CREAT SERPL-MCNC: 0.6 MG/DL (ref 0.9–1.3)
CREAT SERPL-MCNC: 0.6 MG/DL (ref 0.9–1.3)
DIFFERENTIAL TYPE: ABNORMAL
EOSINOPHILS ABSOLUTE: 0 K/CU MM
EOSINOPHILS RELATIVE PERCENT: 0 % (ref 0–3)
GFR AFRICAN AMERICAN: >60 ML/MIN/1.73M2
GFR AFRICAN AMERICAN: >60 ML/MIN/1.73M2
GFR NON-AFRICAN AMERICAN: >60 ML/MIN/1.73M2
GFR NON-AFRICAN AMERICAN: >60 ML/MIN/1.73M2
GLUCOSE BLD-MCNC: 171 MG/DL (ref 70–99)
GLUCOSE BLD-MCNC: 171 MG/DL (ref 70–99)
HCT VFR BLD CALC: 24 % (ref 42–52)
HCT VFR BLD CALC: 24 % (ref 42–52)
HEMOGLOBIN: 7.7 GM/DL (ref 13.5–18)
HEMOGLOBIN: 8 GM/DL (ref 13.5–18)
IMMATURE NEUTROPHIL %: 0.7 % (ref 0–0.43)
INR BLD: 0.96 INDEX
INR BLD: 1.07 INDEX
LYMPHOCYTES ABSOLUTE: 0.2 K/CU MM
LYMPHOCYTES RELATIVE PERCENT: 1.7 % (ref 24–44)
MAGNESIUM: 1.9 MG/DL (ref 1.8–2.4)
MCH RBC QN AUTO: 31.3 PG (ref 27–31)
MCHC RBC AUTO-ENTMCNC: 33.3 % (ref 32–36)
MCV RBC AUTO: 93.8 FL (ref 78–100)
MONOCYTES ABSOLUTE: 0.3 K/CU MM
MONOCYTES RELATIVE PERCENT: 2 % (ref 0–4)
NUCLEATED RBC %: 0 %
PDW BLD-RTO: 14.2 % (ref 11.7–14.9)
PLATELET # BLD: 364 K/CU MM (ref 140–440)
PMV BLD AUTO: 8.9 FL (ref 7.5–11.1)
POTASSIUM SERPL-SCNC: 4.8 MMOL/L (ref 3.5–5.1)
POTASSIUM SERPL-SCNC: 4.8 MMOL/L (ref 3.5–5.1)
PRO-BNP: 445.2 PG/ML
PROTHROMBIN TIME: 12.4 SECONDS (ref 11.7–14.5)
PROTHROMBIN TIME: 13.8 SECONDS (ref 11.7–14.5)
RBC # BLD: 2.56 M/CU MM (ref 4.6–6.2)
SEGMENTED NEUTROPHILS ABSOLUTE COUNT: 12 K/CU MM
SEGMENTED NEUTROPHILS RELATIVE PERCENT: 95.4 % (ref 36–66)
SODIUM BLD-SCNC: 135 MMOL/L (ref 135–145)
SODIUM BLD-SCNC: 136 MMOL/L (ref 135–145)
TOTAL IMMATURE NEUTOROPHIL: 0.09 K/CU MM
TOTAL NUCLEATED RBC: 0 K/CU MM
TOTAL PROTEIN: 6.3 GM/DL (ref 6.4–8.2)
WBC # BLD: 13 K/CU MM (ref 4–10.5)

## 2021-10-25 PROCEDURE — 80053 COMPREHEN METABOLIC PANEL: CPT

## 2021-10-25 PROCEDURE — 94761 N-INVAS EAR/PLS OXIMETRY MLT: CPT

## 2021-10-25 PROCEDURE — 85018 HEMOGLOBIN: CPT

## 2021-10-25 PROCEDURE — 3700000000 HC ANESTHESIA ATTENDED CARE: Performed by: ORTHOPAEDIC SURGERY

## 2021-10-25 PROCEDURE — 6360000002 HC RX W HCPCS: Performed by: NURSE ANESTHETIST, CERTIFIED REGISTERED

## 2021-10-25 PROCEDURE — 85027 COMPLETE CBC AUTOMATED: CPT

## 2021-10-25 PROCEDURE — 6370000000 HC RX 637 (ALT 250 FOR IP): Performed by: ORTHOPAEDIC SURGERY

## 2021-10-25 PROCEDURE — C1776 JOINT DEVICE (IMPLANTABLE): HCPCS | Performed by: ORTHOPAEDIC SURGERY

## 2021-10-25 PROCEDURE — 3600000015 HC SURGERY LEVEL 5 ADDTL 15MIN: Performed by: ORTHOPAEDIC SURGERY

## 2021-10-25 PROCEDURE — 6360000002 HC RX W HCPCS: Performed by: ORTHOPAEDIC SURGERY

## 2021-10-25 PROCEDURE — 36415 COLL VENOUS BLD VENIPUNCTURE: CPT

## 2021-10-25 PROCEDURE — 2580000003 HC RX 258: Performed by: ORTHOPAEDIC SURGERY

## 2021-10-25 PROCEDURE — 70450 CT HEAD/BRAIN W/O DYE: CPT

## 2021-10-25 PROCEDURE — 2500000003 HC RX 250 WO HCPCS: Performed by: NURSE ANESTHETIST, CERTIFIED REGISTERED

## 2021-10-25 PROCEDURE — 3700000001 HC ADD 15 MINUTES (ANESTHESIA): Performed by: ORTHOPAEDIC SURGERY

## 2021-10-25 PROCEDURE — 93306 TTE W/DOPPLER COMPLETE: CPT

## 2021-10-25 PROCEDURE — 83735 ASSAY OF MAGNESIUM: CPT

## 2021-10-25 PROCEDURE — 6360000002 HC RX W HCPCS: Performed by: ANESTHESIOLOGY

## 2021-10-25 PROCEDURE — 0SR903A REPLACEMENT OF RIGHT HIP JOINT WITH CERAMIC SYNTHETIC SUBSTITUTE, UNCEMENTED, OPEN APPROACH: ICD-10-PCS | Performed by: ORTHOPAEDIC SURGERY

## 2021-10-25 PROCEDURE — 27130 TOTAL HIP ARTHROPLASTY: CPT | Performed by: ORTHOPAEDIC SURGERY

## 2021-10-25 PROCEDURE — 71045 X-RAY EXAM CHEST 1 VIEW: CPT

## 2021-10-25 PROCEDURE — 85610 PROTHROMBIN TIME: CPT

## 2021-10-25 PROCEDURE — 2000000000 HC ICU R&B

## 2021-10-25 PROCEDURE — 86901 BLOOD TYPING SEROLOGIC RH(D): CPT

## 2021-10-25 PROCEDURE — 73502 X-RAY EXAM HIP UNI 2-3 VIEWS: CPT

## 2021-10-25 PROCEDURE — 6360000002 HC RX W HCPCS: Performed by: INTERNAL MEDICINE

## 2021-10-25 PROCEDURE — P9045 ALBUMIN (HUMAN), 5%, 250 ML: HCPCS | Performed by: NURSE ANESTHETIST, CERTIFIED REGISTERED

## 2021-10-25 PROCEDURE — 85014 HEMATOCRIT: CPT

## 2021-10-25 PROCEDURE — 86900 BLOOD TYPING SEROLOGIC ABO: CPT

## 2021-10-25 PROCEDURE — C1713 ANCHOR/SCREW BN/BN,TIS/BN: HCPCS | Performed by: ORTHOPAEDIC SURGERY

## 2021-10-25 PROCEDURE — 2500000003 HC RX 250 WO HCPCS: Performed by: ORTHOPAEDIC SURGERY

## 2021-10-25 PROCEDURE — 83880 ASSAY OF NATRIURETIC PEPTIDE: CPT

## 2021-10-25 PROCEDURE — 7100000000 HC PACU RECOVERY - FIRST 15 MIN: Performed by: ORTHOPAEDIC SURGERY

## 2021-10-25 PROCEDURE — 80048 BASIC METABOLIC PNL TOTAL CA: CPT

## 2021-10-25 PROCEDURE — 85025 COMPLETE CBC W/AUTO DIFF WBC: CPT

## 2021-10-25 PROCEDURE — 86922 COMPATIBILITY TEST ANTIGLOB: CPT

## 2021-10-25 PROCEDURE — 3600000005 HC SURGERY LEVEL 5 BASE: Performed by: ORTHOPAEDIC SURGERY

## 2021-10-25 PROCEDURE — 6370000000 HC RX 637 (ALT 250 FOR IP): Performed by: INTERNAL MEDICINE

## 2021-10-25 PROCEDURE — 2709999900 HC NON-CHARGEABLE SUPPLY: Performed by: ORTHOPAEDIC SURGERY

## 2021-10-25 PROCEDURE — 86850 RBC ANTIBODY SCREEN: CPT

## 2021-10-25 PROCEDURE — 7100000001 HC PACU RECOVERY - ADDTL 15 MIN: Performed by: ORTHOPAEDIC SURGERY

## 2021-10-25 DEVICE — HEAD FEM DIA36MM HIP BIOLOX DELT OPT FOR G7 ACET SYS: Type: IMPLANTABLE DEVICE | Site: HIP | Status: FUNCTIONAL

## 2021-10-25 DEVICE — BONE SCREW 6.5X30 SELF-TAP: Type: IMPLANTABLE DEVICE | Site: HIP | Status: FUNCTIONAL

## 2021-10-25 DEVICE — IMPLANTABLE DEVICE: Type: IMPLANTABLE DEVICE | Site: HIP | Status: FUNCTIONAL

## 2021-10-25 DEVICE — SLEEVE FEM -6MM OFFSET HIP TYP 1 TAPR FOR CERAMIC BIOLOX: Type: IMPLANTABLE DEVICE | Site: HIP | Status: FUNCTIONAL

## 2021-10-25 DEVICE — SHELL ACET SZ F DIA54MM 4 H OSSEOTI LIMIT H 2 MOBILITY G7: Type: IMPLANTABLE DEVICE | Site: HIP | Status: FUNCTIONAL

## 2021-10-25 RX ORDER — ROCURONIUM BROMIDE 10 MG/ML
INJECTION, SOLUTION INTRAVENOUS PRN
Status: DISCONTINUED | OUTPATIENT
Start: 2021-10-25 | End: 2021-10-25 | Stop reason: SDUPTHER

## 2021-10-25 RX ORDER — EPHEDRINE SULFATE 50 MG/ML
INJECTION INTRAVENOUS PRN
Status: DISCONTINUED | OUTPATIENT
Start: 2021-10-25 | End: 2021-10-25 | Stop reason: SDUPTHER

## 2021-10-25 RX ORDER — ONDANSETRON 2 MG/ML
4 INJECTION INTRAMUSCULAR; INTRAVENOUS
Status: DISCONTINUED | OUTPATIENT
Start: 2021-10-25 | End: 2021-10-25 | Stop reason: HOSPADM

## 2021-10-25 RX ORDER — DEXAMETHASONE SODIUM PHOSPHATE 4 MG/ML
INJECTION, SOLUTION INTRA-ARTICULAR; INTRALESIONAL; INTRAMUSCULAR; INTRAVENOUS; SOFT TISSUE PRN
Status: DISCONTINUED | OUTPATIENT
Start: 2021-10-25 | End: 2021-10-25 | Stop reason: SDUPTHER

## 2021-10-25 RX ORDER — FENTANYL CITRATE 50 UG/ML
INJECTION, SOLUTION INTRAMUSCULAR; INTRAVENOUS PRN
Status: DISCONTINUED | OUTPATIENT
Start: 2021-10-25 | End: 2021-10-25 | Stop reason: SDUPTHER

## 2021-10-25 RX ORDER — PREGABALIN 75 MG/1
75 CAPSULE ORAL ONCE
Status: DISCONTINUED | OUTPATIENT
Start: 2021-10-25 | End: 2021-10-25 | Stop reason: HOSPADM

## 2021-10-25 RX ORDER — SODIUM CHLORIDE 9 MG/ML
25 INJECTION, SOLUTION INTRAVENOUS PRN
Status: DISCONTINUED | OUTPATIENT
Start: 2021-10-25 | End: 2021-11-03 | Stop reason: HOSPADM

## 2021-10-25 RX ORDER — SODIUM CHLORIDE 9 MG/ML
25 INJECTION, SOLUTION INTRAVENOUS PRN
Status: DISCONTINUED | OUTPATIENT
Start: 2021-10-25 | End: 2021-10-25 | Stop reason: HOSPADM

## 2021-10-25 RX ORDER — ALBUTEROL SULFATE 90 UG/1
2 AEROSOL, METERED RESPIRATORY (INHALATION) EVERY 6 HOURS PRN
Status: DISCONTINUED | OUTPATIENT
Start: 2021-10-25 | End: 2021-11-03 | Stop reason: HOSPADM

## 2021-10-25 RX ORDER — FENTANYL CITRATE 50 UG/ML
25 INJECTION, SOLUTION INTRAMUSCULAR; INTRAVENOUS EVERY 5 MIN PRN
Status: DISCONTINUED | OUTPATIENT
Start: 2021-10-25 | End: 2021-10-25 | Stop reason: HOSPADM

## 2021-10-25 RX ORDER — CLINDAMYCIN PHOSPHATE 900 MG/50ML
900 INJECTION INTRAVENOUS EVERY 8 HOURS
Status: COMPLETED | OUTPATIENT
Start: 2021-10-25 | End: 2021-10-26

## 2021-10-25 RX ORDER — FUROSEMIDE 10 MG/ML
20 INJECTION INTRAMUSCULAR; INTRAVENOUS ONCE
Status: COMPLETED | OUTPATIENT
Start: 2021-10-25 | End: 2021-10-25

## 2021-10-25 RX ORDER — TRANEXAMIC ACID 10 MG/ML
1000 INJECTION, SOLUTION INTRAVENOUS
Status: ACTIVE | OUTPATIENT
Start: 2021-10-25 | End: 2021-10-25

## 2021-10-25 RX ORDER — OXYCODONE HYDROCHLORIDE 5 MG/1
5 TABLET ORAL EVERY 4 HOURS PRN
Status: DISCONTINUED | OUTPATIENT
Start: 2021-10-25 | End: 2021-11-03 | Stop reason: HOSPADM

## 2021-10-25 RX ORDER — ALBUMIN, HUMAN INJ 5% 5 %
SOLUTION INTRAVENOUS PRN
Status: DISCONTINUED | OUTPATIENT
Start: 2021-10-25 | End: 2021-10-25 | Stop reason: SDUPTHER

## 2021-10-25 RX ORDER — PROPOFOL 10 MG/ML
INJECTION, EMULSION INTRAVENOUS PRN
Status: DISCONTINUED | OUTPATIENT
Start: 2021-10-25 | End: 2021-10-25 | Stop reason: SDUPTHER

## 2021-10-25 RX ORDER — CLINDAMYCIN PHOSPHATE 900 MG/50ML
900 INJECTION INTRAVENOUS EVERY 8 HOURS
Status: DISCONTINUED | OUTPATIENT
Start: 2021-10-25 | End: 2021-10-25 | Stop reason: SDUPTHER

## 2021-10-25 RX ORDER — HYDROMORPHONE HCL 110MG/55ML
0.25 PATIENT CONTROLLED ANALGESIA SYRINGE INTRAVENOUS EVERY 5 MIN PRN
Status: DISCONTINUED | OUTPATIENT
Start: 2021-10-25 | End: 2021-10-25 | Stop reason: HOSPADM

## 2021-10-25 RX ORDER — HYDRALAZINE HYDROCHLORIDE 20 MG/ML
5 INJECTION INTRAMUSCULAR; INTRAVENOUS EVERY 10 MIN PRN
Status: DISCONTINUED | OUTPATIENT
Start: 2021-10-25 | End: 2021-10-25 | Stop reason: HOSPADM

## 2021-10-25 RX ORDER — ONDANSETRON 2 MG/ML
INJECTION INTRAMUSCULAR; INTRAVENOUS PRN
Status: DISCONTINUED | OUTPATIENT
Start: 2021-10-25 | End: 2021-10-25 | Stop reason: SDUPTHER

## 2021-10-25 RX ORDER — SODIUM CHLORIDE 0.9 % (FLUSH) 0.9 %
10 SYRINGE (ML) INJECTION PRN
Status: DISCONTINUED | OUTPATIENT
Start: 2021-10-25 | End: 2021-10-25 | Stop reason: HOSPADM

## 2021-10-25 RX ORDER — SODIUM CHLORIDE, SODIUM LACTATE, POTASSIUM CHLORIDE, CALCIUM CHLORIDE 600; 310; 30; 20 MG/100ML; MG/100ML; MG/100ML; MG/100ML
INJECTION, SOLUTION INTRAVENOUS CONTINUOUS
Status: DISCONTINUED | OUTPATIENT
Start: 2021-10-25 | End: 2021-10-25

## 2021-10-25 RX ORDER — OXYCODONE HYDROCHLORIDE 10 MG/1
10 TABLET ORAL EVERY 4 HOURS PRN
Status: DISCONTINUED | OUTPATIENT
Start: 2021-10-25 | End: 2021-11-03 | Stop reason: HOSPADM

## 2021-10-25 RX ORDER — LABETALOL HYDROCHLORIDE 5 MG/ML
5 INJECTION, SOLUTION INTRAVENOUS EVERY 10 MIN PRN
Status: DISCONTINUED | OUTPATIENT
Start: 2021-10-25 | End: 2021-10-25 | Stop reason: HOSPADM

## 2021-10-25 RX ORDER — SODIUM CHLORIDE 0.9 % (FLUSH) 0.9 %
10 SYRINGE (ML) INJECTION EVERY 12 HOURS SCHEDULED
Status: DISCONTINUED | OUTPATIENT
Start: 2021-10-25 | End: 2021-10-25 | Stop reason: HOSPADM

## 2021-10-25 RX ORDER — SODIUM CHLORIDE 0.9 % (FLUSH) 0.9 %
10 SYRINGE (ML) INJECTION EVERY 12 HOURS SCHEDULED
Status: DISCONTINUED | OUTPATIENT
Start: 2021-10-25 | End: 2021-11-03 | Stop reason: HOSPADM

## 2021-10-25 RX ORDER — SODIUM CHLORIDE, SODIUM LACTATE, POTASSIUM CHLORIDE, CALCIUM CHLORIDE 600; 310; 30; 20 MG/100ML; MG/100ML; MG/100ML; MG/100ML
INJECTION, SOLUTION INTRAVENOUS CONTINUOUS
Status: DISCONTINUED | OUTPATIENT
Start: 2021-10-25 | End: 2021-10-27

## 2021-10-25 RX ORDER — ACETAMINOPHEN 500 MG
1000 TABLET ORAL ONCE
Status: DISCONTINUED | OUTPATIENT
Start: 2021-10-25 | End: 2021-10-25 | Stop reason: HOSPADM

## 2021-10-25 RX ORDER — MORPHINE SULFATE 2 MG/ML
2 INJECTION, SOLUTION INTRAMUSCULAR; INTRAVENOUS EVERY 5 MIN PRN
Status: DISCONTINUED | OUTPATIENT
Start: 2021-10-25 | End: 2021-10-25 | Stop reason: HOSPADM

## 2021-10-25 RX ORDER — SODIUM CHLORIDE 0.9 % (FLUSH) 0.9 %
10 SYRINGE (ML) INJECTION PRN
Status: DISCONTINUED | OUTPATIENT
Start: 2021-10-25 | End: 2021-11-03 | Stop reason: HOSPADM

## 2021-10-25 RX ORDER — HYDROMORPHONE HCL 110MG/55ML
0.5 PATIENT CONTROLLED ANALGESIA SYRINGE INTRAVENOUS EVERY 5 MIN PRN
Status: COMPLETED | OUTPATIENT
Start: 2021-10-25 | End: 2021-10-25

## 2021-10-25 RX ORDER — KETOROLAC TROMETHAMINE 30 MG/ML
15 INJECTION, SOLUTION INTRAMUSCULAR; INTRAVENOUS EVERY 6 HOURS PRN
Status: DISPENSED | OUTPATIENT
Start: 2021-10-25 | End: 2021-10-30

## 2021-10-25 RX ORDER — LIDOCAINE HYDROCHLORIDE 20 MG/ML
INJECTION, SOLUTION INTRAVENOUS PRN
Status: DISCONTINUED | OUTPATIENT
Start: 2021-10-25 | End: 2021-10-25 | Stop reason: SDUPTHER

## 2021-10-25 RX ORDER — PHENYLEPHRINE HCL IN 0.9% NACL 1 MG/10 ML
SYRINGE (ML) INTRAVENOUS PRN
Status: DISCONTINUED | OUTPATIENT
Start: 2021-10-25 | End: 2021-10-25 | Stop reason: SDUPTHER

## 2021-10-25 RX ORDER — ACETAMINOPHEN 325 MG/1
650 TABLET ORAL EVERY 4 HOURS PRN
Status: DISCONTINUED | OUTPATIENT
Start: 2021-10-25 | End: 2021-11-03 | Stop reason: HOSPADM

## 2021-10-25 RX ADMIN — ONDANSETRON 4 MG: 2 INJECTION INTRAMUSCULAR; INTRAVENOUS at 07:48

## 2021-10-25 RX ADMIN — PROPOFOL 100 MG: 10 INJECTION, EMULSION INTRAVENOUS at 07:48

## 2021-10-25 RX ADMIN — Medication 100 MCG: at 08:21

## 2021-10-25 RX ADMIN — ROCURONIUM BROMIDE 50 MG: 10 INJECTION INTRAVENOUS at 07:48

## 2021-10-25 RX ADMIN — HYDROMORPHONE HYDROCHLORIDE 0.5 MG: 2 INJECTION, SOLUTION INTRAMUSCULAR; INTRAVENOUS; SUBCUTANEOUS at 09:40

## 2021-10-25 RX ADMIN — ALBUMIN (HUMAN) 12.5 G: 12.5 INJECTION, SOLUTION INTRAVENOUS at 08:00

## 2021-10-25 RX ADMIN — HYDROMORPHONE HYDROCHLORIDE 0.5 MG: 2 INJECTION, SOLUTION INTRAMUSCULAR; INTRAVENOUS; SUBCUTANEOUS at 09:51

## 2021-10-25 RX ADMIN — Medication 100 MCG: at 08:16

## 2021-10-25 RX ADMIN — FUROSEMIDE 20 MG: 10 INJECTION, SOLUTION INTRAVENOUS at 13:11

## 2021-10-25 RX ADMIN — CLINDAMYCIN PHOSPHATE 900 MG: 900 INJECTION, SOLUTION INTRAVENOUS at 16:59

## 2021-10-25 RX ADMIN — Medication 100 MCG: at 08:11

## 2021-10-25 RX ADMIN — MONTELUKAST 10 MG: 10 TABLET, FILM COATED ORAL at 20:04

## 2021-10-25 RX ADMIN — ATORVASTATIN CALCIUM 40 MG: 40 TABLET, FILM COATED ORAL at 11:28

## 2021-10-25 RX ADMIN — CLINDAMYCIN PHOSPHATE 900 MG: 900 INJECTION, SOLUTION INTRAVENOUS at 20:07

## 2021-10-25 RX ADMIN — DEXAMETHASONE SODIUM PHOSPHATE 8 MG: 4 INJECTION, SOLUTION INTRAMUSCULAR; INTRAVENOUS at 07:48

## 2021-10-25 RX ADMIN — TRAZODONE HYDROCHLORIDE 200 MG: 50 TABLET ORAL at 20:04

## 2021-10-25 RX ADMIN — ASPIRIN 325 MG: 325 TABLET, COATED ORAL at 20:04

## 2021-10-25 RX ADMIN — Medication 500 MG: at 11:28

## 2021-10-25 RX ADMIN — VENLAFAXINE HYDROCHLORIDE 150 MG: 150 CAPSULE, EXTENDED RELEASE ORAL at 11:29

## 2021-10-25 RX ADMIN — ASPIRIN 325 MG: 325 TABLET, COATED ORAL at 11:28

## 2021-10-25 RX ADMIN — HYDROMORPHONE HYDROCHLORIDE 0.5 MG: 2 INJECTION, SOLUTION INTRAMUSCULAR; INTRAVENOUS; SUBCUTANEOUS at 09:57

## 2021-10-25 RX ADMIN — CLINDAMYCIN PHOSPHATE 900 MG: 900 INJECTION, SOLUTION INTRAVENOUS at 08:13

## 2021-10-25 RX ADMIN — SODIUM CHLORIDE, POTASSIUM CHLORIDE, SODIUM LACTATE AND CALCIUM CHLORIDE: 600; 310; 30; 20 INJECTION, SOLUTION INTRAVENOUS at 10:29

## 2021-10-25 RX ADMIN — Medication 200 MCG: at 08:36

## 2021-10-25 RX ADMIN — LIDOCAINE HYDROCHLORIDE 50 MG: 20 INJECTION, SOLUTION INTRAVENOUS at 07:48

## 2021-10-25 RX ADMIN — EPHEDRINE SULFATE 25 MG: 50 INJECTION INTRAVENOUS at 08:46

## 2021-10-25 RX ADMIN — SODIUM CHLORIDE, PRESERVATIVE FREE 10 ML: 5 INJECTION INTRAVENOUS at 20:05

## 2021-10-25 RX ADMIN — OXYCODONE HYDROCHLORIDE 5 MG: 5 TABLET ORAL at 14:00

## 2021-10-25 RX ADMIN — Medication 200 MCG: at 08:29

## 2021-10-25 RX ADMIN — SODIUM CHLORIDE: 9 INJECTION, SOLUTION INTRAVENOUS at 07:40

## 2021-10-25 RX ADMIN — HYDROMORPHONE HYDROCHLORIDE 0.5 MG: 2 INJECTION, SOLUTION INTRAMUSCULAR; INTRAVENOUS; SUBCUTANEOUS at 10:04

## 2021-10-25 RX ADMIN — EPHEDRINE SULFATE 25 MG: 50 INJECTION INTRAVENOUS at 08:41

## 2021-10-25 RX ADMIN — FENTANYL CITRATE 100 MCG: 50 INJECTION, SOLUTION INTRAMUSCULAR; INTRAVENOUS at 07:48

## 2021-10-25 RX ADMIN — ALBUMIN (HUMAN) 12.5 G: 12.5 INJECTION, SOLUTION INTRAVENOUS at 08:40

## 2021-10-25 ASSESSMENT — PULMONARY FUNCTION TESTS
PIF_VALUE: 16
PIF_VALUE: 25
PIF_VALUE: 22
PIF_VALUE: 15
PIF_VALUE: 1
PIF_VALUE: 26
PIF_VALUE: 18
PIF_VALUE: 17
PIF_VALUE: 16
PIF_VALUE: 21
PIF_VALUE: 15
PIF_VALUE: 1
PIF_VALUE: 22
PIF_VALUE: 15
PIF_VALUE: 24
PIF_VALUE: 22
PIF_VALUE: 22
PIF_VALUE: 16
PIF_VALUE: 22
PIF_VALUE: 21
PIF_VALUE: 24
PIF_VALUE: 21
PIF_VALUE: 27
PIF_VALUE: 15
PIF_VALUE: 21
PIF_VALUE: 21
PIF_VALUE: 25
PIF_VALUE: 16
PIF_VALUE: 22
PIF_VALUE: 21
PIF_VALUE: 15
PIF_VALUE: 27
PIF_VALUE: 26
PIF_VALUE: 23
PIF_VALUE: 23
PIF_VALUE: 0
PIF_VALUE: 1
PIF_VALUE: 5
PIF_VALUE: 21
PIF_VALUE: 26
PIF_VALUE: 21
PIF_VALUE: 25
PIF_VALUE: 22
PIF_VALUE: 25
PIF_VALUE: 28
PIF_VALUE: 21
PIF_VALUE: 1
PIF_VALUE: 21
PIF_VALUE: 2
PIF_VALUE: 21
PIF_VALUE: 22
PIF_VALUE: 1
PIF_VALUE: 15
PIF_VALUE: 26
PIF_VALUE: 26
PIF_VALUE: 23
PIF_VALUE: 16
PIF_VALUE: 21
PIF_VALUE: 21
PIF_VALUE: 16
PIF_VALUE: 17
PIF_VALUE: 15
PIF_VALUE: 25
PIF_VALUE: 21
PIF_VALUE: 24
PIF_VALUE: 14
PIF_VALUE: 25
PIF_VALUE: 1
PIF_VALUE: 21
PIF_VALUE: 22
PIF_VALUE: 21
PIF_VALUE: 21
PIF_VALUE: 1
PIF_VALUE: 7
PIF_VALUE: 22
PIF_VALUE: 24
PIF_VALUE: 22
PIF_VALUE: 25
PIF_VALUE: 22
PIF_VALUE: 1
PIF_VALUE: 22
PIF_VALUE: 15
PIF_VALUE: 22
PIF_VALUE: 15
PIF_VALUE: 1
PIF_VALUE: 16
PIF_VALUE: 0
PIF_VALUE: 16
PIF_VALUE: 23
PIF_VALUE: 21
PIF_VALUE: 15
PIF_VALUE: 23
PIF_VALUE: 22
PIF_VALUE: 21
PIF_VALUE: 22
PIF_VALUE: 25
PIF_VALUE: 24
PIF_VALUE: 29
PIF_VALUE: 22

## 2021-10-25 ASSESSMENT — PAIN DESCRIPTION - DESCRIPTORS
DESCRIPTORS: DISCOMFORT
DESCRIPTORS: DISCOMFORT
DESCRIPTORS: ACHING
DESCRIPTORS: DISCOMFORT

## 2021-10-25 ASSESSMENT — PAIN DESCRIPTION - LOCATION
LOCATION: HIP

## 2021-10-25 ASSESSMENT — PAIN DESCRIPTION - PROGRESSION: CLINICAL_PROGRESSION: NOT CHANGED

## 2021-10-25 ASSESSMENT — PAIN DESCRIPTION - PAIN TYPE
TYPE: SURGICAL PAIN
TYPE: SURGICAL PAIN
TYPE: ACUTE PAIN
TYPE: SURGICAL PAIN

## 2021-10-25 ASSESSMENT — PAIN DESCRIPTION - FREQUENCY
FREQUENCY: CONTINUOUS
FREQUENCY: INTERMITTENT
FREQUENCY: CONTINUOUS

## 2021-10-25 ASSESSMENT — PAIN SCALES - GENERAL
PAINLEVEL_OUTOF10: 8
PAINLEVEL_OUTOF10: 8
PAINLEVEL_OUTOF10: 4
PAINLEVEL_OUTOF10: 8
PAINLEVEL_OUTOF10: 7
PAINLEVEL_OUTOF10: 3
PAINLEVEL_OUTOF10: 4

## 2021-10-25 ASSESSMENT — ENCOUNTER SYMPTOMS: SHORTNESS OF BREATH: 1

## 2021-10-25 ASSESSMENT — PAIN DESCRIPTION - ONSET: ONSET: GRADUAL

## 2021-10-25 ASSESSMENT — PAIN DESCRIPTION - ORIENTATION
ORIENTATION: RIGHT

## 2021-10-25 ASSESSMENT — PAIN - FUNCTIONAL ASSESSMENT: PAIN_FUNCTIONAL_ASSESSMENT: PREVENTS OR INTERFERES SOME ACTIVE ACTIVITIES AND ADLS

## 2021-10-25 NOTE — OP NOTE
DATE OF PROCEDURE:  10/25/2021    PREOPERATIVE DIAGNOSIS:  Right hip fracture. POSTOPERATIVE DIAGNOSIS:  Right hip fracture. PROCEDURE:  Right hip total hip arthroplasty using Biomet G7 size 54  acetabular cup with 2 screws with 36 neutral liner with Echo Micro size 14 press-fit  femoral stem and size 36 - 6 ceramic femoral head. SURGEON:  Kim Villegas DO.    ANESTHESIA:  General.    ESTIMATED BLOOD LOSS:  100 mL. FLUIDS:  700 mL crystalloids, 500 mL albumin    INDICATIONS OF PROCEDURE:  The patient is a 61-year-old male who sustained a fracture to his right hip. Give his injury and age I recommended surgical treatment. I explained the risks, benefits and  possible complications of the procedure to the patient and his sister and after  answering all of his questions, his sister consented to have the patient undergo the above  procedure. DESCRIPTION OF PROCEDURE:  The patient was seen and evaluated in the  preoperative holding area where the right lower extremity was signed  in his presence. At this point, care of the patient was turned over  to anesthesia team, who transported him back to the operative suite. General  anesthesia was performed and once adequate anesthesia was obtained, he was  placed in the lateral decubitus position. The right lower extremity was  prepped and draped in usual sterile fashion. Preoperative antibiotics  were administered. At this point, a time-out was performed and all in attendance were in agreement. I marked out the planned surgical incision overlying the lateral  aspect of the right hip to perform a standard anterior lateral  approach to the right hip. I then made an incision with location. I  carried sharp dissection down to the level of the IT band. I then  incised longitudinally through the IT band. I then continued soft  tissue dissection through the gluteus medius layer to perform the standard anterior lateral approach to the right hip.   I then placed Katelyn retractors around the femoral neck and I then continued dissection exposing the anterior hip capsule. I then  incised the hip capsule anteriorly exposing the femoral head. I then  had my assistant perform traction and external rotation as exposed the femoral neck fracture. I then marked out the planned femoral neck obliquely oriented from  the tip to greater trochanter to just proximal to the lesser trochanter. I then used a reciprocating saw to perform the femoral neck cut and  the femoral head was then extracted from the acetabulum. I then turned my attention to preparation of the acetabulum. I placed a Katelyn retractors  around the posterior and anterior acetabulum wall as well as spike  retractor in the superior acetabulum. I then debrided at the labrum  around the acetabulum. I then used a curate to débride out the  Ligamentum from the fovea. I then began reaming first with a size 44 mm reamer  and used this to medialize to the level of the medial wall. I then  began subsequently increasing reaming up to size of 53 reamer which fit snuggly within the acetabulum with excellent bleeding bed of bone. This was then selected  for the final implant component. I then inserted the final Biomet G7  size 54 acetabular shell, which was malleted in place until it was  firmly seated. I then drilled and placed 2 screws for additional fixation. The acetabulum was then irrigated with pulse lavage  using with a sterile normal saline with gentamicin. I then inserted  the neutral 36 mm liner which was maletted in place until it was firmly  seated. The spike retractor and Katelyn retractors was then removed. I then turned my attention to preparation of proximal femur. I had my assitant externally rotate and adduct the leg expose to the proximal femur. I then placed retractors along  the proximal femur for complete exposure.   I then debrided of the  femoral neck as well as soft tissue around the tip of the greater  trochanter. I then inserted the canal finding reamer. I then used a  Rat tail rasp in the canal in the lateralized position. I then began broaching  first with a size 7 broach. I was able to broach up to a size of 14 which fit snuggly within the femoral canal down to the appropriate height. This  was then selected for the final implant component. I then inserted  the final Biomet Echo Micro size 14 press-fit femoral stem which was  maletted in place until it was firmly seated. I then inserted a trial size  36 -6 femoral head. I then had my assistant perform traction and  internal rotation. I then reduced the hip within the acetabulum. I  then had my assistant run the hip through a full range of motion and  found there to be excellent stability throughout range of motion with no evidence of impingement and equal leg lengths compared to contralateral side. This was then  selected for the final implant component. I then had my assistant  perform traction and external rotation as I dislocated the hip out of the  acetabulum. The trial head was then removed. I then irrigated the  acetabulum further with pulse lavage. I then inserted the final size  36 -6 ceramic femoral head, which was malleted in place. I then had  my assistant perform traction and internal rotation as I reduced  the hip within the acetabulum. My assistant then ran the hip through  a full range of motion and we found there to be excellent stability  throughout range of motion with no evidence of impingement and equal  leg lengths compared to contralateral side. The wound was then irrigated further with pulse lavage. I injected the capsule with 1 gram of TXA. I then closed  the deep capsule layer using #5 Ethibond suture in a running fashion. I then closed the gluteus medius layer using #5 Ethibond suture in a  running fashion. I then closed the IT band using #1 Vicryl suture in  a figure-of-eight fashion. I then closed subcutaneous tissues using  2-0 Vicryl suture followed by skin closure with stratafix and prineo. Adequate  hemostasis was maintained at all times. I then applied a sterile soft  dressing to the right lower extremity and the patient was then  awakened from anesthesia and transported to PACU in stable condition. He appeared to have tolerated the procedure well. PROGNOSIS:  At this point, he will be admitted to the hospital  for postoperative pain control, rehabilitation and medical monitoring. Hospitalist was consulted for medical management and physical therapy  will be consulted for gait training and he can be weightbearing as  tolerated on the right leg. He will receive antibiotic prophylaxis  and DVT prophylaxis during his hospitalization. Following his discharge,  I will see him back in the office in 3 weeks and will continue to monitor his progress in the outpatient setting for resolution of his symptoms.         Barbara 97, DO

## 2021-10-25 NOTE — BRIEF OP NOTE
Brief Postoperative Note      Patient: Lauren Mi   YOB: 1955  MRN: [id number]    Date of Procedure: 10/25/2021    Pre-Op Diagnosis: righ hip fx    Post-Op Diagnosis: Same       Procedure(s):  RIGHT HIP TOTAL ARTHROPLASTY    Surgeon(s):  Celester Lennox, DO    Assistant:  * No surgical staff found *    Anesthesia: General    Estimated Blood Loss (mL): less than 894     Complications: None    Specimens:   * No specimens in log *    Implants:  Implant Name Type Inv.  Item Serial No.  Lot No. LRB No. Used Action   SHELL ACET SZ F [id number] 4 H OSSEOTI LIMIT H 2 MOBILITY G7  SHELL ACET SZ F [id number] 4 H OSSEOTI LIMIT H 2 MOBILITY G7  [employer]- [id number] Right 1 Implanted   BONE SCREW 6.5X30 SELF-TAP  BONE SCREW 6.5X30 SELF-TAP  [employer]- [id number] Right 1 Implanted   BONE SCREW 6.5X30 SELF-TAP  BONE SCREW 6.5X30 SELF-TAP  [employer]- [id number] Right 1 Implanted   G7 LONGEVITY NEUTRAL 36MM F  G7 LONGEVITY NEUTRAL 36MM F  [employer]- [id number] Right 1 Implanted   ECHO B-MTRC MP FP SO 14  ECHO B-MTRC MP FP SO 14  [employer]- [id number] Right 1 Implanted   SLEEVE FEM -6MM OFFSET HIP TYP 1 TAPR FOR CERAMIC BIOLOX  SLEEVE FEM -6MM OFFSET HIP TYP 1 TAPR FOR CERAMIC BIOLOX  [employer]- [id number] Right 1 Implanted   HEAD FEM [id number] HIP BIOLOX DELT OPT FOR G7 ACET SYS  HEAD FEM [id number] HIP BIOLOX DELT OPT FOR G7 ACET SYS  [employer]- [id number] Right 1 Implanted         Drains: * No LDAs found *    Findings: R hip fx    Electronically signed by Barbara Stone DO on 10/25/2021 at 9:00 AM

## 2021-10-25 NOTE — ANESTHESIA PRE PROCEDURE
Department of Anesthesiology  Preprocedure Note       Name:  Collin Elmore   Age:  60 y.o.  :  1955                                          MRN:  [id number]         Date:  10/25/2021      Surgeon: Amelia Pennington):  Rosales Mnedez DO    Procedure: RIGHT HIP TOTAL ARTHROPLASTY (Right )    Medications prior to admission:   Prior to Admission medications    Medication Sig Start Date End Date Taking? Authorizing Provider   traZODone (DESYREL) 100 MG tablet Take 2 tablets by mouth nightly 9/14/21 10/14/21  Shayna Townsend MD   venlafaxine (EFFEXOR XR) 150 MG extended release capsule Take 1 capsule by mouth daily for 7 days 21  Jhony Coleman PA-C   warfarin (COUMADIN) 5 MG tablet take 1-2 tablets by mouth daily as directed 21   MICHAEL Reese   simvastatin (ZOCOR) 40 MG tablet take 1 tablet by mouth at bedtime 21   MICHAEL Reese   triamcinolone (KENALOG) 0.1 % cream Apply topically 2 times daily. 20   Zamzam Ferris PA-C   ammonium lactate (LAC-HYDRIN) 12 % lotion Apply topically daily.  20   Zamzam Ferris PA-C   ipratropium-albuterol (DUONEB) 0.5-2.5 (3) MG/3ML SOLN nebulizer solution Inhale 3 mLs into the lungs 4 times daily 20   Andre Cage MD   cyclobenzaprine (FLEXERIL) 10 MG tablet Take 10 mg by mouth 3 times daily as needed for Muscle spasms    Historical Provider, MD   albuterol (ACCUNEB) 0.63 MG/3ML nebulizer solution Take 1 ampule by nebulization every 6 hours as needed for Wheezing    Historical Provider, MD   albuterol sulfate HFA (PROAIR HFA) 108 (90 Base) MCG/ACT inhaler Inhale 2 puffs into the lungs every 4 hours as needed for Wheezing or Shortness of Breath 20   Clarisse Fournier MD   montelukast (SINGULAIR) 10 MG tablet Take 1 tablet by mouth nightly \"Alternate With Zyrtec\" 20   Clarisse Fournier MD   calcium carbonate (OSCAL) 500 MG TABS tablet Take 500 mg by mouth daily    Historical Provider, MD Eliseo Hopkins OMEGA-3 PO Take by mouth    Historical Provider, MD   sodium chloride, Inhalant, 3 % nebulizer solution  10/31/18   Historical Provider, MD   cetirizine (ZYRTEC) 10 MG tablet Take 10 mg by mouth \"Alternate With Singulair\"    Historical Provider, MD   NASAL SPRAY SALINE NA by Nasal route daily Over The Counter    Historical Provider, MD   budesonide (RHINOCORT ALLERGY) 32 MCG/ACT nasal spray 2 sprays by Nasal route daily    Historical Provider, MD   Acetaminophen (TYLENOL 8 HOUR PO) Take by mouth as needed Over The Counter    Historical Provider, MD   Multiple Vitamins-Minerals (CENTRUM PO) Take by mouth daily    Historical Provider, MD   Nebulizer MISC Inhale into the lungs as needed    Historical Provider, MD       Current medications:    No current facility-administered medications for this visit. No current outpatient medications on file.      Facility-Administered Medications Ordered in Other Visits   Medication Dose Route Frequency Provider Last Rate Last Admin    traZODone (DESYREL) tablet 200 mg  200 mg Oral Nightly Lesvia VANGIE Pearce - CNP   200 mg at 10/24/21 2135    ipratropium-albuterol (DUONEB) nebulizer solution 1 ampule  1 ampule Inhalation Q4H PRN Sariah Lopez MD        0.9 % sodium chloride infusion  50 mL IntraVENous Once Floretta Ache, DO        diphenhydrAMINE (BENADRYL) injection 50 mg  50 mg IntraMUSCular Q6H PRN VANGIE Monet - CNP        And    haloperidol lactate (HALDOL) injection 5 mg  5 mg IntraMUSCular Q6H PRN VANGIE Monet - CNP        And    LORazepam (ATIVAN) injection 2 mg  2 mg IntraMUSCular Q6H PRN VANGIE Monet - CNP        nicotine (NICODERM CQ) 21 MG/24HR 1 patch  1 patch TransDERmal Daily ServandoVANGIE Napier - CNP   1 patch at 10/24/21 0809    acetaminophen (TYLENOL) tablet 650 mg  650 mg Oral Once Korinne Lusterio, PA-C        morphine sulfate (PF) injection 4 mg  4 mg IntraVENous Q30 Min PRN Tami Acosta PA-C   4 mg at 10/24/21 2135    calcium elemental (OSCAL) tablet 500 mg  500 mg Oral Daily Jayesh Jules MD   500 mg at 10/24/21 1187    cyclobenzaprine (FLEXERIL) tablet 10 mg  10 mg Oral TID PRN Jayesh Jules MD        montelukast (SINGULAIR) tablet 10 mg  10 mg Oral Nightly Jayesh Jules MD   10 mg at 10/24/21 2135    atorvastatin (LIPITOR) tablet 40 mg  40 mg Oral Daily Jayesh Jules MD   40 mg at 10/24/21 0809    venlafaxine (EFFEXOR XR) extended release capsule 150 mg  150 mg Oral Daily Jayesh Jules MD   150 mg at 10/24/21 0809    sodium chloride flush 0.9 % injection 5-40 mL  5-40 mL IntraVENous 2 times per day Jayesh Jules MD   10 mL at 10/24/21 2136    sodium chloride flush 0.9 % injection 5-40 mL  5-40 mL IntraVENous PRN Jayesh Jules MD        0.9 % sodium chloride infusion  25 mL IntraVENous PRN Jayesh Jules MD        ondansetron (ZOFRAN-ODT) disintegrating tablet 4 mg  4 mg Oral Q8H PRN Jayesh Jules MD        Or    [facility]ron [facility]) injection 4 mg  4 mg IntraVENous Q6H PRN Jayesh Jules MD        polyethylene glycol [facility]) packet 17 g  17 g Oral Daily PRN Jayesh Jules MD        acetaminophen (TYLENOL) tablet 650 mg  650 mg Oral Q6H PRN Jayesh Jules MD   650 mg at 10/23/21 0459    Or    acetaminophen (TYLENOL) suppository 650 mg  650 mg Rectal Q6H PRN Jayesh Jules MD        morphine injection 2 mg  2 mg IntraVENous Q4H PRN Jayesh Jules MD        hydrALAZINE (APRESOLINE) injection 10 mg  10 mg IntraVENous Q4H PRN Jayesh Jules MD           Allergies:     Allergies   Allergen Reactions    Ciprofloxacin Hcl Hives and Swelling    Levaquin [Levofloxacin] Hives and Swelling    Other Nausea And Vomiting     \"Allergic To Tylox\"    Ceftin [Cefuroxime]        Problem List:    Patient Active Problem List   Diagnosis Code    Localized osteoarthritis of right shoulder M19.011    S/P AVR Z95.2    Hyperlipidemia E78.5    [place] (hard of hearing) H91.90    [facility] J44.9    Bronchiectasis ([facility] J47.9    Anxiety F41.9    Alcohol abuse F10.10    Acid reflux K21.9    Chronic interstitial lung disease ([facility]) J84.9    Hyponatremia E87.1    Severe episode of recurrent major depressive disorder, with psychotic features [facility] F33.3    Recurrent major depressive disorder, in partial remission ([facility] F33.41    Rheumatic aortic valve disease I06.9    Chronic systolic heart failure ([facility]) I50.22    Rheumatic mitral regurgitation I05.1    Valvular heart disease I38    Psychophysiological insomnia F51.04    Femoral neck stress fracture, initial encounter M84.353A    Closed transcervical fracture of right femur ([facility]) S72.031A    Paranoia ([facility]) F22    Passive suicidal ideations R45.851       Past Medical History:        Diagnosis Date    Acid reflux     Acute frontal sinusitis 7/22/2009    Alcohol abuse     \"I Drink Average 2 Beers A Day\"    Anesthesia     \" I get agitated\"    Anxiety     Arthritis     \"Right Shoulder, Neck, Left Knee\"    Atypical mycobacterial infection     Broken teeth     Upper And Lower     CHF (congestive heart failure) ([facility]     COPD (chronic obstructive pulmonary disease) ([facility])     Sees Dr. Libertad Christiansen In [place], [address]     Cough     Frequent Cough With Green Sputum    Depression     Depression     Dyspnea 2/23/2018    H/O cardiovascular MUGA stress test 05/14/2019    EF 50%, NORMAL LVEF, NORMAL WALL MOTION.    H/O echocardiogram 05/22/2014    CK=>17-21%, LV systolic function is low normal, mild aortic valve calcification, no pericardial effusion    H/O echocardiogram 12/12/2018    EF 45-50%    History of 24 hour EKG monitoring 6/6/14    24 hr holter monitor. Ventricular ectopics were noted in single and couplet forms. Supraventricular ectopics were noted in single and pair forms.     [place] (hard of hearing)     Bilateral Ears    Hyperlipidemia     Hypertension     Irritant contact dermatitis due to other chemical products 8/26/2019    Other drug allergy(995.27) 7/22/2009    S/P AVR 12/2003    Mechanical valvue     Shortness of breath     Teeth missing     Upper And Lower       Past Surgical History:        Procedure Laterality Date    BRONCHOSCOPY  1996    \"Done Twice\"    CARDIAC VALVE REPLACEMENT  12/17/2003    \"Aortic Valve Replacement, Mechanical Valve\"    COLONOSCOPY  2006    Polyps Removed    DENTAL SURGERY      Teeth Extracted In Past    ENDOSCOPY, COLON, DIAGNOSTIC  In Past    HEMIARTHROPLASTY SHOULDER FRACTURE Right 1/29/2019    SHOULDER HEMIARTHROPLASTY performed by David Posadas DO at [facility] ARTHROSCOPY Left 1992    LUNG BIOPSY Right [facility]    \"Cauterized Because My Sperm Was Low\"    ROTATOR CUFF REPAIR Right 2008    TONSILLECTOMY  1959 Or 1960       Social History:    Social History     Tobacco Use    Smoking status: Never Smoker    Smokeless tobacco: Current User     Types: Snuff, Chew   Substance Use Topics    Alcohol use: Yes     Comment: \"Average 2 Beers A Day\"                                Ready to quit: Not Answered  Counseling given: Not Answered      Vital Signs (Current): There were no vitals filed for this visit.                                            BP Readings from Last 3 Encounters:   10/25/21 136/65   09/14/21 (!) 152/82   09/05/21 (!) 180/96       NPO Status:                                                                                 BMI:   Wt Readings from Last 3 Encounters:   10/25/21 148 lb 6.4 oz (67.3 kg)   09/14/21 162 lb (73.5 kg)   09/05/21 178 lb (80.7 kg)     There is no height or weight on file to calculate BMI.    CBC:   Lab Results   Component Value Date    WBC 5.9 10/24/2021    RBC 2.65 10/24/2021    HGB 7.7 10/25/2021    HCT 24.0 10/25/2021    MCV 94.3 10/24/2021    RDW 14.6 10/24/2021     10/24/2021       CMP:   Lab Results   Component Value Date     10/24/2021    K 4.0 10/24/2021     10/24/2021    CO2 21 10/24/2021    BUN 19 10/24/2021    CREATININE 0.4 10/24/2021    GFRAA >60 10/24/2021    AGRATIO 1.5 06/11/2021    LABGLOM >60 10/24/2021    GLUCOSE 120 10/24/2021    PROT 7.3 10/22/2021    CALCIUM 8.5 10/24/2021    BILITOT 0.5 10/22/2021    ALKPHOS 79 10/22/2021    AST 28 10/22/2021    ALT 16 10/22/2021       POC Tests: No results for input(s): POCGLU, POCNA, POCK, POCCL, POCBUN, POCHEMO, POCHCT in the last 72 hours. Coags:   Lab Results   Component Value Date    PROTIME 13.8 10/25/2021    INR 1.07 10/25/2021    APTT 96.6 10/24/2021       HCG (If Applicable): No results found for: PREGTESTUR, PREGSERUM, HCG, HCGQUANT     ABGs: No results found for: PHART, PO2ART, UNQ9DRZ, VNE3XVJ, BEART, Z5FPTMID     Type & Screen (If Applicable):  No results found for: [facility]    Anesthesia Evaluation  Patient summary reviewed history of anesthetic complications:   Airway: Mallampati: II  TM distance: >3 FB   Neck ROM: full  Mouth opening: > = 3 FB Dental:      Comment: Poor dentition    Pulmonary:   (+) COPD:  shortness of breath:                             Cardiovascular:  Exercise tolerance: good (>4 METS),   (+) hypertension:, valvular problems/murmurs (s/p AVR): AI and MR, CHF:, hyperlipidemia         Beta Blocker:  Not on Beta Blocker      ROS comment: Sinus tachycardia   Otherwise normal ECG   When compared with ECG of 05-SEP-2021 14:34,   No significant change was found  P      Resulting Agency  [facility]      Specimen Collected: 10/23/21 23:50 Last Resulted: 10/24/21 06:41      Echo pending      Summary   Left ventricular systolic function is mildly depressed with an ejection   fraction of 45-50%. Left Ventricular chamber size is dilated. Normally functioning mechanical prosthetic valve in aortic position with a   mean pressure of 17 mmHg. Doppler evaluation reveals mild aortic, moderate edmar, and mild tricuspid   regurgitation. No evidence of pericardial effusion.       Signature

## 2021-10-25 NOTE — PROGRESS NOTES
ATTENDING PHYSICIAN'S PROGRESS NOTES    Patient:  Casa Christian      Unit/Bed:2112/2112-A    YOB: 1955    MRN: [id number]     Acct: [de-identified]     Admit date: 10/22/2021    Patient Seen, Chart, Consults notes, Labs, Radiology studies reviewed. SUBJECTIVE:   Day 0 of stay with fall with right hip fracture and most recent (in last 24 hours) has had ORIF of the right hip. Patient had no new complaints when seen and examined. He had had no new complaints and denies any chest pain, fever or chills. All other ROS negative except noted in HPI    Past, Family, Social History unchanged from admission. Diet:  ADULT DIET;  Regular    Medications:  Scheduled Meds:   tranexamic acid-NaCl  1,000 mg IntraVENous On Call to OR    sodium chloride flush  10 mL IntraVENous 2 times per day    aspirin  325 mg Oral BID    clindamycin (CLEOCIN) IV  900 mg IntraVENous Q8H    traZODone  200 mg Oral Nightly    nicotine  1 patch TransDERmal Daily    acetaminophen  650 mg Oral Once    calcium elemental  500 mg Oral Daily    montelukast  10 mg Oral Nightly    atorvastatin  40 mg Oral Daily    venlafaxine  150 mg Oral Daily    sodium chloride flush  5-40 mL IntraVENous 2 times per day     Continuous Infusions:   [Held by provider] lactated ringers 125 mL/hr at 10/25/21 1029    sodium chloride       PRN Meds:sodium chloride flush, sodium chloride, acetaminophen, oxyCODONE **OR** oxyCODONE, ketorolac, albuterol sulfate HFA, ipratropium-albuterol, diphenhydrAMINE **AND** haloperidol lactate **AND** LORazepam, morphine, cyclobenzaprine, sodium chloride flush, ondansetron **OR** ondansetron, polyethylene glycol, [DISCONTINUED] acetaminophen **OR** acetaminophen, morphine, hydrALAZINE    OBJECTIVE:    CBC:   Recent Labs     10/22/21  1821 10/22/21  1821 10/24/21  0626 10/24/21  0626 10/24/21  1500 10/25/21  0101 10/25/21  1449   WBC 9.9  --  5.9  --   --   --  13.0*   HGB 13.9   < > 8.1*   < > 8.4* 7.7* 8.0*     --  264  --   --   --  364    < > = values in this interval not displayed. BMP:    Recent Labs     10/24/21  1500 10/25/21  1449   * 136  135   K 4.0 4.8  4.8    100  99   CO2 21 24 25   BUN 19 14  14   CREATININE 0.4* 0.6*  0.6*   GLUCOSE 120* 171*  171*     Calcium:  Recent Labs     10/25/21  1449   CALCIUM 9.5  9.2     Ionized Calcium:No results for input(s): IONCA in the last 72 hours. Magnesium:  Recent Labs     10/25/21  1449   MG 1.9     Phosphorus:No results for input(s): PHOS in the last 72 hours. BNP:No results for input(s): BNP in the last 72 hours. Glucose:No results for input(s): POCGLU in the last 72 hours. HgbA1C: No results for input(s): LABA1C in the last 72 hours. INR:   Recent Labs     10/25/21  1449   INR 0.96     Hepatic:   Recent Labs     10/25/21  1449   ALKPHOS 60   ALT 17   AST 28   PROT 6.3*   BILITOT 0.4   LABALBU 4.0     Amylase and Lipase:No results for input(s): LACTA, AMYLASE in the last 72 hours. Lactic Acid: No results for input(s): LACTA in the last 72 hours. Troponin: No results for input(s): CKTOTAL, CKMB, TROPONINT in the last 72 hours. BNP: No results for input(s): BNP in the last 72 hours. Lipids: No results for input(s): CHOL, TRIG, HDL, LDLCALC in the last 72 hours. Invalid input(s): LDL  ABGs: No results found for: PH, PCO2, PO2, HCO3, O2SAT    Radiology reports as per the Radiologist  Radiology: XR LUMBAR SPINE (2-3 VIEWS)    Result Date: 10/22/2021  EXAMINATION: THREE XRAY VIEWS OF THE LUMBAR SPINE 10/22/2021 2:12 pm COMPARISON: None. HISTORY: ORDERING SYSTEM PROVIDED HISTORY: fall, trauma TECHNOLOGIST PROVIDED HISTORY: Reason for exam:->fall, trauma Reason for Exam: fall trauma, xtable due to hip fracture Acuity: Acute Type of Exam: Initial FINDINGS: Five non-rib-bearing lumbar type vertebral bodies are identified. Multilevel degenerative changes of spine, including mild-to-moderate disc height loss at L3-L4 and L4-L5. There is an age-indeterminate compression deformity of the L4 vertebral body. Mild anterior wedging of the T12 and L1 vertebral bodies could also represent compression deformities, although wedging at these levels could represent normal variants. Age-indeterminate anterior compression deformity of the L4 vertebral body. Mild anterior wedging of the T12 and L1 vertebral bodies could represent additional age-indeterminate compression deformities, although wedging at these levels could represent normal variants. Correlate with clinical symptoms. Multilevel degenerative changes of the spine. XR CHEST PORTABLE    Result Date: 10/22/2021  EXAMINATION: ONE XRAY VIEW OF THE CHEST 10/22/2021 3:27 pm COMPARISON: 05/05/2018 chest radiograph HISTORY: ORDERING SYSTEM PROVIDED HISTORY: hip fracture TECHNOLOGIST PROVIDED HISTORY: Reason for exam:->hip fracture Reason for Exam: hip fracture Acuity: Acute Type of Exam: Initial FINDINGS: Prior median sternotomy and aortic valve replacement. The cardiomediastinal silhouette is normal in size and contour. Atherosclerotic calcifications of the aortic arch. No focal airspace disease. No pleural effusion or pneumothorax. Bilateral remote rib fractures. Right shoulder hemiarthroplasty. No evidence of acute cardiopulmonary disease. XR HIP 2-3 VW W PELVIS RIGHT    Result Date: 10/22/2021  EXAMINATION: ONE XRAY VIEW OF THE PELVIS AND TWO XRAY VIEWS RIGHT HIP 10/22/2021 2:11 pm COMPARISON: None. HISTORY: ORDERING SYSTEM PROVIDED HISTORY: fall, pain, trauma TECHNOLOGIST PROVIDED HISTORY: Reason for exam:->fall, pain, trauma Reason for Exam: right hip pain Acuity: Acute Type of Exam: Initial Mechanism of Injury: fall Relevant Medical/Surgical History: na FINDINGS: Three views of the pelvis and right hip were reviewed. There is an acute transcervical femoral neck fracture of the right femur with displacement at the fracture site. No additional fractures are identified. Multilevel degenerative changes of the imaged lower lumbar spine. Atherosclerotic vascular calcifications noted. Surgical clips project over the region of the left hip. 1. Acute and displaced transcervical right femoral neck fracture. Physical Exam:  Vitals: /60   Pulse 98   Temp 97.6 °F (36.4 °C) (Temporal)   Resp 21   Ht 5' 4.5\" (1.638 m)   Wt 148 lb 6.4 oz (67.3 kg)   SpO2 92%   BMI 25.08 kg/m²   24 hour intake/output:    Intake/Output Summary (Last 24 hours) at 10/25/2021 1731  Last data filed at 10/25/2021 1500  Gross per 24 hour   Intake 1850 ml   Output 3450 ml   Net -1600 ml     Last 3 weights:   Wt Readings from Last 3 Encounters:   10/25/21 148 lb 6.4 oz (67.3 kg)   09/14/21 162 lb (73.5 kg)   09/05/21 178 lb (80.7 kg)       General appearance - oriented to person, place, and time, acyanotic, in no respiratory distress and ill-appearing  HEENT: Normocephalic, Atraumatic, Conjuctiva pink, PERRL, Oral mucosa normal, Lips, teeth and gums normal, Trachea midline, Thyroid normal and No noted lymphadenopathy  Chest - clear to auscultation, no wheezes, rales or rhonchi, symmetric air entry  Cardiovascular - S1 and S2 normal, clicks present  Abdomen - soft, nontender, nondistended, no masses or organomegaly   Neurological - Alert and oriented, Normal speech, No focal findings or movement disorder noted and Motor and sensory grossly normal bilaterally  Integumentary - Skin color, texture, turgor normal. No Rashes or lesions  Musculoskeletal -right hip surgical site clean and dry, No clubbing or cyanosis and No peripheral edema      DVT prophylaxis: [] Lovenox                                 [] SCDs                                 [x] SQ Heparin                                 [] Encourage ambulation           [] Already on Anticoagulation                 ASSESSMENT / PLAN :    Principal Problem:    Closed transcervical fracture of right femur ([address]  Had ORIF by ortho surgery today and is currently recuperating. Will need rehab upon discharge. Optimize pain control. Active Problems:    Severe episode of recurrent major depressive disorder, with psychotic features (HCC)/ Passive suicidal ideations/ Paranoia [facility])  Patient has been seen by Psychiatry consult with recommendations noted. Hyperlipidemia  Continue maintaining on statin when allowed      COPD (chronic obstructive pulmonary disease) ([facility] with Chronic interstitial lung disease ([facility]  Continue with nebulization and oxygen supplementation, resume home regimen. H/O mechanical aortic valve replacement  Patient is currently off anticoagulation and will be restarted soon as cleared by Ortho consult. Cardiology consult following. Fatigue fracture of lumbar vertebra with routine healing  Conservative management and will need vertebroplasty if pain not relieved with conservative management. Resolved Problems:    * No resolved hospital problems. *  Treatment as outlined above, appreciate input by all consultants, maintaining in-house overnight with a.m. labs.       Electronically signed by Pj Alcaraz MD on 10/25/2021 at 5:31 PM    [facility]

## 2021-10-25 NOTE — PLAN OF CARE
Problem: Falls - Risk of:  Goal: Will remain free from falls  Description: Will remain free from falls  10/25/2021 1742 by Evita Arnold RN  Outcome: Ongoing  10/25/2021 0652 by Sapna Zamarripa RN  Outcome: Ongoing  Goal: Absence of physical injury  Description: Absence of physical injury  10/25/2021 1742 by Evita Arnold RN  Outcome: Ongoing  10/25/2021 0652 by Sapna Zamarripa RN  Outcome: Ongoing     Problem: Pain:  Goal: Pain level will decrease  Description: Pain level will decrease  10/25/2021 1742 by Evita Arnold RN  Outcome: Ongoing  10/25/2021 0652 by Sapna Zamarripa RN  Outcome: Ongoing  Goal: Control of acute pain  Description: Control of acute pain  10/25/2021 1742 by Evita Arnold RN  Outcome: Ongoing  10/25/2021 0652 by Sapna Zamarripa RN  Outcome: Ongoing  Goal: Control of chronic pain  Description: Control of chronic pain  10/25/2021 1742 by Evita Arnold RN  Outcome: Ongoing  10/25/2021 0652 by Sapna Zamarripa RN  Outcome: Ongoing

## 2021-10-25 NOTE — TELEPHONE ENCOUNTER
I am guessing he will also have to go to rehab after her acute hospitalization. Most likely the hospitalist will have to titrate his neuropsych meds before discharge. By the time he is home or at least as an outpatient meds will probably be different we will have to titrate them at that time as needed.

## 2021-10-25 NOTE — CARE COORDINATION
Patient in [address]; will revisit. Funmi Simpson RN     Chart reviewed. Patient has been seen by Psych- recommending sitter and suicide precautions. Plan is [facility]- involuntary. See Elvia VENCES note.  Funmi Simpson RN

## 2021-10-25 NOTE — TELEPHONE ENCOUNTER
Patient is in hospital in ICU at [facility]. He is recovering from hip surgery------    He also has some paranoia and depression issues. Patient may need some help after discharging.  Art Treviño (family member) said she would like any suggestions you might have after patient discharges from the hospital to address these symptoms.  (paranoia & depression)

## 2021-10-26 LAB
FERRITIN: 301 NG/ML (ref 30–400)
FOLATE: 13.7 NG/ML (ref 3.1–17.5)
HCT VFR BLD CALC: 21.8 % (ref 42–52)
HEMOGLOBIN: 7 GM/DL (ref 13.5–18)
INR BLD: 0.98 INDEX
IRON: 22 UG/DL (ref 59–158)
PCT TRANSFERRIN: 8 % (ref 10–44)
PROTHROMBIN TIME: 12.6 SECONDS (ref 11.7–14.5)
RETICULOCYTE COUNT PCT: 3.8 % (ref 0.2–2.2)
TOTAL IRON BINDING CAPACITY: 259 UG/DL (ref 250–450)
UNSATURATED IRON BINDING CAPACITY: 237 UG/DL (ref 110–370)
VITAMIN B-12: 349 PG/ML (ref 211–911)

## 2021-10-26 PROCEDURE — 97166 OT EVAL MOD COMPLEX 45 MIN: CPT

## 2021-10-26 PROCEDURE — 2580000003 HC RX 258: Performed by: ORTHOPAEDIC SURGERY

## 2021-10-26 PROCEDURE — 2500000003 HC RX 250 WO HCPCS: Performed by: ORTHOPAEDIC SURGERY

## 2021-10-26 PROCEDURE — 94150 VITAL CAPACITY TEST: CPT

## 2021-10-26 PROCEDURE — 97116 GAIT TRAINING THERAPY: CPT

## 2021-10-26 PROCEDURE — 94664 DEMO&/EVAL PT USE INHALER: CPT

## 2021-10-26 PROCEDURE — P9016 RBC LEUKOCYTES REDUCED: HCPCS

## 2021-10-26 PROCEDURE — 2000000000 HC ICU R&B

## 2021-10-26 PROCEDURE — 6370000000 HC RX 637 (ALT 250 FOR IP): Performed by: INTERNAL MEDICINE

## 2021-10-26 PROCEDURE — 99232 SBSQ HOSP IP/OBS MODERATE 35: CPT | Performed by: INTERNAL MEDICINE

## 2021-10-26 PROCEDURE — 85045 AUTOMATED RETICULOCYTE COUNT: CPT

## 2021-10-26 PROCEDURE — 6370000000 HC RX 637 (ALT 250 FOR IP): Performed by: ORTHOPAEDIC SURGERY

## 2021-10-26 PROCEDURE — 97535 SELF CARE MNGMENT TRAINING: CPT

## 2021-10-26 PROCEDURE — 97530 THERAPEUTIC ACTIVITIES: CPT

## 2021-10-26 PROCEDURE — 97162 PT EVAL MOD COMPLEX 30 MIN: CPT

## 2021-10-26 PROCEDURE — 85610 PROTHROMBIN TIME: CPT

## 2021-10-26 PROCEDURE — 36430 TRANSFUSION BLD/BLD COMPNT: CPT

## 2021-10-26 PROCEDURE — 82728 ASSAY OF FERRITIN: CPT

## 2021-10-26 PROCEDURE — 85018 HEMOGLOBIN: CPT

## 2021-10-26 PROCEDURE — 2580000003 HC RX 258: Performed by: INTERNAL MEDICINE

## 2021-10-26 PROCEDURE — 82746 ASSAY OF FOLIC ACID SERUM: CPT

## 2021-10-26 PROCEDURE — 83550 IRON BINDING TEST: CPT

## 2021-10-26 PROCEDURE — 85014 HEMATOCRIT: CPT

## 2021-10-26 PROCEDURE — 36415 COLL VENOUS BLD VENIPUNCTURE: CPT

## 2021-10-26 PROCEDURE — 6360000002 HC RX W HCPCS: Performed by: INTERNAL MEDICINE

## 2021-10-26 PROCEDURE — 94761 N-INVAS EAR/PLS OXIMETRY MLT: CPT

## 2021-10-26 PROCEDURE — 83540 ASSAY OF IRON: CPT

## 2021-10-26 PROCEDURE — 82607 VITAMIN B-12: CPT

## 2021-10-26 RX ORDER — WARFARIN SODIUM 7.5 MG/1
7.5 TABLET ORAL DAILY
Status: DISCONTINUED | OUTPATIENT
Start: 2021-10-26 | End: 2021-10-28

## 2021-10-26 RX ORDER — SODIUM CHLORIDE 9 MG/ML
INJECTION, SOLUTION INTRAVENOUS PRN
Status: COMPLETED | OUTPATIENT
Start: 2021-10-26 | End: 2021-10-26

## 2021-10-26 RX ADMIN — CLINDAMYCIN PHOSPHATE 900 MG: 900 INJECTION, SOLUTION INTRAVENOUS at 06:04

## 2021-10-26 RX ADMIN — TRAZODONE HYDROCHLORIDE 200 MG: 50 TABLET ORAL at 20:32

## 2021-10-26 RX ADMIN — ATORVASTATIN CALCIUM 40 MG: 40 TABLET, FILM COATED ORAL at 09:32

## 2021-10-26 RX ADMIN — ENOXAPARIN SODIUM 70 MG: 80 INJECTION SUBCUTANEOUS at 20:32

## 2021-10-26 RX ADMIN — SODIUM CHLORIDE: 9 INJECTION, SOLUTION INTRAVENOUS at 13:45

## 2021-10-26 RX ADMIN — MONTELUKAST 10 MG: 10 TABLET, FILM COATED ORAL at 20:32

## 2021-10-26 RX ADMIN — OXYCODONE HYDROCHLORIDE 5 MG: 5 TABLET ORAL at 20:31

## 2021-10-26 RX ADMIN — ASPIRIN 325 MG: 325 TABLET, COATED ORAL at 20:32

## 2021-10-26 RX ADMIN — Medication 500 MG: at 09:32

## 2021-10-26 RX ADMIN — WARFARIN SODIUM 7.5 MG: 7.5 TABLET ORAL at 19:03

## 2021-10-26 RX ADMIN — SODIUM CHLORIDE, PRESERVATIVE FREE 10 ML: 5 INJECTION INTRAVENOUS at 23:18

## 2021-10-26 RX ADMIN — ASPIRIN 325 MG: 325 TABLET, COATED ORAL at 09:32

## 2021-10-26 RX ADMIN — ACETAMINOPHEN 650 MG: 325 TABLET ORAL at 20:32

## 2021-10-26 RX ADMIN — VENLAFAXINE HYDROCHLORIDE 150 MG: 150 CAPSULE, EXTENDED RELEASE ORAL at 09:32

## 2021-10-26 RX ADMIN — SODIUM CHLORIDE, PRESERVATIVE FREE 10 ML: 5 INJECTION INTRAVENOUS at 20:33

## 2021-10-26 ASSESSMENT — PAIN DESCRIPTION - PAIN TYPE: TYPE: SURGICAL PAIN

## 2021-10-26 ASSESSMENT — PAIN SCALES - GENERAL
PAINLEVEL_OUTOF10: 2
PAINLEVEL_OUTOF10: 4
PAINLEVEL_OUTOF10: 2
PAINLEVEL_OUTOF10: 3

## 2021-10-26 ASSESSMENT — PAIN DESCRIPTION - LOCATION: LOCATION: HIP

## 2021-10-26 ASSESSMENT — PAIN DESCRIPTION - ORIENTATION: ORIENTATION: RIGHT

## 2021-10-26 NOTE — CARE COORDINATION
CM in to see pt. Pt is POD#1 R THR. Cm awaiting PT/OT evals and recs. Pt lives alone in an apt. Pt has insurance and PCP. Psych has evaluated pt 10/25 and indicates need for inpt psych upon discharge. Pt has been to [facility] in the past. Cm to follow.

## 2021-10-26 NOTE — PROGRESS NOTES
Dennis Romero is a 60 y.o. male patient. 1. Closed transcervical fracture of right femur, initial encounter ([facility] [address]    2. Hyponatremia      Past Medical History:   Diagnosis Date    Acid reflux     Acute frontal sinusitis 7/22/2009    Alcohol abuse     \"I Drink Average 2 Beers A Day\"    Anesthesia     \" I get agitated\"    Anxiety     Arthritis     \"Right Shoulder, Neck, Left Knee\"    Atypical mycobacterial infection     Broken teeth     Upper And Lower     CHF (congestive heart failure) ([facility])     COPD (chronic obstructive pulmonary disease) ([facility])     Sees Dr. Irasema Morales [place], [address]     Cough     Frequent Cough With Green Sputum    Depression     Depression     Dyspnea 2/23/2018    H/O cardiovascular MUGA stress test 05/14/2019    EF 50%, NORMAL LVEF, NORMAL WALL MOTION.    H/O echocardiogram 05/22/2014    MR=>59-70%, LV systolic function is low normal, mild aortic valve calcification, no pericardial effusion    H/O echocardiogram 12/12/2018    EF 45-50%    History of 24 hour EKG monitoring 6/6/14    24 hr holter monitor. Ventricular ectopics were noted in single and couplet forms. Supraventricular ectopics were noted in single and pair forms.  [place] (hard of hearing)     Bilateral Ears    Hyperlipidemia     Hypertension     Irritant contact dermatitis due to other chemical products 8/26/2019    Other drug allergy(995.27) 7/22/2009    S/P AVR 12/2003    Mechanical valvue     Shortness of breath     Teeth missing     Upper And Lower     No past surgical history pertinent negatives on file.   Scheduled Meds:   enoxaparin  1 mg/kg SubCUTAneous BID    warfarin  7.5 mg Oral Daily    sodium chloride flush  10 mL IntraVENous 2 times per day    aspirin  325 mg Oral BID    influenza virus vaccine  0.5 mL IntraMUSCular Prior to discharge    traZODone  200 mg Oral Nightly    nicotine  1 patch TransDERmal Daily    acetaminophen  650 mg Oral Once    calcium elemental  500 mg Oral Daily    montelukast  10 mg Oral Nightly    atorvastatin  40 mg Oral Daily    venlafaxine  150 mg Oral Daily    sodium chloride flush  5-40 mL IntraVENous 2 times per day     Continuous Infusions:   [Held by provider] lactated ringers 125 mL/hr at 10/25/21 1029    sodium chloride       PRN Meds:sodium chloride flush, sodium chloride, acetaminophen, oxyCODONE **OR** oxyCODONE, ketorolac, albuterol sulfate HFA, ipratropium-albuterol, diphenhydrAMINE **AND** haloperidol lactate **AND** LORazepam, morphine, cyclobenzaprine, sodium chloride flush, ondansetron **OR** ondansetron, polyethylene glycol, [DISCONTINUED] acetaminophen **OR** acetaminophen, morphine, hydrALAZINE    Allergies   Allergen Reactions    Ciprofloxacin Hcl Hives and Swelling    Levaquin [Levofloxacin] Hives and Swelling    Other Nausea And Vomiting     \"Allergic To Tylox\"    Ceftin [Cefuroxime]      Principal Problem:    Closed transcervical fracture of right femur ([facility] [address]  Active Problems:    Severe episode of recurrent major depressive disorder, with psychotic features ([address]    Passive suicidal ideations    Paranoia ([facility] [address]    Hyperlipidemia    COPD (chronic obstructive pulmonary disease) ([facility] [address]    Anxiety    Chronic interstitial lung disease ([facility] [address]    Femoral neck stress fracture, initial encounter    H/O mechanical aortic valve replacement    Fatigue fracture of lumbar vertebra with routine healing  Resolved Problems:    * No resolved hospital problems. *    Blood pressure (!) 105/40, pulse 106, temperature 98.4 °F (36.9 °C), temperature source Oral, resp. rate 18, height 5' 4.5\" (1.638 m), weight 148 lb 6.4 oz (67.3 kg), SpO2 96 %. Subjective   Patient seen and examined, resting in bed comfortably, pain controlled, no new complaints. Feeling much better today    Objective   RLE - Dressing removed, incision clean, dry, intact, no calf TTP, compartments soft, NVID  No pain with range of motion of the right hip.     Assessment & Plan  POD #1 R MICHAEL, Doing well postoperatively    Continue weight bearing as tolerated.   Continue range of motion exercises  Pain control  DVT prophylaxis  Continue PT/OT  Discharge planning    Earline Hancock DO  10/26/2021

## 2021-10-26 NOTE — CONSULTS
[address] [facility], 1955, 2112/2112-A, 10/26/2021    History  Tribal:  The primary encounter diagnosis was Closed transcervical fracture of right femur, initial encounter ([facility] [address] A diagnosis of Hyponatremia was also pertinent to this visit. Patient  has a past medical history of Acid reflux, Acute frontal sinusitis, Alcohol abuse, Anesthesia, Anxiety, Arthritis, Atypical mycobacterial infection, Broken teeth, CHF (congestive heart failure) ([facility] [address] COPD (chronic obstructive pulmonary disease) ([facility] [address] Cough, Depression, Depression, Dyspnea, H/O cardiovascular MUGA stress test, H/O echocardiogram, H/O echocardiogram, History of 24 hour EKG monitoring, [place] (hard of hearing), Hyperlipidemia, Hypertension, Irritant contact dermatitis due to other chemical products, Other drug allergy(995.27), S/P AVR, Shortness of breath, and Teeth missing. Patient  has a past surgical history that includes Lung biopsy (Right, 1996); Rotator cuff repair (Right, 2008); Dental surgery; Cardiac valve replacement (12/17/2003); Colonoscopy (2006); Endoscopy, colon, diagnostic (In Past); Tonsillectomy (1959 Or 1960); bronchoscopy (1996); Knee arthroscopy (Left, 1992); other surgical history (1992); and HEMIARTHROPLASTY SHOULDER FRACTURE (Right, 1/29/2019). Subjective:  Patient states:  \"I want to try to walk to that sign\", \"I'll try what you think I should\". Pain:  3/10 R hip and anterior thigh \"tightness\". Communication with other providers:  Handoff to RN, co-eval with [facility].   Restrictions: WBAT RLE, Ant/Lat hip precaution, fall risk, suicide/psych precaution    Home Setup/Prior level of function  Social/Functional History  Lives With: Alone  Type of Home: Apartment  Home Layout: One level  Home Access: Ramped entrance  Bathroom Shower/Tub: Tub/Shower unit  Bathroom Toilet: Handicap height  Bathroom Accessibility: Accessible  Home Equipment: 4 wheeled walker  ADL Assistance: Independent  Homemaking Assistance: Independent  Homemaking Responsibilities: Yes  Ambulation Assistance: Independent (mod I with 4ww as of late)  Transfer Assistance: Independent  Active : No (Sister drives)  Occupation: Retired    Examination of body systems (includes body structures/functions, activity/participation limitations):  · Observation:  Pt is awake in semi-fowlers with sitter present, tele on, on room air, SCD's on upon arrival  · Vision:  [facility]  · [facility]  · Cardiopulmonary:  On room air, Sp02 stable. · Cognition: Pt on suicide precaution, pt is oriented, pleasant and follows commands well, see OT/SLP note for further evaluation. Musculoskeletal  · ROM R/L:  WFL LLE, min limitation in motion RLE d/t pain. · Strength R/L:  LLE 5/5, RLE not tested d/t pain, at least 3/5 decreased in function and endurance. · Neuro:  Intact sensation     · Gait pattern: Pt demonstrates step-to pattern on LLE, favoring of LLE, decreased stance time and WB on the RLE. Pt improves with time and v/c for step-through gait. Mobility:  · Supine to sit:  Min A  · Transfers: CGA-Min  · Sitting balance:  SBA. · Standing balance:  CGA. · Gait: CGA    [facility] 6 Clicks Inpatient Mobility:  AM-PAC Inpatient Mobility Raw Score : 17    Treatment:  Bed mobility: PT v/c's for sequencing for BUE to EOB, reaching UE to bed rail. PT provides min A for complete LE advancement, Min A for trunk to upright. Pt utilizes UE support on bed surface throughout. Pt with adequate strength and ROM to perform transfer efficiently. Pt able to perform both nj and retro scooting to/from EOB with SBA for safety only. End of session return to supine with Min A for LE advancement. Pt scoots self to [facility], readjusts with SUP. Sitting balance: Pt maintains good dynamic and static sitting balance at EOB x5' during PT assessment and preparation for transfers. Pt with light UE support on bed during LE AROM and MMT. Additional 3' seated on commode with closeby SUP, pt maintains good balance. STS: Pt performs x1 STS from EOB to RW with CGA, min v/c for UE placement, good carryover. Pt with adequate LE ROM and power for completion of transfer, min favoring of LLE with slight L trunk lean with transition to upright. Min A for control of descent to toilet, good safety awareness with reaching back for grab bar, pt in increased time and effort for lowering with increased R hip pain. V/c for RLE anterior placement prior to STS. Return to seated on EOB with good eccentric control, CGA. Standing balance: Pt tolerates x2' standing EOB preparing for gait, x5' standing at sink for oral and hand hygiene with UE support and CGA-SBA. Pt with intermittent postural sway, no significant LOB. Initially pt demonstrates decreased WB RLE with increased UE support on RW, improves with time and v/c.     Gait: PT demonstrates and explains hip precautions for pt while pt seated at EOB. Pt AMB x30 ft from room/hallway and to BR. With RW and CGA pt demonstrates step-to pattern on LLE, R foot-flat contact, favoring of LLE, decreased stance time and WB on the RLE. Pt improves with time and v/c for step-through gait. Pt requires x2 standing rest breaks for \"breathing\" ~45 seconds each. PT v/c's to encourage L step-through pattern, controlled sequential turning, heel-toe pattern. Education: PT describes and demonstrates hip precautions, discusses pt PLOF, d/c plan. Safety: patient left in supine with HOB raised, all needs, call light within reach, RN notified, gait belt used. Assessment:    Pt is a 61 y/o male admitted to ED 10/22 s/p fall at home with worsening R hip pain x6 days prior to [facility]. Pt had been [address] on the R leg up until admission with increased pain and difficulty. Pt found to have Right femoral neck fracture, R MICHAEL performed by Virginia Gabriel 10/25. Pt is to be WBAT, Ant/Lat precaution.  Patient with significant h/o depression, COPD, ETOH, see chart. Per pt report pt has been performing ADLs/IADLs independently, sister checks on pt daily. At this time pt appears to be functioning below baseline. Pt is now presenting with impairments in RLE strength/ROM, functional endurance, safety awareness, balance, pain, new surgical precaution. Pt would benefit from skilled PT services in order to address impairments and promote return to PLOF. PT to recommend d/c to ARU. Complexity: Moderate  Prognosis: Good, no significant barriers to participation at this time. Plan Times per week: 6+/week, 1 week,   Discharge Recommendations: IP Rehab  Equipment: RW    Goals:  Short term goals  Time Frame for Short term goals: 1 week  Short term goal 1: Pt will AMB x75 ft continuously with RW, step-through pattern , CGA. Short term goal 2: Pt will perform sup<>sit wit SBA for safety only. Short term goal 3: Pt will participate in LE TherEx x15 reps each  Short term goal 4: Pt will demonstrate good carryover with precautions. Treatment plan:  Bed mobility, transfers, balance, gait, TA, TX,     Recommendations for NURSING mobility: AMB to/from BR and short distance in hallway with RW, CGA.     Time:   Time in: 09:24  Time out: 10:07  Timed treatment minutes: 31  Total time: 43    Electronically signed by:    Iris Jimenez PT  09/58/8228, 50:19 PM  PT Lic #: [id number]

## 2021-10-26 NOTE — TELEPHONE ENCOUNTER
Spoke with Sybil Turner, states patient is doing better in the hospital now and is greatly improved from a week ago

## 2021-10-26 NOTE — PROGRESS NOTES
PHARMACY ANTICOAGULATION MONITORING SERVICE    Karen Mosqueda is a 60 y.o. male on warfarin therapy for mechnical aortic valve. Pharmacy consulted by Dr. Alfonso Hilton for monitoring and adjustment of treatment. Indication for anticoagulation: aortic valve replacement (mechanical)  INR goal: 2-3  Warfarin dose prior to admission: warfarin 5 mg every Sunday and 7.5 mg all other days (per patient)    Pertinent Laboratory Values Recent Labs     10/24/21  0626 10/24/21  1500 10/25/21  1449 10/25/21  1449 10/26/21  0244   INR 3.48   < > 0.96   < > 0.98   HGB 8.1*   < > 8.0*   < > 7.0*   HCT 25.0*   < > 24.0*   < > 21.8*     --  364  --   --     < > = values in this interval not displayed.        Assessment/Plan:   Possible DDI's:   o Aspirin 325 mg BID  o Enoxaparin 1 mg/kg subQ BID until INR therapeutic   INR = 0.98, sub-therapeutic following held doses and K-centra (given on 10/24) prior to total hip arthroplasty   Restart warfarin at 7.5 mg daily with additional dose adjustments based on INR trends, interacting medications, and other clinical factors   Pharmacy will continue to monitor and adjust warfarin therapy as indicated    Thank you for the consult,  Claude Fetter, [address] [facility], PharmD  10/26/2021 8:11 AM

## 2021-10-26 NOTE — PROGRESS NOTES
Occupational Therapy    [facility] ACUTE CARE OCCUPATIONAL THERAPY EVALUATION    Carmen Snell, 1955, 2112/2112-A, 10/26/2021    Discharge Recommendation: Inpatient Rehabilitation (excellent candidate from an OT perspective, however psychiatry needs should be considered first, as psychiatry indicates need for inpatient psych hospital admission at discharge)      History:  [place]:  The primary encounter diagnosis was Closed transcervical fracture of right femur, initial encounter ([address] A diagnosis of Hyponatremia was also pertinent to this visit. Subjective:  Patient states: \"I'm sure this will not be the most comfortable thing today. \"  Pain: Pt reported 3/10 surgical pain in Rt hip  Communication with other providers: PT Landry Wade, RN Sushila Champagne  Restrictions: General Precautions, Fall Risk, WBAT Rt LE, Anterolateral Hip Precautions, Sitter/Suicide Precautions, IV, Telemetry, Bed/chair alarm    Home Setup/Prior level of function:  Social/Functional History  Lives With: Alone  Type of Home: Apartment  Home Layout: One level  Home Access: Ramped entrance  Bathroom Shower/Tub: Tub/Shower unit  Bathroom Toilet: Handicap height  Bathroom Accessibility: Accessible  Home Equipment: 4 wheeled walker  ADL Assistance: Independent  Homemaking Assistance: Independent  Homemaking Responsibilities: Yes  Ambulation Assistance: Independent (mod I with 4ww as of late)  Transfer Assistance: Independent  Active : No (Sister drives)  Occupation: Retired    Examination:  · Observation: Supine in bed upon arrival. Pleasant and agreeable to evaluation. Sitter present and pt on suicide precautions.   · Vision: [employer]  · Hearing: WFL  · Vitals: Mildly tachycardic throughout session, HR ranged from 110-125 BPM throughout session    Body Systems and functions:  · ROM: WFL all joints in BL UEs  · Strength: 4+/5 MMT all major muscle groups BL UEs  · Sensation: WFL (denies numbness/tingling)  · Tone: Normal  · Coordination: WFL for ADLs  · Perception: WNL    Activities of Daily Living (ADLs):  · Feeding: Independent  · Grooming: CGA (completed hand hygiene and oral hygiene tasks of brushing/rinsing in standing at sink; min cues for safe RW placement and body positioning)  · UB bathing: SBA   · LB bathing: Mod A (reaching distal BL LEs; may need long handled sponge at this time)  · UB dressing: SBA (donning clean robe seated EOB)  · LB dressing: Dependent (donning BL socks; may need LB AE at this time)  · Toileting: Max A (pt urinated while seated on regular toilet; assist required for clothing mgmt both directions, able to complete seated hygiene without assist)    Cognitive and Psychosocial Functioning:  · Overall cognitive status: WFL (as observed during session; pt was calm, cooperative, demonstrated reasonable insight, and conversed appropriately. However, per psychiatry consult 2 days ago, pt's speech was \"disjointed, non-linear, and illogical\")  · Affect: Normal     Balance:   · Sitting: SBA in unsupported sitting EOB  · Standing: CGA with RW and with ADLs at sink    Functional Mobility:  · Bed Mobility: Min A supine to sitting EOB (HOB elevated to 30', increased time/effort, min coaching for use of bed rail), Mod A sitting EOB to supine (assist for lifting BL LEs into bed)  · Transfers: CGA sit to stand from bed and toilet, Min A stand to sit to bed and toilet (min cues for safe hand placement and extending Rt LE with controlled descent)  · Ambulation: CGA with RW to/from bathroom for ADLs + 30 ft in hallway; antalgic gait, step-to pattern used, min coaching for sequencing steps and RW mgmt      AM-PAC 6 click short form for inpatient daily activity:   How much help from another person does the patient currently need. .. Unable  Dep A Lot  Max A A Lot   Mod A A Little  Min A A Little   CGA  SBA None   Mod I  Indep  Sup   1. Putting on and taking off regular lower body clothing? [x] 1    [] 2   [] 2   [] 3   [] 3   [] 4      2. Bathing (including washing, rinsing, drying)? [] 1   [] 2   [x] 2 [] 3 [] 3 [] 4   3. Toileting, which includes using toilet, bedpan, or urinal? [] 1    [x] 2   [] 2   [] 3   [] 3   [] 4     4. Putting on and taking off regular upper body clothing? [] 1   [] 2   [] 2   [] 3   [x] 3    [] 4      5. Taking care of personal grooming such as brushing teeth? [] 1   [] 2    [] 2 [] 3    [x] 3   [] 4      6. Eating meals? [] 1   [] 2   [] 2   [] 3   [] 3   [x] 4      Raw Score:  15    [24=0% impaired(CH), 23=1-19%(CI), 20-22=20-39%(CJ), 15-19=40-59%(CK), 10-14=60-79%(CL), 7-9=80-99%(CM), 6=100%(CN)]     Treatment:  Therapeutic Activity Training:   Therapeutic activity training was instructed today. Cues were given for safety, sequence, UE/LE placement, awareness, and balance. Activities performed today included bed mobility training, transfer training, household mobility with RW, education on role of OT, POC, WBAT Rt LE, Anterolateral Hip Precautions, Importance of EOB/OOB activity, d/c planning  Self Care Training:   Cues were given for safety, sequence, UE/LE placement, visual cues, and balance. Activities performed today included UB/LB dressing tasks, toileting, hand hygiene, and oral hygiene routine at sink      Safety Measures: Gait belt used, Left in Bed, Alarm in place    Assessment:  Pt is a 60year old male with a past medical history of Acid reflux, Acute frontal sinusitis, Alcohol abuse, Anesthesia, Anxiety, Arthritis, Atypical mycobacterial infection, Broken teeth, CHF (congestive heart failure) ([facility]), COPD (chronic obstructive pulmonary disease) ([facility] [address] Cough, Depression, Depression, Dyspnea, H/O cardiovascular MUGA stress test, H/O echocardiogram, H/O echocardiogram, History of 24 hour EKG monitoring, [place] (hard of hearing), Hyperlipidemia, Hypertension, Irritant contact dermatitis due to other chemical products, Other drug allergy(995.27), S/P AVR, Shortness of breath, and Teeth missing.  Pt admitted following a fall and diagnosed with a Rt femur fracture. Pt underwent Rt hip MICHAEL on 10-25. Pt lives at home alone in an apartment and at baseline is independent with ADLs, IADLs, and ambulates mod I with 4ww. Pt currently presents with the above impairments and would benefit from continued OT services in inpatient rehab setting at discharge. Complexity: Moderate  Prognosis: Good  Plan: 3+x/week      Goals:  1. Pt will complete all aspects of bed mobility for EOB/OOB ADLs SBA with HOB flat  2. Pt will complete UB/LB bathing min A with setup using long handled sponge PRN  3. Pt will complete all aspects of LB dressing min A with setup using AE PRN  4. Pt will complete all functional transfers to and from bed, chair, toilet, shower chair SBA/good safety awareness  5. Pt will ambulate HH distance to bathroom for toileting SBA with RW  6. Pt will complete all aspects of toileting task CGA  7. Pt will complete oral hygiene/grooming routine in standing at sink SBA with setup  8.  Pt will verbalize/be compliant with 3/3 anterolateral hip precautions 100% of the time      Time:   Time in: 923  Time out: 1006  Timed treatment minutes: 28  Total time: 43      Electronically signed by:    DANIEL García/L, [address] [facility], [phone]

## 2021-10-26 NOTE — PROGRESS NOTES
Hospitalist Progress Note      Name:  Karen Mosqueda /Age/Sex: 1955  (60 y.o. male)   MRN & CSN:  [id number] & [id number] Admission Date/Time: 10/22/2021  2:21 PM   Location:  -A PCP: Gurpreet Thomas, [employer] [address] Day: 5  Discharge barrier: POD 1    Assessment and Plan:   Karen Mosqueda is a 60 y.o.  male with a past medical history of depression, hypertension, mechanical aortic valve on chronic anticoagulation, COPD, who presented to the ED on 10/22/2021 with fall and resultant Closed transcervical fracture of right femur ([address]    1. Fall sequelae: With resultant right hip fracture  Maintain fall precautions  PT/OT    2. Right hip fracture: S/p ORIF with MICHAEL 10/25/2021  Appears orthopedics    3. Mechanical aortic valve: On chronic anticoagulation with Coumadin. We will resume Coumadin today  In the meantime, Lovenox for bridging    4. Acute on chronic anemia: Significant hemo Hgb drop 12.5 on arrival, down to 7 g today. Acute anemia likely due to blood loss operatively in this anticoagulated patient  We will transfuse 1 unit to keep him globin closer to 8 given need for ongoing anticoagulation    5. COPD: Not in acute exacerbation  Continue duo nebs    6. Bipolar disorder with severe depression  Psych following. To be admitted to [facility] psych unit once medically stable  Suicide precautions  Therapeutic  at bedside. 7.  Hyponatremia: POA. Likely due to intravascular volume depression  Resolved with IV fluids  8. Age-indeterminate L4 compression fracture with suspicious T12 and L1 wedging versus compression fracture    Diet ADULT DIET;  Regular   DVT Prophylaxis [x] Lovenox, []  Heparin, [] SCDs, [] Ambulation   GI Prophylaxis [] PPI,  [] H2 Blocker,  [] Carafate,  [x] Diet/Tube Feeds   Code Status Full Code   Disposition Patient requires continued admission due to needs inpatient psych versus rehab   MDM [] Low, [] Moderate,[x]  High  Patient's risk as above due to multiple comorbidities     History of Present Illness:     Chief Complaint: Closed transcervical fracture of right femur ([address]     Guillermo Baker is a 60 y.o.  male  who presents with right hip pain due to fall. Was found to have hip fracture     Patient is seen and examined today, has no new complaints. Still feels depressed. Pain in right hip is tolerable. Reports that he has done Lovenox shots before when he had had surgeries. Ten point ROS reviewed negative, unless as noted above    Objective: Intake/Output Summary (Last 24 hours) at 10/26/2021 0730  Last data filed at 10/25/2021 1730  Gross per 24 hour   Intake 1650 ml   Output 2750 ml   Net -1100 ml        Vitals:   Vitals:    10/26/21 0530 10/26/21 0545 10/26/21 0600 10/26/21 0615   BP:   (!) 144/62    Pulse: 60 89 95 91   Resp: 14 25 17 15   Temp:       TempSrc:       SpO2: 97% 98% 99% 98%   Weight:       Height:            Physical Exam:   GEN: Awake male, alert and oriented x3 in no apparent distress. Appears given age. HEENT: Normal   RESP: Clear lung fields bilaterally. Symmetric chest movement while on room air  CVS: RRR, S1, S2. Ejection systolic click   GI/: Abdomen is soft, nontender, no organomegaly. . Bowel sounds normal, rectal exam deferred. No CVA tenderness. MSK: No gross joint deformities. No tenderness  SKIN: Normal coloration, warm, dry. NEURO: Cranial nerves appear grossly intact, normal speech, no lateralizing weakness.   PSYCH:  Affect is depressed    Medications:   Medications:    sodium chloride flush  10 mL IntraVENous 2 times per day    aspirin  325 mg Oral BID    influenza virus vaccine  0.5 mL IntraMUSCular Prior to discharge    traZODone  200 mg Oral Nightly    nicotine  1 patch TransDERmal Daily    acetaminophen  650 mg Oral Once    calcium elemental  500 mg Oral Daily    montelukast  10 mg Oral Nightly    atorvastatin  40 mg Oral Daily    venlafaxine  150 mg Oral Daily    sodium chloride

## 2021-10-27 LAB
ABO/RH: NORMAL
ANION GAP SERPL CALCULATED.3IONS-SCNC: 11 MMOL/L (ref 4–16)
ANTIBODY SCREEN: NEGATIVE
BASOPHILS ABSOLUTE: 0 K/CU MM
BASOPHILS RELATIVE PERCENT: 0.3 % (ref 0–1)
BUN BLDV-MCNC: 15 MG/DL (ref 6–23)
CALCIUM SERPL-MCNC: 8.3 MG/DL (ref 8.3–10.6)
CHLORIDE BLD-SCNC: 97 MMOL/L (ref 99–110)
CO2: 23 MMOL/L (ref 21–32)
COMPONENT: NORMAL
CREAT SERPL-MCNC: 0.7 MG/DL (ref 0.9–1.3)
CROSSMATCH RESULT: NORMAL
DIFFERENTIAL TYPE: ABNORMAL
EOSINOPHILS ABSOLUTE: 0 K/CU MM
EOSINOPHILS RELATIVE PERCENT: 0.2 % (ref 0–3)
GFR AFRICAN AMERICAN: >60 ML/MIN/1.73M2
GFR NON-AFRICAN AMERICAN: >60 ML/MIN/1.73M2
GLUCOSE BLD-MCNC: 123 MG/DL (ref 70–99)
HCT VFR BLD CALC: 25.3 % (ref 42–52)
HEMOGLOBIN: 8.3 GM/DL (ref 13.5–18)
IMMATURE NEUTROPHIL %: 1.3 % (ref 0–0.43)
INR BLD: 1.07 INDEX
LYMPHOCYTES ABSOLUTE: 1.1 K/CU MM
LYMPHOCYTES RELATIVE PERCENT: 10.9 % (ref 24–44)
MCH RBC QN AUTO: 29.9 PG (ref 27–31)
MCHC RBC AUTO-ENTMCNC: 32.8 % (ref 32–36)
MCV RBC AUTO: 91 FL (ref 78–100)
MONOCYTES ABSOLUTE: 1.1 K/CU MM
MONOCYTES RELATIVE PERCENT: 10.4 % (ref 0–4)
NUCLEATED RBC %: 0 %
PDW BLD-RTO: 15.3 % (ref 11.7–14.9)
PLATELET # BLD: 365 K/CU MM (ref 140–440)
PMV BLD AUTO: 8.9 FL (ref 7.5–11.1)
POTASSIUM SERPL-SCNC: 4 MMOL/L (ref 3.5–5.1)
PROTHROMBIN TIME: 13.8 SECONDS (ref 11.7–14.5)
RBC # BLD: 2.78 M/CU MM (ref 4.6–6.2)
SEGMENTED NEUTROPHILS ABSOLUTE COUNT: 8 K/CU MM
SEGMENTED NEUTROPHILS RELATIVE PERCENT: 76.9 % (ref 36–66)
SODIUM BLD-SCNC: 131 MMOL/L (ref 135–145)
STATUS: NORMAL
TOTAL IMMATURE NEUTOROPHIL: 0.13 K/CU MM
TOTAL NUCLEATED RBC: 0 K/CU MM
TRANSFUSION STATUS: NORMAL
UNIT DIVISION: 0
UNIT NUMBER: NORMAL
WBC # BLD: 10.4 K/CU MM (ref 4–10.5)

## 2021-10-27 PROCEDURE — 85025 COMPLETE CBC W/AUTO DIFF WBC: CPT

## 2021-10-27 PROCEDURE — 6370000000 HC RX 637 (ALT 250 FOR IP): Performed by: INTERNAL MEDICINE

## 2021-10-27 PROCEDURE — 99232 SBSQ HOSP IP/OBS MODERATE 35: CPT | Performed by: INTERNAL MEDICINE

## 2021-10-27 PROCEDURE — 6360000002 HC RX W HCPCS: Performed by: INTERNAL MEDICINE

## 2021-10-27 PROCEDURE — 94761 N-INVAS EAR/PLS OXIMETRY MLT: CPT

## 2021-10-27 PROCEDURE — 2580000003 HC RX 258: Performed by: ORTHOPAEDIC SURGERY

## 2021-10-27 PROCEDURE — 6370000000 HC RX 637 (ALT 250 FOR IP): Performed by: ORTHOPAEDIC SURGERY

## 2021-10-27 PROCEDURE — 97116 GAIT TRAINING THERAPY: CPT

## 2021-10-27 PROCEDURE — 97530 THERAPEUTIC ACTIVITIES: CPT

## 2021-10-27 PROCEDURE — 85610 PROTHROMBIN TIME: CPT

## 2021-10-27 PROCEDURE — 94150 VITAL CAPACITY TEST: CPT

## 2021-10-27 PROCEDURE — 97110 THERAPEUTIC EXERCISES: CPT

## 2021-10-27 PROCEDURE — 2000000000 HC ICU R&B

## 2021-10-27 PROCEDURE — 97535 SELF CARE MNGMENT TRAINING: CPT

## 2021-10-27 PROCEDURE — 36415 COLL VENOUS BLD VENIPUNCTURE: CPT

## 2021-10-27 PROCEDURE — 1200000000 HC SEMI PRIVATE

## 2021-10-27 PROCEDURE — 80048 BASIC METABOLIC PNL TOTAL CA: CPT

## 2021-10-27 RX ADMIN — Medication 500 MG: at 09:22

## 2021-10-27 RX ADMIN — OXYCODONE HYDROCHLORIDE 5 MG: 5 TABLET ORAL at 20:03

## 2021-10-27 RX ADMIN — ATORVASTATIN CALCIUM 40 MG: 40 TABLET, FILM COATED ORAL at 09:22

## 2021-10-27 RX ADMIN — ENOXAPARIN SODIUM 70 MG: 80 INJECTION SUBCUTANEOUS at 20:03

## 2021-10-27 RX ADMIN — WARFARIN SODIUM 7.5 MG: 7.5 TABLET ORAL at 18:49

## 2021-10-27 RX ADMIN — ASPIRIN 325 MG: 325 TABLET, COATED ORAL at 09:23

## 2021-10-27 RX ADMIN — TRAZODONE HYDROCHLORIDE 200 MG: 50 TABLET ORAL at 20:03

## 2021-10-27 RX ADMIN — SODIUM CHLORIDE, PRESERVATIVE FREE 10 ML: 5 INJECTION INTRAVENOUS at 09:22

## 2021-10-27 RX ADMIN — ACETAMINOPHEN 650 MG: 325 TABLET ORAL at 06:21

## 2021-10-27 RX ADMIN — VENLAFAXINE HYDROCHLORIDE 150 MG: 150 CAPSULE, EXTENDED RELEASE ORAL at 09:22

## 2021-10-27 RX ADMIN — OXYCODONE HYDROCHLORIDE 5 MG: 5 TABLET ORAL at 06:21

## 2021-10-27 RX ADMIN — SODIUM CHLORIDE, PRESERVATIVE FREE 10 ML: 5 INJECTION INTRAVENOUS at 09:23

## 2021-10-27 RX ADMIN — SODIUM CHLORIDE, PRESERVATIVE FREE 10 ML: 5 INJECTION INTRAVENOUS at 20:04

## 2021-10-27 RX ADMIN — ENOXAPARIN SODIUM 70 MG: 80 INJECTION SUBCUTANEOUS at 09:23

## 2021-10-27 RX ADMIN — ASPIRIN 325 MG: 325 TABLET, COATED ORAL at 20:03

## 2021-10-27 RX ADMIN — MONTELUKAST 10 MG: 10 TABLET, FILM COATED ORAL at 20:03

## 2021-10-27 ASSESSMENT — PAIN DESCRIPTION - PROGRESSION

## 2021-10-27 ASSESSMENT — PAIN SCALES - GENERAL
PAINLEVEL_OUTOF10: 0
PAINLEVEL_OUTOF10: 4
PAINLEVEL_OUTOF10: 0
PAINLEVEL_OUTOF10: 3
PAINLEVEL_OUTOF10: 4
PAINLEVEL_OUTOF10: 4

## 2021-10-27 ASSESSMENT — PAIN DESCRIPTION - LOCATION: LOCATION: HIP;INCISION

## 2021-10-27 ASSESSMENT — PAIN DESCRIPTION - DESCRIPTORS: DESCRIPTORS: ACHING

## 2021-10-27 ASSESSMENT — PAIN DESCRIPTION - ORIENTATION: ORIENTATION: RIGHT

## 2021-10-27 ASSESSMENT — PAIN DESCRIPTION - PAIN TYPE: TYPE: SURGICAL PAIN

## 2021-10-27 NOTE — PROGRESS NOTES
Physical Therapy    Physical Therapy Treatment Note  Name: Estuardo Ang MRN: [id number] :   1955   Date:  10/27/2021   Admission Date: 10/22/2021 Room:     Restrictions/Precautions:        Anterior Hip precautions  No hip extension, no hip adduction and no hip external rotation  Fall risk suicidal precautions  Communication with other providers:  Was unable to reach nursing by phone, OT treated this am  Subjective:  Patient states:  I am ready to go  Pain:   Location, Type, Intensity (0/10 to 10/10): A little pinching  Objective:    Observation:  Pt was in bed visiting with his sister and sitter in the room  Treatment, including education/measures:  reviewed precautions with pt   Supine Exercises: Ankle pumps x 15  Quad sets x 10  Glute sets x 10  Heel slides x 10  SAQ's x 10  Therapeutic Exercise:  Therapeutic exercises were instructed today. Cues were given for technique, safety, recruitment, and rationale. Cues were verbal and/or tactile. Transfers  Supine to sit :CGA  Sit to supine :CGA  Scooting :SBA  Sit to stand :CGA and VC's for hand placements and precautions from bed and toilet  Stand to sit :CGA and VC's for hand placements and precautions to toilet and bed  Gait:  Pt amb with RW for 50 ft x 2 with CGA to SBA, we did not leave the room. Pt needed VC's for sequence and pathway  Safety  Patient left safely in the bed, with call light/phone in reach with sitter in the room. Gait belt was used for transfers and gait.   Assessment / Impression:     Patient's tolerance of treatment:  Good, pt very polite and motivated   Adverse Reaction: none  Significant change in status and impact:  none  Barriers to improvement:  Pain and stiffness  Plan for Next Session:    Will cont to work towards pt's goals per his tolerance  Time in:  1448  Time out:  1530  Timed treatment minutes:  42  Total treatment time:  42  Previously filed items:  Social/Functional History  Lives With: Alone  Type of Home: Apartment  Home Layout: One level  Home Access: Ramped entrance  Bathroom Shower/Tub: Tub/Shower unit  Bathroom Toilet: Handicap height  Bathroom Accessibility: Accessible  Home Equipment: 4 wheeled walker  ADL Assistance: Independent  Homemaking Assistance: Independent  Homemaking Responsibilities: Yes  Ambulation Assistance: Independent (mod I with 4ww as of late)  Transfer Assistance: Independent  Active : No (Sister drives)  Occupation: Retired  Short term goals  Time Frame for [employer] term goals: 1 week  Short term goal 1: Pt will AMB x75 ft continuously with RW, step-through pattern , CGA. Short term goal 2: Pt will perform sup<>sit wit SBA for safety only. Short term goal 3: Pt will participate in LE TherEx x15 reps each  Short term goal 4: Pt will demonstrate good carryover with precautions. Electronically signed by:     Mabel Su PTA  10/27/2021, 2:48 PM

## 2021-10-27 NOTE — PROGRESS NOTES
Cardiology Progress Note     Today's Plan CPM  rerstart anticoag per ortho    Admit Date:  10/22/2021    Consult reason/ Seen  for: pre op clearance     Subjective and  Overnight Events:  Resting in bed- denies chest pain or shortness of breath     Assessment / Plan / Recommendation:     1. Pre op - moderate risk risk  2. VHD- h/o AVR- stable - AC on hold for surgery- resume once able   S/p fall with fracture of right femoral neck -had surgical repair    Has suicidal ideation , has sitter    History of Presenting Illness:    Chief complain on admission : 60 y. o.year old who is admitted for  Chief Complaint   Patient presents with    Hip Pain     right hip goes to right leg/thigh        Past medical history:    has a past medical history of Acid reflux, Acute frontal sinusitis, Alcohol abuse, Anesthesia, Anxiety, Arthritis, Atypical mycobacterial infection, Broken teeth, CHF (congestive heart failure) ([facility]), COPD (chronic obstructive pulmonary disease) ([facility] Cough, Depression, Depression, Dyspnea, H/O cardiovascular MUGA stress test, H/O echocardiogram, H/O echocardiogram, History of 24 hour EKG monitoring, [place] (hard of hearing), Hyperlipidemia, Hypertension, Irritant contact dermatitis due to other chemical products, Other drug allergy(995.27), S/P AVR, Shortness of breath, and Teeth missing. Past surgical history:   has a past surgical history that includes Lung biopsy (Right, 1996); Rotator cuff repair (Right, 2008); Dental surgery; Cardiac valve replacement (12/17/2003); Colonoscopy (2006); Endoscopy, colon, diagnostic (In Past); Tonsillectomy (1959 Or 1960); bronchoscopy (1996); Knee arthroscopy (Left, 1992); other surgical history (1992); and HEMIARTHROPLASTY SHOULDER FRACTURE (Right, 1/29/2019). Social History:   reports that he has never smoked. His smokeless tobacco use includes snuff and chew.  He reports current alcohol use. He reports that he does not use drugs. Family history:  family history includes Asthma in his sister; COPD in his father and sister; Cancer in his mother; Diabetes in his mother and sister; Heart Disease in his father; High Blood Pressure in his mother and sister; High Cholesterol in his mother and sister; No Known Problems in his son; Obesity in his mother; Other in his sister; Vision Loss in his mother. Allergies   Allergen Reactions    Ciprofloxacin Hcl Hives and Swelling    Levaquin [Levofloxacin] Hives and Swelling    Other Nausea And Vomiting     \"Allergic To Tylox\"    Ceftin [Cefuroxime]        Review of Systems:   All 14 systems were reviewed and are negative  Except for the positive findings which are documented     BP (!) 127/58   Pulse 88   Temp 97.9 °F (36.6 °C) (Oral)   Resp 17   Ht 5' 4.5\" (1.638 m)   Wt 146 lb 12.8 oz (66.6 kg)   SpO2 97%   BMI 24.81 kg/m²       Intake/Output Summary (Last 24 hours) at 10/27/2021 1401  Last data filed at 10/27/2021 0617  Gross per 24 hour   Intake 862.68 ml   Output 800 ml   Net 62.68 ml       Physical Exam:  Physical Exam  Vitals reviewed. Cardiovascular:      Rate and Rhythm: Normal rate and regular rhythm. Pulses: Normal pulses. Heart sounds: Normal heart sounds. Pulmonary:      Effort: Pulmonary effort is normal.      Breath sounds: Normal breath sounds. Musculoskeletal:      Right lower leg: No edema. Left lower leg: No edema. Skin:     Capillary Refill: Capillary refill takes less than 2 seconds. Neurological:      Mental Status: He is oriented to person, place, and time.           Medications:    enoxaparin  1 mg/kg SubCUTAneous BID    warfarin  7.5 mg Oral Daily    sodium chloride flush  10 mL IntraVENous 2 times per day    aspirin  325 mg Oral BID    influenza virus vaccine  0.5 mL IntraMUSCular Prior to discharge    traZODone  200 mg Oral Nightly    nicotine  1 patch TransDERmal Daily    acetaminophen  650 mg Oral Once    calcium elemental  500 mg Oral Daily    montelukast  10 mg Oral Nightly    atorvastatin  40 mg Oral Daily    venlafaxine  150 mg Oral Daily    sodium chloride flush  5-40 mL IntraVENous 2 times per day      sodium chloride       sodium chloride flush, sodium chloride, acetaminophen, oxyCODONE **OR** oxyCODONE, ketorolac, albuterol sulfate HFA, ipratropium-albuterol, diphenhydrAMINE **AND** haloperidol lactate **AND** LORazepam, morphine, cyclobenzaprine, sodium chloride flush, ondansetron **OR** ondansetron, polyethylene glycol, [DISCONTINUED] acetaminophen **OR** acetaminophen, morphine, hydrALAZINE    Lab Data:  CBC:   Recent Labs     10/25/21  0101 10/25/21  1449 10/26/21  0244   WBC  --  13.0*  --    HGB 7.7* 8.0* 7.0*   HCT 24.0* 24.0* 21.8*   MCV  --  93.8  --    PLT  --  364  --      BMP:   Recent Labs     10/24/21  1500 10/25/21  1449   * 136  135   K 4.0 4.8  4.8    100  99   CO2 21 24 25   BUN 19 14  14   CREATININE 0.4* 0.6*  0.6*     PT/INR:   Recent Labs     10/25/21  1449 10/26/21  0244 10/27/21  0358   PROTIME 12.4 12.6 13.8   INR 0.96 0.98 1.07     BNP:    Recent Labs     10/25/21  1449   PROBNP 445.2*     TROPONIN: No results for input(s): TROPONINT in the last 72 hours. Impression:  Principal Problem:    Closed transcervical fracture of right femur ([facility]  Active Problems:    Severe episode of recurrent major depressive disorder, with psychotic features ([facility]    Passive suicidal ideations    Paranoia ([facility]    Hyperlipidemia    COPD (chronic obstructive pulmonary disease) (H[facility])    Anxiety    Chronic interstitial lung disease ([facility]    Femoral neck stress fracture, initial encounter    H/O mechanical aortic valve replacement    Fatigue fracture of lumbar vertebra with routine healing  Resolved Problems:    * No resolved hospital problems.  *       All labs, medications and tests reviewed by myself, continue all other medications of all above medical condition listed as is except for changes mentioned above. Thank you   Please call with questions.     Electronically signed by Malcolm Michaels MD on 10/27/2021 at 2:01 PM

## 2021-10-27 NOTE — PROGRESS NOTES
Norma Clarke is a 60 y.o. male patient. 1. Closed transcervical fracture of right femur, initial encounter ([facility]    2. Hyponatremia      Past Medical History:   Diagnosis Date    Acid reflux     Acute frontal sinusitis 7/22/2009    Alcohol abuse     \"I Drink Average 2 Beers A Day\"    Anesthesia     \" I get agitated\"    Anxiety     Arthritis     \"Right Shoulder, Neck, Left Knee\"    Atypical mycobacterial infection     Broken teeth     Upper And Lower     [facility]     COPD (chronic obstructive pulmonary disease) ([facility]     Sees Dr. Shanon Todd In [place], [address]     Cough     Frequent Cough With Green Sputum    Depression     Depression     Dyspnea 2/23/2018    H/O cardiovascular MUGA stress test 05/14/2019    EF 50%, NORMAL LVEF, NORMAL WALL MOTION.    H/O echocardiogram 05/22/2014    CE=>76-92%, LV systolic function is low normal, mild aortic valve calcification, no pericardial effusion    H/O echocardiogram 12/12/2018    EF 45-50%    History of 24 hour EKG monitoring 6/6/14    24 hr holter monitor. Ventricular ectopics were noted in single and couplet forms. Supraventricular ectopics were noted in single and pair forms.  [place] (hard of hearing)     Bilateral Ears    Hyperlipidemia     Hypertension     Irritant contact dermatitis due to other chemical products 8/26/2019    Other drug allergy(995.27) 7/22/2009    S/P AVR 12/2003    Mechanical valvue     Shortness of breath     Teeth missing     Upper And Lower     No past surgical history pertinent negatives on file.   Scheduled Meds:   enoxaparin  1 mg/kg SubCUTAneous BID    warfarin  7.5 mg Oral Daily    sodium chloride flush  10 mL IntraVENous 2 times per day    aspirin  325 mg Oral BID    influenza virus vaccine  0.5 mL IntraMUSCular Prior to discharge    traZODone  200 mg Oral Nightly    nicotine  1 patch TransDERmal Daily    acetaminophen  650 mg Oral Once    calcium elemental  500 weight 146 lb 12.8 oz (66.6 kg), SpO2 96 %. RLE - Incision clean, dry, intact, no calf TTP, compartments soft, NVID  No pain with range of motion of the right hip. Assessment & Plan  POD #2 R MICHAEL, Doing well postoperatively    Continue weight bearing as tolerated. Continue range of motion exercises  Pain control  DVT prophylaxis  Continue PT/OT  Discharge planning  Ortho stable for discharge, follow-up in office in 3 weeks.     Barbara [address], DO  10/27/2021

## 2021-10-27 NOTE — PROGRESS NOTES
PHARMACY ANTICOAGULATION MONITORING SERVICE    Sherrie Dodson is a 60 y.o. male on warfarin therapy for mechnical aortic valve. Pharmacy consulted by Dr. Loretta Wakefield for monitoring and adjustment of treatment. Indication for anticoagulation: aortic valve replacement (mechanical)  INR goal: 2-3  Warfarin dose prior to admission: warfarin 5 mg every Sunday and 7.5 mg all other days (per patient)    Pertinent Laboratory Values   Recent Labs     10/25/21  1449 10/25/21  1449 10/26/21  0244 10/26/21  0244 10/27/21  0358   INR 0.96   < > 0.98   < > 1.07   HGB 8.0*   < > 7.0*  --   --    HCT 24.0*   < > 21.8*  --   --      --   --   --   --     < > = values in this interval not displayed.        Assessment/Plan:   Possible DDI's:   o Aspirin 325 mg BID  o Enoxaparin 1 mg/kg subQ BID until INR therapeutic   INR = 1.07, sub-therapeutic following held doses and K-centra (given on 10/24) prior to total hip arthroplasty   Continue warfarin at 7.5 mg daily with additional dose adjustments based on INR trends, interacting medications, and other clinical factors   Pharmacy will continue to monitor and adjust warfarin therapy as indicated    Thank you for the consult,  Steve HERNADEZ [facility], PharmD  10/27/2021 11:06 AM

## 2021-10-27 NOTE — PROGRESS NOTES
Hospitalist Progress Note      Name:  Karen Mosqueda /Age/Sex: 1955  (60 y.o. male)   MRN & CSN:  [id number] & [id number] Admission Date/Time: 10/22/2021  2:21 PM   Location:  -A PCP: Gurpreet Thomas [address] Day: 6  Discharge barrier: POD 1    Assessment and Plan:   Karen Mosqueda is a 60 y.o.  male with a past medical history of depression, hypertension, mechanical aortic valve on chronic anticoagulation, COPD, who presented to the ED on 10/22/2021 with fall and resultant Closed transcervical fracture of right femur ([address]    1. Fall sequelae: With resultant right hip fracture. Reason for hospitalization  Continue PT/OT. 2.  Right hip fracture: S/p ORIF with MICHAEL 10/25/2021  Appreciate orthopedics. 3.  Mechanical aortic valve: Lovenox plus Coumadin restarted 10/26/2021  Pharmacy assisting with dosing. Appreciate. 4.  Acute on chronic anemia: Received 1 unit PRBC 10/26/2021  Recheck Hgb  See progress note 10/26/2021.    5.  COPD: Not in acute exacerbation  Continue Singulair    6. Bipolar disorder with severe depression  Currently on trazodone 200 mg nightly, Effexor 150 mg daily. Appreciate psych. Inpatient psych at discharge. 7.  Hyponatremia: POA. Likely due to intravascular volume depression  Resolved with IV fluids    8. Age-indeterminate L4 compression fracture with suspicious T12 and L1 wedging versus compression fracture  Maintain fall precaution    9. Nicotine dependence: Continue NicoDerm. Diet ADULT DIET;  Regular   DVT Prophylaxis [x] Lovenox, []  Heparin, [] SCDs, [] Ambulation   GI Prophylaxis [] PPI,  [] H2 Blocker,  [] Carafate,  [x] Diet/Tube Feeds   Code Status Full Code   Disposition Patient requires continued admission due to needs inpatient psych versus rehab   MDM [] Low, [] Moderate,[x]  High  Patient's risk as above due to multiple comorbidities     History of Present Illness:     Chief Complaint: Closed transcervical fracture of right femur ([facility]     Thomas Caldwell is a 60 y.o.  male  who presents with right hip pain due to fall. Was found to have hip fracture     10/26/2021: Patient is seen and examined today, has no new complaints. Still feels depressed. Pain in right hip is tolerable. Reports that he has done Lovenox shots before when he had had surgeries. 10/27/2021: Patient is seen and examined, has no new complaints. Feels his mood is better. Feels his pain is better tolerated and is walking more with therapies. Ten point ROS reviewed negative, unless as noted above    Objective: Intake/Output Summary (Last 24 hours) at 10/27/2021 1346  Last data filed at 10/27/2021 0617  Gross per 24 hour   Intake 862.68 ml   Output 800 ml   Net 62.68 ml        Vitals:   Vitals:    10/27/21 0400 10/27/21 0406 10/27/21 0732 10/27/21 0800   BP: (!) 127/58      Pulse: 88      Resp: 23  17    Temp: 98.1 °F (36.7 °C)   97.9 °F (36.6 °C)   TempSrc: Oral   Oral   SpO2:   96% 97%   Weight:  146 lb 12.8 oz (66.6 kg)     Height:            Physical Exam:   GEN: Awake male, alert and oriented x3 in no apparent distress. Appears given age. HEENT: Normal   RESP: Clear lung fields bilaterally. Symmetric chest movement while on room air  CVS: RRR, S1, S2. Ejection systolic click   GI/: Abdomen is soft, nontender, no organomegaly. . Bowel sounds normal, rectal exam deferred. No CVA tenderness. MSK: No gross joint deformities. Diffuse swelling both no undue tenderness to right hip. Right hip wound/scar well apposed. SKIN: Normal coloration, warm, dry. NEURO: Cranial nerves appear grossly intact, normal speech, no lateralizing weakness.   PSYCH:  Affect is appropriate    Medications:   Medications:    enoxaparin  1 mg/kg SubCUTAneous BID    warfarin  7.5 mg Oral Daily    sodium chloride flush  10 mL IntraVENous 2 times per day    aspirin  325 mg Oral BID    influenza virus vaccine  0.5 mL IntraMUSCular Prior to discharge  traZODone  200 mg Oral Nightly    nicotine  1 patch TransDERmal Daily    acetaminophen  650 mg Oral Once    calcium elemental  500 mg Oral Daily    montelukast  10 mg Oral Nightly    atorvastatin  40 mg Oral Daily    venlafaxine  150 mg Oral Daily    sodium chloride flush  5-40 mL IntraVENous 2 times per day      Infusions:    [Held by provider] lactated ringers 125 mL/hr at 10/25/21 1029    sodium chloride       PRN Meds: sodium chloride flush, 10 mL, PRN  sodium chloride, 25 mL, PRN  acetaminophen, 650 mg, Q4H PRN  oxyCODONE, 5 mg, Q4H PRN   Or  oxyCODONE, 10 mg, Q4H PRN  ketorolac, 15 mg, Q6H PRN  albuterol sulfate HFA, 2 puff, Q6H PRN  ipratropium-albuterol, 1 ampule, Q4H PRN  diphenhydrAMINE, 50 mg, Q6H PRN   And  haloperidol lactate, 5 mg, Q6H PRN   And  LORazepam, 2 mg, Q6H PRN  morphine, 4 mg, Q30 Min PRN  cyclobenzaprine, 10 mg, TID PRN  sodium chloride flush, 5-40 mL, PRN  ondansetron, 4 mg, Q8H PRN   Or  ondansetron, 4 mg, Q6H PRN  polyethylene glycol, 17 g, Daily PRN  acetaminophen, 650 mg, Q6H PRN  morphine, 2 mg, Q4H PRN  hydrALAZINE, 10 mg, Q4H PRN          Electronically signed by Rg Murdock MD on 10/27/2021 at 1:46 PM

## 2021-10-27 NOTE — PROGRESS NOTES
Occupational Therapy      Occupational Therapy Treatment Note      Name: Maria Del Carmen Nash MRN: [id number] :   1955   Date:  10/27/2021   Admission Date: 10/22/2021 Room:  -A     Primary Problem: Rt femur fracture s/p Rt MICHAEL    Restrictions/Precautions: General Precautions, Fall Risk, WBAT Rt LE, Anterolateral Hip Precautions, Sitter/Suicide Precautions, IV, Telemetry, Bed/chair alarm    Communication with other providers: RN    Subjective:  Patient states: \"I've been wanting an opportunity to get up. \"  Pain: Pt reported 2/10 surgical pain in Rt hip    Objective:    Observation: Pt received in supine upon OT arrival.  Pleasant and agreeable to treatment. Objective Measures: Stable vitals throughout session    Treatment, including education:  Therapeutic Activity Training:   Therapeutic activity training was instructed today. Cues were given for safety, sequence, UE/LE placement, awareness, and balance. Self Care Training:   Cues were given for safety, sequence, UE/LE placement, visual cues, and balance. Pt received in supine upon OT arrival. Pt re-educated on role of OT, POC, WBAT status Rt LE, and anterolateral hip precautions. Pt transferred supine to sitting EOB SBA with HOB elevated to 30'. Pt with good technique and able to utilize bed features. Pt sat at EOB level with supervision/good static sitting balance. Pt completed sit to stand transfer from bed CGA with min cues for safe hand placement. Pt ambulated to bathroom for toileting CGA with RW. Pt approached toilet, and transferred stand to sit CGA with min cues for extending Rt LE and using grab bar. Pt urinated while seated, and attempted to have bowel movement, but without success. Pt completed task mod A for clothing mgmt prior to task. Pt stood from toilet CGA with cues for use of grab bars. Pt stood at sink, and completed hand hygiene, facial hygiene, and oral hygiene tasks of brushing/rinsing SBA with setup.  Pt ambulated an additional 20 ft in room CGA progressing to SBA with RW. Pt with improved speed and weight-bearing through Rt LE. Pt transferred stand to sit to bed SBA with min cues for reaching back. Pt transferred sitting EOB to supine SBA with HOB elevated to 30'. Pt left positioned for comfort in bed with all lines intact, all needs within reach, and bed alarm on. Assessment / Impression:    Patient's tolerance of treatment: Well  Adverse Reaction: None  Significant change in status and impact: Improved from initial evaluation yesterday  Barriers to improvement: None noted      Plan for Next Session:    Continue per OT POC. Continue to recommend inpatient rehab at discharge.     Time in: 1002  Time out: 1026  Timed treatment minutes: 24  Total treatment time: 24      Electronically signed by:    DANIEL Ingram/L, [address] [place] [phone]

## 2021-10-28 LAB
INR BLD: 1.34 INDEX
PROTHROMBIN TIME: 17.4 SECONDS (ref 11.7–14.5)

## 2021-10-28 PROCEDURE — 97116 GAIT TRAINING THERAPY: CPT

## 2021-10-28 PROCEDURE — 97530 THERAPEUTIC ACTIVITIES: CPT

## 2021-10-28 PROCEDURE — 2000000000 HC ICU R&B

## 2021-10-28 PROCEDURE — 6370000000 HC RX 637 (ALT 250 FOR IP): Performed by: ORTHOPAEDIC SURGERY

## 2021-10-28 PROCEDURE — 2580000003 HC RX 258: Performed by: ORTHOPAEDIC SURGERY

## 2021-10-28 PROCEDURE — 1200000000 HC SEMI PRIVATE

## 2021-10-28 PROCEDURE — 97110 THERAPEUTIC EXERCISES: CPT

## 2021-10-28 PROCEDURE — 6360000002 HC RX W HCPCS: Performed by: INTERNAL MEDICINE

## 2021-10-28 PROCEDURE — 85610 PROTHROMBIN TIME: CPT

## 2021-10-28 PROCEDURE — 6370000000 HC RX 637 (ALT 250 FOR IP): Performed by: INTERNAL MEDICINE

## 2021-10-28 PROCEDURE — 36415 COLL VENOUS BLD VENIPUNCTURE: CPT

## 2021-10-28 PROCEDURE — 94761 N-INVAS EAR/PLS OXIMETRY MLT: CPT

## 2021-10-28 PROCEDURE — 99232 SBSQ HOSP IP/OBS MODERATE 35: CPT | Performed by: INTERNAL MEDICINE

## 2021-10-28 RX ORDER — WARFARIN SODIUM 5 MG/1
5 TABLET ORAL DAILY
Status: DISCONTINUED | OUTPATIENT
Start: 2021-10-28 | End: 2021-10-29

## 2021-10-28 RX ADMIN — ATORVASTATIN CALCIUM 40 MG: 40 TABLET, FILM COATED ORAL at 09:05

## 2021-10-28 RX ADMIN — OXYCODONE HYDROCHLORIDE 5 MG: 5 TABLET ORAL at 09:05

## 2021-10-28 RX ADMIN — SODIUM CHLORIDE, PRESERVATIVE FREE 10 ML: 5 INJECTION INTRAVENOUS at 21:33

## 2021-10-28 RX ADMIN — TRAZODONE HYDROCHLORIDE 200 MG: 50 TABLET ORAL at 21:34

## 2021-10-28 RX ADMIN — VENLAFAXINE HYDROCHLORIDE 150 MG: 150 CAPSULE, EXTENDED RELEASE ORAL at 09:05

## 2021-10-28 RX ADMIN — ACETAMINOPHEN 650 MG: 325 TABLET ORAL at 18:21

## 2021-10-28 RX ADMIN — ENOXAPARIN SODIUM 70 MG: 80 INJECTION SUBCUTANEOUS at 09:00

## 2021-10-28 RX ADMIN — MONTELUKAST 10 MG: 10 TABLET, FILM COATED ORAL at 21:34

## 2021-10-28 RX ADMIN — OXYCODONE HYDROCHLORIDE 5 MG: 5 TABLET ORAL at 18:21

## 2021-10-28 RX ADMIN — ASPIRIN 325 MG: 325 TABLET, COATED ORAL at 21:34

## 2021-10-28 RX ADMIN — ENOXAPARIN SODIUM 70 MG: 80 INJECTION SUBCUTANEOUS at 21:33

## 2021-10-28 RX ADMIN — WARFARIN SODIUM 5 MG: 5 TABLET ORAL at 18:21

## 2021-10-28 RX ADMIN — Medication 500 MG: at 09:05

## 2021-10-28 RX ADMIN — ASPIRIN 325 MG: 325 TABLET, COATED ORAL at 09:05

## 2021-10-28 ASSESSMENT — PAIN DESCRIPTION - PROGRESSION
CLINICAL_PROGRESSION: NOT CHANGED

## 2021-10-28 ASSESSMENT — PAIN SCALES - GENERAL
PAINLEVEL_OUTOF10: 5
PAINLEVEL_OUTOF10: 0
PAINLEVEL_OUTOF10: 4
PAINLEVEL_OUTOF10: 0
PAINLEVEL_OUTOF10: 0

## 2021-10-28 ASSESSMENT — PAIN DESCRIPTION - ORIENTATION
ORIENTATION: RIGHT
ORIENTATION: RIGHT

## 2021-10-28 ASSESSMENT — PAIN DESCRIPTION - FREQUENCY: FREQUENCY: INTERMITTENT

## 2021-10-28 ASSESSMENT — PAIN DESCRIPTION - LOCATION
LOCATION: RIB CAGE
LOCATION: HIP

## 2021-10-28 ASSESSMENT — PAIN DESCRIPTION - DESCRIPTORS: DESCRIPTORS: ACHING;DISCOMFORT

## 2021-10-28 ASSESSMENT — PAIN DESCRIPTION - PAIN TYPE
TYPE: ACUTE PAIN
TYPE: ACUTE PAIN

## 2021-10-28 NOTE — PROGRESS NOTES
Physical Therapy    Physical Therapy Treatment Note  Name: Thomas Caldwell MRN: [id number] :   1955   Date:  10/28/2021   Admission Date: 10/22/2021 Room:     Restrictions/Precautions:        Anterior Hip precautions  No hip extension, no hip adduction and no hip external rotation  Fall risk suicidal precautions,sitter in the room  Communication with other providers:   Subjective:  Patient states:  I can do what you say  Pain:  Location, Type, Intensity (0/10 to 10/10):   Just a little bit  Objective:    Observation:  Pt was in bed   Treatment, including education/measures:  reviewed precautions with pt   Supine Exercises: Ankle pumps x 15  Quad sets x 15  Glute sets x 15  Heel slides x 15  SAQ's x 15  Therapeutic Exercise:  Therapeutic exercises were instructed today. Cues were given for technique, safety, recruitment, and rationale. Cues were verbal and/or tactile. Transfers  Supine to sit :CGA  Sit to supine :CGA  Scooting :SBA  Sit to stand :CGA and VC's for hand placements and precautions from bed and toilet  Stand to sit :CGA and VC's for hand placements and precautions to toilet and bed  Gait:  Pt amb with RW for 40 ft with CGA to SBA,pt c/o dizziness with elevated HR at 127 BPM, we did not leave the room d/t precautions. Pt needed VC's for sequence and pathway  Safety  Patient left safely in the bed, with call light/phone in reach with sitter in the room. Gait belt was used for transfers and gait.   Assessment / Impression:     Patient's tolerance of treatment:  Good, pt very polite and motivated  Adverse Reaction: none  Significant change in status and impact:  none  Barriers to improvement:  Pain and stiffness, dizziness and elevated HR  Plan for Next Session:    Will cont to work towards pt's goals per his tolerance  Time in:  1100  Time out:  1142  Timed treatment minutes:  42  Total treatment time:  42  Previously filed items:  Social/Functional History  Lives With: Alone  Type of Home: Apartment  Home Layout: One level  Home Access: Ramped entrance  Bathroom Shower/Tub: Tub/Shower unit  Bathroom Toilet: Handicap height  Bathroom Accessibility: Accessible  Home Equipment: 4 wheeled walker  ADL Assistance: Independent  Homemaking Assistance: Independent  Homemaking Responsibilities: Yes  Ambulation Assistance: Independent (mod I with 4ww as of late)  Transfer Assistance: Independent  Active : No (Sister drives)  Occupation: Retired  Short term goals  Time Frame for [employer] term goals: 1 week  Short term goal 1: Pt will AMB x75 ft continuously with RW, step-through pattern , CGA. Short term goal 2: Pt will perform sup<>sit wit SBA for safety only. Short term goal 3: Pt will participate in LE TherEx x15 reps each  Short term goal 4: Pt will demonstrate good carryover with precautions. Electronically signed by:     Za Whelan PTA  10/28/2021, 11:26 AM

## 2021-10-28 NOTE — PROGRESS NOTES
Hospitalist Progress Note      Name:  Macario Romero /Age/Sex: 1955  (60 y.o. male)   MRN & CSN:  [id number] & [id number] Admission Date/Time: 10/22/2021  2:21 PM   Location:  -A PCP: Renae Oakley, [address] Day: 7  Discharge barrier: POD 1    Assessment and Plan:   Macaroi Romero is a 60 y.o.  male with a past medical history of depression, hypertension, mechanical aortic valve on chronic anticoagulation, COPD, who presented to the ED on 10/22/2021 with fall and resultant Closed transcervical fracture of right femur ([address]    1. Fall sequelae: With resultant right hip fracture. Reason for hospitalization    2. Right hip fracture: S/p ORIF with MICHAEL 10/25/2021  Out of bed with assistance. PT/OT    3. Mechanical aortic valve: Lovenox and Coumadin restarted since 10/26/2021  INR 1.3 today. Pharmacy assisting with dosing. Appreciate. 4.  Acute on chronic anemia: Received 1 unit PRBC 10/26/2021  Has remained stable posttransfusion. See progress note 10/26/2021.    5.  COPD: Not in acute exacerbation  Continue Singulair    6. Bipolar disorder with severe depression  Currently on trazodone 200 mg nightly, Effexor 150 mg daily. Appreciate psych. Inpatient psych at discharge. 7.  Hyponatremia: Undetermined significance. Initially resolved with IV fluids. 8.  Age-indeterminate L4 compression fracture with suspicious T12 and L1 wedging versus compression fracture  Maintain fall precaution    9. Nicotine dependence: Continue NicoDerm. Diet ADULT DIET;  Regular   DVT Prophylaxis [x] Lovenox, []  Heparin, [] SCDs, [] Ambulation   GI Prophylaxis [] PPI,  [] H2 Blocker,  [] Carafate,  [x] Diet/Tube Feeds   Code Status Full Code   Disposition Patient requires continued admission due to needs inpatient psych versus rehab   MDM [] Low, [] Moderate,[x]  High  Patient's risk as above due to multiple comorbidities     History of Present Illness:     Chief Complaint: Closed transcervical fracture of right femur ([facility]     Suzanne Singh is a 60 y.o.  male  who presents with right hip pain due to fall. Was found to have hip fracture     10/26/2021: Patient is seen and examined today, has no new complaints. Still feels depressed. Pain in right hip is tolerable. Reports that he has done Lovenox shots before when he had had surgeries. 10/27/2021: Patient is seen and examined, has no new complaints. Feels his mood is better. Feels his pain is better tolerated and is walking more with therapies. 10/28/2021: Patient is seen and examined, has no new complaint. Agreeable to going to physical rehab from the hospital    Ten point ROS reviewed negative, unless as noted above    Objective: Intake/Output Summary (Last 24 hours) at 10/28/2021 1333  Last data filed at 10/27/2021 1948  Gross per 24 hour   Intake --   Output 200 ml   Net -200 ml        Vitals:   Vitals:    10/28/21 0900 10/28/21 1000 10/28/21 1100 10/28/21 1200   BP:  (!) 114/59  119/69   Pulse: 89 78 82 85   Resp: 12 18 16 18   Temp:       TempSrc:       SpO2: 98% 96% 99% 99%   Weight:       Height:            Physical Exam:   GEN: Awake male, alert and oriented x3 in no apparent distress. Appears given age. HEENT: Normal   RESP: Clear lung fields bilaterally. Symmetric chest movement while on room air  CVS: RRR, S1, S2. Ejection systolic click all over precordium  GI/: Abdomen is soft, nontender, no organomegaly. . Bowel sounds normal, rectal exam deferred. No CVA tenderness. MSK: No gross joint deformities. Diffuse swelling both no undue tenderness to right hip. Right hip wound/scar well apposed. SKIN: Normal coloration, warm, dry. NEURO: Cranial nerves appear grossly intact, normal speech, no lateralizing weakness.   PSYCH:  Affect is appropriate    Medications:   Medications:    enoxaparin  1 mg/kg SubCUTAneous BID    warfarin  7.5 mg Oral Daily    sodium chloride flush  10 mL IntraVENous 2 times per day    aspirin  325 mg Oral BID    influenza virus vaccine  0.5 mL IntraMUSCular Prior to discharge    traZODone  200 mg Oral Nightly    nicotine  1 patch TransDERmal Daily    acetaminophen  650 mg Oral Once    calcium elemental  500 mg Oral Daily    montelukast  10 mg Oral Nightly    atorvastatin  40 mg Oral Daily    venlafaxine  150 mg Oral Daily    sodium chloride flush  5-40 mL IntraVENous 2 times per day      Infusions:    sodium chloride       PRN Meds: sodium chloride flush, 10 mL, PRN  sodium chloride, 25 mL, PRN  acetaminophen, 650 mg, Q4H PRN  oxyCODONE, 5 mg, Q4H PRN   Or  oxyCODONE, 10 mg, Q4H PRN  ketorolac, 15 mg, Q6H PRN  albuterol sulfate HFA, 2 puff, Q6H PRN  ipratropium-albuterol, 1 ampule, Q4H PRN  diphenhydrAMINE, 50 mg, Q6H PRN   And  haloperidol lactate, 5 mg, Q6H PRN   And  LORazepam, 2 mg, Q6H PRN  morphine, 4 mg, Q30 Min PRN  cyclobenzaprine, 10 mg, TID PRN  sodium chloride flush, 5-40 mL, PRN  ondansetron, 4 mg, Q8H PRN   Or  ondansetron, 4 mg, Q6H PRN  polyethylene glycol, 17 g, Daily PRN  acetaminophen, 650 mg, Q6H PRN  morphine, 2 mg, Q4H PRN  hydrALAZINE, 10 mg, Q4H PRN          Electronically signed by Claudio Pereira MD on 10/28/2021 at 1:33 PM

## 2021-10-28 NOTE — ANESTHESIA POSTPROCEDURE EVALUATION
Department of Anesthesiology  Postprocedure Note    Patient: Kirti Parnell  MRN: [id number]  YOB: 1955  Date of evaluation: 10/27/2021  Time:  8:54 PM     Procedure Summary     Date: 10/25/21 Room / Location: [address] / [facility]    Anesthesia Start: 4476 Anesthesia Stop: Jamie Guerrero    Procedures:       RIGHT HIP TOTAL ARTHROPLASTY (Right )      Procedure Not Yet Scheduled Diagnosis: (righ hip pain)    Surgeons: Chelsea Garcia DO Responsible Provider: Romelia Billy MD    Anesthesia Type: general ASA Status: 3          Anesthesia Type: general    Sloan Phase I: Sloan Score: 10    Sloan Phase II:      Last vitals: Reviewed and per EMR flowsheets.        Anesthesia Post Evaluation    Patient location during evaluation: PACU  Patient participation: complete - patient participated  Level of consciousness: awake and alert  Pain score: 4  Airway patency: patent  Nausea & Vomiting: no nausea and no vomiting  Complications: no  Cardiovascular status: blood pressure returned to baseline  Respiratory status: acceptable  Hydration status: euvolemic

## 2021-10-28 NOTE — CARE COORDINATION
Pt previously evaluated by psych  NP 10/24 and deemed to need inpt psych upon discharge from acute care. Note indicates inquiry submitted to [facility] andthat they can manage PT/OT along with psych treatment. Pt has not been seen by psych since 10/24. Cm spoke with Dr Hood Hairston on the unit today re; pt and he requested that CM get back with him 7 days after pt surgery and seemed to question the ability of pt to receive PT/OT for hip surgery recovery on [facility]. CM consulted with Dir of [facility] who advises re-consult with psych. Perfect serve message to Dr Dora Veloz re; the same. Note PT carmencita recommends IP rehab for pt. Cm to follow.

## 2021-10-28 NOTE — PROGRESS NOTES
PHARMACY ANTICOAGULATION MONITORING SERVICE    Carmen nSell is a 60 y.o. male on warfarin therapy for mechnical aortic valve. Pharmacy consulted by Dr. Stephie Reyna for monitoring and adjustment of treatment. Indication for anticoagulation: aortic valve replacement (mechanical)  INR goal: 2-3  Warfarin dose prior to admission: warfarin 5 mg every Sunday and 7.5 mg all other days (per patient)    Pertinent Laboratory Values   Recent Labs     10/27/21  0358 10/27/21  1848 10/28/21  1118   INR   < >  --  1.34   HGB  --  8.3*  --    HCT  --  25.3*  --    PLT  --  365  --     < > = values in this interval not displayed.        Assessment/Plan:   Possible DDI's:   o Aspirin 325 mg BID  o Enoxaparin 1 mg/kg subQ BID until INR therapeutic   INR = 1.34, sub-therapeutic following held doses and K-centra (given on 10/24) prior to total hip arthroplasty   Based on trends, adjust to 5 mg daily with additional dose adjustments based on INR trends, interacting medications, and other clinical factors   Pharmacy will continue to monitor and adjust warfarin therapy as indicated    Thank you for the consult,  Torrey Stark [facility] [place], PharmD  10/28/2021 4:46 PM

## 2021-10-28 NOTE — PROGRESS NOTES
Cardiology Progress Note     Today's Plan CPM on anticoag    Admit Date:  10/22/2021    Consult reason/ Seen  for: pre op clearance     Subjective and  Overnight Events:  Resting in bed- denies chest pain or shortness of breath     Assessment / Plan / Recommendation:     1. Pre op - moderate risk risk  VHD- h/o AVR- stable   S/p fall with fracture of right femoral neck -had surgical repair    Has suicidal ideation , has sitter    History of Presenting Illness:    Chief complain on admission : 60 y. o.year old who is admitted for  Chief Complaint   Patient presents with    Hip Pain     right hip goes to right leg/thigh        Past medical history:    has a past medical history of Acid reflux, Acute frontal sinusitis, Alcohol abuse, Anesthesia, Anxiety, Arthritis, Atypical mycobacterial infection, Broken teeth, CHF (congestive heart failure) ([facility]), COPD (chronic obstructive pulmonary disease) ([facility] Cough, Depression, Depression, Dyspnea, H/O cardiovascular MUGA stress test, H/O echocardiogram, H/O echocardiogram, History of 24 hour EKG monitoring, [place] (hard of hearing), Hyperlipidemia, Hypertension, Irritant contact dermatitis due to other chemical products, Other drug allergy(995.27), S/P AVR, Shortness of breath, and Teeth missing. Past surgical history:   has a past surgical history that includes Lung biopsy (Right, 1996); Rotator cuff repair (Right, 2008); Dental surgery; Cardiac valve replacement (12/17/2003); Colonoscopy (2006); Endoscopy, colon, diagnostic (In Past); Tonsillectomy (1959 Or 1960); bronchoscopy (1996); Knee arthroscopy (Left, 1992); other surgical history (1992); and HEMIARTHROPLASTY SHOULDER FRACTURE (Right, 1/29/2019). Social History:   reports that he has never smoked. His smokeless tobacco use includes snuff and chew. He reports current alcohol use. He reports that he does not use drugs.   Family history:  family history includes Asthma in his sister; COPD in his father and sister; Cancer in his mother; Diabetes in his mother and sister; Heart Disease in his father; High Blood Pressure in his mother and sister; High Cholesterol in his mother and sister; No Known Problems in his son; Obesity in his mother; Other in his sister; Vision Loss in his mother. Allergies   Allergen Reactions    Ciprofloxacin Hcl Hives and Swelling    Levaquin [Levofloxacin] Hives and Swelling    Other Nausea And Vomiting     \"Allergic To Tylox\"    Ceftin [Cefuroxime]        Review of Systems:   All 14 systems were reviewed and are negative  Except for the positive findings which are documented     BP (!) 113/59   Pulse 66   Temp 98.5 °F (36.9 °C) (Oral)   Resp 15   Ht 5' 4.5\" (1.638 m)   Wt 146 lb 12.8 oz (66.6 kg)   SpO2 96%   BMI 24.81 kg/m²       Intake/Output Summary (Last 24 hours) at 10/28/2021 0647  Last data filed at 10/27/2021 1948  Gross per 24 hour   Intake --   Output 200 ml   Net -200 ml       Physical Exam:  Physical Exam  Vitals reviewed. Cardiovascular:      Rate and Rhythm: Normal rate and regular rhythm. Pulses: Normal pulses. Heart sounds: Normal heart sounds. Pulmonary:      Effort: Pulmonary effort is normal.      Breath sounds: Normal breath sounds. Musculoskeletal:      Right lower leg: No edema. Left lower leg: No edema. Skin:     Capillary Refill: Capillary refill takes less than 2 seconds. Neurological:      Mental Status: He is oriented to person, place, and time.           Medications:    enoxaparin  1 mg/kg SubCUTAneous BID    warfarin  7.5 mg Oral Daily    sodium chloride flush  10 mL IntraVENous 2 times per day    aspirin  325 mg Oral BID    influenza virus vaccine  0.5 mL IntraMUSCular Prior to discharge    traZODone  200 mg Oral Nightly    nicotine  1 patch TransDERmal Daily    acetaminophen  650 mg Oral Once    calcium elemental  500 mg Oral Daily    montelukast  10 mg Oral Nightly    atorvastatin  40 mg Oral Daily    venlafaxine  150 mg Oral Daily    sodium chloride flush  5-40 mL IntraVENous 2 times per day      sodium chloride       sodium chloride flush, sodium chloride, acetaminophen, oxyCODONE **OR** oxyCODONE, ketorolac, albuterol sulfate HFA, ipratropium-albuterol, diphenhydrAMINE **AND** haloperidol lactate **AND** LORazepam, morphine, cyclobenzaprine, sodium chloride flush, ondansetron **OR** ondansetron, polyethylene glycol, [DISCONTINUED] acetaminophen **OR** acetaminophen, morphine, hydrALAZINE    Lab Data:  CBC:   Recent Labs     10/25/21  1449 10/26/21  0244 10/27/21  1848   WBC 13.0*  --  10.4   HGB 8.0* 7.0* 8.3*   HCT 24.0* 21.8* 25.3*   MCV 93.8  --  91.0     --  365     BMP:   Recent Labs     10/25/21  1449 10/27/21  1848     135 131*   K 4.8  4.8 4.0     99 97*   CO2 24  25 23   BUN 14  14 15   CREATININE 0.6*  0.6* 0.7*     PT/INR:   Recent Labs     10/25/21  1449 10/26/21  0244 10/27/21  0358   PROTIME 12.4 12.6 13.8   INR 0.96 0.98 1.07     BNP:    Recent Labs     10/25/21  1449   PROBNP 445.2*     TROPONIN: No results for input(s): TROPONINT in the last 72 hours. Impression:  Principal Problem:    Closed transcervical fracture of right femur ([facility] [address] [facility]  Active Problems:    Severe episode of recurrent major depressive disorder, with psychotic features ([facility] [address]    Passive suicidal ideations    Paranoia ([facility] [address] [facility]    Hyperlipidemia    COPD (chronic obstructive pulmonary disease) (H[facility])    Anxiety    Chronic interstitial lung disease ([facility] [address] [facility]    Femoral neck stress fracture, initial encounter    H/O mechanical aortic valve replacement    Fatigue fracture of lumbar vertebra with routine healing  Resolved Problems:    * No resolved hospital problems.  *       All labs, medications and tests reviewed by myself, continue all other medications of all above medical condition listed as is except for changes mentioned above.    Thank you   Please call with questions.     Electronically signed by Abelino Mars MD on 10/28/2021 at 6:47 AM

## 2021-10-29 LAB
INR BLD: 1.5 INDEX
PROTHROMBIN TIME: 19.4 SECONDS (ref 11.7–14.5)

## 2021-10-29 PROCEDURE — 1200000000 HC SEMI PRIVATE

## 2021-10-29 PROCEDURE — 36415 COLL VENOUS BLD VENIPUNCTURE: CPT

## 2021-10-29 PROCEDURE — 6360000002 HC RX W HCPCS: Performed by: INTERNAL MEDICINE

## 2021-10-29 PROCEDURE — 2000000000 HC ICU R&B

## 2021-10-29 PROCEDURE — 2580000003 HC RX 258: Performed by: ORTHOPAEDIC SURGERY

## 2021-10-29 PROCEDURE — 6370000000 HC RX 637 (ALT 250 FOR IP): Performed by: ORTHOPAEDIC SURGERY

## 2021-10-29 PROCEDURE — 85610 PROTHROMBIN TIME: CPT

## 2021-10-29 PROCEDURE — 97116 GAIT TRAINING THERAPY: CPT

## 2021-10-29 PROCEDURE — 97530 THERAPEUTIC ACTIVITIES: CPT

## 2021-10-29 PROCEDURE — 6370000000 HC RX 637 (ALT 250 FOR IP): Performed by: INTERNAL MEDICINE

## 2021-10-29 PROCEDURE — 6360000002 HC RX W HCPCS: Performed by: ORTHOPAEDIC SURGERY

## 2021-10-29 PROCEDURE — 97535 SELF CARE MNGMENT TRAINING: CPT

## 2021-10-29 PROCEDURE — 99231 SBSQ HOSP IP/OBS SF/LOW 25: CPT | Performed by: INTERNAL MEDICINE

## 2021-10-29 PROCEDURE — 94761 N-INVAS EAR/PLS OXIMETRY MLT: CPT

## 2021-10-29 PROCEDURE — 97110 THERAPEUTIC EXERCISES: CPT

## 2021-10-29 PROCEDURE — 99221 1ST HOSP IP/OBS SF/LOW 40: CPT | Performed by: PSYCHIATRY & NEUROLOGY

## 2021-10-29 RX ORDER — WARFARIN SODIUM 6 MG/1
6 TABLET ORAL DAILY
Status: DISCONTINUED | OUTPATIENT
Start: 2021-10-29 | End: 2021-11-01

## 2021-10-29 RX ADMIN — POLYETHYLENE GLYCOL (3350) 17 G: 17 POWDER, FOR SOLUTION ORAL at 20:44

## 2021-10-29 RX ADMIN — SODIUM CHLORIDE, PRESERVATIVE FREE 10 ML: 5 INJECTION INTRAVENOUS at 20:17

## 2021-10-29 RX ADMIN — SODIUM CHLORIDE, PRESERVATIVE FREE 10 ML: 5 INJECTION INTRAVENOUS at 09:12

## 2021-10-29 RX ADMIN — KETOROLAC TROMETHAMINE 15 MG: 30 INJECTION, SOLUTION INTRAMUSCULAR; INTRAVENOUS at 14:37

## 2021-10-29 RX ADMIN — ENOXAPARIN SODIUM 70 MG: 80 INJECTION SUBCUTANEOUS at 09:11

## 2021-10-29 RX ADMIN — MONTELUKAST 10 MG: 10 TABLET, FILM COATED ORAL at 20:17

## 2021-10-29 RX ADMIN — WARFARIN SODIUM 6 MG: 6 TABLET ORAL at 18:13

## 2021-10-29 RX ADMIN — ASPIRIN 325 MG: 325 TABLET, COATED ORAL at 09:11

## 2021-10-29 RX ADMIN — SODIUM CHLORIDE, PRESERVATIVE FREE 10 ML: 5 INJECTION INTRAVENOUS at 20:18

## 2021-10-29 RX ADMIN — TRAZODONE HYDROCHLORIDE 200 MG: 50 TABLET ORAL at 20:17

## 2021-10-29 RX ADMIN — KETOROLAC TROMETHAMINE 15 MG: 30 INJECTION, SOLUTION INTRAMUSCULAR; INTRAVENOUS at 20:25

## 2021-10-29 RX ADMIN — ENOXAPARIN SODIUM 70 MG: 80 INJECTION SUBCUTANEOUS at 20:17

## 2021-10-29 RX ADMIN — ASPIRIN 325 MG: 325 TABLET, COATED ORAL at 20:17

## 2021-10-29 RX ADMIN — BISACODYL 10 MG: 5 TABLET, COATED ORAL at 11:02

## 2021-10-29 RX ADMIN — Medication 500 MG: at 09:11

## 2021-10-29 RX ADMIN — VENLAFAXINE HYDROCHLORIDE 150 MG: 150 CAPSULE, EXTENDED RELEASE ORAL at 09:11

## 2021-10-29 RX ADMIN — ATORVASTATIN CALCIUM 40 MG: 40 TABLET, FILM COATED ORAL at 09:11

## 2021-10-29 ASSESSMENT — PAIN DESCRIPTION - LOCATION: LOCATION: HIP

## 2021-10-29 ASSESSMENT — PAIN SCALES - WONG BAKER
WONGBAKER_NUMERICALRESPONSE: 0

## 2021-10-29 ASSESSMENT — PAIN SCALES - GENERAL
PAINLEVEL_OUTOF10: 0
PAINLEVEL_OUTOF10: 7
PAINLEVEL_OUTOF10: 0
PAINLEVEL_OUTOF10: 5

## 2021-10-29 ASSESSMENT — PAIN DESCRIPTION - ORIENTATION: ORIENTATION: RIGHT

## 2021-10-29 ASSESSMENT — PAIN DESCRIPTION - PAIN TYPE: TYPE: ACUTE PAIN;SURGICAL PAIN

## 2021-10-29 NOTE — PLAN OF CARE
Nutrition Problem #1: Inadequate oral intake  Intervention: Food and/or Nutrient Delivery: Continue Current Diet, Start Oral Nutrition Supplement  Nutritional Goals: pt will consume greater than 50-75% of meals and supplements

## 2021-10-29 NOTE — PROGRESS NOTES
PHARMACY ANTICOAGULATION MONITORING SERVICE    Kevyn Valdes is a 73 y.o. male on warfarin therapy for mechnical aortic valve. Pharmacy consulted by Dr. Scarlet Zamora for monitoring and adjustment of treatment. Indication for anticoagulation: aortic valve replacement (mechanical)  INR goal: 2-3  Warfarin dose prior to admission: warfarin 5 mg every Sunday and 7.5 mg all other days (per patient)    Pertinent Laboratory Values   Recent Labs     10/27/21  1848 10/28/21  1118 10/29/21  0718   INR  --    < > 1.50   HGB 8.3*  --   --    HCT 25.3*  --   --      --   --     < > = values in this interval not displayed.        Assessment/Plan:   Possible DDI's:   o Aspirin 325 mg BID  o Enoxaparin 1 mg/kg subQ BID until INR therapeutic   INR = 1.50, sub-therapeutic following held doses and K-centra (given on 10/24) prior to total hip arthroplasty   INR trending up appropriately    Based on trends, adjust to 6 mg daily (home dose 7.5 mg daily) with additional dose adjustments based on INR trends, interacting medications, and other clinical factors   Pharmacy will continue to monitor and adjust warfarin therapy as indicated    Thank you for the consult,  Donavon HERNADEZ D [facility], PharmD  10/29/2021 4:34 PM

## 2021-10-29 NOTE — PROGRESS NOTES
Hospitalist Progress Note      Name:  Bhavik Martínez /Age/Sex: 1955  (73 y.o. male)   MRN & CSN:  [id number] & [id number] Admission Date/Time: 10/22/2021  2:21 PM   Location:  -A PCP: Maddie Ariza, [address] Day: 8  Discharge barrier: S/p MICHAEL 10/25/2021. Needs ongoing rehab. Assessment and Plan:   Bhavik Martínez is a 73 y.o.  male with a past medical history of depression, hypertension, mechanical aortic valve on chronic anticoagulation, COPD, who presented to the ED on 10/22/2021 with fall and resultant Closed transcervical fracture of right femur ([address]    1. Fall sequelae: With resultant right hip fracture. Reason for hospitalization    2. Right hip fracture: S/p ORIF with MICHAEL 10/25/2021  Out of bed with assistance. PT/OT    3. Mechanical aortic valve: Lovenox and Coumadin restarted since 10/26/2021  INR up to 1.5 today. Pharmacy assisting with dosing. Appreciate. 4.  Acute on chronic anemia: Received 1 unit PRBC 10/26/2021  Has remained stable posttransfusion. See progress note 10/26/2021.    5.  COPD: Not in acute exacerbation  Continue Singulair    6. Bipolar disorder with severe depression  Currently on trazodone 200 mg nightly, Effexor 150 mg daily. Appreciate psych reevaluation. Cleared for discharge to physical rehab rather than psych unit    7. Hyponatremia: Undetermined significance. Initially resolved with IV fluids. 8.  Age-indeterminate L4 compression fracture with suspicious T12 and L1 wedging versus compression fracture  Maintain fall precaution    9. Nicotine dependence: Continue NicoDerm. Diet ADULT DIET;  Regular  ADULT ORAL NUTRITION SUPPLEMENT; Breakfast, Dinner; Standard High Calorie/High Protein Oral Supplement   DVT Prophylaxis [x] Lovenox, []  Heparin, [] SCDs, [] Ambulation   GI Prophylaxis [] PPI,  [] H2 Blocker,  [] Carafate,  [x] Diet/Tube Feeds   Code Status Full Code   Disposition Patient requires continued admission due to needs inpatient psych versus rehab   MDM [] Low, [] Moderate,[x]  High  Patient's risk as above due to coordination with multidisciplines     History of Present Illness:     Chief Complaint: Closed transcervical fracture of right femur ([address]     Macario Romero is a 73 y.o.  male  who presents with right hip pain due to fall. Was found to have hip fracture     10/26/2021: Patient is seen and examined today, has no new complaints. Still feels depressed. Pain in right hip is tolerable. Reports that he has done Lovenox shots before when he had had surgeries. 10/27/2021: Patient is seen and examined, has no new complaints. Feels his mood is better. Feels his pain is better tolerated and is walking more with therapies. 10/28/2021: Patient is seen and examined, has no new complaint. Agreeable to going to physical rehab from the hospital    10/29/2021: Patient is seen and examined, has no new complaints. Working with therapies. Ten point ROS reviewed negative, unless as noted above    Objective: Intake/Output Summary (Last 24 hours) at 10/29/2021 1503  Last data filed at 10/29/2021 1401  Gross per 24 hour   Intake 120 ml   Output 580 ml   Net -460 ml        Vitals:   Vitals:    10/29/21 0500 10/29/21 0600 10/29/21 0800 10/29/21 1413   BP:  (!) 117/53 (!) 146/66    Pulse: 69 69 85    Resp: 14 17 14    Temp:   98 °F (36.7 °C)    TempSrc:   Oral    SpO2: 95% 95% 94%    Weight:       Height:    5' 4.49\" (1.638 m)        Physical Exam:   GEN: Awake male, alert and oriented x3 in no apparent distress. Appears given age. HEENT: Normal   RESP: Clear lung fields bilaterally. Symmetric chest movement while on room air  CVS: RRR, S1, S2. Ejection systolic click all over precordium  GI/: Abdomen is soft, nontender, no organomegaly. . Bowel sounds normal, rectal exam deferred. No CVA tenderness. MSK: No gross joint deformities.   Diffuse swelling around right hip scar but no undue tenderness to right hip. Right hip wound/scar well apposed. SKIN: Normal coloration, warm, dry. NEURO: Cranial nerves appear grossly intact, normal speech, no lateralizing weakness.   PSYCH:  Affect is appropriate    Medications:   Medications:    warfarin  5 mg Oral Daily    enoxaparin  1 mg/kg SubCUTAneous BID    sodium chloride flush  10 mL IntraVENous 2 times per day    aspirin  325 mg Oral BID    influenza virus vaccine  0.5 mL IntraMUSCular Prior to discharge    traZODone  200 mg Oral Nightly    nicotine  1 patch TransDERmal Daily    acetaminophen  650 mg Oral Once    calcium elemental  500 mg Oral Daily    montelukast  10 mg Oral Nightly    atorvastatin  40 mg Oral Daily    venlafaxine  150 mg Oral Daily    sodium chloride flush  5-40 mL IntraVENous 2 times per day      Infusions:    sodium chloride       PRN Meds: sodium chloride flush, 10 mL, PRN  sodium chloride, 25 mL, PRN  acetaminophen, 650 mg, Q4H PRN  oxyCODONE, 5 mg, Q4H PRN   Or  oxyCODONE, 10 mg, Q4H PRN  ketorolac, 15 mg, Q6H PRN  albuterol sulfate HFA, 2 puff, Q6H PRN  ipratropium-albuterol, 1 ampule, Q4H PRN  diphenhydrAMINE, 50 mg, Q6H PRN   And  haloperidol lactate, 5 mg, Q6H PRN   And  LORazepam, 2 mg, Q6H PRN  morphine, 4 mg, Q30 Min PRN  cyclobenzaprine, 10 mg, TID PRN  sodium chloride flush, 5-40 mL, PRN  ondansetron, 4 mg, Q8H PRN   Or  ondansetron, 4 mg, Q6H PRN  polyethylene glycol, 17 g, Daily PRN  acetaminophen, 650 mg, Q6H PRN  morphine, 2 mg, Q4H PRN  hydrALAZINE, 10 mg, Q4H PRN          Electronically signed by Yue Last MD on 10/29/2021 at 3:03 PM

## 2021-10-29 NOTE — CARE COORDINATION
Patients primary insurance is [facility] Medicare, pre-cert needed. Pre-cert initiated and pending at this time. Ref #[id number]. Will continue follow for determination.

## 2021-10-29 NOTE — CARE COORDINATION
Spoke with BREANN Chacon RN/[facility] [place] re; [place] plan. She reached out to psych who recommends moving forward with physical rehab as opposed to [facility] at this time. Per Marily Wilson, order given to DC sitter and suicide precautions. Cm spoke with [facility] admissions re; referral. Pt will require precert. White board note left for OT re; need for updated OT notes per ARU request. Cm to follow.

## 2021-10-29 NOTE — PROGRESS NOTES
Comprehensive Nutrition Assessment    Type and Reason for Visit:  Initial (los)    Nutrition Recommendations/Plan:   Please reweigh pt for accuracy  Add standard oral nutrition supplement BID  Encourage po intake as able  Will monitor po intake , weight trends, poc    Nutrition Assessment:  pt admitted for hyponatremia, femoral neck stress fracture, PMH: HLD, HTN, COPD, CHF, POD #3 s/p R MICHAEL, pt unavailable at time of visit, pt on a regular diet with 1 meal of 51-75% documented in the past 72 hr, significant wt loss per chart review, will follow pt at high nutrition risk    Estimated Daily Nutrient Needs:  Energy (kcal):  9967-4911 (25-28 kcal/kg); Weight Used for Energy Requirements:  Current     Protein (g):  67-80 (1.0-1.2 g/kg); Weight Used for Protein Requirements:           Fluid (ml/day):  4911-7008; Method Used for Fluid Requirements:  1 ml/kcal      Nutrition Related Findings:  Na 131, H/H: 8.3/25.3      Wounds:  Surgical Incision       Current Nutrition Therapies:    ADULT DIET;  Regular    Anthropometric Measures:  · Height: 5' 4.49\" (163.8 cm)  · Current Body Weight: 146 lb 13.2 oz (66.6 kg)   · Usual Body Weight: 179 lb 14.3 oz (81.6 kg) ((7/21/21) per chart review)     · Ideal Body Weight: 133 lbs; % Ideal Body Weight 110.4 %   · BMI: 24.8  · BMI Categories: Normal Weight (BMI 22.0 to 24.9) age over 72       Nutrition Diagnosis:   · Inadequate oral intake related to inadequate protein-energy intake as evidenced by weight loss    Nutrition Interventions:   Food and/or Nutrient Delivery:  Continue Current Diet, Start Oral Nutrition Supplement  Nutrition Education/Counseling:  No recommendation at this time   Coordination of Nutrition Care:  Continue to monitor while inpatient    Goals:  pt will consume greater than 50-75% of meals and supplements       Nutrition Monitoring and Evaluation:   Behavioral-Environmental Outcomes:  None Identified   Food/Nutrient Intake Outcomes:  Food and Nutrient Intake, Supplement Intake  Physical Signs/Symptoms Outcomes:  Biochemical Data, Weight, Skin, Fluid Status or Edema, Hemodynamic Status     Discharge Planning:     Too soon to determine     Electronically signed by Rachael Torres MS, RD, LD on 10/29/21 at 2:22 PM EDT    Contact: [phone]

## 2021-10-29 NOTE — PROGRESS NOTES
Occupational Therapy  . Occupational Therapy Treatment Note      Name: Macario Romero MRN: [id number] :   1955   Date:  10/29/2021   Admission Date: 10/22/2021 Room:  -A     Primary Problem:      Restrictions/Precautions:    General precautions, fall risk    WBAT RLE, anterior  hip    Communication with other providers:  Partial cotx with PTA Thad Nelson . Nurse student in to give meds.  in to let patient know physical rehab will  happen before [facility]. Subjective:  Patient states:  Ok thanks maam  Pain: 4/10 (location, type, intensity)    Objective:    Observation:  Patient high fowlers with PTA upon arrival receiving education. agreeble for therapy. Motivated. Polite. Objective Measures:  RA, tele    Treatment, including education:    ADL activity training was instructed today. Cues were given for safety, sequence, UE/LE placement, visual cues, and balance. Activities performed today included dressing, toileting, hand hygiene, and grooming. toileting x2 trials. No succes . - CGA on low commode. Kandy care while seated with CGA for lean. Oral care- CGA in stance at sink. Facial / UB hygiene- CGA in stance at sink  Hand hygiene x2- CGA in stance at sink    Therapeutic activity training was instructed today. Cues were given for safety, sequence, UE/LE placement, awareness, and balance. Activities performed today included bed mobility training, sup-sit, sit-stand, SPT. High fowlers to EOB- SBA plus cues for safety and precautions. EOB to FWW- CGA for safety  patient able to ambulate x5 feet to toilet CGA. Sit to commode x2- CGA plus cues for grab bars. Demo slow and safe descent down. Sitting balance on commodex2- good. Used grab bar and sink when weight shifting. Stand from commode x2- CGA for safety. Demo good use of grab bars after cues.   standing balance at sink x2 - CGA for safety and patients  Request.  patient able to maneuver walker with min cues and sit back to EOB with CGA. Patient required rest break and took meds while EOB. Patient stood back to walker from EOB with CGA and performed functional ambulation x 110 feet    Return supine- SBA with increased time and effort. Patient educated on role of OT , benefits of OT and rationale for therapeutic intervention. Safety, PLB through pain    All therapeutic intervention performed c emphasis on dynamic balance / standing tolerance to increase strength, endurance and activity tolerance for increased Gage c ADL tasks and func transfers / mobility. Patient left safely in bed at end of session, with call light in reach, alarm on and nursing aware. Gait belt was used for func transfers / mobility.         Assessment / Impression:    Patient's tolerance of treatment: great  Adverse Reaction: none  Significant change in status and impact:  none  Barriers to improvement: weakness, pain, decreased safety awareness      Plan for Next Session:    Continue with OT POC    Time in:  1040  Time out:  1137  Timed treatment minutes:  57  Total treatment time:  57      Electronically signed by:    AKIN Thorne COTA/L [id number]  10/29/2021, 10:42 AM

## 2021-10-29 NOTE — PROGRESS NOTES
CARDIOLOGY  NOTE        Name:  Maria Del Carmen Nash /Age/Sex: 1955  (73 y.o. male)   MRN & CSN:  [id number] & [id number] Admission Date/Time: 10/22/2021  2:21 PM   Location:  -A PCP: Lucien Edgar MD       Hospital Day: 8        PLAN FROM CARDIOLOGY FOR TODAY:   Will fu as needed      - cardiology consult is for:  Preop    -  Interval history:  Stable post surgery    · ASSESSMENT/ PLAN:  1.     Stable cardiac status  2. On anticoag for AVR  3. Will FU as needed          Subjective: Todays complain: Patient  No cp, sob    HPI:  Ebonie Guerin is a 73 y. o.year old who and presents with had concerns including Hip Pain (right hip goes to right leg/thigh). Chief Complaint   Patient presents with    Hip Pain     right hip goes to right leg/thigh           Objective: Temperature:  Current - Temp: 98.4 °F (36.9 °C);  Max - Temp  Av.5 °F (36.9 °C)  Min: 98.4 °F (36.9 °C)  Max: 98.7 °F (37.1 °C)    Respiratory Rate : Resp  Av.9  Min: 12  Max: 23    Pulse Range: Pulse  Av.1  Min: 66  Max: 92    Blood Presuure Range:  Systolic (37PYD), SOQ:314 , Min:106 , RNS:775   ; Diastolic (19WNU), JE, Min:52, Max:69      Pulse ox Range: SpO2  Av.2 %  Min: 94 %  Max: 99 %    24hr I & O:      Intake/Output Summary (Last 24 hours) at 10/29/2021 0549  Last data filed at 10/29/2021 0000  Gross per 24 hour   Intake --   Output 581 ml   Net -581 ml         BP (!) 128/57   Pulse 69   Temp 98.4 °F (36.9 °C) (Oral)   Resp 14   Ht 5' 4.5\" (1.638 m)   Wt 146 lb 12.8 oz (66.6 kg)   SpO2 95%   BMI 24.81 kg/m²           Review of Systems:  All 14 systems reviewed, all negative      TELEMETRY: Sinus    has a past medical history of Acid reflux, Acute frontal sinusitis, Alcohol abuse, Anesthesia, Anxiety, Arthritis, Atypical mycobacterial infection, Broken teeth, CHF (congestive heart failure) [address] [address] COPD (chronic obstructive pulmonary disease) [address] [address] Cough, Depression, Depression, Dyspnea, H/O cardiovascular MUGA stress test, H/O echocardiogram, H/O echocardiogram, History of 24 hour EKG monitoring, [place] (hard of hearing), Hyperlipidemia, Hypertension, Irritant contact dermatitis due to other chemical products, Other drug allergy(995.27), S/P AVR, Shortness of breath, and Teeth missing. has a past surgical history that includes Lung biopsy (Right, 1996); Rotator cuff repair (Right, 2008); Dental surgery; Cardiac valve replacement (12/17/2003); Colonoscopy (2006); Endoscopy, colon, diagnostic (In Past); Tonsillectomy (1959 Or 1960); bronchoscopy (1996); Knee arthroscopy (Left, 1992); other surgical history (1992); and HEMIARTHROPLASTY SHOULDER FRACTURE (Right, 1/29/2019).   Physical Exam:  General:  Awake, alert, NAD  Head:normal  Eye:normal  Neck:  No JVD   Chest:  Clear to auscultation, respiration easy  Cardiovascular:  RRR S1S2  Abdomen:   nontender  Extremities:  no edema  Pulses; palpable  Neuro: grossly normal    Medications:    warfarin  5 mg Oral Daily    enoxaparin  1 mg/kg SubCUTAneous BID    sodium chloride flush  10 mL IntraVENous 2 times per day    aspirin  325 mg Oral BID    influenza virus vaccine  0.5 mL IntraMUSCular Prior to discharge    traZODone  200 mg Oral Nightly    nicotine  1 patch TransDERmal Daily    acetaminophen  650 mg Oral Once    calcium elemental  500 mg Oral Daily    montelukast  10 mg Oral Nightly    atorvastatin  40 mg Oral Daily    venlafaxine  150 mg Oral Daily    sodium chloride flush  5-40 mL IntraVENous 2 times per day      sodium chloride       sodium chloride flush, sodium chloride, acetaminophen, oxyCODONE **OR** oxyCODONE, ketorolac, albuterol sulfate HFA, ipratropium-albuterol, diphenhydrAMINE **AND** haloperidol lactate **AND** LORazepam, morphine, cyclobenzaprine, sodium chloride flush, ondansetron **OR** ondansetron, polyethylene glycol, [DISCONTINUED] acetaminophen **OR** acetaminophen, morphine, hydrALAZINE    Lab Data:  CBC:   Recent Labs     10/27/21  1848   WBC 10.4   HGB 8.3*   HCT 25.3*   MCV 91.0        BMP:   Recent Labs     10/27/21  1848   *   K 4.0   CL 97*   CO2 23   BUN 15   CREATININE 0.7*     LIVER PROFILE: No results for input(s): AST, ALT, LIPASE, BILIDIR, BILITOT, ALKPHOS in the last 72 hours. Invalid input(s): AMYLASE,  ALB  PT/INR:   Recent Labs     10/27/21  0358 10/28/21  1118   PROTIME 13.8 17.4*   INR 1.07 1.34     APTT: No results for input(s): APTT in the last 72 hours. BNP:  No results for input(s): BNP in the last 72 hours.   TROPONIN: @TROPONINI:3@  Labs, consult, tests reviewed    Assessment/ PLAN:    As above                  Susie Harrison MD, MD 10/29/2021 5:49 AM

## 2021-10-29 NOTE — PROGRESS NOTES
Received phone call from [facility] with internal medicine, notifying this RN that patient has been cleared from suicide precautions and no longer needs a bedside sitter. Charge RN notified.

## 2021-10-29 NOTE — CONSULTS
Psychiatric Consult  Shabbir Hoover  [phone]  10/22/2021  10/29/21      ID: Patient is a 73 y.o. male    CC: I was depressed but better now    HPI:  Pt is a 74 yo  male who presents for exacerbation of depression S/P hip surgery. Pt noted recent exacarbation of mood but feels safe on his current medications. Pt noted he currently feels safe and comfortable on the unit. Pt was in agreement with treatment team.  Pt was polite and cordial during the interview process. Pt throughout his stay at [facility] pt felt like his medications were working and  felt comfortable being discharged on these medications. Pt was advised to take  all medications as prescribed, follow up with all scheduled appointments and  abstain from any alcohol or illicit substances. Pt was in agreement. Pt felt  safe and comfortable to be discharge and to follow up with outpt mental health. Pt was very optimistic about his D/C and returning to his home. Pt stated that he   was doing \"good,\" today. Pt stated that he slept \"about 6 hours,\" last night. Pt stated that his appetite is \"good. \"  Pt stated that he rates his depression a  \"0,\" on a scale of 0-10 with 10 being the worst and 0 being none. Pt stated  that he rates his anxiety an \"1/10,\" on the same scale. Pt denies any auditory  or visual hallucinations. Pt denied any thoughts to harm himself or anyone else. Pt felt safe and comfortable for D/C. Pt denies any access to guns or weapons. The Pt was educated primarily by verbal means about his diagnoses and their  manifestations in his life. The option for treatment including group individual  therapy programming was offered to him and the use of medications with all their  potential risks, benefits, and side-effects were discussed with the pt at  length. Pt was given the opportunity to ask questions and he participated in the  treatment and planning process.  Pt felt ready and eager to be discharged from  the from the 32.8 32.0 - 36.0 %    RDW 15.3 (H) 11.7 - 14.9 %    Platelets 549 689 - 716 K/CU MM    MPV 8.9 7.5 - 11.1 FL    Differential Type AUTOMATED DIFFERENTIAL     Segs Relative 76.9 (H) 36 - 66 %    Lymphocytes % 10.9 (L) 24 - 44 %    Monocytes % 10.4 (H) 0 - 4 %    Eosinophils % 0.2 0 - 3 %    Basophils % 0.3 0 - 1 %    Segs Absolute 8.0 K/CU MM    Lymphocytes Absolute 1.1 K/CU MM    Monocytes Absolute 1.1 K/CU MM    Eosinophils Absolute 0.0 K/CU MM    Basophils Absolute 0.0 K/CU MM    Nucleated RBC % 0.0 %    Total Nucleated RBC 0.0 K/CU MM    Total Immature Neutrophil 0.13 K/CU MM    Immature Neutrophil % 1.3 (H) 0 - 0.43 %   Basic metabolic panel    Collection Time: 10/27/21  6:48 PM   Result Value Ref Range    Sodium 131 (L) 135 - 145 MMOL/L    Potassium 4.0 3.5 - 5.1 MMOL/L    Chloride 97 (L) 99 - 110 mMol/L    CO2 23 21 - 32 MMOL/L    Anion Gap 11 4 - 16    BUN 15 6 - 23 MG/DL    CREATININE 0.7 (L) 0.9 - 1.3 MG/DL    Glucose 123 (H) 70 - 99 MG/DL    Calcium 8.3 8.3 - 10.6 MG/DL    GFR Non-African American >60 >60 mL/min/1.73m2    GFR African American >60 >60 mL/min/1.73m2   Protime-INR    Collection Time: 10/28/21 11:18 AM   Result Value Ref Range    Protime 17.4 (H) 11.7 - 14.5 SECONDS    INR 1.34 INDEX   Protime-INR    Collection Time: 10/29/21  7:18 AM   Result Value Ref Range    Protime 19.4 (H) 11.7 - 14.5 SECONDS    INR 1.50 INDEX       Review of Systems:  Reports of no current cardiovascular, respiratory, gastrointestinal, genitourinary, integumentary, neurological, muscuoskeletal, or immunological symptoms today. PSYCHIATRIC: See HPI above. Review of Systems:  Reports of no current cardiovascular, respiratory, gastrointestinal, genitourinary, integumentary, neurological, muscuoskeletal, or immunological symptoms today. PSYCHIATRIC: See HPI above. Neurologic examination:  Mental status: The patient is alert, attentive, and oriented.  Speech is clear and fluent with good repetition, comprehension, and naming. he recalls 3/3 objects at 5 minutes. PSYCHIATRIC EXAMINATION / MENTAL STATUS EXAM         General appearance: [x] appears age, []  appears older than stated age,               [x]  adequately dressed and groomed, [] disheveled,               [x]  in no acute distress, [] appears mildly distressed, [] other           MUSCULOSKELETAL:   Gait:   [] normal, [] antalgic, [] unsteady, [x] gait not evaluated   Station:             [] erect, [] sitting, [x] recumbent, [] other        Strength/tone:  [x] muscle strength and tone appear consistent with age and                                        condition     [] atrophy      [] abnormal movements  PSYCHIATRIC:    Appearance: appears stated age. alert and oriented to person, place, time & situation. no acute distress. Adequate grooming and hygeine. Good eye contact. No prominent physical abnormalities. Attitude: Manner is cooperative and pleasant  Motor: No psychomotor agitation, retardation or abnormal movements noted  Speech: Clearly articulated; normal rate, volume, tone & amount. Language: intact understanding and production  Mood: okay  Affect: euthymic, full range, non-labile, congruent with mood and content of speech  Thought Production: Spontaneous. Thought Form: Coherent, linear, logical & goal-directed. No tangentiality or circumstantiality. No flight of ideas or loosening of associations. Thought Content/Perceptions: No SWETA, no AVH, no delusion  Insight: fair  Judgment fair  Memory: Immediate, recent, and remote appear intact, though not formally tested. Attention: maintained throughout interview  Fund of knowledge: Average  Gait/Balance: WNL/WNL           Impression:   MDD recurrent    Problem List:   <principal problem not specified>    Plan:  1. Reviewed treatment plan with patient including medication risks, benefits, side effects. Obtained informed consent for treatment.    2. Psychiatric management:medication initiation and titration, recommend outpt mental health follow up, safe and theraputic environment. 3. Status of problem/condition: ?Improving  4. Medical co-morbidities: Management per [facility] group, appreciate assistance  5. Legal Status: voluntary  6. The treatment team reviewed with the patient the diagnosis and treatment recommendations to include the risks, benefits, and side effects of chosen medications. 7. The patient verbalized understanding and agreed with the treatment regimen as outlined above. 8. Medical records, Labs, Diagnotic tests reviewed  9. Interval History. 10. Review current labs  11. Continue current medications  12. Supportive Therapy Provided  13. Pt had an opportunity to ask questions and address concerns  14. Pt encouraged to continue outpt  Therapy. 15. Pt was in agreement with treatment plan. 16. The risks benefits and side effects of medications were discussed with the patient, including alternatives and no treatment.

## 2021-10-29 NOTE — PLAN OF CARE
Problem: Falls - Risk of:  Goal: Will remain free from falls  Description: Will remain free from falls  Outcome: Met This Shift  Goal: Absence of physical injury  Description: Absence of physical injury  Outcome: Met This Shift     Problem: Pain:  Goal: Pain level will decrease  Description: Pain level will decrease  Outcome: Ongoing  Goal: Control of acute pain  Description: Control of acute pain  Outcome: Ongoing  Goal: Control of chronic pain  Description: Control of chronic pain  Outcome: Met This Shift     Problem: Suicide risk  Goal: Provide patient with safe environment  Description: Provide patient with safe environment  Outcome: Met This Shift

## 2021-10-29 NOTE — PROGRESS NOTES
Physical Therapy    Physical Therapy Treatment Note  Name: Thomas Caldwell MRN: [id number] :   1955   Date:  10/29/2021   Admission Date: 10/22/2021 Room:  A   Restrictions/Precautions:        Anterior Hip precautions: No hip extension, no hip adduction and no hip external rotation,   Communication with other providers:  Per nurse ok to tx and pt no longer on suicidal precautions. CAI joined tx for ADLs/toileting. Subjective:  Patient states:  Pleasant and agreeable to tx. Today is pt's birthday. Pain:   Location, Type, Intensity (0/10 to 10/10):  Rates pain 4 while up on commode  Objective:    Observation:  Alert and oriented. Pt has anxiety with gt and had to take mask off during gt in hallway. Pt needing several standing rest for extended periods due to anxiety and needing to drop his arms down to his side and shake them. Pt with increased heart rate in 130's to 141 when having anxiety. Treatment, including education/measures:  Pt given MICHAEL booklet and reviewed hip precautions. Pt needing cues to maintain with mobility. Ex with written copy: pt needing cues for breathing and frequent rest with reps of ex and between ex. 10 reps aps and circles  5 reps quad sets but reports old issues with \" knee caps\"  10 reps glut sets  8 reps abd/add and declined further reps. Sup <=> sit SBA  Sit<=>stand from bed and commode cga and cues for hand placement. Pt needing to sit on commode x 2 during tx session. Refer to OT note for ADLs. amb with rw and cga/sba 10' x 1, 110' x 1 but needing to stop due to anxiety every 15' to 20'. Safety  Patient left safely in the bed, with call light/phone in reach with alarm applied. Gait belt and mask were used for transfers and gait. Assessment / Impression:       Patient's tolerance of treatment:  good  Adverse Reaction: na  Significant change in status and impact:  na  Barriers to improvement:  Strength and safety  Plan for Next Session:    Cont.  POC  Time in: 1015  Time out:  1137  Timed treatment minutes:  77  Total treatment time:  68    Previously filed items:  Social/Functional History  Lives With: Alone  Type of Home: Apartment  Home Layout: One level  Home Access: Ramped entrance  Bathroom Shower/Tub: Tub/Shower unit  Bathroom Toilet: Handicap height  Bathroom Accessibility: Accessible  Home Equipment: 4 wheeled walker  ADL Assistance: Independent  Homemaking Assistance: [facility] Responsibilities: Yes  Ambulation Assistance: Independent (mod I with 4ww as of late)  Transfer Assistance: Independent  Active : No (Sister drives)  Occupation: Retired  Short term goals  Time Frame for [employer] term goals: 1 week  Short term goal 1: Pt will AMB x75 ft continuously with RW, step-through pattern , CGA. Short term goal 2: Pt will perform sup<>sit wit SBA for safety only. Short term goal 3: Pt will participate in LE TherEx x15 reps each  Short term goal 4: Pt will demonstrate good carryover with precautions.        Electronically signed by:    Karan Amador PTA  10/29/2021, 8:19 AM

## 2021-10-29 NOTE — PROGRESS NOTES
Dr Fiona Spencer wants to ensure plan for DC. Reached out to Psych. Dr Sam Harman agrees that the patient should focus on rehab at this time. OK to DC suicide precautions and sitter and he will come by this afternoon to double check prior to discharge. Dr Fiona Spencer aware. KARI Wallace aware on floor. Orders placed to DC suicide precautions and sitter. MAYA Sharpe aware to initiate Rehab planning.

## 2021-10-30 LAB
INR BLD: 1.45 INDEX
PROTHROMBIN TIME: 18.8 SECONDS (ref 11.7–14.5)

## 2021-10-30 PROCEDURE — 36415 COLL VENOUS BLD VENIPUNCTURE: CPT

## 2021-10-30 PROCEDURE — 97116 GAIT TRAINING THERAPY: CPT

## 2021-10-30 PROCEDURE — 85610 PROTHROMBIN TIME: CPT

## 2021-10-30 PROCEDURE — 6370000000 HC RX 637 (ALT 250 FOR IP): Performed by: ORTHOPAEDIC SURGERY

## 2021-10-30 PROCEDURE — 2580000003 HC RX 258: Performed by: ORTHOPAEDIC SURGERY

## 2021-10-30 PROCEDURE — 97112 NEUROMUSCULAR REEDUCATION: CPT

## 2021-10-30 PROCEDURE — 6360000002 HC RX W HCPCS: Performed by: INTERNAL MEDICINE

## 2021-10-30 PROCEDURE — 6370000000 HC RX 637 (ALT 250 FOR IP): Performed by: INTERNAL MEDICINE

## 2021-10-30 PROCEDURE — 1200000000 HC SEMI PRIVATE

## 2021-10-30 PROCEDURE — 97530 THERAPEUTIC ACTIVITIES: CPT

## 2021-10-30 RX ORDER — LACTULOSE 10 G/15ML
20 SOLUTION ORAL 2 TIMES DAILY
Status: DISCONTINUED | OUTPATIENT
Start: 2021-10-30 | End: 2021-11-03 | Stop reason: HOSPADM

## 2021-10-30 RX ADMIN — ACETAMINOPHEN 650 MG: 325 TABLET ORAL at 18:19

## 2021-10-30 RX ADMIN — SODIUM CHLORIDE, PRESERVATIVE FREE 10 ML: 5 INJECTION INTRAVENOUS at 08:31

## 2021-10-30 RX ADMIN — SODIUM CHLORIDE, PRESERVATIVE FREE 10 ML: 5 INJECTION INTRAVENOUS at 20:26

## 2021-10-30 RX ADMIN — ATORVASTATIN CALCIUM 40 MG: 40 TABLET, FILM COATED ORAL at 08:29

## 2021-10-30 RX ADMIN — MONTELUKAST 10 MG: 10 TABLET, FILM COATED ORAL at 20:25

## 2021-10-30 RX ADMIN — ACETAMINOPHEN 650 MG: 325 TABLET ORAL at 03:14

## 2021-10-30 RX ADMIN — LACTULOSE 20 G: 10 SOLUTION ORAL at 13:27

## 2021-10-30 RX ADMIN — Medication 500 MG: at 08:29

## 2021-10-30 RX ADMIN — ACETAMINOPHEN 650 MG: 325 TABLET ORAL at 09:47

## 2021-10-30 RX ADMIN — ENOXAPARIN SODIUM 70 MG: 80 INJECTION SUBCUTANEOUS at 08:30

## 2021-10-30 RX ADMIN — ENOXAPARIN SODIUM 70 MG: 80 INJECTION SUBCUTANEOUS at 20:24

## 2021-10-30 RX ADMIN — BISACODYL 10 MG: 5 TABLET, COATED ORAL at 13:27

## 2021-10-30 RX ADMIN — ASPIRIN 325 MG: 325 TABLET, COATED ORAL at 08:29

## 2021-10-30 RX ADMIN — WARFARIN SODIUM 6 MG: 6 TABLET ORAL at 18:19

## 2021-10-30 RX ADMIN — LACTULOSE 20 G: 10 SOLUTION ORAL at 20:26

## 2021-10-30 RX ADMIN — ASPIRIN 325 MG: 325 TABLET, COATED ORAL at 20:25

## 2021-10-30 RX ADMIN — VENLAFAXINE HYDROCHLORIDE 150 MG: 150 CAPSULE, EXTENDED RELEASE ORAL at 08:29

## 2021-10-30 RX ADMIN — TRAZODONE HYDROCHLORIDE 200 MG: 50 TABLET ORAL at 20:25

## 2021-10-30 ASSESSMENT — PAIN SCALES - GENERAL
PAINLEVEL_OUTOF10: 4
PAINLEVEL_OUTOF10: 3
PAINLEVEL_OUTOF10: 2
PAINLEVEL_OUTOF10: 0
PAINLEVEL_OUTOF10: 0
PAINLEVEL_OUTOF10: 2
PAINLEVEL_OUTOF10: 4

## 2021-10-30 ASSESSMENT — PAIN SCALES - WONG BAKER: WONGBAKER_NUMERICALRESPONSE: 0

## 2021-10-30 NOTE — PROGRESS NOTES
Physical Therapy    Physical Therapy Treatment Note  Name: Lauren Mi MRN: [id number] :   1955   Date:  10/30/2021   Admission Date: 10/22/2021 Room:  A   Restrictions/Precautions:        WBAT RLE, anterolateral hip precautions, falls risk. Communication with other providers:  Per RNGuera, ok to treat. Subjective:  Patient states:  \"Can we wait until after lunch\"  Pain:   Location, Type, Intensity (0/10 to 10/10):  5/10 lateral R hip  Objective:    Observation:  Supine in bed upon arrival. Nursing in room passing meds. Treatment, including education/measures:  Reviewed exercises given in pamphlet previous session. Pt reports inc knee pain with quad sets and states he is doing less of these. Pt fearful that supine hip abd/add slides will increase pain and has not been doing these either. Reviewed this exercises with pt, able to do 10x with in pain free ROM. Pt performed ambulation in room with SBA for 25 ft and FWW. Intermittent stops for pt to perform deep breathing per pt request. Pt is very methodical.   Pt performed sit to stand transfers x 2 at toilet with SBA and FWW and sit to stand transfers x 5 at bedside with elevation with SBA and FWW. Pt reports no inc in pain at end of session but does not increased fatigue. Assessment / Impression:    Pt tolerated session well. Pt is slow and methodical about all exercises   Patient's tolerance of treatment:  Good. Intermittent standing or seated rest breaks due to reported fatigue. Adverse Reaction: none  Significant change in status and impact:  none  Barriers to improvement:  Requires frequent short rest breaks due to fatigue. Frequent verbal encouragement from clinician that his technique is adequate.   Plan for Next Session:    Continue POC  Time in:  1330  Time out:  1417  Timed treatment minutes:  47  Total treatment time:  52    Previously filed items:  Social/Functional History  Lives With: Alone  Type of Home: [address]  Layout: One level  Home Access: Ramped entrance  Bathroom Shower/Tub: Tub/Shower unit  Bathroom Toilet: Handicap height  Bathroom Accessibility: Accessible  Home Equipment: 4 wheeled walker  ADL Assistance: Independent  Homemaking Assistance: Independent  Homemaking Responsibilities: Yes  Ambulation Assistance: Independent (mod I with 4ww as of late)  Transfer Assistance: Independent  Active : No (Sister drives)  Occupation: Retired  Short term goals  Time Frame for [employer] term goals: 1 week  Short term goal 1: Pt will AMB x75 ft continuously with RW, step-through pattern , CGA. Short term goal 2: Pt will perform sup<>sit wit SBA for safety only. Short term goal 3: Pt will participate in LE TherEx x15 reps each  Short term goal 4: Pt will demonstrate good carryover with precautions.        Electronically signed by:    Candi Mariano PT  10/30/2021, 1:53 PM

## 2021-10-30 NOTE — PROGRESS NOTES
Hospitalist Progress Note      Name:  Maud Boeck /Age/Sex: 1955  (73 y.o. male)   MRN & CSN:  [id number] & [id number] Admission Date/Time: 10/22/2021  2:21 PM   Location:  -A PCP: Juan Aleman, [address] Day: 9  Discharge barrier: S/p MICHAEL 10/25/2021. Needs ongoing rehab. Assessment and Plan:   Maud Boeck is a 73 y.o.  male with a past medical history of depression, hypertension, mechanical aortic valve on chronic anticoagulation, COPD, who presented to the ED on 10/22/2021 with fall and resultant Closed transcervical fracture of right femur ([address]    1. Fall sequelae: With resultant right hip fracture. Reason for hospitalization    2. Right hip fracture: S/p ORIF with MICHAEL 10/25/2021  Out of bed with assistance. PT/OT    3. Mechanical aortic valve: Lovenox and Coumadin restarted since 10/26/2021  INR up to 1.5 today. Pharmacy assisting with dosing. Appreciate. 4.  Acute on chronic anemia: Received 1 unit PRBC 10/26/2021  Has remained stable posttransfusion. See progress note 10/26/2021.    5.  COPD: Not in acute exacerbation  Continue Singulair    6. Bipolar disorder with severe depression  Currently on trazodone 200 mg nightly, Effexor 150 mg daily. Appreciate psych reevaluation. Cleared for discharge to physical rehab rather than psych unit    7. Hyponatremia: Undetermined significance. Initially resolved with IV fluids. 8.  Age-indeterminate L4 compression fracture with suspicious T12 and L1 wedging versus compression fracture  Maintain fall precaution    9. Nicotine dependence: Continue NicoDerm. 10.  Constipation: Aggressive bowel regimen  Lactulose, Dulcolax, mag citrate    Diet ADULT DIET;  Regular  ADULT ORAL NUTRITION SUPPLEMENT; Breakfast, Dinner; Standard High Calorie/High Protein Oral Supplement   DVT Prophylaxis [x] Lovenox, []  Heparin, [] SCDs, [] Ambulation   GI Prophylaxis [] PPI,  [] H2 Blocker,  [] Carafate,  [x] Diet/Tube Feeds   Code Status Full Code   Disposition Patient requires continued admission due to needs inpatient psych versus rehab   MDM [] Low, [] Moderate,[x]  High  Patient's risk as above due to coordination with multidisciplines     History of Present Illness:     Chief Complaint: Closed transcervical fracture of right femur ([address]     Shabbir Hoover is a 73 y.o.  male  who presents with right hip pain due to fall. Was found to have hip fracture     10/26/2021: Patient is seen and examined today, has no new complaints. Still feels depressed. Pain in right hip is tolerable. Reports that he has done Lovenox shots before when he had had surgeries. 10/27/2021: Patient is seen and examined, has no new complaints. Feels his mood is better. Feels his pain is better tolerated and is walking more with therapies. 10/28/2021: Patient is seen and examined, has no new complaint. Agreeable to going to physical rehab from the hospital    10/29/2021: Patient is seen and examined, has no new complaints. Working with therapies. 10/30/2021: Patient is seen and examined, has no new complaints. When asked about bowel movement, he tells me that his last BM was on 10/22/2021      Ten point ROS reviewed negative, unless as noted above    Objective: Intake/Output Summary (Last 24 hours) at 10/30/2021 1503  Last data filed at 10/29/2021 1820  Gross per 24 hour   Intake 240 ml   Output --   Net 240 ml        Vitals:   Vitals:    10/29/21 1600 10/29/21 2016 10/30/21 0203 10/30/21 0829   BP: 121/82 123/61 114/63 (!) 128/57   Pulse: 131 91 79 71   Resp: 21 24 19 19   Temp: 97.8 °F (36.6 °C) 98.4 °F (36.9 °C) 98.1 °F (36.7 °C) 97.7 °F (36.5 °C)   TempSrc: Oral Oral Oral Oral   SpO2:  98% 97% 98%   Weight:       Height:            Physical Exam:   GEN: Awake male, alert and oriented x3 in no apparent distress. Appears given age. HEENT: Normal   RESP: Clear lung fields bilaterally.  Symmetric chest movement while on room air  CVS: RRR, S1, S2. Ejection systolic click all over precordium  GI/: Abdomen is soft, nontender, no organomegaly. . Bowel sounds normal, rectal exam deferred. No CVA tenderness. MSK: No gross joint deformities. Mild swelling around right hip surgical scar but no undue tenderness to right hip. Right hip wound/scar well apposed. SKIN: Normal coloration, warm, dry. NEURO: Cranial nerves appear grossly intact, normal speech, no lateralizing weakness.   PSYCH:  Affect is appropriate    Medications:   Medications:    bisacodyl  10 mg Oral Daily    magnesium citrate  296 mL Oral Once    lactulose  20 g Oral BID    warfarin  6 mg Oral Daily    enoxaparin  1 mg/kg SubCUTAneous BID    sodium chloride flush  10 mL IntraVENous 2 times per day    aspirin  325 mg Oral BID    influenza virus vaccine  0.5 mL IntraMUSCular Prior to discharge    traZODone  200 mg Oral Nightly    nicotine  1 patch TransDERmal Daily    acetaminophen  650 mg Oral Once    calcium elemental  500 mg Oral Daily    montelukast  10 mg Oral Nightly    atorvastatin  40 mg Oral Daily    venlafaxine  150 mg Oral Daily    sodium chloride flush  5-40 mL IntraVENous 2 times per day      Infusions:    sodium chloride       PRN Meds: sodium chloride flush, 10 mL, PRN  sodium chloride, 25 mL, PRN  acetaminophen, 650 mg, Q4H PRN  oxyCODONE, 5 mg, Q4H PRN   Or  oxyCODONE, 10 mg, Q4H PRN  albuterol sulfate HFA, 2 puff, Q6H PRN  ipratropium-albuterol, 1 ampule, Q4H PRN  diphenhydrAMINE, 50 mg, Q6H PRN   And  haloperidol lactate, 5 mg, Q6H PRN   And  LORazepam, 2 mg, Q6H PRN  morphine, 4 mg, Q30 Min PRN  cyclobenzaprine, 10 mg, TID PRN  sodium chloride flush, 5-40 mL, PRN  ondansetron, 4 mg, Q8H PRN   Or  ondansetron, 4 mg, Q6H PRN  polyethylene glycol, 17 g, Daily PRN  acetaminophen, 650 mg, Q6H PRN  morphine, 2 mg, Q4H PRN  hydrALAZINE, 10 mg, Q4H PRN          Electronically signed by Herbie Broderick MD on 10/30/2021 at 3:03 PM

## 2021-10-31 LAB
ANION GAP SERPL CALCULATED.3IONS-SCNC: 12 MMOL/L (ref 4–16)
ATYPICAL LYMPHOCYTE ABSOLUTE COUNT: ABNORMAL
BASOPHILS ABSOLUTE: 0 K/CU MM
BASOPHILS RELATIVE PERCENT: 0.4 % (ref 0–1)
BUN BLDV-MCNC: 9 MG/DL (ref 6–23)
CALCIUM SERPL-MCNC: 8.9 MG/DL (ref 8.3–10.6)
CHLORIDE BLD-SCNC: 98 MMOL/L (ref 99–110)
CO2: 21 MMOL/L (ref 21–32)
CREAT SERPL-MCNC: 0.5 MG/DL (ref 0.9–1.3)
DIFFERENTIAL TYPE: ABNORMAL
DIFFERENTIAL TYPE: ABNORMAL
EOSINOPHILS ABSOLUTE: 0 K/CU MM
EOSINOPHILS RELATIVE PERCENT: 0.3 % (ref 0–3)
GFR AFRICAN AMERICAN: >60 ML/MIN/1.73M2
GFR NON-AFRICAN AMERICAN: >60 ML/MIN/1.73M2
GLUCOSE BLD-MCNC: 155 MG/DL (ref 70–99)
HCT VFR BLD CALC: 25.3 % (ref 42–52)
HEMOGLOBIN: 7.9 GM/DL (ref 13.5–18)
IMMATURE NEUTROPHIL %: 3.8 % (ref 0–0.43)
INR BLD: 1.7 INDEX
LYMPHOCYTES ABSOLUTE: 1.4 K/CU MM
LYMPHOCYTES RELATIVE PERCENT: 13.3 % (ref 24–44)
MCH RBC QN AUTO: 29.6 PG (ref 27–31)
MCHC RBC AUTO-ENTMCNC: 31.2 % (ref 32–36)
MCV RBC AUTO: 94.8 FL (ref 78–100)
MONOCYTES ABSOLUTE: 0.6 K/CU MM
MONOCYTES RELATIVE PERCENT: 5.9 % (ref 0–4)
NUCLEATED RBC %: 0 %
PDW BLD-RTO: 14.2 % (ref 11.7–14.9)
PLATELET # BLD: 572 K/CU MM (ref 140–440)
PLT MORPHOLOGY: ABNORMAL
PMV BLD AUTO: 8.7 FL (ref 7.5–11.1)
POTASSIUM SERPL-SCNC: 4.1 MMOL/L (ref 3.5–5.1)
PROTHROMBIN TIME: 22.1 SECONDS (ref 11.7–14.5)
RBC # BLD: 2.67 M/CU MM (ref 4.6–6.2)
SEGMENTED NEUTROPHILS ABSOLUTE COUNT: 7.9 K/CU MM
SEGMENTED NEUTROPHILS RELATIVE PERCENT: 76.3 % (ref 36–66)
SODIUM BLD-SCNC: 131 MMOL/L (ref 135–145)
TOTAL IMMATURE NEUTOROPHIL: 0.4 K/CU MM
TOTAL NUCLEATED RBC: 0 K/CU MM
WBC # BLD: 10.4 K/CU MM (ref 4–10.5)

## 2021-10-31 PROCEDURE — 6370000000 HC RX 637 (ALT 250 FOR IP): Performed by: ORTHOPAEDIC SURGERY

## 2021-10-31 PROCEDURE — 6370000000 HC RX 637 (ALT 250 FOR IP): Performed by: INTERNAL MEDICINE

## 2021-10-31 PROCEDURE — 2580000003 HC RX 258: Performed by: ORTHOPAEDIC SURGERY

## 2021-10-31 PROCEDURE — 80048 BASIC METABOLIC PNL TOTAL CA: CPT

## 2021-10-31 PROCEDURE — 85025 COMPLETE CBC W/AUTO DIFF WBC: CPT

## 2021-10-31 PROCEDURE — 1200000000 HC SEMI PRIVATE

## 2021-10-31 PROCEDURE — 6360000002 HC RX W HCPCS: Performed by: INTERNAL MEDICINE

## 2021-10-31 PROCEDURE — 90686 IIV4 VACC NO PRSV 0.5 ML IM: CPT | Performed by: INTERNAL MEDICINE

## 2021-10-31 PROCEDURE — G0008 ADMIN INFLUENZA VIRUS VAC: HCPCS | Performed by: INTERNAL MEDICINE

## 2021-10-31 PROCEDURE — 85610 PROTHROMBIN TIME: CPT

## 2021-10-31 PROCEDURE — 94761 N-INVAS EAR/PLS OXIMETRY MLT: CPT

## 2021-10-31 PROCEDURE — 36415 COLL VENOUS BLD VENIPUNCTURE: CPT

## 2021-10-31 RX ADMIN — ENOXAPARIN SODIUM 70 MG: 80 INJECTION SUBCUTANEOUS at 08:50

## 2021-10-31 RX ADMIN — SODIUM CHLORIDE, PRESERVATIVE FREE 10 ML: 5 INJECTION INTRAVENOUS at 08:50

## 2021-10-31 RX ADMIN — VENLAFAXINE HYDROCHLORIDE 150 MG: 150 CAPSULE, EXTENDED RELEASE ORAL at 08:49

## 2021-10-31 RX ADMIN — ACETAMINOPHEN 650 MG: 325 TABLET ORAL at 16:39

## 2021-10-31 RX ADMIN — ASPIRIN 325 MG: 325 TABLET, COATED ORAL at 20:06

## 2021-10-31 RX ADMIN — WARFARIN SODIUM 6 MG: 6 TABLET ORAL at 17:03

## 2021-10-31 RX ADMIN — BISACODYL 10 MG: 5 TABLET, COATED ORAL at 08:49

## 2021-10-31 RX ADMIN — Medication 500 MG: at 08:49

## 2021-10-31 RX ADMIN — ACETAMINOPHEN 650 MG: 325 TABLET ORAL at 02:04

## 2021-10-31 RX ADMIN — ACETAMINOPHEN 650 MG: 325 TABLET ORAL at 08:49

## 2021-10-31 RX ADMIN — ATORVASTATIN CALCIUM 40 MG: 40 TABLET, FILM COATED ORAL at 08:49

## 2021-10-31 RX ADMIN — LACTULOSE 20 G: 10 SOLUTION ORAL at 08:50

## 2021-10-31 RX ADMIN — ENOXAPARIN SODIUM 70 MG: 80 INJECTION SUBCUTANEOUS at 20:06

## 2021-10-31 RX ADMIN — ASPIRIN 325 MG: 325 TABLET, COATED ORAL at 08:49

## 2021-10-31 RX ADMIN — MONTELUKAST 10 MG: 10 TABLET, FILM COATED ORAL at 20:06

## 2021-10-31 RX ADMIN — TRAZODONE HYDROCHLORIDE 200 MG: 50 TABLET ORAL at 20:06

## 2021-10-31 RX ADMIN — LACTULOSE 20 G: 10 SOLUTION ORAL at 20:06

## 2021-10-31 RX ADMIN — INFLUENZA A VIRUS A/VICTORIA/2570/2019 IVR-215 (H1N1) ANTIGEN (PROPIOLACTONE INACTIVATED), INFLUENZA A VIRUS A/CAMBODIA/E0826360/2020 IVR-224 (H3N2) ANTIGEN (PROPIOLACTONE INACTIVATED), INFLUENZA B VIRUS B/VICTORIA/705/2018 BVR-11 ANTIGEN (PROPIOLACTONE INACTIVATED), INFLUENZA B VIRUS B/PHUKET/3073/2013 BVR-1B ANTIGEN (PROPIOLACTONE INACTIVATED) 0.5 ML: 15; 15; 15; 15 INJECTION, SUSPENSION INTRAMUSCULAR at 18:54

## 2021-10-31 ASSESSMENT — PAIN SCALES - GENERAL
PAINLEVEL_OUTOF10: 3
PAINLEVEL_OUTOF10: 5
PAINLEVEL_OUTOF10: 5
PAINLEVEL_OUTOF10: 1

## 2021-10-31 NOTE — PROGRESS NOTES
Hospitalist Progress Note      Name:  Kevyn Valdes /Age/Sex: 1955  (73 y.o. male)   MRN & CSN:  [id number] & [id number] Admission Date/Time: 10/22/2021  2:21 PM   Location:  -A PCP: Fredy Man [employer] [address] Day: 10  Discharge barrier: S/p MICHAEL 10/25/2021. Awaiting rehab. Assessment and Plan:   Kevyn Valdes is a 73 y.o.  male with a past medical history of depression, hypertension, mechanical aortic valve on chronic anticoagulation, COPD, who presented to the ED on 10/22/2021 with fall and resultant Closed transcervical fracture of right femur ([address]    1. Fall sequelae: With resultant right hip fracture. Reason for hospitalization    2. Right hip fracture: S/p ORIF with MICHAEL 10/25/2021  Out of bed with assistance. PT/OT recommends rehab. Patient wants rehab.    3.  Mechanical aortic valve: Maintain therapeutic anticoagulation. Lovenox and Coumadin restarted since 10/26/2021  INR up to 1.7 today. Pharmacy assisting with dosing. Appreciate. 4.  Acute on chronic anemia: Received 1 unit PRBC 10/26/2021  Has remained stable posttransfusion. See progress note 10/26/2021.    5.  COPD: Not in acute exacerbation  Continue Singulair    6. Bipolar disorder with severe depression stable on trazodone and Effexor  Trazodone 200 mg nightly, Effexor 150 mg daily. Psychiatric team cleared for discharge to rehab rather than psych unit    7. Hyponatremia: Likely due to antidepressants  Continue close monitoring. Repeat BMP today. 8.  Age-indeterminate L4 compression fracture with suspicious T12 and L1 wedging versus compression fracture  Maintain fall precaution    9. Nicotine dependence: Continue NicoDerm. 10.  Constipation: Resolved with aggressive bowel regimen. Had a large BM 10/30/2021. Diet ADULT DIET;  Regular  ADULT ORAL NUTRITION SUPPLEMENT; Breakfast, Dinner; Standard High Calorie/High Protein Oral Supplement   DVT Prophylaxis [x] Lovenox, [] Heparin, [] SCDs, [] Ambulation   GI Prophylaxis [] PPI,  [] H2 Blocker,  [] Carafate,  [x] Diet/Tube Feeds   Code Status Full Code   Disposition Patient requires continued admission due to needs inpatient psych versus rehab   MDM [] Low, [] Moderate,[x]  High  Patient's risk as above due to coordination with multidisciplines     History of Present Illness:     Chief Complaint: Closed transcervical fracture of right femur ([address]     Maud Boeck is a 73 y.o.  male  who presents with right hip pain due to fall. Was found to have hip fracture     10/26/2021: Patient is seen and examined today, has no new complaints. Still feels depressed. Pain in right hip is tolerable. Reports that he has done Lovenox shots before when he had had surgeries. 10/27/2021: Patient is seen and examined, has no new complaints. Feels his mood is better. Feels his pain is better tolerated and is walking more with therapies. 10/28/2021: Patient is seen and examined, has no new complaint. Agreeable to going to physical rehab from the hospital    10/29/2021: Patient is seen and examined, has no new complaints. Working with therapies. 10/30/2021: Patient is seen and examined, has no new complaints. When asked about bowel movement, he tells me that his last BM was on 10/22/2021    10/31/2021: Patient is seen and examined, has no complaints. Does not feel ready for home and still feels the need to go to rehab. Had several bowel movements after aggressive bowel regimen yesterday.     Ten point ROS reviewed negative, unless as noted above    Objective:     No intake or output data in the 24 hours ending 10/31/21 1220     Vitals:   Vitals:    10/30/21 0203 10/30/21 0829 10/30/21 2046 10/31/21 0200   BP: 114/63 (!) 128/57 (!) 141/69 (!) 140/58   Pulse: 79 71 78 75   Resp: 19 19 20 16   Temp: 98.1 °F (36.7 °C) 97.7 °F (36.5 °C) 98 °F (36.7 °C) 98.2 °F (36.8 °C)   TempSrc: Oral Oral Oral Oral   SpO2: 97% 98% 95% 94%   Weight: Height:            Physical Exam:   GEN: Awake male, alert and oriented x3 in no apparent distress. Appears given age. HEENT: Normal   RESP: Clear lung fields bilaterally. Symmetric chest movement while on room air  CVS: RRR, S1, S2. Ejection systolic click all over precordium  GI/: Abdomen is soft, nontender, no organomegaly. . Bowel sounds normal, rectal exam deferred. No CVA tenderness. MSK: No gross joint deformities. Mild swelling around right hip surgical scar but no undue tenderness to right hip. Right hip wound/scar well apposed. SKIN: Normal coloration, warm, dry. NEURO: Cranial nerves appear grossly intact, normal speech, no lateralizing weakness.   PSYCH:  Affect is appropriate    Medications:   Medications:    bisacodyl  10 mg Oral Daily    magnesium citrate  296 mL Oral Once    lactulose  20 g Oral BID    warfarin  6 mg Oral Daily    enoxaparin  1 mg/kg SubCUTAneous BID    sodium chloride flush  10 mL IntraVENous 2 times per day    aspirin  325 mg Oral BID    influenza virus vaccine  0.5 mL IntraMUSCular Prior to discharge    traZODone  200 mg Oral Nightly    nicotine  1 patch TransDERmal Daily    acetaminophen  650 mg Oral Once    calcium elemental  500 mg Oral Daily    montelukast  10 mg Oral Nightly    atorvastatin  40 mg Oral Daily    venlafaxine  150 mg Oral Daily    sodium chloride flush  5-40 mL IntraVENous 2 times per day      Infusions:    sodium chloride       PRN Meds: sodium chloride flush, 10 mL, PRN  sodium chloride, 25 mL, PRN  acetaminophen, 650 mg, Q4H PRN  oxyCODONE, 5 mg, Q4H PRN   Or  oxyCODONE, 10 mg, Q4H PRN  albuterol sulfate HFA, 2 puff, Q6H PRN  ipratropium-albuterol, 1 ampule, Q4H PRN  diphenhydrAMINE, 50 mg, Q6H PRN   And  haloperidol lactate, 5 mg, Q6H PRN   And  LORazepam, 2 mg, Q6H PRN  morphine, 4 mg, Q30 Min PRN  cyclobenzaprine, 10 mg, TID PRN  sodium chloride flush, 5-40 mL, PRN  ondansetron, 4 mg, Q8H PRN   Or  ondansetron, 4 mg, Q6H PRN  polyethylene glycol, 17 g, Daily PRN  acetaminophen, 650 mg, Q6H PRN  morphine, 2 mg, Q4H PRN  hydrALAZINE, 10 mg, Q4H PRN          Electronically signed by Ashu Abdalla MD on 10/31/2021 at 12:20 PM

## 2021-10-31 NOTE — PROGRESS NOTES
PHARMACY ANTICOAGULATION MONITORING SERVICE    Regla Owens is a 73 y.o. male on warfarin therapy for mechnical aortic valve. Pharmacy consulted by Dr. Jossy Joy for monitoring and adjustment of treatment.     Indication for anticoagulation: aortic valve replacement (mechanical)  INR goal: 2-3  Warfarin dose prior to admission: warfarin 5 mg every Sunday and 7.5 mg all other days (per patient)    Pertinent Laboratory Values   Recent Labs     10/31/21  0406   INR 1.70       Assessment/Plan:   Possible DDI's:   o Aspirin 325 mg BID  o Enoxaparin 1 mg/kg subQ BID until INR therapeutic   INR remains sub-therapeutic following held doses and K-centra (given on 10/24) prior to total hip arthroplasty   INR trending up appropriately @ 1.70   Continue Warfarin 6 mg daily (home dose 7.5 mg daily) with additional dose adjustments based on INR trends, interacting medications, and other clinical factors   Pharmacy will continue to monitor and adjust warfarin therapy as indicated    Thank you for the consult,  [facility],   10/31/2021 11:36 AM

## 2021-11-01 LAB
INR BLD: 2.69 INDEX
PROTHROMBIN TIME: 35 SECONDS (ref 11.7–14.5)

## 2021-11-01 PROCEDURE — 36415 COLL VENOUS BLD VENIPUNCTURE: CPT

## 2021-11-01 PROCEDURE — 6370000000 HC RX 637 (ALT 250 FOR IP): Performed by: INTERNAL MEDICINE

## 2021-11-01 PROCEDURE — 6370000000 HC RX 637 (ALT 250 FOR IP): Performed by: ORTHOPAEDIC SURGERY

## 2021-11-01 PROCEDURE — 97530 THERAPEUTIC ACTIVITIES: CPT

## 2021-11-01 PROCEDURE — 2580000003 HC RX 258: Performed by: ORTHOPAEDIC SURGERY

## 2021-11-01 PROCEDURE — 6360000002 HC RX W HCPCS: Performed by: INTERNAL MEDICINE

## 2021-11-01 PROCEDURE — 1200000000 HC SEMI PRIVATE

## 2021-11-01 PROCEDURE — 97535 SELF CARE MNGMENT TRAINING: CPT

## 2021-11-01 PROCEDURE — 85610 PROTHROMBIN TIME: CPT

## 2021-11-01 PROCEDURE — 97110 THERAPEUTIC EXERCISES: CPT

## 2021-11-01 PROCEDURE — 97116 GAIT TRAINING THERAPY: CPT

## 2021-11-01 RX ADMIN — ASPIRIN 325 MG: 325 TABLET, COATED ORAL at 20:37

## 2021-11-01 RX ADMIN — SODIUM CHLORIDE, PRESERVATIVE FREE 10 ML: 5 INJECTION INTRAVENOUS at 10:25

## 2021-11-01 RX ADMIN — VENLAFAXINE HYDROCHLORIDE 150 MG: 150 CAPSULE, EXTENDED RELEASE ORAL at 10:24

## 2021-11-01 RX ADMIN — MONTELUKAST 10 MG: 10 TABLET, FILM COATED ORAL at 20:37

## 2021-11-01 RX ADMIN — LACTULOSE 20 G: 10 SOLUTION ORAL at 10:22

## 2021-11-01 RX ADMIN — Medication 500 MG: at 10:24

## 2021-11-01 RX ADMIN — LACTULOSE 20 G: 10 SOLUTION ORAL at 20:37

## 2021-11-01 RX ADMIN — TRAZODONE HYDROCHLORIDE 200 MG: 50 TABLET ORAL at 20:37

## 2021-11-01 RX ADMIN — ASPIRIN 325 MG: 325 TABLET, COATED ORAL at 10:24

## 2021-11-01 RX ADMIN — ENOXAPARIN SODIUM 70 MG: 80 INJECTION SUBCUTANEOUS at 10:22

## 2021-11-01 RX ADMIN — ATORVASTATIN CALCIUM 40 MG: 40 TABLET, FILM COATED ORAL at 10:24

## 2021-11-01 RX ADMIN — BISACODYL 10 MG: 5 TABLET, COATED ORAL at 10:24

## 2021-11-01 ASSESSMENT — PAIN SCALES - WONG BAKER
WONGBAKER_NUMERICALRESPONSE: 0

## 2021-11-01 ASSESSMENT — PAIN SCALES - GENERAL
PAINLEVEL_OUTOF10: 0
PAINLEVEL_OUTOF10: 4

## 2021-11-01 NOTE — PROGRESS NOTES
Occupational Therapy      Occupational Therapy Treatment Note      Name: Kevyn Valdes MRN: [id number] :   1955   Date:  2021   Admission Date: 10/22/2021 Room:  [address] [place] [address]-A     Primary Problem: Rt femur fracture s/p Rt MICHAEL    Restrictions/Precautions: General Precautions, Fall Risk, WBAT Rt LE, Anterolateral Hip Precautions    Communication with other providers: Dr. Ruthy Guajardo:  Patient states: \"Thanks for coming back again. You've really helped me out a lot! \"  Pain: Pt reported 3/10 pain in Rt hip    Objective:    Observation: Pt received sitting in chair upon OT arrival. Pleasant and agreeable to treatment. Gave therapist fist bump upon arrival.  Objective Measures: A & O x 4     Treatment, including education:  Therapeutic Activity Training:   Therapeutic activity training was instructed today. Cues were given for safety, sequence, UE/LE placement, awareness, and balance. Self Care Training:   Cues were given for safety, sequence, UE/LE placement, visual cues, and balance. Pt received in supine upon OT arrival. Pt re-educated on role of OT, POC, WBAT status Rt LE, and anterolateral hip precautions. Pt completed sit to stand transfer from chair SBA with min cues for safe hand placement. Pt requesting to ambulate in hallway, and ambulated 100 ft SBA with RW. Pt with mildly antalgic gait, and step-to pattern used. Pt took brief seated rest break in hallway and transferred stand to sit to chair SBA with cues for reaching back. After brief rest, pt completed sit to stand from chair SBA. Pt ambulated 100 ft back to room SBA with RW, and progressed to reciprocal pattern at times. Upon returning to room, pt ambulated into bathroom for toileting SBA with RW. Pt transferred stand to sit to toilet SBA with cues for use of grab bar. Pt urinated while seated, and attempted to have bowel movement. Pt completed task with SBA. Pt stood from toilet SBA, and ambulated to sink SBA with RW.  Pt stood at sink, and completed hand hygiene, facial hygiene, grooming task of combing hair, and oral hygiene tasks of brushing/rinsing SBA with min cues for safe RW placement. Pt ambulated from sink to chair SBA with RW, and transferred stand to sit to chair SBA. Pt educated on donning/doffing socks safely with maintaining anterolateral hip precautions. Pt able to doff/don Lt sock SBA by crossing leg into \"figure 4 position. \" Pt educated to not use this technique to don/doff Rt sock, as this is externally rotating Rt hip. Pt able to lean forward to doff/don Rt sock with SBA and maintain hip precautions. Pt left positioned for comfort in chair with all needs within reach. Assessment / Impression:    Patient's tolerance of treatment: Excellent  Adverse Reaction: None  Significant change in status and impact: Continued improvements this date  Barriers to improvement: None noted this date, psych history could potentially be a barrier      Plan for Next Session:    Continue per OT POC. Continue to recommend inpatient rehab at discharge.     Time in: 920  Time out: 1001  Timed treatment minutes:  41  Total treatment time: 39      Electronically signed by:    Sybil Starr OTR/L, [facility], [id number]

## 2021-11-01 NOTE — PROGRESS NOTES
Physical Therapy    Physical Therapy Treatment Note  Name: Chantal Delarosa MRN: [id number] :   1955   Date:  2021   Admission Date: 10/22/2021 Room:  [address] [place] [address]   Restrictions/Precautions:        fall risk Anterior Hip precautions  No hip extension, no hip adduction and no hip external rotation  Fall risk suicidal precautions  Communication with other providers:  per nurse pt is ok to treat  Subjective:  Patient states:  He walked earlier and is willing to go again  Pain:   Location, Type, Intensity (0/10 to 10/10):  1-2/10  Objective:    Observation:  Pt was up in the chair  Treatment, including education/measures:  Sitting Exercises: pt does ex very slowly  Ankle pumps x 15  Glute sets x 15  LAQ's x 15  Marching x 15   Hip abd x 15  Therapeutic Exercise:  Therapeutic exercises were instructed today. Cues were given for technique, safety, recruitment, and rationale. Cues were verbal and/or tactile. Transfers  Scooting :SBA  Sit to stand :CGA  Stand to sit :CGA  Gait:  Pt amb with RW for 65 ft x 2 with CGA  Pt needed VC's for precautions and staying on task  Gait Comments: with VC's' and TC's for B LE placement, walker placement and sequence throughout ambulation; with VC's and TC's to maintain upright posture in order to avoid COM shifting outside of [place]; with VC's for PLB throughout ambulation  Safety  Patient left safely in the chair, with call light/phone in reach. Gait belt and mask were used for transfers and gait.   Assessment / Impression:     Patient's tolerance of treatment:  Good, very motivated and methodical  Adverse Reaction: none  Significant change in status and impact:  none  Barriers to improvement:  precautions  Plan for Next Session:    Will cont to work towards pt's goals per his tolerance  Time in:  1125  Time out:  1215  Timed treatment minutes:  40  Total treatment time:  40  Previously filed items:  Social/Functional History  Lives With: Alone  Type of Home: [address] [place] [address]  Layout: One level  Home Access: Ramped entrance  Bathroom Shower/Tub: Tub/Shower unit  Bathroom Toilet: Handicap height  Bathroom Accessibility: Accessible  Home Equipment: 4 wheeled walker  ADL Assistance: Independent  Homemaking Assistance: Independent  Homemaking Responsibilities: Yes  Ambulation Assistance: Independent (mod I with 4ww as of late)  Transfer Assistance: Independent  Active : No (Sister drives)  Occupation: Retired  Short term goals  Time Frame for Barr[facility]in term goals: 1 week  Short term goal 1: Pt will AMB x75 ft continuously with RW, step-through pattern , CGA. Short term goal 2: Pt will perform sup<>sit wit SBA for safety only. Short term goal 3: Pt will participate in LE TherEx x15 reps each  Short term goal 4: Pt will demonstrate good carryover with precautions. Electronically signed by:     Tawnya Brittle PTA  11/1/2021, 1:03 PM

## 2021-11-01 NOTE — CARE COORDINATION
Call from [facility] that pt denied for ARU, spoke with pt about back up plan but he is not sure what he would like to do. Will give him PPO SNU and HC list to review. Maribeth sending appeal info to this CM . 1245 PS to Dr Demi Arrington to see if would like to appeal ARU denial.      1300 Dr Demi Arrington signed appeal, called and updated [facility].

## 2021-11-01 NOTE — PLAN OF CARE
Problem: Falls - Risk of:  Goal: Will remain free from falls  Description: Will remain free from falls  Outcome: Ongoing  Goal: Absence of physical injury  Description: Absence of physical injury  Outcome: Ongoing     Problem: Pain:  Goal: Pain level will decrease  Description: Pain level will decrease  Outcome: Ongoing  Goal: Control of acute pain  Description: Control of acute pain  Outcome: Ongoing  Goal: Control of chronic pain  Description: Control of chronic pain  Outcome: Ongoing     Problem: Suicide risk  Goal: Provide patient with safe environment  Description: Provide patient with safe environment  Outcome: Ongoing     Problem: Nutrition  Goal: Optimal nutrition therapy  Outcome: Ongoing

## 2021-11-01 NOTE — PROGRESS NOTES
Hospitalist Progress Note      Name:  Macario Romero /Age/Sex: 1955  (73 y.o. male)   MRN & CSN:  [id number] & [id number] Admission Date/Time: 10/22/2021  2:21 PM   Location:  [address] [place] [address] PCP: Renae Oakley, [address] Day: 11  Discharge barrier: S/p MICHAEL 10/25/2021. Awaiting rehab. Assessment and Plan:   Macario Romero is a 73 y.o.  male with a past medical history of depression, hypertension, mechanical aortic valve on chronic anticoagulation, COPD, who presented to the ED on 10/22/2021 with fall and resultant Closed transcervical fracture of right femur ([address]    1. Fall sequelae: With resultant right hip fracture. Reason for hospitalization    2. Right hip fracture: S/p ORIF with MICHAEL 10/25/2021  Out of bed with assistance. PT/OT recommends rehab. Patient wants rehab.    3.  Mechanical aortic valve: Maintain therapeutic anticoagulation. Lovenox and Coumadin restarted since 10/26/2021  INR up to 2.6 today. Appreciate pharmacy. DC Lovenox. 4.  Acute on chronic anemia: Received 1 unit PRBC 10/26/2021  Has remained stable posttransfusion. See progress note 10/26/2021.    5.  COPD: Not in acute exacerbation  Continue Singulair    6. Bipolar disorder with severe depression stable on trazodone and Effexor  Trazodone 200 mg nightly, Effexor 150 mg daily. Psychiatric team cleared for discharge to rehab rather than psych unit    7. Hyponatremia: Likely due to antidepressants  Continue close monitoring. Repeat BMP today. 8.  Age-indeterminate L4 compression fracture with suspicious T12 and L1 wedging versus compression fracture  Maintain fall precaution    9. Nicotine dependence: Discontinue. Patient reports he has not smoked in 3 weeks and wants to try to see how he will tolerate no nicotine     10. Constipation: Resolved with aggressive bowel regimen. Had a large BM 10/30/2021.     >50% of encounter time spent in counseling/coordination of care    Diet ADULT DIET; Regular  ADULT ORAL NUTRITION SUPPLEMENT; Breakfast, Dinner; Standard High Calorie/High Protein Oral Supplement   DVT Prophylaxis [x] Lovenox, []  Heparin, [] SCDs, [] Ambulation   GI Prophylaxis [] PPI,  [] H2 Blocker,  [] Carafate,  [x] Diet/Tube Feeds   Code Status Full Code   Disposition Patient requires continued admission due to needs inpatient psych versus rehab   MDM [] Low, [] Moderate,[x]  High  Patient's risk as above due to coordination with multidisciplines     History of Present Illness:     Chief Complaint: Closed transcervical fracture of right femur ([facility]     Santosh Ernst is a 73 y.o.  male  who presents with right hip pain due to fall. Was found to have hip fracture     10/26/2021: Patient is seen and examined today, has no new complaints. Still feels depressed. Pain in right hip is tolerable. Reports that he has done Lovenox shots before when he had had surgeries. 10/27/2021: Patient is seen and examined, has no new complaints. Feels his mood is better. Feels his pain is better tolerated and is walking more with therapies. 10/28/2021: Patient is seen and examined, has no new complaint. Agreeable to going to physical rehab from the hospital    10/29/2021: Patient is seen and examined, has no new complaints. Working with therapies. 10/30/2021: Patient is seen and examined, has no new complaints. When asked about bowel movement, he tells me that his last BM was on 10/22/2021    10/31/2021: Patient is seen and examined, has no complaints. Does not feel ready for home and still feels the need to go to rehab. Had several bowel movements after aggressive bowel regimen yesterday. 11/1/2021: Patient is seen and examined, has no new complaints. He has not smoked effectively in 3 weeks. He thinks he might need the NicoDerm patch at discharge but wants to see if he will have any cravings if this is discontinued today.   If he does have cravings, advised him to reach out to staff to resume NicoDerm patch. Ten point ROS reviewed negative, unless as noted above    Objective: Intake/Output Summary (Last 24 hours) at 11/1/2021 1146  Last data filed at 11/1/2021 1025  Gross per 24 hour   Intake 10 ml   Output 1400 ml   Net -1390 ml        Vitals:   Vitals:    10/31/21 1649 10/31/21 2006 11/01/21 0528 11/01/21 1021   BP:  (!) 148/74 (!) 152/81 119/66   Pulse:  85 82 102   Resp:  18 20 18   Temp:  99.1 °F (37.3 °C) 98.2 °F (36.8 °C) 98.1 °F (36.7 °C)   TempSrc:  Oral Oral Oral   SpO2: 96% 96% 95% 96%   Weight:       Height:            Physical Exam:   GEN: Awake male, alert and oriented x3 in no apparent distress. Appears given age. HEENT: Normal   RESP: Clear lung fields bilaterally. Symmetric chest movement while on room air  CVS: RRR, S1, S2. Ejection systolic click all over precordium  GI/: Abdomen is soft, nontender, no organomegaly. . Bowel sounds normal, rectal exam deferred. No CVA tenderness. MSK: No gross joint deformities. Mild swelling around right hip surgical scar but no undue tenderness to right hip. Right hip wound/scar well apposed. SKIN: Normal coloration, warm, dry. NEURO: Cranial nerves appear grossly intact, normal speech, no lateralizing weakness.   PSYCH:  Affect is appropriate    Medications:   Medications:    bisacodyl  10 mg Oral Daily    lactulose  20 g Oral BID    warfarin  6 mg Oral Daily    enoxaparin  1 mg/kg SubCUTAneous BID    sodium chloride flush  10 mL IntraVENous 2 times per day    aspirin  325 mg Oral BID    traZODone  200 mg Oral Nightly    nicotine  1 patch TransDERmal Daily    acetaminophen  650 mg Oral Once    calcium elemental  500 mg Oral Daily    montelukast  10 mg Oral Nightly    atorvastatin  40 mg Oral Daily    venlafaxine  150 mg Oral Daily    sodium chloride flush  5-40 mL IntraVENous 2 times per day      Infusions:    sodium chloride       PRN Meds: sodium chloride flush, 10 mL, PRN  sodium chloride, 25 mL, PRN  acetaminophen, 650 mg, Q4H PRN  oxyCODONE, 5 mg, Q4H PRN   Or  oxyCODONE, 10 mg, Q4H PRN  albuterol sulfate HFA, 2 puff, Q6H PRN  ipratropium-albuterol, 1 ampule, Q4H PRN  diphenhydrAMINE, 50 mg, Q6H PRN   And  haloperidol lactate, 5 mg, Q6H PRN   And  LORazepam, 2 mg, Q6H PRN  morphine, 4 mg, Q30 Min PRN  cyclobenzaprine, 10 mg, TID PRN  sodium chloride flush, 5-40 mL, PRN  ondansetron, 4 mg, Q8H PRN   Or  ondansetron, 4 mg, Q6H PRN  polyethylene glycol, 17 g, Daily PRN  acetaminophen, 650 mg, Q6H PRN  morphine, 2 mg, Q4H PRN  hydrALAZINE, 10 mg, Q4H PRN          Electronically signed by Rg Murdock MD on 11/1/2021 at 11:46 AM

## 2021-11-01 NOTE — CARE COORDINATION
Received denial for admission to ARU. MD wishes to pursue expedited appeal.  Signed appeal letter sent via fax with updated clinicals. Expedited appeal pending at this time. Will continue to follow for determination.

## 2021-11-01 NOTE — CARE COORDINATION
ARU pre-cert currently pending with [facility] Medicare at this time. Will continue to follow for determination.

## 2021-11-01 NOTE — PLAN OF CARE
Problem: Falls - Risk of:  Goal: Will remain free from falls  Description: Will remain free from falls  Outcome: Ongoing  Goal: Absence of physical injury  Description: Absence of physical injury  Outcome: Ongoing     Problem: Pain:  Goal: Pain level will decrease  Description: Pain level will decrease  Outcome: Ongoing  Goal: Control of acute pain  Description: Control of acute pain  Outcome: Ongoing  Goal: Control of chronic pain  Description: Control of chronic pain  Outcome: Ongoing     Problem: Suicide risk  Goal: Provide patient with safe environment  Description: Provide patient with safe environment  Outcome: Ongoing     Problem: Nutrition  Goal: Optimal nutrition therapy  Outcome: Ongoing     Problem: Skin Integrity:  Goal: Will show no infection signs and symptoms  Description: Will show no infection signs and symptoms  Outcome: Ongoing  Goal: Absence of new skin breakdown  Description: Absence of new skin breakdown  Outcome: Ongoing

## 2021-11-01 NOTE — PROGRESS NOTES
PHARMACY ANTICOAGULATION MONITORING SERVICE    Eddie Packer is a 73 y.o. male on warfarin therapy for mechnical aortic valve. Pharmacy consulted by Dr. Kassi Rothman for monitoring and adjustment of treatment. Indication for anticoagulation: aortic valve replacement (mechanical)  INR goal: 2-3  Warfarin dose prior to admission: warfarin 5 mg every Sunday and 7.5 mg all other days (per patient)    Pertinent Laboratory Values   Recent Labs     10/31/21  0406 10/31/21  1451 11/01/21  0923   INR   < >  --  2.69   HGB  --  7.9*  --    HCT  --  25.3*  --    PLT  --  572*  --     < > = values in this interval not displayed. Assessment/Plan:    Possible DDI's:   o Home meds:  Trazodone  o Aspirin 325 mg BID   INR remains sub-therapeutic following held doses and K-centra (given on 10/24) prior to total hip arthroplasty   INR with a very large jump up to therapeutic @2.69 today. INR goal noted as 2.0-3.0 and INR will likely continue to increase upward.  Hold warfarin dose today and follow INR trends tomorrow. [address] [place] [address] will continue to monitor and adjust warfarin therapy as indicated    Thank you for the consult,  Paolo Bragg.  Rena Soto, [facility],   11/1/2021 2:41 PM

## 2021-11-02 LAB
INR BLD: 1.57 INDEX
PROTHROMBIN TIME: 20.4 SECONDS (ref 11.7–14.5)

## 2021-11-02 PROCEDURE — 94761 N-INVAS EAR/PLS OXIMETRY MLT: CPT

## 2021-11-02 PROCEDURE — 97116 GAIT TRAINING THERAPY: CPT

## 2021-11-02 PROCEDURE — 36415 COLL VENOUS BLD VENIPUNCTURE: CPT

## 2021-11-02 PROCEDURE — 6370000000 HC RX 637 (ALT 250 FOR IP): Performed by: ORTHOPAEDIC SURGERY

## 2021-11-02 PROCEDURE — 6360000002 HC RX W HCPCS: Performed by: INTERNAL MEDICINE

## 2021-11-02 PROCEDURE — 97530 THERAPEUTIC ACTIVITIES: CPT

## 2021-11-02 PROCEDURE — 94150 VITAL CAPACITY TEST: CPT

## 2021-11-02 PROCEDURE — 6370000000 HC RX 637 (ALT 250 FOR IP): Performed by: INTERNAL MEDICINE

## 2021-11-02 PROCEDURE — 94664 DEMO&/EVAL PT USE INHALER: CPT

## 2021-11-02 PROCEDURE — 97110 THERAPEUTIC EXERCISES: CPT

## 2021-11-02 PROCEDURE — 2580000003 HC RX 258: Performed by: ORTHOPAEDIC SURGERY

## 2021-11-02 PROCEDURE — 85610 PROTHROMBIN TIME: CPT

## 2021-11-02 PROCEDURE — 1200000000 HC SEMI PRIVATE

## 2021-11-02 RX ORDER — WARFARIN SODIUM 5 MG/1
5 TABLET ORAL DAILY
Status: DISCONTINUED | OUTPATIENT
Start: 2021-11-02 | End: 2021-11-03 | Stop reason: HOSPADM

## 2021-11-02 RX ORDER — WARFARIN SODIUM 6 MG/1
6 TABLET ORAL DAILY
Status: DISCONTINUED | OUTPATIENT
Start: 2021-11-02 | End: 2021-11-02

## 2021-11-02 RX ADMIN — ENOXAPARIN SODIUM 70 MG: 80 INJECTION SUBCUTANEOUS at 11:07

## 2021-11-02 RX ADMIN — Medication 500 MG: at 09:03

## 2021-11-02 RX ADMIN — ASPIRIN 325 MG: 325 TABLET, COATED ORAL at 21:26

## 2021-11-02 RX ADMIN — BISACODYL 10 MG: 5 TABLET, COATED ORAL at 09:03

## 2021-11-02 RX ADMIN — SODIUM CHLORIDE, PRESERVATIVE FREE 10 ML: 5 INJECTION INTRAVENOUS at 09:03

## 2021-11-02 RX ADMIN — ENOXAPARIN SODIUM 70 MG: 80 INJECTION SUBCUTANEOUS at 21:26

## 2021-11-02 RX ADMIN — ATORVASTATIN CALCIUM 40 MG: 40 TABLET, FILM COATED ORAL at 09:03

## 2021-11-02 RX ADMIN — ASPIRIN 325 MG: 325 TABLET, COATED ORAL at 09:03

## 2021-11-02 RX ADMIN — LACTULOSE 20 G: 10 SOLUTION ORAL at 21:26

## 2021-11-02 RX ADMIN — OXYCODONE HYDROCHLORIDE 5 MG: 5 TABLET ORAL at 05:18

## 2021-11-02 RX ADMIN — VENLAFAXINE HYDROCHLORIDE 150 MG: 150 CAPSULE, EXTENDED RELEASE ORAL at 09:03

## 2021-11-02 RX ADMIN — ACETAMINOPHEN 650 MG: 325 TABLET ORAL at 17:51

## 2021-11-02 RX ADMIN — SODIUM CHLORIDE, PRESERVATIVE FREE 10 ML: 5 INJECTION INTRAVENOUS at 21:27

## 2021-11-02 RX ADMIN — ACETAMINOPHEN 650 MG: 325 TABLET ORAL at 21:26

## 2021-11-02 RX ADMIN — MONTELUKAST 10 MG: 10 TABLET, FILM COATED ORAL at 21:26

## 2021-11-02 RX ADMIN — ACETAMINOPHEN 650 MG: 325 TABLET ORAL at 13:52

## 2021-11-02 RX ADMIN — ACETAMINOPHEN 650 MG: 325 TABLET ORAL at 09:52

## 2021-11-02 RX ADMIN — WARFARIN SODIUM 5 MG: 5 TABLET ORAL at 17:43

## 2021-11-02 RX ADMIN — TRAZODONE HYDROCHLORIDE 200 MG: 50 TABLET ORAL at 21:26

## 2021-11-02 ASSESSMENT — PAIN SCALES - WONG BAKER
WONGBAKER_NUMERICALRESPONSE: 0

## 2021-11-02 ASSESSMENT — PAIN SCALES - GENERAL
PAINLEVEL_OUTOF10: 6
PAINLEVEL_OUTOF10: 4
PAINLEVEL_OUTOF10: 8
PAINLEVEL_OUTOF10: 7
PAINLEVEL_OUTOF10: 5
PAINLEVEL_OUTOF10: 5
PAINLEVEL_OUTOF10: 6

## 2021-11-02 NOTE — PROGRESS NOTES
ATTENDING PHYSICIAN'S PROGRESS NOTES    Patient:  Karen Mosqueda      Unit/Bed:4118/4118-A    YOB: 1955    MRN: [id number]     Acct: [de-identified]     Admit date: 10/22/2021    Patient Seen, Chart, Consults notes, Labs, Radiology studies reviewed. SUBJECTIVE:   Day 8 of stay with fall with right hip fracture and most recent (in last 24 hours) has had ORIF of the right hip. Patient had no new complaints when seen and examined. He had had no new complaints and denies any chest pain, fever or chills. Patient was referred for ARU transfer, but was notified has been declined. He is therefore going to look into SNF. All other ROS negative except noted in HPI    Past, Family, Social History unchanged from admission. Diet:  ADULT DIET;  Regular  ADULT ORAL NUTRITION SUPPLEMENT; Breakfast, Dinner; Standard High Calorie/High Protein Oral Supplement    Medications:  Scheduled Meds:   warfarin (COUMADIN) daily dosing (placeholder)   Other RX Placeholder    bisacodyl  10 mg Oral Daily    lactulose  20 g Oral BID    sodium chloride flush  10 mL IntraVENous 2 times per day    aspirin  325 mg Oral BID    traZODone  200 mg Oral Nightly    [Held by provider] nicotine  1 patch TransDERmal Daily    acetaminophen  650 mg Oral Once    calcium elemental  500 mg Oral Daily    montelukast  10 mg Oral Nightly    atorvastatin  40 mg Oral Daily    venlafaxine  150 mg Oral Daily    sodium chloride flush  5-40 mL IntraVENous 2 times per day     Continuous Infusions:   sodium chloride       PRN Meds:sodium chloride flush, sodium chloride, acetaminophen, oxyCODONE **OR** oxyCODONE, albuterol sulfate HFA, ipratropium-albuterol, diphenhydrAMINE **AND** haloperidol lactate **AND** LORazepam, morphine, cyclobenzaprine, sodium chloride flush, ondansetron **OR** ondansetron, polyethylene glycol, [DISCONTINUED] acetaminophen **OR** acetaminophen, morphine, hydrALAZINE    OBJECTIVE:    CBC:   Recent Labs bodies could also represent compression deformities, although wedging at these levels could represent normal variants. Age-indeterminate anterior compression deformity of the L4 vertebral body. Mild anterior wedging of the T12 and L1 vertebral bodies could represent additional age-indeterminate compression deformities, although wedging at these levels could represent normal variants. Correlate with clinical symptoms. Multilevel degenerative changes of the spine. XR CHEST PORTABLE    Result Date: 10/22/2021  EXAMINATION: ONE XRAY VIEW OF THE CHEST 10/22/2021 3:27 pm COMPARISON: 05/05/2018 chest radiograph HISTORY: ORDERING SYSTEM PROVIDED HISTORY: hip fracture TECHNOLOGIST PROVIDED HISTORY: Reason for exam:->hip fracture Reason for Exam: hip fracture Acuity: Acute Type of Exam: Initial FINDINGS: Prior median sternotomy and aortic valve replacement. The cardiomediastinal silhouette is normal in size and contour. Atherosclerotic calcifications of the aortic arch. No focal airspace disease. No pleural effusion or pneumothorax. Bilateral remote rib fractures. Right shoulder hemiarthroplasty. No evidence of acute cardiopulmonary disease. XR HIP 2-3 VW W PELVIS RIGHT    Result Date: 10/22/2021  EXAMINATION: ONE XRAY VIEW OF THE PELVIS AND TWO XRAY VIEWS RIGHT HIP 10/22/2021 2:11 pm COMPARISON: None. HISTORY: ORDERING SYSTEM PROVIDED HISTORY: fall, pain, trauma TECHNOLOGIST PROVIDED HISTORY: Reason for exam:->fall, pain, trauma Reason for Exam: right hip pain Acuity: Acute Type of Exam: Initial Mechanism of Injury: fall Relevant Medical/Surgical History: na FINDINGS: Three views of the pelvis and right hip were reviewed. There is an acute transcervical femoral neck fracture of the right femur with displacement at the fracture site. No additional fractures are identified. Multilevel degenerative changes of the imaged lower lumbar spine. Atherosclerotic vascular calcifications noted.   Surgical clips project over the region of the left hip. 1. Acute and displaced transcervical right femoral neck fracture. Physical Exam:  Vitals: /62   Pulse 67   Temp 97.9 °F (36.6 °C) (Oral)   Resp 18   Ht 5' 4.49\" (1.638 m)   Wt 146 lb 12.8 oz (66.6 kg)   SpO2 98%   BMI 24.82 kg/m²   24 hour intake/output:    Intake/Output Summary (Last 24 hours) at 11/2/2021 1010  Last data filed at 11/2/2021 0547  Gross per 24 hour   Intake 10 ml   Output 650 ml   Net -640 ml     Last 3 weights: Wt Readings from Last 3 Encounters:   10/27/21 146 lb 12.8 oz (66.6 kg)   09/14/21 162 lb (73.5 kg)   09/05/21 178 lb (80.7 kg)       General appearance - oriented to person, place, and time, acyanotic, in no respiratory distress and ill-appearing  HEENT: Normocephalic, Atraumatic, Conjuctiva pink, PERRL, Oral mucosa normal, Lips, teeth and gums normal, Trachea midline, Thyroid normal and No noted lymphadenopathy  Chest - clear to auscultation, no wheezes, rales or rhonchi, symmetric air entry  Cardiovascular - S1 and S2 normal, clicks present  Abdomen - soft, nontender, nondistended, no masses or organomegaly   Neurological - Alert and oriented, Normal speech, No focal findings or movement disorder noted and Motor and sensory grossly normal bilaterally  Integumentary - Skin color, texture, turgor normal. No Rashes or lesions  Musculoskeletal -right hip surgical site clean and dry, No clubbing or cyanosis and No peripheral edema      DVT prophylaxis: [] Lovenox                                 [] SCDs                                 [x] SQ Heparin                                 [] Encourage ambulation           [] Already on Anticoagulation                 ASSESSMENT / PLAN :    Principal Problem:    Closed transcervical fracture of right femur ([address]  Had ORIF by ortho surgery and is currently recuperating. Will need rehab upon discharge but has been declined and therefore is looking into SNF. Optimize pain control.     Active Problems:    Severe episode of recurrent major depressive disorder, with psychotic features (HCC)/ Passive suicidal ideations/ Paranoia [facility])  Patient has been seen by Psychiatry consult with recommendations noted and appreciated. Hyperlipidemia  Continue maintaining on statin as reordered      COPD (chronic obstructive pulmonary disease) ([facility] [address] with Chronic interstitial lung disease ([facility] [address]  Continue with nebulization and oxygen supplementation, resume home regimen. H/O mechanical aortic valve replacement  Patient is currently off anticoagulation and will be restarted soon as cleared by Ortho consult. Cardiology consult following. Fatigue fracture of lumbar vertebra with routine healing  Conservative management and will need vertebroplasty if pain not relieved with conservative management. Resolved Problems:    * No resolved hospital problems. *    Treatment as outlined above, appreciate input by all consultants, maintain in-house overnight with a.m. labs pending approval to the SNF.       Electronically signed by Jaelyn Owens MD on 11/2/2021 at 10:10 AM    Rounding Hospitalist

## 2021-11-02 NOTE — PROGRESS NOTES
Physical Therapy    Physical Therapy Treatment Note  Name: Norma Clarke MRN: [id number] :   1955   Date:  2021   Admission Date: 10/22/2021 Room:  [address] [place] [address]   Restrictions/Precautions:        fall risk Anterior Hip precautions  No hip extension, no hip adduction and no hip external rotation  Fall risk suicidal precautions  Communication with other providers:  per nurse pt is ok to treat  Subjective:  Patient states:  He is ready to get up and walk  Pain:   Location, Type, Intensity (0/10 to 10/10):  0/10  Objective:    Observation:  Pt was in the bed  Treatment, including education/measures:  Supine Exercises: Ankle pumps x 20  Quad sets x 20  Glute sets x 20  Heel slides x 15  Hip abd x 15  SAQ's x 15  Therapeutic Exercise:  Therapeutic exercises were instructed today. Cues were given for technique, safety, recruitment, and rationale. Cues were verbal and/or tactile. reviewed pt's precautions, Anterior hip  Transfers  Scooting :SBA  Sit to stand :CGA  Stand to sit :CGA  Gait:  Pt amb with RW for 100 ft with CGA and 2 standing rest breaks  Pt needed VC's for precautions and staying on task  Gait Comments: with VC's' and TC's for B LE placement, walker placement and sequence throughout ambulation; with VC's and TC's to maintain upright posture in order to avoid COM shifting outside of [place]; with VC's for PLB throughout ambulation  Safety  Patient left safely in the chair, with call light/phone in reach. Gait belt and mask were used for transfers and gait.   Assessment / Impression:     Patient's tolerance of treatment:  Good, very motivated and methodical  Adverse Reaction: none  Significant change in status and impact:  none  Barriers to improvement:  precautions  Plan for Next Session:    Will cont to work towards pt's goals per his tolerance  Time in:  1100  Time out:  1140  Timed treatment minutes:  40  Total treatment time:  40  Previously filed items:  Social/Functional History  Lives With: Alone  Type of Home: Apartment  Home Layout: One level  Home Access: Ramped entrance  Bathroom Shower/Tub: Tub/Shower unit  Bathroom Toilet: Handicap height  Bathroom Accessibility: Accessible  Home Equipment: 4 wheeled walker  ADL Assistance: Independent  Homemaking Assistance: [address] [facility] Responsibilities: Yes  Ambulation Assistance: Independent (mod I with 4ww as of late)  Transfer Assistance: Independent  Active : No (Sister drives)  Occupation: Retired  Short term goals  Time Frame for [employer] term goals: 1 week  Short term goal 1: Pt will AMB x75 ft continuously with RW, step-through pattern , CGA. Short term goal 2: Pt will perform sup<>sit wit SBA for safety only. Short term goal 3: Pt will participate in LE TherEx x15 reps each  Short term goal 4: Pt will demonstrate good carryover with precautions. Electronically signed by:     Isaias Swanson PTA  11/2/2021, 11:39 AM

## 2021-11-02 NOTE — PROGRESS NOTES
Comprehensive Nutrition Assessment    Type and Reason for Visit:  Reassess    Nutrition Recommendations/Plan:   Continue current diet and supplements    Nutrition Assessment:  Pt is consuming % of meals and drinking supplements. Pt in good spirits, no complains of GI distress. Explained significant weight loss due to planned increased activity and healthier lifestyle modifications. Encouraged pt to continue with healthful practices and adequate protein intake for healing. Low nutrition risk at this time. Malnutrition Assessment:  Malnutrition Status: At risk for malnutrition (Comment)    Context:  Acute Illness     Findings of the 6 clinical characteristics of malnutrition:  Energy Intake:  Mild decrease in energy intake (Comment)  Weight Loss:  7 - Greater than 7.5% over 3 months (18.4% over 4 months, intentional weight loss)     Body Fat Loss:  No significant body fat loss     Muscle Mass Loss:  Unable to assess    Fluid Accumulation:  Unable to assess     Strength:  Not Performed    Estimated Daily Nutrient Needs:  Energy (kcal):  0527-9749 (25-28 kcal/kg); Weight Used for Energy Requirements:  Current     Protein (g):  67-80 (1.0-1.2 g/kg); Weight Used for Protein Requirements:           Fluid (ml/day):  8164-7303; Method Used for Fluid Requirements:  1 ml/kcal      Nutrition Related Findings:  Noted MD appeal for pt's ARU admission, awaiting response. S/p right MICHAEL      Wounds:  Surgical Incision       Current Nutrition Therapies:    ADULT DIET;  Regular  ADULT ORAL NUTRITION SUPPLEMENT; Breakfast, Dinner; Standard High Calorie/High Protein Oral Supplement    Anthropometric Measures:  · Height: 5' 4.49\" (163.8 cm)  · Current Body Weight: 146 lb 13.2 oz (66.6 kg)   · Admission Body Weight:      · Usual Body Weight: 179 lb 14.3 oz (81.6 kg) ((7/21/21) per chart review)     · Ideal Body Weight: 133 lbs; % Ideal Body Weight 110.4 %   · BMI: 24.8  · BMI Categories: Normal Weight (BMI 22.0 to 24.9) age over 72       Nutrition Diagnosis:   · Increased nutrient needs related to  (healing) as evidenced by wounds (recovery from recent hip surgery)    Nutrition Interventions:   Food and/or Nutrient Delivery:  Continue Current Diet, Continue Oral Nutrition Supplement  Nutrition Education/Counseling:  No recommendation at this time   Coordination of Nutrition Care:  Continue to monitor while inpatient    Goals:  pt will consume greater than 50-75% of meals and supplements       Nutrition Monitoring and Evaluation:   Behavioral-Environmental Outcomes:  None Identified   Food/Nutrient Intake Outcomes:  Food and Nutrient Intake, Supplement Intake  Physical Signs/Symptoms Outcomes:  Biochemical Data, GI Status, Skin, Weight     Discharge Planning:     Too soon to determine     Electronically signed by Rigoberto Benz RD, LD on 11/2/21 at 4:36 PM EDT    Contact: [phone]

## 2021-11-02 NOTE — PLAN OF CARE
Nutrition Problem #1: Increased nutrient needs  Intervention: Food and/or Nutrient Delivery: Continue Current Diet, Continue Oral Nutrition Supplement  Nutritional Goals: pt will consume greater than 50-75% of meals and supplements

## 2021-11-02 NOTE — PROGRESS NOTES
PHARMACY ANTICOAGULATION MONITORING SERVICE    Suzanne Singh is a 73 y.o. male on warfarin therapy for mechnical aortic valve. Pharmacy consulted by Dr. Reema Wiggins for monitoring and adjustment of treatment. Indication for anticoagulation: aortic valve replacement (mechanical)  INR goal: 2-3  Warfarin dose prior to admission: warfarin 5 mg every Sunday and 7.5 mg all other days (per patient)    Pertinent Laboratory Values   Recent Labs     10/31/21  1451 11/01/21  0923 11/02/21  0438   INR  --    < > 1.57   HGB 7.9*  --   --    HCT 25.3*  --   --    *  --   --     < > = values in this interval not displayed. Assessment/Plan:    Possible DDI's:   o Home meds:  Trazodone  o Aspirin 325 mg BID  o Lovenox bridge restarted today due to subtherapeutic INR.  INR with a large jump into therapeutic range yesterday, now with a big drop back down to subtherapeutic today following no warfarin dose last night.  INR goal noted as 2.0-3.0 even with mechanical aortic valve replacement.  Resume warfarin dosing tonight at normal 5mg daily dose and follow INR trends tomorrow. [address] [place] [address] will continue to monitor and adjust warfarin therapy as indicated    Thank you for the consult,  Raheel Kothari.  Luma Sepulveda, [facility]   11/2/2021 2:47 PM

## 2021-11-03 ENCOUNTER — HOSPITAL ENCOUNTER (INPATIENT)
Age: 66
LOS: 6 days | Discharge: ANOTHER HOSPITAL | DRG: 560 | End: 2021-11-09
Attending: PHYSICAL MEDICINE & REHABILITATION | Admitting: PHYSICAL MEDICINE & REHABILITATION
Payer: MEDICARE

## 2021-11-03 VITALS
RESPIRATION RATE: 18 BRPM | WEIGHT: 146.8 LBS | OXYGEN SATURATION: 98 % | BODY MASS INDEX: 25.06 KG/M2 | DIASTOLIC BLOOD PRESSURE: 64 MMHG | HEIGHT: 64 IN | TEMPERATURE: 98.4 F | SYSTOLIC BLOOD PRESSURE: 106 MMHG | HEART RATE: 78 BPM

## 2021-11-03 PROBLEM — S72.001A TRAUMATIC CLOSED FRACTURE OF NECK OF FEMUR WITH MINIMAL DISPLACEMENT, RIGHT, INITIAL ENCOUNTER (HCC): Status: ACTIVE | Noted: 2021-11-03

## 2021-11-03 LAB
INR BLD: 1.3 INDEX
PROTHROMBIN TIME: 16.8 SECONDS (ref 11.7–14.5)

## 2021-11-03 PROCEDURE — 6370000000 HC RX 637 (ALT 250 FOR IP): Performed by: INTERNAL MEDICINE

## 2021-11-03 PROCEDURE — 94640 AIRWAY INHALATION TREATMENT: CPT

## 2021-11-03 PROCEDURE — 99223 1ST HOSP IP/OBS HIGH 75: CPT | Performed by: PHYSICAL MEDICINE & REHABILITATION

## 2021-11-03 PROCEDURE — 2580000003 HC RX 258: Performed by: ORTHOPAEDIC SURGERY

## 2021-11-03 PROCEDURE — 6360000002 HC RX W HCPCS: Performed by: INTERNAL MEDICINE

## 2021-11-03 PROCEDURE — 94761 N-INVAS EAR/PLS OXIMETRY MLT: CPT

## 2021-11-03 PROCEDURE — 6370000000 HC RX 637 (ALT 250 FOR IP): Performed by: ORTHOPAEDIC SURGERY

## 2021-11-03 PROCEDURE — 85610 PROTHROMBIN TIME: CPT

## 2021-11-03 PROCEDURE — 1280000000 HC REHAB R&B

## 2021-11-03 PROCEDURE — 94150 VITAL CAPACITY TEST: CPT

## 2021-11-03 PROCEDURE — 36415 COLL VENOUS BLD VENIPUNCTURE: CPT

## 2021-11-03 PROCEDURE — 6360000002 HC RX W HCPCS: Performed by: PHYSICAL MEDICINE & REHABILITATION

## 2021-11-03 RX ORDER — VENLAFAXINE HYDROCHLORIDE 37.5 MG/1
150 CAPSULE, EXTENDED RELEASE ORAL DAILY
Status: DISCONTINUED | OUTPATIENT
Start: 2021-11-04 | End: 2021-11-09 | Stop reason: HOSPADM

## 2021-11-03 RX ORDER — MONTELUKAST SODIUM 10 MG/1
10 TABLET ORAL NIGHTLY
Status: CANCELLED | OUTPATIENT
Start: 2021-11-03

## 2021-11-03 RX ORDER — ONDANSETRON 2 MG/ML
4 INJECTION INTRAMUSCULAR; INTRAVENOUS EVERY 6 HOURS PRN
Status: DISCONTINUED | OUTPATIENT
Start: 2021-11-03 | End: 2021-11-09

## 2021-11-03 RX ORDER — LORAZEPAM 2 MG/ML
2 INJECTION INTRAMUSCULAR EVERY 6 HOURS PRN
Status: DISCONTINUED | OUTPATIENT
Start: 2021-11-03 | End: 2021-11-09

## 2021-11-03 RX ORDER — SODIUM CHLORIDE 9 MG/ML
25 INJECTION, SOLUTION INTRAVENOUS PRN
Status: DISCONTINUED | OUTPATIENT
Start: 2021-11-03 | End: 2021-11-09 | Stop reason: HOSPADM

## 2021-11-03 RX ORDER — POLYETHYLENE GLYCOL 3350 17 G/17G
17 POWDER, FOR SOLUTION ORAL DAILY PRN
Status: DISCONTINUED | OUTPATIENT
Start: 2021-11-03 | End: 2021-11-03

## 2021-11-03 RX ORDER — CYCLOBENZAPRINE HCL 10 MG
10 TABLET ORAL 3 TIMES DAILY PRN
Status: DISCONTINUED | OUTPATIENT
Start: 2021-11-03 | End: 2021-11-09

## 2021-11-03 RX ORDER — ONDANSETRON 2 MG/ML
4 INJECTION INTRAMUSCULAR; INTRAVENOUS EVERY 6 HOURS PRN
Status: CANCELLED | OUTPATIENT
Start: 2021-11-03

## 2021-11-03 RX ORDER — HYDRALAZINE HYDROCHLORIDE 20 MG/ML
10 INJECTION INTRAMUSCULAR; INTRAVENOUS EVERY 4 HOURS PRN
Status: CANCELLED | OUTPATIENT
Start: 2021-11-03

## 2021-11-03 RX ORDER — VENLAFAXINE HYDROCHLORIDE 150 MG/1
150 CAPSULE, EXTENDED RELEASE ORAL DAILY
Status: CANCELLED | OUTPATIENT
Start: 2021-11-04

## 2021-11-03 RX ORDER — ALBUTEROL SULFATE 90 UG/1
2 AEROSOL, METERED RESPIRATORY (INHALATION) EVERY 6 HOURS PRN
Status: CANCELLED | OUTPATIENT
Start: 2021-11-03

## 2021-11-03 RX ORDER — LACTULOSE 10 G/15ML
20 SOLUTION ORAL 2 TIMES DAILY
Status: DISCONTINUED | OUTPATIENT
Start: 2021-11-03 | End: 2021-11-09 | Stop reason: HOSPADM

## 2021-11-03 RX ORDER — DIPHENHYDRAMINE HYDROCHLORIDE 50 MG/ML
50 INJECTION INTRAMUSCULAR; INTRAVENOUS EVERY 6 HOURS PRN
Status: CANCELLED | OUTPATIENT
Start: 2021-11-03

## 2021-11-03 RX ORDER — HALOPERIDOL 5 MG/ML
5 INJECTION INTRAMUSCULAR EVERY 6 HOURS PRN
Status: CANCELLED | OUTPATIENT
Start: 2021-11-03

## 2021-11-03 RX ORDER — ACETAMINOPHEN 325 MG/1
650 TABLET ORAL EVERY 4 HOURS PRN
Status: CANCELLED | OUTPATIENT
Start: 2021-11-03

## 2021-11-03 RX ORDER — ALBUTEROL SULFATE 90 UG/1
2 AEROSOL, METERED RESPIRATORY (INHALATION) EVERY 6 HOURS PRN
Status: DISCONTINUED | OUTPATIENT
Start: 2021-11-03 | End: 2021-11-09 | Stop reason: HOSPADM

## 2021-11-03 RX ORDER — DIPHENHYDRAMINE HYDROCHLORIDE 50 MG/ML
50 INJECTION INTRAMUSCULAR; INTRAVENOUS EVERY 6 HOURS PRN
Status: DISCONTINUED | OUTPATIENT
Start: 2021-11-03 | End: 2021-11-09

## 2021-11-03 RX ORDER — OXYCODONE HYDROCHLORIDE 5 MG/1
5 TABLET ORAL EVERY 4 HOURS PRN
Status: DISCONTINUED | OUTPATIENT
Start: 2021-11-03 | End: 2021-11-09 | Stop reason: HOSPADM

## 2021-11-03 RX ORDER — IPRATROPIUM BROMIDE AND ALBUTEROL SULFATE 2.5; .5 MG/3ML; MG/3ML
1 SOLUTION RESPIRATORY (INHALATION) EVERY 4 HOURS PRN
Status: DISCONTINUED | OUTPATIENT
Start: 2021-11-03 | End: 2021-11-09

## 2021-11-03 RX ORDER — POLYETHYLENE GLYCOL 3350 17 G/17G
17 POWDER, FOR SOLUTION ORAL DAILY PRN
Status: DISCONTINUED | OUTPATIENT
Start: 2021-11-03 | End: 2021-11-09 | Stop reason: HOSPADM

## 2021-11-03 RX ORDER — ATORVASTATIN CALCIUM 40 MG/1
40 TABLET, FILM COATED ORAL DAILY
Status: CANCELLED | OUTPATIENT
Start: 2021-11-04

## 2021-11-03 RX ORDER — ONDANSETRON 4 MG/1
4 TABLET, ORALLY DISINTEGRATING ORAL EVERY 8 HOURS PRN
Status: CANCELLED | OUTPATIENT
Start: 2021-11-03

## 2021-11-03 RX ORDER — ATORVASTATIN CALCIUM 40 MG/1
40 TABLET, FILM COATED ORAL DAILY
Status: DISCONTINUED | OUTPATIENT
Start: 2021-11-04 | End: 2021-11-09 | Stop reason: HOSPADM

## 2021-11-03 RX ORDER — SODIUM CHLORIDE 9 MG/ML
25 INJECTION, SOLUTION INTRAVENOUS PRN
Status: CANCELLED | OUTPATIENT
Start: 2021-11-03

## 2021-11-03 RX ORDER — ACETAMINOPHEN 650 MG/1
650 SUPPOSITORY RECTAL EVERY 6 HOURS PRN
Status: DISCONTINUED | OUTPATIENT
Start: 2021-11-03 | End: 2021-11-03

## 2021-11-03 RX ORDER — CYCLOBENZAPRINE HCL 10 MG
10 TABLET ORAL 3 TIMES DAILY PRN
Status: CANCELLED | OUTPATIENT
Start: 2021-11-03

## 2021-11-03 RX ORDER — OXYCODONE HYDROCHLORIDE 5 MG/1
5 TABLET ORAL EVERY 4 HOURS PRN
Status: CANCELLED | OUTPATIENT
Start: 2021-11-03

## 2021-11-03 RX ORDER — HALOPERIDOL 5 MG/ML
5 INJECTION INTRAMUSCULAR EVERY 6 HOURS PRN
Status: DISCONTINUED | OUTPATIENT
Start: 2021-11-03 | End: 2021-11-09

## 2021-11-03 RX ORDER — ACETAMINOPHEN 650 MG/1
650 SUPPOSITORY RECTAL EVERY 6 HOURS PRN
Status: CANCELLED | OUTPATIENT
Start: 2021-11-03

## 2021-11-03 RX ORDER — LORAZEPAM 2 MG/ML
2 INJECTION INTRAMUSCULAR EVERY 6 HOURS PRN
Status: CANCELLED | OUTPATIENT
Start: 2021-11-03

## 2021-11-03 RX ORDER — CALCIUM CARBONATE 500(1250)
500 TABLET ORAL DAILY
Status: DISCONTINUED | OUTPATIENT
Start: 2021-11-04 | End: 2021-11-09 | Stop reason: HOSPADM

## 2021-11-03 RX ORDER — CALCIUM CARBONATE 500(1250)
500 TABLET ORAL DAILY
Status: CANCELLED | OUTPATIENT
Start: 2021-11-04

## 2021-11-03 RX ORDER — ACETAMINOPHEN 325 MG/1
650 TABLET ORAL EVERY 4 HOURS PRN
Status: DISCONTINUED | OUTPATIENT
Start: 2021-11-03 | End: 2021-11-09 | Stop reason: HOSPADM

## 2021-11-03 RX ORDER — ACETAMINOPHEN 325 MG/1
650 TABLET ORAL EVERY 4 HOURS PRN
Status: DISCONTINUED | OUTPATIENT
Start: 2021-11-03 | End: 2021-11-03 | Stop reason: SDUPTHER

## 2021-11-03 RX ORDER — LACTULOSE 10 G/15ML
20 SOLUTION ORAL 2 TIMES DAILY
Status: CANCELLED | OUTPATIENT
Start: 2021-11-03

## 2021-11-03 RX ORDER — ONDANSETRON 4 MG/1
4 TABLET, ORALLY DISINTEGRATING ORAL EVERY 8 HOURS PRN
Status: DISCONTINUED | OUTPATIENT
Start: 2021-11-03 | End: 2021-11-09 | Stop reason: HOSPADM

## 2021-11-03 RX ORDER — MONTELUKAST SODIUM 10 MG/1
10 TABLET ORAL NIGHTLY
Status: DISCONTINUED | OUTPATIENT
Start: 2021-11-03 | End: 2021-11-09 | Stop reason: HOSPADM

## 2021-11-03 RX ORDER — TRAZODONE HYDROCHLORIDE 50 MG/1
200 TABLET ORAL NIGHTLY
Status: CANCELLED | OUTPATIENT
Start: 2021-11-03

## 2021-11-03 RX ORDER — POLYETHYLENE GLYCOL 3350 17 G/17G
17 POWDER, FOR SOLUTION ORAL DAILY PRN
Status: CANCELLED | OUTPATIENT
Start: 2021-11-03

## 2021-11-03 RX ORDER — WARFARIN SODIUM 5 MG/1
5 TABLET ORAL DAILY
Status: CANCELLED | OUTPATIENT
Start: 2021-11-03

## 2021-11-03 RX ORDER — IPRATROPIUM BROMIDE AND ALBUTEROL SULFATE 2.5; .5 MG/3ML; MG/3ML
1 SOLUTION RESPIRATORY (INHALATION) EVERY 4 HOURS PRN
Status: CANCELLED | OUTPATIENT
Start: 2021-11-03

## 2021-11-03 RX ORDER — TRAZODONE HYDROCHLORIDE 50 MG/1
200 TABLET ORAL NIGHTLY
Status: DISCONTINUED | OUTPATIENT
Start: 2021-11-03 | End: 2021-11-09 | Stop reason: HOSPADM

## 2021-11-03 RX ORDER — HYDRALAZINE HYDROCHLORIDE 20 MG/ML
10 INJECTION INTRAMUSCULAR; INTRAVENOUS EVERY 4 HOURS PRN
Status: DISCONTINUED | OUTPATIENT
Start: 2021-11-03 | End: 2021-11-03 | Stop reason: CLARIF

## 2021-11-03 RX ORDER — WARFARIN SODIUM 5 MG/1
5 TABLET ORAL DAILY
Status: DISCONTINUED | OUTPATIENT
Start: 2021-11-03 | End: 2021-11-08

## 2021-11-03 RX ORDER — OXYCODONE HYDROCHLORIDE 10 MG/1
10 TABLET ORAL EVERY 4 HOURS PRN
Status: CANCELLED | OUTPATIENT
Start: 2021-11-03

## 2021-11-03 RX ORDER — OXYCODONE HYDROCHLORIDE 10 MG/1
10 TABLET ORAL EVERY 4 HOURS PRN
Status: DISCONTINUED | OUTPATIENT
Start: 2021-11-03 | End: 2021-11-09

## 2021-11-03 RX ADMIN — ASPIRIN 325 MG: 325 TABLET, COATED ORAL at 21:22

## 2021-11-03 RX ADMIN — ASPIRIN 325 MG: 325 TABLET, COATED ORAL at 09:34

## 2021-11-03 RX ADMIN — LACTULOSE 20 G: 10 SOLUTION ORAL at 21:23

## 2021-11-03 RX ADMIN — CYCLOBENZAPRINE 10 MG: 10 TABLET, FILM COATED ORAL at 21:22

## 2021-11-03 RX ADMIN — ACETAMINOPHEN 650 MG: 325 TABLET ORAL at 01:43

## 2021-11-03 RX ADMIN — ENOXAPARIN SODIUM 70 MG: 80 INJECTION SUBCUTANEOUS at 09:34

## 2021-11-03 RX ADMIN — LACTULOSE 20 G: 10 SOLUTION ORAL at 09:34

## 2021-11-03 RX ADMIN — OXYCODONE 5 MG: 5 TABLET ORAL at 21:24

## 2021-11-03 RX ADMIN — ACETAMINOPHEN 650 MG: 325 TABLET ORAL at 06:21

## 2021-11-03 RX ADMIN — ENOXAPARIN SODIUM 70 MG: 100 INJECTION SUBCUTANEOUS at 21:23

## 2021-11-03 RX ADMIN — MONTELUKAST 10 MG: 10 TABLET, FILM COATED ORAL at 21:22

## 2021-11-03 RX ADMIN — ALBUTEROL SULFATE 2 PUFF: 90 AEROSOL, METERED RESPIRATORY (INHALATION) at 20:00

## 2021-11-03 RX ADMIN — Medication 500 MG: at 09:34

## 2021-11-03 RX ADMIN — TRAZODONE HYDROCHLORIDE 200 MG: 50 TABLET ORAL at 21:23

## 2021-11-03 RX ADMIN — VENLAFAXINE HYDROCHLORIDE 150 MG: 150 CAPSULE, EXTENDED RELEASE ORAL at 09:34

## 2021-11-03 RX ADMIN — WARFARIN SODIUM 5 MG: 5 TABLET ORAL at 18:15

## 2021-11-03 RX ADMIN — SODIUM CHLORIDE, PRESERVATIVE FREE 10 ML: 5 INJECTION INTRAVENOUS at 09:35

## 2021-11-03 RX ADMIN — ATORVASTATIN CALCIUM 40 MG: 40 TABLET, FILM COATED ORAL at 09:34

## 2021-11-03 RX ADMIN — CYCLOBENZAPRINE 10 MG: 10 TABLET, FILM COATED ORAL at 01:43

## 2021-11-03 ASSESSMENT — PAIN SCALES - GENERAL
PAINLEVEL_OUTOF10: 5
PAINLEVEL_OUTOF10: 5
PAINLEVEL_OUTOF10: 1
PAINLEVEL_OUTOF10: 6
PAINLEVEL_OUTOF10: 5
PAINLEVEL_OUTOF10: 5
PAINLEVEL_OUTOF10: 0

## 2021-11-03 ASSESSMENT — PAIN DESCRIPTION - DESCRIPTORS: DESCRIPTORS: ACHING;CONSTANT;DISCOMFORT

## 2021-11-03 ASSESSMENT — PAIN DESCRIPTION - ONSET: ONSET: ON-GOING

## 2021-11-03 ASSESSMENT — PAIN DESCRIPTION - PAIN TYPE: TYPE: ACUTE PAIN

## 2021-11-03 ASSESSMENT — PAIN DESCRIPTION - LOCATION: LOCATION: HIP

## 2021-11-03 ASSESSMENT — PAIN DESCRIPTION - ORIENTATION: ORIENTATION: RIGHT

## 2021-11-03 ASSESSMENT — PAIN DESCRIPTION - FREQUENCY: FREQUENCY: CONTINUOUS

## 2021-11-03 NOTE — H&P
Berenice Soulier    : 1955  Acct #: [de-identified]  MRN: [id number]              History and physical      Admitting diagnosis: Right femoral neck fracture ( [address])    Comorbid diagnoses impacting rehabilitation: Uncontrolled pain, generalized weakness, gait disturbance, acute blood loss anemia, COPD (simple bronchitis), essential hypertension, hyponatremia, bipolar disorder with depression, L4 compression fracture    Chief complaint: General fatigue and right leg pain with swelling. History of present illness: Patient is a 67-year-old right-hand-dominant male who offers a very vague description of \"falling a couple times into a piece of furniture). He may have been arising from a bent over position when he lost his balance and tumbled backwards. This took place on or about 10/17/2021. He had back and leg pain following the injury but remained at home. Several days later on 10/22/2021 he presented to our ED with escalating right leg pain and difficulty walking. He was found to have a displaced right femoral neck fracture at the hip. After medical and psychological clearance, on 10/25/2021 Dr. Maureen Rodas performed an anterior approach total right hip arthroplasty. The patient has had significant pain, generalized weakness and hyponatremia following the procedure. He has also had a severely negative mental reaction to the experience. He had suicidal verbalizations that required monitoring by psychiatry and medication adjustments. Recently he has become engaged in therapies and moderately hopeful about the near future. He remains unable to do his own toileting, transfers and self-care and cannot return directly home. He requires inpatient rehabilitation to address these issues. Review of systems: Right leg pain with swelling. Chronic lower back pain. Poor sleep. Fair appetite. Occasional bowel movements without abdominal cramping. No dysuria or chills.   He denied any new numbness or tingling distal to the injury/surgical site of his right leg. The remainder of their review of systems was negative except as mentioned in the history of present illness. Social History: Lives With: Alone  Type of Home: Apartment  Home Layout: One level  Home Access: Ramped entrance  Bathroom Shower/Tub: Tub/Shower unit  Bathroom Toilet: Handicap height  Bathroom Accessibility: Antony Wallis: 4 wheeled walker  ADL Assistance: Independent  Homemaking Assistance: Independent  Homemaking Responsibilities: Yes  Ambulation Assistance: Independent (mod I with 4ww as of late)  Transfer Assistance: Independent  Active : No (Sister drives)  Occupation: Retired    He reports that he has never smoked. His smokeless tobacco use includes snuff and chew. He reports current alcohol use of about 4.0 - 5.0 standard drinks of alcohol per week. He reports that he does not use drugs. Prior (baseline) level of function: Independent with mobility and self-care. Current level of function: Moderate physical assistance for mobility and self-care.     Allergies:  Ciprofloxacin hcl, Levaquin [levofloxacin], Other, and Ceftin [cefuroxime]    Past Medical History:   Past Medical History:   Diagnosis Date    Acid reflux     Acute frontal sinusitis 7/22/2009    Alcohol abuse     \"I Drink Average 2 Beers A Day\"    Anesthesia     \" I get agitated\"    Anxiety     Arthritis     \"Right Shoulder, Neck, Left Knee\"    Atypical mycobacterial infection     Broken teeth     Upper And Lower     [facility]congestive [facility] failure[facility] ([facility])     COPD (chronic obstructive pulmonary disease) ([facility])     Sees Dr. Clair Jerez In [place], [address]     Cough     Frequent Cough With Green Sputum    Depression     Depression     Dyspnea 2/23/2018    H/O cardiovascular MUGA stress test 05/14/2019    EF 50%, NORMAL LVEF, NORMAL WALL MOTION.    H/O echocardiogram 05/22/2014    RC=>03-18%, LV systolic function is low normal, mild aortic valve calcification, no pericardial effusion    H/O echocardiogram 12/12/2018    EF 45-50%    History of 24 hour EKG monitoring 6/6/14    24 hr holter monitor. Ventricular ectopics were noted in single and couplet forms. Supraventricular ectopics were noted in single and pair forms.     [place] (hard of hearing)     Bilateral Ears    Hyperlipidemia     Hypertension     Irritant contact dermatitis due to other chemical products 8/26/2019    Other drug allergy(995.27) 7/22/2009    S/P AVR 12/2003    Mechanical valvue     Shortness of breath     Teeth missing     Upper And Lower        Past Surgical History:     Past Surgical History:   Procedure Laterality Date    BRONCHOSCOPY  1996    \"Done Twice\"    CARDIAC VALVE REPLACEMENT  12/17/2003    \"Aortic Valve Replacement, Mechanical Valve\"    COLONOSCOPY  2006    Polyps Removed    DENTAL SURGERY      Teeth Extracted In Past    ENDOSCOPY, COLON, DIAGNOSTIC  In Past    HEMIARTHROPLASTY SHOULDER FRACTURE Right 1/29/2019    SHOULDER HEMIARTHROPLASTY performed by Carrie Damico DO at [address] [facility] Left 1992   [facility] Right 1996   [facility],  [address]    \"Cauterized Because My Sperm Was Low\"    ROTATOR CUFF REPAIR Right 2008    TONSILLECTOMY  1959 Or 1960    TOTAL HIP ARTHROPLASTY Right 10/25/2021    RIGHT HIP TOTAL ARTHROPLASTY performed by Carrie Damico DO at [facility] OR       Current Medications:     Current Facility-Administered Medications:     0.9 % sodium chloride infusion, 25 mL, IntraVENous, PRN, Cameron Kaur MD    acetaminophen (TYLENOL) tablet 650 mg, 650 mg, Oral, Q4H PRN, Cameron Kaur MD    aspirin EC tablet 325 mg, 325 mg, Oral, BID, Cameron Kaur MD    oxyCODONE (ROXICODONE) immediate release tablet 5 mg, 5 mg, Oral, Q4H PRN **OR** oxyCODONE HCl (OXY-IR) immediate release tablet 10 mg, 10 mg, Oral, Q4H PRN, Cameron Kaur MD    ondansetron (ZOFRAN-ODT) disintegrating tablet 4 mg, 4 mg, Oral, Q8H Relation Age of Onset    Cancer Mother         Lymphoma    Diabetes Mother     High Blood Pressure Mother     High Cholesterol Mother     Obesity Mother     Vision Loss Mother         Wore Glasses    Heart Disease Father         \"Heart Failure\"   Destiney Se COPD Father     Asthma Sister     High Blood Pressure Sister     High Cholesterol Sister     Other Sister         pre diabetic    COPD Sister     Diabetes Sister         \"Pre Diabetes\"    No Known Problems Son        Exam:    Blood pressure (!) 127/95, pulse 73, temperature 98.9 °F (37.2 °C), temperature source Oral, resp. rate 16, height 5' 4\" (1.626 m), weight 142 lb 6.4 oz (64.6 kg), SpO2 94 %. General: Observed semiupright in bed. Appears alert but very vague with verbal responses and details about his history. Slow to follow one-step commands. In no distress. HEENT: MMM. Neck supple. No signs of trauma to his head or neck. Full visual field. No adenopathy or JVD. Pulmonary: Unlabored and clear. Cardiac: Regular rate and rhythm. Abdomen: Patient's abdomen was soft and nondistended. Bowel sounds were present throughout. There was no rebound, guarding or masses noted. Spinal exam: Diffuse percussion tenderness at the thoracolumbar junction. No bony step-offs or skin breakdown. Upper extremities: He was able to bring both hands up to meet mine. Normal tone and reflexes with functional  strength and fair coordination. Lower extremities: Right hip and thigh are swollen as expected. His incision is glued and tender to palpation. No drainage, erythema or bogginess. Very guarded right hip flexion. 4+/5 ankle dorsiflexion bilaterally. Sitting balance was good. Standing balance was poor.     Lab Results   Component Value Date    WBC 10.4 10/31/2021    HGB 7.9 (L) 10/31/2021    HCT 25.3 (L) 10/31/2021    MCV 94.8 10/31/2021     (H) 10/31/2021     Lab Results   Component Value Date    INR 1.30 11/03/2021    INR 1.57 11/02/2021    INR 2.69 11/01/2021    PROTIME 16.8 (H) 11/03/2021    PROTIME 20.4 (H) 11/02/2021    PROTIME 35.0 (H) 11/01/2021     Lab Results   Component Value Date    CREATININE 0.5 (L) 10/31/2021    BUN 9 10/31/2021     (L) 10/31/2021    K 4.1 10/31/2021    CL 98 (L) 10/31/2021    CO2 21 10/31/2021     Lab Results   Component Value Date    ALT 17 10/25/2021    AST 28 10/25/2021    ALKPHOS 60 10/25/2021    BILITOT 0.4 10/25/2021         Impression: 71-year-old male with a history of bipolar disorder, hypertension COPD who has suffered a fall (apparently mechanical in nature) with right hip fracture. He has undergone a right hip total arthroplasty on 10/25/2021. He has had poorly controlled pain, hyponatremia and blood pressure fluctuations following the procedure. He has also had some deterioration of his mental illness. Strengths for the patient: His age, independent habits prior to admission and a reasonably accessible home. Limitations/barriers for the patient: He lives alone, he is unfamiliar with anterior hip precautions and his mental health issues. Recommendation: Acute inpatient rehabilitation with occupational and physical therapy 180 minutes 5 out of every 7 days. Will address basic and  advancing mobility with self-care instruction and adaptive equipment training. Caregiver education will be offered. Expected length of stay  prior to a supervised level of function for discharge home with a walker and C OT/PT is 2 weeks. Additional recommendation:    1. Right femoral neck fracture with total arthroplasty: The patient requires daily occupational and physical therapy. Mistreating him to do self-care activities and mobility following anterior hip precautions. Weightbearing as tolerated. He requires pain management, adaptive equipment training and DVT prophylaxis. We must provide pulmonary hygiene measures and monitor his anemia and hyponatremia.   2. DVT prophylaxis: Surgeon has chosen aspirin 325 twice daily for DVT prophylaxis. Weightbearing activities will be pursued daily. SCDs when in bed. 3. Bipolar disorder with depression: Treating him in a calm consistent environment. Regulating his sleep-wake schedule. He requires trazodone and Effexor. Psychiatry services available to him as needed. 4. Uncontrolled pain: Scheduled acetaminophen and oxycodone with cryotherapy and progressive mobilization following anterior hip precautions. 5. Hypertension: Recently his blood pressures have been reasonable off medication. We will continue to monitor vital signs at rest and with activity. Target systolic blood pressure is 120-140. 6. Hyponatremia: His numbers are closer to normal now. Will encourage consistent oral intake and be cautious with any diuretic use. No fluid restrictions at the moment. Periodic monitoring of his chemistries. 7. COPD: Albuterol inhaler twice daily, Singulair and aggressive pulmonary hygiene measures. Monitoring O2 saturations at rest and with activity. No steroid therapy for the time being. I personally performed a history and physical on this patient within 24 hours of admission to the rehab unit. I have reviewed the preadmission screening and concur with its findings without change. A detailed plan of care will be established by hospital day 4 and I attest the patient is appropriate for inpatient rehabilitation at this time. I have compared the patient's current functional status noted during my history and physical with that of the preadmission screen and I have found no significant differences.

## 2021-11-03 NOTE — PROGRESS NOTES
Occupational Therapy  . Occupational Therapy Treatment Note      Name: Maureen Friday MRN: [id number] :   1955   Date:  11/3/2021   Admission Date: 10/22/2021 Room:  [address] [place] [address]-A       attempted to see patient at 1515. Per nurse LAHTI patient leaving at  to go to ARU.        Electronically signed by:    AKIN Mendenhall,   11/3/2021, 3:17 PM

## 2021-11-03 NOTE — PROGRESS NOTES
A Complete drug regimen review was completed for this patient this date. []  No clinically significant medication issue was identified   []  Yes, a clinically significant medication issue was identified     []  Adverse Drug Event:    []  Allergy:    []  Side Effect:    []  Ineffective Therapy:    []  Drug interaction:     []  Duplicate Therapy:    []  Untreated Indication:    []  Non-adherence:    []  Other:  Nursing contacted the physician:       Date:                Time:    Actions recommended by physician were completed:   Date:                 Time:  Action(s) Taken:             []  New Physician Order Received    []  Issue Noted by Physician;  However No Action Required    []  Other:

## 2021-11-03 NOTE — PLAN OF CARE
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]     Affective psychosis, bipolar ([address] [F31.9]     Compression fracture of fourth lumbar vertebra ([address] [C52.262N]     Traumatic closed fracture of neck of femur with minimal displacement, right, initial encounter ([address] Donald Lai 2021    Essential hypertension [I10]        Rehabilitation Diagnosis:     Displaced midcervical fracture of right femur, initial encounter for closed fracture [S72.031A]  Traumatic closed fracture of neck of femur with minimal displacement, right, initial encounter ([address] [S72.001A]       ADMIT DATE:11/3/2021   CARE PLAN     NURSING:  Dennis Romero while on this unit will:      Bowel and Bladder   [x] Be continent of bowel and bladder      [x] Have an adequate number of bowel movements   [] Urinate with no urinary retention >300ml in bladder   [] Bladder Scan: (details)   [] Complete bladder protocol with sanon removal   [] Initiate Bladder Program to toilet every ___ hours   [] Initiate Bowel Program to toilet every ___hours   [] Bladder training    [] Bowel training  Pulmonary   [x] Maintain O2 SATs at 92% or greater  Pain Management   [x] Have pain managed while on ARU        [] Be pain free by discharge    [x] Medication Management and Education  Maintenance of Skin Integrity/Wound Management   [x] Have no skin breakdown while on ARU   [] Have improved skin integrity via wound measurements   [] Have no signs/symptoms of infection via infection protection and monitoring at the          wound site  Fall Prevention   [x] Be free from injury during hospitalization via fall prevention measures     [x] Disease management and Education  Precautions   [] Weight Bearing Precautions   [] Swallowing Precautions   [] Monitoring of Risks of Complications   [x] DVT Prophylaxis    [x] Fluid/electrolyte/Nutrition Management    [x] Complete education with patient/family with understanding demonstrated for          in-room safety with transfers to bed, toilet, wheelchair, shower as well as                bathroom activities and hygiene. [] Adjustment   [] Other:   Nursing interventions may include bowel/bladder training, education for medical assistive devices, medication education, O2 saturation management, energy conservation, stress management techniques, fall prevention, alarms protocol, seating and positioning, skin/wound care, pressure relief instruction,dressing changes,  infection protection, DVT prophylaxis, and/or assistance with in room safety with transfers to bed, toilet, wheelchair, shower as well as bathroom activities and hygiene. Patient/caregiver education for:   [x] Disease/sustained injury/management      [x] Medication Use   [x] Surgical intervention   [x] Safety/Precautions   [x] Body mechanics and or joint protection   [x] Health maintenance         PHYSICAL THERAPY:  Goals:                  Short term goals  Time Frame for Short term goals: 7 tx days:  Short term goal 1: Pt will complete rolling L/R and sup<->sit using leg  to assist RLE for hip abduction movement and following anterolateral hip precautions on R Mod Ind-Ind. Short term goal 2: Pt will complete OOB transfers using RW and following anterolateral hip precautions on R Mod Ind. Short term goal 3: Pt will ambulate >150 ft over level surface and at least 10 ft of uneven surface using RW following appropriate step-to pattern Mod Ind.   Short term goal 4: Pt will ascend/descend curb step using RW and 1 flight of stairs using railings following appropriate step-to pattern on ascent/descent Mod Ind. Short term goal 5: Pt will complete object retrieval from the floor with 1UE support on RW and using reacher prn Mod Ind. These goals were reviewed with this patient at the time of assessment and Thomas Caldwell is in agreement. Plan of Care: Pt to be seen 5 days per week for a minimum of 60 minutes for 7 days. Current Treatment Recommendations: Strengthening, Gait Training, Patient/Caregiver Education & Training, Stair training, Equipment Evaluation, Education, & procurement, Balance Training, Pain Management, Functional Mobility Training, Endurance Training, Home Exercise Program, Transfer Training, Positioning, Safety Education & Training community reintegration,animal assisted therapy, and concurrent/group therapy.     PT IRF-JASPER scores and goals for initial assessment:   Bed Mobility:   Sit to Lying  Assistance Needed: Supervision or touching assistance  Comment: Sup. without use of bed features; pt transitioned from upright sitting at EOB to long sitting and then to supine; transfer completed from the R side of bed and was able to actively lift RLE on the bed following precautions  CARE Score: 4  Discharge Goal: Independent  Roll Left and Right  Assistance Needed: Supervision or touching assistance  Comment: rolling L/R with SBA-Sup without use of bed features; required VCs on hip precautions and positioning of pillow between thighs to prevent adduction beyond midline especially when rolling to L  CARE Score: 4  Discharge Goal: Independent  Lying to Sitting on Side of Bed  Assistance Needed: Supervision or touching assistance  Comment: Sup (required verbal prompts to self-assist RLE following precaution of \"no active hip abduction\" and to increase attention on hip position to prevent excessive ER as pt completed transfer towards R side of bed)  CARE Score: 4  Discharge Goal: Independent    Transfers:    Sit to Stand  Assistance Needed: Supervision or touching assistance  Comment: SBA using RW  CARE Score: 4  Discharge Goal: Independent  Chair/Bed-to-Chair Transfer  Assistance Needed: Supervision or touching assistance  Comment: SBA using RW  CARE Score: 4  Discharge Goal: Independent  Toilet Transfer  Assistance Needed: Supervision or touching assistance  Comment: SBA using RW and grab bars  CARE Score: 4  Car Transfer  Assistance Needed: Supervision or touching assistance  Comment: CGA using RW with additional stablization of AD to be able to stand; pt was able to actively lift BLE in car, self-assisted RLE and required verbal prompts to attend to RLE movements when getting out of car to ensure no excessive ER  CARE Score: 4  Discharge Goal: Independent    Ambulation:    Walking Ability  Does the Patient Walk?: Yes     Walk 10 Feet  Assistance Needed: Supervision or touching assistance  Comment: SBA using RW following appropriate step-to pattern  CARE Score: 4  Discharge Goal: Independent     Walk 50 Feet with Two Turns  Assistance Needed: Supervision or touching assistance  Comment: SBA using RW following appropriate step-to pattern  CARE Score: 4  Discharge Goal: Independent     Walk 150 Feet  Assistance Needed: Supervision or touching assistance  Comment: SBA using RW following appropriate step-to pattern  CARE Score: 4  Discharge Goal: Independent     Walking 10 Feet on Uneven Surfaces  Assistance Needed: Supervision or touching assistance  Comment: CGA using RW following appropriate step-to pattern  CARE Score: 4  Discharge Goal: Independent     1 Step (Curb)  Assistance Needed: Supervision or touching assistance  Comment: CGA using RW following appropriate step-to pattern  CARE Score: 4  Discharge Goal: Independent     4 Steps  Assistance Needed: Supervision or touching assistance  Comment: CGA using railings following appropriate step-to pattern on ascent/descent  CARE Score: 4  Discharge Goal: Independent     12 Steps  Assistance Needed: Supervision or touching assistance  Comment: CGA using railings following appropriate step-to pattern on ascent/descent  CARE Score: 4  Discharge Goal: Independent    Gait Deviations: []None []Slow kathy  [] Increased VALENTINA  [] Staggers []Deviated Path  [] Decreased step length  [] Decreased step height  []Decreased arm swing  [] Shuffles  [] Decreased head and trunk rotation  []other:        Wheelchair:  w/c Ability: Wheelchair Ability  Uses a Wheelchair and/or Scooter?: No                Balance:        Object: Picking Up Object  Assistance Needed: Partial/moderate assistance  Comment: CGA to bend over, Min A to be able to get back up in upright standing; used LUE support on RW; task completed without use of reacher  CARE Score: 3  OCCUPATIONAL THERAPY:  Goals:             Short term goals  Time Frame for Short term goals: STGs=LTGs :  Long term goals  Time Frame for Long term goals : ~1 week or until d/c  Long term goal 1: Pt will complete grooming tasks Ind  Long term goal 2: Pt will complete total body bathing mod I  Long term goal 3: Pt will complete UB dressing Ind  Long term goal 4: Pt will complete LB dressing mod I using AE PRN  Long term goal 5: Pt will doff/don footwear mod I using AE PRN  Long term goals 6: Pt will complete toileting mod I  Long term goal 7: Pt will complete functional transfers (bed, chair, shower, toilet) c DME PRN and mod I  Long term goal 8: Pt will perform therex/therax to facilitate increased strength/endurance/ax tolerance (c emphasis on dynamic standing balance/tolerance >12 mins, BUE endurance) c SBA  Long term goal 9: Pt will complete simple homemaking tasks c DME PRN and mod I :    These goals were reviewed with this patient at the time of assessment and Macario Romero is in agreement    Plan of Care:  Pt to be seen 5 days per week for a minimum of 60 minutes for 7 days.          Plan  Times per day: Daily  Current Treatment Recommendations: Functional Mobility Training, Endurance Training, Balance Training, Safety Education & Training, Pain Management, Patient/Caregiver Education & Training, Equipment Evaluation, Education, & procurement, Self-Care / ADL, Home Management Training         cognitive training, home management, energy conservation training, community reintegration, splint fabrication, patient/caregiver education and training, animal assisted therapy, and concurrent and/or group therapy. OT IRF-JASPER scores and goals for initial assessment:    ADLs:    Eating: Eating  Assistance Needed: Independent  Comment: able to open packages/containers  CARE Score: 6  Discharge Goal: Independent       Oral Hygiene: Oral Hygiene  Assistance Needed: Supervision or touching assistance  Comment: in stance at sink  CARE Score: 4  Discharge Goal: Independent    UB/LB Bathing: Shower/Bathe Self  Assistance Needed: Supervision or touching assistance  Comment: SBA while in stance; performed remainder seated c supervision  CARE Score: 4  Discharge Goal: Independent    UB Dressing: Upper Body Dressing  Assistance Needed: Supervision or touching assistance  Comment: to don T shirt and button up shirt  CARE Score: 4  Discharge Goal: Independent         LB Dressing: Lower Body Dressing  Assistance Needed: Partial/moderate assistance  Comment: required min A to thread RLE in jeans to ensure precaution compliance; SBA while in stance to perform pants management  CARE Score: 3  Discharge Goal: Independent    Donning and [place] Footwear: Putting On/Taking Off Footwear  Assistance Needed: Partial/moderate assistance  Comment: able to doff B socks and don L; required assist with R and educated on improved technique to comply with precautions  CARE Score: 3  Discharge Goal: Independent      Toileting:  Toileting Hygiene  Assistance Needed: Supervision or touching assistance  Comment: SBA in stance to manage clothing in stance  CARE Score: 4  Discharge Goal: Independent      Toilet Transfers: Toilet Transfer  Assistance Needed: Supervision or touching assistance  Comment: SBA using RW and grab bars  CARE Score: 4  Discharge Goal: Independent      SPEECH THERAPY: (If ordered)  Plan of Care and Goals:   LTG                                                         LTG:                           Treatments may include speech/language/communication therapy, cognitive training, animal assisted therapy, group therapy, education, and/or dysphagia therapy based on the above goals. Co-treats where appropriate with PT or OT to facilitate patient goals in functional tasks. These goals were reviewed with this patient at the time of assessment and Maud Boeck is in agreement. CASE MANAGEMENT:  Goals:   Assist patient/family with discharge planning, patient/family counseling, assistance in obtaining recommended equipment and other services, and coordination with insurance during ARU stay. Patient Goals: Return to maximum level of independent function. Activities Prior to Admit:   Homemaking Responsibilities: Yes  Active : No (Sister drives)  Mode of Transportation: Car  Occupation: On disability            Intensity of [address] will be seen a minimum of 3 hours of therapy per day/a minimum of 5 out of 7 days per week. [] In this rare instance due to the nature of this patient's medical involvement, this patient will be seen 15 hours per week (900 minutes within a 7 day period). Treatments may include therapeutic exercises, gait training, neuromuscular re-ed, transfer training, community reintegration, bed mobility, w/c mobility and training, self care, home mgmt, cognitive training, energy conservation,dysphagia tx, speech/language/communication therapy, group therapy, and patient/family education.  In addition, dietician/nutritionist may monitor calorie count as well as intake and collaboratively work with SLP on dietary upgrades. Neuropsychology/Psychology may evaluate and provide necessary support. Group therapy as appropriate to facilitate improved endurance, STR, COORD, function, safety, transfers, awareness and insight into deficits, problem solving, memory, and social interaction and engagement. Medical issues being managed closely and that require 24 hour availability of a physician:   [] Swallowing Precautions                                     [] Weight bearing precautions   [x] Wound Care                             [x] Infection Prevention   [x] DVT Prophylaxis/assessment              [x] Monitoring for complications    [x] Fall Precautions/Prevention                         [x] Fluid/Electrolyte/Nutrition Balance   [] Voice Protection                           [x] Medication Management   [x] Respiratory                   [x] Pain Mgmt   [x] Bowel/Bladder Fx    Medical Prognosis: [] Good  [x] Fair    [] Guarded   Total expected IRF days 14                                            Physician anticipated functional outcomes:  FWW and HHC OT/PT and supervision.   Rehab Goals:   [] Return to premorbid function of_______________________________.    [] Independent   [] Mod I  [x] Supervision  [] CGA   [] Min A   [] Mod A  Level for ambulation []without assistive device  [x] with assistive device        [] Independent   [] Mod I  [x] Supervision [] CGA   [] Min A   [] Mod A  Level for transfers []without assistive device  [x] with assistive device         [] Independent   [] Mod I  [x] Supervision [] CGA   [] Min A   [] Mod A Level with ADL's []without assistive device   [x] with assistive device     ___________________     Level with cognitive skills requiring [] No assist [x] Supervision  [] Active Assist/Cues     [] Maximize level of mobility and ADL's to decrease burden on caregiver    IPOC brief synthesis of Preadmission Screen, Post-Admission Evaluation, and Therapy Evaluations: Acute inpatient rehabilitation with occupational and physical therapy 180 minutes 5 out of every 7 days. Will address basic and  advancing mobility with self-care instruction and adaptive equipment training. Caregiver education will be offered. Expected length of stay  prior to a supervised level of function for discharge home with a walker and [facility] OT/PT is 2 weeks.     Additional recommendation:     1. Right femoral neck fracture with total arthroplasty: The patient requires daily occupational and physical therapy. Mistreating him to do self-care activities and mobility following anterior hip precautions. Weightbearing as tolerated. He requires pain management, adaptive equipment training and DVT prophylaxis. We must provide pulmonary hygiene measures and monitor his anemia and hyponatremia. 2. DVT prophylaxis: Surgeon has chosen aspirin 325 twice daily for DVT prophylaxis. Weightbearing activities will be pursued daily. SCDs when in bed. 3. Bipolar disorder with depression: Treating him in a calm consistent environment. Regulating his sleep-wake schedule. He requires trazodone and Effexor. Psychiatry services available to him as needed. 4. Uncontrolled pain: Scheduled acetaminophen and oxycodone with cryotherapy and progressive mobilization following anterior hip precautions. 5. Hypertension: Recently his blood pressures have been reasonable off medication. We will continue to monitor vital signs at rest and with activity. Target systolic blood pressure is 120-140. 6. Hyponatremia: His numbers are closer to normal now. Will encourage consistent oral intake and be cautious with any diuretic use. No fluid restrictions at the moment. Periodic monitoring of his chemistries. 7. COPD: Albuterol inhaler twice daily, Singulair and aggressive pulmonary hygiene measures. Monitoring O2 saturations at rest and with activity.   No steroid therapy for the time being.     Anticipated discharge

## 2021-11-03 NOTE — PLAN OF CARE
Problem: Falls - Risk of:  Goal: Will remain free from falls  Description: Will remain free from falls  Outcome: Ongoing  Goal: Absence of physical injury  Description: Absence of physical injury  Outcome: Ongoing     Problem: Falls - Risk of:  Goal: Will remain free from falls  Description: Will remain free from falls  Outcome: Ongoing     Problem: Falls - Risk of:  Goal: Absence of physical injury  Description: Absence of physical injury  Outcome: Ongoing     Problem: Skin Integrity:  Goal: Will show no infection signs and symptoms  Description: Will show no infection signs and symptoms  Outcome: Ongoing  Goal: Absence of new skin breakdown  Description: Absence of new skin breakdown  Outcome: Ongoing     Problem: Skin Integrity:  Goal: Will show no infection signs and symptoms  Description: Will show no infection signs and symptoms  Outcome: Ongoing     Problem: Skin Integrity:  Goal: Absence of new skin breakdown  Description: Absence of new skin breakdown  Outcome: Ongoing     Problem: SAFETY  Goal: Free from accidental physical injury  Outcome: Ongoing     Problem: SAFETY  Goal: Free from accidental physical injury  Outcome: Ongoing     Problem: DAILY CARE  Goal: Daily care needs are met  Outcome: Ongoing     Problem: DAILY CARE  Goal: Daily care needs are met  Outcome: Ongoing     Problem: DAILY CARE  Goal: Daily care needs are met  Outcome: Ongoing     Problem: DAILY CARE  Goal: Daily care needs are met  Outcome: Ongoing     Problem: PAIN  Goal: Patient's pain/discomfort is manageable  Outcome: Ongoing     Problem: PAIN  Goal: Patient's pain/discomfort is manageable  Outcome: Ongoing     Problem: KNOWLEDGE DEFICIT  Goal: Patient/S.O. demonstrates understanding of disease process, treatment plan, medications, and discharge instructions.   Outcome: Ongoing     Problem: KNOWLEDGE DEFICIT  Goal: Patient/S.O. demonstrates understanding of disease process, treatment plan, medications, and discharge instructions.   Outcome: Ongoing     Problem: DISCHARGE BARRIERS  Goal: Patient's continuum of care needs are met  Outcome: Ongoing     Problem: DISCHARGE BARRIERS  Goal: Patient's continuum of care needs are met  Outcome: Ongoing

## 2021-11-03 NOTE — DISCHARGE SUMMARY
HOSPITALIST DISCHARGE SUMMARY     Patient ID: Thomas Caldwell [id number]                 [id number]      PCP:  Johnny Mosqueda MD    Admit date: 10/22/2021   Discharge date: 11/3/2021      Admitting Physician: Seema Radford MD  Attending Physician(s): Seema Radford MD, MD;   Discharge Physician: Seema Radford MD, MD.  Consultant(s): Psychiatry, Cardiology, Orthopedic Surgery    Admitting Diagnoses: Fracture of the neck of right femur  Discharge Diagnoses: Principal Problem:    Closed transcervical fracture of right femur [facility])  Active Problems:    Severe episode of recurrent major depressive disorder, with psychotic features ([facility] [address]    Passive suicidal ideations    Paranoia ([facility] [address]    Hyperlipidemia    COPD (chronic obstructive pulmonary disease) ([facility] [address]    Anxiety    Chronic interstitial lung disease ([facility] [address]    Femoral neck stress fracture, initial encounter    H/O mechanical aortic valve replacement    Fatigue fracture of lumbar vertebra with routine healing  Resolved Problems:    * No resolved hospital problems. *    Discharged Condition: stable  Disposition: ARU    Procedures: Right hip total hip arthroplasty using Biomet G7 size 54; by Dr. Ludwin Vela  Complications: None recorded    Brief History: History of Presenting Illness. Patient is a 71-year-old male past medical history of COPD, hypertension, mechanical aortic valve replacement who presents to the ED with complaints of right hip pain. Patient states he may have fell this Sunday. He states he was taking ibuprofen. He further states that his sister brought him here because of the pain. In the ER patient was found to have a right femoral neck fracture. Lab evaluation showed hyponatremia. Patient was admitted for further evaluation. Hospital Course: Admitted as above, seen by orthopedic surgery and had a right total hip arthroplasty.   Postprocedure patient had become depressed and therefore was seen by psychiatry consult with medication adjustments. He had become deconditioned and was seen by PT/OT who recommended ARU. Pain control initially was an issue but was currently was improved. He has been cleared for discharge to the ARU and therefore being discharged out there in a stable state.     Prognosis: Good    Physical Examination:   General appearance - oriented to person, place, and time, acyanotic, in no respiratory distress and ill-appearing  HEENT: Normocephalic, Atraumatic, Conjuctiva pink, PERRL, Oral mucosa normal, Lips, teeth and gums normal, Trachea midline, Thyroid normal and No noted lymphadenopathy  Chest - clear to auscultation, no wheezes, rales or rhonchi, symmetric air entry  Cardiovascular - S1 and S2 normal, clicks present  Abdomen - soft, nontender, nondistended, no masses or organomegaly   Neurological - Alert and oriented, Normal speech, No focal findings or movement disorder noted and Motor and sensory grossly normal bilaterally  Integumentary - Skin color, texture, turgor normal. No Rashes or lesions  Musculoskeletal -right hip surgical site clean and dry, No clubbing or cyanosis and No peripheral edema    Significant Diagnostic Studies: None  Pending Investigation/labs: None    Recent Labs:  CBC with Differential:    Lab Results   Component Value Date    WBC 10.4 10/31/2021    RBC 2.67 10/31/2021    HGB 7.9 10/31/2021    HCT 25.3 10/31/2021     10/31/2021    MCV 94.8 10/31/2021    MCH 29.6 10/31/2021    MCHC 31.2 10/31/2021    RDW 14.2 10/31/2021    SEGSPCT 76.3 10/31/2021    LYMPHOPCT 13.3 10/31/2021    MONOPCT 5.9 10/31/2021    BASOPCT 0.4 10/31/2021    MONOSABS 0.6 10/31/2021    LYMPHSABS 1.4 10/31/2021    EOSABS 0.0 10/31/2021    BASOSABS 0.0 10/31/2021    DIFFTYPE AUTOMATED DIFFERENTIAL 10/31/2021    DIFFTYPE AUTOMATED DIFF RESULTS CONFIRMED BY SMEAR REVIEW 10/31/2021     CMP:    Lab Results   Component Value Date     10/31/2021    K 4.1 10/31/2021    CL 98 10/31/2021    CO2 21 10/31/2021    BUN 9 10/31/2021    CREATININE 0.5 10/31/2021    GFRAA >60 10/31/2021    AGRATIO 1.5 06/11/2021    LABGLOM >60 10/31/2021    GLUCOSE 155 10/31/2021    PROT 6.3 10/25/2021    LABALBU 4.0 10/25/2021    CALCIUM 8.9 10/31/2021    BILITOT 0.4 10/25/2021    ALKPHOS 60 10/25/2021    AST 28 10/25/2021    ALT 17 10/25/2021       Patient Instructions  Medications:   Jeanette Harding   [place] Medication Instructions [id number]    Printed on:11/03/21 1415   Medication Information                      Acetaminophen (TYLENOL 8 HOUR PO)  Take by mouth as needed Over The Counter             albuterol (ACCUNEB) 0.63 MG/3ML nebulizer solution  Take 1 ampule by nebulization every 6 hours as needed for Wheezing             albuterol sulfate HFA (PROAIR HFA) 108 (90 Base) MCG/ACT inhaler  Inhale 2 puffs into the lungs every 4 hours as needed for Wheezing or Shortness of Breath             ammonium lactate (LAC-HYDRIN) 12 % lotion  Apply topically daily.              budesonide (RHINOCORT ALLERGY) 32 MCG/ACT nasal spray  2 sprays by Nasal route daily             calcium carbonate (OSCAL) 500 MG TABS tablet  Take 500 mg by mouth daily             cetirizine (ZYRTEC) 10 MG tablet  Take 10 mg by mouth \"Alternate With Singulair\"             cyclobenzaprine (FLEXERIL) 10 MG tablet  Take 10 mg by mouth 3 times daily as needed for Muscle spasms             ipratropium-albuterol (DUONEB) 0.5-2.5 (3) MG/3ML SOLN nebulizer solution  Inhale 3 mLs into the lungs 4 times daily             KRILL OIL OMEGA-3 PO  Take by mouth             montelukast (SINGULAIR) 10 MG tablet  Take 1 tablet by mouth nightly \"Alternate With Zyrtec\"             Multiple Vitamins-Minerals (CENTRUM PO)  Take by mouth daily             NASAL SPRAY SALINE NA  by Nasal route daily Over The Counter             Nebulizer MISC  Inhale into the lungs as needed             simvastatin (ZOCOR) 40 MG tablet  take 1 tablet by mouth at bedtime             sodium chloride, Inhalant, 3 % nebulizer solution               traZODone (DESYREL) 100 MG tablet  Take 2 tablets by mouth nightly             triamcinolone (KENALOG) 0.1 % cream  Apply topically 2 times daily. venlafaxine (EFFEXOR XR) 150 MG extended release capsule  Take 1 capsule by mouth daily for 7 days             warfarin (COUMADIN) 5 MG tablet  take 1-2 tablets by mouth daily as directed                  Activity: activity as tolerated  Diet: regular diet  Wound Care: keep wound clean and dry  Follow-up with: To be determined upon discharge from the ARU  Services:  To provided at the ARU      Electronically Signed by: Jaelyn Owens MD, MD.    Time spent on discharge/dictation: 38 minutes

## 2021-11-04 LAB
INR BLD: 1.07 INDEX
PROTHROMBIN TIME: 13.8 SECONDS (ref 11.7–14.5)

## 2021-11-04 PROCEDURE — 97162 PT EVAL MOD COMPLEX 30 MIN: CPT

## 2021-11-04 PROCEDURE — 1280000000 HC REHAB R&B

## 2021-11-04 PROCEDURE — 94640 AIRWAY INHALATION TREATMENT: CPT

## 2021-11-04 PROCEDURE — 97116 GAIT TRAINING THERAPY: CPT

## 2021-11-04 PROCEDURE — 99232 SBSQ HOSP IP/OBS MODERATE 35: CPT | Performed by: PHYSICAL MEDICINE & REHABILITATION

## 2021-11-04 PROCEDURE — 94761 N-INVAS EAR/PLS OXIMETRY MLT: CPT

## 2021-11-04 PROCEDURE — 6360000002 HC RX W HCPCS: Performed by: PHYSICAL MEDICINE & REHABILITATION

## 2021-11-04 PROCEDURE — 6370000000 HC RX 637 (ALT 250 FOR IP): Performed by: INTERNAL MEDICINE

## 2021-11-04 PROCEDURE — 94150 VITAL CAPACITY TEST: CPT

## 2021-11-04 PROCEDURE — 97535 SELF CARE MNGMENT TRAINING: CPT

## 2021-11-04 PROCEDURE — 97166 OT EVAL MOD COMPLEX 45 MIN: CPT

## 2021-11-04 PROCEDURE — 6370000000 HC RX 637 (ALT 250 FOR IP): Performed by: PHYSICAL MEDICINE & REHABILITATION

## 2021-11-04 PROCEDURE — 36415 COLL VENOUS BLD VENIPUNCTURE: CPT

## 2021-11-04 PROCEDURE — 85610 PROTHROMBIN TIME: CPT

## 2021-11-04 PROCEDURE — 97530 THERAPEUTIC ACTIVITIES: CPT

## 2021-11-04 RX ORDER — ALBUTEROL SULFATE 90 UG/1
2 AEROSOL, METERED RESPIRATORY (INHALATION) 2 TIMES DAILY
Status: DISCONTINUED | OUTPATIENT
Start: 2021-11-04 | End: 2021-11-09 | Stop reason: HOSPADM

## 2021-11-04 RX ORDER — BISACODYL 10 MG
10 SUPPOSITORY, RECTAL RECTAL DAILY PRN
Status: DISCONTINUED | OUTPATIENT
Start: 2021-11-04 | End: 2021-11-09 | Stop reason: HOSPADM

## 2021-11-04 RX ADMIN — POLYETHYLENE GLYCOL (3350) 17 G: 17 POWDER, FOR SOLUTION ORAL at 11:54

## 2021-11-04 RX ADMIN — MONTELUKAST 10 MG: 10 TABLET, FILM COATED ORAL at 20:31

## 2021-11-04 RX ADMIN — CYCLOBENZAPRINE 10 MG: 10 TABLET, FILM COATED ORAL at 20:31

## 2021-11-04 RX ADMIN — ALBUTEROL SULFATE 2 PUFF: 90 AEROSOL, METERED RESPIRATORY (INHALATION) at 21:15

## 2021-11-04 RX ADMIN — TRAZODONE HYDROCHLORIDE 200 MG: 50 TABLET ORAL at 20:31

## 2021-11-04 RX ADMIN — VENLAFAXINE HYDROCHLORIDE 150 MG: 37.5 CAPSULE, EXTENDED RELEASE ORAL at 10:04

## 2021-11-04 RX ADMIN — WARFARIN SODIUM 5 MG: 5 TABLET ORAL at 17:49

## 2021-11-04 RX ADMIN — CALCIUM 500 MG: 500 TABLET ORAL at 10:00

## 2021-11-04 RX ADMIN — OXYCODONE 5 MG: 5 TABLET ORAL at 20:31

## 2021-11-04 RX ADMIN — LACTULOSE 20 G: 10 SOLUTION ORAL at 20:30

## 2021-11-04 RX ADMIN — ALBUTEROL SULFATE 2 PUFF: 90 AEROSOL, METERED RESPIRATORY (INHALATION) at 06:56

## 2021-11-04 RX ADMIN — ATORVASTATIN CALCIUM 40 MG: 40 TABLET, FILM COATED ORAL at 10:04

## 2021-11-04 RX ADMIN — LACTULOSE 20 G: 10 SOLUTION ORAL at 10:00

## 2021-11-04 RX ADMIN — ASPIRIN 325 MG: 325 TABLET, COATED ORAL at 10:04

## 2021-11-04 RX ADMIN — OXYCODONE 5 MG: 5 TABLET ORAL at 11:55

## 2021-11-04 RX ADMIN — BISACODYL 10 MG: 5 TABLET, COATED ORAL at 10:04

## 2021-11-04 RX ADMIN — ASPIRIN 325 MG: 325 TABLET, COATED ORAL at 20:31

## 2021-11-04 RX ADMIN — ENOXAPARIN SODIUM 70 MG: 100 INJECTION SUBCUTANEOUS at 10:09

## 2021-11-04 RX ADMIN — ENOXAPARIN SODIUM 70 MG: 100 INJECTION SUBCUTANEOUS at 20:31

## 2021-11-04 ASSESSMENT — PAIN SCALES - GENERAL
PAINLEVEL_OUTOF10: 0
PAINLEVEL_OUTOF10: 3
PAINLEVEL_OUTOF10: 4
PAINLEVEL_OUTOF10: 0

## 2021-11-04 ASSESSMENT — PAIN DESCRIPTION - DESCRIPTORS: DESCRIPTORS: SORE

## 2021-11-04 ASSESSMENT — PAIN - FUNCTIONAL ASSESSMENT: PAIN_FUNCTIONAL_ASSESSMENT: PREVENTS OR INTERFERES SOME ACTIVE ACTIVITIES AND ADLS

## 2021-11-04 ASSESSMENT — PAIN DESCRIPTION - DIRECTION: RADIATING_TOWARDS: RIGHT BUTTOCK

## 2021-11-04 ASSESSMENT — PAIN DESCRIPTION - LOCATION: LOCATION: HIP

## 2021-11-04 ASSESSMENT — PAIN DESCRIPTION - PAIN TYPE: TYPE: ACUTE PAIN

## 2021-11-04 ASSESSMENT — PAIN DESCRIPTION - PROGRESSION: CLINICAL_PROGRESSION: GRADUALLY WORSENING

## 2021-11-04 ASSESSMENT — PAIN DESCRIPTION - ONSET: ONSET: ON-GOING

## 2021-11-04 ASSESSMENT — PAIN DESCRIPTION - FREQUENCY: FREQUENCY: CONTINUOUS

## 2021-11-04 ASSESSMENT — PAIN DESCRIPTION - ORIENTATION: ORIENTATION: RIGHT

## 2021-11-04 NOTE — PROGRESS NOTES
Comprehensive Nutrition Assessment    Type and Reason for Visit:  Initial, Consult (oral nutrition supplement)    Nutrition Recommendations/Plan:   Continue regular diet   Will continue to offer oral nutrition supplements during stay   Will continue to follow up during stay     Nutrition Assessment:  Rehab admit with recent hx right hip surgery. Appetite improving, overall content with meals on regular diet. Willing to drink some oral nutriton supplement during stay. Remains low nutrition risk at this time with improving intake. Malnutrition Assessment:  Malnutrition Status: At risk for malnutrition (Comment)    Context:  Acute Illness       Estimated Daily Nutrient Needs:  Energy (kcal):  5213-4188 (25-30 kaila/kg); Weight Used for Energy Requirements:  Current     Protein (g):  65-78 (1-1.2 g/kg); Weight Used for Protein Requirements:  Current        Fluid (ml/day):  1900; Method Used for Fluid Requirements:  1 ml/kcal      Nutrition Related Findings:  sitting up in chair, feeling better with bowels more regular today, weight loss noted planned loss with lifestyle changes      Wounds:  Surgical Incision       Current Nutrition Therapies:    ADULT DIET; Regular  ADULT ORAL NUTRITION SUPPLEMENT; Breakfast, Dinner; Standard High Calorie/High Protein Oral Supplement    Anthropometric Measures:  · Height: 5' 4\" (162.6 cm)  · Current Body Weight: 144 lb 10 oz (65.6 kg)   · Usual Body Weight: 179 lb 14.3 oz (81.6 kg) (per hx)     · Ideal Body Weight: 130 lbs; % Ideal Body Weight 111.2 %   · BMI: 24.8  · Adjusted Body Weight:  ; No Adjustment   · BMI Categories: Normal Weight (BMI 18.5-24. 9)       Nutrition Diagnosis:   · Increased nutrient needs related to increase demand for energy/nutrients (recent hip surgery) as evidenced by wounds      Nutrition Interventions:   Food and/or Nutrient Delivery:  Continue Current Diet, Snacks (Comment), Continue Oral Nutrition Supplement  Nutrition Education/Counseling:  No recommendation at this time   Coordination of Nutrition Care:  Continue to monitor while inpatient    Goals:  Patient will consume at least 75% at meals during stay       Nutrition Monitoring and Evaluation:   Behavioral-Environmental Outcomes:  None Identified   Food/Nutrient Intake Outcomes:  Supplement Intake, Food and Nutrient Intake  Physical Signs/Symptoms Outcomes:  Biochemical Data, Meal Time Behavior, Skin, Weight     Discharge Planning:    Continue current diet     Electronically signed by Narcisa Carpio RD, LD on 11/4/21 at 3:11 PM EDT    Contact: [phone]

## 2021-11-04 NOTE — PROGRESS NOTES
Occupational Therapy                              [facility] ARU OCCUPATIONAL THERAPY EVALUATION    Chart Review:  Past Medical History:   Diagnosis Date    Acid reflux     Acute frontal sinusitis 7/22/2009    Alcohol abuse     \"I Drink Average 2 Beers A Day\"    Anesthesia     \" I get agitated\"    Anxiety     Arthritis     \"Right Shoulder, Neck, Left Knee\"    Atypical mycobacterial infection     Broken teeth     Upper And Lower     CHF (congestive heart failure) (HCC)     COPD (chronic obstructive pulmonary disease) ([facility])     Sees Dr. Jalen Zaldivar In [place], [address]     Cough     Frequent Cough With Green Sputum    Depression     Depression     Dyspnea 2/23/2018    H/O cardiovascular MUGA stress test 05/14/2019    EF 50%, NORMAL LVEF, NORMAL WALL MOTION.    H/O echocardiogram 05/22/2014    TF=>44-71%, LV systolic function is low normal, mild aortic valve calcification, no pericardial effusion    H/O echocardiogram 12/12/2018    EF 45-50%    History of 24 hour EKG monitoring 6/6/14    24 hr holter monitor. Ventricular ectopics were noted in single and couplet forms. Supraventricular ectopics were noted in single and pair forms.     [place] (hard of hearing)     Bilateral Ears    Hyperlipidemia     Hypertension     Irritant contact dermatitis due to other chemical products 8/26/2019    Other drug allergy(995.27) 7/22/2009    S/P AVR 12/2003    Mechanical valvue     Shortness of breath     Teeth missing     Upper And Lower     Past Surgical History:   Procedure Laterality Date    BRONCHOSCOPY  1996    \"Done Twice\"    CARDIAC VALVE REPLACEMENT  12/17/2003    \"Aortic Valve Replacement, Mechanical Valve\"    COLONOSCOPY  2006    Polyps Removed    DENTAL SURGERY      Teeth Extracted In Past    ENDOSCOPY, COLON, DIAGNOSTIC  In Past    HEMIARTHROPLASTY SHOULDER FRACTURE Right 1/29/2019    SHOULDER HEMIARTHROPLASTY performed by Lydia Munoz DO at [address] [place] [address] Left 1992  LUNG BIOPSY Right 1996    OTHER SURGICAL HISTORY  1992    \"Cauterized Because My Sperm Was Low\"    ROTATOR CUFF REPAIR Right 2008    TONSILLECTOMY  1959 Or 1960    TOTAL HIP ARTHROPLASTY Right 10/25/2021    RIGHT HIP TOTAL ARTHROPLASTY performed by Janett Byrnes DO at [address] [place] [address] History:  Social/Functional History  Lives With: Alone  Type of Home: Apartment  Home Layout: One level  Home Access: Ramped entrance, Elevator (has elevator access to apartment)  Bathroom Shower/Tub: Tub/Shower unit  Bathroom Toilet: Handicap height  Bathroom Equipment: Grab bars in shower  Bathroom Accessibility: Walker accessible  Home Equipment:  (Has 4WW in hospital room that is her sister's)  ADL Assistance: [address]: Independent  Homemaking Responsibilities: Yes  Ambulation Assistance: Independent (no AD)  Transfer Assistance: Independent  Active : No (Sister drives)  Mode of Transportation: Car  Occupation: On disability  Type of occupation: Construction  Additional Comments: Pt typically sleeps in a flat, regular bed at home. Pt reports at least 3 falls in the past year \"due to bad judgement\"; latest one caused R hip fx precipitating this hospitalization. Restrictions:  Restrictions/Precautions  Restrictions/Precautions: General Precautions, Fall Risk (WBAT RLE, anterior hip precautions)        Position Activity Restriction  Hip Precautions: No ADduction, No hip extension, No hip external rotation         Pain Level: 0       Objective:  Observation/Palpation  Posture: Good  Observation: Pt alert and in recliner on approach, very pleasant, polite, agreeable to evaluation.   A few times gave vague/unclear answers requiring specific follow up  Scar: Surgical glue intact to R hip    Orientation  Overall Orientation Status: Within Normal Limits        Vision  Vision: Impaired  Vision Exceptions: Wears glasses for reading  Vision - Basic Assessment  Prior Vision: Wears glasses only for reading  Patient Visual Report: No visual complaint reported. Hearing  Hearing: Within functional limits    ROM:      LUE AROM (degrees)  LUE AROM : WNL     Left Hand AROM (degrees)  Left Hand AROM: WNL     RUE AROM (degrees)  RUE AROM : WFL  RUE General AROM: ~150*; mild limitations for shoulder sx     Right Hand AROM (degrees)  Right Hand AROM: WFL  Right Hand General AROM: mild ulnar drift    Strength:    LUE Strength  Gross LUE Strength: WNL  L Hand General: 5/5  LUE Strength Comment: 5/5 grossly  RUE Strength  Gross RUE Strength: WNL  R Hand General: 5/5  RUE Strength Comment: 5/5 grossly    Quality of Movement:   Tone RUE  RUE Tone: Normotonic  Tone LUE  LUE Tone: Normotonic  Coordination  Movements Are Fluid And Coordinated: Yes       Sensation:    Sensation  Overall Sensation Status: Impaired (reports intermittent numbness in hands 2* arthritis; denies in BLEs)     ADLs:  Eating: Eating  Assistance Needed: Independent  Comment: able to open packages/containers  CARE Score: 6  Discharge Goal: Independent       Oral Hygiene: Oral Hygiene  Assistance Needed: Supervision or touching assistance  Comment: in stance at sink  CARE Score: 4  Discharge Goal: Independent    UB/LB Bathing: Shower/Bathe Self  Assistance Needed: Supervision or touching assistance  Comment: SBA while in stance; performed remainder seated c supervision  CARE Score: 4  Discharge Goal: Independent    UB Dressing: Upper Body Dressing  Assistance Needed: Supervision or touching assistance  Comment: to don T shirt and button up shirt  CARE Score: 4  Discharge Goal: Independent         LB Dressing:   Lower Body Dressing  Assistance Needed: Partial/moderate assistance  Comment: required min A to thread RLE in jeans to ensure precaution compliance; SBA while in stance to perform pants management  CARE Score: 3  Discharge Goal: Independent    [employer] [employer] [place] Footwear: Putting On/Taking Off Footwear  Assistance Needed: Partial/moderate assistance  Comment: able to doff B socks and don L; required assist with R and educated on improved technique to comply with precautions  CARE Score: 3  Discharge Goal: Independent      Toileting: Toileting Hygiene  Assistance Needed: Supervision or touching assistance  Comment: SBA in stance to manage clothing in stance  CARE Score: 4  Discharge Goal: Independent      Bed Mobility:    Bed mobility  Comment: Not performed, see PT carmencita for details    Transfers:    Transfers  Stand Pivot Transfers: Stand by assistance  Sit to stand: Stand by assistance  Stand to sit: Stand by assistance  Transfer Comments: Min cues for hand placement     Shower Transfers  Shower - Transfer Type: To and From  Shower - Transfer To: Shower seat without back  Shower - Technique: Ambulating (c RW)  Shower Transfers: Stand by assistance  Shower Transfers Comments: Min cues for body mechanics     Toilet Transfer  Assistance Needed: Supervision or touching assistance  Comment: SBA c RW  CARE Score: 4  Discharge Goal: Independent    Functional Mobility:    Balance  Sitting Balance: Independent  Standing Balance: Stand by assistance  Standing Balance  Time: Multiple stands ranging from 2-10 mins each  Activity: ADLs  Comment: Pt able to maintain balance c 0-1 UE support  Functional Mobility  Functional - Mobility Device: Rolling Walker  Activity: To/from bathroom  Assist Level: Stand by assistance     Cognition:  Cognition  Overall Cognitive Status: WFL    Perception:  Perception  Overall Perceptual Status: WFL      Assessment:     Performance deficits / Impairments: Decreased functional mobility , Decreased ADL status, Decreased endurance, Decreased balance, Decreased high-level IADLs    The patient is a 73year old male admitted onto ARU after hospitalization for fall resulting in R femoral neck fracture. It took pt several days after fall to come to the ED d/t increasing pain.   During hospitalization pt was also treated for severe episode of recurrent major depressive disorder with psychotic features and passive suicidal ideation; medications have been adjusted and at this point outpatient therapy follow up is recommended. Pt is now s/p R total hip arthroplasty and is to follow anterior hip precautions, pt is WBAT RLE. PTA, pt lives alone and is Ind c ADLs, IADLs except for driving, and functional transfers/mobility. Today, pt participated well and appears motivated and receptive to education. Pt did require min A for some LB ADLs however pt demo's potential to progress to mod I-Ind quickly with or without AE. The QI, MMT, and ROM standardized assessments were used this date to determine the above performance deficits, which compromise pt's ability to safely complete ADLs/IADLs/mobility. Pt will benefit from ARU OT services to increase functional performance and return to PLOF. Decision Making: Medium Complexity  Clinical Presentation:  Evolving c changing characteristics (I.e. pain)  Patient education:   ARU naml, Role of O.T., O.T. plan of care  []   Patient goal was established and reviewed in Rehabtracker with patient and/or family this date. REQUIRES OT FOLLOW UP: Yes  Discharge Recommendations:  Home c assist PRN, C OT?   Equipment Recommendations:  SC vs TTB    Goals:     Short term goals  Time Frame for Short term goals: STGs=LTGs  Long term goals  Time Frame for Long term goals : ~1 week or until d/c  Long term goal 1: Pt will complete grooming tasks Ind  Long term goal 2: Pt will complete total body bathing mod I  Long term goal 3: Pt will complete UB dressing Ind  Long term goal 4: Pt will complete LB dressing mod I using AE PRN  Long term goal 5: Pt will doff/don footwear mod I using AE PRN  Long term goals 6: Pt will complete toileting mod I  Long term goal 7: Pt will complete functional transfers (bed, chair, shower, toilet) c DME PRN and mod I  Long term goal 8: Pt will perform therex/therax to facilitate increased strength/endurance/ax tolerance (c emphasis on dynamic standing balance/tolerance >12 mins, BUE endurance) c SBA  Long term goal 9: Pt will complete simple homemaking tasks c DME PRN and mod I    Plan:    Pt will be seen at least 60 minutes per day for a minimum of 5 days per week, plus group therapy as appropriate  Current Treatment Recommendations: Functional Mobility Training, Endurance Training, Balance Training, Safety Education & Training, Pain Management, Patient/Caregiver Education & Training, Equipment Evaluation, Education, & procurement, Self-Care / ADL, Home Management Training    OT Individual Minutes  Time In: 0940  Time Out: 1110  Minutes: 90                Number of Minutes/Billable Intervention      OT Evaluation 25   Therapeutic Exercise    ADL Self-care 65   Neuro Re-Ed    Therapeutic Activity    Group    Other:    TOTAL 90     Electronically signed by:    Donovan Wright MS, OTR/L  License #[id number]  11/4/2021, 11:49 AM

## 2021-11-04 NOTE — PROGRESS NOTES
Physical Therapy    [facility] ARU PHYSICAL THERAPY EVALUATION    Chart Review:  Past Medical History:   Diagnosis Date    Acid reflux     Acute frontal sinusitis 7/22/2009    Alcohol abuse     \"I Drink Average 2 Beers A Day\"    Anesthesia     \" I get agitated\"    Anxiety     Arthritis     \"Right Shoulder, Neck, Left Knee\"    Atypical mycobacterial infection     Broken teeth     Upper And Lower     CHF (congestive heart failure) ([facility])     COPD (chronic obstructive pulmonary disease) ([facility])     Sees Dr. Lou Argueta In [address] [place] [address], [address] [place] [address] Cough     Frequent Cough With Green Sputum    Depression     Depression     Dyspnea 2/23/2018    H/O cardiovascular MUGA stress test 05/14/2019    EF 50%, NORMAL LVEF, NORMAL WALL MOTION.    H/O echocardiogram 05/22/2014    UX=>00-12%, LV systolic function is low normal, mild aortic valve calcification, no pericardial effusion    H/O echocardiogram 12/12/2018    EF 45-50%    History of 24 hour EKG monitoring 6/6/14    24 hr holter monitor. Ventricular ectopics were noted in single and couplet forms. Supraventricular ectopics were noted in single and pair forms.     [place] (hard of hearing)     Bilateral Ears    Hyperlipidemia     Hypertension     Irritant contact dermatitis due to other chemical products 8/26/2019    Other drug allergy(995.27) 7/22/2009    S/P AVR 12/2003    Mechanical valvue     Shortness of breath     Teeth missing     Upper And Lower     Past Surgical History:   Procedure Laterality Date    BRONCHOSCOPY  1996    \"Done Twice\"    CARDIAC VALVE REPLACEMENT  12/17/2003    \"Aortic Valve Replacement, Mechanical Valve\"    COLONOSCOPY  2006    Polyps Removed    DENTAL SURGERY      Teeth Extracted In Past    ENDOSCOPY, COLON, DIAGNOSTIC  In Past    HEMIARTHROPLASTY SHOULDER FRACTURE Right 1/29/2019    SHOULDER HEMIARTHROPLASTY performed by Santhosh Horton DO at [facility] Left 1992   [address] [facility] OTHER SURGICAL HISTORY  1992    \"Cauterized Because My Sperm Was Low\"    ROTATOR CUFF REPAIR Right 2008    TONSILLECTOMY  1959 Or 1960    TOTAL HIP ARTHROPLASTY Right 10/25/2021    RIGHT HIP TOTAL ARTHROPLASTY performed by Kaci Andres DO at [facility] OR     Fall History:   Social History:  Social/Functional History  Lives With: Alone  Type of Home: Apartment  Home Layout: One level  Home Access: Ramped entrance, Elevator (has elevator access to apartment)  Bathroom Shower/Tub: Tub/Shower unit  Bathroom Toilet: Handicap height  Bathroom Equipment: Grab bars in shower  Bathroom Accessibility: Walker accessible  Home Equipment:  (Has 4WW in hospital room that is her sister's)  ADL Assistance: [facility]: Independent  Homemaking Responsibilities: Yes  Ambulation Assistance: Independent (no AD)  Transfer Assistance: Independent  Active : No (Sister drives)  Mode of Transportation: Car  Occupation: On disability  Type of occupation: Construction  Additional Comments: Pt typically sleeps in a flat, regular bed at home. Pt reports at least 3 falls in the past year \"due to bad judgement\"; latest one caused R hip fx precipitating this hospitalization. Restrictions:  Restrictions/Precautions  Restrictions/Precautions: General Precautions, Fall Risk (WBAT RLE, anterolateral  hip precautions)  Position Activity Restriction  Hip Precautions: No ADduction, No hip extension, No hip external rotation, No active ABduction    Subjective: Pt resting in recliner, alert and pleasant, automatically crossed his ankles during conversation requring verbal prompts, states multiple trips to the bathroom today but has just been passing gas.     Pain Level: 5/10 at rest prior to mobility tasks, 3/10 during mobility tasks   Pain Location: R Hip    Objective:  Orientation  Orientation Level: Oriented X4        Vision  Vision: Impaired  Vision Exceptions: Wears glasses for reading  Hearing  Hearing: Within functional limits    ROM:      AROM RLE (degrees)  RLE General AROM: ankle and knee WFL, limited hip movements due to ROM restrictions and pain     AROM LLE (degrees)  LLE AROM : WFL                 Strength:    Strength RLE  Strength RLE: WFL  Comment: grossly 4/5  Strength LLE  Strength LLE: WFL (functional for basic mobility)              Bed Mobility:   Lying to Sitting on Side of Bed  Assistance Needed: Supervision or touching assistance  Comment: Sup (required verbal prompts to self-assist RLE following precaution of \"no active hip abduction\" and to increase attention on hip position to prevent excessive ER as pt completed transfer towards R side of bed)  CARE Score: 4  Discharge Goal: Independent  Roll Left and Right  Assistance Needed: Supervision or touching assistance  Comment: rolling L/R with SBA-Sup without use of bed features; required VCs on hip precautions and positioning of pillow between thighs to prevent adduction beyond midline especially when rolling to L  CARE Score: 4  Discharge Goal: Independent  Sit to Lying  Assistance Needed: Supervision or touching assistance  Comment: Sup. without use of bed features; pt transitioned from upright sitting at EOB to long sitting and then to supine; transfer completed from the R side of bed and was able to actively lift RLE on the bed following precautions  CARE Score: 4  Discharge Goal: Independent    Transfers:    Sit to Stand  Assistance Needed: Supervision or touching assistance  Comment: SBA using RW  CARE Score: 4  Discharge Goal: Independent  Chair/Bed-to-Chair Transfer  Assistance Needed: Supervision or touching assistance  Comment: SBA using RW  CARE Score: 4  Discharge Goal: Independent  Toilet Transfer  Assistance Needed: Supervision or touching assistance  Comment: SBA using RW and grab bars  CARE Score: 4  Car Transfer  Assistance Needed: Supervision or touching assistance  Comment: CGA using RW with additional stablization of AD to be able to stand; pt was able to actively lift BLE in car, self-assisted RLE and required verbal prompts to attend to RLE movements when getting out of car to ensure no excessive ER  CARE Score: 4  Discharge Goal: Independent    Ambulation:   Device used PTA: none    Walking Ability  Does the Patient Walk?: Yes     Walk 10 Feet  Assistance Needed: Supervision or touching assistance  Comment: SBA using RW following appropriate step-to pattern  CARE Score: 4  Discharge Goal: Independent     Walk 50 Feet with Two Turns  Assistance Needed: Supervision or touching assistance  Comment: SBA using RW following appropriate step-to pattern  CARE Score: 4  Discharge Goal: Independent     Walk 150 Feet  Assistance Needed: Supervision or touching assistance  Comment: SBA using RW following appropriate step-to pattern  CARE Score: 4  Discharge Goal: Independent     Walking 10 Feet on Uneven Surfaces  Assistance Needed: Supervision or touching assistance  Comment: CGA using RW following appropriate step-to pattern  CARE Score: 4  Discharge Goal: Independent     1 Step (Curb)  Assistance Needed: Supervision or touching assistance  Comment: CGA using RW following appropriate step-to pattern  CARE Score: 4  Discharge Goal: Independent     4 Steps  Assistance Needed: Supervision or touching assistance  Comment: CGA using railings following appropriate step-to pattern on ascent/descent  CARE Score: 4  Discharge Goal: Independent     12 Steps  Assistance Needed: Supervision or touching assistance  Comment: CGA using railings following appropriate step-to pattern on ascent/descent  CARE Score: 4  Discharge Goal: Independent    Gait Deviations: []None [x]Slow kathy  [] Increased VALENTINA  [] Staggers []Deviated Path  [] Decreased step length  [] Decreased step height  []Decreased arm swing  [] Shuffles  [] Decreased head and trunk rotation  []other:        Wheelchair:  w/c Ability: Wheelchair Ability  Uses a Wheelchair and/or Scooter?: No Balance:        Object: Picking Up Object  Assistance Needed: Partial/moderate assistance  Comment: CGA to bend over, Min A to be able to get back up in upright standing; used LUE support on RW; task completed without use of reacher  CARE Score: 3    Assessment:   The patient is a 73year old male admitted onto ARU after hospitalization for worsening R hip pain due to fall. Pt found to have R femoral neck Fx S/P R MICHAEL. Pt is WBAT on RLE and is to follow anterior/lateral hip precautions. Pt also required psych consult due to hallucinations and paranoid thoughts and was found to have major depressive disorder with psychotic issues requiring medication adjustment. Pt with Hx of COPD, HTN, and mechanical AVR. Pt presented with post-op pain on R hip that was more at rest and decreased with movement that may likely be due to increased joint mobility and effect of distraction, consistently followed commands and carried over hip precautions and proper step sequencing for ambulation and stairs despite feeling of pt of \"not totally being with it\", increased urge to use bathroom but was unsuccessful with BM due to constipation, and had decreased confidence due to fear of making mistakes/errors. Overall, pt's gait quality was steady and demonstrates potential to improve towards established PT goals based on current deficits and with continued pain management.         Body structures, Functions, Activity limitations: Decreased functional mobility , Decreased high-level IADLs, Decreased endurance, Decreased strength, Decreased balance, Increased pain, Decreased ADL status     Prognosis: Good  Decision Making: Medium Complexity  Clinical Presentation: evolving with changing characteristics      Patient education:   ARU schedule, ARU expectations for participation, plan of care, anterolateral hip precautions   Treatment Initiated:  Functional mobility training, gait training, patient education  Barriers to Improvement: constipation, pain, fear of errors/mistakes that may affect frustration level   Discharge Recommendations:  [facility] PT   Equipment Recommendations:  RW  Chantal Delarosa requires the assistance of a front-wheeled walker to successfully ambulate from room to room at home to allow completion of daily living tasks such as: bathing, toileting, dressing and grooming. A wheeled walker is necessary due to the patient's unsteady gait, upper body weakness, inability to  a standard walker. This patient can ambulate only by pushing a walker instead of using a lesser assistive device such as a cane or crutch. Goals:  Patient Goals   Patient goals : to be able to care for self and go home alone with only assist prn  Short term goals  Time Frame for Short term goals: 7 tx days:  Short term goal 1: Pt will complete rolling L/R and sup<->sit using leg  to assist RLE for hip abduction movement and following anterolateral hip precautions on R Mod Ind-Ind. Short term goal 2: Pt will complete OOB transfers using RW and following anterolateral hip precautions on R Mod Ind. Short term goal 3: Pt will ambulate >150 ft over level surface and at least 10 ft of uneven surface using RW following appropriate step-to pattern Mod Ind. Short term goal 4: Pt will ascend/descend curb step using RW and 1 flight of stairs using railings following appropriate step-to pattern on ascent/descent Mod Ind. Short term goal 5: Pt will complete object retrieval from the floor with 1UE support on RW and using reacher prn Mod Ind.      Plan:    REQUIRES PT FOLLOW UP: Yes  Pt will be seen at least 60 minutes per day for a minimum of 5 days per week, plus group therapy as appropriate  Plan  Times per day: Daily  Current Treatment Recommendations: Strengthening, Gait Training, Patient/Caregiver Education & Training, Stair training, Equipment Evaluation, Education, & procurement, Balance Training, Pain Management, Functional Mobility Training, Endurance Training, Home Exercise Program, Transfer Training, Positioning, Safety Education & Training    PT Individual Minutes  Time In: 1500  Time Out: 1630  Minutes: 90        Timed Code Treatment Minutes: 75 Minutes    Number of Minutes/Billable Intervention      PT Evaluation 15   Gait Training 45   Therapeutic Exercise    Neuro Re-Ed    Therapeutic Activity 30   Wheelchair Propulsion    Group    Other:    TOTAL 90       Electronically signed by:    Ally Davenport, PT  11/4/2021, 17:34

## 2021-11-05 LAB
INR BLD: 1.17 INDEX
PROTHROMBIN TIME: 15.1 SECONDS (ref 11.7–14.5)

## 2021-11-05 PROCEDURE — 6370000000 HC RX 637 (ALT 250 FOR IP): Performed by: INTERNAL MEDICINE

## 2021-11-05 PROCEDURE — 6360000002 HC RX W HCPCS: Performed by: PHYSICAL MEDICINE & REHABILITATION

## 2021-11-05 PROCEDURE — 97110 THERAPEUTIC EXERCISES: CPT

## 2021-11-05 PROCEDURE — 85610 PROTHROMBIN TIME: CPT

## 2021-11-05 PROCEDURE — 99232 SBSQ HOSP IP/OBS MODERATE 35: CPT | Performed by: PHYSICAL MEDICINE & REHABILITATION

## 2021-11-05 PROCEDURE — 94640 AIRWAY INHALATION TREATMENT: CPT

## 2021-11-05 PROCEDURE — 97116 GAIT TRAINING THERAPY: CPT

## 2021-11-05 PROCEDURE — 6370000000 HC RX 637 (ALT 250 FOR IP): Performed by: PHYSICAL MEDICINE & REHABILITATION

## 2021-11-05 PROCEDURE — 36415 COLL VENOUS BLD VENIPUNCTURE: CPT

## 2021-11-05 PROCEDURE — 97530 THERAPEUTIC ACTIVITIES: CPT

## 2021-11-05 PROCEDURE — 94150 VITAL CAPACITY TEST: CPT

## 2021-11-05 PROCEDURE — 97535 SELF CARE MNGMENT TRAINING: CPT

## 2021-11-05 PROCEDURE — 1280000000 HC REHAB R&B

## 2021-11-05 PROCEDURE — 94761 N-INVAS EAR/PLS OXIMETRY MLT: CPT

## 2021-11-05 RX ADMIN — ALBUTEROL SULFATE 2 PUFF: 90 AEROSOL, METERED RESPIRATORY (INHALATION) at 06:52

## 2021-11-05 RX ADMIN — LACTULOSE 20 G: 10 SOLUTION ORAL at 20:50

## 2021-11-05 RX ADMIN — WARFARIN SODIUM 5 MG: 5 TABLET ORAL at 17:48

## 2021-11-05 RX ADMIN — TRAZODONE HYDROCHLORIDE 200 MG: 50 TABLET ORAL at 20:48

## 2021-11-05 RX ADMIN — BISACODYL 10 MG: 5 TABLET, COATED ORAL at 08:18

## 2021-11-05 RX ADMIN — VENLAFAXINE HYDROCHLORIDE 150 MG: 37.5 CAPSULE, EXTENDED RELEASE ORAL at 08:21

## 2021-11-05 RX ADMIN — ENOXAPARIN SODIUM 70 MG: 100 INJECTION SUBCUTANEOUS at 08:25

## 2021-11-05 RX ADMIN — ASPIRIN 325 MG: 325 TABLET, COATED ORAL at 08:17

## 2021-11-05 RX ADMIN — ACETAMINOPHEN 650 MG: 325 TABLET ORAL at 08:24

## 2021-11-05 RX ADMIN — ASPIRIN 325 MG: 325 TABLET, COATED ORAL at 20:48

## 2021-11-05 RX ADMIN — MONTELUKAST 10 MG: 10 TABLET, FILM COATED ORAL at 20:49

## 2021-11-05 RX ADMIN — ENOXAPARIN SODIUM 70 MG: 100 INJECTION SUBCUTANEOUS at 20:49

## 2021-11-05 RX ADMIN — ALBUTEROL SULFATE 2 PUFF: 90 AEROSOL, METERED RESPIRATORY (INHALATION) at 20:36

## 2021-11-05 RX ADMIN — CALCIUM 500 MG: 500 TABLET ORAL at 08:20

## 2021-11-05 RX ADMIN — ACETAMINOPHEN 650 MG: 325 TABLET ORAL at 13:40

## 2021-11-05 RX ADMIN — ACETAMINOPHEN 650 MG: 325 TABLET ORAL at 20:52

## 2021-11-05 RX ADMIN — ATORVASTATIN CALCIUM 40 MG: 40 TABLET, FILM COATED ORAL at 08:20

## 2021-11-05 ASSESSMENT — PAIN SCALES - GENERAL
PAINLEVEL_OUTOF10: 2
PAINLEVEL_OUTOF10: 4
PAINLEVEL_OUTOF10: 3
PAINLEVEL_OUTOF10: 7
PAINLEVEL_OUTOF10: 3
PAINLEVEL_OUTOF10: 3
PAINLEVEL_OUTOF10: 0
PAINLEVEL_OUTOF10: 5

## 2021-11-05 ASSESSMENT — PAIN DESCRIPTION - LOCATION
LOCATION: HIP
LOCATION: HIP

## 2021-11-05 ASSESSMENT — PAIN SCALES - WONG BAKER
WONGBAKER_NUMERICALRESPONSE: 0
WONGBAKER_NUMERICALRESPONSE: 0

## 2021-11-05 ASSESSMENT — PAIN DESCRIPTION - DESCRIPTORS
DESCRIPTORS: SORE
DESCRIPTORS: SORE

## 2021-11-05 ASSESSMENT — PAIN DESCRIPTION - PAIN TYPE
TYPE: ACUTE PAIN
TYPE: ACUTE PAIN

## 2021-11-05 ASSESSMENT — PAIN DESCRIPTION - PROGRESSION: CLINICAL_PROGRESSION: GRADUALLY IMPROVING

## 2021-11-05 ASSESSMENT — PAIN DESCRIPTION - ORIENTATION
ORIENTATION: RIGHT
ORIENTATION: RIGHT

## 2021-11-05 ASSESSMENT — PAIN DESCRIPTION - FREQUENCY: FREQUENCY: CONTINUOUS

## 2021-11-05 ASSESSMENT — PAIN DESCRIPTION - ONSET: ONSET: ON-GOING

## 2021-11-05 ASSESSMENT — PAIN - FUNCTIONAL ASSESSMENT: PAIN_FUNCTIONAL_ASSESSMENT: PREVENTS OR INTERFERES SOME ACTIVE ACTIVITIES AND ADLS

## 2021-11-05 NOTE — PROGRESS NOTES
Physical Therapy  [x] daily progress note       [] discharge       Patient Name:  Mayo Garcia   :  1955 MRN: [id number]  Room:  [address] [place] [address] Date of Admission: 11/3/2021  Rehabilitation Diagnosis:   Displaced midcervical fracture of right femur, initial encounter for closed fracture [S72.031A]  Traumatic closed fracture of neck of femur with minimal displacement, right, initial encounter ([facility] Jarrell Friedman       Date 2021       Day of ARU Week:  3   Time IN//931   Individual Tx Minutes 60   Group Tx Minutes    Co-Treat Minutes    Concurrent Tx Minutes    TOTAL Tx Time Mins 60   Variance Time    Variance Time []   Refusal due to:     []   Medical hold/reason:    []   Illness   []   Off Unit for test/procedure  []   Extra time needed to complete task  []   Therapeutic need  []   Other (specify):   Restrictions Restrictions/Precautions  Restrictions/Precautions: General Precautions, Fall Risk (WBAT RLE, anterolateral  hip precautions)  Position Activity Restriction  Hip Precautions: No ADduction, No hip extension, No hip external rotation, No active ABduction   Interdisciplinary communication [x]   Cleared for therapy per nursing     []   RN notified about issues during session  []   RN updated on pt performance  []   Spoke with   []   Spoke with OT  []   Spoke with MD  []   Other:    Subjective observations and cognitive status: Pt up with staff in bathroom upon entering.  Agreeable to therapy   Pain level/location:  3-5  /10       Location:R hip    Discharge recommendations  Anticipated discharge date:  tbd  Destination: []home alone   [x]home alone with assist PRN     [] home w/ family      [] Continuous supervision  []SNF    [] Assisted living     [] Other:  Continued therapy: [x]HHC PT  []OUTPATIENT  PT   [] No Further PT  []SNF PT  Caregiver training recommended: []Yes  [] No   Equipment needs: Mayo Garcia requires the assistance of a wheeled walker to successfully ambulate from room to room at home to allow completion of daily living tasks such as: bathing, toileting, dressing and grooming. A wheeled walker is necessary due to the patient's unsteady gait, upper body weakness, inability to  a standard walker. This patient can ambulate only by pushing a walker instead of using a lesser assistive device such as a cane or crutch. Bed Mobility:           [x]   Pt received out of bed       Transfers:    Sit--> Stand:  Supervision with good f/u of safety education  Stand --> Sit:   supervision  Chair-->Bed/Bed --> Chair:   Supervision with cues for not overrotating  Toilet Transfer (if applicable):supervision   Other:    Assistive device required for transfer:   2ww    Gait:    Distance:  150', 125', 50'   Assistance:  supervision  Device:  2ww  Gait Quality:  Cues for turns around obstacles and to not move too far forward in walker, very slow kathy  Walker was too high for pt and could not be lowered. Fit new walker to pt with pt stating that it felt more comfortable. Training with pt in safe walker techniques, hand placement, carrying objects with good understanding        Additional Therapeutic activities/exercises completed this date:     []   Nu-step:  Time:        Level:         #Steps:       []   Rebounder:    []  Seated     []  Standing        []   Balance training         []   Postural training    []   Supine ther ex (reps/sets):     [x]   Seated ther ex (reps/sets): B LAQ 10 x1 modified in sitting to perform AP, quad set, glut set, heel slides    []   Standing ther ex (reps/sets):     []   Picking up object from floor (standing):                   []   Reacher used   []   Other:   []   Other:    Comments:      Patient/Caregiver Education and Training:   []   Role of PT  [x]   Education about Dx  []   Use of call light for assist   []   Wheelchair mobility/management  [x]   HEP provided and explained   []   Treatment plan reviewed  []   Home safety  [x]   Body mechanics  [x]   Positioning  [x]   Bed Mobility/Transfer technique  [x]   Gait technique/sequencing  []   Proper use of assistive device/adaptive equipment  []   Stair training/Advanced mobility safety and technique  []   Reinforced patient's precautions/mobility while maintaining precautions  []   Postural awareness  []   Family/caregiver training  []   Progress was updated and reviewed in Rehabtracker with patient and/or family this date. []   Other:      Treatment Plan for Next Session: stair training, safety in function      Assessment:    Assessment: This pt demonstrated a positive response to today's treatment as evidenced by good carryover with training thus far. The patient is making  progress toward established goals as evidenced by QI scores. Ongoing deficits are observed in the areas of safety and continued focus on functional gait with precautions is recommended.      Treatment/Activity Tolerance:   [] Tolerated treatment with no adverse effects    [] Patient limited by fatigue  [x] Patient limited by pain   [] Patient limited by medical complications:    [] Adverse reaction to Tx:   [] Significant change in status    Barrier/s to progress/learning:   []   None  []   Cognition  []   Hearing deficit  [x]   Pre-morbid mental/psychological status   []   Motivation  []   Communication  []   Anxiety  []   Vision deficit  []   Attention  []   Other:      Safety:       []  bed alarm set    []  chair alarm set    []  Pt refused alarms                []  Telesitter activated      [x]  Gait belt used during tx session      [x]other:handoff to aide         Number of Minutes/Billable Intervention  Gait Training 45   Therapeutic Exercise 15   Neuro Re-Ed    Therapeutic Activity    Wheelchair Propulsion    Group    Other:    TOTAL 60         Social History  Social/Functional History  Lives With: Alone  Type of Home: Apartment  Home Layout: One level  Home Access: Ramped entrance, Elevator (has elevator access to apartment)  Bathroom Shower/Tub: Tub/Shower unit  Bathroom Toilet: Handicap height  Bathroom Equipment: Grab bars in shower  Bathroom Accessibility: Walker accessible  Home Equipment:  (Has 2MS in hospital room that is her sister's)  ADL Assistance: Independent  Homemaking Assistance: Independent  Homemaking Responsibilities: Yes  Ambulation Assistance: Independent (no AD)  Transfer Assistance: Independent  Active : No (Sister drives)  Mode of Transportation: Car  Occupation: On disability  Type of occupation: Construction  Additional Comments: Pt typically sleeps in a flat, regular bed at home. Pt reports at least 3 falls in the past year \"due to bad judgement\"; latest one caused R hip fx precipitating this hospitalization. Objective                                                                                    Goals:  (Update in navigator)  Short term goals  Time Frame for Short term goals: 7 tx days:  Short term goal 1: Pt will complete rolling L/R and sup<->sit using leg  to assist RLE for hip abduction movement and following anterolateral hip precautions on R Mod Ind-Ind. Short term goal 2: Pt will complete OOB transfers using RW and following anterolateral hip precautions on R Mod Ind. Short term goal 3: Pt will ambulate >150 ft over level surface and at least 10 ft of uneven surface using RW following appropriate step-to pattern Mod Ind. Short term goal 4: Pt will ascend/descend curb step using RW and 1 flight of stairs using railings following appropriate step-to pattern on ascent/descent Mod Ind. Short term goal 5: Pt will complete object retrieval from the floor with 1UE support on RW and using reacher prn Mod Ind.:   :        Plan of Care                                                                              Times per week: 5 days per week for a minimum of 60 minutes/day plus group as appropriate for 60 minutes.   Treatment to include Current Treatment Recommendations: Strengthening, Gait Training, Patient/Caregiver Education & Training, Stair training, Equipment Evaluation, Education, & procurement, Balance Training, Pain Management, Functional Mobility Training, Endurance Training, Home Exercise Program, Transfer Training, Positioning, Safety Education & Training    Electronically signed by   Katie Pedersen PT,  11/5/2021, 8:37 AM

## 2021-11-05 NOTE — PROGRESS NOTES
Physical Therapy      [x] daily progress note       [] discharge       Patient Name:  Verner Crete   :  1955 MRN: [id number]  Room:  [address] [place] [address] Date of Admission: 11/3/2021  Rehabilitation Diagnosis:   Displaced midcervical fracture of right femur, initial encounter for closed fracture [S72.031A]  Traumatic closed fracture of neck of femur with minimal displacement, right, initial encounter ([facility] [S72.001A]       Date 2021       Day of ARU Week:  3   Time IN/OUT 8134-2738   Individual Tx Minutes 60   TOTAL Tx Time Mins 60   Variance Time    Variance Time []   Refusal due to:     []   Medical hold/reason:    []   Illness   []   Off Unit for test/procedure  []   Extra time needed to complete task  []   Therapeutic need  []   Other (specify):   Restrictions Restrictions/Precautions  Restrictions/Precautions: General Precautions, Fall Risk (WBAT RLE, anterolateral  hip precautions)  Position Activity Restriction  Hip Precautions: No ADduction, No hip extension, No hip external rotation, No active ABduction   Communication with other providers: [x]   OK to see per nursing:     []   Spoke with team member regarding:      Subjective observations and cognitive status: Pt up in chair, pleasant and agreeable     Pain level/location: Reports \"tightness\" only in R hip, ice currently on hip but reports unsure if he can feel it through his jeans   Discharge recommendations  Anticipated discharge date:  tbd  Destination: []?home alone   [x]? home alone with assist PRN     []? home w/ family      []? Continuous supervision  []? SNF    []? Assisted living     []? Other:  Continued therapy: [x]? Qamar Hameed PT  []? OUTPATIENT  PT   []? No Further PT  []? SNF PT  Caregiver training recommended: []? Yes  []? No   Equipment needs: Verner Crete requires the assistance of a wheeled walker to successfully ambulate from room to room at home to allow completion of daily living tasks such as: bathing, toileting, dressing and grooming.   A wheeled walker is necessary due to the patient's unsteady gait, upper body weakness, inability to  a standard walker. This patient can ambulate only by pushing a walker instead of using a lesser assistive device such as a cane or crutch     Bed Mobility:           [x]   Pt received out of bed     Transfers:    Sit--> Stand:  SB-supervision  Stand --> Sit:   SB-Supervision  Pt req min cues for impulsivity/safety to ensure set up of AD before standing, and waiting for [facility] brakes to be locked before attempting to sit. Stand-Pivot:  SBA, min cues after demonstration and cues for avoiding R hip ER with R turns, max cues for L turns. Assistive device required for transfer:   RW    Gait:    Distance:  05'+231'+388+697'  Assistance:  Supervision  Device:  RW  Gait Quality:   initially req cues for step to pattern leading with R vs recip. Pt with intermittent stopping with gait, demos incorrect set but at times self corrects.      Stairs   # Completed:   12  Assistance:    SBA, min seq cues, at times hesitates, self corrects  Supportive Device:  B rails  Height:   6\"    Additional Therapeutic activities/exercises completed this date:     []   Nu-step:  Time:        Level:         #Steps:       []   Rebounder:    []  Seated     []  Standing        []   Balance training         []   Postural training    []   Supine ther ex (reps/sets):     []   Seated ther ex (reps/sets):     []   Standing ther ex (reps/sets):     []   Picked up object from floor                       []   Reacher used   [x]   Other:education provided with visual demonstration and vc's for hip precautions with turning; pt practicing multiple turns with RW to R and L; min cues to avoid ER when turning to R, max cues turning to L    []   Other:   []   Other:      Patient/Caregiver Education and Training:   [x]   Bed Mobility/Transfer technique/safety  [x]   Gait technique/sequencing  [x]   Proper use of assistive device  [x]   Advanced mobility safety and technique  []   Reinforced patient's precautions with mobility/functional tasks  []   Postural awareness  []   Family training  []   Other:    Treatment Plan for Next Session: gait progression, community barriers including ramps, threshold, amb on/off elevator; LE strengthening, ed with ant hip precautions in functional tasks. Treatment/Activity Tolerance:   [x] Tolerated treatment with no adverse effects    [] Patient limited by fatigue  [] Patient limited by pain   [] Patient limited by medical complications:    [] Adverse reaction to Tx:   [] Significant change in status    Safety:       []  bed alarm set    [x]  chair alarm set    []  Pt refused alarms                []  Telesitter activated      [x]  Gait belt used during tx session      []other:         Number of Minutes/Billable Intervention  Gait Training 45   Therapeutic Exercise    Neuro Re-Ed    Therapeutic Activity 15   Wheelchair Propulsion    Group    Other:    TOTAL 60         Social History  Social/Functional History  Lives With: Alone  Type of Home: Apartment  Home Layout: One level  Home Access: Ramped entrance, Elevator (has elevator access to apartment)  Bathroom Shower/Tub: Tub/Shower unit  Bathroom Toilet: Handicap height  Bathroom Equipment: Grab bars in shower  Bathroom Accessibility: Walker accessible  Home Equipment:  (Has 4WW in hospital room that is her sister's)  ADL Assistance: [address]: Independent  Homemaking Responsibilities: Yes  Ambulation Assistance: Independent (no AD)  Transfer Assistance: Independent  Active : No (Sister drives)  Mode of Transportation: Car  Occupation: On disability  Type of occupation: Construction  Additional Comments: Pt typically sleeps in a flat, regular bed at home. Pt reports at least 3 falls in the past year \"due to bad judgement\"; latest one caused R hip fx precipitating this hospitalization.     Objective

## 2021-11-05 NOTE — CARE COORDINATION
Case Management Admission Note      Patient:Manas Gonzalez      :1955  DYL:[id number]  Rehab Dx/Hx: Displaced midcervical fracture of right femur, initial encounter for closed fracture [S72.031A]  Traumatic closed fracture of neck of femur with minimal displacement, right, initial encounter ([facility] [S72.001A]    Chief Complaint:   Past Medical History:   Diagnosis Date    Acid reflux     Acute frontal sinusitis 2009    Alcohol abuse     \"I Drink Average 2 Beers A Day\"    Anesthesia     \" I get agitated\"    Anxiety     Arthritis     \"Right Shoulder, Neck, Left Knee\"    Atypical mycobacterial infection     Broken teeth     Upper And Lower     [facility]congest[facility] failure[facility]     COPD (chronic obstructive pulmonary disease) ([facility]     Sees Dr. Clair Jerez In [place], [facility] Cough     Frequent Cough With Green Sputum    Depression     Depression     Dyspnea 2018    H/O cardiovascular MUGA stress test 2019    EF 50%, NORMAL LVEF, NORMAL WALL MOTION.    H/O echocardiogram 2014    FZ=>92-19%, LV systolic function is low normal, mild aortic valve calcification, no pericardial effusion    H/O echocardiogram 2018    EF 45-50%    History of 24 hour EKG monitoring 14    24 hr holter monitor. Ventricular ectopics were noted in single and couplet forms. Supraventricular ectopics were noted in single and pair forms.     [place] (hard of hearing)     Bilateral Ears    Hyperlipidemia     Hypertension     Irritant contact dermatitis due to other chemical products 2019    Other drug allergy(995.27) 2009    S/P AVR 2003    Mechanical valvue     Shortness of breath     Teeth missing     Upper And Lower     Past Surgical History:   Procedure Laterality Date    BRONCHOSCOPY      \"Done Twice\"    CARDIAC VALVE REPLACEMENT  2003    \"Aortic Valve Replacement, Mechanical Valve\"    COLONOSCOPY      Polyps Removed   [place] [facility] Teeth Extracted In Past    ENDOSCOPY, COLON, DIAGNOSTIC  In Past    HEMIARTHROPLASTY SHOULDER FRACTURE Right 1/29/2019    SHOULDER HEMIARTHROPLASTY performed by Chay Alonso DO at [facility] ARTHROSCOPY Left 1992    LUNG BIOPSY Right 1996   [address] [facility],  [address]    \"Cauterized Because My Sperm Was Low\"    ROTATOR CUFF REPAIR Right 2008    TONSILLECTOMY  1959 Or 1960    TOTAL HIP ARTHROPLASTY Right 10/25/2021    RIGHT HIP TOTAL ARTHROPLASTY performed by Chay Alonso DO at [facility] OR     Allergies   Allergen Reactions    Ciprofloxacin Hcl Hives and Swelling    Levaquin [Levofloxacin] Hives and Swelling    Other Nausea And Vomiting     \"Allergic To Tylox\"    Ceftin [Cefuroxime]      Precautions: falls    Date of Admit: 11/3/2021  Room #: 1026/1026-A      Current functional status at time of admit:        Home Living/DME Available:      Type of Home: Apartment  Home Access: Ramped entrance, Elevator (has elevator access to apartment)  Bathroom Shower/Tub: Tub/Shower unit  Bathroom Toilet: Handicap height  Bathroom Equipment: Grab bars in [address]:  (Has 4WW in hospital room that is her sister's)       IADL Hx:   Homemaking Responsibilities: Yes  Active : No (Sister drives)  Mode of Transportation: Car  Occupation: On disability          Spouse: None. Family:Sister Trang - drives patient to appointments. Comments: LSW met with patient for admission assessment. Patient lives alone in a 1-level apartment in [place]. There are no steps to enter (ramp) and no steps inside. Patient reports tub/shower for bathing and bedroom and bathroom are on the main level. Patient has PCP, was independent in ADLs PTA, and uses [facility] in [place] for prescriptions. Patient states he has a rollator and handicap height toilet at home and no pre-existing HHC. Patient reports access to food, utilities, and shelter and feels safe in his environment.   BIMS completed in initial assessment. Plan is to D/C home alone, with HHC and DME as recommended by therapy staff. F/U to be set with Dr. Yuki Llamas ([phone]) and family will transport home.     MAINE Aguilera, LSW, 11/5/2021, 11:15 AM

## 2021-11-05 NOTE — PROGRESS NOTES
Bhavik Martínez    : 1955  Acct #: [de-identified]  MRN: [id number]              PM&R Progress Note      Admitting diagnosis: Right femoral neck fracture ( [address])     Comorbid diagnoses impacting rehabilitation: Uncontrolled pain, generalized weakness, gait disturbance, acute blood loss anemia, COPD (simple bronchitis), essential hypertension, hyponatremia, bipolar disorder with depression, L4 compression fracture     Chief complaint: General fatigue and restlessness. Poor sleep. No chills, chest pain or cough. Prior (baseline) level of function: Independent.     Current level of function:         Current  IRF-JASPER and Goals:   Occupational Therapy:    Short term goals  Time Frame for Short term goals: STGs=LTGs :   Long term goals  Time Frame for Long term goals : ~1 week or until d/c  Long term goal 1: Pt will complete grooming tasks Ind  Long term goal 2: Pt will complete total body bathing mod I  Long term goal 3: Pt will complete UB dressing Ind  Long term goal 4: Pt will complete LB dressing mod I using AE PRN  Long term goal 5: Pt will doff/don footwear mod I using AE PRN  Long term goals 6: Pt will complete toileting mod I  Long term goal 7: Pt will complete functional transfers (bed, chair, shower, toilet) c DME PRN and mod I  Long term goal 8: Pt will perform therex/therax to facilitate increased strength/endurance/ax tolerance (c emphasis on dynamic standing balance/tolerance >12 mins, BUE endurance) c SBA  Long term goal 9: Pt will complete simple homemaking tasks c DME PRN and mod I :                                       Eating: Eating  Assistance Needed: Independent  Comment: able to open packages/containers  CARE Score: 6  Discharge Goal: Independent       Oral Hygiene: Oral Hygiene  Assistance Needed: Supervision or touching assistance  Comment: in stance at sink  CARE Score: 4  Discharge Goal: Independent    UB/LB Bathing: Shower/Bathe Self  Assistance Needed: Supervision or touching assistance  Comment: SBA while in stance; performed remainder seated c supervision  CARE Score: 4  Discharge Goal: Independent    UB Dressing: Upper Body Dressing  Assistance Needed: Supervision or touching assistance  Comment: to don T shirt and button up shirt  CARE Score: 4  Discharge Goal: Independent         LB Dressing: Lower Body Dressing  Assistance Needed: Partial/moderate assistance  Comment: required min A to thread RLE in jeans to ensure precaution compliance; SBA while in stance to perform pants management  CARE Score: 3  Discharge Goal: Independent    Donning and [place] Footwear: Putting On/Taking Off Footwear  Assistance Needed: Partial/moderate assistance  Comment: able to doff B socks and don L; required assist with R and educated on improved technique to comply with precautions  CARE Score: 3  Discharge Goal: Independent      Toileting: Toileting Hygiene  Assistance Needed: Supervision or touching assistance  Comment: SBA in stance to manage clothing in stance  CARE Score: 4  Discharge Goal: Independent      Toilet Transfers: Toilet Transfer  Assistance Needed: Supervision or touching assistance  Comment: SBA using RW and grab bars  CARE Score: 4  Discharge Goal: Independent    Physical Therapy:   Short term goals  Time Frame for Short term goals: 7 tx days:  Short term goal 1: Pt will complete rolling L/R and sup<->sit using leg  to assist RLE for hip abduction movement and following anterolateral hip precautions on R Mod Ind-Ind. Short term goal 2: Pt will complete OOB transfers using RW and following anterolateral hip precautions on R Mod Ind. Short term goal 3: Pt will ambulate >150 ft over level surface and at least 10 ft of uneven surface using RW following appropriate step-to pattern Mod Ind. Short term goal 4: Pt will ascend/descend curb step using RW and 1 flight of stairs using railings following appropriate step-to pattern on ascent/descent Mod Ind.   Short term goal 5: Pt will complete object retrieval from the floor with 1UE support on RW and using reacher prn Mod Ind.             Bed Mobility:   Sit to Lying  Assistance Needed: Supervision or touching assistance  Comment: Sup. without use of bed features; pt transitioned from upright sitting at EOB to long sitting and then to supine; transfer completed from the R side of bed and was able to actively lift RLE on the bed following precautions  CARE Score: 4  Discharge Goal: Independent  Roll Left and Right  Assistance Needed: Supervision or touching assistance  Comment: rolling L/R with SBA-Sup without use of bed features; required VCs on hip precautions and positioning of pillow between thighs to prevent adduction beyond midline especially when rolling to L  CARE Score: 4  Discharge Goal: Independent  Lying to Sitting on Side of Bed  Assistance Needed: Supervision or touching assistance  Comment: Sup (required verbal prompts to self-assist RLE following precaution of \"no active hip abduction\" and to increase attention on hip position to prevent excessive ER as pt completed transfer towards R side of bed)  CARE Score: 4  Discharge Goal: Independent    Transfers:    Sit to Stand  Assistance Needed: Supervision or touching assistance  Comment: SBA using RW  CARE Score: 4  Discharge Goal: Independent  Chair/Bed-to-Chair Transfer  Assistance Needed: Supervision or touching assistance  Comment: SBA using RW  CARE Score: 4  Discharge Goal: Independent  Toilet Transfer  Assistance Needed: Supervision or touching assistance  Comment: SBA using RW and grab bars  CARE Score: 4  Car Transfer  Assistance Needed: Supervision or touching assistance  Comment: CGA using RW with additional stablization of AD to be able to stand; pt was able to actively lift BLE in car, self-assisted RLE and required verbal prompts to attend to RLE movements when getting out of car to ensure no excessive ER  CARE Score: 4  Discharge Goal: Independent    Ambulation: Walking Ability  Does the Patient Walk?: Yes     Walk 10 Feet  Assistance Needed: Supervision or touching assistance  Comment: SBA using RW following appropriate step-to pattern  CARE Score: 4  Discharge Goal: Independent     Walk 50 Feet with Two Turns  Assistance Needed: Supervision or touching assistance  Comment: SBA using RW following appropriate step-to pattern  CARE Score: 4  Discharge Goal: Independent     Walk 150 Feet  Assistance Needed: Supervision or touching assistance  Comment: SBA using RW following appropriate step-to pattern  CARE Score: 4  Discharge Goal: Independent     Walking 10 Feet on Uneven Surfaces  Assistance Needed: Supervision or touching assistance  Comment: CGA using RW following appropriate step-to pattern  CARE Score: 4  Discharge Goal: Independent     1 Step (Curb)  Assistance Needed: Supervision or touching assistance  Comment: CGA using RW following appropriate step-to pattern  CARE Score: 4  Discharge Goal: Independent     4 Steps  Assistance Needed: Supervision or touching assistance  Comment: CGA using railings following appropriate step-to pattern on ascent/descent  CARE Score: 4  Discharge Goal: Independent     12 Steps  Assistance Needed: Supervision or touching assistance  Comment: CGA using railings following appropriate step-to pattern on ascent/descent  CARE Score: 4  Discharge Goal: Independent       Wheelchair:  w/c Ability: Wheelchair Ability  Uses a Wheelchair and/or Scooter?: No                Balance:        Object: Picking Up Object  Assistance Needed: Partial/moderate assistance  Comment: CGA to bend over, Min A to be able to get back up in upright standing; used LUE support on RW; task completed without use of reacher  CARE Score: 3    I      Exam:    Blood pressure (!) 113/53, pulse 76, temperature 98.2 °F (36.8 °C), temperature source Oral, resp. rate 16, height 5' 4\" (1.626 m), weight 148 lb 2.4 oz (67.2 kg), SpO2 98 %. General: Sitting up in bed. Legs extended. In no distress. HEENT: MMM. Neck supple. No JVD. Pulmonary: Shallow respirations without wheezes or rales. Cardiac: Regular rate and rhythm. Abdomen: Patient's abdomen is soft and nondistended. Bowel sounds were present throughout. There was no rebound, guarding or masses noted. Upper extremities: Able to bring both hands up to meet mine. Fair  strength and coordination. No bruising. Lower extremities: Right hip and thigh are swollen as expected. Mild tenderness to palpation. Very little active right hip flexion with pain. Sitting balance was fair. Standing balance was poor. Lab Results   Component Value Date    WBC 10.4 10/31/2021    HGB 7.9 (L) 10/31/2021    HCT 25.3 (L) 10/31/2021    MCV 94.8 10/31/2021     (H) 10/31/2021     Lab Results   Component Value Date    INR 1.07 11/04/2021    INR 1.30 11/03/2021    INR 1.57 11/02/2021    PROTIME 13.8 11/04/2021    PROTIME 16.8 (H) 11/03/2021    PROTIME 20.4 (H) 11/02/2021     Lab Results   Component Value Date    CREATININE 0.5 (L) 10/31/2021    BUN 9 10/31/2021     (L) 10/31/2021    K 4.1 10/31/2021    CL 98 (L) 10/31/2021    CO2 21 10/31/2021     Lab Results   Component Value Date    ALT 17 10/25/2021    AST 28 10/25/2021    ALKPHOS 60 10/25/2021    BILITOT 0.4 10/25/2021       Expected length of stay  prior to a supervised level of function for discharge home with a walker and C OT/PT is 2 weeks. Recommendations:    1. Right femoral neck fracture with total arthroplasty: Developing the routine for his daily occupational and physical therapy. Instructing him to do self-care activities and mobility following anterior hip precautions. Weightbearing as tolerated. Providing him pain management, adaptive equipment training and DVT prophylaxis. Encouraging pulmonary hygiene measures while we monitor his anemia and hyponatremia.   2. DVT prophylaxis: Surgeon has chosen aspirin 325 twice daily for DVT prophylaxis. Weightbearing activities are pursued daily. SCDs when in bed. He is also on therapeutic Lovenox bridging his Coumadin use for his cardiac valvular disease. 3. Bipolar disorder with depression: Treating him in a calm consistent environment. Regulating his sleep-wake schedule. He requires trazodone and Effexor. Psychiatry services available to him as needed. 4. Uncontrolled pain: Scheduled acetaminophen and as needed oxycodone with cryotherapy and progressive mobilization following anterior hip precautions. 5. Hypertension: Recently his blood pressures have been reasonable off medication. Monitoring vital signs at rest and with activity. Target systolic blood pressure is 120-140. Blood pressure is just below the target range. 6. Hyponatremia: His numbers are closer to normal now. Will encourage consistent oral intake and be cautious with any diuretic use. No fluid restrictions at the moment. Periodic monitoring of his chemistries. 7. COPD: Albuterol inhaler twice daily, Singulair and aggressive pulmonary hygiene measures. Monitoring O2 saturations at rest and with activity. No steroid therapy for the time being. 8. Cardiac valvular disease with past valve replacement: Patient is on a Lovenox bridge until his INR is therapeutic.

## 2021-11-05 NOTE — PROGRESS NOTES
Occupational Therapy  Physical Rehabilitation: OCCUPATIONAL THERAPY     [x] daily progress note       [] discharge       Patient Name:  Berenice Soulier   :  1955 MRN: [id number]  Room:  [address] [place] [address] Date of Admission: 11/3/2021  Rehabilitation Diagnosis:   Displaced midcervical fracture of right femur, initial encounter for closed fracture [S72.031A]  Traumatic closed fracture of neck of femur with minimal displacement, right, initial encounter ([facility] Jeovanny Thompson       Date 2021       Day of ARU Week:  3   Time IN/OUT 2730-3330   Individual Tx Minutes 60   Group Tx Minutes    Co-Treat Minutes    Concurrent Tx Minutes    TOTAL Tx Time Mins 60   Variance Time    Variance Time []   Refusal due to:     []   Medical hold/reason:    []   Illness   []   Off Unit for test/procedure  []   Extra time needed to complete task  []   Therapeutic need  []   Other (specify):   Restrictions Restrictions/Precautions: General Precautions, Fall Risk (WBAT RLE, anterolateral  hip precautions)     Hip Precautions: No ADduction, No hip extension, No hip external rotation, No active ABduction   Communication with other providers: [x]   OK to see per nursing:     []   Spoke with team member regarding:      Subjective observations and cognitive status: Pt sitting up in chair on approach. Pt pleasant and agreeable to therapy.       Pain level/location:   \"tightness in left buttocks\"    Discharge recommendations  Anticipated discharge date: TBD  Destination: []home alone   []home alone w assist prn   [] home w/ family    [] Continuous supervision       []SNF    [] Assisted living     [] Other:   Continued therapy: []C OT  []OUTPATIENT  OT   [] No Further OT  Equipment needs: TBD      Toileting:   Supervision for CM        Toilet Transfers:   Supervision  Device Used:    [x]   Standard Toilet         [x]   Grab Bars           []  Bedside Commode       []   Elevated Toilet          []   Other:        Bed Mobility:           [x]   Pt received out of bed       Transfers:    Sit--> Stand:  Supervision   Stand --> Sit:   Supervision  Stand-Pivot:   Supervision   Other:    Assistive device required for transfer:   RW       Functional Mobility:  Throughout room + 116ft x 2     Assistance:  SBA   Device:   []   Rolling Walker     []   Standard Walker []   Wheelchair        []   [employer]       []   4-Wheeled Will Klippel         []   Cardiac Walker       []   Other:            Additional Therapeutic activities/exercises completed this date:     [x]   ADL Training: Pt stood at counter for ~5 mins to complete hand hygiene and oral hygiene at Supervision. [x]   Balance/Postural training: Pt stood x 4.5 min at counter with Supervision while completing dynamic stand task while reaching outside base of support to facilitate increased balance for ADL tasks and transfers. Pt had 1 UE on counter for support 50% of the time. []   Bed/Transfer Training   [x]   Endurance Training: patient instructed in bilateral reciprocal patterned movement with min resistance while seated for 10 minutes with 0 rest breaks to increase endurance/activity tolerance for facilitation of both ADL and mobility tasks. []   Neuromuscular Re-ed   []   Nu-step:  Time:        Level:         #Steps:       []   Rebounder:    []  Seated     []  Standing        []   Supine Ther Ex (reps/sets):     []   Seated Ther Ex (reps/sets):     []   Standing Ther Ex (reps/sets):     []   Other:      Comments:      Patient/Caregiver Education and Training:   []   [employer] Equipment Use  []   Bed Mobility/Transfer Technique/Safety  []   Energy Conservation Tips  []   Family training  []   Postural Awareness  []   Safety During Functional Activities  []   Reinforced Patient's Precautions   []   Progress was updated and reviewed in Rehabtracker with patient and/or family this         date. Treatment Plan for Next Session: Continue OT POC       Assessment:   This pt demonstrated a positive response to today's treatment as evidenced by actively engaging in therapy session. The patient is making progress toward established goals as evidenced by QI scores. Ongoing deficits are observed in the areas of UB strength, endurance and balance and continued focus on this is recommended. Treatment/Activity Tolerance:   [x] Tolerated treatment with no adverse effects    [] Patient limited by fatigue  [] Patient limited by pain   [] Patient limited by medical complications:    [] Adverse reaction to Tx:   [] Significant change in status    Safety:       []  bed alarm set    [x]  chair alarm set    []  Pt refused alarms                []  Telesitter activated      [x]  Gait belt used during tx session      []other:       Number of Minutes/Billable Intervention  Therapeutic Exercise 15   ADL Self-care 15   Neuro Re-Ed    Therapeutic Activity 30   Group    Other:    TOTAL 60       Social History  Social/Functional History  Lives With: Alone  Type of Home: Apartment  Home Layout: One level  Home Access: Ramped entrance, Elevator (has elevator access to apartment)  Bathroom Shower/Tub: Tub/Shower unit  Bathroom Toilet: Handicap height  Bathroom Equipment: Grab bars in shower  Bathroom Accessibility: Walker accessible  Home Equipment:  (Has 4WW in hospital room that is her sister's)  ADL Assistance: [address]: Independent  Homemaking Responsibilities: Yes  Ambulation Assistance: Independent (no AD)  Transfer Assistance: Independent  Active : No (Sister drives)  Mode of Transportation: Car  Occupation: On disability  Type of occupation: Construction  Additional Comments: Pt typically sleeps in a flat, regular bed at home. Pt reports at least 3 falls in the past year \"due to bad judgement\"; latest one caused R hip fx precipitating this hospitalization.     Objective                                                                                    Goals:  (Update in navigator)  Short term goals  Time Frame for Short term goals: STGs=LTGs:  Long term goals  Time Frame for Long term goals : ~1 week or until d/c  Long term goal 1: Pt will complete grooming tasks Ind  Long term goal 2: Pt will complete total body bathing mod I  Long term goal 3: Pt will complete UB dressing Ind  Long term goal 4: Pt will complete LB dressing mod I using AE PRN  Long term goal 5: Pt will doff/don footwear mod I using AE PRN  Long term goals 6: Pt will complete toileting mod I  Long term goal 7: Pt will complete functional transfers (bed, chair, shower, toilet) c DME PRN and mod I  Long term goal 8: Pt will perform therex/therax to facilitate increased strength/endurance/ax tolerance (c emphasis on dynamic standing balance/tolerance >12 mins, BUE endurance) c SBA  Long term goal 9: Pt will complete simple homemaking tasks c DME PRN and mod I:        Plan of Care                                                                              Times per week: 5 days per week for a minimum of 60 minutes/day plus group as appropriate for 60 minutes.   Treatment to include Plan  Times per day: Daily  Current Treatment Recommendations: Functional Mobility Training, Endurance Training, Balance Training, Safety Education & Training, Pain Management, Patient/Caregiver Education & Training, Equipment Evaluation, Education, & procurement, Self-Care / ADL, Home Management Training    Electronically signed by   AKIN Cevallos,  11/5/2021, 11:00 AM

## 2021-11-05 NOTE — PLAN OF CARE
Problem: Falls - Risk of:  Goal: Will remain free from falls  Description: Will remain free from falls  Outcome: Ongoing  Goal: Absence of physical injury  Description: Absence of physical injury  Outcome: Ongoing     Problem: Skin Integrity:  Goal: Will show no infection signs and symptoms  Description: Will show no infection signs and symptoms  Outcome: Ongoing  Goal: Absence of new skin breakdown  Description: Absence of new skin breakdown  Outcome: Ongoing     Problem: SAFETY  Goal: Free from accidental physical injury  Outcome: Ongoing     Problem: DAILY CARE  Goal: Daily care needs are met  Outcome: Ongoing     Problem: PAIN  Goal: Patient's pain/discomfort is manageable  Outcome: Ongoing     Problem: KNOWLEDGE DEFICIT  Goal: Patient/S.O. demonstrates understanding of disease process, treatment plan, medications, and discharge instructions.   Outcome: Ongoing     Problem: DISCHARGE BARRIERS  Goal: Patient's continuum of care needs are met  Outcome: Ongoing

## 2021-11-06 LAB
INR BLD: 1.25 INDEX
PROTHROMBIN TIME: 16.2 SECONDS (ref 11.7–14.5)

## 2021-11-06 PROCEDURE — 6370000000 HC RX 637 (ALT 250 FOR IP): Performed by: INTERNAL MEDICINE

## 2021-11-06 PROCEDURE — 85610 PROTHROMBIN TIME: CPT

## 2021-11-06 PROCEDURE — 6360000002 HC RX W HCPCS: Performed by: PHYSICAL MEDICINE & REHABILITATION

## 2021-11-06 PROCEDURE — 1280000000 HC REHAB R&B

## 2021-11-06 PROCEDURE — 97150 GROUP THERAPEUTIC PROCEDURES: CPT

## 2021-11-06 PROCEDURE — 97110 THERAPEUTIC EXERCISES: CPT

## 2021-11-06 PROCEDURE — 97116 GAIT TRAINING THERAPY: CPT

## 2021-11-06 PROCEDURE — 36415 COLL VENOUS BLD VENIPUNCTURE: CPT

## 2021-11-06 PROCEDURE — 97535 SELF CARE MNGMENT TRAINING: CPT

## 2021-11-06 PROCEDURE — 94640 AIRWAY INHALATION TREATMENT: CPT

## 2021-11-06 PROCEDURE — 94761 N-INVAS EAR/PLS OXIMETRY MLT: CPT

## 2021-11-06 PROCEDURE — 97530 THERAPEUTIC ACTIVITIES: CPT

## 2021-11-06 PROCEDURE — 6370000000 HC RX 637 (ALT 250 FOR IP): Performed by: PHYSICAL MEDICINE & REHABILITATION

## 2021-11-06 PROCEDURE — 94150 VITAL CAPACITY TEST: CPT

## 2021-11-06 RX ADMIN — WARFARIN SODIUM 5 MG: 5 TABLET ORAL at 18:23

## 2021-11-06 RX ADMIN — LACTULOSE 20 G: 10 SOLUTION ORAL at 08:59

## 2021-11-06 RX ADMIN — ASPIRIN 325 MG: 325 TABLET, COATED ORAL at 09:00

## 2021-11-06 RX ADMIN — LACTULOSE 20 G: 10 SOLUTION ORAL at 21:14

## 2021-11-06 RX ADMIN — CALCIUM 500 MG: 500 TABLET ORAL at 08:59

## 2021-11-06 RX ADMIN — ASPIRIN 325 MG: 325 TABLET, COATED ORAL at 21:14

## 2021-11-06 RX ADMIN — ACETAMINOPHEN 650 MG: 325 TABLET ORAL at 05:32

## 2021-11-06 RX ADMIN — ENOXAPARIN SODIUM 70 MG: 100 INJECTION SUBCUTANEOUS at 21:14

## 2021-11-06 RX ADMIN — BISACODYL 10 MG: 5 TABLET, COATED ORAL at 09:00

## 2021-11-06 RX ADMIN — ENOXAPARIN SODIUM 70 MG: 100 INJECTION SUBCUTANEOUS at 08:58

## 2021-11-06 RX ADMIN — MONTELUKAST 10 MG: 10 TABLET, FILM COATED ORAL at 21:14

## 2021-11-06 RX ADMIN — ATORVASTATIN CALCIUM 40 MG: 40 TABLET, FILM COATED ORAL at 09:00

## 2021-11-06 RX ADMIN — VENLAFAXINE HYDROCHLORIDE 150 MG: 37.5 CAPSULE, EXTENDED RELEASE ORAL at 09:07

## 2021-11-06 RX ADMIN — ALBUTEROL SULFATE 2 PUFF: 90 AEROSOL, METERED RESPIRATORY (INHALATION) at 08:53

## 2021-11-06 RX ADMIN — ALBUTEROL SULFATE 2 PUFF: 90 AEROSOL, METERED RESPIRATORY (INHALATION) at 19:33

## 2021-11-06 RX ADMIN — ACETAMINOPHEN 650 MG: 325 TABLET ORAL at 21:14

## 2021-11-06 ASSESSMENT — PAIN DESCRIPTION - DESCRIPTORS: DESCRIPTORS: ACHING

## 2021-11-06 ASSESSMENT — PAIN DESCRIPTION - FREQUENCY: FREQUENCY: CONTINUOUS

## 2021-11-06 ASSESSMENT — PAIN SCALES - GENERAL
PAINLEVEL_OUTOF10: 0
PAINLEVEL_OUTOF10: 2
PAINLEVEL_OUTOF10: 5
PAINLEVEL_OUTOF10: 0
PAINLEVEL_OUTOF10: 2
PAINLEVEL_OUTOF10: 0

## 2021-11-06 ASSESSMENT — PAIN DESCRIPTION - ONSET: ONSET: ON-GOING

## 2021-11-06 ASSESSMENT — PAIN DESCRIPTION - LOCATION: LOCATION: HIP

## 2021-11-06 ASSESSMENT — PAIN - FUNCTIONAL ASSESSMENT: PAIN_FUNCTIONAL_ASSESSMENT: PREVENTS OR INTERFERES SOME ACTIVE ACTIVITIES AND ADLS

## 2021-11-06 ASSESSMENT — PAIN DESCRIPTION - ORIENTATION: ORIENTATION: RIGHT

## 2021-11-06 ASSESSMENT — PAIN DESCRIPTION - PAIN TYPE: TYPE: ACUTE PAIN

## 2021-11-06 NOTE — FLOWSHEET NOTE
daily living tasks such as: bathing, toileting, dressing and grooming.  A wheeled walker is necessary due to the patient's unsteady gait, upper body weakness, inability to  a standard walker.  This patient can ambulate only by pushing a walker instead of using a lesser assistive device such as a cane or crutch     Total time in group = 60min   Exercise / [employer]: Carmen Snell participated in exercise and discussion on benefits and precautions during exercise. This provided the opportunity to have fun, build camaraderie, increase endurance, improve breathing techniques, balance, and strength. Carmen Snell performed a variety of seated and standing activities as able, with focus on technique and safety during exercise. Also focused on warm-up, warm-down, endurance monitoring, strengthening & strategies, function, safety, and general social & communication opportunities with other group members. Functional areas targeted:   [] Communication [] Memory  [] Coordination    [] Kitchen Safety [x] Problem Solving  [x] Strengthening     [x] Attention  [x] Endurance   [x] Mobility    [] Awareness/Insight [x] Balance  [x] Transfers  [] Social/Pragmatics [] Sequencing  [x] Equipment Use    [x] Safety   [] Other (specify)     Participation:  [x] Actively participated    [x] Required Min cues for moving around obstacles safely  [] Other (specify)    Education completed: Looking up and preparing for obstacle ahead      Bed Mobility:           [x]   Pt received out of bed   Rolling R/L:  Mod I, HOB elevated  Scooting: Mod I, used bed features- HOB elevated and bedrails  Sit --> lying:  Mod I, used bed features- HOB elevated and bedrails    Transfers:    Sit--> Stand:  SBA to supervision  Stand --> Sit:   Supervision  Toilet Transfer (if applicable): stand pivot with 2WW, Mod I  Other:    Assistive device required for transfer:   2WW    Gait:    Distance:  56' with 2WW, 100' with [facility], 150' with [facility]  Assistance: With 2WW- Mod I and vcs for reciprocal pattern ; with SPC- CGA with vcs for proper three-point and then two-point sequence  Device:  2WW and SPC  Gait Quality:  When performing gait training with 2WW and coaching to a reciprocal pattern, pt was walking through front of walker- like it wasn't going fast enough for him- almost hindering him in a more normal gait pattern; finished ambulating to gym and then trialed a SPC- first in // bars so pt could feel most secure and watch himself in the mirror and then without // bars. Pt first started off with three-point pattern but naturally went to a two-point pattern with some missteps and verbal or tactile cues to help correct. Pt at times had a narrow [place] but it occurred more when he was overthinking the sequence     Stairs   # Completed:  3, 4\" steps and 2, 6\" steps- up and down x 4 reps   Assistance:  CGA with verbal cues for proper sequence  Supportive Device:  SPC    Uneven Surfaces:       Assistance:    CGA   Device:   SPC  Surfaces Completed:   [x]  Carpet with bean bags beneath      []  Throw rugs       []  Ramp       []  Outdoor pavements        []  Grass             []  Loose gravel        []  Other:         Additional Therapeutic activities/exercises completed this date:     []   Nu-step:  Time:        Level:         #Steps:       []   Rebounder:    []  Seated     []  Standing        []   Balance training         []   Postural training    []   Supine ther ex (reps/sets):     []   Seated ther ex (reps/sets):     []   Standing ther ex (reps/sets):     []   Picking up object from floor (standing):                   []   Reacher used   [x]   Other: toileting activities:  Mod I with 2WW   []   Other:    Comments:      Patient/Caregiver Education and Training:   [x]   Bed Mobility/Transfer technique/safety  [x]   Gait technique/sequencing  [x]   Proper use of assistive device  [x]   Advanced mobility safety and technique  []   Reinforced patient's precautions/mobility Goals:  (Update in navigator)  Short term goals  Time Frame for Short term goals: 7 tx days:  Short term goal 1: Pt will complete rolling L/R and sup<->sit using leg  to assist RLE for hip abduction movement and following anterolateral hip precautions on R Mod Ind-Ind. Short term goal 2: Pt will complete OOB transfers using RW and following anterolateral hip precautions on R Mod Ind. Short term goal 3: Pt will ambulate >150 ft over level surface and at least 10 ft of uneven surface using RW following appropriate step-to pattern Mod Ind. Short term goal 4: Pt will ascend/descend curb step using RW and 1 flight of stairs using railings following appropriate step-to pattern on ascent/descent Mod Ind. Short term goal 5: Pt will complete object retrieval from the floor with 1UE support on RW and using reacher prn Mod Ind.:   :        Plan of Care                                                                              Times per week: 5 days per week for a minimum of 60 minutes/day plus group as appropriate for 60 minutes.   Treatment to include Current Treatment Recommendations: Strengthening, Gait Training, Patient/Caregiver Education & Training, Stair training, Equipment Evaluation, Education, & procurement, Balance Training, Pain Management, Functional Mobility Training, Endurance Training, Home Exercise Program, Transfer Training, Positioning, Safety Education & Training    Electronically signed by   SHER Gant,[id number]  11/6/2021, 8:22 AM

## 2021-11-06 NOTE — PLAN OF CARE
Problem: Falls - Risk of:  Goal: Will remain free from falls  Description: Will remain free from falls  Outcome: Ongoing  Goal: Absence of physical injury  Description: Absence of physical injury  Outcome: Ongoing     Problem: Skin Integrity:  Goal: Will show no infection signs and symptoms  Description: Will show no infection signs and symptoms  Outcome: Ongoing  Goal: Absence of new skin breakdown  Description: Absence of new skin breakdown  Outcome: Ongoing     Problem: SAFETY  Goal: Free from accidental physical injury  Outcome: Ongoing     Problem: DAILY CARE  Goal: Daily care needs are met  Outcome: Ongoing     Problem: PAIN  Goal: Patient's pain/discomfort is manageable  Outcome: Ongoing     Problem: KNOWLEDGE DEFICIT  Goal: Patient/S.O. demonstrates understanding of disease process, treatment plan, medications, and discharge instructions.   Outcome: Ongoing     Problem: DISCHARGE BARRIERS  Goal: Patient's continuum of care needs are met  Outcome: Ongoing     Problem: Pain:  Goal: Pain level will decrease  Description: Pain level will decrease  Outcome: Ongoing  Goal: Control of acute pain  Description: Control of acute pain  Outcome: Ongoing  Goal: Control of chronic pain  Description: Control of chronic pain  Outcome: Ongoing

## 2021-11-06 NOTE — PROGRESS NOTES
.Physical Rehabilitation: OCCUPATIONAL THERAPY     [x] daily progress note       [] discharge       Patient Name:  Carmen Snell   :  1955 MRN: [id number]  Room:  [address] [place] [address] Date of Admission: 11/3/2021  Rehabilitation Diagnosis:   Displaced midcervical fracture of right femur, initial encounter for closed fracture [S72.031A]  Traumatic closed fracture of neck of femur with minimal displacement, right, initial encounter ([facility] Theoplis Check       Date 2021       Day of ARU Week:  4   Time IN/-1029   Individual Tx Minutes 60   Group Tx Minutes    Co-Treat Minutes    Concurrent Tx Minutes    TOTAL Tx Time Mins 60   Variance Time    Variance Time []   Refusal due to:     []   Medical hold/reason:    []   Illness   []   Off Unit for test/procedure  []   Extra time needed to complete task  []   Therapeutic need  []   Other (specify):   Restrictions Restrictions/Precautions  Restrictions/Precautions: General Precautions, Fall Risk (WBAT RLE, anterolateral  hip precautions)  Position Activity Restriction  Hip Precautions: No ADduction, No hip extension, No hip external rotation, No active ABduction   Communication with other providers: [x]   OK to see per nursing:     []   Spoke with team member regarding:      Subjective observations and cognitive status: Pt in recliner upon arrival.  Pt deny taking a shower for therapy. \"Rather work down in therapy room as much as possible.       Pain level/location:  5  /10       Location: R hip   Discharge recommendations  Anticipated discharge date:  TBD  Destination: []home alone   []home alone w assist prn [] home w/ family    [] Continuous supervision       []SNF            [] Assisted living     [] Other:   Continued therapy: []HHC OT  []OUTPATIENT  OT   [] No Further OT  Equipment needs: TBD   (HIT F2 to transition between stars)    Grooming:   SUP while standing at sink ~8 mins with shaving, brushing hair  Oral Hygiene:  SUP    Bathing:   Denied    Dressing: Upper Body Dressing:  Already dressed  Lower Body Dressing:  Already dressed  Footwear: SUP, verbal cues for precautions due to anterior hip precautions, pt attempted crossing BLE putting on socks and shoes    Toileting:   SUP    Toilet Transfers:   SUP utilizing RW  Device Used:    [x]   Standard Toilet         []   Grab Bars           []  Bedside Commode       []   Elevated Toilet          []   Other:      Tub/Shower Transfer:   NA  Device Used:    []   Shower Bench      []   Shower Chair      []   Tub Transfer Bench           []   Bathtub    []   Shower         []   Other:       Bed Mobility:           [x]   Pt received out of bed     Transfers:    Sit--> Stand:  SUP  Stand --> Sit:   SUP  Stand-Pivot:   SUP  Other:    Assistive device required for transfer:   RW    Functional Mobility:  Around the ARU unit 178' with 1 standing RB needed+120, focus on reciprocal gait pattern to increase IND. Tolerates ~35 before RB or stagger gait. Assistance:  SUP  Device:   [x]   Rolling Walker     []   Standard Walker []   Wheelchair        []   [employer]       []   4-Wheeled Jose Elias Highman         []   Cardiac Walker       []   Other:      Homemaking Tasks:   NA    Additional Therapeutic activities/exercises completed this date:     [x]   ADL Training   [x]   Balance/Postural training     []   Bed/Transfer Training   []   Endurance Training   []   Neuromuscular Re-ed   []   Nu-step:  Time:        Level:         #Steps:       []   Rebounder:    []  Seated     [x]  Standing  BUE tossing and catching x2 sets x25 reps with CGA with RW placed in front, intermittently at times grabs handle with RUE, Req 1 standing RB during ea set       []   Supine Ther Ex (reps/sets):     [x]   Seated Ther Ex (reps/sets):  BUE strengthening ex utilizing 3# free weight targeting shoulder press, chest press, shoulder abd/add, shoulder horiz abd/add, concentric arm circles clockwise/counter clockwise, bicep/triceps curls x2 sets x10 reps.  Demo SUP +verbal/visual cues for pace and corrected techs.   []   Standing Ther Ex (reps/sets):     []   Other:    Comments:      Patient/Caregiver Education and Training:   [x]   Adaptive Equipment Use  []   Bed Mobility/Transfer Technique/Safety  []   Energy Conservation Tips  []   Family training  [x]   Postural Awareness  []   Safety During Functional Activities  []   Reinforced Patient's Precautions   []   Progress was updated and reviewed in Rehabtracker with patient and/or family this date. Treatment Plan for Next Session: Continue OT POC      Assessment: This pt demonstrated a positive response to today's treatment as evidenced by actively participating. The patient is making progress toward established goals as evidenced by QI scores. Ongoing deficits are observed in the areas of safety awareness for hip precautions, strength, standing activity tolerance and continued focus on these is recommended.      Treatment/Activity Tolerance:   [x] Tolerated treatment with no adverse effects    [x] Patient limited by fatigue  [] Patient limited by pain   [] Patient limited by medical complications:    [] Adverse reaction to Tx:   [] Significant change in status    Safety:       []  bed alarm set    [x]  chair alarm set    []  Pt refused alarms                []  Telesitter activated      [x]  Gait belt used during tx session      []other:       Number of Minutes/Billable Intervention  Therapeutic Exercise 28   ADL Self-care 18   Neuro Re-Ed    Therapeutic Activity 14   Group    Other:    TOTAL 60     Social History  Social/Functional History  Lives With: Alone  Type of Home: Apartment  Home Layout: One level  Home Access: Ramped entrance, Elevator (has elevator access to apartment)  Bathroom Shower/Tub: Tub/Shower unit  Bathroom Toilet: Handicap height  Bathroom Equipment: Grab bars in shower  Bathroom Accessibility: Walker accessible  Home Equipment:  (Has 4WW in hospital room that is her sister's)  ADL Assistance: Independent  Homemaking Assistance: Independent  Homemaking Responsibilities: Yes  Ambulation Assistance: Independent (no AD)  Transfer Assistance: Independent  Active : No (Sister drives)  Mode of Transportation: Car  Occupation: On disability  Type of occupation: Construction  Additional Comments: Pt typically sleeps in a flat, regular bed at home. Pt reports at least 3 falls in the past year \"due to bad judgement\"; latest one caused R hip fx precipitating this hospitalization. Objective                                                                                    Goals:  (Update in navigator)  Short term goals  Time Frame for Short term goals: STGs=LTGs:  Long term goals  Time Frame for Long term goals : ~1 week or until d/c  Long term goal 1: Pt will complete grooming tasks Ind  Long term goal 2: Pt will complete total body bathing mod I  Long term goal 3: Pt will complete UB dressing Ind  Long term goal 4: Pt will complete LB dressing mod I using AE PRN  Long term goal 5: Pt will doff/don footwear mod I using AE PRN  Long term goals 6: Pt will complete toileting mod I  Long term goal 7: Pt will complete functional transfers (bed, chair, shower, toilet) c DME PRN and mod I  Long term goal 8: Pt will perform therex/therax to facilitate increased strength/endurance/ax tolerance (c emphasis on dynamic standing balance/tolerance >12 mins, BUE endurance) c SBA  Long term goal 9: Pt will complete simple homemaking tasks c DME PRN and mod I:        Plan of Care                                                                              Times per week: 5 days per week for a minimum of 60 minutes/day plus group as appropriate for 60 minutes.   Treatment to include Plan  Times per day: Daily  Current Treatment Recommendations: Functional Mobility Training, Endurance Training, Balance Training, Safety Education & Training, Pain Management, Patient/Caregiver Education & Training, Equipment Evaluation,

## 2021-11-06 NOTE — PROGRESS NOTES
Karen Mosqueda    : 1955  Acct #: [de-identified]  MRN: [id number]              PM&R Progress Note      Admitting diagnosis: Right femoral neck fracture ( [place] [facility])     Comorbid diagnoses impacting rehabilitation: Uncontrolled pain, generalized weakness, gait disturbance, acute blood loss anemia, COPD (simple bronchitis), essential hypertension, hyponatremia, bipolar disorder with depression, L4 compression fracture    Chief complaint: He requested to use Effexor which is a home medicine for his anxieties. Mild hip pain as expected. No chills or dyspnea. Prior (baseline) level of function: Independent.     Current level of function:         Current  IRF-JASPER and Goals:   Occupational Therapy:    Short term goals  Time Frame for Short term goals: STGs=LTGs :   Long term goals  Time Frame for Long term goals : ~1 week or until d/c  Long term goal 1: Pt will complete grooming tasks Ind  Long term goal 2: Pt will complete total body bathing mod I  Long term goal 3: Pt will complete UB dressing Ind  Long term goal 4: Pt will complete LB dressing mod I using AE PRN  Long term goal 5: Pt will doff/don footwear mod I using AE PRN  Long term goals 6: Pt will complete toileting mod I  Long term goal 7: Pt will complete functional transfers (bed, chair, shower, toilet) c DME PRN and mod I  Long term goal 8: Pt will perform therex/therax to facilitate increased strength/endurance/ax tolerance (c emphasis on dynamic standing balance/tolerance >12 mins, BUE endurance) c SBA  Long term goal 9: Pt will complete simple homemaking tasks c DME PRN and mod I :                                       Eating: Eating  Assistance Needed: Independent  Comment: able to open packages/containers  CARE Score: 6  Discharge Goal: Independent       Oral Hygiene: Oral Hygiene  Assistance Needed: Supervision or touching assistance  Comment: in stance at sink  CARE Score: 4  Discharge Goal: Independent    UB/LB Bathing: Shower/Bathe Self  Assistance Needed: Supervision or touching assistance  Comment: SBA while in stance; performed remainder seated c supervision  CARE Score: 4  Discharge Goal: Independent    UB Dressing: Upper Body Dressing  Assistance Needed: Supervision or touching assistance  Comment: to don T shirt and button up shirt  CARE Score: 4  Discharge Goal: Independent         LB Dressing: Lower Body Dressing  Assistance Needed: Partial/moderate assistance  Comment: required min A to thread RLE in jeans to ensure precaution compliance; SBA while in stance to perform pants management  CARE Score: 3  Discharge Goal: Independent    Donning and [place] Footwear: Putting On/Taking Off Footwear  Assistance Needed: Partial/moderate assistance  Comment: able to doff B socks and don L; required assist with R and educated on improved technique to comply with precautions  CARE Score: 3  Discharge Goal: Independent      Toileting: Toileting Hygiene  Assistance Needed: Supervision or touching assistance  Comment: SBA in stance to manage clothing in stance  CARE Score: 4  Discharge Goal: Independent      Toilet Transfers: Toilet Transfer  Assistance Needed: Supervision or touching assistance  Comment: SBA using RW and grab bars  CARE Score: 4  Discharge Goal: Independent    Physical Therapy:   Short term goals  Time Frame for Short term goals: 7 tx days:  Short term goal 1: Pt will complete rolling L/R and sup<->sit using leg  to assist RLE for hip abduction movement and following anterolateral hip precautions on R Mod Ind-Ind. Short term goal 2: Pt will complete OOB transfers using RW and following anterolateral hip precautions on R Mod Ind. Short term goal 3: Pt will ambulate >150 ft over level surface and at least 10 ft of uneven surface using RW following appropriate step-to pattern Mod Ind.   Short term goal 4: Pt will ascend/descend curb step using RW and 1 flight of stairs using railings following appropriate step-to pattern on ascent/descent Mod Ind. Short term goal 5: Pt will complete object retrieval from the floor with 1UE support on RW and using reacher prn Mod Ind.             Bed Mobility:   Sit to Lying  Assistance Needed: Supervision or touching assistance  Comment: Sup. without use of bed features; pt transitioned from upright sitting at EOB to long sitting and then to supine; transfer completed from the R side of bed and was able to actively lift RLE on the bed following precautions  CARE Score: 4  Discharge Goal: Independent  Roll Left and Right  Assistance Needed: Supervision or touching assistance  Comment: rolling L/R with SBA-Sup without use of bed features; required VCs on hip precautions and positioning of pillow between thighs to prevent adduction beyond midline especially when rolling to L  CARE Score: 4  Discharge Goal: Independent  Lying to Sitting on Side of Bed  Assistance Needed: Supervision or touching assistance  Comment: Sup (required verbal prompts to self-assist RLE following precaution of \"no active hip abduction\" and to increase attention on hip position to prevent excessive ER as pt completed transfer towards R side of bed)  CARE Score: 4  Discharge Goal: Independent    Transfers:    Sit to Stand  Assistance Needed: Supervision or touching assistance  Comment: SBA using RW  CARE Score: 4  Discharge Goal: Independent  Chair/Bed-to-Chair Transfer  Assistance Needed: Supervision or touching assistance  Comment: SBA using RW  CARE Score: 4  Discharge Goal: Independent  Toilet Transfer  Assistance Needed: Supervision or touching assistance  Comment: SBA using RW and grab bars  CARE Score: 4  Car Transfer  Assistance Needed: Supervision or touching assistance  Comment: CGA using RW with additional stablization of AD to be able to stand; pt was able to actively lift BLE in car, self-assisted RLE and required verbal prompts to attend to RLE movements when getting out of car to ensure no excessive ER  CARE Score: 4  Discharge Goal: Independent    Ambulation:    Walking Ability  Does the Patient Walk?: Yes     Walk 10 Feet  Assistance Needed: Supervision or touching assistance  Comment: SBA using RW following appropriate step-to pattern  CARE Score: 4  Discharge Goal: Independent     Walk 50 Feet with Two Turns  Assistance Needed: Supervision or touching assistance  Comment: SBA using RW following appropriate step-to pattern  CARE Score: 4  Discharge Goal: Independent     Walk 150 Feet  Assistance Needed: Supervision or touching assistance  Comment: SBA using RW following appropriate step-to pattern  CARE Score: 4  Discharge Goal: Independent     Walking 10 Feet on Uneven Surfaces  Assistance Needed: Supervision or touching assistance  Comment: CGA using RW following appropriate step-to pattern  CARE Score: 4  Discharge Goal: Independent     1 Step (Curb)  Assistance Needed: Supervision or touching assistance  Comment: CGA using RW following appropriate step-to pattern  CARE Score: 4  Discharge Goal: Independent     4 Steps  Assistance Needed: Supervision or touching assistance  Comment: CGA using railings following appropriate step-to pattern on ascent/descent  CARE Score: 4  Discharge Goal: Independent     12 Steps  Assistance Needed: Supervision or touching assistance  Comment: CGA using railings following appropriate step-to pattern on ascent/descent  CARE Score: 4  Discharge Goal: Independent       Wheelchair:  w/c Ability: Wheelchair Ability  Uses a Wheelchair and/or Scooter?: No                Balance:        Object: Picking Up Object  Assistance Needed: Partial/moderate assistance  Comment: CGA to bend over, Min A to be able to get back up in upright standing; used LUE support on RW; task completed without use of reacher  CARE Score: 3    I      Exam:    Blood pressure (!) 131/58, pulse 73, temperature 97.9 °F (36.6 °C), resp. rate 16, height 5' 4\" (1.626 m), weight 145 lb 1 oz (65.8 kg), SpO2 94 %.     General: Up in a bedside chair with legs elevated. Ice pack in place. HEENT: MMM. Clear speech and full visual field. Pulmonary: No wheezes or rales. Cardiac: Regular rate and rhythm. Abdomen: Patient's abdomen is soft and nondistended. Bowel sounds were present throughout. There was no rebound, guarding or masses noted. Upper extremities: Strong . Fair dexterity. No bruising. Lower extremities: Mild swelling about the hip as expected. Incision is clean and dry. No signs of DVT. Sitting balance was good. Standing balance was poor. Lab Results   Component Value Date    WBC 10.4 10/31/2021    HGB 7.9 (L) 10/31/2021    HCT 25.3 (L) 10/31/2021    MCV 94.8 10/31/2021     (H) 10/31/2021     Lab Results   Component Value Date    INR 1.17 11/05/2021    INR 1.07 11/04/2021    INR 1.30 11/03/2021    PROTIME 15.1 (H) 11/05/2021    PROTIME 13.8 11/04/2021    PROTIME 16.8 (H) 11/03/2021     Lab Results   Component Value Date    CREATININE 0.5 (L) 10/31/2021    BUN 9 10/31/2021     (L) 10/31/2021    K 4.1 10/31/2021    CL 98 (L) 10/31/2021    CO2 21 10/31/2021     Lab Results   Component Value Date    ALT 17 10/25/2021    AST 28 10/25/2021    ALKPHOS 60 10/25/2021    BILITOT 0.4 10/25/2021       Expected length of stay  prior to a supervised level of function for discharge home with a walker and [facility] OT/PT is 2 weeks. Recommendations:    1. Right femoral neck fracture with total arthroplasty:  Responds to verbal cues to participate in his daily occupational and physical therapy.     Practicing self-care activities and mobility following anterior hip precautions.  Weightbearing as tolerated.    Providing him pain management, adaptive equipment training and DVT prophylaxis.    Encouraging pulmonary hygiene measures while we monitor his anemia and hyponatremia. Verbal cues and minimum physical assistance for transfers.   2. DVT prophylaxis: Surgeon has chosen aspirin 325 twice daily for DVT prophylaxis.  Weightbearing activities are pursued daily.  SCDs when in bed. He is also on therapeutic Lovenox bridging his Coumadin use for his cardiac valvular disease. INR subtherapeutic. 3. Bipolar disorder with depression: Treating him in a calm consistent environment.  Regulating his sleep-wake schedule.  He requires trazodone and Effexor.  Psychiatry services available to him as needed. Restarted his Effexor. 4. Uncontrolled pain: Scheduled acetaminophen and as needed oxycodone with cryotherapy and progressive mobilization following anterior hip precautions. 5. Hypertension: Recently his blood pressures have been reasonable off medication.    Monitoring vital signs at rest and with activity.  Target systolic blood pressure is 120-140. Blood pressure is just below the target range. 6. Hyponatremia: His numbers are closer to normal now. Christianne Chirinos encourage consistent oral intake and be cautious with any diuretic use.  No fluid restrictions at the moment.  Periodic monitoring of his chemistries. 7. COPD: Albuterol inhaler twice daily, Singulair and aggressive pulmonary hygiene measures.  Monitoring O2 saturations at rest and with activity.  No steroid therapy for the time being. 8. Cardiac valvular disease with past valve replacement: Patient is on a Lovenox bridge until his INR is therapeutic.            This is a late entry note for service provided 11/5/2021.

## 2021-11-07 LAB
INR BLD: 1.18 INDEX
PROTHROMBIN TIME: 15.3 SECONDS (ref 11.7–14.5)

## 2021-11-07 PROCEDURE — 94640 AIRWAY INHALATION TREATMENT: CPT

## 2021-11-07 PROCEDURE — 1280000000 HC REHAB R&B

## 2021-11-07 PROCEDURE — 94761 N-INVAS EAR/PLS OXIMETRY MLT: CPT

## 2021-11-07 PROCEDURE — 85610 PROTHROMBIN TIME: CPT

## 2021-11-07 PROCEDURE — 6360000002 HC RX W HCPCS: Performed by: PHYSICAL MEDICINE & REHABILITATION

## 2021-11-07 PROCEDURE — 6370000000 HC RX 637 (ALT 250 FOR IP): Performed by: PHYSICAL MEDICINE & REHABILITATION

## 2021-11-07 PROCEDURE — 6370000000 HC RX 637 (ALT 250 FOR IP): Performed by: INTERNAL MEDICINE

## 2021-11-07 PROCEDURE — 36415 COLL VENOUS BLD VENIPUNCTURE: CPT

## 2021-11-07 PROCEDURE — 94150 VITAL CAPACITY TEST: CPT

## 2021-11-07 PROCEDURE — 94664 DEMO&/EVAL PT USE INHALER: CPT

## 2021-11-07 RX ORDER — TRIAMCINOLONE ACETONIDE 1 MG/G
CREAM TOPICAL 2 TIMES DAILY
Status: DISCONTINUED | OUTPATIENT
Start: 2021-11-07 | End: 2021-11-09 | Stop reason: HOSPADM

## 2021-11-07 RX ADMIN — ALBUTEROL SULFATE 2 PUFF: 90 AEROSOL, METERED RESPIRATORY (INHALATION) at 20:15

## 2021-11-07 RX ADMIN — ASPIRIN 325 MG: 325 TABLET, COATED ORAL at 20:36

## 2021-11-07 RX ADMIN — ATORVASTATIN CALCIUM 40 MG: 40 TABLET, FILM COATED ORAL at 09:07

## 2021-11-07 RX ADMIN — CYCLOBENZAPRINE 10 MG: 10 TABLET, FILM COATED ORAL at 20:36

## 2021-11-07 RX ADMIN — VENLAFAXINE HYDROCHLORIDE 150 MG: 37.5 CAPSULE, EXTENDED RELEASE ORAL at 09:07

## 2021-11-07 RX ADMIN — LACTULOSE 20 G: 10 SOLUTION ORAL at 09:08

## 2021-11-07 RX ADMIN — BISACODYL 10 MG: 5 TABLET, COATED ORAL at 09:09

## 2021-11-07 RX ADMIN — ALBUTEROL SULFATE 2 PUFF: 90 AEROSOL, METERED RESPIRATORY (INHALATION) at 07:33

## 2021-11-07 RX ADMIN — MONTELUKAST 10 MG: 10 TABLET, FILM COATED ORAL at 20:36

## 2021-11-07 RX ADMIN — WARFARIN SODIUM 5 MG: 5 TABLET ORAL at 17:49

## 2021-11-07 RX ADMIN — TRIAMCINOLONE ACETONIDE: 1 CREAM TOPICAL at 21:34

## 2021-11-07 RX ADMIN — ENOXAPARIN SODIUM 70 MG: 100 INJECTION SUBCUTANEOUS at 20:36

## 2021-11-07 RX ADMIN — ASPIRIN 325 MG: 325 TABLET, COATED ORAL at 09:07

## 2021-11-07 RX ADMIN — TRAZODONE HYDROCHLORIDE 200 MG: 50 TABLET ORAL at 20:36

## 2021-11-07 RX ADMIN — ENOXAPARIN SODIUM 70 MG: 100 INJECTION SUBCUTANEOUS at 10:47

## 2021-11-07 RX ADMIN — CALCIUM 500 MG: 500 TABLET ORAL at 10:47

## 2021-11-07 ASSESSMENT — PAIN SCALES - GENERAL: PAINLEVEL_OUTOF10: 0

## 2021-11-08 LAB
INR BLD: 1.13 INDEX
PROTHROMBIN TIME: 14.6 SECONDS (ref 11.7–14.5)

## 2021-11-08 PROCEDURE — 99232 SBSQ HOSP IP/OBS MODERATE 35: CPT | Performed by: PHYSICAL MEDICINE & REHABILITATION

## 2021-11-08 PROCEDURE — 97535 SELF CARE MNGMENT TRAINING: CPT

## 2021-11-08 PROCEDURE — 97116 GAIT TRAINING THERAPY: CPT

## 2021-11-08 PROCEDURE — 6370000000 HC RX 637 (ALT 250 FOR IP): Performed by: PHYSICAL MEDICINE & REHABILITATION

## 2021-11-08 PROCEDURE — 94761 N-INVAS EAR/PLS OXIMETRY MLT: CPT

## 2021-11-08 PROCEDURE — 94150 VITAL CAPACITY TEST: CPT

## 2021-11-08 PROCEDURE — 85610 PROTHROMBIN TIME: CPT

## 2021-11-08 PROCEDURE — 1280000000 HC REHAB R&B

## 2021-11-08 PROCEDURE — 6370000000 HC RX 637 (ALT 250 FOR IP): Performed by: INTERNAL MEDICINE

## 2021-11-08 PROCEDURE — 94640 AIRWAY INHALATION TREATMENT: CPT

## 2021-11-08 PROCEDURE — 36415 COLL VENOUS BLD VENIPUNCTURE: CPT

## 2021-11-08 PROCEDURE — 6360000002 HC RX W HCPCS: Performed by: PHYSICAL MEDICINE & REHABILITATION

## 2021-11-08 PROCEDURE — 97530 THERAPEUTIC ACTIVITIES: CPT

## 2021-11-08 RX ORDER — WARFARIN SODIUM 5 MG/1
10 TABLET ORAL DAILY
Status: DISCONTINUED | OUTPATIENT
Start: 2021-11-08 | End: 2021-11-09

## 2021-11-08 RX ADMIN — ASPIRIN 325 MG: 325 TABLET, COATED ORAL at 21:05

## 2021-11-08 RX ADMIN — LACTULOSE 20 G: 10 SOLUTION ORAL at 08:38

## 2021-11-08 RX ADMIN — TRAZODONE HYDROCHLORIDE 200 MG: 50 TABLET ORAL at 21:05

## 2021-11-08 RX ADMIN — BISACODYL 10 MG: 5 TABLET, COATED ORAL at 08:39

## 2021-11-08 RX ADMIN — ATORVASTATIN CALCIUM 40 MG: 40 TABLET, FILM COATED ORAL at 08:39

## 2021-11-08 RX ADMIN — MONTELUKAST 10 MG: 10 TABLET, FILM COATED ORAL at 21:05

## 2021-11-08 RX ADMIN — ACETAMINOPHEN 650 MG: 325 TABLET ORAL at 08:44

## 2021-11-08 RX ADMIN — VENLAFAXINE HYDROCHLORIDE 150 MG: 37.5 CAPSULE, EXTENDED RELEASE ORAL at 08:39

## 2021-11-08 RX ADMIN — TRIAMCINOLONE ACETONIDE: 1 CREAM TOPICAL at 21:15

## 2021-11-08 RX ADMIN — ASPIRIN 325 MG: 325 TABLET, COATED ORAL at 08:39

## 2021-11-08 RX ADMIN — WARFARIN SODIUM 10 MG: 5 TABLET ORAL at 17:14

## 2021-11-08 RX ADMIN — ENOXAPARIN SODIUM 70 MG: 100 INJECTION SUBCUTANEOUS at 21:07

## 2021-11-08 RX ADMIN — CALCIUM 500 MG: 500 TABLET ORAL at 08:39

## 2021-11-08 RX ADMIN — TRIAMCINOLONE ACETONIDE: 1 CREAM TOPICAL at 10:06

## 2021-11-08 RX ADMIN — ENOXAPARIN SODIUM 70 MG: 100 INJECTION SUBCUTANEOUS at 08:39

## 2021-11-08 RX ADMIN — ALBUTEROL SULFATE 2 PUFF: 90 AEROSOL, METERED RESPIRATORY (INHALATION) at 08:24

## 2021-11-08 RX ADMIN — LACTULOSE 20 G: 10 SOLUTION ORAL at 21:07

## 2021-11-08 ASSESSMENT — PAIN SCALES - GENERAL
PAINLEVEL_OUTOF10: 0
PAINLEVEL_OUTOF10: 0
PAINLEVEL_OUTOF10: 5
PAINLEVEL_OUTOF10: 3

## 2021-11-08 NOTE — PROGRESS NOTES
Physical Therapy  . [x] daily progress note       [x] discharge       Patient Name:  Sherrie Dodson   :  1955 MRN: [id number]  Room:  [address] [place], [place] [address] Date of Admission: 11/3/2021  Rehabilitation Diagnosis:   Displaced midcervical fracture of right femur, initial encounter for closed fracture [S72.031A]  Traumatic closed fracture of neck of femur with minimal displacement, right[facility] [S72.001A]       Date 2021       Day of ARU Week:  6   Time IN/OUT 1100/1200, 1300/1400   Individual Tx Minutes 60+60   Group Tx Minutes    Co-Treat Minutes    Concurrent Tx Minutes    TOTAL Tx Time Mins 120   Variance Time    Variance Time []   Refusal due to:     []   Medical hold/reason:    []   Illness   []   Off Unit for test/procedure  []   Extra time needed to complete task  []   Therapeutic need  []   Other (specify):   Restrictions Restrictions/Precautions  Restrictions/Precautions: General Precautions, Fall Risk (WBAT RLE, anterolateral  hip precautions)  Position Activity Restriction  Hip Precautions: No ADduction, No hip extension, No hip external rotation, No active ABduction   Interdisciplinary communication [x]   Cleared for therapy per nursing     []   RN notified about issues during session  []   RN updated on pt performance  []   Spoke with   []   Spoke with OT  []   Spoke with MD  []   Other:    Subjective observations and cognitive status: Pt in bed am, chair pm. Agreeable to therapy   Pain level/location:  5-7  /10       Location:R hip    Discharge recommendations  Anticipated discharge date:  tbd  Destination: []home alone   [x]home alone with assist PRN     [] home w/ family      [] Continuous supervision  []SNF    [] Assisted living     [] Other:  Continued therapy: [x]HHC PT  []OUTPATIENT PT   [] No Further PT  []SNF PT  Caregiver training recommended: []Yes  [] No   Equipment needs: Sherrie Dodson requires the assistance of a wheeled walker to successfully ambulate from room to room at home to allow completion of daily living tasks such as: bathing, toileting, dressing and grooming. A wheeled walker is necessary due to the patient's unsteady gait, upper body weakness, inability to  a standard walker. This patient can ambulate only by pushing a walker instead of using a lesser assistive device such as a cane or crutch. PT IRF-JASPER scores and goals for discharge assessment:   Roll Left and Right  Assistance Needed: Independent  Comment: without rails  CARE Score: 6  Discharge Goal: Independent    Sit to Lying  Assistance Needed: Independent  Comment: no use of bed features  CARE Score: 6  Discharge Goal: Independent    Lying to Sitting on Side of Bed  Assistance Needed: Independent  Comment: no use of bed features  CARE Score: 6  Discharge Goal: Independent    Sit to Stand  Assistance Needed: Independent  Comment: x  CARE Score: 6  Discharge Goal: Independent    Chair/Bed-to-Chair Transfer  Assistance Needed: Supervision or touching assistance  Comment: cues for turn for rotation precautions  CARE Score: 4  Discharge Goal: Independent        Car Transfer  Assistance Needed: Independent  Comment: uses UEs to self assist RLE into/out of  car  CARE Score: 6  Discharge Goal: Independent    Walk 10 Feet?   Walk 10 Feet?: Yes    1 Step  1 Step?: Yes    Picking Up Object  Assistance Needed: Supervision or touching assistance  Comment: supervision with cues for set up of body  CARE Score: 4    Wheelchair Ability  Uses a Wheelchair and/or Scooter?: No                        Walking Ability  Does the Patient Walk?: Yes    Walk 10 Feet  Assistance Needed: Independent  Comment: 2ww  CARE Score: 6  Discharge Goal: Independent    Walk 50 Feet with Two Turns  Assistance Needed: Supervision or touching assistance  Comment: supervision with 2ww with cues for turns  CARE Score: 4  Discharge Goal: Independent    Walk 150 Feet  Assistance Needed: Supervision or touching assistance  Comment: supervision with 2ww, needs standing rest every 50' due to fatigue,   CARE Score: 4  Discharge Goal: Independent    Walking 10 Feet on Uneven Surfaces  Assistance Needed: Independent  Comment: 2ww  CARE Score: 6  Discharge Goal: Independent    1 Step (Curb)  Assistance Needed: Supervision or touching assistance  Comment: supervision with 2ww, cues for sequence  CARE Score: 4  Discharge Goal: Independent    4 Steps  Assistance Needed: Supervision or touching assistance  Comment: supervision with 25% cues for sequence, B rails  CARE Score: 4  Discharge Goal: Independent    12 Steps  Assistance Needed: Supervision or touching assistance  Comment: Pt needed max standing rests to perform all 12 steps due to HR elevated and irregular(130 bpm)  CARE Score: 4  Discharge Goal: Independent    In pm pt instructed in use of straight cane using 3 point pattern. Pt demonstrated good technique for straight line gait, but cues for turns to not over-rotate or over extend hip. Pt verbalized feeling more fatigue for the distance with cane. Pt also had 4ww in room from sister. Instructed in use of 4ww with pt again needing cues not to over extend RLE in late stance phase and showed some increased difficulty with path deviation due to swivel wheels, but overall supervision. Pt states he has about 500' from car to apt door. Pt able to use 4ww to walk/rest(sitting appropriately on 4ww seat) for 700' total. Pt did have HR as high as 121 that did not decrease after 1 min of sitting. Instructed in deep breathing techniques which helped to quickly decrease HR to 103. Additional Therapeutic activities/exercises completed this date:     []   Nu-step:  Time:        Level:         #Steps:       []   Rebounder:    []  Seated     []  Standing        []   Balance training         []   Postural training    []   Supine ther ex (reps/sets):     []   Seated ther ex (reps/sets):     []   Standing ther ex (reps/sets):     []   Other:  Toileting activity completed with    [x]   Other:Pt performed w/c mobility for 300' with rest breaks as an activity tolerance exercise with cues for monitoring RPE    Comments:      Patient/Caregiver Education and Training:   []   Role of PT  [x]   Education about Dx  [x]   Use of call light for assist   []   HEP provided and explained   []   Treatment plan reviewed  []   Home safety  []   Wheelchair mobility/management   []   Body mechanics  [x]   Bed Mobility/Transfer technique  [x]   Gait technique/sequencing  []   Proper use of assistive device/adaptive equipment  [x]   Stair training/Advanced mobility safety and technique  [x]   Reinforced patient's precautions/mobility while maintaining precautions  []   Postural awareness  []   Family/caregiver training  []   Progress was updated and reviewed in Rehabtracker with patient and/or family this date. []   Other:    Treatment Plan for Next Session: build activity tolerance, continue to train with cane    Assessment: This pt demonstrated a positive  response to today's treatment as evidenced by improved independence. The patient is making  progress toward established goals as evidenced by QI scores. Ongoing deficits are observed in the areas of keeping precautions and activity tolerance and continued focus on safety in gait are recommended.        Treatment/Activity Tolerance:   [] Tolerated treatment with no adverse effects    [x] Patient limited by fatigue  [] Patient limited by pain   [] Patient limited by medical complications:    [] Adverse reaction to Tx:   [] Significant change in status    Safety:       []  bed alarm set    [x]  chair alarm set    []  Pt refused alarms                []  Telesitter activated      [x]  Gait belt used during tx session      []other:       Number of Minutes/Billable Intervention  Gait Training 75   Therapeutic Exercise    Neuro Re-Ed    Therapeutic Activity 45   Wheelchair Propulsion    Group    Other:    TOTAL 120     Social History  Social/Functional History  Lives With: Alone  Type of Home: Apartment  Home Layout: One level  Home Access: Ramped entrance, Elevator (has elevator access to apartment)  Bathroom Shower/Tub: Tub/Shower unit  Bathroom Toilet: Handicap height  Bathroom Equipment: Grab bars in shower  Bathroom Accessibility: Walker accessible  Home Equipment:  (Has 4WW in hospital room that is her sister's)  ADL Assistance: Independent  Homemaking Assistance: Independent  Homemaking Responsibilities: Yes  Ambulation Assistance: Independent (no AD)  Transfer Assistance: Independent  Active : No (Sister drives)  Mode of Transportation: Car  Occupation: On disability  Type of occupation: Construction  Additional Comments: Pt typically sleeps in a flat, regular bed at home. Pt reports at least 3 falls in the past year \"due to bad judgement\"; latest one caused R hip fx precipitating this hospitalization. Objective                                                                                    Goals:  (Update in navigator)  Short term goals  Time Frame for Short term goals: 7 tx days:  Short term goal 1: Pt will complete rolling L/R and sup<->sit using leg  to assist RLE for hip abduction movement and following anterolateral hip precautions on R Mod Ind-Ind. Short term goal 2: Pt will complete OOB transfers using RW and following anterolateral hip precautions on R Mod Ind. Short term goal 3: Pt will ambulate >150 ft over level surface and at least 10 ft of uneven surface using RW following appropriate step-to pattern Mod Ind. Short term goal 4: Pt will ascend/descend curb step using RW and 1 flight of stairs using railings following appropriate step-to pattern on ascent/descent Mod Ind.   Short term goal 5: Pt will complete object retrieval from the floor with 1UE support on RW and using reacher prn Mod Ind.:   :        Plan of Care Times per week: 5 days per week for a minimum of 60 minutes/day plus group as appropriate for 60 minutes.   Treatment to include Current Treatment Recommendations: Strengthening, Gait Training, Patient/Caregiver Education & Training, Stair training, Equipment Evaluation, Education, & procurement, Balance Training, Pain Management, Functional Mobility Training, Endurance Training, Home Exercise Program, Transfer Training, Positioning, Safety Education & Training    Electronically signed by   Andressa Dover PT,   11/8/2021, 4:27 PM

## 2021-11-08 NOTE — PATIENT CARE CONFERENCE
ACUTE REHAB TEAM CONFERENCE SUMMARY   [address] [facility]    NAME: Mayo Garcia  : 1955 ADMIT DATE: 11/3/2021    Rehab Admitting Dx:Displaced midcervical fracture of right femur, initial encounter for closed fracture [S72.031A]  Traumatic closed fracture of neck of femur with minimal displacement, right, initial encounter ([address] [S72.001A]  Acute blood loss anemia [D62]  Patient Comorbid Conditions: Active Hospital Problems    Diagnosis Date Noted    Uncontrolled pain [R52]     Generalized weakness [R53.1]     Gait disturbance [R26.9]     Acute blood loss anemia [D62]     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 Support includes [facility]. Patient reports have a R and HHT at home. Plan is to discharge to home alone. PHYSICAL THERAPY (Updated in QI)  Short term goals  Time Frame for Short term goals: 7 tx days:  Short term goal 1: Pt will complete rolling L/R and sup<->sit using leg  to assist RLE for hip abduction movement and following anterolateral hip precautions on R Mod Ind-Ind. Short term goal 2: Pt will complete OOB transfers using RW and following anterolateral hip precautions on R Mod Ind. Short term goal 3: Pt will ambulate >150 ft over level surface and at least 10 ft of uneven surface using RW following appropriate step-to pattern Mod Ind. Short term goal 4: Pt will ascend/descend curb step using RW and 1 flight of stairs using railings following appropriate step-to pattern on ascent/descent Mod Ind.   Short term goal 5: Pt will complete object retrieval from the floor with 1UE support on RW and using reacher prn Mod Ind.           Impairments/deficits, barriers:  Scores noted for PT based on 11/8 performance, but pt on medical hold this am due to very low Hgb  Body structures, Functions, Activity limitations: Decreased functional mobility , Decreased high-level IADLs, Decreased endurance, Decreased strength, Decreased balance, Increased pain, Decreased ADL status     Prognosis: Good  Decision Making: Medium Complexity  Clinical Presentation: evolving with changing characteristics  Equipment needed at discharge:2ww, cane      PT IRF-JASPER scores since initial assessment  Bed Mobility:   Sit to Lying  Assistance Needed: Independent  Comment: no use of bed features  CARE Score: 6  Discharge Goal: Independent    Roll Left and Right  Assistance Needed: Independent  Comment: without rails  CARE Score: 6  Discharge Goal: Independent    Lying to Sitting on Side of Bed  Assistance Needed: Independent  Comment: no use of bed features  CARE Score: 6  Discharge Goal: Independent    Transfers:    Sit to Stand  Assistance Needed: Independent  Comment: x  CARE Score: 6  Discharge Goal: Independent    Chair/Bed-to-Chair Transfer  Assistance Needed: Supervision or touching assistance  Comment: cues for turn for rotation precautions  CARE Score: 4  Discharge Goal: Independent        Car Transfer  Assistance Needed: Independent  Comment: uses UEs to self assist RLE into/out of  car  CARE Score: 6  Discharge Goal: Independent    Ambulation:    Walking Ability  Does the Patient Walk?: Yes     Walk 10 Feet  Assistance Needed: Independent  Comment: 2ww  CARE Score: 6  Discharge Goal: Independent     Walk 50 Feet with Two Turns  Assistance Needed: Supervision or touching assistance  Comment: supervision with 2ww with cues for turns  CARE Score: 4  Discharge Goal: Independent     Walk 150 Feet  Assistance Needed: Supervision or touching assistance  Comment: supervision with 2ww, needs standing rest every 50' due to fatigue,   CARE Score: 4  Discharge Goal: Independent     Walking 10 Feet on Uneven Surfaces  Assistance Needed: Independent  Comment: 2ww  CARE Score: 6  Discharge Goal: Independent     1 Step (Curb)  Assistance Needed: Supervision or touching assistance  Comment: supervision with 2ww, cues for sequence  CARE Score: 4  Discharge Goal: Independent     4 Steps  Assistance Needed: Supervision or touching assistance  Comment: supervision with 25% cues for sequence, B rails  CARE Score: 4  Discharge Goal: Independent     12 Steps  Assistance Needed: Supervision or touching assistance  Comment: Pt needed max standing rests to perform all 12 steps due to HR elevated and irregular(130 bpm)  CARE Score: 4  Discharge Goal: Independent           Wheelchair:  w/c Ability: Wheelchair Ability  Uses a Wheelchair and/or Scooter?: No                        Balance:        Object: Picking Up Object  Assistance Needed: Supervision or touching assistance  Comment: supervision with cues for set up of body  CARE Score: 4    Fall Risk: [x]  Yes  []  No    OCCUPATIONAL THERAPY  (Updated in QI)  Short term goals  Time Frame for Short term goals: STGs=LTGs :   Long term goals  Time Frame for Long term goals : ~1 week or until d/c  Long term goal 1: Pt will complete grooming tasks Ind  Long term goal 2: Pt will complete total body bathing mod I  Long term goal 3: Pt will complete UB dressing Ind  Long term goal 4: Pt will complete LB dressing mod I using AE PRN  Long term goal 5: Pt will doff/don footwear mod I using AE PRN  Long term goals 6: Pt will complete toileting mod I  Long term goal 7: Pt will complete functional transfers (bed, chair, shower, toilet) c DME PRN and mod I  Long term goal 8: Pt will perform therex/therax to facilitate increased strength/endurance/ax tolerance (c emphasis on dynamic standing balance/tolerance >12 mins, DEBBIE endurance) c SBA  Long term goal 9: Pt will complete simple homemaking tasks c DME PRN and mod I :                                       OT IRF-JASPER scores and goals since initial assessment:    ADLs:    Eating: Eating  Assistance Needed: Independent  Comment: per Pt  CARE Score: 6  Discharge Goal: Independent       Oral Hygiene: Oral Hygiene  Assistance Needed: Independent  Comment: sink side seated  CARE Score: 6  Discharge Goal: Independent    UB/LB Bathing: Shower/Bathe Self  Assistance Needed: Supervision or touching assistance  Comment: Sup when standing, completed majority seated on bench in shower  CARE Score: 4  Discharge Goal: Independent    UB Dressing: Upper Body Dressing  Assistance Needed: Setup or clean-up assistance  Comment: Pt request A to gather clothing from closet  CARE Score: 5  Discharge Goal: Independent         LB Dressing: Lower Body Dressing  Assistance Needed: Setup or clean-up assistance  Comment: Pt request A to gather clothing from closet  CARE Score: 5  Discharge Goal: Independent    Donning and [place] Footwear: Putting On/Taking Off Footwear  Assistance Needed: Partial/moderate assistance  Comment: able to doff B socks and don L; required assist with R and educated on improved technique to comply with precautions  CARE Score: 3  Discharge Goal: Independent      Toileting: Toileting Hygiene  Assistance Needed: Independent  Comment: x  CARE Score: 6  Discharge Goal: Independent      Toilet Transfers:    Toilet Transfer  Assistance Needed: Supervision or touching assistance  Comment: SBA using RW and grab bars  CARE Score: 4  Discharge Goal: Independent      Impairments/deficits, barriers: Decreased balance, endurance, memory/recall of precautions  Assessment  Performance deficits / Impairments: Decreased functional mobility , Decreased ADL status, Decreased endurance, Decreased balance, Decreased high-level IADLs  Decision Making: Medium Complexity  REQUIRES OT FOLLOW UP: Yes  Equipment needed at discharge:  SC vs TTB      COGNITIVE FUNCTION/SPEECH THERAPY (AS INDICATED)  LTG                                           Nursing Current Medical Status:   [x] Is continent of bowel and bladder     [] Is incontinent of bowel and bladder    [x] Has had an adequate number of bowel movements   [x] Urinates with no urinary retention >300ml in bladder   [] Targeting bladder protocol with sanon removal   [x] Maintaining O2 SATs at 92% or greater   [x] Has pain managed while on ARU         [x] Has had no skin breakdown while on ARU   [] Has improved skin integrity via wound measurements   [] Has no signs/symptoms of infection at the wound site   [] Pressure wounds Stage/Location:    [] Arrived on unit with pressure wound  [x] Has been free from injury during hospitalization   [] Has experienced a fall during hospitalization  [x] Ongoing education with patient/family with understanding demonstrated for:  [] Receives IV Fluids  [] Other:Low HGB (4.8) order for blood        NUTRITION  Weight: 144 lb 6.4 oz (65.5 kg) / Body mass index is 24.79 kg/m². Current diet: ADULT ORAL NUTRITION SUPPLEMENT; Breakfast, Dinner; Standard High Calorie/High Protein Oral Supplement  ADULT DIET; Clear Liquid  Intake:       Medical improvements/barriers: implementing hip precautions, Hbg level, discharging to ICU, needs transfusion, black stools        Team goals for next treatment period/Intervention for current barriers:   [x] Pt will increase activity tolerance for daily tasks.   [] Pt will improve bed mobility with reduced assist.  [x] Pt will improve safety in fx tasks with reduced cues/assist  [x] Pt will improve transfers with reduced assist  [x] Pt will improve toileting with reduced assist  [x] Pt will improve ADL's with use of adaptive equipment with reduced assist  [] Pt will improve pain mgmt for maximum participation in tx program  [] Pt will improve communication to get basic needs met on unit  [] Pt will improve swallowing for safe diet advancement with use of strategies  []  Plan for discharge to home. Patient Strengths: Motivated, Cooperative and Pleasant    Justification for Continued Stay  Based on my medical assessment of the patient and review of information from the interdisciplinary team as part of this weekly team conference, the patient continues to meet the following criteria for IRF level of care:   The patient requires active and ongoing intervention of multiple therapy disciplines   The patient requires and intensive rehabilitation therapy program   The patient requires continued physician supervision by a rehabilitation physician   The patient requires 24 hours rehab nursing care   The patient requires an intensive and coordinated interdisciplinary team approach to the delivery of rehabilitative care. Assessment/Plan   [x]  The patient is making good progression towards their long term goals and is actively participating in and has a reasonable expectation to continue to benefit from the intensive rehabilitation therapy program   []  The estimated discharge date has been changed from initial team conference due to:   []  The estimated discharge destination has been changed from initial team conference due to:         Ongoing tx following discharge: [x]HHC OT PT    []OUTPATIENT     [] No Further Treatment     [] Family/Caregiver Training  []  Transitional Living Arrangement   [] Home Assessment (date  )     [] Family Conference   []  Therapeutic Pass       []  Other: (specify)    Estimated Discharge Date: transfer today to ICU  If returns 11/17/21    Estimated Discharge Destination: []home alone   []home alone with assist prn  []Continuous supervision []Return home with s/o/spouse/family   [] Assisted living    []SNF     Team members participating in today's conference.     [x] AMBROSIO Weiss, Medical Director  [] Johanny Leyva,    [] Carrie Mariscal, Nurse Mgr [x] Adali Green, Nurse Supervisor   []  Jessica Heath, PT   [] Rosy Magdaleno, OT   [x]  June Proctor, PT  [x] Dinorah Gaytan, OT      [x]  Red Boucher, SLP    []  Madina Bishop, NICOLE   [] Brunilda Carmona, SLP   []  Reilly Nguyen, RD     [x] Dio Alarcon,     []Ruth Phillips,     [] Troy Berkowitz RN[] Dakotah Hamm RN     [] Emmy Rich RN    [] Vitaly Li RN    [] Mary Couch RN  [] Eliecer Ledesma RN    []     I have led this Team Conference and agree with the plan, Ramone Covington MD, 11/9/2021, 1:08 PM  Goals have been updated to reflect recent status.     Team conference note transcribed this date by: Amparo Horn MA, [id number] [facility], Therapy Coordinator'

## 2021-11-08 NOTE — PROGRESS NOTES
Physical Rehabilitation: OCCUPATIONAL THERAPY     [x] daily progress note       [] discharge       Patient Name:  James Meirda   :  1955 MRN: [id number]  Room:  [address] [place] [address] Date of Admission: 11/3/2021  Rehabilitation Diagnosis:   Displaced midcervical fracture of right femur, initial encounter for closed fracture [S72.031A]  Traumatic closed fracture of neck of femur with minimal displacement, right, initial encounter ([facility]       Date 2021       Day of ARU Week:  6   Time IN/OUT [id number]   Individual Tx Minutes 65   Group Tx Minutes    Co-Treat Minutes    Concurrent Tx Minutes    TOTAL Tx Time Mins 65   Variance Time +5   Variance Time []   Refusal due to:     []   Medical hold/reason:    []   Illness   []   Off Unit for test/procedure  [x]   Extra time needed to complete task  []   Therapeutic need  []   Other (specify):   Restrictions Restrictions/Precautions: General Precautions, Fall Risk (WBAT RLE, anterolateral  hip precautions)     Hip Precautions: No ADduction, No hip extension, No hip external rotation, No active ABduction   Communication with other providers: [x]   OK to see per nursing:     []   Spoke with team member regarding:      Subjective observations and cognitive status: Pt up in bed upon arrival with nursing. Pt pleasant and agreeable to OT tx.    Pain level/location:  2  /10       Location: R hip   Discharge recommendations  Anticipated discharge date:  TBD  Destination: []home alone   []home alone w assist prn   [] home w/ family    [] Continuous supervision       []SNF    [] Assisted living     [] Other:   Continued therapy: []HHC OT  []OUTPATIENT  OT   [] No Further OT  Equipment needs: TBD       ADLs:    Eating: Eating  Assistance Needed: Independent  Comment: per Pt  CARE Score: 6  Discharge Goal: Independent       Oral Hygiene: Oral Hygiene  Assistance Needed: Independent  Comment: sink side seated  CARE Score: 6  Discharge Goal: Independent    UB/LB Bathing: Minutes/Billable Intervention  Therapeutic Exercise    ADL Self-care 60   Neuro Re-Ed    Therapeutic Activity    Group    Other:    TOTAL 60       Social History  Social/Functional History  Lives With: Alone  Type of Home: Apartment  Home Layout: One level  Home Access: Ramped entrance, Elevator (has elevator access to apartment)  Bathroom Shower/Tub: Tub/Shower unit  Bathroom Toilet: Handicap height  Bathroom Equipment: Grab bars in shower  Bathroom Accessibility: Walker accessible  Home Equipment:  (Has 4WW in hospital room that is her sister's)  ADL Assistance: Independent  Homemaking Assistance: Independent  Homemaking Responsibilities: Yes  Ambulation Assistance: Independent (no AD)  Transfer Assistance: Independent  Active : No (Sister drives)  Mode of Transportation: Car  Occupation: On disability  Type of occupation: Construction  Additional Comments: Pt typically sleeps in a flat, regular bed at home. Pt reports at least 3 falls in the past year \"due to bad judgement\"; latest one caused R hip fx precipitating this hospitalization.     Objective                                                                                    Goals:  (Update in navigator)  Short term goals  Time Frame for Short term goals: STGs=LTGs:  Long term goals  Time Frame for Long term goals : ~1 week or until d/c  Long term goal 1: Pt will complete grooming tasks Ind  Long term goal 2: Pt will complete total body bathing mod I  Long term goal 3: Pt will complete UB dressing Ind  Long term goal 4: Pt will complete LB dressing mod I using AE PRN  Long term goal 5: Pt will doff/don footwear mod I using AE PRN  Long term goals 6: Pt will complete toileting mod I  Long term goal 7: Pt will complete functional transfers (bed, chair, shower, toilet) c DME PRN and mod I  Long term goal 8: Pt will perform therex/therax to facilitate increased strength/endurance/ax tolerance (c emphasis on dynamic standing balance/tolerance >12 mins,

## 2021-11-09 ENCOUNTER — HOSPITAL ENCOUNTER (INPATIENT)
Age: 66
LOS: 6 days | Discharge: HOME | DRG: 378 | End: 2021-11-15
Attending: FAMILY MEDICINE | Admitting: FAMILY MEDICINE
Payer: OTHER GOVERNMENT

## 2021-11-09 VITALS
TEMPERATURE: 98.1 F | RESPIRATION RATE: 16 BRPM | SYSTOLIC BLOOD PRESSURE: 111 MMHG | HEIGHT: 64 IN | DIASTOLIC BLOOD PRESSURE: 54 MMHG | HEART RATE: 104 BPM | BODY MASS INDEX: 24.65 KG/M2 | WEIGHT: 144.4 LBS | OXYGEN SATURATION: 93 %

## 2021-11-09 DIAGNOSIS — M19.011 LOCALIZED OSTEOARTHRITIS OF RIGHT SHOULDER: Primary | ICD-10-CM

## 2021-11-09 LAB
BACTERIA: NEGATIVE /HPF
BILIRUBIN URINE: NEGATIVE MG/DL
BLOOD, URINE: NEGATIVE
CLARITY: CLEAR
COLOR: YELLOW
GLUCOSE, URINE: NEGATIVE MG/DL
HCT VFR BLD CALC: 14.3 % (ref 42–52)
HEMOGLOBIN: 4.3 GM/DL (ref 13.5–18)
INR BLD: 1.21 INDEX
KETONES, URINE: NEGATIVE MG/DL
LEUKOCYTE ESTERASE, URINE: NEGATIVE
MCH RBC QN AUTO: 29.5 PG (ref 27–31)
MCHC RBC AUTO-ENTMCNC: 30.1 % (ref 32–36)
MCV RBC AUTO: 97.9 FL (ref 78–100)
NITRITE URINE, QUANTITATIVE: NEGATIVE
PDW BLD-RTO: 17.2 % (ref 11.7–14.9)
PH, URINE: 5 (ref 5–8)
PLATELET # BLD: 383 K/CU MM (ref 140–440)
PMV BLD AUTO: 8.7 FL (ref 7.5–11.1)
PROTEIN UA: NEGATIVE MG/DL
PROTHROMBIN TIME: 15.6 SECONDS (ref 11.7–14.5)
RBC # BLD: 1.46 M/CU MM (ref 4.6–6.2)
RBC URINE: NORMAL /HPF (ref 0–3)
SPECIFIC GRAVITY UA: 1.02 (ref 1–1.03)
SQUAMOUS EPITHELIAL: <1 /HPF
TRICHOMONAS: NORMAL /HPF
UROBILINOGEN, URINE: NEGATIVE MG/DL (ref 0.2–1)
WBC # BLD: 12 K/CU MM (ref 4–10.5)
WBC UA: <1 /HPF (ref 0–2)

## 2021-11-09 PROCEDURE — 99239 HOSP IP/OBS DSCHRG MGMT >30: CPT | Performed by: PHYSICAL MEDICINE & REHABILITATION

## 2021-11-09 PROCEDURE — 2140000000 HC CCU INTERMEDIATE R&B

## 2021-11-09 PROCEDURE — G0378 HOSPITAL OBSERVATION PER HR: HCPCS

## 2021-11-09 PROCEDURE — 36415 COLL VENOUS BLD VENIPUNCTURE: CPT

## 2021-11-09 PROCEDURE — C9113 INJ PANTOPRAZOLE SODIUM, VIA: HCPCS | Performed by: NURSE PRACTITIONER

## 2021-11-09 PROCEDURE — 6370000000 HC RX 637 (ALT 250 FOR IP): Performed by: INTERNAL MEDICINE

## 2021-11-09 PROCEDURE — 76937 US GUIDE VASCULAR ACCESS: CPT

## 2021-11-09 PROCEDURE — 86850 RBC ANTIBODY SCREEN: CPT

## 2021-11-09 PROCEDURE — 6370000000 HC RX 637 (ALT 250 FOR IP): Performed by: PHYSICAL MEDICINE & REHABILITATION

## 2021-11-09 PROCEDURE — 36430 TRANSFUSION BLD/BLD COMPNT: CPT

## 2021-11-09 PROCEDURE — 85014 HEMATOCRIT: CPT

## 2021-11-09 PROCEDURE — 99223 1ST HOSP IP/OBS HIGH 75: CPT | Performed by: INTERNAL MEDICINE

## 2021-11-09 PROCEDURE — P9016 RBC LEUKOCYTES REDUCED: HCPCS

## 2021-11-09 PROCEDURE — 85018 HEMOGLOBIN: CPT

## 2021-11-09 PROCEDURE — 81001 URINALYSIS AUTO W/SCOPE: CPT

## 2021-11-09 PROCEDURE — 86901 BLOOD TYPING SEROLOGIC RH(D): CPT

## 2021-11-09 PROCEDURE — 80069 RENAL FUNCTION PANEL: CPT

## 2021-11-09 PROCEDURE — 6370000000 HC RX 637 (ALT 250 FOR IP): Performed by: NURSE PRACTITIONER

## 2021-11-09 PROCEDURE — 85027 COMPLETE CBC AUTOMATED: CPT

## 2021-11-09 PROCEDURE — 6360000002 HC RX W HCPCS: Performed by: PHYSICAL MEDICINE & REHABILITATION

## 2021-11-09 PROCEDURE — 86900 BLOOD TYPING SEROLOGIC ABO: CPT

## 2021-11-09 PROCEDURE — 86922 COMPATIBILITY TEST ANTIGLOB: CPT

## 2021-11-09 PROCEDURE — 6360000002 HC RX W HCPCS: Performed by: NURSE PRACTITIONER

## 2021-11-09 PROCEDURE — 94761 N-INVAS EAR/PLS OXIMETRY MLT: CPT

## 2021-11-09 PROCEDURE — 85610 PROTHROMBIN TIME: CPT

## 2021-11-09 PROCEDURE — 2580000003 HC RX 258: Performed by: NURSE PRACTITIONER

## 2021-11-09 PROCEDURE — 93005 ELECTROCARDIOGRAM TRACING: CPT | Performed by: NURSE PRACTITIONER

## 2021-11-09 PROCEDURE — 97530 THERAPEUTIC ACTIVITIES: CPT

## 2021-11-09 RX ORDER — SODIUM CHLORIDE 0.9 % (FLUSH) 0.9 %
5-40 SYRINGE (ML) INJECTION EVERY 12 HOURS SCHEDULED
Status: DISCONTINUED | OUTPATIENT
Start: 2021-11-09 | End: 2021-11-15 | Stop reason: HOSPADM

## 2021-11-09 RX ORDER — MONTELUKAST SODIUM 10 MG/1
10 TABLET ORAL NIGHTLY
Status: CANCELLED | OUTPATIENT
Start: 2021-11-09

## 2021-11-09 RX ORDER — PANTOPRAZOLE SODIUM 40 MG/10ML
40 INJECTION, POWDER, LYOPHILIZED, FOR SOLUTION INTRAVENOUS EVERY 12 HOURS
Status: DISCONTINUED | OUTPATIENT
Start: 2021-11-09 | End: 2021-11-09 | Stop reason: HOSPADM

## 2021-11-09 RX ORDER — TRIAMCINOLONE ACETONIDE 1 MG/G
CREAM TOPICAL 2 TIMES DAILY
Status: DISCONTINUED | OUTPATIENT
Start: 2021-11-09 | End: 2021-11-15 | Stop reason: HOSPADM

## 2021-11-09 RX ORDER — POLYETHYLENE GLYCOL 3350 17 G/17G
17 POWDER, FOR SOLUTION ORAL DAILY PRN
Status: CANCELLED | OUTPATIENT
Start: 2021-11-09

## 2021-11-09 RX ORDER — SODIUM CHLORIDE 9 MG/ML
INJECTION, SOLUTION INTRAVENOUS CONTINUOUS
Status: DISCONTINUED | OUTPATIENT
Start: 2021-11-09 | End: 2021-11-09 | Stop reason: HOSPADM

## 2021-11-09 RX ORDER — POLYETHYLENE GLYCOL 3350 17 G/17G
17 POWDER, FOR SOLUTION ORAL DAILY PRN
Status: DISCONTINUED | OUTPATIENT
Start: 2021-11-09 | End: 2021-11-15 | Stop reason: HOSPADM

## 2021-11-09 RX ORDER — SODIUM CHLORIDE 9 MG/ML
INJECTION, SOLUTION INTRAVENOUS PRN
Status: DISCONTINUED | OUTPATIENT
Start: 2021-11-09 | End: 2021-11-09 | Stop reason: HOSPADM

## 2021-11-09 RX ORDER — SODIUM CHLORIDE 9 MG/ML
25 INJECTION, SOLUTION INTRAVENOUS PRN
Status: DISCONTINUED | OUTPATIENT
Start: 2021-11-09 | End: 2021-11-09 | Stop reason: HOSPADM

## 2021-11-09 RX ORDER — ACETAMINOPHEN 325 MG/1
650 TABLET ORAL EVERY 4 HOURS PRN
Status: DISCONTINUED | OUTPATIENT
Start: 2021-11-09 | End: 2021-11-09

## 2021-11-09 RX ORDER — TRIAMCINOLONE ACETONIDE 1 MG/G
CREAM TOPICAL 2 TIMES DAILY
Status: CANCELLED | OUTPATIENT
Start: 2021-11-09

## 2021-11-09 RX ORDER — LACTULOSE 10 G/15ML
20 SOLUTION ORAL 2 TIMES DAILY
Status: CANCELLED | OUTPATIENT
Start: 2021-11-09

## 2021-11-09 RX ORDER — ONDANSETRON 2 MG/ML
4 INJECTION INTRAMUSCULAR; INTRAVENOUS EVERY 6 HOURS PRN
Status: DISCONTINUED | OUTPATIENT
Start: 2021-11-09 | End: 2021-11-15 | Stop reason: HOSPADM

## 2021-11-09 RX ORDER — BISACODYL 10 MG
10 SUPPOSITORY, RECTAL RECTAL DAILY PRN
Status: DISCONTINUED | OUTPATIENT
Start: 2021-11-09 | End: 2021-11-15 | Stop reason: HOSPADM

## 2021-11-09 RX ORDER — ACETAMINOPHEN 650 MG/1
650 SUPPOSITORY RECTAL EVERY 6 HOURS PRN
Status: DISCONTINUED | OUTPATIENT
Start: 2021-11-09 | End: 2021-11-15 | Stop reason: HOSPADM

## 2021-11-09 RX ORDER — LACTULOSE 10 G/15ML
20 SOLUTION ORAL 2 TIMES DAILY
Status: DISCONTINUED | OUTPATIENT
Start: 2021-11-09 | End: 2021-11-15 | Stop reason: HOSPADM

## 2021-11-09 RX ORDER — SODIUM CHLORIDE 9 MG/ML
INJECTION, SOLUTION INTRAVENOUS CONTINUOUS
Status: DISCONTINUED | OUTPATIENT
Start: 2021-11-09 | End: 2021-11-11

## 2021-11-09 RX ORDER — SODIUM CHLORIDE 9 MG/ML
25 INJECTION, SOLUTION INTRAVENOUS PRN
Status: DISCONTINUED | OUTPATIENT
Start: 2021-11-09 | End: 2021-11-09

## 2021-11-09 RX ORDER — ONDANSETRON 4 MG/1
4 TABLET, ORALLY DISINTEGRATING ORAL EVERY 8 HOURS PRN
Status: DISCONTINUED | OUTPATIENT
Start: 2021-11-09 | End: 2021-11-15 | Stop reason: HOSPADM

## 2021-11-09 RX ORDER — SODIUM CHLORIDE 0.9 % (FLUSH) 0.9 %
5-40 SYRINGE (ML) INJECTION PRN
Status: DISCONTINUED | OUTPATIENT
Start: 2021-11-09 | End: 2021-11-09 | Stop reason: HOSPADM

## 2021-11-09 RX ORDER — CYCLOBENZAPRINE HCL 10 MG
5 TABLET ORAL 3 TIMES DAILY PRN
Status: CANCELLED | OUTPATIENT
Start: 2021-11-09

## 2021-11-09 RX ORDER — OXYCODONE HYDROCHLORIDE 5 MG/1
5 TABLET ORAL EVERY 4 HOURS PRN
Status: CANCELLED | OUTPATIENT
Start: 2021-11-09

## 2021-11-09 RX ORDER — ACETAMINOPHEN 325 MG/1
650 TABLET ORAL EVERY 4 HOURS PRN
Status: CANCELLED | OUTPATIENT
Start: 2021-11-09

## 2021-11-09 RX ORDER — ACETAMINOPHEN 650 MG/1
650 SUPPOSITORY RECTAL EVERY 6 HOURS PRN
Status: DISCONTINUED | OUTPATIENT
Start: 2021-11-09 | End: 2021-11-09 | Stop reason: HOSPADM

## 2021-11-09 RX ORDER — SODIUM CHLORIDE 9 MG/ML
25 INJECTION, SOLUTION INTRAVENOUS PRN
Status: CANCELLED | OUTPATIENT
Start: 2021-11-09

## 2021-11-09 RX ORDER — CYCLOBENZAPRINE HCL 10 MG
5 TABLET ORAL 3 TIMES DAILY PRN
Status: DISCONTINUED | OUTPATIENT
Start: 2021-11-09 | End: 2021-11-15 | Stop reason: HOSPADM

## 2021-11-09 RX ORDER — ALBUTEROL SULFATE 90 UG/1
2 AEROSOL, METERED RESPIRATORY (INHALATION) EVERY 6 HOURS PRN
Status: CANCELLED | OUTPATIENT
Start: 2021-11-09

## 2021-11-09 RX ORDER — BISACODYL 10 MG
10 SUPPOSITORY, RECTAL RECTAL DAILY PRN
Status: CANCELLED | OUTPATIENT
Start: 2021-11-09

## 2021-11-09 RX ORDER — ALBUTEROL SULFATE 90 UG/1
2 AEROSOL, METERED RESPIRATORY (INHALATION) EVERY 6 HOURS PRN
Status: DISCONTINUED | OUTPATIENT
Start: 2021-11-09 | End: 2021-11-15 | Stop reason: HOSPADM

## 2021-11-09 RX ORDER — CALCIUM CARBONATE 500(1250)
500 TABLET ORAL DAILY
Status: CANCELLED | OUTPATIENT
Start: 2021-11-09

## 2021-11-09 RX ORDER — PANTOPRAZOLE SODIUM 40 MG/10ML
40 INJECTION, POWDER, LYOPHILIZED, FOR SOLUTION INTRAVENOUS ONCE
Status: COMPLETED | OUTPATIENT
Start: 2021-11-09 | End: 2021-11-09

## 2021-11-09 RX ORDER — ATORVASTATIN CALCIUM 40 MG/1
40 TABLET, FILM COATED ORAL DAILY
Status: CANCELLED | OUTPATIENT
Start: 2021-11-09

## 2021-11-09 RX ORDER — ACETAMINOPHEN 325 MG/1
650 TABLET ORAL EVERY 6 HOURS PRN
Status: DISCONTINUED | OUTPATIENT
Start: 2021-11-09 | End: 2021-11-15 | Stop reason: HOSPADM

## 2021-11-09 RX ORDER — VENLAFAXINE HYDROCHLORIDE 37.5 MG/1
150 CAPSULE, EXTENDED RELEASE ORAL DAILY
Status: CANCELLED | OUTPATIENT
Start: 2021-11-09

## 2021-11-09 RX ORDER — MONTELUKAST SODIUM 10 MG/1
10 TABLET ORAL NIGHTLY
Status: DISCONTINUED | OUTPATIENT
Start: 2021-11-09 | End: 2021-11-15 | Stop reason: HOSPADM

## 2021-11-09 RX ORDER — PANTOPRAZOLE SODIUM 40 MG/10ML
40 INJECTION, POWDER, LYOPHILIZED, FOR SOLUTION INTRAVENOUS 2 TIMES DAILY
Status: DISCONTINUED | OUTPATIENT
Start: 2021-11-09 | End: 2021-11-15 | Stop reason: HOSPADM

## 2021-11-09 RX ORDER — SODIUM CHLORIDE 0.9 % (FLUSH) 0.9 %
5-40 SYRINGE (ML) INJECTION PRN
Status: DISCONTINUED | OUTPATIENT
Start: 2021-11-09 | End: 2021-11-15 | Stop reason: HOSPADM

## 2021-11-09 RX ORDER — ONDANSETRON 4 MG/1
4 TABLET, ORALLY DISINTEGRATING ORAL EVERY 8 HOURS PRN
Status: DISCONTINUED | OUTPATIENT
Start: 2021-11-09 | End: 2021-11-09

## 2021-11-09 RX ORDER — OXYCODONE HYDROCHLORIDE 5 MG/1
5 TABLET ORAL EVERY 4 HOURS PRN
Status: DISCONTINUED | OUTPATIENT
Start: 2021-11-09 | End: 2021-11-15 | Stop reason: HOSPADM

## 2021-11-09 RX ORDER — ONDANSETRON 4 MG/1
4 TABLET, ORALLY DISINTEGRATING ORAL EVERY 8 HOURS PRN
Status: CANCELLED | OUTPATIENT
Start: 2021-11-09

## 2021-11-09 RX ORDER — ATORVASTATIN CALCIUM 40 MG/1
40 TABLET, FILM COATED ORAL DAILY
Status: DISCONTINUED | OUTPATIENT
Start: 2021-11-09 | End: 2021-11-15 | Stop reason: HOSPADM

## 2021-11-09 RX ORDER — SODIUM CHLORIDE 9 MG/ML
25 INJECTION, SOLUTION INTRAVENOUS PRN
Status: DISCONTINUED | OUTPATIENT
Start: 2021-11-09 | End: 2021-11-15 | Stop reason: HOSPADM

## 2021-11-09 RX ORDER — SODIUM CHLORIDE 9 MG/ML
INJECTION, SOLUTION INTRAVENOUS CONTINUOUS
Status: DISCONTINUED | OUTPATIENT
Start: 2021-11-09 | End: 2021-11-09

## 2021-11-09 RX ORDER — CALCIUM CARBONATE 500(1250)
500 TABLET ORAL DAILY
Status: DISCONTINUED | OUTPATIENT
Start: 2021-11-09 | End: 2021-11-15 | Stop reason: HOSPADM

## 2021-11-09 RX ORDER — TRAZODONE HYDROCHLORIDE 50 MG/1
200 TABLET ORAL NIGHTLY
Status: DISCONTINUED | OUTPATIENT
Start: 2021-11-09 | End: 2021-11-15 | Stop reason: HOSPADM

## 2021-11-09 RX ORDER — ACETAMINOPHEN 325 MG/1
650 TABLET ORAL EVERY 6 HOURS PRN
Status: DISCONTINUED | OUTPATIENT
Start: 2021-11-09 | End: 2021-11-09 | Stop reason: HOSPADM

## 2021-11-09 RX ORDER — SODIUM CHLORIDE 0.9 % (FLUSH) 0.9 %
5-40 SYRINGE (ML) INJECTION EVERY 12 HOURS SCHEDULED
Status: DISCONTINUED | OUTPATIENT
Start: 2021-11-09 | End: 2021-11-09 | Stop reason: HOSPADM

## 2021-11-09 RX ORDER — SODIUM CHLORIDE 9 MG/ML
10 INJECTION INTRAVENOUS EVERY 12 HOURS
Status: DISCONTINUED | OUTPATIENT
Start: 2021-11-09 | End: 2021-11-09 | Stop reason: HOSPADM

## 2021-11-09 RX ORDER — VENLAFAXINE HYDROCHLORIDE 150 MG/1
150 CAPSULE, EXTENDED RELEASE ORAL DAILY
Status: DISCONTINUED | OUTPATIENT
Start: 2021-11-09 | End: 2021-11-15 | Stop reason: HOSPADM

## 2021-11-09 RX ORDER — CYCLOBENZAPRINE HCL 10 MG
5 TABLET ORAL 3 TIMES DAILY PRN
Status: DISCONTINUED | OUTPATIENT
Start: 2021-11-09 | End: 2021-11-09 | Stop reason: HOSPADM

## 2021-11-09 RX ORDER — TRAZODONE HYDROCHLORIDE 50 MG/1
200 TABLET ORAL NIGHTLY
Status: CANCELLED | OUTPATIENT
Start: 2021-11-09

## 2021-11-09 RX ADMIN — VENLAFAXINE HYDROCHLORIDE 150 MG: 37.5 CAPSULE, EXTENDED RELEASE ORAL at 08:52

## 2021-11-09 RX ADMIN — SODIUM CHLORIDE: 9 INJECTION, SOLUTION INTRAVENOUS at 15:08

## 2021-11-09 RX ADMIN — ATORVASTATIN CALCIUM 40 MG: 40 TABLET, FILM COATED ORAL at 18:12

## 2021-11-09 RX ADMIN — TRIAMCINOLONE ACETONIDE: 1 CREAM TOPICAL at 08:51

## 2021-11-09 RX ADMIN — ACETAMINOPHEN 650 MG: 325 TABLET ORAL at 23:20

## 2021-11-09 RX ADMIN — VENLAFAXINE HYDROCHLORIDE 150 MG: 150 CAPSULE, EXTENDED RELEASE ORAL at 18:11

## 2021-11-09 RX ADMIN — ASPIRIN 325 MG: 325 TABLET, COATED ORAL at 08:52

## 2021-11-09 RX ADMIN — CALCIUM 500 MG: 500 TABLET ORAL at 18:12

## 2021-11-09 RX ADMIN — PANTOPRAZOLE SODIUM 40 MG: 40 INJECTION, POWDER, FOR SOLUTION INTRAVENOUS at 20:50

## 2021-11-09 RX ADMIN — SODIUM CHLORIDE, PRESERVATIVE FREE 10 ML: 5 INJECTION INTRAVENOUS at 20:52

## 2021-11-09 RX ADMIN — PANTOPRAZOLE SODIUM 40 MG: 40 INJECTION, POWDER, FOR SOLUTION INTRAVENOUS at 16:35

## 2021-11-09 RX ADMIN — TRAZODONE HYDROCHLORIDE 200 MG: 50 TABLET ORAL at 20:49

## 2021-11-09 RX ADMIN — ENOXAPARIN SODIUM 70 MG: 100 INJECTION SUBCUTANEOUS at 08:52

## 2021-11-09 RX ADMIN — MONTELUKAST 10 MG: 10 TABLET, FILM COATED ORAL at 20:49

## 2021-11-09 RX ADMIN — ATORVASTATIN CALCIUM 40 MG: 40 TABLET, FILM COATED ORAL at 08:53

## 2021-11-09 RX ADMIN — BISACODYL 10 MG: 5 TABLET, COATED ORAL at 08:52

## 2021-11-09 RX ADMIN — CALCIUM 500 MG: 500 TABLET ORAL at 08:52

## 2021-11-09 ASSESSMENT — PAIN SCALES - GENERAL
PAINLEVEL_OUTOF10: 0
PAINLEVEL_OUTOF10: 6
PAINLEVEL_OUTOF10: 0

## 2021-11-09 NOTE — H&P
History and Physical      Name:  Chantal Delarosa /Age/Sex: 1955  (73 y.o. male)   MRN & CSN:  [id number] & [id number] Admission Date/Time: 2021  2:12 PM   Location:  [facility]8236-S PCP: Leah Rodriguez, [address] Suh Day: 1           Assessment and Plan:   # Acute on chronic anemia with blood loss - Hgb 7-8 range in 2021. Prior 13-14 range. Suspect component of blood loss from orthopedic surgery 10/25/21, however patient had 3.6gm drop since 10/31/21. Pt denies bloody stools, reporting dark stools yesterday and today. Colonoscopy 2020 showed diverticular disease and hemorrhoids. Denies abd pain, no n/v or hematemesis. Pt on aspirin and lovenox/coumadin, INR subtherapeutic 1.2. Check FOBT, Will place on PPI BID, stop anti-platelets and lovenox/coumadin for now. No NSAIDS. Clear liquid diet. Transfuse 2 units PRBC's, monitor hemograms q6hrs. Consult GI. Monitor hemodynamics. # Symptomatic anemia - POC as above  # s/p RTHA 10/25/21 - PT/OT wt being status, pain management as per previous rec's  # Leukocytosis - recent ortho surgery likely contrib factor. Pt afebrile. Will check UA, encourage IS, monitor for now  # Mechanical Aortic valve -we will hold anticoagulation in setting of acute anemia until GI bleed r/o. Consult Cardiology to follow/rec's. # COPD - not in exacerbation, continue home inhalers  # Mixed hyperlipidemia resume statin  # Bipolar disorder- continue current psychiatric medications  # Hyperglycemia - Possiby stress induced Will check A1c,      Diet Diet NPO Exceptions are: Ice Chips   DVT Prophylaxis [] Lovenox, []  Heparin, [x] SCDs, [] Ambulation  [] Long term AC   GI Prophylaxis [x] PPI,  [] H2 Blocker,  [] Carafate,  [] Diet/Tube Feeds   Code Status Full CODE   Disposition Admit to med surg. Patient plans to return home upon discharge         Chief Complaint: No chief complaint on file.   Acute anemia    History obtained from patient and EHR  History of Present Illness:   Sade Bashir is a 73 y.o. male  with history of Mechanical AV on AC, COPD, hyperlipidemia, bipolar disorder who underwent went a right total hip arthroplasty 10/25/2021 for the orthopedic service. Patient has been transferred to inpatient for rehab. Patient reports he had been doing well for the past couple days. He reports mild constipation and bearing down with bowel movements. He states over the last 24 hours he had presyncopal symptoms with bowel movements and noted dark stools. He denies bloody stools. Denies abdominal pain nausea vomiting or hematemesis. He reports feeling fatigued over the past 24 hours with activity. Symptoms improved with rest. He denies shortness of breath or chest pain. He denies f/c or cough. His CBC was checked today and Hgb noted to be 4.3 from 7.9 10/31/2021 therefore the Hospitalist team was contacted for admission. Chemistry panel reviewed is showing stable renal function with BUN 9 creat 0.5. The patient has been managed on therapeutic Lovenox sq Coumadin and aspirin 325 mg daily. His INR today is 1.2. The patient's last colonoscopy was July 2020 showing diverticulosis and hemorrhoids. He reports a colonoscopy with polypectomy performed in 2006. The patient has stable BP with tachycardia noted. The patient has been typed and screened. 2 unit PRBC's ordered. 14 point ROS: reviewed and are negative, unless as noted in above HPI. Objective:     No intake or output data in the 24 hours ending 11/09/21 1543     Vitals:   Vitals:    11/09/21 1430   BP: (!) 141/68   Pulse: 109   Resp: 19   Temp: 98.5 °F (36.9 °C)   TempSrc: Oral   SpO2: 96%   Weight: 138 lb 3.7 oz (62.7 kg)   Height: 5' 4\" (1.626 m)       Physical Exam: 11/09/21     GEN -Awake  And alert appearing male, in NAD. Appears given age. EYES -anicteric, conjunctiva pink. HENT -Head is normocephalic, atraumatic. MM are moist. Oral pharynx without exudates, no evidence of thrush.   NECK -Supple, no apparent thyromegaly or masses. RESP -CTA, no wheezes, rales or rhonchi. Symmetric chest movement   C/V -S1/S2 auscultated. RRR without appreciable M/R/G. No JVD. Cap refill <3 sec. No peripheral edema. GI -Abdomen is soft non distended, non tender to palpation. + BS. No masses or guarding.  -No CVA/ flank tenderness. LYMPH- No petechiae or ecchymoses. MS -No gross joint deformities. SKIN -pale coloration, warm, dry. Right hip incision intact, no erythema noted  NEURO-Cranial nerves appear grossly intact, normal speech, no lateralizing weakness. PSYC-Awake, alert, oriented x3 . Appropriate affect. Past Medical History:      Past Medical History:   Diagnosis Date    Acid reflux     Acute frontal sinusitis 7/22/2009    Alcohol abuse     \"I Drink Average 2 Beers A Day\"    Anesthesia     \" I get agitated\"    Anxiety     Arthritis     \"Right Shoulder, Neck, Left Knee\"    Atypical mycobacterial infection     Broken teeth     Upper And Lower     [facility]congestive heart failure) [facility]     COPD (chronic obstructive pulmonary disease) ([facility])     Sees Dr. Louann Smith In [place], [address] [place] Cough     Frequent Cough With Green Sputum    Depression     Depression     Dyspnea 2/23/2018    H/O cardiovascular MUGA stress test 05/14/2019    EF 50%, NORMAL LVEF, NORMAL WALL MOTION.    H/O echocardiogram 05/22/2014    UD=>83-36%, LV systolic function is low normal, mild aortic valve calcification, no pericardial effusion    H/O echocardiogram 12/12/2018    EF 45-50%    History of 24 hour EKG monitoring 6/6/14    24 hr holter monitor. Ventricular ectopics were noted in single and couplet forms. Supraventricular ectopics were noted in single and pair forms.     [place] (hard of hearing)     Bilateral Ears    Hyperlipidemia     Hypertension     Irritant contact dermatitis due to other chemical products 8/26/2019    Other drug allergy(995.27) 7/22/2009    S/P AVR 12/2003    Mechanical valvue     Shortness of breath     Teeth missing     Upper And Lower       Past Surgical  History:    has a past surgical history that includes Lung biopsy (Right, 1996); Rotator cuff repair (Right, 2008); Dental surgery; Cardiac valve replacement (12/17/2003); Colonoscopy (2006); Endoscopy, colon, diagnostic (In Past); Tonsillectomy (1959 Or 1960); bronchoscopy (1996); Knee arthroscopy (Left, 1992); other surgical history (1992); HEMIARTHROPLASTY SHOULDER FRACTURE (Right, 1/29/2019); and Total hip arthroplasty (Right, 10/25/2021). Family  History:   family history includes Asthma in his sister; COPD in his father and sister; Cancer in his mother; Diabetes in his mother and sister; Heart Disease in his father; High Blood Pressure in his mother and sister; High Cholesterol in his mother and sister; No Known Problems in his son; Obesity in his mother; Other in his sister; Vision Loss in his mother. Social History:     Social History     Socioeconomic History    Marital status:      Spouse name: Not on file    Number of children: 1    Years of education: 15    Highest education level: Not on file   Occupational History    Not on file   Tobacco Use    Smoking status: Never Smoker    Smokeless tobacco: Current User     Types: Snuff, Chew   Vaping Use    Vaping Use: Never used   Substance and Sexual Activity    Alcohol use:  Yes     Alcohol/week: 4.0 - 5.0 standard drinks     Types: 4 - 5 Cans of beer per week     Comment: last drink stated 2 weeks ago    Drug use: No    Sexual activity: Not Currently   Other Topics Concern    Not on file   Social History Narrative    Not on file     Social Determinants of Health     Financial Resource Strain:     Difficulty of Paying Living Expenses: Not on file   Food Insecurity:     Worried About Running Out of Food in the Last Year: Not on file    Suzi of Food in the Last Year: Not on file   Transportation Needs:     Lack of Transportation (Medical): Not on file    Lack of Transportation (Non-Medical): Not on file   Physical Activity:     Days of Exercise per Week: Not on file    Minutes of Exercise per Session: Not on file   Stress:     Feeling of Stress : Not on file   Social Connections:     Frequency of Communication with Friends and Family: Not on file    Frequency of Social Gatherings with Friends and Family: Not on file    Attends Holiness Services: Not on file    Active Member of [address] [place] [address] [employer] or Organizations: Not on file    Attends Club or Organization Meetings: Not on file    Marital Status: Not on file   Intimate Partner Violence:     Fear of Current or Ex-Partner: Not on file    Emotionally Abused: Not on file    Physically Abused: Not on file    Sexually Abused: Not on file   Housing Stability:     Unable to Pay for Housing in the Last Year: Not on file    Number of Rony in the Last Year: Not on file    Unstable Housing in the Last Year: Not on file      reports that he has never smoked. His smokeless tobacco use includes snuff and chew. reports current alcohol use of about 4.0 - 5.0 standard drinks of alcohol per week. reports no history of drug use. Allergies: Allergies   Allergen Reactions    Ciprofloxacin Hcl Hives and Swelling    Levaquin [Levofloxacin] Hives and Swelling    Other Nausea And Vomiting     \"Allergic To Tylox\"    Ceftin [Cefuroxime]        Home Medications:     Prior to Admission medications    Medication Sig Start Date End Date Taking?  Authorizing Provider   traZODone (DESYREL) 100 MG tablet Take 2 tablets by mouth nightly 9/14/21 10/14/21  Latisha Whitley MD   venlafaxine (EFFEXOR XR) 150 MG extended release capsule Take 1 capsule by mouth daily for 7 days 9/5/21 9/12/21  Lizzeth Coleman PA-C   warfarin (COUMADIN) 5 MG tablet take 1-2 tablets by mouth daily as directed 7/21/21   MICHAEL Herring   simvastatin (ZOCOR) 40 MG tablet take 1 tablet by mouth at bedtime 6/11/21 MICHAEL Mukherjee   triamcinolone (KENALOG) 0.1 % cream Apply topically 2 times daily. 11/9/20   Carissa Sainz PA-C   ammonium lactate (LAC-HYDRIN) 12 % lotion Apply topically daily.  11/9/20   Carissa Sainz PA-C   ipratropium-albuterol (DUONEB) 0.5-2.5 (3) MG/3ML SOLN nebulizer solution Inhale 3 mLs into the lungs 4 times daily 8/27/20   Suzette Nash MD   cyclobenzaprine (FLEXERIL) 10 MG tablet Take 10 mg by mouth 3 times daily as needed for Muscle spasms    Historical Provider, MD   albuterol (ACCUNEB) 0.63 MG/3ML nebulizer solution Take 1 ampule by nebulization every 6 hours as needed for Wheezing    Historical Provider, MD   albuterol sulfate HFA (PROAIR HFA) 108 (90 Base) MCG/ACT inhaler Inhale 2 puffs into the lungs every 4 hours as needed for Wheezing or Shortness of Breath 8/4/20   Suzette Nash MD   montelukast (SINGULAIR) 10 MG tablet Take 1 tablet by mouth nightly \"Alternate With Zyrtec\" 8/4/20   Suzette Nash MD   calcium carbonate (OSCAL) 500 MG TABS tablet Take 500 mg by mouth daily    Historical Provider, MD   KRILL OIL OMEGA-3 PO Take by mouth    Historical Provider, MD   sodium chloride, Inhalant, 3 % nebulizer solution  10/31/18   Historical Provider, MD   cetirizine (ZYRTEC) 10 MG tablet Take 10 mg by mouth \"Alternate With Singulair\"    Historical Provider, MD   NASAL SPRAY SALINE NA by Nasal route daily Over The Counter    Historical Provider, MD   budesonide (RHINOCORT ALLERGY) 32 MCG/ACT nasal spray 2 sprays by Nasal route daily    Historical Provider, MD   Acetaminophen (TYLENOL 8 HOUR PO) Take by mouth as needed Over The Counter    Historical Provider, MD   Multiple Vitamins-Minerals (CENTRUM PO) Take by mouth daily    Historical Provider, MD   Nebulizer MISC Inhale into the lungs as needed    Historical Provider, MD         Medications:   Medications:    atorvastatin  40 mg Oral Daily    calcium elemental  500 mg Oral Daily    lactulose  20 g Oral BID    montelukast  10 mg Oral Nightly    traZODone  200 mg Oral Nightly    triamcinolone   Topical BID    venlafaxine  150 mg Oral Daily    pantoprazole  40 mg IntraVENous BID    sodium chloride flush  5-40 mL IntraVENous 2 times per day    pantoprazole  40 mg IntraVENous Once      Infusions:    sodium chloride      sodium chloride       PRN Meds: sodium chloride, 25 mL, PRN  oxyCODONE, 5 mg, Q4H PRN  polyethylene glycol, 17 g, Daily PRN  albuterol sulfate HFA, 2 puff, Q6H PRN  bisacodyl, 10 mg, Daily PRN  cyclobenzaprine, 5 mg, TID PRN  magnesium hydroxide, 30 mL, Daily PRN  sodium chloride flush, 5-40 mL, PRN  ondansetron, 4 mg, Q8H PRN   Or  ondansetron, 4 mg, Q6H PRN  acetaminophen, 650 mg, Q6H PRN   Or  acetaminophen, 650 mg, Q6H PRN        Data:     Laboratory this visit:  Reviewed  Recent Labs     11/09/21  0943   WBC 12.0*   HGB 4.3*   HCT 14.3*         No results for input(s): NA, K, CL, CO2, PHOS, BUN, CREATININE in the last 72 hours. Invalid input(s): CA  No results for input(s): AST, ALT, ALB, BILIDIR, BILITOT, ALKPHOS in the last 72 hours. Recent Labs     11/07/21  0544 11/08/21  0548 11/09/21  0303   INR 1.18 1.13 1.21         Radiology this visit:  Reviewed.     Echocardiogram complete 2D with doppler with color    Result Date: 11/9/2021  Transthoracic Echocardiography Report (TTE)  Demographics   Patient Name       Jane Bourgeois     Date of Study       10/25/2021   Date of Birth      1955         Gender              Male   Age                60 year(s)         Race                   Patient Number     [id number]         Room Number         5031   Visit Number       [id number]   Corporate ID       [id number]   Accession Number   [id number]         Peri Parra RVT, RDMS   Ordering Physician Marc Fernández MD Physician           Jeannine SMITH  Procedure Type of Study   TTE procedure:ECHOCARDIOGRAM COMPLETE 2D W DOPPLER W COLOR. Procedure Date Date: 10/25/2021 Start: 11:03 AM Study Location: Portable Technical Quality: Technically difficult study Indications: Aortic stenosis. Patient Status: Routine Height: 64.5 inches Weight: 160 pounds BSA: 1.79 m2 BMI: 27.04 kg/m2 HR: 96 bpm BP: 113/60 mmHg  Conclusions   Summary  Technically difficult examination due to patient immobility s/p hip  arthroplasty. Left ventricular systolic function is low normal.  Ejection fraction is visually estimated at 50%. Moderate left ventricular hypertrophy. prosthetic valve in aortic position; mean PG: 15 mmHg. LACEY: 1.70 cm sq. DVI:  0.4. AT: 25 ms. Trace aortic regurgitation noted. Signature   ------------------------------------------------------------------  Electronically signed by Joe Ashton MD  (Interpreting physician) on 11/09/2021 at 11:05 AM  ------------------------------------------------------------------   Findings   Left Ventricle  Left ventricular systolic function is low normal.  Ejection fraction is visually estimated at 50%. Moderate left ventricular hypertrophy. Left ventricle size is normal.  No regional wall motion abnormalites. Indeterminate diastolic function; E/A flow reversal is noted. Left Atrium  Mildly dilated left atrium. Right Atrium  Essentially normal right atrium. Right Ventricle  Essentially normal right ventricle. Aortic Valve  prosthetic valve in aortic position; mean PG: 15 mmHg. LACEY: 1.70 cm sq. DVI:  0.4. AT: 25 ms. Trace aortic regurgitation noted. Mitral Valve  Mild mitral regurgitation. Tricuspid Valve  Tricuspid valve was not well visualized. Pulmonic Valve  The pulmonic valve was not well visualized. Pericardial Effusion  No evidence of any pericardial effusion. Pleural Effusion  No evidence of pleural effusion.    Miscellaneous  The aorta is within normal limits. M-Mode/2D Measurements & Calculations   LV Diastolic Dimension:  LV Systolic Dimension:  LA Dimension: 3.2 cmAO Root  4.16 cm                  3.22 cm                 Dimension: 3.1 cmLA Area:  LV FS:22.6 %             LV Volume Diastolic: 95 95.7 cm2  LV PW Diastolic: 4.93 cm ml  LV PW Systolic: 9.22 cm  LV Volume Systolic: 50  Septum Diastolic: 7.73   ml  cm                       LV EDV/LV EDV Index: 95 RV Diastolic Dimension:  Septum Systolic: 3.36 cm YS/09 Z3NC ESV/LV ESV   2.64 cm  CO: 3.71 l/min           Index: 50 ml/28 m2  CI: 2.07 l/m*m2          EF Calculated (A4C):    LA/Aorta: 1.03                           47.4 %  LV Area Diastolic: 23.7  EF Calculated (2D):     LA volume/Index: 59 ml  cm2                      45.8 %                  /30T4  LV Area Systolic: 80.6  cm2                      LV Length: 8.3 cm                            LVOT: 1.4 cm  Doppler Measurements & Calculations   MV Peak E-Wave: 71.6    AV Peak Velocity: 251 cm/s   LVOT Peak Velocity: 103  cm/s                    AV Peak Gradient: 25.2 mmHg  cm/s  MV Peak A-Wave: 88.8    AV Mean Velocity: 187 cm/s   LVOT Mean Velocity:  cm/s                    AV Mean Gradient: 15 mmHg    86.1 cm/s  MV E/A Ratio: 0.81      AV VTI: 44.1 cm              LVOT Peak Gradient: 5  MV Peak Gradient: 2.05  AV Area (Continuity):0.88    mmHgLVOT Mean Gradient:  mmHg                    cm2                          3 mmHg   MV P1/2t: 45 msec       LVOT VTI: 25.1 cm  MVA by PHT:4.89 cm2   MV E' Septal Velocity:  9.54 cm/s  MV E' Lateral Velocity:  13.6 cm/s  MV E/E' septal: 7.51  MV E/E' lateral: 5.26      XR LUMBAR SPINE (2-3 VIEWS)    Result Date: 10/22/2021  EXAMINATION: THREE XRAY VIEWS OF THE LUMBAR SPINE 10/22/2021 2:12 pm COMPARISON: None.  HISTORY: ORDERING SYSTEM PROVIDED HISTORY: fall, trauma TECHNOLOGIST PROVIDED HISTORY: Reason for exam:->fall, trauma Reason for Exam: fall trauma, xtable due to hip fracture Acuity: Acute Type of Exam: Initial FINDINGS: Five non-rib-bearing lumbar type vertebral bodies are identified. Multilevel degenerative changes of spine, including mild-to-moderate disc height loss at L3-L4 and L4-L5. There is an age-indeterminate compression deformity of the L4 vertebral body. Mild anterior wedging of the T12 and L1 vertebral bodies could also represent compression deformities, although wedging at these levels could represent normal variants. Age-indeterminate anterior compression deformity of the L4 vertebral body. Mild anterior wedging of the T12 and L1 vertebral bodies could represent additional age-indeterminate compression deformities, although wedging at these levels could represent normal variants. Correlate with clinical symptoms. Multilevel degenerative changes of the spine. CT HEAD WO CONTRAST    Result Date: 10/25/2021  EXAMINATION: CT OF THE HEAD WITHOUT CONTRAST  10/25/2021 11:53 am TECHNIQUE: CT of the head was performed without the administration of intravenous contrast. Dose modulation, iterative reconstruction, and/or weight based adjustment of the mA/kV was utilized to reduce the radiation dose to as low as reasonably achievable. COMPARISON: None. HISTORY: ORDERING SYSTEM PROVIDED HISTORY: confusion with dysarthria TECHNOLOGIST PROVIDED HISTORY: Reason for exam:->confusion with dysarthria Has a \"code stroke\" or \"stroke alert\" been called? ->No FINDINGS: BRAIN/VENTRICLES: There is no acute infarct or acute intracranial hemorrhage present. There is no mass effect or midline shift present. There is no ventriculomegaly or abnormal extra-axial fluid collection present. ORBITS: Limited evaluation of the orbits is unremarkable. SINUSES: There is mild mucosal thickening throughout the paranasal sinuses. The mastoid air cells are clear. SOFT TISSUES/SKULL:  No lytic or blastic osseous lesions are identified. No acute intracranial process identified.      XR CHEST PORTABLE    Result Date: 10/25/2021  EXAMINATION: ONE XRAY VIEW OF THE CHEST 10/25/2021 1:06 pm COMPARISON: Chest x-ray October 22, 2021; CT chest August 18, 2020 HISTORY: ORDERING SYSTEM PROVIDED HISTORY: congestion TECHNOLOGIST PROVIDED HISTORY: Reason for exam:->congestion Reason for Exam: chest congestion FINDINGS: Cardiac silhouette is stable. No focal consolidation, pleural effusion, or pneumothorax identified. Chronic interstitial changes of the lungs. Atherosclerotic changes of the aorta. Remote bilateral rib deformities. Postoperative changes of right shoulder arthroplasty appears grossly stable. 1. No acute cardiopulmonary process identified. XR CHEST PORTABLE    Result Date: 10/22/2021  EXAMINATION: ONE XRAY VIEW OF THE CHEST 10/22/2021 3:27 pm COMPARISON: 05/05/2018 chest radiograph HISTORY: ORDERING SYSTEM PROVIDED HISTORY: hip fracture TECHNOLOGIST PROVIDED HISTORY: Reason for exam:->hip fracture Reason for Exam: hip fracture Acuity: Acute Type of Exam: Initial FINDINGS: Prior median sternotomy and aortic valve replacement. The cardiomediastinal silhouette is normal in size and contour. Atherosclerotic calcifications of the aortic arch. No focal airspace disease. No pleural effusion or pneumothorax. Bilateral remote rib fractures. Right shoulder hemiarthroplasty. No evidence of acute cardiopulmonary disease. XR HIP 2-3 VW W PELVIS RIGHT    Result Date: 10/25/2021  EXAMINATION: ONE XRAY VIEW OF THE PELVIS AND TWO XRAY VIEWS RIGHT HIP 10/25/2021 9:46 am COMPARISON: 10/22/2021. HISTORY: ORDERING SYSTEM PROVIDED HISTORY: S/P R hip thalia TECHNOLOGIST PROVIDED HISTORY: Of operative side while in recovery room. Reason for exam:->S/P R hip thalia Reason for Exam: S/P R hip thalia FINDINGS: There is a right total hip arthroplasty with noncemented components. No acute fracture or dislocation. No acute hardware failure or loosening. Grossly normal alignment.      Total hip arthropasty without acute hardware complication. XR HIP 2-3 VW W PELVIS RIGHT    Result Date: 10/22/2021  EXAMINATION: ONE XRAY VIEW OF THE PELVIS AND TWO XRAY VIEWS RIGHT HIP 10/22/2021 2:11 pm COMPARISON: None. HISTORY: ORDERING SYSTEM PROVIDED HISTORY: fall, pain, trauma TECHNOLOGIST PROVIDED HISTORY: Reason for exam:->fall, pain, trauma Reason for Exam: right hip pain Acuity: Acute Type of Exam: Initial Mechanism of Injury: fall Relevant Medical/Surgical History: na FINDINGS: Three views of the pelvis and right hip were reviewed. There is an acute transcervical femoral neck fracture of the right femur with displacement at the fracture site. No additional fractures are identified. Multilevel degenerative changes of the imaged lower lumbar spine. Atherosclerotic vascular calcifications noted. Surgical clips project over the region of the left hip. 1. Acute and displaced transcervical right femoral neck fracture.            EKG this visit:  Pending    H&P was completed at 11:50am while patient was in ARU  This H&P has been carried over to reflect new MRN as patient is now in [facility]     Electronically signed by VANGIE Starr CNP on 11/9/2021 at 3:43 PM

## 2021-11-09 NOTE — CONSULTS
IV Consult complete. Nexiva 20g 1.75\" Extra long PIV inserted in right FA x1 attempt with ultrasound guidance. Brisk blood return, flushes easy. Patient tolerated well.

## 2021-11-09 NOTE — PROGRESS NOTES
[facility]    : 1955  Acct #: [de-identified]  MRN: [id number]              PM&R Progress Note      Admitting diagnosis: Right femoral neck fracture ( [place] [address])     Comorbid diagnoses impacting rehabilitation: Uncontrolled pain, generalized weakness, gait disturbance, acute blood loss anemia, COPD (simple bronchitis), essential hypertension, hyponatremia, bipolar disorder with depression, L4 compression fracture     Chief complaint: Some dry itching skin over the extensor surfaces of his hands. Right hip pain with any active movement. Chronic low back pain. Poor sleep    Prior (baseline) level of function: Independent.     Current level of function:         Current  [facility] and Goals:   Occupational Therapy:    Short term goals  Time Frame for Short term goals: STGs=LTGs :   Long term goals  Time Frame for Long term goals : ~1 week or until d/c  Long term goal 1: Pt will complete grooming tasks Ind  Long term goal 2: Pt will complete total body bathing mod I  Long term goal 3: Pt will complete UB dressing Ind  Long term goal 4: Pt will complete LB dressing mod I using AE PRN  Long term goal 5: Pt will doff/don footwear mod I using AE PRN  Long term goals 6: Pt will complete toileting mod I  Long term goal 7: Pt will complete functional transfers (bed, chair, shower, toilet) c DME PRN and mod I  Long term goal 8: Pt will perform therex/therax to facilitate increased strength/endurance/ax tolerance (c emphasis on dynamic standing balance/tolerance >12 mins, BUE endurance) c SBA  Long term goal 9: Pt will complete simple homemaking tasks c DME PRN and mod I :                                       Eating: Eating  Assistance Needed: Independent  Comment: per Pt  CARE Score: 6  Discharge Goal: Independent       Oral Hygiene: Oral Hygiene  Assistance Needed: Independent  Comment: sink side seated  CARE Score: 6  Discharge Goal: Independent    UB/LB Bathing: Shower/Bathe Self  Assistance Needed: Supervision or touching assistance  Comment: Sup when standing, completed majority seated on bench in shower  CARE Score: 4  Discharge Goal: Independent    UB Dressing: Upper Body Dressing  Assistance Needed: Setup or clean-up assistance  Comment: Pt request A to gather clothing from closet  CARE Score: 5  Discharge Goal: Independent         LB Dressing: Lower Body Dressing  Assistance Needed: Setup or clean-up assistance  Comment: Pt request A to gather clothing from closet  CARE Score: 5  Discharge Goal: Independent    Donning and [place] Footwear: Putting On/Taking Off Footwear  Assistance Needed: Partial/moderate assistance  Comment: able to doff B socks and don L; required assist with R and educated on improved technique to comply with precautions  CARE Score: 3  Discharge Goal: Independent      Toileting: Toileting Hygiene  Assistance Needed: Independent  Comment: x  CARE Score: 6  Discharge Goal: Independent      Toilet Transfers: Toilet Transfer  Assistance Needed: Supervision or touching assistance  Comment: SBA using RW and grab bars  CARE Score: 4  Discharge Goal: Independent    Physical Therapy:   Short term goals  Time Frame for Short term goals: 7 tx days:  Short term goal 1: Pt will complete rolling L/R and sup<->sit using leg  to assist RLE for hip abduction movement and following anterolateral hip precautions on R Mod Ind-Ind. Short term goal 2: Pt will complete OOB transfers using RW and following anterolateral hip precautions on R Mod Ind. Short term goal 3: Pt will ambulate >150 ft over level surface and at least 10 ft of uneven surface using RW following appropriate step-to pattern Mod Ind. Short term goal 4: Pt will ascend/descend curb step using RW and 1 flight of stairs using railings following appropriate step-to pattern on ascent/descent Mod Ind. Short term goal 5: Pt will complete object retrieval from the floor with 1UE support on RW and using reacher prn Mod Ind.             Bed Mobility: Sit to Lying  Assistance Needed: Independent  Comment: no use of bed features  CARE Score: 6  Discharge Goal: Independent  Roll Left and Right  Assistance Needed: Independent  Comment: without rails  CARE Score: 6  Discharge Goal: Independent  Lying to Sitting on Side of Bed  Assistance Needed: Independent  Comment: no use of bed features  CARE Score: 6  Discharge Goal: Independent    Transfers:    Sit to Stand  Assistance Needed: Independent  Comment: x  CARE Score: 6  Discharge Goal: Independent  Chair/Bed-to-Chair Transfer  Assistance Needed: Supervision or touching assistance  Comment: cues for turn for rotation precautions  CARE Score: 4  Discharge Goal: Independent  Toilet Transfer  Assistance Needed: Supervision or touching assistance  Comment: SBA using RW and grab bars  CARE Score: 4  Car Transfer  Assistance Needed: Independent  Comment: uses UEs to self assist RLE into/out of  car  CARE Score: 6  Discharge Goal: Independent    Ambulation:    Walking Ability  Does the Patient Walk?: Yes     Walk 10 Feet  Assistance Needed: Independent  Comment: 2ww  CARE Score: 6  Discharge Goal: Independent     Walk 50 Feet with Two Turns  Assistance Needed: Supervision or touching assistance  Comment: supervision with 2ww with cues for turns  CARE Score: 4  Discharge Goal: Independent     Walk 150 Feet  Assistance Needed: Supervision or touching assistance  Comment: supervision with 2ww, needs standing rest every 50' due to fatigue,   CARE Score: 4  Discharge Goal: Independent     Walking 10 Feet on Uneven Surfaces  Assistance Needed: Independent  Comment: 2ww  CARE Score: 6  Discharge Goal: Independent     1 Step (Curb)  Assistance Needed: Supervision or touching assistance  Comment: supervision with 2ww, cues for sequence  CARE Score: 4  Discharge Goal: Independent     4 Steps  Assistance Needed: Supervision or touching assistance  Comment: supervision with 25% cues for sequence, B rails  CARE Score: 4  Discharge Goal: Independent     12 Steps  Assistance Needed: Supervision or touching assistance  Comment: Pt needed max standing rests to perform all 12 steps due to HR elevated and irregular(130 bpm)  CARE Score: 4  Discharge Goal: Independent       Wheelchair:  w/c Ability: Wheelchair Ability  Uses a Wheelchair and/or Scooter?: No                Balance:        Object: Picking Up Object  Assistance Needed: Supervision or touching assistance  Comment: supervision with cues for set up of body  CARE Score: 4    I      Exam:    Blood pressure 106/69, pulse 93, temperature 97.7 °F (36.5 °C), temperature source Oral, resp. rate 18, height 5' 4\" (1.626 m), weight 144 lb 6.4 oz (65.5 kg), SpO2 98 %. General: Sitting up in a bedside chair. Fully dressed. Alert. Poor insight and reasoning. HEENT: MMM. No JVD. Visual fields full. Pulmonary: Shallow respirations without wheezes or rales. Cardiac: Regular rate and rhythm. Abdomen: Patient's abdomen is soft and nondistended. Bowel sounds were present throughout. There was no rebound, guarding or masses noted. Upper extremities: Able to bring both hands up to meet mine. Fair  strength. Lower extremities: Right hip and thigh are swollen as expected. Tenderness to palpation. Guarded right hip flexion. Toe tap is symmetric. No signs of DVT. Sitting balance was good. Standing balance was poor.     Lab Results   Component Value Date    WBC 10.4 10/31/2021    HGB 7.9 (L) 10/31/2021    HCT 25.3 (L) 10/31/2021    MCV 94.8 10/31/2021     (H) 10/31/2021     Lab Results   Component Value Date    INR 1.13 11/08/2021    INR 1.18 11/07/2021    INR 1.25 11/06/2021    PROTIME 14.6 (H) 11/08/2021    PROTIME 15.3 (H) 11/07/2021    PROTIME 16.2 (H) 11/06/2021     Lab Results   Component Value Date    CREATININE 0.5 (L) 10/31/2021    BUN 9 10/31/2021     (L) 10/31/2021    K 4.1 10/31/2021    CL 98 (L) 10/31/2021    CO2 21 10/31/2021 Lab Results   Component Value Date    ALT 17 10/25/2021    AST 28 10/25/2021    ALKPHOS 60 10/25/2021    BILITOT 0.4 10/25/2021       Expected length of stay  prior to a supervised level of function for discharge home with a walker and [facility] OT/PT is 10 to 14 days. Recommendations:    1. Right femoral neck fracture with total arthroplasty: Demonstrating some benefit from participating in the daily occupational and physical therapy. Developing techniques for self-care activities and mobility following anterior hip precautions. Weightbearing as tolerated. Tolerating the pain management, adaptive equipment training and DVT prophylaxis. We must provide pulmonary hygiene measures and monitor his anemia and hyponatremia. Verbal cues and 25% physical assistance for transfers. 2. DVT prophylaxis: Surgeon has chosen aspirin 325 twice daily for DVT prophylaxis. Weightbearing activities will be pursued daily. SCDs when in bed. 3. Bipolar disorder with depression: Treating him in a calm consistent environment. Regulating his sleep-wake schedule. He requires trazodone and Effexor. Psychiatry services available to him as needed. 4. Uncontrolled pain: Scheduled acetaminophen and oxycodone with cryotherapy and progressive mobilization following anterior hip precautions. 5. Hypertension: Recently his blood pressures have been reasonable off medication. We will continue to monitor vital signs at rest and with activity. Target systolic blood pressure is 120-140. 6. Hyponatremia: His numbers are closer to normal now. Will encourage consistent oral intake and be cautious with any diuretic use. No fluid restrictions at the moment. Periodic monitoring of his chemistries. 7. COPD: Albuterol inhaler twice daily, Singulair and aggressive pulmonary hygiene measures. Monitoring O2 saturations at rest and with activity. No steroid therapy for the time being. 8. Psoriasis: Kenalog cream for his hands.   This is a home medicine as well.

## 2021-11-09 NOTE — PROGRESS NOTES
Report called to [facility] for transfer to [phone]. Patient transferred due to low Hgb and need for monitoring. Patient transferred in bed without difficulty.

## 2021-11-09 NOTE — CONSULTS
CARDIOLOGY CONSULT NOTE   Reason for consultation:  Aortic Stenosis    Referring physician:  Cheryl Wall MD     Primary care physician: Gurpreet Thomas MD      Dear  Dr. Cheryl Wall MD   Thanks for the consult. Chief Complaints :No chief complaint on file. History of present illness:Manas is a 73 y. o.year old who was transferred from acute rehab due to concerns for feeling dizzy lightheaded weak was noted to have profound anemia with hemoglobin of 4.5. He says he was getting dizzy when he stood up or was doing exercise he has history of mechanical aortic valve prosthesis for which he is on chronic anticoagulation with Coumadin. Recently underwent hip replacement. Normally sees Dr. Rita Ruiz but has not been seen in office since 2019    Past medical history:    has a past medical history of Acid reflux, Acute frontal sinusitis, Alcohol abuse, Anesthesia, Anxiety, Arthritis, Atypical mycobacterial infection, Broken teeth, CHF (congestive heart failure) ([facility] [address] COPD (chronic obstructive pulmonary disease) ([facility] [address] Cough, Depression, Depression, Dyspnea, H/O cardiovascular MUGA stress test, H/O echocardiogram, H/O echocardiogram, History of 24 hour EKG monitoring, [place] (hard of hearing), Hyperlipidemia, Hypertension, Irritant contact dermatitis due to other chemical products, Other drug allergy(995.27), S/P AVR, Shortness of breath, and Teeth missing. Past surgical history:   has a past surgical history that includes Lung biopsy (Right, 1996); Rotator cuff repair (Right, 2008); Dental surgery; Cardiac valve replacement (12/17/2003); Colonoscopy (2006); Endoscopy, colon, diagnostic (In Past); Tonsillectomy (1959 Or 1960); bronchoscopy (1996); Knee arthroscopy (Left, 1992); other surgical history (1992); HEMIARTHROPLASTY SHOULDER FRACTURE (Right, 1/29/2019); and Total hip arthroplasty (Right, 10/25/2021). Social History:   reports that he has never smoked.  His smokeless tobacco use includes snuff and chew. He reports current alcohol use of about 4.0 - 5.0 standard drinks of alcohol per week. He reports that he does not use drugs.   Family history:   no family history of CAD, STROKE of DM at early age    Allergies   Allergen Reactions    Ciprofloxacin Hcl Hives and Swelling    Levaquin [Levofloxacin] Hives and Swelling    Other Nausea And Vomiting     \"Allergic To Tylox\"    Ceftin [Cefuroxime]        0.9 % sodium chloride infusion, PRN  oxyCODONE (ROXICODONE) immediate release tablet 5 mg, Q4H PRN  polyethylene glycol (GLYCOLAX) packet 17 g, Daily PRN  albuterol sulfate  (90 Base) MCG/ACT inhaler 2 puff, Q6H PRN  atorvastatin (LIPITOR) tablet 40 mg, Daily  bisacodyl (DULCOLAX) suppository 10 mg, Daily PRN  calcium elemental (OSCAL) tablet 500 mg, Daily  cyclobenzaprine (FLEXERIL) tablet 5 mg, TID PRN  [Held by provider] lactulose (CHRONULAC) 10 GM/15ML solution 20 g, BID  magnesium hydroxide (MILK OF MAGNESIA) 400 MG/5ML suspension 30 mL, Daily PRN  montelukast (SINGULAIR) tablet 10 mg, Nightly  traZODone (DESYREL) tablet 200 mg, Nightly  triamcinolone (KENALOG) 0.1 % cream, BID  venlafaxine (EFFEXOR XR) extended release capsule 150 mg, Daily  0.9 % sodium chloride infusion, Continuous  pantoprazole (PROTONIX) injection 40 mg, BID  sodium chloride flush 0.9 % injection 5-40 mL, 2 times per day  sodium chloride flush 0.9 % injection 5-40 mL, PRN  ondansetron (ZOFRAN-ODT) disintegrating tablet 4 mg, Q8H PRN   Or  ondansetron (ZOFRAN) injection 4 mg, Q6H PRN  acetaminophen (TYLENOL) tablet 650 mg, Q6H PRN   Or  acetaminophen (TYLENOL) suppository 650 mg, Q6H PRN      Current Facility-Administered Medications   Medication Dose Route Frequency Provider Last Rate Last Admin    0.9 % sodium chloride infusion  25 mL IntraVENous PRN AMBROSIO Johnson MD        oxyCODONE (ROXICODONE) immediate release tablet 5 mg  5 mg Oral Q4H PRN AMBROSIO Johnson MD        polyethylene glycol [facility]) packet 17 g  17 g Oral Daily PRN AMBROSIO Maharaj MD        albuterol sulfate  (90 Base) MCG/ACT inhaler 2 puff  2 puff Inhalation Q6H PRN AMBROSIO Maharaj MD        atorvastatin (LIPITOR) tablet 40 mg  40 mg Oral Daily AMBROSIO Maharaj MD   40 mg at 11/09/21 1812    bisacodyl (DULCOLAX) suppository 10 mg  10 mg Rectal Daily PRN AMBROSIO Maharaj MD        calcium elemental (OSCAL) tablet 500 mg  500 mg Oral Daily AMBROSIO Maharaj MD   500 mg at 11/09/21 1812    cyclobenzaprine (FLEXERIL) tablet 5 mg  5 mg Oral TID PRN AMBROSIO Maharaj MD        [Held by provider] lactulose (CHRONULAC) 10 GM/15ML solution 20 g  20 g Oral BID AMBROSIO Maharaj MD        magnesium hydroxide (MILK OF MAGNESIA) 400 MG/5ML suspension 30 mL  30 mL Oral Daily PRN AMBROSIO Maharaj MD        montelukast (SINGULAIR) tablet 10 mg  10 mg Oral Nightly AMBROSIO Maharaj MD        traZODone (DESYREL) tablet 200 mg  200 mg Oral Nightly AMBROSIO Maharaj MD        triamcinolone (KENALOG) 0.1 % cream   Topical BID AMBROSIO Maharaj MD        venlafaxine [facility]) extended release capsule 150 mg  150 mg Oral Daily AMBROSIO Maharaj MD   150 mg at 11/09/21 1811    0.9 % sodium chloride infusion   IntraVENous Continuous Genie Balloon, APRN - CNP 75 mL/hr at 11/09/21 1508 New Bag at 11/09/21 1508    pantoprazole (PROTONIX) injection 40 mg  40 mg IntraVENous BID Genie Balloon, APRN - CNP        sodium chloride flush 0.9 % injection 5-40 mL  5-40 mL IntraVENous 2 times per day Genie Balloon, APRN - CNP        sodium chloride flush 0.9 % injection 5-40 mL  5-40 mL IntraVENous PRN Genie Balloon, APRN - CNP        ondansetron (ZOFRAN-ODT) disintegrating tablet 4 mg  4 mg Oral Q8H PRN Genie Balloon, APRN - CNP        Or    ondansetron (ZOFRAN) injection 4 mg  4 mg IntraVENous Q6H PRN Genie Balloon, APRN - CNP        acetaminophen (TYLENOL) tablet 650 mg  650 mg Oral Q6H PRN Genie Balloon, APRN - CNP        Or   [facility] acetaminophen (TYLENOL) suppository 650 mg  650 mg Rectal Q6H PRN Hilaria Snowden, APRN - CNP         Review of Systems:   · Constitutional: No Fever or Weight Loss   · Eyes: No Decreased Vision  · ENT: No Headaches, Hearing Loss or Vertigo  · Cardiovascular: As per HPI  · Respiratory: As per HPI  · Gastrointestinal: No abdominal pain, appetite loss, blood in stools, constipation, diarrhea or heartburn  · Genitourinary: No dysuria, trouble voiding, or hematuria  · Musculoskeletal:  No gait disturbance, weakness or joint complaints  · Integumentary: No rash or pruritis  · Neurological: No TIA or stroke symptoms  · Psychiatric: No anxiety or depression  · Endocrine: No malaise, fatigue or temperature intolerance  · Hematologic/Lymphatic: No bleeding problems, blood clots or swollen lymph nodes  · Allergic/Immunologic: No nasal congestion or hives  All systems negative except as marked. Physical Examination:    Vitals:    11/09/21 1430 11/09/21 1624   BP: (!) 141/68 136/66   Pulse: 109 99   Resp: 19 18   Temp: 98.5 °F (36.9 °C) 98.4 °F (36.9 °C)   TempSrc: Oral Oral   SpO2: 96% 96%   Weight: 138 lb 3.7 oz (62.7 kg)    Height: 5' 4\" (1.626 m)        General Appearance:  No distress, conversant    Constitutional:  Well developed, Well nourished, No acute distress, Non-toxic appearance. HENT:  Normocephalic, Atraumatic, Bilateral external ears normal, Oropharynx moist, No oral exudates, Nose normal. Neck- Normal range of motion, No tenderness, Supple, No stridor,no apical-carotid delay  Lymphatics : no palpable lymph nodes  Eyes:  PERRL, EOMI, Conjunctiva normal, No discharge. Respiratory:  Normal breath sounds, No respiratory distress, No wheezing, No chest tenderness. ,no use of accessory muscles, crackles Absent   Cardiovascular: (PMI) apex non displaced,no lifts no thrills, ankle swelling Present , 1+, s1 and s2 audible,Murmur. Present postive mechanica click, JVD not noted    Abdomen /GI:  Bowel sounds normal, Soft, No tenderness, No masses, No gross visceromegaly   :  No costovertebral angle tenderness   Musculoskeletal:  No edema, no tenderness, no deformities. Back- no tenderness  Integument:  Well hydrated, no rash   Lymphatic:  No lymphadenopathy noted   Neurologic:  Alert & oriented x 3, CN 2-12 normal, normal motor function, normal sensory function, no focal deficits noted           Medical decision making and Data review:    Lab Review   Recent Labs     11/09/21  0943   WBC 12.0*   HGB 4.3*   HCT 14.3*         No results for input(s): NA, K, CL, CO2, PHOS, BUN, CREATININE in the last 72 hours. Invalid input(s): CA  No results for input(s): AST, ALT, ALB, BILIDIR, BILITOT, ALKPHOS in the last 72 hours. No results for input(s): TROPONINT in the last 72 hours. No results for input(s): PROBNP in the last 72 hours. Lab Results   Component Value Date    INR 1.21 11/09/2021    PROTIME 15.6 (H) 11/09/2021       EKG: (reviewed by myself)    ECHO:(reviewed by myself)    Chest Xray:(reviewed by myself)  Echocardiogram complete 2D with doppler with color    Result Date: 11/9/2021  Transthoracic Echocardiography Report (TTE)  Demographics   Patient Name       Jarred Davenport     Date of Study       10/25/2021   Date of Birth      1955         Gender              Male   Age                60 year(s)         Race                   Patient Number     [id number]         Room Number         4118   Visit Number       [id number]   Corporate ID       [id number]   Accession Number   [id number]         Mahendra Garrison RVT, RDMS   Ordering Physician Marla Torres MD        Physician           Jeannine SMITH  Procedure Type of Study   TTE procedure:ECHOCARDIOGRAM COMPLETE 2D W DOPPLER W COLOR.   Procedure Date Date: 10/25/2021 Start: 11:03 AM Study Location: Portable Technical Quality: Technically difficult study Indications: Aortic stenosis. Patient Status: Routine Height: 64.5 inches Weight: 160 pounds BSA: 1.79 m2 BMI: 27.04 kg/m2 HR: 96 bpm BP: 113/60 mmHg  Conclusions   Summary  Technically difficult examination due to patient immobility s/p hip  arthroplasty. Left ventricular systolic function is low normal.  Ejection fraction is visually estimated at 50%. Moderate left ventricular hypertrophy. prosthetic valve in aortic position; mean PG: 15 mmHg. LACEY: 1.70 cm sq. DVI:  0.4. AT: 25 ms. Trace aortic regurgitation noted. Signature   ------------------------------------------------------------------  Electronically signed by Kelsey Wolfe MD  (Interpreting physician) on 11/09/2021 at 11:05 AM  ------------------------------------------------------------------   Findings   Left Ventricle  Left ventricular systolic function is low normal.  Ejection fraction is visually estimated at 50%. Moderate left ventricular hypertrophy. Left ventricle size is normal.  No regional wall motion abnormalites. Indeterminate diastolic function; E/A flow reversal is noted. Left Atrium  Mildly dilated left atrium. Right Atrium  Essentially normal right atrium. Right Ventricle  Essentially normal right ventricle. Aortic Valve  prosthetic valve in aortic position; mean PG: 15 mmHg. LACEY: 1.70 cm sq. DVI:  0.4. AT: 25 ms. Trace aortic regurgitation noted. Mitral Valve  Mild mitral regurgitation. Tricuspid Valve  Tricuspid valve was not well visualized. Pulmonic Valve  The pulmonic valve was not well visualized. Pericardial Effusion  No evidence of any pericardial effusion. Pleural Effusion  No evidence of pleural effusion. Miscellaneous  The aorta is within normal limits.   M-Mode/2D Measurements & Calculations   LV Diastolic Dimension:  LV Systolic Dimension:  LA Dimension: 3.2 cmAO Root  4.16 cm                  3.22 cm Dimension: 3.1 cmLA Area:  LV FS:22.6 %             LV Volume Diastolic: 95 95.6 cm2  LV PW Diastolic: 8.46 cm ml  LV PW Systolic: 8.08 cm  LV Volume Systolic: 50  Septum Diastolic: 2.27   ml  cm                       LV EDV/LV EDV Index: 95 RV Diastolic Dimension:  Septum Systolic: 9.89 cm NC/94 Z8KT ESV/LV ESV   2.64 cm  CO: 3.71 l/min           Index: 50 ml/28 m2  CI: 2.07 l/m*m2          EF Calculated (A4C):    LA/Aorta: 1.03                           47.4 %  LV Area Diastolic: 70.7  EF Calculated (2D):     LA volume/Index: 59 ml  cm2                      45.8 %                  /98F9  LV Area Systolic: 58.7  cm2                      LV Length: 8.3 cm                            LVOT: 1.4 cm  Doppler Measurements & Calculations   MV Peak E-Wave: 71.6    AV Peak Velocity: 251 cm/s   LVOT Peak Velocity: 103  cm/s                    AV Peak Gradient: 25.2 mmHg  cm/s  MV Peak A-Wave: 88.8    AV Mean Velocity: 187 cm/s   LVOT Mean Velocity:  cm/s                    AV Mean Gradient: 15 mmHg    86.1 cm/s  MV E/A Ratio: 0.81      AV VTI: 44.1 cm              LVOT Peak Gradient: 5  MV Peak Gradient: 2.05  AV Area (Continuity):0.88    mmHgLVOT Mean Gradient:  mmHg                    cm2                          3 mmHg   MV P1/2t: 45 msec       LVOT VTI: 25.1 cm  MVA by PHT:4.89 cm2   MV E' Septal Velocity:  9.54 cm/s  MV E' Lateral Velocity:  13.6 cm/s  MV E/E' septal: 7.51  MV E/E' lateral: 5.26      XR LUMBAR SPINE (2-3 VIEWS)    Result Date: 10/22/2021  EXAMINATION: THREE XRAY VIEWS OF THE LUMBAR SPINE 10/22/2021 2:12 pm COMPARISON: None. HISTORY: ORDERING SYSTEM PROVIDED HISTORY: fall, trauma TECHNOLOGIST PROVIDED HISTORY: Reason for exam:->fall, trauma Reason for Exam: fall trauma, xtable due to hip fracture Acuity: Acute Type of Exam: Initial FINDINGS: Five non-rib-bearing lumbar type vertebral bodies are identified.  Multilevel degenerative changes of spine, including mild-to-moderate disc height loss at L3-L4 and L4-L5. There is an age-indeterminate compression deformity of the L4 vertebral body. Mild anterior wedging of the T12 and L1 vertebral bodies could also represent compression deformities, although wedging at these levels could represent normal variants. Age-indeterminate anterior compression deformity of the L4 vertebral body. Mild anterior wedging of the T12 and L1 vertebral bodies could represent additional age-indeterminate compression deformities, although wedging at these levels could represent normal variants. Correlate with clinical symptoms. Multilevel degenerative changes of the spine. CT HEAD WO CONTRAST    Result Date: 10/25/2021  EXAMINATION: CT OF THE HEAD WITHOUT CONTRAST  10/25/2021 11:53 am TECHNIQUE: CT of the head was performed without the administration of intravenous contrast. Dose modulation, iterative reconstruction, and/or weight based adjustment of the mA/kV was utilized to reduce the radiation dose to as low as reasonably achievable. COMPARISON: None. HISTORY: ORDERING SYSTEM PROVIDED HISTORY: confusion with dysarthria TECHNOLOGIST PROVIDED HISTORY: Reason for exam:->confusion with dysarthria Has a \"code stroke\" or \"stroke alert\" been called? ->No FINDINGS: BRAIN/VENTRICLES: There is no acute infarct or acute intracranial hemorrhage present. There is no mass effect or midline shift present. There is no ventriculomegaly or abnormal extra-axial fluid collection present. ORBITS: Limited evaluation of the orbits is unremarkable. SINUSES: There is mild mucosal thickening throughout the paranasal sinuses. The mastoid air cells are clear. SOFT TISSUES/SKULL:  No lytic or blastic osseous lesions are identified. No acute intracranial process identified.      XR CHEST PORTABLE    Result Date: 10/25/2021  EXAMINATION: ONE XRAY VIEW OF THE CHEST 10/25/2021 1:06 pm COMPARISON: Chest x-ray October 22, 2021; CT chest August 18, 2020 HISTORY: ORDERING SYSTEM PROVIDED HISTORY: congestion TECHNOLOGIST PROVIDED HISTORY: Reason for exam:->congestion Reason for Exam: chest congestion FINDINGS: Cardiac silhouette is stable. No focal consolidation, pleural effusion, or pneumothorax identified. Chronic interstitial changes of the lungs. Atherosclerotic changes of the aorta. Remote bilateral rib deformities. Postoperative changes of right shoulder arthroplasty appears grossly stable. 1. No acute cardiopulmonary process identified. XR CHEST PORTABLE    Result Date: 10/22/2021  EXAMINATION: ONE XRAY VIEW OF THE CHEST 10/22/2021 3:27 pm COMPARISON: 05/05/2018 chest radiograph HISTORY: ORDERING SYSTEM PROVIDED HISTORY: hip fracture TECHNOLOGIST PROVIDED HISTORY: Reason for exam:->hip fracture Reason for Exam: hip fracture Acuity: Acute Type of Exam: Initial FINDINGS: Prior median sternotomy and aortic valve replacement. The cardiomediastinal silhouette is normal in size and contour. Atherosclerotic calcifications of the aortic arch. No focal airspace disease. No pleural effusion or pneumothorax. Bilateral remote rib fractures. Right shoulder hemiarthroplasty. No evidence of acute cardiopulmonary disease. XR HIP 2-3 VW W PELVIS RIGHT    Result Date: 10/25/2021  EXAMINATION: ONE XRAY VIEW OF THE PELVIS AND TWO XRAY VIEWS RIGHT HIP 10/25/2021 9:46 am COMPARISON: 10/22/2021. HISTORY: ORDERING SYSTEM PROVIDED HISTORY: S/P R hip thalia TECHNOLOGIST PROVIDED HISTORY: Of operative side while in recovery room. Reason for exam:->S/P R hip thalia Reason for Exam: S/P R hip thalia FINDINGS: There is a right total hip arthroplasty with noncemented components. No acute fracture or dislocation. No acute hardware failure or loosening. Grossly normal alignment. Total hip arthropasty without acute hardware complication. XR HIP 2-3 VW W PELVIS RIGHT    Result Date: 10/22/2021  EXAMINATION: ONE XRAY VIEW OF THE PELVIS AND TWO XRAY VIEWS RIGHT HIP 10/22/2021 2:11 pm COMPARISON: None.  HISTORY: ORDERING SYSTEM PROVIDED HISTORY: fall, pain, trauma TECHNOLOGIST PROVIDED HISTORY: Reason for exam:->fall, pain, trauma Reason for Exam: right hip pain Acuity: Acute Type of Exam: Initial Mechanism of Injury: fall Relevant Medical/Surgical History: na FINDINGS: Three views of the pelvis and right hip were reviewed. There is an acute transcervical femoral neck fracture of the right femur with displacement at the fracture site. No additional fractures are identified. Multilevel degenerative changes of the imaged lower lumbar spine. Atherosclerotic vascular calcifications noted. Surgical clips project over the region of the left hip. 1. Acute and displaced transcervical right femoral neck fracture. All labs, medications and tests reviewed by myself including data  from outside source , patient and available family . Continue all other medications of all above medical condition listed as is. Impression:  Active Problems:    Chronic systolic heart failure (HCC)    Rheumatic mitral regurgitation    S/P AVR  Resolved Problems:    * No resolved hospital problems. *      Assessment: 73 y. o.year old with PMH of  has a past medical history of Acid reflux, Acute frontal sinusitis, Alcohol abuse, Anesthesia, Anxiety, Arthritis, Atypical mycobacterial infection, Broken teeth, CHF (congestive heart failure) ([facility]), COPD (chronic obstructive pulmonary disease) ([facility] [address] Cough, Depression, Depression, Dyspnea, H/O cardiovascular MUGA stress test, H/O echocardiogram, H/O echocardiogram, History of 24 hour EKG monitoring, [place] (hard of hearing), Hyperlipidemia, Hypertension, Irritant contact dermatitis due to other chemical products, Other drug allergy(995.27), S/P AVR, Shortness of breath, and Teeth missing.       Plan and Recommendations:    S/p AVR : Mechanical prosthesis okay to hold Coumadin although INR has not been therapeutic for last several days  Evaluate for source of bleeding   Restart anticoagulation as soon as possible high risk for stroke and valve thrombosis without anticoagulation  DVT prophylaxis if no contraindication  6. Hold blood pressure meds for now          Thank you  much for consult and giving us the opportunity in contributing in the care of this patient. Please feel free to call me for any questions.        Keri Vela MD, 11/9/2021 6:56 PM

## 2021-11-09 NOTE — PROGRESS NOTES
Occupational Therapy  Physical Rehabilitation: OCCUPATIONAL THERAPY     [x] daily progress note       [] discharge       Patient Name:  Berenice Soulier   :  1955 MRN: [id number]  Room:  [address] [place] [address] Date of Admission: 11/3/2021  Rehabilitation Diagnosis:   Displaced midcervical fracture of right femur, initial encounter for closed fracture [S72.031A]  Traumatic closed fracture of neck of femur with minimal displacement, right, [facility] [S72.001A]  Acute blood loss anemia [D62]       Date 2021       Day of ARU Week:  7   Time IN/OUT x   Individual Tx Minutes    Group Tx Minutes    Co-Treat Minutes    Concurrent Tx Minutes    TOTAL Tx Time Mins 0   Variance Time -60   Variance Time []   Refusal due to:     [x]   Medical hold/reason:  And pending d/c from ARU 2* critical H&H  []   Illness   []   Off Unit for test/procedure  []   Extra time needed to complete task  []   Therapeutic need  []   Other (specify):   Restrictions Restrictions/Precautions: General Precautions, Fall Risk (WBAT RLE, anterolateral  hip precautions)     Hip Precautions: No ADduction, No hip extension, No hip external rotation, No active ABduction   Communication with other providers: []   OK to see per nursing:     []   Spoke with team member regarding:      Subjective observations and cognitive status:      Pain level/location:    /10       Location:    Discharge recommendations           Number of Minutes/Billable Intervention  Therapeutic Exercise    ADL Self-care    Neuro Re-Ed    Therapeutic Activity    Group    Other:    TOTAL 0       Social History  Social/Functional History  Lives With: Alone  Type of Home: Apartment  Home Layout: One level  Home Access: Ramped entrance, Elevator (has elevator access to apartment)  Bathroom Shower/Tub: Tub/Shower unit  Bathroom Toilet: Handicap height  Bathroom Equipment: Grab bars in shower  Bathroom Accessibility: Walker accessible  Home Equipment:  (Has 4WW in hospital room that is her sister's)  ADL Assistance: Independent  Homemaking Assistance: Independent  Homemaking Responsibilities: Yes  Ambulation Assistance: Independent (no AD)  Transfer Assistance: Independent  Active : No (Sister drives)  Mode of Transportation: Car  Occupation: On disability  Type of occupation: Construction  Additional Comments: Pt typically sleeps in a flat, regular bed at home. Pt reports at least 3 falls in the past year \"due to bad judgement\"; latest one caused R hip fx precipitating this hospitalization. Objective                                                                                    Goals:  (Update in navigator)  Short term goals  Time Frame for Short term goals: STGs=LTGs:  Long term goals  Time Frame for Long term goals : ~1 week or until d/c  Long term goal 1: Pt will complete grooming tasks Ind  Long term goal 2: Pt will complete total body bathing mod I  Long term goal 3: Pt will complete UB dressing Ind  Long term goal 4: Pt will complete LB dressing mod I using AE PRN  Long term goal 5: Pt will doff/don footwear mod I using AE PRN  Long term goals 6: Pt will complete toileting mod I  Long term goal 7: Pt will complete functional transfers (bed, chair, shower, toilet) c DME PRN and mod I  Long term goal 8: Pt will perform therex/therax to facilitate increased strength/endurance/ax tolerance (c emphasis on dynamic standing balance/tolerance >12 mins, BUE endurance) c SBA  Long term goal 9: Pt will complete simple homemaking tasks c DME PRN and mod I:        Plan of Care                                                                              Times per week: 5 days per week for a minimum of 60 minutes/day plus group as appropriate for 60 minutes.   Treatment to include Plan  Times per day: Daily  Current Treatment Recommendations: Functional Mobility Training, Endurance Training, Balance Training, Safety Education & Training, Pain Management, Patient/Caregiver Education & Training, Equipment Evaluation, Education, & procurement, Self-Care / ADL, Home Management Training    Electronically signed by   Farzaneh Cosme MS, OTR/L  License #[id number]  11/9/2021, 1:50 PM

## 2021-11-09 NOTE — PROGRESS NOTES
Physical Therapy    [x] daily progress note       [] discharge       Patient Name:  Jaquelin Stone   :  1955 MRN: [id number]  Room:  [address] [place] [address] Date of Admission: 11/3/2021  Rehabilitation Diagnosis:   Displaced midcervical fracture of right femur, initial encounter for closed fracture [S72.031A]  Traumatic closed fracture of neck of femur with minimal displacement, right, initial encounter ([facility] Adolfo George       Date 2021       Day of ARU Week:  7   Time IN/OUT [id number]  [id number]   Individual Tx Minutes 23   Group Tx Minutes    Co-Treat Minutes    Concurrent Tx Minutes    TOTAL Tx Time Mins 23   Variance Time    Variance Time []   Refusal due to:     [x]   Medical hold/reason:  Hbg 4.3 per  Dr Tristin Frost   []   Illness   []   Off Unit for test/procedure  []   Extra time needed to complete task  []   Therapeutic need  []   Other (specify):   Restrictions Restrictions/Precautions  Restrictions/Precautions: General Precautions, Fall Risk (WBAT RLE, anterolateral  hip precautions)  Position Activity Restriction  Hip Precautions: No ADduction, No hip extension, No hip external rotation, No active ABduction   Communication with other providers: [x]   OK to see per nursing:     [x]   Spoke with team member regarding: possible vagal episode     Subjective observations and cognitive status: Pt in semi kulkarni position with c/o increased dizziness this morning.  B/p = 111/54 mmhg, HR = 94 b/m and MAP=78     Pain level/location: 0   /10       Location:    Discharge recommendations  Anticipated discharge date:  tbd  Destination: []home alone   [x]home alone with assist PRN     [] home w/ family      [] Continuous supervision  []SNF    [] Assisted living     [] Other:   Continued therapy: [x]HHC PT  []OUTPATIENT  PT   [] No Further PT  Equipment needs:  Jaquelin Stone requires the assistance of a wheeled walker to successfully ambulate from room to room at home to allow completion of daily living tasks such as: bathing, medical complications: increased dizziness with  Low hbg of 4.6 today   [] Adverse reaction to Tx:   [] Significant change in status    Safety:       [x]  bed alarm set    []  chair alarm set    []  Pt refused alarms                []  Telesitter activated      []  Gait belt used during tx session      []other:         Number of Minutes/Billable Intervention  Gait Training    Therapeutic Exercise    Neuro Re-Ed    Therapeutic Activity 23   Wheelchair Propulsion    Group    Other:    TOTAL 23         Social History  Social/Functional History  Lives With: Alone  Type of Home: Apartment  Home Layout: One level  Home Access: Ramped entrance, Elevator (has elevator access to apartment)  Bathroom Shower/Tub: Tub/Shower unit  Bathroom Toilet: Handicap height  Bathroom Equipment: Grab bars in shower  Bathroom Accessibility: Walker accessible  Home Equipment:  (Has 4WW in hospital room that is her sister's)  ADL Assistance: [address]: Independent  Homemaking Responsibilities: Yes  Ambulation Assistance: Independent (no AD)  Transfer Assistance: Independent  Active : No (Sister drives)  Mode of Transportation: Car  Occupation: On disability  Type of occupation: Construction  Additional Comments: Pt typically sleeps in a flat, regular bed at home. Pt reports at least 3 falls in the past year \"due to bad judgement\"; latest one caused R hip fx precipitating this hospitalization. Objective                                                                                    Goals:  (Update in navigator)  Short term goals  Time Frame for Short term goals: 7 tx days:  Short term goal 1: Pt will complete rolling L/R and sup<->sit using leg  to assist RLE for hip abduction movement and following anterolateral hip precautions on R Mod Ind-Ind. Short term goal 2: Pt will complete OOB transfers using RW and following anterolateral hip precautions on R Mod Ind.   Short term goal 3: Pt will ambulate >150 ft over level surface and at least 10 ft of uneven surface using RW following appropriate step-to pattern Mod Ind. Short term goal 4: Pt will ascend/descend curb step using RW and 1 flight of stairs using railings following appropriate step-to pattern on ascent/descent Mod Ind. Short term goal 5: Pt will complete object retrieval from the floor with 1UE support on RW and using reacher prn Mod Ind.:   :        Plan of Care                                                                              Times per week: 5 days per week for a minimum of 60 minutes/day plus group as appropriate for 60 minutes.   Treatment to include Current Treatment Recommendations: Strengthening, Gait Training, Patient/Caregiver Education & Training, Stair training, Equipment Evaluation, Education, & procurement, Balance Training, Pain Management, Functional Mobility Training, Endurance Training, Home Exercise Program, Transfer Training, Positioning, Safety Education & Training    Electronically signed by   DAYNA Etienne,[id number]  11/9/2021, 8:18 AM

## 2021-11-09 NOTE — H&P
History and Physical      Name:  Sade Bashir /Age/Sex: 1955  (73 y.o. male)   MRN & CSN:  [id number] & [id number] Admission Date/Time: 11/3/2021  3:57 PM   Location:  [facility] PCP: Steven Vincent, [address] Day: 7           Assessment and Plan:   # Acute on chronic anemia with blood loss - Hgb 7-8 range in 2021. Prior 13-14 range. Suspect component of blood loss from orthopedic surgery 10/25/21, however patient had 3.6gm drop since 10/31/21. Pt denies bloody stools, reporting dark stools yesterday and today. Colonoscopy 2020 showed diverticular disease and hemorrhoids. Denies abd pain, no n/v or hematemesis. Pt on aspirin and lovenox/coumadin, INR subtherapeutic 1.2. Check FOBT, Will place on PPI BID, stop anti-platelets and lovenox/coumadin for now. No NSAIDS. Clear liquid diet. Transfuse 2 units PRBC's, monitor hemograms q6hrs. Consult GI. Monitor hemodynamics. # Symptomatic anemia - POC as above  # s/p RTHA 10/25/21 - PT/OT wt being status, pain management as per previous rec's  # Leukocytosis - recent ortho surgery likely contrib factor. Pt afebrile. Will check UA, encourage IS, monitor for now  # Mechanical Aortic valve -we will hold anticoagulation in setting of acute anemia until GI bleed r/o. Consult Cardiology to follow/rec's.   # COPD - not in exacerbation, continue home inhalers  # Mixed hyperlipidemia resume statin  # Bipolar disorder- continue current psychiatric medications  # Hyperglycemia - Possiby stress induced Will check A1c,      Present on Admission:   Traumatic closed fracture of neck of femur with minimal displacement, right, initial encounter ([facility] [address]   Essential hypertension   Uncontrolled pain   Generalized weakness   Gait disturbance   Acute blood loss anemia   Status post total replacement of right hip   Affective psychosis, bipolar (HCC)   Compression fracture of fourth lumbar vertebra (HCC)             Diet ADULT ORAL NUTRITION SUPPLEMENT; Breakfast, Dinner; Standard High Calorie/High Protein Oral Supplement  ADULT DIET; Clear Liquid   DVT Prophylaxis [] Lovenox, []  Heparin, [x] SCDs, [] Ambulation  [] Long term AC   GI Prophylaxis [x] PPI,  [] H2 Blocker,  [] Carafate,  [] Diet/Tube Feeds   Code Status Full CODE   Disposition Admit to med surg. obs . Patient plans to return home upon discharge         Chief Complaint: No chief complaint on file. Acute anemia    History obtained from patient and EHR  History of Present Illness:   Sherrie Dodson is a 73 y.o. male  with history of Mechanical AV on AC, COPD, hyperlipidemia, bipolar disorder who underwent went a right total hip arthroplasty 10/25/2021 for the orthopedic service. Patient has been transferred to inpatient for rehab. Patient reports he had been doing well for the past couple days. He reports mild constipation and bearing down with bowel movements. He states over the last 24 hours he had presyncopal symptoms with bowel movements and noted dark stools. He denies bloody stools. Denies abdominal pain nausea vomiting or hematemesis. He reports feeling fatigued over the past 24 hours with activity. Symptoms improved with rest. He denies shortness of breath or chest pain. He denies f/c or cough. His CBC was checked today and Hgb noted to be 4.3 from 7.9 10/31/2021 therefore the Hospitalist team was contacted for admission. Chemistry panel reviewed is showing stable renal function with BUN 9 creat 0.5. The patient has been managed on therapeutic Lovenox sq Coumadin and aspirin 325 mg daily. His INR today is 1.2. The patient's last colonoscopy was July 2020 showing diverticulosis and hemorrhoids. He reports a colonoscopy with polypectomy performed in 2006. The patient has stable BP with tachycardia noted. The patient has been typed and screened. 2 unit PRBC's ordered. 14 point ROS: reviewed and are negative, unless as noted in above HPI. Objective:        Intake/Output Summary (Last 24 hours) at 11/9/2021 1150  Last data filed at 11/9/2021 1040  Gross per 24 hour   Intake 240 ml   Output 850 ml   Net -610 ml        Vitals:   Vitals:    11/08/21 2100 11/09/21 0245 11/09/21 0750 11/09/21 0830   BP: 106/69 (!) 109/54 (!) 103/55 (!) 111/54   Pulse: 93 105 104    Resp: 18 18 16    Temp: 97.7 °F (36.5 °C) 98 °F (36.7 °C) 98.1 °F (36.7 °C)    TempSrc: Oral Oral Oral    SpO2: 98% 96% 93%    Weight:       Height:           Physical Exam: 11/09/21     GEN -Awake  And alert appearing male, in NAD. Appears given age. EYES -anicteric, conjunctiva pink. HENT -Head is normocephalic, atraumatic. MM are moist. Oral pharynx without exudates, no evidence of thrush. NECK -Supple, no apparent thyromegaly or masses. RESP -CTA, no wheezes, rales or rhonchi. Symmetric chest movement   C/V -S1/S2 auscultated. RRR without appreciable M/R/G. No JVD. Cap refill <3 sec. No peripheral edema. GI -Abdomen is soft non distended, non tender to palpation. + BS. No masses or guarding.  -No CVA/ flank tenderness. LYMPH- No petechiae or ecchymoses. MS -No gross joint deformities. SKIN -Normal coloration, warm, dry. Right hip incision intact, no erythema noted  NEURO-Cranial nerves appear grossly intact, normal speech, no lateralizing weakness. PSYC-Awake, alert, oriented x3 . Appropriate affect.     Past Medical History:      Past Medical History:   Diagnosis Date    Acid reflux     Acute frontal sinusitis 7/22/2009    Alcohol abuse     \"I Drink Average 2 Beers A Day\"    Anesthesia     \" I get agitated\"    Anxiety     Arthritis     \"Right Shoulder, Neck, Left Knee\"    Atypical mycobacterial infection     Broken teeth     Upper And Lower     [facility]congestive [facility] failure) ([facility])     COPD (chronic obstructive pulmonary disease) ([facility])     Sees Dr. Leti Rodriguez In [place], [address]     Cough     Frequent Cough With Green Sputum    Depression     Depression     Dyspnea 2/23/2018  H/O cardiovascular MUGA stress test 05/14/2019    EF 50%, NORMAL LVEF, NORMAL WALL MOTION.    H/O echocardiogram 05/22/2014    QS=>08-21%, LV systolic function is low normal, mild aortic valve calcification, no pericardial effusion    H/O echocardiogram 12/12/2018    EF 45-50%    History of 24 hour EKG monitoring 6/6/14    24 hr holter monitor. Ventricular ectopics were noted in single and couplet forms. Supraventricular ectopics were noted in single and pair forms.  [place] (hard of hearing)     Bilateral Ears    Hyperlipidemia     Hypertension     Irritant contact dermatitis due to other chemical products 8/26/2019    Other drug allergy(995.27) 7/22/2009    S/P AVR 12/2003    Mechanical valvue     Shortness of breath     Teeth missing     Upper And Lower       Past Surgical  History:    has a past surgical history that includes Lung biopsy (Right, 1996); Rotator cuff repair (Right, 2008); Dental surgery; Cardiac valve replacement (12/17/2003); Colonoscopy (2006); Endoscopy, colon, diagnostic (In Past); Tonsillectomy (1959 Or 1960); bronchoscopy (1996); Knee arthroscopy (Left, 1992); other surgical history (1992); HEMIARTHROPLASTY SHOULDER FRACTURE (Right, 1/29/2019); and Total hip arthroplasty (Right, 10/25/2021). Family  History:   family history includes Asthma in his sister; COPD in his father and sister; Cancer in his mother; Diabetes in his mother and sister; Heart Disease in his father; High Blood Pressure in his mother and sister; High Cholesterol in his mother and sister; No Known Problems in his son; Obesity in his mother; Other in his sister; Vision Loss in his mother.         Social History:     Social History     Socioeconomic History    Marital status:      Spouse name: None    Number of children: 1    Years of education: 15    Highest education level: None   Occupational History    None   Tobacco Use    Smoking status: Never Smoker    Smokeless tobacco: Current User Types: Snuff, Chew   Vaping Use    Vaping Use: Never used   Substance and Sexual Activity    Alcohol use: Yes     Alcohol/week: 4.0 - 5.0 standard drinks     Types: 4 - 5 Cans of beer per week     Comment: last drink stated 2 weeks ago    Drug use: No    Sexual activity: Not Currently   Other Topics Concern    None   Social History Narrative    None     Social Determinants of Health     Financial Resource Strain:     Difficulty of Paying Living Expenses: Not on file   Food Insecurity:     Worried About Running Out of Food in the Last Year: Not on file    Suzi of Food in the Last Year: Not on file   Transportation Needs:     Lack of Transportation (Medical): Not on file    Lack of Transportation (Non-Medical): Not on file   Physical Activity:     Days of Exercise per Week: Not on file    Minutes of Exercise per Session: Not on file   Stress:     Feeling of Stress : Not on file   Social Connections:     Frequency of Communication with Friends and Family: Not on file    Frequency of Social Gatherings with Friends and Family: Not on file    Attends [employer] Services: Not on file    Active Member of [address] [place] [address] or Organizations: Not on file    Attends Club or Organization Meetings: Not on file    Marital Status: Not on file   Intimate Partner Violence:     Fear of Current or Ex-Partner: Not on file    Emotionally Abused: Not on file    Physically Abused: Not on file    Sexually Abused: Not on file   Housing Stability:     Unable to Pay for Housing in the Last Year: Not on file    Number of [place] in the Last Year: Not on file    Unstable Housing in the Last Year: Not on file      reports that he has never smoked. His smokeless tobacco use includes snuff and chew. reports current alcohol use of about 4.0 - 5.0 standard drinks of alcohol per week. reports no history of drug use. Allergies:      Allergies   Allergen Reactions    Ciprofloxacin Hcl Hives and Swelling    Levaquin mouth \"Alternate With Singulair\"    Historical Provider, MD   NASAL SPRAY SALINE NA by Nasal route daily Over The Counter    Historical Provider, MD   budesonide (RHINOCORT ALLERGY) 32 MCG/ACT nasal spray 2 sprays by Nasal route daily    Historical Provider, MD   Acetaminophen (TYLENOL 8 HOUR PO) Take by mouth as needed Over The Counter    Historical Provider, MD   Multiple Vitamins-Minerals (CENTRUM PO) Take by mouth daily    Historical Provider, MD   Nebulizer MISC Inhale into the lungs as needed    Historical Provider, MD         Medications:   Medications:    sodium chloride flush  5-40 mL IntraVENous 2 times per day    pantoprazole  40 mg IntraVENous Q12H    And    sodium chloride (PF)  10 mL IntraVENous Q12H    triamcinolone   Topical BID    albuterol sulfate HFA  2 puff Inhalation BID    atorvastatin  40 mg Oral Daily    bisacodyl  10 mg Oral Daily    calcium elemental  500 mg Oral Daily    lactulose  20 g Oral BID    montelukast  10 mg Oral Nightly    traZODone  200 mg Oral Nightly    venlafaxine  150 mg Oral Daily      Infusions:    sodium chloride      sodium chloride      sodium chloride      sodium chloride      sodium chloride       PRN Meds: cyclobenzaprine, 5 mg, TID PRN  sodium chloride, , PRN  sodium chloride flush, 5-40 mL, PRN  sodium chloride, 25 mL, PRN  acetaminophen, 650 mg, Q6H PRN   Or  acetaminophen, 650 mg, Q6H PRN  sodium chloride, , PRN  magnesium hydroxide, 30 mL, Daily PRN  bisacodyl, 10 mg, Daily PRN  sodium chloride, 25 mL, PRN  oxyCODONE, 5 mg, Q4H PRN  ondansetron, 4 mg, Q8H PRN  albuterol sulfate HFA, 2 puff, Q6H PRN  acetaminophen, 650 mg, Q4H PRN  polyethylene glycol, 17 g, Daily PRN  magnesium hydroxide, 30 mL, Daily PRN        Data:     Laboratory this visit:  Reviewed  Recent Labs     11/09/21  0943   WBC 12.0*   HGB 4.3*   HCT 14.3*         No results for input(s): NA, K, CL, CO2, PHOS, BUN, CREATININE in the last 72 hours.     Invalid input(s): CA  No ------------------------------------------------------------------   Findings   Left Ventricle  Left ventricular systolic function is low normal.  Ejection fraction is visually estimated at 50%. Moderate left ventricular hypertrophy. Left ventricle size is normal.  No regional wall motion abnormalites. Indeterminate diastolic function; E/A flow reversal is noted. Left Atrium  Mildly dilated left atrium. Right Atrium  Essentially normal right atrium. Right Ventricle  Essentially normal right ventricle. Aortic Valve  prosthetic valve in aortic position; mean PG: 15 mmHg. LACEY: 1.70 cm sq. DVI:  0.4. AT: 25 ms. Trace aortic regurgitation noted. Mitral Valve  Mild mitral regurgitation. Tricuspid Valve  Tricuspid valve was not well visualized. Pulmonic Valve  The pulmonic valve was not well visualized. Pericardial Effusion  No evidence of any pericardial effusion. Pleural Effusion  No evidence of pleural effusion. Miscellaneous  The aorta is within normal limits.   M-Mode/2D Measurements & Calculations   LV Diastolic Dimension:  LV Systolic Dimension:  LA Dimension: 3.2 cmAO Root  4.16 cm                  3.22 cm                 Dimension: 3.1 cmLA Area:  LV FS:22.6 %             LV Volume Diastolic: 95 84.7 cm2  LV PW Diastolic: 0.49 cm ml  LV PW Systolic: 6.24 cm  LV Volume Systolic: 50  Septum Diastolic: 3.71   ml  cm                       LV EDV/LV EDV Index: 95 RV Diastolic Dimension:  Septum Systolic: 9.54 cm UR/48 F1WE ESV/LV ESV   2.64 cm  CO: 3.71 l/min           Index: 50 ml/28 m2  CI: 2.07 l/m*m2          EF Calculated (A4C):    LA/Aorta: 1.03                           47.4 %  LV Area Diastolic: 57.3  EF Calculated (2D):     LA volume/Index: 59 ml  cm2                      45.8 %                  /73T2  LV Area Systolic: 73.3  cm2                      LV Length: 8.3 cm                            LVOT: 1.4 cm  Doppler Measurements & Calculations   MV Peak E-Wave: 71.6    AV Peak Velocity: 251 cm/s   LVOT Peak Velocity: 103  cm/s                    AV Peak Gradient: 25.2 mmHg  cm/s  MV Peak A-Wave: 88.8    AV Mean Velocity: 187 cm/s   LVOT Mean Velocity:  cm/s                    AV Mean Gradient: 15 mmHg    86.1 cm/s  MV E/A Ratio: 0.81      AV VTI: 44.1 cm              LVOT Peak Gradient: 5  MV Peak Gradient: 2.05  AV Area (Continuity):0.88    mmHgLVOT Mean Gradient:  mmHg                    cm2                          3 mmHg   MV P1/2t: 45 msec       LVOT VTI: 25.1 cm  MVA by PHT:4.89 cm2   MV E' Septal Velocity:  9.54 cm/s  MV E' Lateral Velocity:  13.6 cm/s  MV E/E' septal: 7.51  MV E/E' lateral: 5.26      XR LUMBAR SPINE (2-3 VIEWS)    Result Date: 10/22/2021  EXAMINATION: THREE XRAY VIEWS OF THE LUMBAR SPINE 10/22/2021 2:12 pm COMPARISON: None. HISTORY: ORDERING SYSTEM PROVIDED HISTORY: fall, trauma TECHNOLOGIST PROVIDED HISTORY: Reason for exam:->fall, trauma Reason for Exam: fall trauma, xtable due to hip fracture Acuity: Acute Type of Exam: Initial FINDINGS: Five non-rib-bearing lumbar type vertebral bodies are identified. Multilevel degenerative changes of spine, including mild-to-moderate disc height loss at L3-L4 and L4-L5. There is an age-indeterminate compression deformity of the L4 vertebral body. Mild anterior wedging of the T12 and L1 vertebral bodies could also represent compression deformities, although wedging at these levels could represent normal variants. Age-indeterminate anterior compression deformity of the L4 vertebral body. Mild anterior wedging of the T12 and L1 vertebral bodies could represent additional age-indeterminate compression deformities, although wedging at these levels could represent normal variants. Correlate with clinical symptoms. Multilevel degenerative changes of the spine.      CT HEAD WO CONTRAST    Result Date: 10/25/2021  EXAMINATION: CT OF THE HEAD WITHOUT CONTRAST  10/25/2021 11:53 am TECHNIQUE: CT of the head was performed without the administration of intravenous contrast. Dose modulation, iterative reconstruction, and/or weight based adjustment of the mA/kV was utilized to reduce the radiation dose to as low as reasonably achievable. COMPARISON: None. HISTORY: ORDERING SYSTEM PROVIDED HISTORY: confusion with dysarthria TECHNOLOGIST PROVIDED HISTORY: Reason for exam:->confusion with dysarthria Has a \"code stroke\" or \"stroke alert\" been called? ->No FINDINGS: BRAIN/VENTRICLES: There is no acute infarct or acute intracranial hemorrhage present. There is no mass effect or midline shift present. There is no ventriculomegaly or abnormal extra-axial fluid collection present. ORBITS: Limited evaluation of the orbits is unremarkable. SINUSES: There is mild mucosal thickening throughout the paranasal sinuses. The mastoid air cells are clear. SOFT TISSUES/SKULL:  No lytic or blastic osseous lesions are identified. No acute intracranial process identified. XR CHEST PORTABLE    Result Date: 10/25/2021  EXAMINATION: ONE XRAY VIEW OF THE CHEST 10/25/2021 1:06 pm COMPARISON: Chest x-ray October 22, 2021; CT chest August 18, 2020 HISTORY: ORDERING SYSTEM PROVIDED HISTORY: congestion TECHNOLOGIST PROVIDED HISTORY: Reason for exam:->congestion Reason for Exam: chest congestion FINDINGS: Cardiac silhouette is stable. No focal consolidation, pleural effusion, or pneumothorax identified. Chronic interstitial changes of the lungs. Atherosclerotic changes of the aorta. Remote bilateral rib deformities. Postoperative changes of right shoulder arthroplasty appears grossly stable. 1. No acute cardiopulmonary process identified.      XR CHEST PORTABLE    Result Date: 10/22/2021  EXAMINATION: ONE XRAY VIEW OF THE CHEST 10/22/2021 3:27 pm COMPARISON: 05/05/2018 chest radiograph HISTORY: ORDERING SYSTEM PROVIDED HISTORY: hip fracture TECHNOLOGIST PROVIDED HISTORY: Reason for exam:->hip fracture Reason for Exam: hip fracture Acuity: Acute Type of Exam: Initial FINDINGS: Prior median sternotomy and aortic valve replacement. The cardiomediastinal silhouette is normal in size and contour. Atherosclerotic calcifications of the aortic arch. No focal airspace disease. No pleural effusion or pneumothorax. Bilateral remote rib fractures. Right shoulder hemiarthroplasty. No evidence of acute cardiopulmonary disease. XR HIP 2-3 VW W PELVIS RIGHT    Result Date: 10/25/2021  EXAMINATION: ONE XRAY VIEW OF THE PELVIS AND TWO XRAY VIEWS RIGHT HIP 10/25/2021 9:46 am COMPARISON: 10/22/2021. HISTORY: ORDERING SYSTEM PROVIDED HISTORY: S/P R hip thalia TECHNOLOGIST PROVIDED HISTORY: Of operative side while in recovery room. Reason for exam:->S/P R hip thalia Reason for Exam: S/P R hip thalia FINDINGS: There is a right total hip arthroplasty with noncemented components. No acute fracture or dislocation. No acute hardware failure or loosening. Grossly normal alignment. Total hip arthropasty without acute hardware complication. XR HIP 2-3 VW W PELVIS RIGHT    Result Date: 10/22/2021  EXAMINATION: ONE XRAY VIEW OF THE PELVIS AND TWO XRAY VIEWS RIGHT HIP 10/22/2021 2:11 pm COMPARISON: None. HISTORY: ORDERING SYSTEM PROVIDED HISTORY: fall, pain, trauma TECHNOLOGIST PROVIDED HISTORY: Reason for exam:->fall, pain, trauma Reason for Exam: right hip pain Acuity: Acute Type of Exam: Initial Mechanism of Injury: fall Relevant Medical/Surgical History: na FINDINGS: Three views of the pelvis and right hip were reviewed. There is an acute transcervical femoral neck fracture of the right femur with displacement at the fracture site. No additional fractures are identified. Multilevel degenerative changes of the imaged lower lumbar spine. Atherosclerotic vascular calcifications noted. Surgical clips project over the region of the left hip. 1. Acute and displaced transcervical right femoral neck fracture.            EKG this visit:  pending        Electronically signed by Vibha Bacon VANGIE Lynch - CNP on 11/9/2021 at 11:50 AM

## 2021-11-09 NOTE — PLAN OF CARE
Problem: Falls - Risk of:  Goal: Will remain free from falls  Description: Will remain free from falls  Outcome: Completed  Goal: Absence of physical injury  Description: Absence of physical injury  Outcome: Completed     Problem: Skin Integrity:  Goal: Will show no infection signs and symptoms  Description: Will show no infection signs and symptoms  Outcome: Completed  Goal: Absence of new skin breakdown  Description: Absence of new skin breakdown  Outcome: Completed     Problem: SAFETY  Goal: Free from accidental physical injury  Outcome: Completed     Problem: DAILY CARE  Goal: Daily care needs are met  Outcome: Completed     Problem: PAIN  Goal: Patient's pain/discomfort is manageable  Outcome: Completed     Problem: KNOWLEDGE DEFICIT  Goal: Patient/S.O. demonstrates understanding of disease process, treatment plan, medications, and discharge instructions.   Outcome: Completed     Problem: DISCHARGE BARRIERS  Goal: Patient's continuum of care needs are met  Outcome: Completed     Problem: Pain:  Goal: Pain level will decrease  Description: Pain level will decrease  Outcome: Completed  Goal: Control of acute pain  Description: Control of acute pain  Outcome: Completed  Goal: Control of chronic pain  Description: Control of chronic pain  Outcome: Completed

## 2021-11-10 ENCOUNTER — ANESTHESIA EVENT (OUTPATIENT)
Dept: ENDOSCOPY | Age: 66
DRG: 378 | End: 2021-11-10
Payer: OTHER GOVERNMENT

## 2021-11-10 ENCOUNTER — ANESTHESIA (OUTPATIENT)
Dept: ENDOSCOPY | Age: 66
DRG: 378 | End: 2021-11-10
Payer: OTHER GOVERNMENT

## 2021-11-10 ENCOUNTER — APPOINTMENT (OUTPATIENT)
Dept: CT IMAGING | Age: 66
DRG: 378 | End: 2021-11-10
Attending: FAMILY MEDICINE
Payer: OTHER GOVERNMENT

## 2021-11-10 VITALS — OXYGEN SATURATION: 100 % | DIASTOLIC BLOOD PRESSURE: 58 MMHG | SYSTOLIC BLOOD PRESSURE: 108 MMHG

## 2021-11-10 LAB
ALBUMIN SERPL-MCNC: 2.8 GM/DL (ref 3.4–5)
ALP BLD-CCNC: 70 IU/L (ref 40–128)
ALT SERPL-CCNC: 12 U/L (ref 10–40)
AMYLASE: 36 U/L (ref 25–115)
ANION GAP SERPL CALCULATED.3IONS-SCNC: 9 MMOL/L (ref 4–16)
APTT: 30.6 SECONDS (ref 25.1–37.1)
AST SERPL-CCNC: 14 IU/L (ref 15–37)
BANDED NEUTROPHILS ABSOLUTE COUNT: 0.29 K/CU MM
BANDED NEUTROPHILS RELATIVE PERCENT: 3 % (ref 5–11)
BASOPHILS ABSOLUTE: 0.1 K/CU MM
BASOPHILS RELATIVE PERCENT: 1 % (ref 0–1)
BILIRUB SERPL-MCNC: 0.6 MG/DL (ref 0–1)
BUN BLDV-MCNC: 20 MG/DL (ref 6–23)
CALCIUM SERPL-MCNC: 7.8 MG/DL (ref 8.3–10.6)
CHLORIDE BLD-SCNC: 103 MMOL/L (ref 99–110)
CO2: 21 MMOL/L (ref 21–32)
CREAT SERPL-MCNC: 0.8 MG/DL (ref 0.9–1.3)
DIFFERENTIAL TYPE: ABNORMAL
DOHLE BODIES: PRESENT
EKG ATRIAL RATE: 96 BPM
EKG ATRIAL RATE: 96 BPM
EKG DIAGNOSIS: NORMAL
EKG DIAGNOSIS: NORMAL
EKG P AXIS: 70 DEGREES
EKG P AXIS: 70 DEGREES
EKG P-R INTERVAL: 140 MS
EKG P-R INTERVAL: 140 MS
EKG Q-T INTERVAL: 364 MS
EKG Q-T INTERVAL: 364 MS
EKG QRS DURATION: 100 MS
EKG QRS DURATION: 100 MS
EKG QTC CALCULATION (BAZETT): 459 MS
EKG QTC CALCULATION (BAZETT): 459 MS
EKG R AXIS: 42 DEGREES
EKG R AXIS: 42 DEGREES
EKG T AXIS: 83 DEGREES
EKG T AXIS: 83 DEGREES
EKG VENTRICULAR RATE: 96 BPM
EKG VENTRICULAR RATE: 96 BPM
ESTIMATED AVERAGE GLUCOSE: 97 MG/DL
GFR AFRICAN AMERICAN: >60 ML/MIN/1.73M2
GFR NON-AFRICAN AMERICAN: >60 ML/MIN/1.73M2
GLUCOSE BLD-MCNC: 92 MG/DL (ref 70–99)
HBA1C MFR BLD: 5 % (ref 4.2–6.3)
HCT VFR BLD CALC: 21.1 % (ref 42–52)
HCT VFR BLD CALC: 27.6 % (ref 42–52)
HEMOGLOBIN: 6.6 GM/DL (ref 13.5–18)
HEMOGLOBIN: 8.6 GM/DL (ref 13.5–18)
INR BLD: 1.44 INDEX
LIPASE: 20 IU/L (ref 13–60)
LYMPHOCYTES ABSOLUTE: 1.9 K/CU MM
LYMPHOCYTES RELATIVE PERCENT: 19 % (ref 24–44)
MCH RBC QN AUTO: 30.4 PG (ref 27–31)
MCHC RBC AUTO-ENTMCNC: 31.3 % (ref 32–36)
MCV RBC AUTO: 97.2 FL (ref 78–100)
METAMYELOCYTES ABSOLUTE COUNT: 0.2 K/CU MM
METAMYELOCYTES PERCENT: 2 %
MONOCYTES ABSOLUTE: 0.9 K/CU MM
MONOCYTES RELATIVE PERCENT: 9 % (ref 0–4)
MYELOCYTE PERCENT: 1 %
MYELOCYTES ABSOLUTE COUNT: 0.1 K/CU MM
PDW BLD-RTO: 17.1 % (ref 11.7–14.9)
PLATELET # BLD: 335 K/CU MM (ref 140–440)
PMV BLD AUTO: 8.7 FL (ref 7.5–11.1)
POLYCHROMASIA: ABNORMAL
POTASSIUM SERPL-SCNC: 4.1 MMOL/L (ref 3.5–5.1)
PROTHROMBIN TIME: 18.7 SECONDS (ref 11.7–14.5)
RBC # BLD: 2.17 M/CU MM (ref 4.6–6.2)
SARS-COV-2, NAAT: NOT DETECTED
SEGMENTED NEUTROPHILS ABSOLUTE COUNT: 6.3 K/CU MM
SEGMENTED NEUTROPHILS RELATIVE PERCENT: 65 % (ref 36–66)
SODIUM BLD-SCNC: 133 MMOL/L (ref 135–145)
SOURCE: NORMAL
TOTAL PROTEIN: 4.8 GM/DL (ref 6.4–8.2)
TOXIC GRANULATION: PRESENT
WBC # BLD: 9.8 K/CU MM (ref 4–10.5)
WBC # BLD: ABNORMAL 10*3/UL

## 2021-11-10 PROCEDURE — C9113 INJ PANTOPRAZOLE SODIUM, VIA: HCPCS | Performed by: NURSE PRACTITIONER

## 2021-11-10 PROCEDURE — 85018 HEMOGLOBIN: CPT

## 2021-11-10 PROCEDURE — 0DB68ZX EXCISION OF STOMACH, VIA NATURAL OR ARTIFICIAL OPENING ENDOSCOPIC, DIAGNOSTIC: ICD-10-PCS | Performed by: SPECIALIST

## 2021-11-10 PROCEDURE — P9016 RBC LEUKOCYTES REDUCED: HCPCS

## 2021-11-10 PROCEDURE — 85730 THROMBOPLASTIN TIME PARTIAL: CPT

## 2021-11-10 PROCEDURE — 87635 SARS-COV-2 COVID-19 AMP PRB: CPT

## 2021-11-10 PROCEDURE — 88342 IMHCHEM/IMCYTCHM 1ST ANTB: CPT | Performed by: PATHOLOGY

## 2021-11-10 PROCEDURE — 85610 PROTHROMBIN TIME: CPT

## 2021-11-10 PROCEDURE — 97530 THERAPEUTIC ACTIVITIES: CPT

## 2021-11-10 PROCEDURE — 97163 PT EVAL HIGH COMPLEX 45 MIN: CPT

## 2021-11-10 PROCEDURE — 3700000000 HC ANESTHESIA ATTENDED CARE: Performed by: SPECIALIST

## 2021-11-10 PROCEDURE — 3609012400 HC EGD TRANSORAL BIOPSY SINGLE/MULTIPLE: Performed by: SPECIALIST

## 2021-11-10 PROCEDURE — 93010 ELECTROCARDIOGRAM REPORT: CPT | Performed by: INTERNAL MEDICINE

## 2021-11-10 PROCEDURE — 85027 COMPLETE CBC AUTOMATED: CPT

## 2021-11-10 PROCEDURE — 97166 OT EVAL MOD COMPLEX 45 MIN: CPT

## 2021-11-10 PROCEDURE — 74176 CT ABD & PELVIS W/O CONTRAST: CPT

## 2021-11-10 PROCEDURE — 2580000003 HC RX 258: Performed by: NURSE PRACTITIONER

## 2021-11-10 PROCEDURE — 88305 TISSUE EXAM BY PATHOLOGIST: CPT | Performed by: PATHOLOGY

## 2021-11-10 PROCEDURE — 3700000001 HC ADD 15 MINUTES (ANESTHESIA): Performed by: SPECIALIST

## 2021-11-10 PROCEDURE — 2140000000 HC CCU INTERMEDIATE R&B

## 2021-11-10 PROCEDURE — 6360000002 HC RX W HCPCS: Performed by: NURSE ANESTHETIST, CERTIFIED REGISTERED

## 2021-11-10 PROCEDURE — 94761 N-INVAS EAR/PLS OXIMETRY MLT: CPT

## 2021-11-10 PROCEDURE — 2500000003 HC RX 250 WO HCPCS: Performed by: NURSE ANESTHETIST, CERTIFIED REGISTERED

## 2021-11-10 PROCEDURE — 85007 BL SMEAR W/DIFF WBC COUNT: CPT

## 2021-11-10 PROCEDURE — 0DB78ZX EXCISION OF STOMACH, PYLORUS, VIA NATURAL OR ARTIFICIAL OPENING ENDOSCOPIC, DIAGNOSTIC: ICD-10-PCS | Performed by: SPECIALIST

## 2021-11-10 PROCEDURE — 80053 COMPREHEN METABOLIC PANEL: CPT

## 2021-11-10 PROCEDURE — 83036 HEMOGLOBIN GLYCOSYLATED A1C: CPT

## 2021-11-10 PROCEDURE — 82150 ASSAY OF AMYLASE: CPT

## 2021-11-10 PROCEDURE — 85014 HEMATOCRIT: CPT

## 2021-11-10 PROCEDURE — 6370000000 HC RX 637 (ALT 250 FOR IP): Performed by: PHYSICAL MEDICINE & REHABILITATION

## 2021-11-10 PROCEDURE — 83690 ASSAY OF LIPASE: CPT

## 2021-11-10 PROCEDURE — 6360000002 HC RX W HCPCS: Performed by: NURSE PRACTITIONER

## 2021-11-10 PROCEDURE — 2500000003 HC RX 250 WO HCPCS: Performed by: SPECIALIST

## 2021-11-10 PROCEDURE — APPSS45 APP SPLIT SHARED TIME 31-45 MINUTES: Performed by: NURSE PRACTITIONER

## 2021-11-10 PROCEDURE — 99233 SBSQ HOSP IP/OBS HIGH 50: CPT | Performed by: INTERNAL MEDICINE

## 2021-11-10 PROCEDURE — 6360000004 HC RX CONTRAST MEDICATION: Performed by: HOSPITALIST

## 2021-11-10 PROCEDURE — 97116 GAIT TRAINING THERAPY: CPT

## 2021-11-10 PROCEDURE — 36415 COLL VENOUS BLD VENIPUNCTURE: CPT

## 2021-11-10 PROCEDURE — 2709999900 HC NON-CHARGEABLE SUPPLY: Performed by: SPECIALIST

## 2021-11-10 RX ORDER — PROPOFOL 10 MG/ML
INJECTION, EMULSION INTRAVENOUS PRN
Status: DISCONTINUED | OUTPATIENT
Start: 2021-11-10 | End: 2021-11-10 | Stop reason: SDUPTHER

## 2021-11-10 RX ORDER — LIDOCAINE HYDROCHLORIDE 20 MG/ML
INJECTION, SOLUTION EPIDURAL; INFILTRATION; INTRACAUDAL; PERINEURAL PRN
Status: DISCONTINUED | OUTPATIENT
Start: 2021-11-10 | End: 2021-11-10 | Stop reason: SDUPTHER

## 2021-11-10 RX ORDER — CLINDAMYCIN PHOSPHATE 150 MG/ML
600 INJECTION, SOLUTION INTRAVENOUS ONCE
Status: DISCONTINUED | OUTPATIENT
Start: 2021-11-10 | End: 2021-11-10 | Stop reason: ALTCHOICE

## 2021-11-10 RX ORDER — SODIUM CHLORIDE 9 MG/ML
INJECTION, SOLUTION INTRAVENOUS PRN
Status: DISCONTINUED | OUTPATIENT
Start: 2021-11-10 | End: 2021-11-15 | Stop reason: HOSPADM

## 2021-11-10 RX ORDER — CLINDAMYCIN PHOSPHATE 600 MG/50ML
600 INJECTION INTRAVENOUS ONCE
Status: COMPLETED | OUTPATIENT
Start: 2021-11-10 | End: 2021-11-10

## 2021-11-10 RX ADMIN — VENLAFAXINE HYDROCHLORIDE 150 MG: 150 CAPSULE, EXTENDED RELEASE ORAL at 07:58

## 2021-11-10 RX ADMIN — PANTOPRAZOLE SODIUM 40 MG: 40 INJECTION, POWDER, FOR SOLUTION INTRAVENOUS at 07:58

## 2021-11-10 RX ADMIN — ATORVASTATIN CALCIUM 40 MG: 40 TABLET, FILM COATED ORAL at 07:58

## 2021-11-10 RX ADMIN — SODIUM CHLORIDE, PRESERVATIVE FREE 10 ML: 5 INJECTION INTRAVENOUS at 07:58

## 2021-11-10 RX ADMIN — PHENYLEPHRINE HYDROCHLORIDE 100 MCG: 10 INJECTION INTRAVENOUS at 15:46

## 2021-11-10 RX ADMIN — MONTELUKAST 10 MG: 10 TABLET, FILM COATED ORAL at 20:10

## 2021-11-10 RX ADMIN — LIDOCAINE HYDROCHLORIDE 100 MG: 20 INJECTION, SOLUTION EPIDURAL; INFILTRATION; INTRACAUDAL; PERINEURAL at 15:39

## 2021-11-10 RX ADMIN — IOHEXOL 50 ML: 240 INJECTION, SOLUTION INTRATHECAL; INTRAVASCULAR; INTRAVENOUS; ORAL at 11:25

## 2021-11-10 RX ADMIN — CLINDAMYCIN PHOSPHATE 600 MG: 600 INJECTION, SOLUTION INTRAVENOUS at 15:33

## 2021-11-10 RX ADMIN — PANTOPRAZOLE SODIUM 40 MG: 40 INJECTION, POWDER, FOR SOLUTION INTRAVENOUS at 20:10

## 2021-11-10 RX ADMIN — TRIAMCINOLONE ACETONIDE: 1 CREAM TOPICAL at 20:13

## 2021-11-10 RX ADMIN — PHENYLEPHRINE HYDROCHLORIDE 100 MCG: 10 INJECTION INTRAVENOUS at 15:40

## 2021-11-10 RX ADMIN — PHENYLEPHRINE HYDROCHLORIDE 100 MCG: 10 INJECTION INTRAVENOUS at 15:43

## 2021-11-10 RX ADMIN — CALCIUM 500 MG: 500 TABLET ORAL at 07:58

## 2021-11-10 RX ADMIN — TRIAMCINOLONE ACETONIDE: 1 CREAM TOPICAL at 07:58

## 2021-11-10 RX ADMIN — PROPOFOL 150 MG: 10 INJECTION, EMULSION INTRAVENOUS at 15:39

## 2021-11-10 RX ADMIN — TRAZODONE HYDROCHLORIDE 200 MG: 50 TABLET ORAL at 20:10

## 2021-11-10 ASSESSMENT — PAIN SCALES - GENERAL
PAINLEVEL_OUTOF10: 3
PAINLEVEL_OUTOF10: 0

## 2021-11-10 NOTE — PLAN OF CARE
Nutrition Problem #1: Predicted inadequate energy intake  Intervention: Food and/or Nutrient Delivery: Continue Current Diet, Start Oral Nutrition Supplement  Nutritional Goals: Patient will tolerate diet to consume at least 75% at meals during stay

## 2021-11-10 NOTE — PROGRESS NOTES
Comprehensive Nutrition Assessment    Type and Reason for Visit:  Initial, Positive Nutrition Screen (poor intake, weight loss)    Nutrition Recommendations/Plan:   Resume regular diet when appropriate   As diet advances from clear liquid, resume oral nutrition supplement  Will continue to follow up during stay     Nutrition Assessment:  Transfer from rehab unit due to weakness, dizzy, low Hb. Currently on clear liquid diet. Initially with good po intake on rehab unit and drinking oral nutrition supplement. Some weight loss reported on admit but per patient was intentional loss due to changes to food/lifestyle. Admit to rehab after hip surgery. Will follow at moderate nutrition risk at this time with recent decline in intake, on clear liquid diet at this time. Malnutrition Assessment:  Malnutrition Status: At risk for malnutrition (Comment)    Context:  Acute Illness       Estimated Daily Nutrient Needs:  Energy (kcal):  1729-0526 (30-32 kaila/kg); Weight Used for Energy Requirements:  Current     Protein (g):  63-75 (1-1.2 g/kg); Weight Used for Protein Requirements:  Current        Fluid (ml/day):  1900; Method Used for Fluid Requirements:  1 ml/kcal      Nutrition Related Findings:  not available on visit today, GI eval in progress with anemia      Wounds:  Surgical Incision       Current Nutrition Therapies:    ADULT DIET; Clear Liquid    Anthropometric Measures:  · Height: 5' 4\" (162.6 cm)  · Current Body Weight: 138 lb 3.7 oz (62.7 kg)   · Admission Body Weight: 144 lb 10 oz (65.6 kg) (rehab)    · Usual Body Weight: 179 lb 14.3 oz (81.6 kg) (per hx)     · Ideal Body Weight: 130 lbs; % Ideal Body Weight 106.3 %   · BMI: 23.7  · Adjusted Body Weight:  ; No Adjustment   · BMI Categories: Normal Weight (BMI 18.5-24. 9)       Nutrition Diagnosis:   · Predicted inadequate energy intake related to increase demand for energy/nutrients as evidenced by other (comment) (recovery from surgery)      Nutrition Interventions:   Food and/or Nutrient Delivery:  Continue Current Diet, Start Oral Nutrition Supplement  Nutrition Education/Counseling:  No recommendation at this time   Coordination of Nutrition Care:  Continue to monitor while inpatient    Goals:  Patient will tolerate diet to consume at least 75% at meals during stay       Nutrition Monitoring and Evaluation:   Behavioral-Environmental Outcomes:  None Identified   Food/Nutrient Intake Outcomes:  Diet Advancement/Tolerance, Food and Nutrient Intake  Physical Signs/Symptoms Outcomes:  Biochemical Data, GI Status, Meal Time Behavior, Skin, Weight     Discharge Planning:     Too soon to determine     Electronically signed by Trace Mukherjee RD, LD on 11/10/21 at 11:56 AM EST    Contact: [phone]

## 2021-11-10 NOTE — OP NOTE
noted with no active bleeding. RECOMMENDATIONS:  1. We will continue the patient on Protonix. 2.  We will follow up on the above biopsy report; if positive for H.   Pylori, the patient should be treated for H.  Pylori. 3.  Monitor the patient's H and H and transfuse on a p.r.n. basis to  keep hemoglobin above 7 gm percent. 4.  May resume anticoagulation for prosthetic aortic valve. INTERPRETATION:  The patient tolerated the procedure well. There were  no postop complications. The blood loss during the procedure was nil.         Rell Maier MD    D: 11/10/2021 15:56:44       T: 11/10/2021 15:59:14     [id number]  Job#: [id number]     Doc#: [id number]    CC:

## 2021-11-10 NOTE — ANESTHESIA POSTPROCEDURE EVALUATION
Department of Anesthesiology  Postprocedure Note    Patient: Shashi Amezcua  MRN: [id number]  YOB: 1955  Date of evaluation: 11/10/2021  Time:  4:03 PM     Procedure Summary     Date: 11/10/21 Room / Location: [address]    Anesthesia Start: 1855 Anesthesia Stop: 2787    Procedure: EGD BIOPSY (N/A ) Diagnosis: (--)    Surgeons: Susana Ramirez MD Responsible Provider: Jose Cantu MD    Anesthesia Type: MAC ASA Status: 3 - Emergent          Anesthesia Type: MAC    Sloan Phase I:  10    Sloan Phase II:  10    Last vitals: Reviewed and per EMR flowsheets.        Anesthesia Post Evaluation    Patient location during evaluation: bedside  Patient participation: complete - patient participated  Level of consciousness: awake and alert  Pain score: 0  Airway patency: patent  Nausea & Vomiting: no nausea and no vomiting  Complications: no  Cardiovascular status: hemodynamically stable  Respiratory status: acceptable, room air, spontaneous ventilation and nonlabored ventilation  Hydration status: euvolemic

## 2021-11-10 NOTE — PROGRESS NOTES
[facility] HOSPITALIST PROGRESS NOTE      PCP: Emily Key MD    Date of Admission: 11/9/2021    Subjective:  Stable      Brief Hospital summary patient is a 28-year-old male with history of COPD, CHF, essential hypertension, hyperlipidemia and mechanical aortic valve on Coumadin and right hip arthroplasty on 10/25/2021 who was admitted with weakness and fatigue. Hemoglobin 4.3, status post 2 packed RBC transfusions  GI consulted, Coumadin held,    Vitals signs:  Afebrile, HR 70s range, blood pressure normal range, on room air     Medications:  Lipitor, lactulose, pantoprazole, montelukast, trazodone, venlafaxine     Antibiotics: None     Fluid status: I/O    Labs:   Hb 6.6,--> 8.6   LFTs     Imaging:   CT abdomen     1. No specific acute abnormality identified in the abdomen pelvis by   noncontrast evaluation. 2. Noted of severe atherosclerotic calcifications and possible constipation. 3. Mild infiltrative changes in the lung bases, the appearance suspicious for   low-grade aspiration.         Assessment/Plan:     ABLA on chronic anemia   S/p right hip arthroplasty   Mechanical AV   COPD  Bipolar disorder   Hyperglycemia   HLD       S/p PRBCs,   hemoglobin stable  IV PPI, H and H Q8   Colonoscopy per GI   lipitor   Continue venlafaxine       DVT prophlaxis:   scds    Last BM:   None     Ambulation:   As tolerated    Disposition:   Home     Diet: Full liquid diet  NPO after midnight     Physical Exam Performed:       /63   Pulse 77   Temp 97.9 °F (36.6 °C) (Oral)   Resp 22   Ht 5' 4\" (1.626 m)   Wt 138 lb 3.7 oz (62.7 kg)   SpO2 99%   BMI 23.73 kg/m²     Physical Exam  Constitutional:       General: He is not in acute distress. Appearance: Normal appearance. HENT:      Head: Normocephalic and atraumatic. Right Ear: External ear normal.      Left Ear: External ear normal.   Eyes:      Extraocular Movements: Extraocular movements intact.       Pupils: Pupils are equal, round, and reactive to light. Cardiovascular:      Rate and Rhythm: Normal rate and regular rhythm. Heart sounds: No murmur heard. Pulmonary:      Effort: Pulmonary effort is normal. No respiratory distress. Breath sounds: Normal breath sounds. No wheezing. Abdominal:      General: Bowel sounds are normal. There is no distension. Palpations: Abdomen is soft. Tenderness: There is no abdominal tenderness. Musculoskeletal:         General: No swelling. Cervical back: Normal range of motion. Skin:     General: Skin is warm. Neurological:      General: No focal deficit present. Mental Status: He is alert and oriented to person, place, and time. Cranial Nerves: No cranial nerve deficit. Psychiatric:         Mood and Affect: Mood normal.         Labs:   Recent Labs     11/09/21  0943 11/10/21  0259   WBC 12.0* 9.8   HGB 4.3* 6.6*   HCT 14.3* 21.1*    335     Recent Labs     11/10/21  0259   *   K 4.1      CO2 21   BUN 20   CREATININE 0.8*   CALCIUM 7.8*     Recent Labs     11/10/21  0259   AST 14*   ALT 12   BILITOT 0.6   ALKPHOS 70     Recent Labs     11/08/21  0548 11/09/21  0303 11/10/21  0259   INR 1.13 1.21 1.44     No results for input(s): Magdacalista Harrell in the last 72 hours.     Urinalysis:      Lab Results   Component Value Date    NITRU NEGATIVE 11/09/2021    WBCUA <1 11/09/2021    BACTERIA NEGATIVE 11/09/2021    RBCUA NONE SEEN 11/09/2021    BLOODU NEGATIVE 11/09/2021    SPECGRAV 1.016 11/09/2021    GLUCOSEU neg 09/17/2019       Radiology:  CT ABDOMEN PELVIS W WO CONTRAST    (Results Pending)           Conner Leger MD  11/10/2021 10:28 AM

## 2021-11-10 NOTE — BRIEF OP NOTE
Brief Postoperative Note      Jaguar Jane is a 73 y.o. male     Pre-operative Diagnosis: ANEMIA  / GIT BLEEDING  Post-operative Diagnosis H-H--/  EROSIVE ESOPHAGITIS /   1 CM PYLORIC CHANNEL ULCER WITH ACTIVE BLEEDING    Procedure:EGD WITH BX    Anesthesia: MAC    Surgeons/Assistants:Haim Camara MD     Estimated Blood Loss: NONE    Complications: None    Specimens: were obtained  REC   CPM-- MAY RESUME ANTICOAGULATION--F/U H/H AND BX FOR H.PYLORI    Reyna Cisneros MD   11/10/2021   3:51 PM

## 2021-11-10 NOTE — PROGRESS NOTES
[facility] PHYSICAL REHABILITATION [place]  [address], [address] [facility],Third [address]  Phone: [phone]    Fax [phone]                  Faith Reynaga MD, Dot Avendaño MD, Isabella Reddy MD, MD Luis William MD Georgiann Barrow, MD Dorenda Orion, MD Deedra Bard, APRN      Radha File, APRN  Sivakumar Limb, [address] Ele[address] Soto, APRN    Cardiology Progress Note     Today's Plan: hold [facility]    Admit Date:  11/9/2021    Consult reason/ Seen today for: GIB, mechanical AVR    Subjective and  Overnight Events:  Patient staes that he is unsure of the plan for his GIB. He is concerned about being off of his coumadin. He states that he does take his mediations regularly     Assessment / Plan / Recommendation:     1. S/p AVR : Mechanical prosthesis okay to hold Coumadin although INR has not been therapeutic for last several days  2. GI to evaluate for source of bleeding per note planning EGD. He may proceed with procedure without further testing. 3. Restart anticoagulation as soon as possible high risk for stroke and valve thrombosis without anticoagulation  4. DVT prophylaxis if no contraindication  5. Hypotension: improving. Hold blood pressure meds for now         History of Presenting Illness:    Chief complain on admission : 73 y. o.year old who is admitted forNo chief complaint on file.        Past medical history:    has a past medical history of Acid reflux, Acute frontal sinusitis, Alcohol abuse, Anesthesia, Anxiety, Arthritis, Atypical mycobacterial infection, Broken teeth, CHF (congestive heart failure) ([address] COPD (chronic obstructive pulmonary disease) ([address] Cough, Depression, Depression, Dyspnea, H/O cardiovascular MUGA stress test, H/O echocardiogram, H/O echocardiogram, History of 24 hour EKG monitoring, [place] (hard of hearing), Hyperlipidemia, Hypertension, Irritant contact dermatitis due to other chemical products, Other drug allergy(995.27), S/P AVR, Shortness of breath, and Teeth missing. Past surgical history:   has a past surgical history that includes Lung biopsy (Right, 1996); Rotator cuff repair (Right, 2008); Dental surgery; Cardiac valve replacement (12/17/2003); Colonoscopy (2006); Endoscopy, colon, diagnostic (In Past); Tonsillectomy (1959 Or 1960); bronchoscopy (1996); Knee arthroscopy (Left, 1992); other surgical history (1992); HEMIARTHROPLASTY SHOULDER FRACTURE (Right, 1/29/2019); and Total hip arthroplasty (Right, 10/25/2021). Social History:   reports that he has never smoked. His smokeless tobacco use includes snuff and chew. He reports current alcohol use of about 4.0 - 5.0 standard drinks of alcohol per week. He reports that he does not use drugs. Family history:  family history includes Asthma in his sister; COPD in his father and sister; Cancer in his mother; Diabetes in his mother and sister; Heart Disease in his father; High Blood Pressure in his mother and sister; High Cholesterol in his mother and sister; No Known Problems in his son; Obesity in his mother; Other in his sister; Vision Loss in his mother. Allergies   Allergen Reactions    Ciprofloxacin Hcl Hives and Swelling    Levaquin [Levofloxacin] Hives and Swelling    Other Nausea And Vomiting     \"Allergic To Tylox\"    Ceftin [Cefuroxime]        Review of Systems   All 14 systems were reviewed and are negative  Except for the positive findings  which as documented     BP (!) 117/57   Pulse 71   Temp 98 °F (36.7 °C) (Oral)   Resp 14   Ht 5' 4\" (1.626 m)   Wt 138 lb 3.7 oz (62.7 kg)   SpO2 98%   BMI 23.73 kg/m²       Intake/Output Summary (Last 24 hours) at 11/10/2021 0601  Last data filed at 11/10/2021 0534  Gross per 24 hour   Intake 350 ml   Output --   Net 350 ml       Physical Exam  Vitals reviewed. Constitutional:       General: He is not in acute distress. Appearance: Normal appearance. He is not ill-appearing. HENT:      Head: Atraumatic.    Neck:      Vascular: No carotid bruit. Cardiovascular:      Rate and Rhythm: Normal rate and regular rhythm. Pulses: Normal pulses. Heart sounds: Murmur (mechanical click) heard. Pulmonary:      Effort: Pulmonary effort is normal. No respiratory distress. Breath sounds: Normal breath sounds. Musculoskeletal:         General: No swelling or deformity. Cervical back: Neck supple. No muscular tenderness. Neurological:      Mental Status: He is alert. Telemetry Reviewed:   Sinus rhythm with PVC's    Medications:    atorvastatin  40 mg Oral Daily    calcium elemental  500 mg Oral Daily    [Held by provider] lactulose  20 g Oral BID    montelukast  10 mg Oral Nightly    traZODone  200 mg Oral Nightly    triamcinolone   Topical BID    venlafaxine  150 mg Oral Daily    pantoprazole  40 mg IntraVENous BID    sodium chloride flush  5-40 mL IntraVENous 2 times per day      sodium chloride      sodium chloride      sodium chloride 75 mL/hr at 11/09/21 1508     sodium chloride, sodium chloride, oxyCODONE, polyethylene glycol, albuterol sulfate HFA, bisacodyl, cyclobenzaprine, magnesium hydroxide, sodium chloride flush, ondansetron **OR** ondansetron, acetaminophen **OR** acetaminophen    Lab Data:  CBC:   Recent Labs     11/09/21  0943 11/10/21  0259   WBC 12.0* 9.8   HGB 4.3* 6.6*   HCT 14.3* 21.1*   MCV 97.9 97.2    335     BMP:   Recent Labs     11/10/21  0259   *   K 4.1      CO2 21   BUN 20   CREATININE 0.8*     PT/INR:   Recent Labs     11/08/21  0548 11/09/21  0303 11/10/21  0259   PROTIME 14.6* 15.6* 18.7*   INR 1.13 1.21 1.44     BNP:  No results for input(s): PROBNP in the last 72 hours. TROPONIN: No results for input(s): TROPONINT in the last 72 hours. ECHO :   Echocardiogram  10/25/2021   Summary   Technically difficult examination due to patient immobility s/p hip   arthroplasty.    Left ventricular systolic function is low normal.   Ejection fraction is visually estimated at 50%. Moderate left ventricular hypertrophy. prosthetic valve in aortic position; mean PG: 15 mmHg. LACEY: 1.70 cm sq. DVI:   0.4. AT: 25 ms. Trace aortic regurgitation noted. All labs, medications and tests reviewed by myself , continue all other medications of all above medical condition listed as is except for changes mentioned above. Thank you very much for consult , please call with questions. Electronically signed by VANGIE Hsu CNP on 11/10/2021 at [address]    I have seen ,spoken to  and examined this patient personally, independently of the nurse practitioner. I have reviewed the hospital care given to date and reviewed all pertinent labs and imaging. The plan was developed mutually at the time of the visit with the patient,  NP   and myself. I have spoken with patient, nursing staff and provided written and verbal instructions . The above note has been reviewed and I agree with the assessment, diagnosis, and treatment plan with changes made by me as follows     HPI:  I have reviewed the above HPI  And agree with above   Leanna Carolina is a 73 y. o.year old who and presents with had no chief complaint listed for this encounter. No chief complaint on file. Please review addendum/changes made to note above   Interval history:  GI work up today , got 2 units of blood Coumadin is on hold    Physical Exam:  General:  Awake, alert, NAD  Head:normal  Eye:normal  Neck:  No JVD   Chest:  Clear to auscultation, respiration easy  Cardiovascular:  S1 and S2 audible, No added heart sounds, No signs of ankle edema, or volume overload, No evidence of JVD, No crackles  Abdomen:   nontender  Extremities:  No * edema  Pulses; palpable  Neuro: grossly normal      MEDICAL DECISION MAKING;    I agree with the above plan, which was planned by myself and discussed with NP.     Will need anticoagulation once GI bleeding is sorted out to mechanical aortic valve  Continue to hold anticoagulation today but ideally should not have interruption for more than 72 hours    Quintin Leong MD [facility] 11/10/21

## 2021-11-10 NOTE — PROGRESS NOTES
RECEIVED REPORT FROM DIANNA PIERCE PRIOR TO TRANSFER TO ENDO UNIT.  PT IS ALERT AND ORIENTED AND BEEN NPO APPROPRIATE AMOUNT OF TIME

## 2021-11-10 NOTE — CARE COORDINATION
Pt came from ARU. CM met with pt for d/c planning. Pt plans to return to ARU. Pt will require a pre-cert to return to ARU. PT/OT has been ordered and requested via WB. Notified Maribeth/HUGO to start the pre-cert when the PT/OT evals have been completed.   TE

## 2021-11-10 NOTE — CONSULTS
[address] [place] [address], [address] [facility]                                  CONSULTATION    PATIENT NAME: Gloria Crowder                     :        1955  MED REC NO:   [id number]                          ROOM:       [id number]  ACCOUNT NO:   [de-identified]                           ADMIT DATE: 2021  PROVIDER:     Robby Kim MD    CONSULT DATE:  2021    PRIMARY CARE PROVIDER:  Juan Carlos Espino MD    CHIEF COMPLAINT:  History of severe anemia with \"blackish stool\" rule  out GI bleeding. HISTORY OF PRESENT ILLNESS:  The patient is a 71-year-old white  gentleman, patient of Dr. Arya Josue with past medical history  significant for coronary artery disease/congestive heart failure status  post aortic valve replacement, hypertension, hyperlipidemia, COPD,  depression, gastroesophageal reflux disease, anxiety disorder,  osteoarthritis, and history of EtOH abuse, who was admitted to the  hospital on 10/22/2021, after a fall and was noted to have a fracture of  the right femur. The patient underwent surgery by Dr. Micaela Montero, on  10/25/2021, and then subsequently he was transferred to rehab unit on  2021. The patient lately has been complaining of generalized weakness,  dizziness, and nauseated feeling and had a pale appearance today. The  patient had a CBC drawn today, which showed a hemoglobin of 4.3,  platelet count was 340,575, and WBC was 12,000. The patient's last  hemoglobin on 10/31/2021 was 7.9 gm percent. The patient was  transferred from the rehab center to the cardiac step-down unit for  further workup and management. Of note, the patient's hemoglobin upon  admission on 10/22/2021 was 12.5 gm percent. The patient is on  Coumadin.   According to the patient, he did have an EGD done in the  remote past and also has had a couple of colonoscopies done, the first  one was by Dr. Garth Briggs, on 2015, and the second one by myself in  2020. The patient is hemodynamically stable and is on IV Protonix. PAST MEDICAL HISTORY:  Significant for history of coronary artery  disease/congestive heart failure, valvular heart disease status post  aortic valve replacement on Coumadin, history of EtOH abuse,  hypertension, hyperlipidemia, osteoarthritis, anxiety  disorder/depression, and COPD. PAST SURGICAL HISTORY:  The patient has had aortic valve replacement  done, recently had surgery in his right femur on 10/25/2021. The  patient also has had surgery done on his knee, ankle, and had a lung  biopsy done in the remote past, tonsillectomy and adenoidectomy, right  shoulder surgery, and dental surgery. FAMILY HISTORY:  The patient's mother was diagnosed with lymphoma. SOCIOECONOMIC HISTORY:  There is history of EtOH abuse, but according to  the patient now he drinks socially, his last drink was two weeks ago and  the patient is a current smoker as well. CURRENT MEDICATIONS:  Please refer to chart (the patient was on Coumadin  for his aortic valve replacement). ALLERGIES:  The patient is allergic to CIPROFLOXACIN, LEVAQUIN, and  CEFTIN. REVIEW OF SYSTEMS:  CENTRAL NERVOUS SYSTEM EXAMINATION:  The patient denies headache or  focal sensorimotor symptoms. CARDIOVASCULAR SYSTEM:  No history of chest pain, but the patient  complains of shortness of breath, but no leg swelling. GENITOURINARY SYSTEM:  No history of dysuria, pyuria, hematuria. MUSCULOSKELETAL SYSTEM:  The patient complains of generalized weakness  and dizziness. RESPIRATORY SYSTEM:  No history of cough, hemoptysis, fever, or chills. PHYSICAL EXAMINATION:  GENERAL:  Examination shows a 18-year-old white gentleman of thin build  and average nutritional status, who is lying flat in bed, in no acute  distress. He is awake, alert, and oriented and pleasant to talk with. VITAL SIGNS:  Stable. HEENT:  Examination shows skull to be atraumatic.   Conjunctivae pale.  NECK:  Supple. CHEST:  Clear. CARDIOVASCULAR:  Heart S1 and S2 is normal.  ABDOMEN:  Soft, nondistended, and nontender. Liver and spleen are not  palpable. Bowel sounds are present. RECTAL:  Exam is deferred. CNS:  Exam shows the patient to be awake, alert, and oriented. There  are no focal sensorimotor signs. Musculoskeletal:  Exam shows evidence of degenerative joint disease  changes. LABORATORY DATA:  As above mentioned. IMPRESSION:  A 15-year-old white gentleman with multiple comorbidities,  undergoing rehab for right femur fracture and noted to be pale with  severe anemia with hemoglobin 4.3 gm percent and \"darkish stool\", rule  out GI bleeding, source most likely upper gastrointestinal tract;  however, lower GI bleeding lesion cannot be ruled out. RECOMMENDATIONS:  1. Agree with present management to transfuse packed RBCs to keep  hemoglobin above 7 gm percent. 2.  We will continue the patient on Protonix. 3.  We will also get a CAT scan of the abdomen and pelvis as a part of  workup of his anemia. 4.  The patient will need an EGD and if negative, colonoscopy and small  bowel evaluation. 5.  The patient has been strongly instructed to quit smoking cigarettes  and drinking alcohol. 6.  The case and plan has been discussed in detail with the patient.         Piero Diaz MD    D: 11/09/2021 17:40:48       T: 11/09/2021 17:44:02     [email or web address]  Job#: [id number]     Doc#: [id number]    CC:  Radha Brock MD

## 2021-11-10 NOTE — CONSULTS
[address], 1955, 3119/3119-A, 11/10/2021    Discharge Recommendation: Home with [facility] OT services, Initial 24 hour SUP/assist, PRN assistance    Recommend use of RW during mobility and shower chair to increase safety while bathing. History:  Ramona:  There were no encounter diagnoses. Subjective:  Patient states: Agreeable to therapy. Pain: Pt denied pain this date (0/10). Communication with other providers: PT, RN, LSW  Restrictions: General Precautions, Fall Risk, WBAT Rt LE, Anterolateral Hip Precautions, Precautions, IV, Telemetry, Bed/chair alarm    Home Setup/Prior level of function:    Lives With: Alone  Type of Home: Apartment  Home Layout: One level  Home Access: Ramped entrance, Elevator (has elevator access to apartment)  Bathroom Shower/Tub: Tub/Shower unit  Bathroom Toilet: Handicap height  Bathroom Equipment: Grab bars in shower  Bathroom Accessibility: Walker accessible  Home Equipment:  (Has 4WW in hospital room that is her sister's)  ADL Assistance: Independent  Homemaking Assistance: Independent  Homemaking Responsibilities: Yes  Ambulation Assistance: Independent (no AD)  Transfer Assistance: Independent  Active : No (Sister drives)  Mode of Transportation: Car  Occupation: On disability  Type of occupation: Construction  Additional Comments: Pt typically sleeps in a flat, regular bed at home. Pt reports at least 3 falls in the past year \"due to bad judgement\"; latest one caused R hip fx precipitating this hospitalization. Examination:  · Observation: Supine in bed upon arrival  · Vision: [facility]  · Hearing: [facility] [place]  · Vitals: Stable vitals throughout session    Body Systems and functions:  · ROM: WFL   · Strength: WFL  · Sensation: WFL  · Tone: Normal  · Coordination: WFL  · Perception: WNL    Activities of Daily Living (ADLs):  · Feeding: Emily  setup and increased time.   · Grooming: SUP in standing at sink with SUP for safety, setup, increased time, VC.   · UB bathing: Emily  in seated with setup and increased time. · LB bathing: [place] in seated with setup, increased time, VC and assistance for distal reaching. · UB dressing: Emily  in seated with setup and increased time. · LB dressing: Mercy in seated with setup, increased time, VC and assistance for distal reaching. Pt donned flip flops while seated upright at EOB this date. · Toileting: CGA during commode transfers and while balancing in standing to perform temo care and LB clothing management. Cognitive and Psychosocial Functioning:  · Overall cognitive status: Oriented to time, date, person, place, city  · Affect: Normal     Balance:   · Sitting: SUP (pt sat EOB demo good dynamic sitting balance). · Standing: SBA (pt stood with RW. Demo good dynamic standing balance). Functional Mobility:   · Bed Mobility: SUP (pt performed supine to seated bed mobility with HOB slightly elevated. Demo good ability to complete task). · Transfers: SBA (pt performed STS from EOB with RW. Required VC throughout for sequencing and increased time). · Ambulation: SBA (pt ambulated approx 150ft with RW. Demo fair pace throughout and 0 LOB). AM-PAC 6 click short form for inpatient daily activity:   How much help from another person does the patient currently need. .. Unable  Dep A Lot  Max A A Lot   Mod A A Little  Min A A Little   CGA  SBA None   Mod I  Indep  Sup   1. Putting on and taking off regular lower body clothing? [] 1    [] 2   [] 2   [x] 3   [] 3   [] 4      2. Bathing (including washing, rinsing, drying)? [] 1   [] 2   [] 2 [x] 3 [] 3 [] 4   3. Toileting, which includes using toilet, bedpan, or urinal? [] 1    [] 2   [] 2   [] 3   [x] 3   [] 4     4. Putting on and taking off regular upper body clothing? [] 1   [] 2   [] 2   [] 3   [] 3    [x] 4      5. Taking care of personal grooming such as brushing teeth?  [] 1   [] 2    [] 2 [] 3    [] 3 [x] 4      6. Eating meals? [] 1   [] 2   [] 2   [] 3   [] 3   [x] 4      Raw Score:  21     [24=0% impaired(CH), 23=1-19%(CI), 20-22=20-39%(CJ), 15-19=40-59%(CK), 10-14=60-79%(CL), 7-9=80-99%(CM), 6=100%(CN)]    Treatment:  Therapeutic Activity Training:   Therapeutic activity training was instructed today. Cues were given for safety, sequence, UE/LE placement, awareness, and balance. Activities performed today included bed mobility training, sup-sit, sit-stand, ambulation. Safety Measures: Gait belt used, Left in chair, Alarm in place    Assessment:  Assessment  Performance deficits / Impairments: Decreased functional mobility , Decreased ADL status, Decreased high-level IADLs, Decreased endurance, Decreased balance  Treatment Diagnosis: acute blood loss anemia  Prognosis: Good  Decision Making: Medium Complexity  REQUIRES OT FOLLOW UP: Yes  Discharge Recommendations: Home with Home health OT    Pt is a 73year old M admitted for acute blood loss anemia. Pt underwent R MICHAEL on 10/25/2021 after experiencing a fx and is currently WBAT with anterior hip precautions. Pt was receiving therapy through ARU and was transferred to acute inpatient d/t acute blood loss anemia. Pt reports that prior to admission he was IND in ADLs, IADLs, and functional mobility. This date, pt demo decreased activity tolerance and overall functional mobility impacting ADL status. Pt is currently functioning below baseline and would benefit from skilled OT services through [facility], initial 24 hour SUP/assist, PRN assist.    Plan:  Plan  Times per week: 2+  Times per day: Daily      Goals:  1. Pt will complete all aspects of bed mobility for EOB/OOB ADLs Emily. 2. Pt will complete LB bathing with CGA and AE as needed. 3. Pt will complete all aspects of LB dressing with CGA and AE as needed. 4. Pt will complete all functional transfers to and from bed, chair, toilet, shower chair with SUP and AD.   5. Pt will ambulate HH distance to bathroom for toileting with SUP and AD. 6. Pt will complete all aspects of toileting task with SBA. 7. Pt will complete oral hygiene/grooming routine in standing at sink with Emily demo good dynamic standing balance for approx 8 minutes. 8. Pt will complete ther ex/ther act with focus on UB strengthening. Time:   Time in: 1205  Time out: 1245  Timed treatment minutes: 25  Total time: 40      Electronically signed by:       DANIEL Box/L, [address] [place] [id number]

## 2021-11-10 NOTE — ANESTHESIA PRE PROCEDURE
Department of Anesthesiology  Preprocedure Note       Name:  Kevyn Valdes   Age:  73 y.o.  :  1955                                          MRN:  [id number]         Date:  11/10/2021      Surgeon: Fidel Boyce):  Marie Potter MD    Procedure: Procedure(s):  EGD ESOPHAGOGASTRODUODENOSCOPY    Medications prior to admission:   Prior to Admission medications    Medication Sig Start Date End Date Taking?  Authorizing Provider   warfarin (COUMADIN) 5 MG tablet take 1-2 tablets by mouth daily as directed 21  Yes MICHAEL Titus   simvastatin (ZOCOR) 40 MG tablet take 1 tablet by mouth at bedtime 21  Yes MICHAEL Titus   ipratropium-albuterol (DUONEB) 0.5-2.5 (3) MG/3ML SOLN nebulizer solution Inhale 3 mLs into the lungs 4 times daily 20  Yes Dario Gama MD   albuterol (ACCUNEB) 0.63 MG/3ML nebulizer solution Take 1 ampule by nebulization every 6 hours as needed for Wheezing   Yes Historical Provider, MD   albuterol sulfate HFA (PROAIR HFA) 108 (90 Base) MCG/ACT inhaler Inhale 2 puffs into the lungs every 4 hours as needed for Wheezing or Shortness of Breath 20  Yes Dario Gama MD   montelukast (SINGULAIR) 10 MG tablet Take 1 tablet by mouth nightly \"Alternate With Zyrtec\" 20  Yes Dario Gama MD   calcium carbonate (OSCAL) 500 MG TABS tablet Take 500 mg by mouth daily   Yes Historical Provider, MD   KRILL OIL OMEGA-3 PO Take by mouth   Yes Historical Provider, MD   sodium chloride, Inhalant, 3 % nebulizer solution  10/31/18  Yes Historical Provider, MD   cetirizine (ZYRTEC) 10 MG tablet Take 10 mg by mouth \"Alternate With Singulair\"   Yes Historical Provider, MD   NASAL SPRAY SALINE NA by Nasal route daily Over The Counter   Yes Historical Provider, MD   budesonide (RHINOCORT ALLERGY) 32 MCG/ACT nasal spray 2 sprays by Nasal route daily   Yes Historical Provider, MD   Acetaminophen (TYLENOL 8 HOUR PO) Take by mouth as needed Over The Counter   Yes Historical Oral BID C Dmitriy Bennett MD        magnesium hydroxide (MILK OF MAGNESIA) 400 MG/5ML suspension 30 mL  30 mL Oral Daily PRN AMBROSIO Bennett MD        montelukast (SINGULAIR) tablet 10 mg  10 mg Oral Nightly C Dmitriy Bennett MD   10 mg at 11/09/21 2049    traZODone (DESYREL) tablet 200 mg  200 mg Oral Nightly C Dmitriy Bennett MD   200 mg at 11/09/21 2049    triamcinolone (KENALOG) 0.1 % cream   Topical BID AMBROSIO Bennett MD   Given at 11/10/21 0758    venlafaxine (EFFEXOR XR) extended release capsule 150 mg  150 mg Oral Daily AMBROSIO Bennett MD   150 mg at 11/10/21 0758    0.9 % sodium chloride infusion   IntraVENous Continuous Clois Blanka APRN - CNP 75 mL/hr at 11/09/21 1508 New Bag at 11/09/21 1508    pantoprazole (PROTONIX) injection 40 mg  40 mg IntraVENous BID Clois Blanka, APRN - CNP   40 mg at 11/10/21 0758    sodium chloride flush 0.9 % injection 5-40 mL  5-40 mL IntraVENous 2 times per day Clois Blanka, APRN - CNP   10 mL at 11/10/21 0758    sodium chloride flush 0.9 % injection 5-40 mL  5-40 mL IntraVENous PRN Clois Blanka, APRN - CNP        ondansetron (ZOFRAN-ODT) disintegrating tablet 4 mg  4 mg Oral Q8H PRN Clois Blanka, APRN - CNP        Or    ondansetron (ZOFRAN) injection 4 mg  4 mg IntraVENous Q6H PRN Clois Blanka, APRN - CNP        acetaminophen (TYLENOL) tablet 650 mg  650 mg Oral Q6H PRN Clois Blanka, APRN - CNP   650 mg at 11/09/21 2320    Or    acetaminophen (TYLENOL) suppository 650 mg  650 mg Rectal Q6H PRN Clois Blanka, APRN - CNP           Allergies:     Allergies   Allergen Reactions    Ciprofloxacin Hcl Hives and Swelling    Levaquin [Levofloxacin] Hives and Swelling    Other Nausea And Vomiting     \"Allergic To Tylox\"    Ceftin [Cefuroxime]        Problem List:    Patient Active Problem List   Diagnosis Code    Localized osteoarthritis of right shoulder M19.011    S/P AVR Z95.2    Essential hypertension I10    Hyperlipidemia E78.5    [place] (hard of hearing) H91.90    COPD (chronic obstructive pulmonary disease) ([facility]) J44.9    Bronchiectasis ([facility]) J47.9    Anxiety F41.9    Alcohol abuse F10.10    Acid reflux K21.9    Chronic interstitial lung disease ([facility]) J84.9    Hyponatremia E87.1    Severe episode of recurrent major depressive disorder, with psychotic features ([facility] F33.3    Recurrent major depressive disorder, [facility] F33.41    Rheumatic aortic valve disease I06.9    Chronic systolic heart failure ([facility]) I50.22    Rheumatic mitral regurgitation I05.1    Valvular heart disease I38    Psychophysiological insomnia F51.04    Femoral neck stress fracture, initial encounter M84.353A    Closed transcervical fracture of right femur ([facility]) S72.031A    Paranoia ([facility]) F22    Passive suicidal ideations R45.851    Closed right hip fracture, initial encounter ([facility] S71.0A    H/O mechanical aortic valve replacement Z95.2    Fatigue fracture of lumbar vertebra with routine healing M48.46XD    Traumatic closed fracture of neck of femur with minimal displacement, right, initial encounter ([facility]) S72.001A    Uncontrolled pain R52    Generalized weakness R53.1    Gait disturbance R26.9    Acute blood loss anemia D62    Status post total replacement of right hip Z96.641    Affective psychosis, bipolar ([facility]) F31.9    Compression fracture of fourth lumbar vertebra ([facility]) S32.040A       Past Medical History:        Diagnosis Date    Acid reflux     Acute frontal sinusitis 7/22/2009    Alcohol abuse     \"I Drink Average 2 Beers A Day\"    Anesthesia     \" I get agitated\"    Anxiety     Arthritis     \"Right Shoulder, Neck, Left Knee\"    Atypical mycobacterial infection     Broken teeth     Upper And Lower     CHF (congestive heart failure) ([facility])     COPD (chronic obstructive pulmonary disease) ([facility])     Sees Dr. Valdez Leslie [place], [address]     Cough     Frequent Cough With Green Sputum    Depression     Depression     Dyspnea 2/23/2018    H/O cardiovascular MUGA stress test 05/14/2019    EF 50%, NORMAL LVEF, NORMAL WALL MOTION.    H/O echocardiogram 05/22/2014    DF=>79-21%, LV systolic function is low normal, mild aortic valve calcification, no pericardial effusion    H/O echocardiogram 12/12/2018    EF 45-50%    History of 24 hour EKG monitoring 6/6/14    24 hr holter monitor. Ventricular ectopics were noted in single and couplet forms. Supraventricular ectopics were noted in single and pair forms.  [place] (hard of hearing)     Bilateral Ears    Hyperlipidemia     Hypertension     Irritant contact dermatitis due to other chemical products 8/26/2019    Other drug allergy(995.27) 7/22/2009    S/P AVR 12/2003    Mechanical valvue     Shortness of breath     Teeth missing     Upper And Lower       Past Surgical History:        Procedure Laterality Date    BRONCHOSCOPY  1996    \"Done Twice\"    CARDIAC VALVE REPLACEMENT  12/17/2003    \"Aortic Valve Replacement, Mechanical Valve\"    COLONOSCOPY  2006    Polyps Removed    DENTAL SURGERY      Teeth Extracted In Past    ENDOSCOPY, COLON, DIAGNOSTIC  In Past    HEMIARTHROPLASTY SHOULDER FRACTURE Right 1/29/2019    SHOULDER HEMIARTHROPLASTY performed by Helga Mathews DO at [facility] ARTHROSCOPY Left 1992    LUNG BIOPSY Right 1996   [facility], [address]    \"Cauterized Because My Sperm Was Low\"    ROTATOR CUFF REPAIR Right 2008    TONSILLECTOMY  1959 Or 1960    TOTAL HIP ARTHROPLASTY Right 10/25/2021    RIGHT HIP TOTAL ARTHROPLASTY performed by Helga Mathews DO at [facility] History:    Social History     Tobacco Use    Smoking status: Never Smoker    Smokeless tobacco: Current User     Types: Snuff, Chew   Substance Use Topics    Alcohol use:  Yes     Alcohol/week: 4.0 - 5.0 standard drinks     Types: 4 - 5 Cans of beer per week     Comment: last drink stated 2 weeks ago [address]    Drug/Infectious Status (If Applicable):  No results found for: HIV, HEPCAB    COVID-19 Screening (If Applicable):   Lab Results   Component Value Date    COVID19 NOT DETECTED 10/24/2021    COVID19 NOT DETECTED 11/04/2020           Anesthesia Evaluation  Patient summary reviewed history of anesthetic complications (\" I get agitated\"): Airway: Mallampati: III  TM distance: >3 FB   Neck ROM: full  Mouth opening: > = 3 FB Dental:      Comment: Poor dentition, numerous missing, loose, and chipped teeth    Pulmonary:normal exam    (+) COPD:                             Cardiovascular:  Exercise tolerance: poor (<4 METS),   (+) hypertension:, hyperlipidemia         Beta Blocker:  Not on Beta Blocker      ROS comment: Sinus rhythm with occasional premature ventricular complexes   Otherwise normal ECG   When compared with ECG of 23-OCT-2021 23:50,   premature ventricular complexes are now present     Echo 10/2021:  Summary   Technically difficult examination due to patient immobility s/p hip   arthroplasty. Left ventricular systolic function is low normal.   Ejection fraction is visually estimated at 50%. Moderate left ventricular hypertrophy. prosthetic valve in aortic position; mean PG: 15 mmHg. LACEY: 1.70 cm sq. DVI:   0.4. AT: 25 ms. Trace aortic regurgitation noted. Neuro/Psych:   (+) psychiatric history:depression/anxiety             GI/Hepatic/Renal:   (+) GERD:,           Endo/Other:    (+) blood dyscrasia: anemia and anticoagulation therapy:., .                 Abdominal:             Vascular: negative vascular ROS. Other Findings:           Anesthesia Plan      MAC     ASA 3 - emergent       Induction: intravenous. Anesthetic plan and risks discussed with patient. VANGIE Donohue - CRNA   11/10/2021      Pre Anesthesia Evaluation complete. Anesthesia plan, risks, benefits, alternatives, and personnel discussed with patient and/or legal guardian.  Patient and/or legal guardian verbalized an understanding and agreed to proceed. Anesthesia plan discussed with care team members and agreed upon.   VANGIE Moody - CRNA  11/10/2021

## 2021-11-10 NOTE — PROGRESS NOTES
Physical Therapy    Facility/Department: [address] [place] [address]  Initial Assessment    NAME: Sade Bashir  : 1955  MRN: [id number]    Date of Service: 11/10/2021    Discharge Recommendations:  IP Rehab        Assessment   Assessment: Pt is a 73 y.o. male with medical history, surgical history, co-morbidities, and personal factors including Acid reflux, Acute frontal sinusitis, Alcohol abuse, Anesthesia, Anxiety, Arthritis, Atypical mycobacterial infection, Broken teeth, CHF (congestive heart failure) ([facility] COPD (chronic obstructive pulmonary disease) ([facility] Cough, Depression, Depression, Dyspnea, H/O cardiovascular MUGA stress test, H/O echocardiogram, H/O echocardiogram, History of 24 hour EKG monitoring, [place] (hard of hearing), Hyperlipidemia, Hypertension, Irritant contact dermatitis due to other chemical products, Other drug allergy(995.27), S/P AVR, Shortness of breath, Teeth missing, Lung biopsy (Right, ); Rotator cuff repair (Right, ); Dental surgery; Cardiac valve replacement (2003); Colonoscopy (); Endoscopy, colon, diagnostic (In Past); Tonsillectomy ( Or ); bronchoscopy (); Knee arthroscopy (Left, ); other surgical history (); HEMIARTHROPLASTY SHOULDER FRACTURE (Right, 2019); and Total hip arthroplasty (Right, 10/25/2021) with admission for anemia. Prior to admission, pt was independent with functional mobility and ADLs. Examination of body systems reveals decreased strength, decreased balance, decreased aerobic capacity, and decreased independence with functional mobility. Prognosis: Good  Decision Making: High Complexity  Clinical Presentation: unpredictable characteristics  PT Education: Goals; PT Role; Plan of Care; Home Exercise Program; Precautions; Equipment; Functional Mobility Training; Transfer Training; Gait Training; General Safety;  Adaptive Device Training; Weight-bearing Education  REQUIRES PT FOLLOW UP: Yes  Activity Tolerance  Activity Tolerance: Patient Tolerated treatment well         Restrictions  Restrictions/Precautions  Restrictions/Precautions: General Precautions, Fall Risk, Weight Bearing  Lower Extremity Weight Bearing Restrictions  Right Lower Extremity Weight Bearing: Weight Bearing As Tolerated  Position Activity Restriction  Hip Precautions: No ADduction, No hip extension, No hip external rotation, No active ABduction  Vision/Hearing  Vision: Within Functional Limits  Hearing: Within functional limits     Subjective  General  Chart Reviewed: Yes  Patient assessed for rehabilitation services?: Yes  Family / Caregiver Present: No  Follows Commands: Within Functional Limits  Pain Screening  Patient Currently in Pain: Denies  Vital Signs  Patient Currently in Pain: Denies       Orientation  Orientation  Overall Orientation Status: Within Normal Limits  Social/Functional History  Social/Functional History  Lives With: Alone  Type of Home: Apartment  Home Layout: One level  Home Access: Ramped entrance, Elevator  Bathroom Shower/Tub: Tub/Shower unit  Bathroom Toilet: Handicap height  Bathroom Equipment: Grab bars in shower  Bathroom Accessibility: Walker accessible  ADL Assistance: Independent  Homemaking Assistance: Independent  Ambulation Assistance: Independent  Transfer Assistance: Independent  Active : No  Mode of Transportation: Car  Occupation: On disability  Type of occupation: Construction  Additional Comments: Pt typically sleeps in a flat, regular bed at home. Pt reports at least 3 falls in the past year \"due to bad judgement\"; latest one caused R hip fx precipitating this hospitalization. Cognition   Cognition  Overall Cognitive Status: Exceptions  Arousal/Alertness: Appropriate responses to stimuli  Following Commands:  Follows all commands without difficulty  Attention Span: Appears intact  Safety Judgement: Decreased awareness of need for safety; Decreased awareness of need for assistance  Problem Solving: Decreased awareness of errors  Insights: Decreased awareness of deficits  Initiation: Requires cues for some  Sequencing: Requires cues for some    Objective             Strength RLE  Comment: knee extension: at least 3+/5 observed functionally  Strength LLE  Comment: knee extension: at least 3+/5 observed functionally     Sensation  Overall Sensation Status: WNL  Bed mobility  Supine to Sit: Contact guard assistance  Transfers  Sit to Stand: Contact guard assistance (with verbal cues to push through bed and avoid pulling on walker)  Stand to sit: Contact guard assistance (with verbal cues to feel chair against back of legs, reach back, and sit slowly)  Ambulation  Ambulation?: Yes  Ambulation 1  Surface: level tile  Device: Rolling Walker  Assistance: Contact guard assistance  Quality of Gait: decreased gait speed, decreased step length bilaterally, mildly decreased stance time on RLE, intermittent unsteady gait  Distance: 35 feet + 35 feet  Comments: with verbal and tactile cues for BLE placement, walker placement, and sequence throughout ambulation; with verbal and tactile cues to maintain upright posture in order to avoid COM shifting outside of [place]     Balance  Posture: Fair  Sitting - Static: Good  Sitting - Dynamic: Good  Standing - Static: Fair; -  Standing - Dynamic: Fair; -      Gait Training:  Cues were given for safety, sequence, device management, balance, posture, awareness, path. Therapeutic Activity Training:   Therapeutic activity training was instructed today. Cues were given for safety, sequence, UE/LE placement, awareness, and balance. Activities performed today included bed mobility training, sup-sit, sit-stand.         Plan   Plan  Times per week: 3+  Current Treatment Recommendations: Strengthening, ROM, Balance Training, Functional Mobility Training, Transfer Training, ADL/Self-care Training, Gait Training, Patient/Caregiver Education & Training, IADL Training, Stair training, Neuromuscular Re-education, Pain Management, Home Exercise Program, Endurance Training, Positioning, Integrated Dry Needling, Safety Education & Training, Equipment Evaluation, Education, & procurement, Cognitive/Perceptual Training  Safety Devices  Type of devices: All fall risk precautions in place, Left in chair, Call light within reach, Chair alarm in place, Nurse notified, Gait belt, Patient at risk for falls      AM-PAC Score  AM-PAC Inpatient Mobility Raw Score : 16 (11/10/21 1635)  AM-PAC Inpatient T-Scale Score : 40.78 (11/10/21 1635)  Mobility Inpatient CMS 0-100% Score: 54.16 (11/10/21 1635)  Mobility Inpatient CMS G-Code Modifier : CK (11/10/21 1635)          Goals  Long term goals  Time Frame for Long term goals :  In one week:  Long term goal 1: Pt will complete all bed mobility independently  Long term goal 2: Pt will complete sit <> stand transfers independently  Long term goal 3: Pt will ambulate 200 feet with supervision with LRAD  Long term goal 4: Pt will ascend/descend 6 steps with a handrail with CGAx1  Long term goal 5: Pt will independently complete 3 sets of 10 reps of BLE AROM exercises in available and allowed ROM       Time In:  1205  Time Out: 1245  Total Treatment Time: 40  Timed Code Treatment Minutes: Gilbert Villa, PT, DPT  License #: [id number]

## 2021-11-11 LAB
ABO/RH: NORMAL
ALBUMIN SERPL-MCNC: 2.9 GM/DL (ref 3.4–5)
ALP BLD-CCNC: 73 IU/L (ref 40–128)
ALT SERPL-CCNC: 12 U/L (ref 10–40)
ANION GAP SERPL CALCULATED.3IONS-SCNC: 7 MMOL/L (ref 4–16)
ANTIBODY SCREEN: NEGATIVE
APTT: 37.2 SECONDS (ref 25.1–37.1)
AST SERPL-CCNC: 15 IU/L (ref 15–37)
BASOPHILS ABSOLUTE: 0 K/CU MM
BASOPHILS RELATIVE PERCENT: 0.3 % (ref 0–1)
BILIRUB SERPL-MCNC: 0.5 MG/DL (ref 0–1)
BUN BLDV-MCNC: 10 MG/DL (ref 6–23)
CALCIUM SERPL-MCNC: 8 MG/DL (ref 8.3–10.6)
CHLORIDE BLD-SCNC: 107 MMOL/L (ref 99–110)
CO2: 22 MMOL/L (ref 21–32)
COMPONENT: NORMAL
CREAT SERPL-MCNC: 0.7 MG/DL (ref 0.9–1.3)
CROSSMATCH RESULT: NORMAL
DIFFERENTIAL TYPE: ABNORMAL
EOSINOPHILS ABSOLUTE: 0 K/CU MM
EOSINOPHILS RELATIVE PERCENT: 0.6 % (ref 0–3)
GFR AFRICAN AMERICAN: >60 ML/MIN/1.73M2
GFR NON-AFRICAN AMERICAN: >60 ML/MIN/1.73M2
GLUCOSE BLD-MCNC: 94 MG/DL (ref 70–99)
HCT VFR BLD CALC: 23.8 % (ref 42–52)
HCT VFR BLD CALC: 25.7 % (ref 42–52)
HCT VFR BLD CALC: 28.1 % (ref 42–52)
HEMOGLOBIN: 7.3 GM/DL (ref 13.5–18)
HEMOGLOBIN: 7.8 GM/DL (ref 13.5–18)
HEMOGLOBIN: 8.4 GM/DL (ref 13.5–18)
IMMATURE NEUTROPHIL %: 1.7 % (ref 0–0.43)
INR BLD: 1.21 INDEX
LYMPHOCYTES ABSOLUTE: 1.3 K/CU MM
LYMPHOCYTES RELATIVE PERCENT: 19.1 % (ref 24–44)
MCH RBC QN AUTO: 29.3 PG (ref 27–31)
MCH RBC QN AUTO: 29.7 PG (ref 27–31)
MCHC RBC AUTO-ENTMCNC: 29.9 % (ref 32–36)
MCHC RBC AUTO-ENTMCNC: 30.7 % (ref 32–36)
MCV RBC AUTO: 95.6 FL (ref 78–100)
MCV RBC AUTO: 99.3 FL (ref 78–100)
MONOCYTES ABSOLUTE: 0.7 K/CU MM
MONOCYTES RELATIVE PERCENT: 9.4 % (ref 0–4)
NUCLEATED RBC %: 0.3 %
PDW BLD-RTO: 16.7 % (ref 11.7–14.9)
PDW BLD-RTO: 17.3 % (ref 11.7–14.9)
PLATELET # BLD: 290 K/CU MM (ref 140–440)
PLATELET # BLD: 329 K/CU MM (ref 140–440)
PMV BLD AUTO: 8.3 FL (ref 7.5–11.1)
PMV BLD AUTO: 8.5 FL (ref 7.5–11.1)
POTASSIUM SERPL-SCNC: 4.3 MMOL/L (ref 3.5–5.1)
PROTHROMBIN TIME: 15.7 SECONDS (ref 11.7–14.5)
RBC # BLD: 2.49 M/CU MM (ref 4.6–6.2)
RBC # BLD: 2.83 M/CU MM (ref 4.6–6.2)
SEGMENTED NEUTROPHILS ABSOLUTE COUNT: 4.8 K/CU MM
SEGMENTED NEUTROPHILS RELATIVE PERCENT: 68.9 % (ref 36–66)
SODIUM BLD-SCNC: 136 MMOL/L (ref 135–145)
STATUS: NORMAL
TOTAL IMMATURE NEUTOROPHIL: 0.12 K/CU MM
TOTAL NUCLEATED RBC: 0 K/CU MM
TOTAL PROTEIN: 4.8 GM/DL (ref 6.4–8.2)
TRANSFUSION STATUS: NORMAL
UNIT DIVISION: 0
UNIT NUMBER: NORMAL
WBC # BLD: 6.9 K/CU MM (ref 4–10.5)
WBC # BLD: 7.2 K/CU MM (ref 4–10.5)

## 2021-11-11 PROCEDURE — 6360000002 HC RX W HCPCS: Performed by: HOSPITALIST

## 2021-11-11 PROCEDURE — 85025 COMPLETE CBC W/AUTO DIFF WBC: CPT

## 2021-11-11 PROCEDURE — 97530 THERAPEUTIC ACTIVITIES: CPT

## 2021-11-11 PROCEDURE — 85610 PROTHROMBIN TIME: CPT

## 2021-11-11 PROCEDURE — C9113 INJ PANTOPRAZOLE SODIUM, VIA: HCPCS | Performed by: NURSE PRACTITIONER

## 2021-11-11 PROCEDURE — 85014 HEMATOCRIT: CPT

## 2021-11-11 PROCEDURE — 85730 THROMBOPLASTIN TIME PARTIAL: CPT

## 2021-11-11 PROCEDURE — 36415 COLL VENOUS BLD VENIPUNCTURE: CPT

## 2021-11-11 PROCEDURE — 2580000003 HC RX 258: Performed by: NURSE PRACTITIONER

## 2021-11-11 PROCEDURE — 2140000000 HC CCU INTERMEDIATE R&B

## 2021-11-11 PROCEDURE — 97116 GAIT TRAINING THERAPY: CPT

## 2021-11-11 PROCEDURE — 6370000000 HC RX 637 (ALT 250 FOR IP): Performed by: PHYSICAL MEDICINE & REHABILITATION

## 2021-11-11 PROCEDURE — 99233 SBSQ HOSP IP/OBS HIGH 50: CPT | Performed by: INTERNAL MEDICINE

## 2021-11-11 PROCEDURE — 6370000000 HC RX 637 (ALT 250 FOR IP): Performed by: NURSE PRACTITIONER

## 2021-11-11 PROCEDURE — 80053 COMPREHEN METABOLIC PANEL: CPT

## 2021-11-11 PROCEDURE — 85027 COMPLETE CBC AUTOMATED: CPT

## 2021-11-11 PROCEDURE — 85018 HEMOGLOBIN: CPT

## 2021-11-11 PROCEDURE — 94761 N-INVAS EAR/PLS OXIMETRY MLT: CPT

## 2021-11-11 PROCEDURE — 6360000002 HC RX W HCPCS: Performed by: NURSE PRACTITIONER

## 2021-11-11 PROCEDURE — APPSS60 APP SPLIT SHARED TIME 46-60 MINUTES: Performed by: NURSE PRACTITIONER

## 2021-11-11 RX ORDER — HEPARIN SODIUM 1000 [USP'U]/ML
80 INJECTION, SOLUTION INTRAVENOUS; SUBCUTANEOUS ONCE
Status: DISCONTINUED | OUTPATIENT
Start: 2021-11-11 | End: 2021-11-11

## 2021-11-11 RX ORDER — HEPARIN SODIUM 10000 [USP'U]/100ML
5-30 INJECTION, SOLUTION INTRAVENOUS CONTINUOUS
Status: DISCONTINUED | OUTPATIENT
Start: 2021-11-11 | End: 2021-11-11

## 2021-11-11 RX ORDER — HEPARIN SODIUM 1000 [USP'U]/ML
80 INJECTION, SOLUTION INTRAVENOUS; SUBCUTANEOUS PRN
Status: DISCONTINUED | OUTPATIENT
Start: 2021-11-11 | End: 2021-11-11

## 2021-11-11 RX ORDER — HEPARIN SODIUM 1000 [USP'U]/ML
40 INJECTION, SOLUTION INTRAVENOUS; SUBCUTANEOUS PRN
Status: DISCONTINUED | OUTPATIENT
Start: 2021-11-11 | End: 2021-11-11

## 2021-11-11 RX ORDER — WARFARIN SODIUM 7.5 MG/1
7.5 TABLET ORAL DAILY
Status: DISCONTINUED | OUTPATIENT
Start: 2021-11-11 | End: 2021-11-13 | Stop reason: DRUGHIGH

## 2021-11-11 RX ADMIN — WARFARIN SODIUM 7.5 MG: 7.5 TABLET ORAL at 17:32

## 2021-11-11 RX ADMIN — ENOXAPARIN SODIUM 60 MG: 100 INJECTION SUBCUTANEOUS at 11:56

## 2021-11-11 RX ADMIN — SODIUM CHLORIDE: 9 INJECTION, SOLUTION INTRAVENOUS at 02:47

## 2021-11-11 RX ADMIN — SODIUM CHLORIDE, PRESERVATIVE FREE 10 ML: 5 INJECTION INTRAVENOUS at 09:46

## 2021-11-11 RX ADMIN — VENLAFAXINE HYDROCHLORIDE 150 MG: 150 CAPSULE, EXTENDED RELEASE ORAL at 09:45

## 2021-11-11 RX ADMIN — PANTOPRAZOLE SODIUM 40 MG: 40 INJECTION, POWDER, FOR SOLUTION INTRAVENOUS at 21:16

## 2021-11-11 RX ADMIN — TRIAMCINOLONE ACETONIDE: 1 CREAM TOPICAL at 21:16

## 2021-11-11 RX ADMIN — PANTOPRAZOLE SODIUM 40 MG: 40 INJECTION, POWDER, FOR SOLUTION INTRAVENOUS at 09:45

## 2021-11-11 RX ADMIN — MONTELUKAST 10 MG: 10 TABLET, FILM COATED ORAL at 21:16

## 2021-11-11 RX ADMIN — TRAZODONE HYDROCHLORIDE 200 MG: 50 TABLET ORAL at 21:16

## 2021-11-11 RX ADMIN — ACETAMINOPHEN 650 MG: 325 TABLET ORAL at 21:15

## 2021-11-11 RX ADMIN — ENOXAPARIN SODIUM 60 MG: 100 INJECTION SUBCUTANEOUS at 21:16

## 2021-11-11 RX ADMIN — ATORVASTATIN CALCIUM 40 MG: 40 TABLET, FILM COATED ORAL at 09:45

## 2021-11-11 RX ADMIN — SODIUM CHLORIDE, PRESERVATIVE FREE 10 ML: 5 INJECTION INTRAVENOUS at 21:17

## 2021-11-11 RX ADMIN — TRIAMCINOLONE ACETONIDE: 1 CREAM TOPICAL at 11:56

## 2021-11-11 RX ADMIN — CALCIUM 500 MG: 500 TABLET ORAL at 09:45

## 2021-11-11 ASSESSMENT — PAIN SCALES - GENERAL
PAINLEVEL_OUTOF10: 0
PAINLEVEL_OUTOF10: 0
PAINLEVEL_OUTOF10: 3
PAINLEVEL_OUTOF10: 0

## 2021-11-11 NOTE — PROGRESS NOTES
Physician Progress Note      Ron Portillo  CSN #:                  [id number]  :                       1955  ADMIT DATE:       2021 2:12 PM  [address] [place] DATE:  RESPONDING  PROVIDER #:        Filiberto Miller MD          QUERY TEXT:    Patient admitted with GI bleeding, noted to have Ulcer of esophagus and   gastric ulcer. If possible, please document in progress notes and discharge   summary the cause of the GI bleeding: The medical record reflects the following:  Risk Factors: GI bleed, ABLA  Clinical Indicators: \"1. Small sliding hiatal hernia. 2. Mild distal erosive   esophagitis. 3. A 1 cm pyloric channel ulcer noted with no active bleeding. \"   Noted in the post op report By Dr Roberto Whatley 11/10, HGB 4.3  Treatment: GI consult, Protonix, EGD with biopsy, Monitor the patient's H and   H, Holding anticoag, H pylori testing , 2 units PRBC, IVF    Thank you Kelly Monet RN, CDS ([phone])  Options provided:  -- GI bleeding due to Ulcer of esophagus  -- GI bleeding due to gastric ulcer  -- Other - I will add my own diagnosis  -- Disagree - Not applicable / Not valid  -- Disagree - Clinically unable to determine / Unknown  -- Refer to Clinical Documentation Reviewer    PROVIDER RESPONSE TEXT:    This patient has GI bleeding due to gastric ulcer.     Query created by: Rc Bergeron on 2021 9:23 AM      Electronically signed by:  Filiberto Miller MD 2021 9:25 AM

## 2021-11-11 NOTE — DISCHARGE SUMMARY
Patient Name: Antolin Holm  Patient :  1955  Patient MRN:   [id number]      Admission Date:  11/3/2021  Discharge Date: 2021    Admitting diagnosis: Right femoral neck fracture ( [place] [address])     Comorbid diagnoses impacting rehabilitation: Uncontrolled pain, generalized weakness, gait disturbance, acute blood loss anemia, COPD (simple bronchitis), essential hypertension, hyponatremia, bipolar disorder with depression, L4 compression fracture    Discharging diagnosis: Right femoral neck fracture ([facility] 8.11)     Comorbid diagnoses impacting rehabilitation: Uncontrolled pain, generalized weakness, gait disturbance, acute blood loss anemia, COPD (simple bronchitis), essential hypertension, hyponatremia, bipolar disorder with depression, L4 compression fracture    Complication: Acute upper GI bleed with acute blood loss anemia (hemoglobin 4.3)    Consultant: Hospitalist.    History of present illness: Patient is a 60-year-old right-hand-dominant male who offers a very vague description of \"falling a couple times into a piece of furniture). He may have been arising from a bent over position when he lost his balance and tumbled backwards. This took place on or about 10/17/2021. He had back and leg pain following the injury but remained at home. Several days later on 10/22/2021 he presented to our ED with escalating right leg pain and difficulty walking. He was found to have a displaced right femoral neck fracture at the hip. After medical and psychological clearance, on 10/25/2021 Dr. Kenya Suarez performed an anterior approach total right hip arthroplasty. The patient had significant pain, generalized weakness and hyponatremia following the procedure. He also had a severely negative mental reaction to the experience. He had suicidal verbalizations that required monitoring by psychiatry and medication adjustments. Recently he became more engaged in therapies and moderately hopeful about the near future. Shower/Bathe Self  Assistance Needed: Supervision or touching assistance  Comment: Sup when standing, completed majority seated on bench in shower  CARE Score: 4  Discharge Goal: Independent    UB Dressing: Upper Body Dressing  Assistance Needed: Setup or clean-up assistance  Comment: Pt request A to gather clothing from closet  CARE Score: 5  Discharge Goal: Independent         LB Dressing: Lower Body Dressing  Assistance Needed: Setup or clean-up assistance  Comment: Pt request A to gather clothing from closet  CARE Score: 5  Discharge Goal: Independent    Donning and [place] Footwear: Putting On/Taking Off Footwear  Assistance Needed: Partial/moderate assistance  Comment: able to doff B socks and don L; required assist with R and educated on improved technique to comply with precautions  CARE Score: 3  Discharge Goal: Independent      Toileting: Toileting Hygiene  Assistance Needed: Independent  Comment: x  CARE Score: 6  Discharge Goal: Independent      Toilet Transfers: Toilet Transfer  Assistance Needed: Supervision or touching assistance  Comment: SBA using RW and grab bars  CARE Score: 4  Discharge Goal: Independent    Physical Therapy:   Short term goals  Time Frame for Short term goals: 7 tx days:  Short term goal 1: Pt will complete rolling L/R and sup<->sit using leg  to assist RLE for hip abduction movement and following anterolateral hip precautions on R Mod Ind-Ind. Short term goal 2: Pt will complete OOB transfers using RW and following anterolateral hip precautions on R Mod Ind. Short term goal 3: Pt will ambulate >150 ft over level surface and at least 10 ft of uneven surface using RW following appropriate step-to pattern Mod Ind. Short term goal 4: Pt will ascend/descend curb step using RW and 1 flight of stairs using railings following appropriate step-to pattern on ascent/descent Mod Ind.   Short term goal 5: Pt will complete object retrieval from the floor with 1UE support on RW and using reacher prn Mod Ind.             Bed Mobility:   Sit to Lying  Assistance Needed: Independent  Comment: no use of bed features  CARE Score: 6  Discharge Goal: Independent  Roll Left and Right  Assistance Needed: Independent  Comment: without rails  CARE Score: 6  Discharge Goal: Independent  Lying to Sitting on Side of Bed  Assistance Needed: Independent  Comment: no use of bed features  CARE Score: 6  Discharge Goal: Independent    Transfers:    Sit to Stand  Assistance Needed: Independent  Comment: x  CARE Score: 6  Discharge Goal: Independent  Chair/Bed-to-Chair Transfer  Assistance Needed: Supervision or touching assistance  Comment: cues for turn for rotation precautions  CARE Score: 4  Discharge Goal: Independent  Toilet Transfer  Assistance Needed: Supervision or touching assistance  Comment: SBA using RW and grab bars  CARE Score: 4  Car Transfer  Assistance Needed: Independent  Comment: uses UEs to self assist RLE into/out of  car  CARE Score: 6  Discharge Goal: Independent    Ambulation:    Walking Ability  Does the Patient Walk?: Yes     Walk 10 Feet  Assistance Needed: Independent  Comment: 2ww  CARE Score: 6  Discharge Goal: Independent     Walk 50 Feet with Two Turns  Assistance Needed: Supervision or touching assistance  Comment: supervision with 2ww with cues for turns  CARE Score: 4  Discharge Goal: Independent     Walk 150 Feet  Assistance Needed: Supervision or touching assistance  Comment: supervision with 2ww, needs standing rest every 50' due to fatigue,   CARE Score: 4  Discharge Goal: Independent     Walking 10 Feet on Uneven Surfaces  Assistance Needed: Independent  Comment: 2ww  CARE Score: 6  Discharge Goal: Independent     1 Step (Curb)  Assistance Needed: Supervision or touching assistance  Comment: supervision with 2ww, cues for sequence  CARE Score: 4  Discharge Goal: Independent     4 Steps  Assistance Needed: Supervision or touching assistance  Comment: supervision with 25% cues for sequence, B rails  CARE Score: 4  Discharge Goal: Independent     12 Steps  Assistance Needed: Supervision or touching assistance  Comment: Pt needed max standing rests to perform all 12 steps due to HR elevated and irregular(130 bpm)  CARE Score: 4  Discharge Goal: Independent       Wheelchair:  w/c Ability: Wheelchair Ability  Uses a Wheelchair and/or Scooter?: No                Balance:        Object: Picking Up Object  Assistance Needed: Supervision or touching assistance  Comment: supervision with cues for set up of body  CARE Score: 4    I      Exam:    Blood pressure (!) 111/54, pulse 104, temperature 98.1 °F (36.7 °C), temperature source Oral, resp. rate 16, height 5' 4\" (1.626 m), weight 144 lb 6.4 oz (65.5 kg), SpO2 93 %. General: Sitting up in a bedside chair. Talkative but pale. Alert. Fair- insight and reasoning. HEENT: Neck supple. MMM. No JVD. Visual fields full. Pulmonary: Shallow respirations without wheezes or rales. Cardiac: Regular rate and rhythm. Abdomen: Patient's abdomen is soft and nondistended. Bowel sounds were present throughout. There was no rebound, guarding or masses noted. Upper extremities: Able to bring both hands up to meet mine. Fair  strength. Lower extremities: Right hip and thigh are swollen as expected. Tenderness to palpation. Guarded right hip flexion. Toe tap is symmetric. No signs of DVT. Sitting balance was good. Standing balance was poor.     Lab Results   Component Value Date    WBC 7.2 11/11/2021    HGB 8.4 (L) 11/11/2021    HCT 28.1 (L) 11/11/2021    MCV 99.3 11/11/2021     11/11/2021     Lab Results   Component Value Date    INR 1.44 11/10/2021    INR 1.21 11/09/2021    INR 1.13 11/08/2021    PROTIME 18.7 (H) 11/10/2021    PROTIME 15.6 (H) 11/09/2021    PROTIME 14.6 (H) 11/08/2021     Lab Results   Component Value Date    CREATININE 0.7 (L) 11/11/2021    BUN 10 11/11/2021     11/11/2021    K 4.3 11/11/2021     11/11/2021    CO2 22 11/11/2021     Lab Results   Component Value Date    ALT 12 11/11/2021    AST 15 11/11/2021    ALKPHOS 73 11/11/2021    BILITOT 0.5 11/11/2021           Patient was discussed with hospitalist in consultation. Due to his hemoglobin of 4.3, decision was made to discharge him to the hospitalist service in a monitored bed setting. Medication List      ASK your doctor about these medications    * albuterol 0.63 MG/3ML nebulizer solution  Commonly known as: ACCUNEB     * albuterol sulfate  (90 Base) MCG/ACT inhaler  Commonly known as: ProAir HFA  Inhale 2 puffs into the lungs every 4 hours as needed for Wheezing or Shortness of Breath     ammonium lactate 12 % lotion  Commonly known as: Lac-Hydrin  Apply topically daily. calcium carbonate 500 MG Tabs tablet  Commonly known as: OSCAL     CENTRUM PO     cetirizine 10 MG tablet  Commonly known as: ZYRTEC     cyclobenzaprine 10 MG tablet  Commonly known as: FLEXERIL     ipratropium-albuterol 0.5-2.5 (3) MG/3ML Soln nebulizer solution  Commonly known as: DUONEB  Inhale 3 mLs into the lungs 4 times daily     KRILL OIL OMEGA-3 PO     montelukast 10 MG tablet  Commonly known as: SINGULAIR  Take 1 tablet by mouth nightly \"Alternate With Zyrtec\"     NASAL SPRAY SALINE NA     Nebulizer Misc     Rhinocort Allergy 32 MCG/ACT nasal spray  Generic drug: budesonide     simvastatin 40 MG tablet  Commonly known as: ZOCOR  take 1 tablet by mouth at bedtime     sodium chloride (Inhalant) 3 % nebulizer solution     traZODone 100 MG tablet  Commonly known as: DESYREL  Take 2 tablets by mouth nightly     triamcinolone 0.1 % cream  Commonly known as: KENALOG  Apply topically 2 times daily. TYLENOL 8 HOUR PO     venlafaxine 150 MG extended release capsule  Commonly known as: EFFEXOR XR  Take 1 capsule by mouth daily for 7 days     warfarin 5 MG tablet  Commonly known as: COUMADIN  Take as directed.  If you are unsure how to take this medication, talk to your nurse or doctor. Original instructions: take 1-2 tablets by mouth daily as directed         * This list has 2 medication(s) that are the same as other medications prescribed for you. Read the directions carefully, and ask your doctor or other care provider to review them with you. CONDITION ON DISCHARGE: Fair    The prognosis is fair for further improvements in ADL's and safety with adapted gait/transfers. Record review, patient exam, discharge instructions, medication reconciliation and summary for this discharge visit took more than 30 minutes.

## 2021-11-11 NOTE — PROGRESS NOTES
[facility] ([facility] PHYSICAL [facility]  [address]  Phone: [phone]    Fax [phone]                  Abelino Mars MD, Elyse Rivas MD, Fredy Biggs MD, MD Judd Chatman MD Paula Lair, MD Cathleen Adams, MD Casie Mercado, APRN      Juan Scott, APRN  Angel Ding, [address], VANGIE    Cardiology Progress Note     Today's Plan: restart [facility]    Admit Date:  11/9/2021    Consult reason/ Seen today for: GIB, mechanical AVR    Subjective and  Overnight Events:  Patient states that he is feeling well. Denies any complaints. Assessment / Plan / Recommendation:     1. S/p AVR: Mechanical prosthesis GI agreeable to restart AC. Restart Lovenox 1 mg/kg and coumadin per pharmacy. Goal INR 2-3  2. GiB : GI found esophagitis and ulcer, but okayed to restart AC  3. DVT prophylaxis if no contraindication  4. Hypotension: improving. Hold blood pressure meds for now         History of Presenting Illness:    Chief complain on admission : 73 y. o.year old who is admitted forNo chief complaint on file. Past medical history:    has a past medical history of Acid reflux, Acute frontal sinusitis, Alcohol abuse, Anesthesia, Anxiety, Arthritis, Atypical mycobacterial infection, Broken teeth, CHF (congestive heart failure) ([facility] [address] COPD (chronic obstructive pulmonary disease) ([facility] [address] Cough, Depression, Depression, Dyspnea, H/O cardiovascular MUGA stress test, H/O echocardiogram, H/O echocardiogram, History of 24 hour EKG monitoring, [place] (hard of hearing), Hyperlipidemia, Hypertension, Irritant contact dermatitis due to other chemical products, Other drug allergy(995.48), S/P AVR, Shortness of breath, and Teeth missing. Past surgical history:   has a past surgical history that includes Lung biopsy (Right, 1996); Rotator cuff repair (Right, 2008); Dental surgery; Cardiac valve replacement (12/17/2003); Colonoscopy (2006);  Endoscopy, colon, diagnostic (In Past); Tonsillectomy (1959 Or 1960); bronchoscopy (1996); Knee arthroscopy (Left, 1992); other surgical history (1992); HEMIARTHROPLASTY SHOULDER FRACTURE (Right, 1/29/2019); Total hip arthroplasty (Right, 10/25/2021); and Upper gastrointestinal endoscopy (N/A, 11/10/2021). Social History:   reports that he has never smoked. His smokeless tobacco use includes snuff and chew. He reports current alcohol use of about 4.0 - 5.0 standard drinks of alcohol per week. He reports that he does not use drugs. Family history:  family history includes Asthma in his sister; COPD in his father and sister; Cancer in his mother; Diabetes in his mother and sister; Heart Disease in his father; High Blood Pressure in his mother and sister; High Cholesterol in his mother and sister; No Known Problems in his son; Obesity in his mother; Other in his sister; Vision Loss in his mother. Allergies   Allergen Reactions    Ciprofloxacin Hcl Hives and Swelling    Levaquin [Levofloxacin] Hives and Swelling    Other Nausea And Vomiting     \"Allergic To Tylox\"    Ceftin [Cefuroxime]        Review of Systems   All 14 systems were reviewed and are negative  Except for the positive findings  which as documented     BP (!) 110/53   Pulse 62   Temp 98.2 °F (36.8 °C) (Oral)   Resp 14   Ht 5' 4\" (1.626 m)   Wt 138 lb 3.7 oz (62.7 kg)   SpO2 98%   BMI 23.73 kg/m²       Intake/Output Summary (Last 24 hours) at 11/11/2021 0741  Last data filed at 11/11/2021 0230  Gross per 24 hour   Intake 1505.83 ml   Output 2550 ml   Net -1044.17 ml       Physical Exam  Vitals reviewed. Constitutional:       General: He is not in acute distress. Appearance: Normal appearance. He is not ill-appearing. HENT:      Head: Atraumatic. Neck:      Vascular: No carotid bruit. Cardiovascular:      Rate and Rhythm: Normal rate and regular rhythm. Pulses: Normal pulses. Heart sounds: Murmur (mechanical click) heard. Pulmonary:      Effort: Pulmonary effort is normal. No respiratory distress. Breath sounds: Normal breath sounds. Musculoskeletal:         General: No swelling or deformity. Cervical back: Neck supple. No muscular tenderness. Neurological:      Mental Status: He is alert. Telemetry Reviewed:   Sinus rhythm with PVC's    Medications:    atorvastatin  40 mg Oral Daily    calcium elemental  500 mg Oral Daily    [Held by provider] lactulose  20 g Oral BID    montelukast  10 mg Oral Nightly    traZODone  200 mg Oral Nightly    triamcinolone   Topical BID    venlafaxine  150 mg Oral Daily    pantoprazole  40 mg IntraVENous BID    sodium chloride flush  5-40 mL IntraVENous 2 times per day      sodium chloride      sodium chloride      sodium chloride 75 mL/hr at 11/11/21 0247     sodium chloride, sodium chloride, oxyCODONE, polyethylene glycol, albuterol sulfate HFA, bisacodyl, cyclobenzaprine, magnesium hydroxide, sodium chloride flush, ondansetron **OR** ondansetron, acetaminophen **OR** acetaminophen    Lab Data:  CBC:   Recent Labs     11/09/21  0943 11/09/21  0943 11/10/21  0259 11/10/21  1310 11/11/21  0244   WBC 12.0*  --  9.8  --  6.9   HGB 4.3*   < > 6.6* 8.6* 7.3*   HCT 14.3*   < > 21.1* 27.6* 23.8*   MCV 97.9  --  97.2  --  95.6     --  335  --  290    < > = values in this interval not displayed. BMP:   Recent Labs     11/10/21  0259 11/11/21  0244   * 136   K 4.1 4.3    107   CO2 21 22   BUN 20 10   CREATININE 0.8* 0.7*     PT/INR:   Recent Labs     11/09/21  0303 11/10/21  0259   PROTIME 15.6* 18.7*   INR 1.21 1.44     BNP:  No results for input(s): PROBNP in the last 72 hours. TROPONIN: No results for input(s): TROPONINT in the last 72 hours. ECHO :   Echocardiogram  10/25/2021   Summary   Technically difficult examination due to patient immobility s/p hip   arthroplasty.    Left ventricular systolic function is low normal.   Ejection fraction is visually estimated at 50%. Moderate left ventricular hypertrophy. prosthetic valve in aortic position; mean PG: 15 mmHg. LACEY: 1.70 cm sq. DVI:   0.4. AT: 25 ms. Trace aortic regurgitation noted. All labs, medications and tests reviewed by myself , continue all other medications of all above medical condition listed as is except for changes mentioned above. Thank you very much for consult , please call with questions. Electronically signed by VANGIE Byrd CNP on 11/11/2021 at 7:41 AM      CARDIOLOGY ATTENDING ADDENDUM    I have seen ,spoken to  and examined this patient personally, independently of the nurse practitioner. I have reviewed the hospital care given to date and reviewed all pertinent labs and imaging. The plan was developed mutually at the time of the visit with the patient,  NP   and myself. I have spoken with patient, nursing staff and provided written and verbal instructions . The above note has been reviewed and I agree with the assessment, diagnosis, and treatment plan with changes made by me as follows     HPI:  I have reviewed the above HPI  And agree with above   Risa Hwang is a 73 y. o.year old who and presents with had no chief complaint listed for this encounter. No chief complaint on file. Please review addendum/changes made to note above   Interval history:  Endoscopy revealed gastric ulcer hematocrit is now stable cleared to restart anticoagulation    Physical Exam:  General:  Awake, alert, NAD  Head:normal  Eye:normal  Neck:  No JVD   Chest:  Clear to auscultation, respiration easy  Cardiovascular:  S1 and S2 audible, mechanical click heard, No signs of ankle edema, or volume overload, No evidence of JVD, No crackles  Abdomen:   nontender  Extremities:  No  edema  Pulses; palpable  Neuro: grossly normal      MEDICAL DECISION MAKING;    I agree with the above plan, which was planned by myself and discussed with NP.   Start Lovenox overlap until INR therapeutic at 2.5  Should be on aspirin  Consider watching hematocrit closely by weekly lab work  Dental prophylaxis  Follow-up in office will sign off  Please call if there are any new changes or concerns    Nick Carreon MD [facility] 11/11/21

## 2021-11-11 NOTE — PROGRESS NOTES
PHARMACY ANTICOAGULATION MONITORING SERVICE    Sherrie Dodson is a 73 y.o. male on warfarin therapy for mechnical aortic valve. Pharmacy consulted by ML Gusman for monitoring and adjustment of treatment. Indication for anticoagulation: aortic valve replacement (mechanical)  INR goal: 2-3  Warfarin dose prior to admission: warfarin 5 mg every Sunday and 7.5 mg all other days (per patient)    Pertinent Laboratory Values   Recent Labs     11/10/21  0259 11/10/21  1310 11/11/21  0244 11/11/21  0244 11/11/21  0950   INR 1.44  --   --   --  1.21   HGB 6.6*   < > 7.3*   < > 8.4*   HCT 21.1*   < > 23.8*   < > 28.1*     --  290  --  329    < > = values in this interval not displayed. Assessment/Plan:    Possible DDI's:   o Home meds:  Trazodone, venlafaxine. o Lovenox bridge to restart tonight due to subtherapeutic INR.  INR now subtherapeutic after no doses on 11/9 and 11/10. Warfarin was held due to bleeding concerns with esophagitis and ulcer, now cleared to restart anticoagulation.  INR goal noted as 2.0-3.0 even with mechanical aortic valve replacement.  Resume warfarin dosing tonight with normal home dose of 7.5mg po daily and follow INR trends tomorrow. [address] [place] [address] will continue to monitor and adjust warfarin therapy as indicated    Thank you for the consult,  Fina Mckeon.  Ana Zhong, [address] [place] [address]   11/11/2021 12:45 PM

## 2021-11-11 NOTE — PROGRESS NOTES
[facility] HOSPITALIST PROGRESS NOTE      PCP: Josiah Mclain MD    Date of Admission: 11/9/2021    Subjective:  No more GI bleeding     Brief Hospital summary patient is a 61-year-old male with history of COPD, CHF, essential hypertension, hyperlipidemia and mechanical aortic valve on Coumadin and right hip arthroplasty on 10/25/2021 who was admitted with weakness and fatigue. Hemoglobin 4.3, status post 2 packed RBC transfusions  GI consulted, Coumadin held, EGD showed erosive esophagitis and non bleeding pyloric ulcer   lovenox resumed per GI since history of mechanical AV      Vitals signs:  Afebrile, HR 50-70s range, blood pressure borderline     Medications:  Lipitor, lactulose, pantoprazole, montelukast, trazodone, venlafaxine     Antibiotics: None     Fluid status: I/O    Labs:   Hb 7.3, WBC 6.9, Plt 290   , Hb 4.3, Cr 0.7   LFTs     Imaging:   CT abdomen     1. No specific acute abnormality identified in the abdomen pelvis by   noncontrast evaluation. 2. Noted of severe atherosclerotic calcifications and possible constipation.    3. Mild infiltrative changes in the lung bases, the appearance suspicious for   low-grade aspiration.         Assessment/Plan:     ABLA on chronic anemia: Admitted with anemia, Hb 4.3,   S/p PRBC   Monitor H and H   EGD shows non bleeding ulcer  Continue protonix   Monitor H and H   Advance diet     Mechical Aortic valve: s/o AVR in 2003,   INR sub therapeutic   lovenox resumed,   Will add coumadin if remains stable   swich to heparin infusion if re bleed and needs reversal     S/p right hip arthroplasty: PT consultation     COPD, Chronic: Duonebs PRN   Bipolar disorder: venlafaxine     HLD: statin       DVT prophlaxis:   lovenox     Last BM:   None     Ambulation:   As tolerated    Disposition:   Home     Diet: Full liquid diet  NPO after midnight     Physical Exam Performed:       BP (!) 110/53   Pulse 62   Temp 98.2 °F (36.8 °C) (Oral)   Resp 14 Ht 5' 4\" (1.626 m)   Wt 138 lb 3.7 oz (62.7 kg)   SpO2 98%   BMI 23.73 kg/m²     Physical Exam  Constitutional:       General: He is not in acute distress. Appearance: Normal appearance. HENT:      Head: Normocephalic and atraumatic. Right Ear: External ear normal.      Left Ear: External ear normal.   Eyes:      Extraocular Movements: Extraocular movements intact. Pupils: Pupils are equal, round, and reactive to light. Cardiovascular:      Rate and Rhythm: Normal rate and regular rhythm. Heart sounds: No murmur heard. Pulmonary:      Effort: Pulmonary effort is normal. No respiratory distress. Breath sounds: Normal breath sounds. No wheezing. Abdominal:      General: Bowel sounds are normal. There is no distension. Palpations: Abdomen is soft. Tenderness: There is no abdominal tenderness. Musculoskeletal:         General: No swelling. Cervical back: Normal range of motion. Skin:     General: Skin is warm. Neurological:      General: No focal deficit present. Mental Status: He is alert and oriented to person, place, and time. Cranial Nerves: No cranial nerve deficit. Psychiatric:         Mood and Affect: Mood normal.         Labs:   Recent Labs     11/09/21 0943 11/09/21  0943 11/10/21  0259 11/10/21  1310 11/11/21  0244   WBC 12.0*  --  9.8  --  6.9   HGB 4.3*   < > 6.6* 8.6* 7.3*   HCT 14.3*   < > 21.1* 27.6* 23.8*     --  335  --  290    < > = values in this interval not displayed. Recent Labs     11/10/21  0259 11/11/21  0244   * 136   K 4.1 4.3    107   CO2 21 22   BUN 20 10   CREATININE 0.8* 0.7*   CALCIUM 7.8* 8.0*     Recent Labs     11/10/21  0259 11/11/21  0244   AST 14* 15   ALT 12 12   BILITOT 0.6 0.5   ALKPHOS 70 73     Recent Labs     11/09/21  0303 11/10/21  0259   INR 1.21 1.44     No results for input(s): Becky Bishop in the last 72 hours.     Urinalysis:      Lab Results   Component Value Date NITRU NEGATIVE 11/09/2021    WBCUA <1 11/09/2021    BACTERIA NEGATIVE 11/09/2021    RBCUA NONE SEEN 11/09/2021    BLOODU NEGATIVE 11/09/2021    SPECGRAV 1.016 11/09/2021    GLUCOSEU neg 09/17/2019       Radiology:  CT ABDOMEN PELVIS WO CONTRAST Additional Contrast? None   Final Result   1. No specific acute abnormality identified in the abdomen pelvis by   noncontrast evaluation. 2. Noted of severe atherosclerotic calcifications and possible constipation. 3. Mild infiltrative changes in the lung bases, the appearance suspicious for   low-grade aspiration.                  Alicia Suarez MD  11/11/2021 9:05 AM

## 2021-11-11 NOTE — CARE COORDINATION
Received referral for ARU. Reviewed patients clinicals and PT/OT notes. Patients primary insurance is [facility] Medicare, pre-cert needed. Pre-cert initiated and pending at this time. Pending ref [id number]. Will continue to follow for determination.

## 2021-11-12 LAB
ANION GAP SERPL CALCULATED.3IONS-SCNC: 9 MMOL/L (ref 4–16)
BUN BLDV-MCNC: 9 MG/DL (ref 6–23)
CALCIUM SERPL-MCNC: 7.9 MG/DL (ref 8.3–10.6)
CHLORIDE BLD-SCNC: 108 MMOL/L (ref 99–110)
CO2: 23 MMOL/L (ref 21–32)
CREAT SERPL-MCNC: 0.7 MG/DL (ref 0.9–1.3)
GFR AFRICAN AMERICAN: >60 ML/MIN/1.73M2
GFR NON-AFRICAN AMERICAN: >60 ML/MIN/1.73M2
GLUCOSE BLD-MCNC: 96 MG/DL (ref 70–99)
HCT VFR BLD CALC: 25.2 % (ref 42–52)
HCT VFR BLD CALC: 27.7 % (ref 42–52)
HEMOGLOBIN: 7.8 GM/DL (ref 13.5–18)
HEMOGLOBIN: 8.4 GM/DL (ref 13.5–18)
INR BLD: 1.16 INDEX
MCH RBC QN AUTO: 29.4 PG (ref 27–31)
MCHC RBC AUTO-ENTMCNC: 31 % (ref 32–36)
MCV RBC AUTO: 95.1 FL (ref 78–100)
PDW BLD-RTO: 16.9 % (ref 11.7–14.9)
PLATELET # BLD: 303 K/CU MM (ref 140–440)
PMV BLD AUTO: 8.4 FL (ref 7.5–11.1)
POTASSIUM SERPL-SCNC: 3.9 MMOL/L (ref 3.5–5.1)
PROTHROMBIN TIME: 15 SECONDS (ref 11.7–14.5)
RBC # BLD: 2.65 M/CU MM (ref 4.6–6.2)
SODIUM BLD-SCNC: 140 MMOL/L (ref 135–145)
WBC # BLD: 6.1 K/CU MM (ref 4–10.5)

## 2021-11-12 PROCEDURE — 6370000000 HC RX 637 (ALT 250 FOR IP): Performed by: NURSE PRACTITIONER

## 2021-11-12 PROCEDURE — 2140000000 HC CCU INTERMEDIATE R&B

## 2021-11-12 PROCEDURE — 6360000002 HC RX W HCPCS: Performed by: NURSE PRACTITIONER

## 2021-11-12 PROCEDURE — 85610 PROTHROMBIN TIME: CPT

## 2021-11-12 PROCEDURE — 85014 HEMATOCRIT: CPT

## 2021-11-12 PROCEDURE — 6360000002 HC RX W HCPCS: Performed by: HOSPITALIST

## 2021-11-12 PROCEDURE — 6370000000 HC RX 637 (ALT 250 FOR IP): Performed by: PHYSICAL MEDICINE & REHABILITATION

## 2021-11-12 PROCEDURE — 80048 BASIC METABOLIC PNL TOTAL CA: CPT

## 2021-11-12 PROCEDURE — 85027 COMPLETE CBC AUTOMATED: CPT

## 2021-11-12 PROCEDURE — C9113 INJ PANTOPRAZOLE SODIUM, VIA: HCPCS | Performed by: NURSE PRACTITIONER

## 2021-11-12 PROCEDURE — 2580000003 HC RX 258: Performed by: NURSE PRACTITIONER

## 2021-11-12 PROCEDURE — 97535 SELF CARE MNGMENT TRAINING: CPT

## 2021-11-12 PROCEDURE — 97530 THERAPEUTIC ACTIVITIES: CPT

## 2021-11-12 PROCEDURE — 36415 COLL VENOUS BLD VENIPUNCTURE: CPT

## 2021-11-12 PROCEDURE — 85018 HEMOGLOBIN: CPT

## 2021-11-12 RX ADMIN — MONTELUKAST 10 MG: 10 TABLET, FILM COATED ORAL at 20:42

## 2021-11-12 RX ADMIN — ENOXAPARIN SODIUM 60 MG: 100 INJECTION SUBCUTANEOUS at 20:42

## 2021-11-12 RX ADMIN — VENLAFAXINE HYDROCHLORIDE 150 MG: 150 CAPSULE, EXTENDED RELEASE ORAL at 10:27

## 2021-11-12 RX ADMIN — SODIUM CHLORIDE, PRESERVATIVE FREE 10 ML: 5 INJECTION INTRAVENOUS at 10:28

## 2021-11-12 RX ADMIN — TRAZODONE HYDROCHLORIDE 200 MG: 50 TABLET ORAL at 20:42

## 2021-11-12 RX ADMIN — PANTOPRAZOLE SODIUM 40 MG: 40 INJECTION, POWDER, FOR SOLUTION INTRAVENOUS at 20:42

## 2021-11-12 RX ADMIN — SODIUM CHLORIDE, PRESERVATIVE FREE 10 ML: 5 INJECTION INTRAVENOUS at 20:42

## 2021-11-12 RX ADMIN — ENOXAPARIN SODIUM 60 MG: 100 INJECTION SUBCUTANEOUS at 10:27

## 2021-11-12 RX ADMIN — WARFARIN SODIUM 7.5 MG: 7.5 TABLET ORAL at 17:21

## 2021-11-12 RX ADMIN — ATORVASTATIN CALCIUM 40 MG: 40 TABLET, FILM COATED ORAL at 20:44

## 2021-11-12 RX ADMIN — TRIAMCINOLONE ACETONIDE: 1 CREAM TOPICAL at 10:33

## 2021-11-12 RX ADMIN — CALCIUM 500 MG: 500 TABLET ORAL at 10:27

## 2021-11-12 RX ADMIN — TRIAMCINOLONE ACETONIDE: 1 CREAM TOPICAL at 20:43

## 2021-11-12 RX ADMIN — PANTOPRAZOLE SODIUM 40 MG: 40 INJECTION, POWDER, FOR SOLUTION INTRAVENOUS at 10:27

## 2021-11-12 RX ADMIN — ACETAMINOPHEN 650 MG: 325 TABLET ORAL at 13:07

## 2021-11-12 RX ADMIN — POLYETHYLENE GLYCOL (3350) 17 G: 17 POWDER, FOR SOLUTION ORAL at 13:07

## 2021-11-12 ASSESSMENT — PAIN SCALES - GENERAL
PAINLEVEL_OUTOF10: 3
PAINLEVEL_OUTOF10: 3
PAINLEVEL_OUTOF10: 0
PAINLEVEL_OUTOF10: 0

## 2021-11-12 NOTE — PROGRESS NOTES
Physical Therapy    Physical Therapy Treatment Note  Name: Berenice Soulier MRN: [id number] :   1955   Date:  2021   Admission Date: 2021 Room:  [facility]-A   Restrictions/Precautions:  Restrictions/Precautions  Restrictions/Precautions: General Precautions, Fall Risk, Weight Bearing Lower Extremity Weight Bearing Restrictions  Right Lower Extremity Weight Bearing: Weight Bearing As Tolerated       Subjective:  Patient states:  \"I have not walked in the hallway today but I would like to\"  Pain:   Location, Type, Intensity (0/10 to 10/10): Denies    Objective:    Observation:  Pt supine in bed upon entry    Treatment, including education/measures:  Pt agreeable to participating in therapy at this time. Therapeutic Activity Training:   Therapeutic activity training was instructed today. Cues were given for safety, sequence, UE/LE placement, awareness, and balance. Activities performed today included bed mobility training, sup-sit, sit-stand. Pt completed supine to sit with CGAx1 with verbal and tactile cues for BUE and BLE placement throughout transfer, with HOB elevated, and with increased time for task completion. Pt completed sit to stand from bed with CGAx1 with verbal cues to push through bed and avoid pulling on walker. Pt completed stand to sit onto chair with CGAx1 with verbal cues to feel chair against back of legs, reach back, and sit slowly. Pt completed sit to stand from chair with minAx1 with verbal cues to push through chair and avoid pulling on walker. Pt completed stand to sit onto chair with CGAx1 with verbal cues to feel chair against back of legs, reach back, and sit slowly. Gait Training:  Cues were given for safety, sequence, device management, balance, posture, awareness, path.     Pt ambulated 40 feet + 60 feet with CGAx1 with a front wheeled walker with a decreased gait speed, a decreased step length bilaterally, a mildly decreased stance time on RLE, and an intermittent unsteady gait. Pt provided with verbal and tactile cues for BLE placement, walker placement, and sequence throughout ambulation. Pt provided with verbal and tactile cues to maintain upright posture in order to avoid COM shifting outside of [place]. Pt provided with verbal cues for directions in order to successfully navigate hallway and return to correct room. Safety  Patient left safely in the chair, with call light/phone in reach with alarm applied. Gait belt and mask were used for transfers and gait. Assessment / Impression:       Patient's tolerance of treatment:  Good; patient tolerated ambulation in hallway today   Adverse Reaction: none  Significant change in status and impact:  none  Barriers to improvement:  Generalized weakness    Plan for Next Session:    Continue progressing toward goals per plan of care. Progress independence with transfers and ambulation as tolerated and appropriate. Progress ambulation distance as tolerated and appropriate. Time in:  1842  Time out:  1906  Timed treatment minutes:  24  Total treatment time:  24    Previously filed items:  Social/Functional History  Lives With: Alone  Type of Home: Apartment  Home Layout: One level  Home Access: Ramped entrance, Elevator  Bathroom Shower/Tub: Tub/Shower unit  Bathroom Toilet: Handicap height  Bathroom Equipment: Grab bars in shower  Bathroom Accessibility: Walker accessible  ADL Assistance: Independent  Homemaking Assistance: Independent  Ambulation Assistance: Independent  Transfer Assistance: Independent  Active : No  Mode of Transportation: Car  Occupation: On disability  Type of occupation: Construction  Additional Comments: Pt typically sleeps in a flat, regular bed at home. Pt reports at least 3 falls in the past year \"due to bad judgement\"; latest one caused R hip fx precipitating this hospitalization. Long term goals  Time Frame for Long term goals :  In one week:  Long term goal 1: Pt will complete all bed mobility independently  Long term goal 2: Pt will complete sit <> stand transfers independently  Long term goal 3: Pt will ambulate 200 feet with supervision with LRAD  Long term goal 4: Pt will ascend/descend 6 steps with a handrail with CGAx1  Long term goal 5: Pt will independently complete 3 sets of 10 reps of BLE AROM exercises in available and allowed ROM    Electronically signed by:     Lisbeth Snell PT, DPT  License #: [id number]

## 2021-11-12 NOTE — PROGRESS NOTES
PHARMACY ANTICOAGULATION MONITORING SERVICE    Antolin Holm is a 73 y.o. male on warfarin therapy for mechnical aortic valve. Pharmacy consulted by ML Pace for monitoring and adjustment of treatment. Indication for anticoagulation: aortic valve replacement (mechanical)  INR goal: 2-3  Warfarin dose prior to admission: warfarin 5 mg every Sunday and 7.5 mg all other days (per patient)    Pertinent Laboratory Values   Recent Labs     11/11/21  0244 11/11/21  0244 11/11/21  0950 11/11/21  1559 11/12/21  0345   INR  --   --  1.21  --  1.16   HGB 7.3*   < > 8.4*   < > 7.8*   HCT 23.8*   < > 28.1*   < > 25.2*     --  329  --  303    < > = values in this interval not displayed. Assessment/Plan:    Possible DDI's:   o Home meds:  Trazodone, venlafaxine. o Lovenox bridge to restart tonight due to subtherapeutic INR.  INR remains subtherapeutic after no doses on 11/9 and 11/10. Warfarin was held due to bleeding concerns with esophagitis and ulcer, cleared to restart anticoagulation yesterday.  INR goal noted as 2.0-3.0 even with mechanical aortic valve replacement.  Continue with normal home dose of warfarin 7.5mg po daily and follow INR trends tomorrow. [address] [place] [address] will continue to monitor and adjust warfarin therapy as indicated    Thank you for the consult,  Vanesa Ordoñez.  Virgen Rowley, [facility]   11/12/2021 2:52 PM

## 2021-11-12 NOTE — PROGRESS NOTES
[facility] HOSPITALIST PROGRESS NOTE      PCP: Grace Yeh MD    Date of Admission: 11/9/2021    Subjective:  No more GI bleeding     Brief Hospital summary patient is a 58-year-old male with history of COPD, CHF, essential hypertension, hyperlipidemia and mechanical aortic valve on Coumadin and right hip arthroplasty on 10/25/2021 who was admitted with weakness and fatigue. Hemoglobin 4.3, status post 2 packed RBC transfusions  GI consulted, Coumadin held, EGD showed erosive esophagitis and non bleeding pyloric ulcer   lovenox resumed per GI since history of mechanical AV    Vitals signs:  Afebrile, HR 60-70s range, on room air, blood pressure 113/66    Medications:  Lipitor, lactulose, pantoprazole, montelukast, trazodone, venlafaxine     Antibiotics: None     Fluid status: UOP 2050     Labs:   Na 140, K 3/9. Cl 108, HCO 23, Cr 0.7   WBC 6.1, Hb 7.8, Plt 303     Imaging:   CT abdomen     1. No specific acute abnormality identified in the abdomen pelvis by   noncontrast evaluation. 2. Noted of severe atherosclerotic calcifications and possible constipation.    3. Mild infiltrative changes in the lung bases, the appearance suspicious for   low-grade aspiration.         Assessment/Plan:     ABLA on chronic anemia: Admitted with anemia, Hb 4.3,   S/p PRBC   Monitor H and H   EGD shows non bleeding ulcer  Continue protonix   Diet advanced  Hemoglobin stable      Mechical Aortic valve: s/o AVR in 2003,   INR sub therapeutic   lovenox BID and coumadin     S/p right hip arthroplasty: PT consultation     COPD, Chronic: Duonebs PRN     Bipolar disorder: venlafaxine     HLD: statin     DVT prophlaxis:   lovenox     Last BM:   None     Ambulation:   As tolerated    Disposition:  Back to ARU once accepted     Diet: Full liquid diet  NPO after midnight     Physical Exam Performed:       /66   Pulse 73   Temp 98 °F (36.7 °C) (Oral)   Resp 18   Ht 5' 4\" (1.626 m)   Wt 138 lb 3.7 oz (62.7 kg) SpO2 97%   BMI 23.73 kg/m²     Physical Exam  Constitutional:       General: He is not in acute distress. Appearance: Normal appearance. HENT:      Head: Normocephalic and atraumatic. Right Ear: External ear normal.      Left Ear: External ear normal.   Eyes:      Extraocular Movements: Extraocular movements intact. Pupils: Pupils are equal, round, and reactive to light. Cardiovascular:      Rate and Rhythm: Normal rate and regular rhythm. Heart sounds: No murmur heard. Pulmonary:      Effort: Pulmonary effort is normal. No respiratory distress. Breath sounds: Normal breath sounds. No wheezing. Abdominal:      General: Bowel sounds are normal. There is no distension. Palpations: Abdomen is soft. Tenderness: There is no abdominal tenderness. Musculoskeletal:         General: No swelling. Cervical back: Normal range of motion. Skin:     General: Skin is warm. Neurological:      General: No focal deficit present. Mental Status: He is alert and oriented to person, place, and time. Cranial Nerves: No cranial nerve deficit. Psychiatric:         Mood and Affect: Mood normal.         Labs:   Recent Labs     11/11/21 0244 11/11/21 0244 11/11/21 0950 11/11/21  1559 11/12/21  0345   WBC 6.9  --  7.2  --  6.1   HGB 7.3*   < > 8.4* 7.8* 7.8*   HCT 23.8*   < > 28.1* 25.7* 25.2*     --  329  --  303    < > = values in this interval not displayed. Recent Labs     11/10/21  0259 11/11/21  0244 11/12/21  0345   * 136 140   K 4.1 4.3 3.9    107 108   CO2 21 22 23   BUN 20 10 9   CREATININE 0.8* 0.7* 0.7*   CALCIUM 7.8* 8.0* 7.9*     Recent Labs     11/10/21  0259 11/11/21  0244   AST 14* 15   ALT 12 12   BILITOT 0.6 0.5   ALKPHOS 70 73     Recent Labs     11/10/21  0259 11/11/21  0950 11/12/21  0345   INR 1.44 1.21 1.16     No results for input(s): Danae Kras in the last 72 hours.     Urinalysis:      Lab Results   Component Value Date    NITRU NEGATIVE 11/09/2021    WBCUA <1 11/09/2021    BACTERIA NEGATIVE 11/09/2021    RBCUA NONE SEEN 11/09/2021    BLOODU NEGATIVE 11/09/2021    SPECGRAV 1.016 11/09/2021    GLUCOSEU neg 09/17/2019       Radiology:  CT ABDOMEN PELVIS WO CONTRAST Additional Contrast? None   Final Result   1. No specific acute abnormality identified in the abdomen pelvis by   noncontrast evaluation. 2. Noted of severe atherosclerotic calcifications and possible constipation. 3. Mild infiltrative changes in the lung bases, the appearance suspicious for   low-grade aspiration.                  Anil Chapman MD  11/12/2021 10:18 AM

## 2021-11-12 NOTE — CARE COORDINATION
Received denial for admission to ARU. Discussed with Dr. Ventura Freeman, who is willing to pursue an expedited appeal.  Expedited appeal letter signed by MD and faxed to [facility] Medicare expedited appeal dept. Will continue to follow for determination.

## 2021-11-12 NOTE — CARE COORDINATION
Reviewed updated therapy notes with Dr. Kaleigh Casillas. Patient presenting too high level for ARU now. Notified Kyung TREJO and Dr. Jhonny Sandy.   Will cancel expedited appeal.

## 2021-11-12 NOTE — PROGRESS NOTES
Occupational Therapy  . Occupational Therapy Treatment Note      Name: James Merida MRN: [id number] :   1955   Date:  2021   Admission Date: 2021 Room:  [address] [place] [address]-A     Primary Problem:      Restrictions/Precautions:  Restrictions/Precautions  Restrictions/Precautions: General Precautions, Fall Risk, Weight Bearing General Precautions, Fall Risk, WBAT Rt LE, Anterolateral Hip Precautions    Communication with other providers:  .Per chart review and RN Bridget, patient is appropriate for therapeutic intervention. Subjective:  Patient states:  Pt agreeable to OT Tx session. \"I'd really like to get my shirt and jeans on and use the toilet. Then I want to walk. Please tell the therapists I like to have therapy. \"  Pain: 5/10, R hip (location, type, intensity)    Objective:    Observation:  Pt received seated in bedside chair. A&O x4. Pt has indep recall for anterolateral hip precautions, demonstrates good safety awareness throughout Tx session. Objective Measures:  Saline lock LUE, Telemetry    Treatment, including education:  Therapeutic Activity Training:   Therapeutic activity training was instructed today. Educational review for anterolateral hip precautions in function after patient was able to identify basic precautions, educational review for RW safety / mgmt c compliance to hip precautions in mind as well as safety awareness. Cues were given for safety, sequence, UE/LE placement, awareness, and balance. Activities performed today included  sit-stand, SPT. Sit<>stands: Sup c RW  Standing Balance / Tolerance:  Sup c RW static stands to SBA c RW for multiple stands, ~5 minutes each, during dynamic reaching. Functional Mobility: SBA c RW inside room to / from bathroom and in hallway ~100 ft. Pt exhibits slow, purposeful movements c compliance to anterolateral hip precautions.     Self Care Training:   Cues were given for safety, sequence, UE/LE placement, visual cues, and toileting with SUP and AD. 6. Pt will complete all aspects of toileting task with SBA. 7. Pt will complete oral hygiene/grooming routine in standing at sink with Emily demo good dynamic standing balance for approx 8 minutes. 8. Pt will complete ther ex/ther act with focus on UB strengthening.

## 2021-11-13 LAB
HCT VFR BLD CALC: 27.3 % (ref 42–52)
HCT VFR BLD CALC: 28.4 % (ref 42–52)
HEMOGLOBIN: 8.4 GM/DL (ref 13.5–18)
HEMOGLOBIN: 8.7 GM/DL (ref 13.5–18)
INR BLD: 1.15 INDEX
PROTHROMBIN TIME: 14.8 SECONDS (ref 11.7–14.5)

## 2021-11-13 PROCEDURE — 85014 HEMATOCRIT: CPT

## 2021-11-13 PROCEDURE — 94640 AIRWAY INHALATION TREATMENT: CPT

## 2021-11-13 PROCEDURE — 85610 PROTHROMBIN TIME: CPT

## 2021-11-13 PROCEDURE — 97116 GAIT TRAINING THERAPY: CPT

## 2021-11-13 PROCEDURE — 85018 HEMOGLOBIN: CPT

## 2021-11-13 PROCEDURE — 36415 COLL VENOUS BLD VENIPUNCTURE: CPT

## 2021-11-13 PROCEDURE — 6370000000 HC RX 637 (ALT 250 FOR IP): Performed by: INTERNAL MEDICINE

## 2021-11-13 PROCEDURE — 2140000000 HC CCU INTERMEDIATE R&B

## 2021-11-13 PROCEDURE — 94150 VITAL CAPACITY TEST: CPT

## 2021-11-13 PROCEDURE — 94761 N-INVAS EAR/PLS OXIMETRY MLT: CPT

## 2021-11-13 PROCEDURE — 6360000002 HC RX W HCPCS: Performed by: HOSPITALIST

## 2021-11-13 PROCEDURE — 2580000003 HC RX 258: Performed by: NURSE PRACTITIONER

## 2021-11-13 PROCEDURE — C9113 INJ PANTOPRAZOLE SODIUM, VIA: HCPCS | Performed by: NURSE PRACTITIONER

## 2021-11-13 PROCEDURE — 6370000000 HC RX 637 (ALT 250 FOR IP): Performed by: NURSE PRACTITIONER

## 2021-11-13 PROCEDURE — 97110 THERAPEUTIC EXERCISES: CPT

## 2021-11-13 PROCEDURE — 6360000002 HC RX W HCPCS: Performed by: NURSE PRACTITIONER

## 2021-11-13 PROCEDURE — 97530 THERAPEUTIC ACTIVITIES: CPT

## 2021-11-13 PROCEDURE — 6370000000 HC RX 637 (ALT 250 FOR IP): Performed by: PHYSICAL MEDICINE & REHABILITATION

## 2021-11-13 RX ORDER — WARFARIN SODIUM 4 MG/1
8 TABLET ORAL DAILY
Status: DISCONTINUED | OUTPATIENT
Start: 2021-11-13 | End: 2021-11-14 | Stop reason: DRUGHIGH

## 2021-11-13 RX ADMIN — TRIAMCINOLONE ACETONIDE: 1 CREAM TOPICAL at 10:08

## 2021-11-13 RX ADMIN — MONTELUKAST 10 MG: 10 TABLET, FILM COATED ORAL at 20:00

## 2021-11-13 RX ADMIN — TRIAMCINOLONE ACETONIDE: 1 CREAM TOPICAL at 20:03

## 2021-11-13 RX ADMIN — PANTOPRAZOLE SODIUM 40 MG: 40 INJECTION, POWDER, FOR SOLUTION INTRAVENOUS at 20:04

## 2021-11-13 RX ADMIN — ENOXAPARIN SODIUM 60 MG: 100 INJECTION SUBCUTANEOUS at 20:04

## 2021-11-13 RX ADMIN — VENLAFAXINE HYDROCHLORIDE 150 MG: 150 CAPSULE, EXTENDED RELEASE ORAL at 09:58

## 2021-11-13 RX ADMIN — WARFARIN SODIUM 8 MG: 4 TABLET ORAL at 19:57

## 2021-11-13 RX ADMIN — ACETAMINOPHEN 650 MG: 325 TABLET ORAL at 10:06

## 2021-11-13 RX ADMIN — SODIUM CHLORIDE, PRESERVATIVE FREE 10 ML: 5 INJECTION INTRAVENOUS at 20:04

## 2021-11-13 RX ADMIN — ATORVASTATIN CALCIUM 40 MG: 40 TABLET, FILM COATED ORAL at 09:58

## 2021-11-13 RX ADMIN — PANTOPRAZOLE SODIUM 40 MG: 40 INJECTION, POWDER, FOR SOLUTION INTRAVENOUS at 09:58

## 2021-11-13 RX ADMIN — CALCIUM 500 MG: 500 TABLET ORAL at 09:58

## 2021-11-13 RX ADMIN — TRAZODONE HYDROCHLORIDE 200 MG: 50 TABLET ORAL at 20:00

## 2021-11-13 RX ADMIN — SODIUM CHLORIDE, PRESERVATIVE FREE 10 ML: 5 INJECTION INTRAVENOUS at 10:04

## 2021-11-13 RX ADMIN — ACETAMINOPHEN 650 MG: 325 TABLET ORAL at 20:00

## 2021-11-13 RX ADMIN — ENOXAPARIN SODIUM 60 MG: 100 INJECTION SUBCUTANEOUS at 09:59

## 2021-11-13 RX ADMIN — ALBUTEROL SULFATE 2 PUFF: 90 AEROSOL, METERED RESPIRATORY (INHALATION) at 11:36

## 2021-11-13 ASSESSMENT — PAIN SCALES - GENERAL
PAINLEVEL_OUTOF10: 5

## 2021-11-13 ASSESSMENT — PAIN DESCRIPTION - DIRECTION: RADIATING_TOWARDS: POSTERIOR

## 2021-11-13 ASSESSMENT — PAIN DESCRIPTION - DESCRIPTORS: DESCRIPTORS: ACHING

## 2021-11-13 ASSESSMENT — PAIN DESCRIPTION - ORIENTATION: ORIENTATION: RIGHT

## 2021-11-13 ASSESSMENT — PAIN DESCRIPTION - FREQUENCY: FREQUENCY: INTERMITTENT

## 2021-11-13 ASSESSMENT — PAIN DESCRIPTION - PAIN TYPE: TYPE: ACUTE PAIN

## 2021-11-13 ASSESSMENT — PAIN DESCRIPTION - LOCATION: LOCATION: HIP

## 2021-11-13 ASSESSMENT — PAIN DESCRIPTION - ONSET: ONSET: GRADUAL

## 2021-11-13 NOTE — PROGRESS NOTES
Physical Therapy Treatment Note  Name: Jaquelin Stone MRN: [id number] :   1955   Date:  2021   Admission Date: 2021 Room:  [address] [place] [address]   Restrictions/Precautions:  Restrictions/Precautions  Restrictions/Precautions: General Precautions, Fall Risk, Weight Bearing Lower Extremity Weight Bearing Restrictions  Right Lower Extremity Weight Bearing: Weight Bearing As Tolerated    telemetry  Communication with other providers:  [id number] Domi Robledo to see per nursing Lula  Subjective:  Patient states: Pt reports that he is feeling better after his blood transfusion and would like to ambulate in hallway  Pain:   Location, Type, Intensity (0/10 to 10/10):  denies  Objective:    Observation:  Pt sitting in chair upon arrival. Pt has telemetry at this time. Treatment, including education/measures:  Therapeutic Activity Training:  STS from chair using RW with SBA  Reviewed hip precautions and pt is compliant to anterolateral hip precautions at this time  Reviewed seated therapeutic exercises with pt: including march, LAQ, HR. TR, glut set. Pt able to verbalize understanding and demonstrate good exercise technique. Gait Training:  Pt ambulated in hallway with SBA with RW ~ 100 ft x 4 with standing rest breaks. Pt with improved step length and foot clearance. Pt exhibits slight antalgic gait with fatigue. Safety  Patient left safely in the chair, with call light/phone in reach with alarm applied. Gait belt and mask were used for transfers and gait. Assessment / Impression:       Patient's tolerance of treatment:  good   Adverse Reaction:none  Significant change in status and impact: none  Barriers to improvement:  none  Plan for Next Session:    Continue POC.   Time in:  1405  Time out:  1450  Timed treatment minutes:  45  Total treatment time: 39    Previously filed items:  Social/Functional History  Lives With: Alone  Type of Home: Apartment  Home Layout: One level  Home Access: Ramped entrance, Elevator  Bathroom Shower/Tub: Tub/Shower unit  Bathroom Toilet: Handicap height  Bathroom Equipment: Grab bars in shower  Bathroom Accessibility: Walker accessible  ADL Assistance: Independent  Homemaking Assistance: Independent  Ambulation Assistance: Independent  Transfer Assistance: Independent  Active : No  Mode of Transportation: Car  Occupation: On disability  Type of occupation: Construction  Additional Comments: Pt typically sleeps in a flat, regular bed at home. Pt reports at least 3 falls in the past year \"due to bad judgement\"; latest one caused R hip fx precipitating this hospitalization. Long term goals  Time Frame for Long term goals :  In one week:  Long term goal 1: Pt will complete all bed mobility independently  Long term goal 2: Pt will complete sit <> stand transfers independently  Long term goal 3: Pt will ambulate 200 feet with supervision with [place] term goal 4: Pt will ascend/descend 6 steps with a handrail with CGAx1  Long term goal 5: Pt will independently complete 3 sets of 10 reps of BLE AROM exercises in available and allowed ROM    Electronically signed by:    Summer Momin PTA  11/13/2021, 3:17 PM

## 2021-11-13 NOTE — PROGRESS NOTES
Internal Medicine Daily Progress Note @todate@                    Date of Admission: 11/9/2021  Allergies  Ciprofloxacin hcl, Levaquin [levofloxacin], Other, and Ceftin [cefuroxime]    Assessment/Plan    1. Acute blood loss anemia status post transfusion. At the present time patient is stable no acute issues no source has been found at the present time EGD was negative. Continue present management. 2 mechanical aortic valve on warfarin. Continue present management. Goal is 2.5-3.5. No active bleed noted at the present time. 3.  Patient is status post arthroplasty PT is working with patient. Disposition based on PT recommendations patient seems to be doing well.  4 history of COPD chronic in nature on as needed inhalers. Overall patient seems to be doing well. Outpatient PT OT assessment and evaluation patient will be discharged to ARU awaits precertification.   Clinically patient stable      Patient Active Problem List    Diagnosis Date Noted    Passive suicidal ideations 10/24/2021    Severe episode of recurrent major depressive disorder, with psychotic features ([address] 06/29/2020    Paranoia ([address] 10/24/2021    Chronic systolic heart failure ([address] 06/30/2020    Rheumatic mitral regurgitation 06/30/2020    Uncontrolled pain     Generalized weakness     Gait disturbance     Acute blood loss anemia     Status post total replacement of right hip     Affective psychosis, bipolar ([address]     Compression fracture of fourth lumbar vertebra ([address]     Traumatic closed fracture of neck of femur with minimal displacement, right, initial encounter ([address] 11/03/2021    Closed right hip fracture, initial encounter ([address] 10/25/2021    H/O mechanical aortic valve replacement 10/25/2021    Fatigue fracture of lumbar vertebra with routine healing 10/25/2021    Closed transcervical fracture of right femur ([address]     Femoral neck stress fracture, initial encounter 10/22/2021    entry is equal on both sides. Heart: Regular rate and rhythm with Normal S1/S2 without  murmurs, rubs or gallops, point of maximum impulse non-displaced  Abdomen: Soft, non-tender or non-distended without rigidity or guarding and positive bowel sounds all four quadrants. Extremities: No clubbing, cyanosis, or edema bilaterally. Full range of motion without deformity and normal gait intact. Skin: Skin color, texture, turgor normal. No rashes or lesions. Neurologic: Alert and oriented X 3,  neurovascularly intact with sensory/motor intact upper extremities/lower extremities, bilaterally. Cranial nerves:II-XII intact, grossly non-focal.  Mental status: Alert, oriented, thought content appropriate. Labs:  CBC:   Lab Results   Component Value Date    WBC 6.1 11/12/2021    RBC 2.65 11/12/2021    HGB 8.4 11/13/2021    HCT 27.3 11/13/2021    MCV 95.1 11/12/2021    MCH 29.4 11/12/2021    MCHC 31.0 11/12/2021    RDW 16.9 11/12/2021     11/12/2021    MPV 8.4 11/12/2021     BMP:    Lab Results   Component Value Date     11/12/2021    K 3.9 11/12/2021     11/12/2021    CO2 23 11/12/2021    BUN 9 11/12/2021    LABALBU 2.9 11/11/2021    CREATININE 0.7 11/12/2021    CALCIUM 7.9 11/12/2021    GFRAA >60 11/12/2021    LABGLOM >60 11/12/2021    GLUCOSE 96 11/12/2021     No results for input(s): TROPONINT in the last 72 hours.   Lab Results   Component Value Date    [facility] 2.360 07/01/2020        Scheduled Med:   warfarin  8 mg Oral Daily    enoxaparin  1 mg/kg SubCUTAneous BID    atorvastatin  40 mg Oral Daily    calcium elemental  500 mg Oral Daily    [Held by provider] lactulose  20 g Oral BID    montelukast  10 mg Oral Nightly    traZODone  200 mg Oral Nightly    triamcinolone   Topical BID    venlafaxine  150 mg Oral Daily    pantoprazole  40 mg IntraVENous BID    sodium chloride flush  5-40 mL IntraVENous 2 times per day         Infusion :   sodium chloride      sodium chloride Consultants:    IP CONSULT TO GI  IP CONSULT TO CARDIOLOGY  IP CONSULT TO GI  PHARMACY TO DOSE WARFARIN    Code Status: Full Code      I have explained to the patient and discussed with him/her the treatment plan. Electronically signed by Ghanshyam Zamora MD on 11/13/2021 at 11:36 AM    Time spent roughly >30 minute in interviewing patient performing medical examination, communicating with relevant medical staff and consultants including documentation .

## 2021-11-13 NOTE — PROGRESS NOTES
PHARMACY ANTICOAGULATION MONITORING SERVICE    Kevyn Valdes is a 73 y.o. male on warfarin therapy for mechnical aortic valve. Pharmacy consulted by ML Chopra for monitoring and adjustment of treatment. Indication for anticoagulation: aortic valve replacement (mechanical)  INR goal: 2-3  Warfarin dose prior to admission: warfarin 5 mg every Sunday and 7.5 mg all other days (per patient)    Pertinent Laboratory Values   Recent Labs     11/11/21  0244 11/11/21  0244 11/11/21  0950 11/11/21  1559 11/12/21  0345 11/12/21  1848 11/13/21  0554   INR  --    < > 1.21  --  1.16  --  1.15   HGB 7.3*   < > 8.4*   < > 7.8*   < > 8.4*   HCT 23.8*   < > 28.1*   < > 25.2*   < > 27.3*     --  329  --  303  --   --     < > = values in this interval not displayed. Assessment/Plan:    Possible DDI's:   o Home meds:  Trazodone, venlafaxine. o Lovenox bridge to restarted on 11/12; due to subtherapeutic INR.  INR remains subtherapeutic after no doses on 11/9 and 11/10. Warfarin was held due to bleeding concerns with esophagitis and ulcer, cleared to restart anticoagulation on 11/11.  INR goal noted as 2.0-3.0 even with mechanical aortic valve replacement.  Increase Warfarin to 8 mg po daily and follow INR trends tomorrow.    Pharmacy will continue to monitor and adjust warfarin therapy as indicated    Thank you for the consult,  Lenny Hinton, [facility]   11/13/2021 9:01 AM

## 2021-11-13 NOTE — PROGRESS NOTES
NPN, focus: status  D: patient resting in chair in monitor  A: RN assessed patient. Patient c/o r hip pain and requested prn tylenol. RN administered prn tylenol. R: RN continues to monitor patient. Call light in reach and bed in low position.

## 2021-11-14 LAB
HCT VFR BLD CALC: 30.9 % (ref 42–52)
HEMOGLOBIN: 9.5 GM/DL (ref 13.5–18)
INR BLD: 1.16 INDEX
PROTHROMBIN TIME: 15 SECONDS (ref 11.7–14.5)

## 2021-11-14 PROCEDURE — 94150 VITAL CAPACITY TEST: CPT

## 2021-11-14 PROCEDURE — 85014 HEMATOCRIT: CPT

## 2021-11-14 PROCEDURE — 2580000003 HC RX 258: Performed by: NURSE PRACTITIONER

## 2021-11-14 PROCEDURE — 6370000000 HC RX 637 (ALT 250 FOR IP): Performed by: INTERNAL MEDICINE

## 2021-11-14 PROCEDURE — 6360000002 HC RX W HCPCS: Performed by: HOSPITALIST

## 2021-11-14 PROCEDURE — 6370000000 HC RX 637 (ALT 250 FOR IP): Performed by: PHYSICAL MEDICINE & REHABILITATION

## 2021-11-14 PROCEDURE — 94761 N-INVAS EAR/PLS OXIMETRY MLT: CPT

## 2021-11-14 PROCEDURE — C9113 INJ PANTOPRAZOLE SODIUM, VIA: HCPCS | Performed by: NURSE PRACTITIONER

## 2021-11-14 PROCEDURE — 6360000002 HC RX W HCPCS: Performed by: NURSE PRACTITIONER

## 2021-11-14 PROCEDURE — 2140000000 HC CCU INTERMEDIATE R&B

## 2021-11-14 PROCEDURE — 36415 COLL VENOUS BLD VENIPUNCTURE: CPT

## 2021-11-14 PROCEDURE — 94640 AIRWAY INHALATION TREATMENT: CPT

## 2021-11-14 PROCEDURE — 85610 PROTHROMBIN TIME: CPT

## 2021-11-14 PROCEDURE — 6370000000 HC RX 637 (ALT 250 FOR IP): Performed by: NURSE PRACTITIONER

## 2021-11-14 PROCEDURE — 85018 HEMOGLOBIN: CPT

## 2021-11-14 RX ADMIN — ENOXAPARIN SODIUM 60 MG: 100 INJECTION SUBCUTANEOUS at 08:45

## 2021-11-14 RX ADMIN — PANTOPRAZOLE SODIUM 40 MG: 40 INJECTION, POWDER, FOR SOLUTION INTRAVENOUS at 22:28

## 2021-11-14 RX ADMIN — TRIAMCINOLONE ACETONIDE: 1 CREAM TOPICAL at 22:29

## 2021-11-14 RX ADMIN — POLYETHYLENE GLYCOL (3350) 17 G: 17 POWDER, FOR SOLUTION ORAL at 05:52

## 2021-11-14 RX ADMIN — TRIAMCINOLONE ACETONIDE: 1 CREAM TOPICAL at 08:45

## 2021-11-14 RX ADMIN — SODIUM CHLORIDE, PRESERVATIVE FREE 10 ML: 5 INJECTION INTRAVENOUS at 22:28

## 2021-11-14 RX ADMIN — ALBUTEROL SULFATE 2 PUFF: 90 AEROSOL, METERED RESPIRATORY (INHALATION) at 11:11

## 2021-11-14 RX ADMIN — VENLAFAXINE HYDROCHLORIDE 150 MG: 150 CAPSULE, EXTENDED RELEASE ORAL at 08:44

## 2021-11-14 RX ADMIN — ACETAMINOPHEN 650 MG: 325 TABLET ORAL at 22:27

## 2021-11-14 RX ADMIN — CALCIUM 500 MG: 500 TABLET ORAL at 08:44

## 2021-11-14 RX ADMIN — PANTOPRAZOLE SODIUM 40 MG: 40 INJECTION, POWDER, FOR SOLUTION INTRAVENOUS at 08:45

## 2021-11-14 RX ADMIN — WARFARIN SODIUM 9 MG: 6 TABLET ORAL at 18:14

## 2021-11-14 RX ADMIN — ATORVASTATIN CALCIUM 40 MG: 40 TABLET, FILM COATED ORAL at 08:44

## 2021-11-14 RX ADMIN — SODIUM CHLORIDE, PRESERVATIVE FREE 10 ML: 5 INJECTION INTRAVENOUS at 08:45

## 2021-11-14 RX ADMIN — TRAZODONE HYDROCHLORIDE 200 MG: 50 TABLET ORAL at 22:27

## 2021-11-14 RX ADMIN — MONTELUKAST 10 MG: 10 TABLET, FILM COATED ORAL at 22:27

## 2021-11-14 RX ADMIN — ENOXAPARIN SODIUM 60 MG: 100 INJECTION SUBCUTANEOUS at 22:28

## 2021-11-14 RX ADMIN — ACETAMINOPHEN 650 MG: 325 TABLET ORAL at 08:58

## 2021-11-14 ASSESSMENT — PAIN SCALES - GENERAL
PAINLEVEL_OUTOF10: 0
PAINLEVEL_OUTOF10: 4
PAINLEVEL_OUTOF10: 0

## 2021-11-14 ASSESSMENT — PAIN SCALES - WONG BAKER: WONGBAKER_NUMERICALRESPONSE: 0

## 2021-11-14 NOTE — PROGRESS NOTES
PHARMACY ANTICOAGULATION MONITORING SERVICE    Suzanne Singh is a 73 y.o. male on warfarin therapy for mechnical aortic valve. Pharmacy consulted by ML Medley for monitoring and adjustment of treatment. Indication for anticoagulation: aortic valve replacement (mechanical)  INR goal: 2-3  Warfarin dose prior to admission: warfarin 5 mg every Sunday and 7.5 mg all other days (per patient)    Pertinent Laboratory Values   Recent Labs     11/12/21  0345 11/12/21  1848 11/14/21  1446   INR 1.16   < > 1.16   HGB 7.8*   < > 9.5*   HCT 25.2*   < > 30.9*     --   --     < > = values in this interval not displayed. Assessment/Plan:    Possible DDI's:   o Home meds:  Trazodone, venlafaxine. o Lovenox bridge to restarted on 11/12; due to subtherapeutic INR - still= ongoing. o    INR remains subtherapeutic after no doses on 11/9 and 11/10. Warfarin was held due to bleeding concerns with esophagitis and ulcer, cleared to restart anticoagulation on 11/11.  INR goal noted as 2.0-3.0 even with mechanical aortic valve replacement.  Increase Warfarin to 9 mg po daily and follow INR trends tomorrow.    Pharmacy will continue to monitor and adjust warfarin therapy as indicated    Thank you for the consult,  Kourtney Salazar, [facility]   11/14/2021 4:22 PM

## 2021-11-14 NOTE — PROGRESS NOTES
Internal Medicine Daily Progress Note @todate@                    Date of Admission: 11/9/2021  Allergies  Ciprofloxacin hcl, Levaquin [levofloxacin], Other, and Ceftin [cefuroxime]    Assessment/Plan    1. Acute blood loss anemia hemoglobin is stable at the present time continue to monitor patient is medically stable hemodynamically doing well can be discharged. 2.  Mechanical aortic valve on warfarin continue present medical management. On Lovenox bridge on Coumadin which was started yesterday pharmacy is following INR ordered for the morning. 3 status post arthroplasty still complains of pain feels that rehab is needed and await for certification for the staff    Overall patient seems to be doing very well has some cough nonproductive no fever chills no chest pains medically stable for discharge for rehab await precertification.     Patient Active Problem List    Diagnosis Date Noted    Passive suicidal ideations 10/24/2021    Severe episode of recurrent major depressive disorder, with psychotic features ([address] 06/29/2020    Paranoia ([address] 10/24/2021    Chronic systolic heart failure ([address] 06/30/2020    Rheumatic mitral regurgitation 06/30/2020    Uncontrolled pain     Generalized weakness     Gait disturbance     Acute blood loss anemia     Status post total replacement of right hip     Affective psychosis, bipolar ([address]     Compression fracture of fourth lumbar vertebra ([address]     Traumatic closed fracture of neck of femur with minimal displacement, right, initial encounter ([address] 11/03/2021    Closed right hip fracture, initial encounter ([address] 10/25/2021    H/O mechanical aortic valve replacement 10/25/2021    Fatigue fracture of lumbar vertebra with routine healing 10/25/2021    Closed transcervical fracture of right femur ([address]     Femoral neck stress fracture, initial encounter 10/22/2021    Psychophysiological insomnia 09/14/2021    Valvular heart disease 2020    Recurrent major depressive disorder, in partial remission [facility] 2020    Rheumatic aortic valve disease 2020    Hyponatremia 2020    Chronic interstitial lung disease ([facility] 10/24/2019    Essential hypertension     Hyperlipidemia     [place] (hard of hearing)     COPD (chronic obstructive pulmonary disease) (HCC)     Bronchiectasis (HCC)     Anxiety     Alcohol abuse     Acid reflux     Localized osteoarthritis of right shoulder 2019    S/P AVR 2003                      Subjective:   No chief complaint on file. Mr. Pushpa Orellana of I am doing quite well at the present time however I do have some cough my pain is still present in the right hip and I think he needs a rehab. Denies any fever chills nausea vomiting nonproductive cough no fever no blood in it    Objective:   TEMPERATURE:  Current - Temp: 98.1 °F (36.7 °C); Max - Temp  Av.3 °F (36.8 °C)  Min: 98.1 °F (36.7 °C)  Max: 98.5 °F (36.9 °C)  RESPIRATIONS RANGE: Resp  Av.6  Min: 16  Max: 25  PULSE RANGE: Pulse  Av.3  Min: 63  Max: 85  BLOOD PRESSURE RANGE:  Systolic (75AZD), ZMP:638 , Min:122 , HPF:880   ; Diastolic (29IYM), JWI:27, Min:55, Max:62    PULSE OXIMETRY RANGE: SpO2  Av.5 %  Min: 95 %  Max: 98 %  24HR INTAKE/OUTPUT:      Intake/Output Summary (Last 24 hours) at 2021 1128  Last data filed at 2021 0971  Gross per 24 hour   Intake 240 ml   Output 350 ml   Net -110 ml       Intake/Output Summary (Last 24 hours) at 2021 1128  Last data filed at 2021 7643  Gross per 24 hour   Intake 240 ml   Output 350 ml   Net -110 ml              Physical Exam:  eneral appearance: Sitting comfortably in chair no acute issues  HEENT EOMI   neck: No neck rigidity no carotid bruit   lungs: Clear to ascultation, bilaterally without Rales/Wheezes/Rhonchi with good respiratory effort.   Heart: Systolic murmur   abdomen: Soft, non-tender or non-distended without rigidity or guarding and positive bowel sounds all four quadrants. Extremities: No clubbing, cyanosis, or edema bilaterally. Full range of motion without deformity and normal gait intact. Skin: Skin color, texture, turgor normal. No rashes or lesions. Neurologic: Alert and oriented X 3,  neurovascularly intact with sensory/motor intact upper extremities/lower extremities, bilaterally. Cranial nerves:II-XII intact, grossly non-focal.  Mental status: Alert, oriented, thought content appropriate. Labs:  CBC:   Lab Results   Component Value Date    WBC 6.1 11/12/2021    RBC 2.65 11/12/2021    HGB 8.7 11/14/2021    HCT 28.4 11/14/2021    MCV 95.1 11/12/2021    MCH 29.4 11/12/2021    MCHC 31.0 11/12/2021    RDW 16.9 11/12/2021     11/12/2021    MPV 8.4 11/12/2021     CMP:    Lab Results   Component Value Date     11/12/2021    K 3.9 11/12/2021     11/12/2021    CO2 23 11/12/2021    BUN 9 11/12/2021    CREATININE 0.7 11/12/2021    GFRAA >60 11/12/2021    AGRATIO 1.5 06/11/2021    LABGLOM >60 11/12/2021    GLUCOSE 96 11/12/2021    PROT 4.8 11/11/2021    LABALBU 2.9 11/11/2021    CALCIUM 7.9 11/12/2021    BILITOT 0.5 11/11/2021    ALKPHOS 73 11/11/2021    AST 15 11/11/2021    ALT 12 11/11/2021     No results for input(s): TROPONINT in the last 72 hours.   Lab Results   Component Value Date    [facility] 2.360 07/01/2020        Scheduled Med:   warfarin  8 mg Oral Daily    enoxaparin  1 mg/kg SubCUTAneous BID    atorvastatin  40 mg Oral Daily    calcium elemental  500 mg Oral Daily    [Held by provider] lactulose  20 g Oral BID    montelukast  10 mg Oral Nightly    traZODone  200 mg Oral Nightly    triamcinolone   Topical BID    venlafaxine  150 mg Oral Daily    pantoprazole  40 mg IntraVENous BID    sodium chloride flush  5-40 mL IntraVENous 2 times per day         Infusion :   sodium chloride      sodium chloride                 Consultants:    IP CONSULT TO GI  IP CONSULT TO CARDIOLOGY  IP CONSULT TO GI  PHARMACY TO DOSE WARFARIN    Code Status: Full Code      I have explained to the patient and discussed with him/her the treatment plan. Electronically signed by Kareem Gordon MD on 11/14/2021 at 11:28 AM    Time spent roughly >30 minute in interviewing patient performing medical examination, communicating with relevant medical staff and consultants including documentation .

## 2021-11-14 NOTE — CARE COORDINATION
Consult received. Chart reviewed. Patient too high functioning for ARU; expedited appeal denied. Contacted patient by phone in room. He is alert and able to participate. Discussed issues with ARU admission- too high functioning. Discussed SNF rehab; patient declines any form of SNF rehab. He is agreeable to home (lives alone) with his sister's support. He will accpet HHC/PT/OT- only if insurance will pay. He is requesting a rolling walker (as PT used) for discharge. He feels unable to discharge to home today as \"nothing will be ready- I need to know everything will be ready\". Sister will be able to transport on 11/15; DME and HHC can be secured on 11/15.  Geovanny Barrera RN

## 2021-11-15 VITALS
HEART RATE: 57 BPM | OXYGEN SATURATION: 97 % | HEIGHT: 64 IN | BODY MASS INDEX: 24.92 KG/M2 | WEIGHT: 145.94 LBS | TEMPERATURE: 97.6 F | RESPIRATION RATE: 16 BRPM | DIASTOLIC BLOOD PRESSURE: 53 MMHG | SYSTOLIC BLOOD PRESSURE: 113 MMHG

## 2021-11-15 LAB
HCT VFR BLD CALC: 27.6 % (ref 42–52)
HEMOGLOBIN: 8.4 GM/DL (ref 13.5–18)
INR BLD: 1.42 INDEX
PROTHROMBIN TIME: 18.4 SECONDS (ref 11.7–14.5)

## 2021-11-15 PROCEDURE — 85014 HEMATOCRIT: CPT

## 2021-11-15 PROCEDURE — 2580000003 HC RX 258: Performed by: NURSE PRACTITIONER

## 2021-11-15 PROCEDURE — 6360000002 HC RX W HCPCS: Performed by: NURSE PRACTITIONER

## 2021-11-15 PROCEDURE — 6360000002 HC RX W HCPCS: Performed by: HOSPITALIST

## 2021-11-15 PROCEDURE — C9113 INJ PANTOPRAZOLE SODIUM, VIA: HCPCS | Performed by: NURSE PRACTITIONER

## 2021-11-15 PROCEDURE — 85018 HEMOGLOBIN: CPT

## 2021-11-15 PROCEDURE — 36415 COLL VENOUS BLD VENIPUNCTURE: CPT

## 2021-11-15 PROCEDURE — 85610 PROTHROMBIN TIME: CPT

## 2021-11-15 PROCEDURE — 6370000000 HC RX 637 (ALT 250 FOR IP): Performed by: PHYSICAL MEDICINE & REHABILITATION

## 2021-11-15 RX ORDER — PANTOPRAZOLE SODIUM 40 MG/1
TABLET, DELAYED RELEASE ORAL
Qty: 180 TABLET | Refills: 1 | Status: SHIPPED | OUTPATIENT
Start: 2021-11-15 | End: 2022-08-11 | Stop reason: SDUPTHER

## 2021-11-15 RX ADMIN — CALCIUM 500 MG: 500 TABLET ORAL at 10:02

## 2021-11-15 RX ADMIN — ENOXAPARIN SODIUM 60 MG: 100 INJECTION SUBCUTANEOUS at 10:02

## 2021-11-15 RX ADMIN — ATORVASTATIN CALCIUM 40 MG: 40 TABLET, FILM COATED ORAL at 10:02

## 2021-11-15 RX ADMIN — SODIUM CHLORIDE, PRESERVATIVE FREE 10 ML: 5 INJECTION INTRAVENOUS at 10:03

## 2021-11-15 RX ADMIN — PANTOPRAZOLE SODIUM 40 MG: 40 INJECTION, POWDER, FOR SOLUTION INTRAVENOUS at 10:02

## 2021-11-15 RX ADMIN — VENLAFAXINE HYDROCHLORIDE 150 MG: 150 CAPSULE, EXTENDED RELEASE ORAL at 10:02

## 2021-11-15 ASSESSMENT — PAIN SCALES - WONG BAKER: WONGBAKER_NUMERICALRESPONSE: 0

## 2021-11-15 ASSESSMENT — PAIN SCALES - GENERAL
PAINLEVEL_OUTOF10: 0
PAINLEVEL_OUTOF10: 0

## 2021-11-15 NOTE — CARE COORDINATION
Pt is requesting a walker with wheels,. He does not have a DME store preference. Informed pt of hospital affiliation with [facility]. Referral made to [facility]. Pt can stop by [facility] on way home to  the walker.   TE

## 2021-11-15 NOTE — PROGRESS NOTES
1300 discharge instructions reviewed with pt and his sister. Both verbalized understanding. Pt gait steady no distress. He packed belongings cloting cell phone glassess. 1345 Taken to private auto per w/c.

## 2021-11-15 NOTE — CARE COORDINATION
Jaguar Jane requires the assistance of a wheeled walker to successfully complete daily living tasks such as: bathing, toileting, dressing and grooming. A wheeled walker is necessary due to the patient's unsteady gait, upper body weakness, inability to  an ambulation device, and can ambulate only by pushing a walker instead of a lesser assistive device such as a cane, crutch, or standard walker.      Electronically signed by India Whitley RN on 11/15/2021 at 1:39 PM

## 2021-11-15 NOTE — DISCHARGE SUMMARY
[facility]    Patient Demographics    Patient. Rudy Chung  Date of Birth. 1955  MRN. [id number]     Primary care provider. Pablito Shukla MD  (Tel: [phone])    Admit date: 11/9/2021    Discharge date (blank if same as Note Date): Note Date: 11/15/2021     Reason for Hospitalization. No chief complaint on file. Diagnosis. Active Problems:    Chronic systolic heart failure (HCC)    Rheumatic mitral regurgitation    S/P AVR    Acute blood loss anemia  Resolved Problems:    * No resolved hospital problems. [facility] Course:  patient is a 27-year-old male with history of COPD, CHF, essential hypertension, hyperlipidemia and mechanical aortic valve on Coumadin and right hip arthroplasty on 10/25/2021 who was admitted with weakness and fatigue. Hemoglobin 4.3, status post 2 packed RBC transfusions  GI consulted, Coumadin held, EGD showed erosive esophagitis and non bleeding pyloric ulcer   lovenox resumed per GI since history of mechanical AV  Hemoglobin remained stable  Was refused ARU and SNF  Discharged home on lovenox and coumadin   Was advised to stop lovenox once INR above 2.5   Repeat INR in 2-3 days     Pantoprazole BID on discharge        Invasive procedures and treatments. 1. EGD showed erosiv e gastritis and non bleeding pyloric ulcer     Consults. IP CONSULT TO GI  IP CONSULT TO CARDIOLOGY  IP CONSULT TO GI  PHARMACY TO DOSE WARFARIN  IP CONSULT TO SOCIAL WORK    Physical examination on discharge day. BP (!) 113/53   Pulse 57   Temp 97.6 °F (36.4 °C) (Oral)   Resp 16   Ht 5' 4\" (1.626 m)   Wt 145 lb 15.1 oz (66.2 kg)   SpO2 97%   BMI 25.05 kg/m²   General appearance. Alert. Looks comfortable. HEENT. Sclera clear. Moist mucus membranes. Cardiovascular. Regular rate and rhythm, normal S1, S2. No murmur. Respiratory.  Not using accessory muscles. Clear to auscultation bilaterally, no wheeze. Gastrointestinal. Abdomen soft, non-tender, not distended, normal bowel sounds  Neurology. Facial symmetry. No speech deficits. Moving all extremities equally. Extremities. No edema in lower extremities. Skin. Warm, dry, normal turgor    Condition at time of discharge stable     Medication instructions provided to patient at discharge. Medication List      ASK your doctor about these medications    * albuterol 0.63 MG/3ML nebulizer solution  Commonly known as: ACCUNEB     * albuterol sulfate  (90 Base) MCG/ACT inhaler  Commonly known as: ProAir HFA  Inhale 2 puffs into the lungs every 4 hours as needed for Wheezing or Shortness of Breath     ammonium lactate 12 % lotion  Commonly known as: Lac-Hydrin  Apply topically daily. calcium carbonate 500 MG Tabs tablet  Commonly known as: OSCAL     CENTRUM PO     cetirizine 10 MG tablet  Commonly known as: ZYRTEC     cyclobenzaprine 10 MG tablet  Commonly known as: FLEXERIL     ipratropium-albuterol 0.5-2.5 (3) MG/3ML Soln nebulizer solution  Commonly known as: DUONEB  Inhale 3 mLs into the lungs 4 times daily     KRILL OIL OMEGA-3 PO     montelukast 10 MG tablet  Commonly known as: SINGULAIR  Take 1 tablet by mouth nightly \"Alternate With Zyrtec\"     NASAL SPRAY SALINE NA     Nebulizer Misc     Rhinocort Allergy 32 MCG/ACT nasal spray  Generic drug: budesonide     simvastatin 40 MG tablet  Commonly known as: ZOCOR  take 1 tablet by mouth at bedtime     sodium chloride (Inhalant) 3 % nebulizer solution     traZODone 100 MG tablet  Commonly known as: DESYREL  Take 2 tablets by mouth nightly     triamcinolone 0.1 % cream  Commonly known as: KENALOG  Apply topically 2 times daily. TYLENOL 8 HOUR PO     venlafaxine 150 MG extended release capsule  Commonly known as: EFFEXOR XR  Take 1 capsule by mouth daily for 7 days     warfarin 5 MG tablet  Commonly known as: COUMADIN  Take as directed.  If you are unsure how to take this medication, talk to your nurse or doctor. Original instructions: take 1-2 tablets by mouth daily as directed         * This list has 2 medication(s) that are the same as other medications prescribed for you. Read the directions carefully, and ask your doctor or other care provider to review them with you. Discharge recommendations given to patient. Follow Up. PCP in 1 week   Disposition. home  Activity. As tolerated  Diet: ADULT DIET; Regular      Spent 25 minutes in discharge process.     Signed:  Michelle Phelps MD     11/15/2021 12:52 PM

## 2021-11-16 ENCOUNTER — CARE COORDINATION (OUTPATIENT)
Dept: CASE MANAGEMENT | Age: 66
End: 2021-11-16

## 2021-11-16 NOTE — CARE COORDINATION
[facility] 45 Transitions Initial Follow Up Call    Call within 2 business days of discharge: Yes    Patient: Sade Bashir Patient : 1955   MRN: [id number]  Reason for Admission: Rt Hip Fx s/p ORIF 10/25/21, Erosive Esophagitis, ABLA  Discharge Date: 11/15/21 RARS: Readmission Risk Score: 18.3 ( )  Facility:  [facility] > ARU > [address] Discharge [facility]       Complaint Diagnosis Description Type Department Provider    21  Localized osteoarthritis of right shoulder Admission (Discharged) [facility] [address] Yecenia Benton MD        Non-face-to-face services provided:  Obtained and reviewed discharge summary and/or continuity of care documents    Care Transitions 24 Hour Call    Care Transitions Interventions       Future Appointments   Date Time Provider Chante Resendez   2021  2:30 PM Sosa Snyder [facility] FPS NURSE [facility] FPS [facility]   2021  1:00 PM [facility]   12/10/2021  1:00 PM Dolly Camacho MD [facility]     Challenges to be reviewed by the provider   Additional needs identified to be addressed with provider:        Method of communication with provider : [address] [place] [address] PCP: Dr Christopher Fitzgerald   Surgeon: Dr Abhinav Farias  Pulmonologist: Dr Karo Fuentes  GI: Dr Michelle Roque: pending - see below    Was this a readmission? Yes  Patient stated reason for admission: Fall w/ rt hip pain  Patients top risk factors for readmission: depression, functional physical ability and medical condition-Fall w/ Rt Hip Fx s/p ORIF 21, Erosive Esophagitis, COPD, CHF, Severe Depression, Anxiety, MVR s/p AVR    Spoke in length with Patient for Care Transition discharge call, verified Patient name and  as identifiers. Introduced self and explained CT program. Agreeable to ongoing CT follow up. Phone Assessment: Reports doing well since home. Obtained and ambulating w/ wheeled walker well in apartment (handicap accessible w/ ramp entry and elevator).  No falls or near [facility]. Reviewed fall safety. Reports incision site intact, no sx of infection. Reports WBAT and performing ROM exercises as directed. Discussed importance of ambulation as tolerated and off loading to help reduce risk of pressure ulcers. Using Lovenox as directed; reviewed administration technique, verbalizes understanding and denies difficulty w/ giving injection. Pain and swelling minimal, using ice and Tylenol prn. Denies dizziness, sob, abd/throat pain. Advised avoiding alcohol, spicy foods and nsaids. Reports voiding qs, + bm since home. Appetite wnl. Discussed benefits of HHC, outpt therapy; Patient denies need. Reports doing \"good, feeling better\" re: depression, anxiety since home. No SI/HI. Plans to speak w/ Dr Annie Mcnamara about Effexor rx and possible therapy referral.     AVS/Meds: Confirmed copy of AVS received, reviewed with Patient. Confirmed Patient obtained and is taking Lovenox, Protonix as directed. Plan to stop Lovenox once INR > 2.5. Med education provided, questions answered, verbalizes understanding. Stressed importance of med compliance. 1111F medication review completed with Patient . Advised obtaining 90 day supply of routine, prn meds. Advised contacting pharmacy/md for refill needs. Resource Need Assessment:   Living Arrangements: lives alone   ADLS:independent, does not drive   DME:wheeled walker, med neb, handicap accessible apt    Transportation: sister  [facility]:none, denies need   Community Assistance:none, denies need   Referrals Made per CTN with Patient/Family Approval: [id number] [facility] Follow Up Appointments: Discussed importance of 7 day hospital follow up appointment for continuity of care. Transportation confirmed for the above appts. Confirmed lab appt for needed INR and CBC. Patient to schedule appts w/ Dr Peter Mancilla and Dr Yoly Lewis, no assistance needed. [id number]:  Patient received Pfizer Covid 19 vaccine series. Encouraged to speak w/ PCP re: timing of booster.   Reviewed [id number] preventative measures including mask covering nose/mouth, social distancing, hand washing. Received flu vaccine while hospitalized. Advanced Care Planning: Full code. No advance directives, considering. Not interested in ACP referral at this time. Healthcare Decision Maker:    Primary Decision Maker: Jaimee Luque - Child - [phone]    Advised to contact PCP / Dr Heidi Mathur 24/7 re: any health concerns for early outpatient intervention in an effort to avoid hospitalization. Discussed benefits of WIC, Urgent Care. Advised 911 if noting acute distress. Upon next outreach: confirm INR as to stop Lovenox once > 2.5, confirm appts w/ Dr Kenya Suarez and Dr Frost Check, CHF/COPD ZM education, f/u on PCP appt -- Effexor?  [address] therapy referral?    Florinda Mccoy RN

## 2021-11-17 ENCOUNTER — TELEPHONE (OUTPATIENT)
Dept: ORTHOPEDIC SURGERY | Age: 66
End: 2021-11-17

## 2021-11-17 DIAGNOSIS — Z96.641 S/P TOTAL RIGHT HIP ARTHROPLASTY: Primary | ICD-10-CM

## 2021-11-18 ENCOUNTER — NURSE ONLY (OUTPATIENT)
Dept: FAMILY MEDICINE CLINIC | Age: 66
End: 2021-11-18
Payer: MEDICARE

## 2021-11-18 DIAGNOSIS — Z95.2 S/P AVR: Chronic | ICD-10-CM

## 2021-11-18 LAB
INTERNATIONAL NORMALIZATION RATIO, POC: 1.2
PROTHROMBIN TIME, POC: ABNORMAL

## 2021-11-18 PROCEDURE — 85610 PROTHROMBIN TIME: CPT | Performed by: FAMILY MEDICINE

## 2021-11-18 NOTE — TELEPHONE ENCOUNTER
Venlafaxine 150 mg er-pt states it's on the hosp dc paperwork but the script was not given to him. Could it be sent to pharm?     ERIC BARBER

## 2021-11-18 NOTE — PROGRESS NOTES
Pt INR today was 1.2. PT TAKING 7.5MG DAILY AND 6ML OF LOVENOX DAILY, PT TO KEEP DOSE THE SAME AND RECHECK IN 1 WEEK PER DR. PEREIRA [facility]

## 2021-11-19 RX ORDER — VENLAFAXINE HYDROCHLORIDE 150 MG/1
150 CAPSULE, EXTENDED RELEASE ORAL DAILY
Qty: 30 CAPSULE | Refills: 5 | Status: SHIPPED | OUTPATIENT
Start: 2021-11-19 | End: 2022-05-23

## 2021-11-23 ENCOUNTER — CARE COORDINATION (OUTPATIENT)
Dept: CASE MANAGEMENT | Age: 66
End: 2021-11-23

## 2021-11-23 ENCOUNTER — OFFICE VISIT (OUTPATIENT)
Dept: FAMILY MEDICINE CLINIC | Age: 66
End: 2021-11-23
Payer: MEDICARE

## 2021-11-23 VITALS — BODY MASS INDEX: 26.58 KG/M2 | HEIGHT: 64 IN | WEIGHT: 155.7 LBS | OXYGEN SATURATION: 98 % | HEART RATE: 87 BPM

## 2021-11-23 DIAGNOSIS — D50.0 IRON DEFICIENCY ANEMIA DUE TO CHRONIC BLOOD LOSS: ICD-10-CM

## 2021-11-23 DIAGNOSIS — K25.0 ACUTE GASTRIC ULCER WITH HEMORRHAGE: ICD-10-CM

## 2021-11-23 DIAGNOSIS — Z09 ENCOUNTER FOR EXAMINATION FOLLOWING TREATMENT AT HOSPITAL: Primary | ICD-10-CM

## 2021-11-23 DIAGNOSIS — R09.89 BILATERAL CAROTID BRUITS: ICD-10-CM

## 2021-11-23 DIAGNOSIS — Z95.2 S/P AVR: Chronic | ICD-10-CM

## 2021-11-23 LAB
BASOPHILS ABSOLUTE: 0 K/UL (ref 0–0.2)
BASOPHILS RELATIVE PERCENT: 0.5 %
EOSINOPHILS ABSOLUTE: 0 K/UL (ref 0–0.6)
EOSINOPHILS RELATIVE PERCENT: 0.6 %
HCT VFR BLD CALC: 28.7 % (ref 40.5–52.5)
HEMOGLOBIN: 9 G/DL (ref 13.5–17.5)
LYMPHOCYTES ABSOLUTE: 1 K/UL (ref 1–5.1)
LYMPHOCYTES RELATIVE PERCENT: 13.5 %
MCH RBC QN AUTO: 28.4 PG (ref 26–34)
MCHC RBC AUTO-ENTMCNC: 31.3 G/DL (ref 31–36)
MCV RBC AUTO: 90.6 FL (ref 80–100)
MONOCYTES ABSOLUTE: 0.6 K/UL (ref 0–1.3)
MONOCYTES RELATIVE PERCENT: 7.7 %
NEUTROPHILS ABSOLUTE: 5.9 K/UL (ref 1.7–7.7)
NEUTROPHILS RELATIVE PERCENT: 77.7 %
PDW BLD-RTO: 15.8 % (ref 12.4–15.4)
PLATELET # BLD: 428 K/UL (ref 135–450)
PMV BLD AUTO: 7.2 FL (ref 5–10.5)
RBC # BLD: 3.17 M/UL (ref 4.2–5.9)
WBC # BLD: 7.6 K/UL (ref 4–11)

## 2021-11-23 PROCEDURE — 99214 OFFICE O/P EST MOD 30 MIN: CPT | Performed by: PHYSICIAN ASSISTANT

## 2021-11-23 PROCEDURE — 1111F DSCHRG MED/CURRENT MED MERGE: CPT | Performed by: PHYSICIAN ASSISTANT

## 2021-11-23 PROCEDURE — 85610 PROTHROMBIN TIME: CPT | Performed by: FAMILY MEDICINE

## 2021-11-23 RX ORDER — NICOTINE 21 MG/24HR
1 PATCH, TRANSDERMAL 24 HOURS TRANSDERMAL DAILY
Qty: 42 PATCH | Refills: 0 | Status: SHIPPED | OUTPATIENT
Start: 2021-11-23 | End: 2021-12-10

## 2021-11-23 RX ORDER — LANOLIN ALCOHOL/MO/W.PET/CERES
325 CREAM (GRAM) TOPICAL 2 TIMES DAILY
Qty: 90 TABLET | Refills: 3 | Status: SHIPPED | OUTPATIENT
Start: 2021-11-23 | End: 2022-08-23

## 2021-11-23 NOTE — PATIENT INSTRUCTIONS
Patient Education        Peptic Ulcer Disease: Care Instructions  Overview     Peptic ulcers are sores on the inside of the stomach. Or they may be on the inside of the small intestine (such as a duodenal ulcer). They are most often caused by an infection with bacteria or from use of nonsteroidal anti-inflammatory drugs (NSAIDs). NSAIDs include aspirin, ibuprofen (Advil), and naproxen (Aleve). Your doctor may have prescribed medicine to reduce stomach acid. You also may need to take antibiotics if your peptic ulcers are caused by an infection. You can help yourself heal and keep ulcers from coming back. You can do this by making some changes in your lifestyle. Quit smoking. Limit alcohol. Follow-up care is a key part of your treatment and safety. Be sure to make and go to all appointments, and call your doctor if you are having problems. It's also a good idea to know your test results and keep a list of the medicines you take. How can you care for yourself at home? · Be safe with medicines. Take your medicines exactly as prescribed. Call your doctor if you think you are having a problem with your medicine. · Do not take aspirin or other NSAIDs such as ibuprofen (Advil or Motrin) or naproxen (Aleve). Ask your doctor what you can take for pain. · Do not smoke. Smoking can make ulcers worse. If you need help quitting, talk to your doctor about stop-smoking programs and medicines. These can increase your chances of quitting for good. · Drink in moderation or avoid drinking alcohol. · Eat a balanced diet of small, frequent meals. See a dietitian if you need help planning your meals. When should you call for help? Call 911  anytime you think you may need emergency care. For example, call if:    · You vomit blood or what looks like coffee grounds.     · You pass maroon or very bloody stools.    Call your doctor now or seek immediate medical care if:    · You have new or worse belly pain.     · Your stools are black and look like tar, or they have streaks of blood.     · You vomit. Watch closely for changes in your health, and be sure to contact your doctor if:    · You do not get better as expected. Where can you learn more? Go to [email or web address] and sign in to your [employer] account. Enter V085 in the [employer] box to learn more about \"Peptic Ulcer Disease: Care Instructions. \"     If you do not have an account, please click on the \"Sign Up Now\" link. Current as of: February 10, 2021               Content Version: 13.0  © 2843-3768 Healthwise, Incorporated. Care instructions adapted under license by [facility] ([facility]). If you have questions about a medical condition or this instruction, always ask your healthcare professional. [address] any warranty or liability for your use of this information.

## 2021-11-23 NOTE — CARE COORDINATION
[facility] 45 Transitions Follow Up Call    2021    Patient: Bhavik Martínez  Patient : 1955   MRN: [id number]  Reason for Admission: Rt Hip Fx s/p ORIF 10/25/21, Erosive Esophagitis, ABLA  Discharge Date: 11/15/21 RARS: Readmission Risk Score: 18.3 ( )    Care Transitions Subsequent and Final Call    Schedule Follow Up Appointment with PCP: Declined  Subsequent and Final Calls  Do you have any ongoing symptoms?: No  Have your medications changed?: Yes  Patient Reports: lovenox complete, resumed coumadin  Do you have any questions related to your medications?: No  Do you currently have any active services?: No  Do you have any needs or concerns that I can assist you with?: No  Identified Barriers: Other  Care Transitions Interventions  No Identified Needs   Home Care Waiver: Declined        Transportation Support: Declined      DME Assistance: Declined     Senior Services: Declined    Other Interventions:         Future Appointments   Date Time Provider Chante Resendez   2021  1:30 PM Krissy Mclean FPS NURSE [facility]   2021  9:00 AM Alea Rodriguez DO [facility]   12/10/2021  1:00 PM Charity Erazo MD [facility]     CARE TRANSITION MONITORING    [facility] PCP: Dr Mami Pacheco   Surgeon: Dr Elias Mcclain  Pulmonologist: Dr Guadalupe Delgado  GI: Dr Vladimir Champagne: pending -- now denies need 21    Spoke w/ Patient for follow up call. Reports doing well. Was seen by MICHAEL Caro (PCP) today. Lovenox complete, now on Coumadin w/ PCP monitoring pt/inr closely. Reports taking all meds as directed including Protonix. Reinforced PA recommendation of stopping smokeless tobacco. Patient reports he was given rx for nicotine patches and \"now have to decide when Im ready\". Not interested in referral for [facility]. Advised [id number]. Stressed importance of no tobacco, no nsaids, no alcohol given dx of erosive esophagitis/abla. Has appt w/ Dr Luis M Cancino 21.   Reports initially he had asked PCP about HHC however no longer needed as he is able wbat/ambulate w/o walker. Encouraged cane prn. Incision healing well, minimal swelling or pain. Has f/u appt w/ Dr Janessa Suarez 12/6/21. Sister provides transportation for appts. Reports depression/anxiety sx resolved, feeling much better since home. Plans to discuss Covid19 booster vaccine timing at next PCP appt. Advised Covid19 preventative measures including mask covering nose/mouth, social distancing, hand washing. Advised to contact PCP / Dr Levi Lazo 24/7 re: any health concerns for early outpatient intervention in an effort to avoid hospitalization. Agreeable to ongoing CT follow up w/ next outreach scheduled for 7-10 days as Patient doing well.    Barry Harkins RN

## 2021-11-23 NOTE — PROGRESS NOTES
Post-Discharge Transitional Care Management Services or Hospital Follow Up      Casa Christian   YOB: 1955    Date of Office Visit:  11/23/2021  Date of Hospital Admission: 11/9/21  Date of Hospital Discharge: 11/15/21  Risk of hospital readmission (high >=14%.  Medium >=10%) :Readmission Risk Score: 18.3 ( )      Care management risk score Rising risk (score 2-5) and Complex Care (Scores >=6): 7     Non face to face  following discharge, date last encounter closed (first attempt may have been earlier): 11/16/2021  2:37 PM    Call initiated 2 business days of discharge: Yes    Patient Active Problem List   Diagnosis    Localized osteoarthritis of right shoulder    S/P AVR    Essential hypertension    Hyperlipidemia    Council (hard of hearing)    COPD (chronic obstructive pulmonary disease) ([address]    Bronchiectasis ([address]    Anxiety    Alcohol abuse    Acid reflux    Chronic interstitial lung disease ([address]    Hyponatremia    Severe episode of recurrent major depressive disorder, with psychotic features ([address]    Recurrent major depressive disorder, in partial remission ([address]    Rheumatic aortic valve disease    Chronic systolic heart failure (HCC)    Rheumatic mitral regurgitation    Valvular heart disease    Psychophysiological insomnia    Femoral neck stress fracture, initial encounter    Closed transcervical fracture of right femur ([address]    Paranoia ([address]    Passive suicidal ideations    Closed right hip fracture, initial encounter ([address]    H/O mechanical aortic valve replacement    Fatigue fracture of lumbar vertebra with routine healing    Traumatic closed fracture of neck of femur with minimal displacement, right, initial encounter ([address]    Uncontrolled pain    Generalized weakness    Gait disturbance    Acute blood loss anemia    Status post total replacement of right hip    Affective psychosis, bipolar ([address]    Compression fracture of fourth lumbar vertebra ([address] Allergies   Allergen Reactions    Ciprofloxacin Hcl Hives and Swelling    Levaquin [Levofloxacin] Hives and Swelling    Other Nausea And Vomiting     \"Allergic To Tylox\"    Ceftin [Cefuroxime]        Medications listed as ordered at the time of discharge from hospital  @DISCHARGEMEDSLIST(<NOROUTINE> error)@      Medications marked \"taking\" at this time  Outpatient Medications Marked as Taking for the 11/23/21 encounter (Office Visit) with MICHAEL Figueroa   Medication Sig Dispense Refill    ferrous sulfate (FE TABS) 325 (65 Fe) MG EC tablet Take 1 tablet by mouth 2 times daily 90 tablet 3    nicotine (NICODERM CQ) 21 MG/24HR Place 1 patch onto the skin daily 42 patch 0    venlafaxine (EFFEXOR XR) 150 MG extended release capsule Take 1 capsule by mouth daily 30 capsule 5    pantoprazole (PROTONIX) 40 MG tablet 40 mg PO BID for 30 days then 40 mg PO daily to continue 180 tablet 1    warfarin (COUMADIN) 5 MG tablet take 1-2 tablets by mouth daily as directed 60 tablet 2    simvastatin (ZOCOR) 40 MG tablet take 1 tablet by mouth at bedtime 90 tablet 1    triamcinolone (KENALOG) 0.1 % cream Apply topically 2 times daily. 453.6 g 1    ammonium lactate (LAC-HYDRIN) 12 % lotion Apply topically daily.  225 g 1    ipratropium-albuterol (DUONEB) 0.5-2.5 (3) MG/3ML SOLN nebulizer solution Inhale 3 mLs into the lungs 4 times daily 360 mL 5    albuterol (ACCUNEB) 0.63 MG/3ML nebulizer solution Take 1 ampule by nebulization every 6 hours as needed for Wheezing      albuterol sulfate HFA (PROAIR HFA) 108 (90 Base) MCG/ACT inhaler Inhale 2 puffs into the lungs every 4 hours as needed for Wheezing or Shortness of Breath 1 Inhaler 3    calcium carbonate (OSCAL) 500 MG TABS tablet Take 500 mg by mouth daily      KRILL OIL OMEGA-3 PO Take by mouth      sodium chloride, Inhalant, 3 % nebulizer solution       Acetaminophen (TYLENOL 8 HOUR PO) Take by mouth as needed Over The Counter      Multiple Vitamins-Minerals (CENTRUM PO) Take by mouth daily          Medications patient taking as of now reconciled against medications ordered at time of hospital discharge: Yes and No    Chief Complaint   Patient presents with   [address] [place] [facility]     October 22- November15 right total hip replacement     Other     protime        History of Present illness - Follow up of Hospital diagnosis(es):  Bleeding ulcer, anemia,     Inpatient course: Discharge summary reviewed- see chart. Pt went into ED because of leg pain, found to have fx hip. Admitted for ORIF-  after surgery developed severe anemia and transferred to ICU. EGD performed found to have bleeding gastric ulcer. Hemoglobin was 4.5. Received 2 units and was stable in the 8-9 range at discharge after physical therapy. Pt had last lovenox shot today. To continue Coumadin. Pt was on warfarin since 2003. Has mechanical heart valve, target INR 2.5-3.5. A/c -finishing Lovenox today, has been on Coumadin 7.5 mg/day. Target INR 2.5 for mechanical heart valve. Taking Protonix for pyloric ulcer, discussed optimal diet. Gi f/u December 6 or 10th? Patient wants Nicotine patches to help him with cessation of chewing tobacco.       Interval history/Current status: Patient recovering well, energy has increased, still has chronic cough. Compliant with all medications, dizziness has resolved, took final Lovenox shot today. INR check today. Victoria Hanna Has follow-up visits with GI and Ortho. Patient to call and make appointment to see cardiology. A comprehensive review of systems was negative except for what was noted in the HPI. Vitals:    11/23/21 1305   Pulse: 87   SpO2: 98%   Weight: 155 lb 11.2 oz (70.6 kg)   Height: 5' 4\" (1.626 m)     Body mass index is 26.73 kg/m².    Wt Readings from Last 3 Encounters:   11/23/21 155 lb 11.2 oz (70.6 kg)   11/14/21 145 lb 15.1 oz (66.2 kg)   11/08/21 144 lb 6.4 oz (65.5 kg)     BP Readings from Last 3 Encounters:   11/15/21 (!) 113/53   11/10/21 (!) 108/58   11/09/21 (!) 111/54        Physical Exam  Constitutional:       Appearance: Normal appearance. He is normal weight. HENT:      Head: Normocephalic and atraumatic. Right Ear: Tympanic membrane and external ear normal.      Left Ear: Tympanic membrane and external ear normal.      Nose: No rhinorrhea. Mouth/Throat:      Mouth: Mucous membranes are moist.      Pharynx: Oropharynx is clear. No oropharyngeal exudate or posterior oropharyngeal erythema. Eyes:      General: No scleral icterus. Extraocular Movements: Extraocular movements intact. Conjunctiva/sclera: Conjunctivae normal.      Pupils: Pupils are equal, round, and reactive to light. Cardiovascular:      Rate and Rhythm: Normal rate and regular rhythm. Pulses: Normal pulses. Heart sounds: Murmur heard. No friction rub. No gallop. Comments: Heart heart sounds from mechanical heart valve  Pulmonary:      Effort: Pulmonary effort is normal.      Breath sounds: Normal breath sounds. No wheezing, rhonchi or rales. Abdominal:      General: Bowel sounds are normal. There is no distension. Palpations: Abdomen is soft. There is no mass. Tenderness: There is no abdominal tenderness. There is no right CVA tenderness, left CVA tenderness, guarding or rebound. Musculoskeletal:         General: No deformity. Normal range of motion. Cervical back: Normal range of motion and neck supple. No rigidity. No muscular tenderness. Right lower leg: No edema. Left lower leg: No edema. Skin:     General: Skin is warm and dry. Capillary Refill: Capillary refill takes less than 2 seconds. Coloration: Skin is pale. Findings: No bruising, erythema or rash. Neurological:      General: No focal deficit present. Mental Status: He is alert and oriented to person, place, and time.       Coordination: Coordination normal.      Gait: Gait normal. Psychiatric:         Mood and Affect: Mood normal.         Behavior: Behavior normal.           Assessment/Plan:  1. Iron deficiency anemia due to chronic blood loss  Iron supplement,   Monitor H and H  - CBC Auto Differential; Future  - ferrous sulfate (FE TABS) 325 (65 Fe) MG EC tablet; Take 1 tablet by mouth 2 times daily  Dispense: 90 tablet; Refill: 3  - [place] DISCHARGE MEDS RECONCILED W/ CURRENT OUTPATIENT MED LIST  - Ultrasound carotid doppler; Future    2. Bilateral carotid bruits  Family history of carotid stenosis  Multiple risk factors  Carotid bruits on exam  Recommend cardiology follow-up  - Ultrasound carotid doppler; Future    3. S/P AVR  Subtherapeutic on 7.5 mg Coumadin  - POCT INR -INR 1.2 today. Recommend next dose of Coumadin is a double dose, then repeat series of 10 mg, 7.5 mg, 7.5 mg x 2 and return in 1 week. 4. Encounter for examination following treatment at hospital  Patient to call cardiology for follow-up  INR is subtherapeutic    5.  Acute gastric ulcer with hemorrhage  Continue with Protonix  Information given on optimal diet  Recommend discontinue use of smokeless tobacco        Medical Decision Making: moderate complexity

## 2021-11-26 ENCOUNTER — HOSPITAL ENCOUNTER (EMERGENCY)
Age: 66
Discharge: LEFT BEFORE DISCHARGE | End: 2021-11-26

## 2021-11-26 ENCOUNTER — NURSE TRIAGE (OUTPATIENT)
Dept: OTHER | Facility: CLINIC | Age: 66
End: 2021-11-26

## 2021-11-26 VITALS
RESPIRATION RATE: 16 BRPM | DIASTOLIC BLOOD PRESSURE: 78 MMHG | WEIGHT: 155 LBS | HEIGHT: 65 IN | TEMPERATURE: 98.1 F | OXYGEN SATURATION: 98 % | SYSTOLIC BLOOD PRESSURE: 148 MMHG | BODY MASS INDEX: 25.83 KG/M2 | HEART RATE: 88 BPM

## 2021-11-26 ASSESSMENT — PAIN DESCRIPTION - PAIN TYPE: TYPE: ACUTE PAIN

## 2021-11-26 ASSESSMENT — PAIN DESCRIPTION - ORIENTATION: ORIENTATION: RIGHT;UPPER

## 2021-11-26 ASSESSMENT — PAIN DESCRIPTION - LOCATION: LOCATION: LEG

## 2021-11-26 ASSESSMENT — PAIN SCALES - GENERAL: PAINLEVEL_OUTOF10: 3

## 2021-11-26 NOTE — TELEPHONE ENCOUNTER
Brief description of triage: Bilat leg swelling. Started yesterday. From mid calf to feet. Still present when he got up today. Denies pain or and SOB    Triage indicates for patient to PCP today    Care advice provided, patient verbalizes understanding; denies any other questions or concerns; instructed to call back for any new or worsening symptoms. Writer provided warm transfer to [employer] at [facility] for appointment scheduling. Office is not open today. Patient referred to [facility] or ER     Attention Provider: Thank you for allowing me to participate in the care of your patient. The patient was connected to triage in response to information provided to the ECC/PSC. Please do not respond through this encounter as the response is not directed to a shared pool. Received call from Flip Fuentes at [facility], caller not on line. Complaint: bilat leg swelling    Market: [address] Name: Geoffrey Henson telephone number verified as [phone]      Connected with caller via phone, please see below triage            Reason for Disposition   MODERATE swelling of both ankles (e.g., swelling extends up to the knees) AND new onset or worsening    Answer Assessment - Initial Assessment Questions  1. ONSET: \"When did the swelling start? \" (e.g., minutes, hours, days)      Noticed yesterday    2. LOCATION: \"What part of the leg is swollen? \"  \"Are both legs swollen or just one leg? \"      Feet to mid calf    3. SEVERITY: \"How bad is the swelling? \" (e.g., localized; mild, moderate, severe)   - Localized - small area of swelling localized to one leg   - MILD pedal edema - swelling limited to foot and ankle, pitting edema < 1/4 inch (6 mm) deep, rest and elevation eliminate most or all swelling   - MODERATE edema - swelling of lower leg to knee, pitting edema > 1/4 inch (6 mm) deep, rest and elevation only partially reduce swelling   - SEVERE edema - swelling extends above knee, facial or hand swelling present       Moderate    4. REDNESS: \"Does the swelling look red or infected? \"      Denies    5. PAIN: \"Is the swelling painful to touch? \" If so, ask: \"How painful is it? \"   (Scale 1-10; mild, moderate or severe)      Not at this time    6. FEVER: \"Do you have a fever? \" If so, ask: \"What is it, how was it measured, and when did it start? \"       Denies    7. CAUSE: \"What do you think is causing the leg swelling? \"      Unsure    8. MEDICAL HISTORY: \"Do you have a history of heart failure, kidney disease, liver failure, or cancer? \"      CHF, aortic valve replacement    9. RECURRENT SYMPTOM: \"Have you had leg swelling before? \" If so, ask: \"When was the last time? \" \"What happened that time? \"      Never had before    10. OTHER SYMPTOMS: \"Do you have any other symptoms? \" (e.g., chest pain, difficulty breathing)        Denies    11. PREGNANCY: \"Is there any chance you are pregnant? \" \"When was your last menstrual period? \"        N/a    Protocols used: LEG SWELLING AND EDEMA-ADULT-OH

## 2021-11-30 ENCOUNTER — CARE COORDINATION (OUTPATIENT)
Dept: CASE MANAGEMENT | Age: 66
End: 2021-11-30

## 2021-11-30 ENCOUNTER — NURSE ONLY (OUTPATIENT)
Dept: FAMILY MEDICINE CLINIC | Age: 66
End: 2021-11-30
Payer: MEDICARE

## 2021-11-30 DIAGNOSIS — Z95.2 S/P AVR: Chronic | ICD-10-CM

## 2021-11-30 LAB
INTERNATIONAL NORMALIZATION RATIO, POC: 2.6
PROTHROMBIN TIME, POC: NORMAL

## 2021-11-30 PROCEDURE — 85610 PROTHROMBIN TIME: CPT | Performed by: FAMILY MEDICINE

## 2021-11-30 NOTE — CARE COORDINATION
[facility] Transitions Follow Up Call    2021    Patient: Estuardo Ang  Patient : 1955   MRN: [id number]  Reason for Admission: Rt Hip Fx s/p ORIF 10/25/21, Erosive Esophagitis, ABLA  Discharge Date: 21 RARS: Readmission Risk Score: 18.3 ( )    Future Appointments   Date Time Provider Chante Resendez   2021  9:00 AM Eugenia Biggs DO [facility]   12/10/2021  1:00 PM Ivana Gallegos MD [facility]   2021 11:40 AM Evan Jung, APRN - CNP [facility] PCP:  [address] [place] [address]  Surgeon: Dr Langford   Pulmonologist: Dr Denise Kirby  GI: Dr Vida Chahal  Psych: pending -- now denies need 21    Attempt to reach for Care Transition follow up call unsuccessful. HIPAA compliant message left requesting call back. Has been seen by PCP office since hospital discharge. Noted to have appt w/ Dr Mcpherson Drought 21 and Dr Langford  21.     Howie Hamm RN

## 2021-12-06 ENCOUNTER — OFFICE VISIT (OUTPATIENT)
Dept: ORTHOPEDIC SURGERY | Age: 66
End: 2021-12-06

## 2021-12-06 VITALS
RESPIRATION RATE: 19 BRPM | HEIGHT: 65 IN | WEIGHT: 155 LBS | HEART RATE: 75 BPM | BODY MASS INDEX: 25.83 KG/M2 | OXYGEN SATURATION: 97 %

## 2021-12-06 DIAGNOSIS — Z96.641 S/P TOTAL RIGHT HIP ARTHROPLASTY: Primary | ICD-10-CM

## 2021-12-06 PROCEDURE — 99024 POSTOP FOLLOW-UP VISIT: CPT | Performed by: ORTHOPAEDIC SURGERY

## 2021-12-06 ASSESSMENT — ENCOUNTER SYMPTOMS
CHEST TIGHTNESS: 0
BACK PAIN: 0
COLOR CHANGE: 0

## 2021-12-06 NOTE — PROGRESS NOTES
Subjective:      Patient ID: Maureen Friday is a 73 y.o. male. Patient presents to the office with a right MICHAEL DOS: 10/25/21. Pt states he does not really have pain today just some general stiffness in his hip and upper thigh. Pt states he has been doing exercises and will take 2x tylenols a day to help reduce the pain. Pt denies any new injuries and presents with the prineo already removed. He comes in today for his 6-week postop recheck after right MICHAEL for his femoral neck fracture. Overall he states that he is feeling great and is not having much pain in the right hip. Patient denies any new injury to the involved extremity/ joint, denies numbness or tingling in the involved extremity and denies fever or chills. Review of Systems   Constitutional: Negative for activity change, chills and fever. Respiratory: Negative for chest tightness. Cardiovascular: Negative for chest pain. Musculoskeletal: Negative for arthralgias, back pain, gait problem, joint swelling and myalgias. Skin: Negative for color change, pallor, rash and wound. Neurological: Negative for weakness and numbness.        Past Medical History:   Diagnosis Date    Acid reflux     Acute frontal sinusitis 7/22/2009    Alcohol abuse     \"I Drink Average 2 Beers A Day\"    Anesthesia     \" I get agitated\"    Anxiety     Arthritis     \"Right Shoulder, Neck, Left Knee\"    Atypical mycobacterial infection     Broken teeth     Upper And Lower     [facility]     COPD (chronic obstructive pulmonary disease) ([facility])     Sees Dr. Mikayla Benson In [place], [address]     Cough     Frequent Cough With Green Sputum    Depression     Depression     Dyspnea 2/23/2018    H/O cardiovascular MUGA stress test 05/14/2019    EF 50%, NORMAL LVEF, NORMAL WALL MOTION.    H/O echocardiogram 05/22/2014    HN=>22-15%, LV systolic function is low normal, mild aortic valve calcification, no pericardial effusion    H/O echocardiogram 12/12/2018    EF 45-50%    History of 24 hour EKG monitoring 6/6/14    24 hr holter monitor. Ventricular ectopics were noted in single and couplet forms. Supraventricular ectopics were noted in single and pair forms.  [place] (hard of hearing)     Bilateral Ears    Hyperlipidemia     Hypertension     Irritant contact dermatitis due to other chemical products 8/26/2019    Other drug allergy(995.27) 7/22/2009    S/P AVR 12/2003    Mechanical valvue     Shortness of breath     Teeth missing     Upper And Lower       Objective:   Physical Exam  Constitutional:       Appearance: He is well-developed. HENT:      Head: Normocephalic. Eyes:      Pupils: Pupils are equal, round, and reactive to light. Pulmonary:      Effort: Pulmonary effort is normal.   Musculoskeletal:         General: No tenderness or deformity. Normal range of motion. Cervical back: Normal range of motion. Right hip: No deformity, lacerations, tenderness, bony tenderness or crepitus. Normal range of motion. Normal strength. Left hip: No deformity, lacerations, tenderness, bony tenderness or crepitus. Normal range of motion. Normal strength. Right knee: Normal.      Left knee: Normal.   Skin:     General: Skin is warm and dry. Capillary Refill: Capillary refill takes less than 2 seconds. Coloration: Skin is not pale. Findings: No erythema or rash. Neurological:      Mental Status: He is alert and oriented to person, place, and time. Right hip-Skin intact with no erythema, ecchymosis or lacerations present. No groin pain with range of motion of the right hip.     XR HIP 1 VW W PELVIS RIGHT    Result Date: 12/6/2021  XRAY X-ray 2 views of the right hip and AP pelvis obtained and reviewed by me today in the office demonstrates age appropriate bone density throughout with well-positioned right total hip arthroplasty, there has been no change in position components compared to prior x-rays, no subluxation or dislocation noted, no acute osseous abnormalities. Impression: Stable right total hip arthroplasty with no acute process. Assessment:      Right MICHAEL, 6 weeks      Plan:      I discussed with him today his x-ray findings. I explained to him that his implants are stable and remain in good alignment. I discussed with the patient today antibiotic prophylaxis for procedures. I discussed with him today that he is continuing to progress extremely well. I discussed with the patient today that their are symptoms are normal and should improve with time. Continue weight-bearing as tolerated. Continue range of motion exercises as instructed. Ice and elevate as needed. Tylenol or Motrin for pain. Follow up in 6 weeks for recheck with x-rays of the right hip.           Barbara 97, DO

## 2021-12-06 NOTE — PROGRESS NOTES
Patient presents to the office with a right MICHAEL DOS: 10/25/21. Pt states he does not really have pain today just some general stiffness in his hip and upper thigh. Pt states he has been doing exercises and will take 2x tylenols a day to help reduce the pain. Pt denies any new injuries and presents with the prineo already removed.

## 2021-12-06 NOTE — PATIENT INSTRUCTIONS
Continue weight-bearing as tolerated. Continue range of motion exercises as instructed. Ice and elevate as needed. Tylenol or Motrin for pain. Follow up in 6 weeks.

## 2021-12-10 ENCOUNTER — OFFICE VISIT (OUTPATIENT)
Dept: FAMILY MEDICINE CLINIC | Age: 66
End: 2021-12-10
Payer: MEDICARE

## 2021-12-10 VITALS
OXYGEN SATURATION: 98 % | HEART RATE: 85 BPM | HEIGHT: 65 IN | WEIGHT: 155 LBS | SYSTOLIC BLOOD PRESSURE: 120 MMHG | DIASTOLIC BLOOD PRESSURE: 84 MMHG | BODY MASS INDEX: 25.83 KG/M2

## 2021-12-10 DIAGNOSIS — J44.9 CHRONIC OBSTRUCTIVE PULMONARY DISEASE, UNSPECIFIED COPD TYPE (HCC): Chronic | ICD-10-CM

## 2021-12-10 DIAGNOSIS — E78.2 MIXED HYPERLIPIDEMIA: Chronic | ICD-10-CM

## 2021-12-10 DIAGNOSIS — D50.0 IRON DEFICIENCY ANEMIA DUE TO CHRONIC BLOOD LOSS: ICD-10-CM

## 2021-12-10 DIAGNOSIS — Z95.2 S/P AVR: Chronic | ICD-10-CM

## 2021-12-10 DIAGNOSIS — F41.9 ANXIETY: Chronic | ICD-10-CM

## 2021-12-10 DIAGNOSIS — I10 ESSENTIAL HYPERTENSION: Primary | ICD-10-CM

## 2021-12-10 LAB
INTERNATIONAL NORMALIZATION RATIO, POC: 1.6
PROTHROMBIN TIME, POC: NORMAL

## 2021-12-10 PROCEDURE — G8482 FLU IMMUNIZE ORDER/ADMIN: HCPCS | Performed by: FAMILY MEDICINE

## 2021-12-10 PROCEDURE — 85610 PROTHROMBIN TIME: CPT | Performed by: FAMILY MEDICINE

## 2021-12-10 PROCEDURE — 4004F PT TOBACCO SCREEN RCVD TLK: CPT | Performed by: FAMILY MEDICINE

## 2021-12-10 PROCEDURE — 99214 OFFICE O/P EST MOD 30 MIN: CPT | Performed by: FAMILY MEDICINE

## 2021-12-10 PROCEDURE — 1123F ACP DISCUSS/DSCN MKR DOCD: CPT | Performed by: FAMILY MEDICINE

## 2021-12-10 PROCEDURE — G8417 CALC BMI ABV UP PARAM F/U: HCPCS | Performed by: FAMILY MEDICINE

## 2021-12-10 PROCEDURE — 3017F COLORECTAL CA SCREEN DOC REV: CPT | Performed by: FAMILY MEDICINE

## 2021-12-10 PROCEDURE — 3288F FALL RISK ASSESSMENT DOCD: CPT | Performed by: FAMILY MEDICINE

## 2021-12-10 PROCEDURE — 1111F DSCHRG MED/CURRENT MED MERGE: CPT | Performed by: FAMILY MEDICINE

## 2021-12-10 PROCEDURE — G8926 SPIRO NO PERF OR DOC: HCPCS | Performed by: FAMILY MEDICINE

## 2021-12-10 PROCEDURE — 3023F SPIROM DOC REV: CPT | Performed by: FAMILY MEDICINE

## 2021-12-10 PROCEDURE — 4040F PNEUMOC VAC/ADMIN/RCVD: CPT | Performed by: FAMILY MEDICINE

## 2021-12-10 PROCEDURE — G8427 DOCREV CUR MEDS BY ELIG CLIN: HCPCS | Performed by: FAMILY MEDICINE

## 2021-12-10 SDOH — ECONOMIC STABILITY: FOOD INSECURITY: WITHIN THE PAST 12 MONTHS, YOU WORRIED THAT YOUR FOOD WOULD RUN OUT BEFORE YOU GOT MONEY TO BUY MORE.: NEVER TRUE

## 2021-12-10 SDOH — ECONOMIC STABILITY: FOOD INSECURITY: WITHIN THE PAST 12 MONTHS, THE FOOD YOU BOUGHT JUST DIDN'T LAST AND YOU DIDN'T HAVE MONEY TO GET MORE.: NEVER TRUE

## 2021-12-10 ASSESSMENT — ENCOUNTER SYMPTOMS
NAUSEA: 0
VOMITING: 0
BLOOD IN STOOL: 0
DIARRHEA: 0
TROUBLE SWALLOWING: 0
CHEST TIGHTNESS: 0
SHORTNESS OF BREATH: 0
WHEEZING: 0
EYE PAIN: 0
ABDOMINAL PAIN: 0

## 2021-12-10 ASSESSMENT — SOCIAL DETERMINANTS OF HEALTH (SDOH): HOW HARD IS IT FOR YOU TO PAY FOR THE VERY BASICS LIKE FOOD, HOUSING, MEDICAL CARE, AND HEATING?: NOT HARD AT ALL

## 2021-12-10 NOTE — PROGRESS NOTES
INR 1.6  Current dose 10/10/7.5 mg. Increase to 10/10/10/7.5 and recheck in 2 weeks. Be sure to start with a 10mg tonight.

## 2021-12-10 NOTE — PROGRESS NOTES
12/10/2021    Kerri Barraza    Chief Complaint   Patient presents with    6 Month Follow-Up    Other     no c/o's       HPI  History was obtained from the patient. Marian Cardenas is a 73 y.o. male who presents today with routine follow-up. He still recovering from total right hip replacement but doing well pain is improving and his gait is better. No recent falls reported patient's breathing has been stable he denies any flare of anxiety depression or affective psychosis type symptoms. Patient has a history of aortic valve replacement on Coumadin INR was checked today it was only 1.6 we will titrate his Coumadin and follow-up closely. He does not appear is angry as before and is not short of breath. Is  quite pleasant. Jonelle Mccray REVIEW OF SYMPTOMS    Review of Systems   Constitutional: Negative for activity change and fatigue. HENT: Negative for congestion, hearing loss, mouth sores and trouble swallowing. Eyes: Negative for pain and visual disturbance. Respiratory: Negative for chest tightness, shortness of breath and wheezing. Patient with history of COPD   Cardiovascular: Negative for chest pain and palpitations. Patient is status post aortic valve replacement. No shortness of breath currently   Gastrointestinal: Negative for abdominal pain, blood in stool, diarrhea, nausea and vomiting. Endocrine: Negative for polydipsia and polyuria. Genitourinary: Negative for dysuria, frequency and urgency. Musculoskeletal: Positive for arthralgias. Negative for gait problem and neck stiffness. Skin: Negative for rash. Allergic/Immunologic: Negative for environmental allergies. Neurological: Negative for dizziness, seizures, speech difficulty and weakness. Hematological: Does not bruise/bleed easily. Psychiatric/Behavioral: Positive for dysphoric mood. Negative for agitation, confusion, hallucinations, self-injury and suicidal ideas. The patient is nervous/anxious.         PAST MEDICAL HISTORY  Past Medical History:   Diagnosis Date    Acid reflux     Acute frontal sinusitis 7/22/2009    Alcohol abuse     \"I Drink Average 2 Beers A Day\"    Anesthesia     \" I get agitated\"    Anxiety     Arthritis     \"Right Shoulder, Neck, Left Knee\"    Atypical mycobacterial infection     Broken teeth     Upper And Lower     [facility]congest[facility] failure) ([facility])     COPD (chronic obstructive pulmonary disease) ([facility])     Sees Dr. Mel Gibbs In [place], [address]     Cough     Frequent Cough With Green Sputum    Depression     Depression     Dyspnea 2/23/2018    H/O cardiovascular MUGA stress test 05/14/2019    EF 50%, NORMAL LVEF, NORMAL WALL MOTION.    H/O echocardiogram 05/22/2014    DB=>13-82%, LV systolic function is low normal, mild aortic valve calcification, no pericardial effusion    H/O echocardiogram 12/12/2018    EF 45-50%    History of 24 hour EKG monitoring 6/6/14    24 hr holter monitor. Ventricular ectopics were noted in single and couplet forms. Supraventricular ectopics were noted in single and pair forms.     [place] (hard of hearing)     Bilateral Ears    Hyperlipidemia     Hypertension     Irritant contact dermatitis due to other chemical products 8/26/2019    Other drug allergy(995.27) 7/22/2009    S/P AVR 12/2003    Mechanical valvue     Shortness of breath     Teeth missing     Upper And Lower       FAMILY HISTORY  Family History   Problem Relation Age of Onset    Cancer Mother         Lymphoma    Diabetes Mother     High Blood Pressure Mother     High Cholesterol Mother     Obesity Mother     Vision Loss Mother         Wore Glasses    Heart Disease Father         \"Heart Failure\"   Debera  COPD Father     Asthma Sister     High Blood Pressure Sister     High Cholesterol Sister     Other Sister         pre diabetic    COPD Sister     Diabetes Sister         \"Pre Diabetes\"    No Known Problems Son        SOCIAL HISTORY  Social History Socioeconomic History    Marital status:      Spouse name: None    Number of children: 1    Years of education: 15    Highest education level: None   Occupational History    None   Tobacco Use    Smoking status: Never Smoker    Smokeless tobacco: Current User     Types: Snuff, Chew   Vaping Use    Vaping Use: Never used   Substance and Sexual Activity    Alcohol use: Yes     Alcohol/week: 4.0 - 5.0 standard drinks     Types: 4 - 5 Cans of beer per week     Comment: last drink stated 2 weeks ago    Drug use: No    Sexual activity: Not Currently   Other Topics Concern    None   Social History Narrative    None     Social Determinants of Health     Financial Resource Strain: Low Risk     Difficulty of Paying Living Expenses: Not hard at all   Food Insecurity: No Food Insecurity    Worried About Running Out of Food in the Last Year: Never true    Suzi of Food in the Last Year: Never true   Transportation Needs:     Lack of Transportation (Medical): Not on file    Lack of Transportation (Non-Medical):  Not on file   Physical Activity:     Days of Exercise per Week: Not on file    Minutes of Exercise per Session: Not on file   Stress:     Feeling of Stress : Not on file   Social Connections:     Frequency of Communication with Friends and Family: Not on file    Frequency of Social Gatherings with Friends and Family: Not on file    Attends [employer] Services: Not on file    Active Member of [address] [place] [address] or Organizations: Not on file    Attends Club or Organization Meetings: Not on file    Marital Status: Not on file   Intimate Partner Violence:     Fear of Current or Ex-Partner: Not on file    Emotionally Abused: Not on file    Physically Abused: Not on file    Sexually Abused: Not on file   Housing Stability:     Unable to Pay for Housing in the Last Year: Not on file    Number of [place] in the Last Year: Not on file    Unstable Housing in the Last Year: Not on file SURGICAL HISTORY  Past Surgical History:   Procedure Laterality Date    BRONCHOSCOPY  1996    \"Done Twice\"    CARDIAC VALVE REPLACEMENT  12/17/2003    \"Aortic Valve Replacement, Mechanical Valve\"    COLONOSCOPY  2006    Polyps Removed    DENTAL SURGERY      Teeth Extracted In Past    ENDOSCOPY, COLON, DIAGNOSTIC  In Past    HEMIARTHROPLASTY SHOULDER FRACTURE Right 1/29/2019    SHOULDER HEMIARTHROPLASTY performed by Rosales Mendez DO at [address] [facility] Left 1992    LUNG BIOPSY Right 1996   [address] [facility],  [address]    \"Cauterized Because My Sperm Was Low\"    ROTATOR CUFF REPAIR Right 2008    TONSILLECTOMY  1959 Or 1960    TOTAL HIP ARTHROPLASTY Right 10/25/2021    RIGHT HIP TOTAL ARTHROPLASTY performed by Rosales Mendez DO at [place] [address] N/A 11/10/2021    EGD BIOPSY [facility] by Dominique [facility]galde MD at [facility]  Current Outpatient Medications   Medication Sig Dispense Refill    ferrous sulfate (FE TABS) 325 (65 Fe) MG EC tablet Take 1 tablet by mouth 2 times daily 90 tablet 3    venlafaxine (EFFEXOR XR) 150 MG extended release capsule Take 1 capsule by mouth daily 30 capsule 5    pantoprazole (PROTONIX) 40 MG tablet 40 mg PO BID for 30 days then 40 mg PO daily to continue 180 tablet 1    warfarin (COUMADIN) 5 MG tablet take 1-2 tablets by mouth daily as directed 60 tablet 2    simvastatin (ZOCOR) 40 MG tablet take 1 tablet by mouth at bedtime 90 tablet 1    triamcinolone (KENALOG) 0.1 % cream Apply topically 2 times daily. 453.6 g 1    ammonium lactate (LAC-HYDRIN) 12 % lotion Apply topically daily.  225 g 1    ipratropium-albuterol (DUONEB) 0.5-2.5 (3) MG/3ML SOLN nebulizer solution Inhale 3 mLs into the lungs 4 times daily 360 mL 5    albuterol (ACCUNEB) 0.63 MG/3ML nebulizer solution Take 1 ampule by nebulization every 6 hours as needed for Wheezing      albuterol sulfate HFA (PROAIR HFA) 108 (90 Base) MCG/ACT inhaler Inhale 2 puffs into the lungs every 4 hours as needed for Wheezing or Shortness of Breath 1 Inhaler 3    montelukast (SINGULAIR) 10 MG tablet Take 1 tablet by mouth nightly \"Alternate With Zyrtec\" 30 tablet 5    calcium carbonate (OSCAL) 500 MG TABS tablet Take 500 mg by mouth daily       KRILL OIL OMEGA-3 PO Take by mouth      sodium chloride, Inhalant, 3 % nebulizer solution       cetirizine (ZYRTEC) 10 MG tablet Take 10 mg by mouth \"Alternate With Singulair\"      NASAL SPRAY SALINE NA by Nasal route daily Over The Counter      Acetaminophen (TYLENOL 8 HOUR PO) Take 500 mg by mouth as needed Over The Counter       Multiple Vitamins-Minerals (CENTRUM PO) Take by mouth daily      Nebulizer MISC Inhale into the lungs as needed       No current facility-administered medications for this visit. ALLERGIES  Allergies   Allergen Reactions    Ciprofloxacin Hcl Hives and Swelling    Levaquin [Levofloxacin] Hives and Swelling    Other Nausea And Vomiting     \"Allergic To Tylox\"    Ceftin [Cefuroxime]        PHYSICAL EXAM    /84   Pulse 85   Ht 5' 5\" (1.651 m)   Wt 155 lb (70.3 kg)   SpO2 98%   BMI 25.79 kg/m²     Physical Exam  Vitals and nursing note reviewed. Constitutional:       General: He is not in acute distress. Appearance: He is well-developed. He is not toxic-appearing. HENT:      Head: Normocephalic and atraumatic. Right Ear: There is no impacted cerumen. Nose: Nose normal.      Mouth/Throat:      Mouth: Mucous membranes are moist.      Pharynx: Oropharynx is clear. Eyes:      Pupils: Pupils are equal, round, and reactive to light. Cardiovascular:      Rate and Rhythm: Normal rate and regular rhythm. Heart sounds: Normal heart sounds. No murmur heard. Pulmonary:      Effort: Pulmonary effort is normal. No respiratory distress. Breath sounds: Normal breath sounds. No wheezing or rales. Abdominal:      Palpations: Abdomen is soft. Musculoskeletal:         General: No swelling or deformity. Normal range of motion. Cervical back: Normal range of motion and neck supple. No rigidity. Lymphadenopathy:      Cervical: No cervical adenopathy. Skin:     General: Skin is warm and dry. Coloration: Skin is not jaundiced. Neurological:      General: No focal deficit present. Mental Status: He is alert and oriented to person, place, and time. Mental status is at baseline. Cranial Nerves: No cranial nerve deficit. Motor: No weakness. Psychiatric:         Mood and Affect: Mood normal.         Behavior: Behavior normal.         Thought Content: Thought content normal.         ASSESSMENT & PLAN     Diagnosis Orders   1. Essential hypertension  CBC    COMPREHENSIVE METABOLIC PANEL   2. Iron deficiency anemia due to chronic blood loss  CBC   3. Chronic obstructive pulmonary disease, unspecified COPD type ([facility] [address]     4. Anxiety     5. Mixed hyperlipidemia     6. S/P AVR  POCT INR    POCT INR   Overall Brandon seems to be doing quite well. Advise get Covid booster shot ASAP we will get a Pneumovax 23 with next visit. Eventually needs Shingrix shots also. We will check an INR, CBC and a CMP today follow-up for all test results. Continue to work on healthy lifestyle and keep appointments with subspecialists as directed. Call with other issues or problems    Return in about 3 months (around 3/10/2022).          Electronically signed by Pablito Shukla MD on 12/10/2021

## 2021-12-14 ENCOUNTER — OFFICE VISIT (OUTPATIENT)
Dept: CARDIOLOGY CLINIC | Age: 66
End: 2021-12-14
Payer: MEDICARE

## 2021-12-14 VITALS
SYSTOLIC BLOOD PRESSURE: 122 MMHG | HEIGHT: 64 IN | WEIGHT: 154.6 LBS | HEART RATE: 72 BPM | BODY MASS INDEX: 26.4 KG/M2 | DIASTOLIC BLOOD PRESSURE: 80 MMHG

## 2021-12-14 DIAGNOSIS — I38 VALVULAR HEART DISEASE: ICD-10-CM

## 2021-12-14 DIAGNOSIS — Z82.49 FAMILY HISTORY OF CAROTID ARTERY STENOSIS: ICD-10-CM

## 2021-12-14 DIAGNOSIS — I50.22 CHRONIC SYSTOLIC HEART FAILURE (HCC): Primary | Chronic | ICD-10-CM

## 2021-12-14 DIAGNOSIS — E78.2 MIXED HYPERLIPIDEMIA: Chronic | ICD-10-CM

## 2021-12-14 PROCEDURE — 4004F PT TOBACCO SCREEN RCVD TLK: CPT | Performed by: NURSE PRACTITIONER

## 2021-12-14 PROCEDURE — G8427 DOCREV CUR MEDS BY ELIG CLIN: HCPCS | Performed by: NURSE PRACTITIONER

## 2021-12-14 PROCEDURE — 99214 OFFICE O/P EST MOD 30 MIN: CPT | Performed by: NURSE PRACTITIONER

## 2021-12-14 PROCEDURE — G8482 FLU IMMUNIZE ORDER/ADMIN: HCPCS | Performed by: NURSE PRACTITIONER

## 2021-12-14 PROCEDURE — 3017F COLORECTAL CA SCREEN DOC REV: CPT | Performed by: NURSE PRACTITIONER

## 2021-12-14 PROCEDURE — 4040F PNEUMOC VAC/ADMIN/RCVD: CPT | Performed by: NURSE PRACTITIONER

## 2021-12-14 PROCEDURE — 1123F ACP DISCUSS/DSCN MKR DOCD: CPT | Performed by: NURSE PRACTITIONER

## 2021-12-14 PROCEDURE — G8417 CALC BMI ABV UP PARAM F/U: HCPCS | Performed by: NURSE PRACTITIONER

## 2021-12-14 PROCEDURE — 1111F DSCHRG MED/CURRENT MED MERGE: CPT | Performed by: NURSE PRACTITIONER

## 2021-12-14 RX ORDER — MELATONIN 10 MG
10 CAPSULE ORAL NIGHTLY
COMMUNITY
End: 2022-08-23

## 2021-12-14 RX ORDER — GUAIFENESIN 600 MG/1
1200 TABLET, EXTENDED RELEASE ORAL EVERY 12 HOURS
COMMUNITY

## 2021-12-14 ASSESSMENT — ENCOUNTER SYMPTOMS: SHORTNESS OF BREATH: 0

## 2021-12-14 NOTE — PROGRESS NOTES
Lilliam Diaz    [facility] [address], [address]  Phone: [phone]    Fax [phone]                  Libertad Delarosa MD, Giorgio Levin MD, Danella Duverney, MD, MD Ana Marshall MD, Konstantin Gonzalez MD, Cece Magdaleno MD, [address] [facility], APRN       Heber Pulliam, APRN  Koko Maguire, APRN       Ana Galvan, APRN        Cardiology Progress Note      12/14/2021    RE: Casa Christian  (1955)                             Primary cardiologist: Dr. Ana Leslie       Subjective:  CC:   1. Chronic systolic heart failure (HCC)    2. Valvular heart disease    3. Mixed hyperlipidemia    4. Family history of carotid artery stenosis        HPI: Casa Christian, who is a  73y.o. year old male with a past medical history as listed below. Patient presents to the office for follow up on CHF, VHD, and hyperlipidemia. Patient had right hip orthoplasty then developed bleeding ulcer. Patient had severe blood loss anemia hemoglobin 4.3 which required multiple PRBC infusions. Patient was bridged with Lovenox due to Coumadin being held and patient having mechanical valve. Patient denies any chest pain, shortness of breath, dizziness, syncope, or palpitations. Past Medical History:   Diagnosis Date    Acid reflux     Acute frontal sinusitis 7/22/2009    Alcohol abuse     \"I Drink Average 2 Beers A Day\"    Anesthesia     \" I get agitated\"    Anxiety     Arthritis     \"Right Shoulder, Neck, Left Knee\"    Atypical mycobacterial infection     Broken teeth     Upper And Lower     [facility]     COPD (chronic obstructive pulmonary disease) ([facility])     Sees Dr. Stef Leon In [place], [address]     Cough     Frequent Cough With Green Sputum    Depression     Depression     Dyspnea 2/23/2018    H/O cardiovascular MUGA stress test 05/14/2019    EF 50%, NORMAL LVEF, NORMAL WALL MOTION.  H/O echocardiogram 05/22/2014    OL=>32-57%, LV systolic function is low normal, mild aortic valve calcification, no pericardial effusion    H/O echocardiogram 12/12/2018    EF 45-50%    History of 24 hour EKG monitoring 6/6/14    24 hr holter monitor. Ventricular ectopics were noted in single and couplet forms. Supraventricular ectopics were noted in single and pair forms.  [place] (hard of hearing)     Bilateral Ears    Hyperlipidemia     Hypertension     Irritant contact dermatitis due to other chemical products 8/26/2019    Other drug allergy(995.27) 7/22/2009    S/P AVR 12/2003    Mechanical valvue     Shortness of breath     Teeth missing     Upper And Lower       Current Outpatient Medications   Medication Sig Dispense Refill    melatonin 10 MG CAPS capsule Take 10 mg by mouth nightly      guaiFENesin (MUCINEX) 600 MG extended release tablet Take 1,200 mg by mouth every 12 hours      ferrous sulfate (FE TABS) 325 (65 Fe) MG EC tablet Take 1 tablet by mouth 2 times daily 90 tablet 3    venlafaxine (EFFEXOR XR) 150 MG extended release capsule Take 1 capsule by mouth daily 30 capsule 5    pantoprazole (PROTONIX) 40 MG tablet 40 mg PO BID for 30 days then 40 mg PO daily to continue 180 tablet 1    warfarin (COUMADIN) 5 MG tablet take 1-2 tablets by mouth daily as directed 60 tablet 2    simvastatin (ZOCOR) 40 MG tablet take 1 tablet by mouth at bedtime 90 tablet 1    triamcinolone (KENALOG) 0.1 % cream Apply topically 2 times daily. 453.6 g 1    ammonium lactate (LAC-HYDRIN) 12 % lotion Apply topically daily.  225 g 1    ipratropium-albuterol (DUONEB) 0.5-2.5 (3) MG/3ML SOLN nebulizer solution Inhale 3 mLs into the lungs 4 times daily 360 mL 5    albuterol (ACCUNEB) 0.63 MG/3ML nebulizer solution Take 1 ampule by nebulization every 6 hours as needed for Wheezing      montelukast (SINGULAIR) 10 MG tablet Take 1 tablet by mouth nightly \"Alternate With Zyrtec\" 30 tablet 5    calcium carbonate (OSCAL) 500 MG TABS tablet Take 500 mg by mouth daily       KRILL OIL OMEGA-3 PO Take by mouth      sodium chloride, Inhalant, 3 % nebulizer solution       cetirizine (ZYRTEC) 10 MG tablet Take 10 mg by mouth \"Alternate With Singulair\"      NASAL SPRAY SALINE NA by Nasal route daily Over The Counter      Acetaminophen (TYLENOL 8 HOUR PO) Take 500 mg by mouth as needed Over The Counter       Multiple Vitamins-Minerals (CENTRUM PO) Take by mouth daily      Nebulizer MISC Inhale into the lungs as needed      albuterol sulfate HFA (PROAIR HFA) 108 (90 Base) MCG/ACT inhaler Inhale 2 puffs into the lungs every 4 hours as needed for Wheezing or Shortness of Breath (Patient not taking: Reported on 12/14/2021) 1 Inhaler 3     No current facility-administered medications for this visit. Review of Systems:  Review of Systems   Respiratory: Negative for shortness of breath. Cardiovascular: Negative for chest pain, palpitations and leg swelling. Musculoskeletal: Negative. Skin: Negative. Neurological: Negative for dizziness and weakness. All other systems reviewed and are negative. Objective:      Physical Exam:  /80 (Site: Left Upper Arm, Position: Sitting, Cuff Size: Medium Adult)   Pulse 72   Ht 5' 4\" (1.626 m)   Wt 154 lb 9.6 oz (70.1 kg)   BMI 26.54 kg/m²   Wt Readings from Last 3 Encounters:   12/14/21 154 lb 9.6 oz (70.1 kg)   12/10/21 155 lb (70.3 kg)   12/06/21 155 lb (70.3 kg)     Body mass index is 26.54 kg/m². Physical exam:  Physical Exam  Constitutional:       Appearance: He is well-developed. Cardiovascular:      Rate and Rhythm: Normal rate and regular rhythm. Pulses: Intact distal pulses. Dorsalis pedis pulses are 2+ on the right side and 2+ on the left side. Posterior tibial pulses are 2+ on the right side and 2+ on the left side. Heart sounds: Murmur heard.     Machinery midsystolic murmur is present at the upper right sternal border radiating to the neck. Pulmonary:      Effort: Pulmonary effort is normal.      Breath sounds: Normal breath sounds. Musculoskeletal:         General: Normal range of motion. Skin:     General: Skin is warm and dry. Neurological:      Mental Status: He is alert and oriented to person, place, and time. DATA:  No results found for: CKTOTAL, CKMB, CKMBINDEX, TROPONINI  BNP:  No results found for: BNP  PT/INR:  No results found for: PTINR  Lab Results   Component Value Date    LABA1C 5.0 11/10/2021    LABA1C 5.5 07/01/2020     Lab Results   Component Value Date    CHOL 126 07/01/2020    TRIG 57 07/01/2020    HDL 45 06/11/2021    LDLCALC 133 (H) 06/11/2021    LDLDIRECT 69 07/01/2020     Lab Results   Component Value Date    ALT 12 11/11/2021    AST 15 11/11/2021     TSH:    Lab Results   Component Value Date    TSH 1.57 06/11/2021       Vitals:    12/14/21 1205   BP: 122/80   Pulse: 72       Echo: 10/25/21   Technically difficult examination due to patient immobility s/p hip   arthroplasty. Left ventricular systolic function is low normal.   Ejection fraction is visually estimated at 50%. Moderate left ventricular hypertrophy. prosthetic valve in aortic position; mean PG: 15 mmHg. LACEY: 1.70 cm sq. DVI:0.4. AT: 25 ms. Trace aortic regurgitation noted. Stress test: 5/14/19  No ischemia       The 10-year ASCVD risk score (Shell Roland et al., 2013) is: 12.5%    Values used to calculate the score:      Age: 73 years      Sex: Male      Is Non- : No      Diabetic: No      Tobacco smoker: No      Systolic Blood Pressure: 157 mmHg      Is BP treated: No      HDL Cholesterol: 45 mg/dL      Total Cholesterol: 200 mg/dL      Assessment/ Plan:     1.  Chronic systolic heart failure (HCC)    Appears euvolemic   Cont with current medications   Monitor weight daily  Limit sodium to < 2000 mg a day  Limit water to < 64 oz in a day  Call the office if a weight gain of >3 lbs in 2 days or > 5 lbs in a week is noted. Last testing: Echo 2018 EF 45-50% Muga showed 50 %      2. Mixed hyperlipidemia  -At or near goal Yes     -He is to continue current medications (Zocor) Hepatic function panel WNL. No abdominal pain. No myalgias.      -The nature of cardiac risk has been fully discussed with this patient. I have made him aware of his LDL target goal given his cardiovascular risk analysis. I have discussed the appropriate diet. The need for lifelong compliance in order to reduce risk is stressed. A regular exercise program is recommended to help achieve and maintain normal body weight, fitness and improve lipid balance. A written copy of a low fat, low cholesterol diet has been given to the patient.      3. Valvular heart disease  -Recent echocardiogram shows EF 50% and valve is in stable position.     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 Patient on coumadin for mechanical valve. Given recent GI bleed patient was bridged with Lovenox. Goal INR 2-3. Patient has not had any further bleeding episodes. Family hX of carotid artery stenosis  -Patient reports fathers cause of death was due to bilateral CA blockages. CTA of neck in 2020 shows minimal stenosis 33% of the proximal left cervical ICA . Patient seen, interviewed and examined. Testing was reviewed. Patient is encouraged to exercise even a brisk walk for 30 minutes at least 3 to 4 times a week. Lifestyle and risk factor modificatons discussed. Various goals are discussed and questions answered. Continue current medications. Appropriate prescriptions are addressed. Questions answered and patient verbalizes understanding. Call for any problems, questions, or concerns. Pt is to follow up in 6 months for Cardiac management    Electronically signed by VANGIE Turner CNP on 12/14/2021 at 12:35 PM

## 2021-12-14 NOTE — PATIENT INSTRUCTIONS
**It is YOUR responsibilty to bring medication bottles and/or updated medication list to [address] [place] [address]. This will allow us to better serve you and all your healthcare needs**  Please be informed that if you contact our office outside of normal business hours the physician on call cannot help with any scheduling or rescheduling issues, procedure instruction questions or any type of medication issue. We advise you for any urgent/emergency that you go to the nearest emergency room!     PLEASE CALL OUR OFFICE DURING NORMAL BUSINESS HOURS    Monday - Friday   8 am to 5 pm    [place] [address]: [phone]    [place]:  [phone]

## 2021-12-28 ENCOUNTER — NURSE ONLY (OUTPATIENT)
Dept: FAMILY MEDICINE CLINIC | Age: 66
End: 2021-12-28
Payer: MEDICARE

## 2021-12-28 DIAGNOSIS — Z95.2 S/P AVR: Chronic | ICD-10-CM

## 2021-12-28 LAB
INTERNATIONAL NORMALIZATION RATIO, POC: 2.5
PROTHROMBIN TIME, POC: NORMAL

## 2021-12-28 PROCEDURE — 85610 PROTHROMBIN TIME: CPT | Performed by: FAMILY MEDICINE

## 2022-01-18 ENCOUNTER — NURSE ONLY (OUTPATIENT)
Dept: FAMILY MEDICINE CLINIC | Age: 67
End: 2022-01-18
Payer: MEDICARE

## 2022-01-18 ENCOUNTER — ANTI-COAG VISIT (OUTPATIENT)
Dept: FAMILY MEDICINE CLINIC | Age: 67
End: 2022-01-18

## 2022-01-18 DIAGNOSIS — Z95.2 S/P AVR: Chronic | ICD-10-CM

## 2022-01-18 DIAGNOSIS — Z95.2 H/O MECHANICAL AORTIC VALVE REPLACEMENT: ICD-10-CM

## 2022-01-18 LAB
INTERNATIONAL NORMALIZATION RATIO, POC: 3.2
PROTHROMBIN TIME, POC: NORMAL

## 2022-01-18 PROCEDURE — 99999 PR OFFICE/OUTPT VISIT,PROCEDURE ONLY: CPT | Performed by: FAMILY MEDICINE

## 2022-01-18 PROCEDURE — 85610 PROTHROMBIN TIME: CPT | Performed by: FAMILY MEDICINE

## 2022-02-07 ENCOUNTER — OFFICE VISIT (OUTPATIENT)
Dept: ORTHOPEDIC SURGERY | Age: 67
End: 2022-02-07
Payer: MEDICARE

## 2022-02-07 VITALS
HEART RATE: 98 BPM | RESPIRATION RATE: 17 BRPM | HEIGHT: 64 IN | WEIGHT: 154 LBS | OXYGEN SATURATION: 96 % | BODY MASS INDEX: 26.29 KG/M2

## 2022-02-07 DIAGNOSIS — Z79.01 ANTICOAGULANT LONG-TERM USE: ICD-10-CM

## 2022-02-07 DIAGNOSIS — E78.2 MIXED HYPERLIPIDEMIA: ICD-10-CM

## 2022-02-07 DIAGNOSIS — Z96.641 S/P TOTAL RIGHT HIP ARTHROPLASTY: Primary | ICD-10-CM

## 2022-02-07 PROCEDURE — 1123F ACP DISCUSS/DSCN MKR DOCD: CPT | Performed by: ORTHOPAEDIC SURGERY

## 2022-02-07 PROCEDURE — 4040F PNEUMOC VAC/ADMIN/RCVD: CPT | Performed by: ORTHOPAEDIC SURGERY

## 2022-02-07 PROCEDURE — G8427 DOCREV CUR MEDS BY ELIG CLIN: HCPCS | Performed by: ORTHOPAEDIC SURGERY

## 2022-02-07 PROCEDURE — 4004F PT TOBACCO SCREEN RCVD TLK: CPT | Performed by: ORTHOPAEDIC SURGERY

## 2022-02-07 PROCEDURE — 3017F COLORECTAL CA SCREEN DOC REV: CPT | Performed by: ORTHOPAEDIC SURGERY

## 2022-02-07 PROCEDURE — G8482 FLU IMMUNIZE ORDER/ADMIN: HCPCS | Performed by: ORTHOPAEDIC SURGERY

## 2022-02-07 PROCEDURE — 99212 OFFICE O/P EST SF 10 MIN: CPT | Performed by: ORTHOPAEDIC SURGERY

## 2022-02-07 PROCEDURE — G8417 CALC BMI ABV UP PARAM F/U: HCPCS | Performed by: ORTHOPAEDIC SURGERY

## 2022-02-07 RX ORDER — WARFARIN SODIUM 5 MG/1
TABLET ORAL
Qty: 60 TABLET | Refills: 2 | Status: SHIPPED | OUTPATIENT
Start: 2022-02-07 | End: 2022-05-23 | Stop reason: SDUPTHER

## 2022-02-07 RX ORDER — SIMVASTATIN 40 MG
TABLET ORAL
Qty: 90 TABLET | Refills: 1 | Status: SHIPPED | OUTPATIENT
Start: 2022-02-07 | End: 2022-08-11 | Stop reason: SDUPTHER

## 2022-02-07 NOTE — PROGRESS NOTES
Patient returns to the office with a 3 month post op of his right MICHAEL, DOS: 10/25/21. Pt stated that he has no pain today. Pt stated he has no concerns with walking around and is pleased with his treatment. 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 Pt denies any new injuries or falls.

## 2022-02-08 ENCOUNTER — NURSE ONLY (OUTPATIENT)
Dept: FAMILY MEDICINE CLINIC | Age: 67
End: 2022-02-08
Payer: MEDICARE

## 2022-02-08 DIAGNOSIS — Z95.2 S/P AVR: Primary | ICD-10-CM

## 2022-02-08 LAB
INTERNATIONAL NORMALIZATION RATIO, POC: 1.9
PROTHROMBIN TIME, POC: ABNORMAL

## 2022-02-08 PROCEDURE — 85610 PROTHROMBIN TIME: CPT | Performed by: FAMILY MEDICINE

## 2022-02-09 ASSESSMENT — ENCOUNTER SYMPTOMS
BACK PAIN: 0
COLOR CHANGE: 0
CHEST TIGHTNESS: 0

## 2022-02-09 NOTE — PROGRESS NOTES
Subjective:      Patient ID: Odilia Cho is a 73 y.o. male. Patient returns to the office with a 3 month post op of his right MICHAEL, DOS: 10/25/21. Pt stated that he has no pain today. Pt stated he has no concerns with walking around and is pleased with his treatment. 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 Pt denies any new injuries or falls. He comes in today for his 3-month postop recheck after right MICHAEL for his femoral neck fracture. Overall he states that he is feeling great and is not having much pain in the right hip. Patient denies any new injury to the involved extremity/ joint, denies numbness or tingling in the involved extremity and denies fever or chills. Knee Pain   Pertinent negatives include no numbness. Review of Systems   Constitutional: Negative for activity change, chills and fever. Respiratory: Negative for chest tightness. Cardiovascular: Negative for chest pain. Musculoskeletal: Negative for arthralgias, back pain, gait problem, joint swelling and myalgias. Skin: Negative for color change, pallor, rash and wound. Neurological: Negative for weakness and numbness.        Past Medical History:   Diagnosis Date    Acid reflux     Acute frontal sinusitis 7/22/2009    Alcohol abuse     \"I Drink Average 2 Beers A Day\"    Anesthesia     \" I get agitated\"    Anxiety     Arthritis     \"Right Shoulder, Neck, Left Knee\"    Atypical mycobacterial infection     Broken teeth     Upper And Lower     [facility]     COPD (chronic obstructive pulmonary disease) ([facility])     Sees Dr. Gregory Hall In [place], [address] [facility]     Cough     Frequent Cough With Green Sputum    Depression     Depression     Dyspnea 2/23/2018    H/O cardiovascular MUGA stress test 05/14/2019    EF 50%, NORMAL LVEF, NORMAL WALL MOTION.    H/O echocardiogram 05/22/2014    EL=>30-01%, LV systolic function is low normal, mild aortic valve calcification, no pericardial effusion    H/O echocardiogram 12/12/2018    EF 45-50%    History of 24 hour EKG monitoring 6/6/14    24 hr holter monitor. Ventricular ectopics were noted in single and couplet forms. Supraventricular ectopics were noted in single and pair forms.  [place] (hard of hearing)     Bilateral Ears    Hyperlipidemia     Hypertension     Irritant contact dermatitis due to other chemical products 8/26/2019    Other drug allergy(995.27) 7/22/2009    S/P AVR 12/2003    Mechanical valvue     Shortness of breath     Teeth missing     Upper And Lower       Objective:   Physical Exam  Constitutional:       Appearance: He is well-developed. HENT:      Head: Normocephalic. Eyes:      Pupils: Pupils are equal, round, and reactive to light. Pulmonary:      Effort: Pulmonary effort is normal.   Musculoskeletal:         General: No tenderness or deformity. Normal range of motion. Cervical back: Normal range of motion. Right hip: No deformity, lacerations, tenderness, bony tenderness or crepitus. Normal range of motion. Normal strength. Left hip: No deformity, lacerations, tenderness, bony tenderness or crepitus. Normal range of motion. Normal strength. Right knee: Normal.      Left knee: Normal.   Skin:     General: Skin is warm and dry. Capillary Refill: Capillary refill takes less than 2 seconds. Coloration: Skin is not pale. Findings: No erythema or rash. Neurological:      Mental Status: He is alert and oriented to person, place, and time. Right hip-Skin intact with no erythema, ecchymosis or lacerations present. No groin pain with range of motion of the right hip.     XR HIP 1 VW W PELVIS RIGHT    Result Date: 2/7/2022  XRAY X-ray 2 views of the right hip and AP pelvis obtained and reviewed by me today in the office demonstrates age appropriate bone density throughout with well-positioned right total hip arthroplasty, there has been no change in position components compared to prior x-rays, no subluxation or dislocation noted, no acute osseous abnormalities. Impression: Stable right total hip arthroplasty with no acute process. Assessment:      Right MICHAEL, 3 months      Plan:      I discussed with him today his x-ray findings. I explained to him that his implants are stable and remain in good alignment. I discussed with him today that he is continuing to progress extremely well. I discussed with the patient today that their are symptoms are normal and should improve with time. Continue weight-bearing as tolerated. Continue range of motion exercises as instructed. Ice and elevate as needed. Tylenol or Motrin for pain. Follow up in 9 months for recheck with x-rays of the right hip.           Barbara 89, DO

## 2022-02-22 ENCOUNTER — ANTI-COAG VISIT (OUTPATIENT)
Dept: FAMILY MEDICINE CLINIC | Age: 67
End: 2022-02-22

## 2022-02-22 ENCOUNTER — NURSE ONLY (OUTPATIENT)
Dept: FAMILY MEDICINE CLINIC | Age: 67
End: 2022-02-22
Payer: MEDICARE

## 2022-02-22 DIAGNOSIS — Z95.2 H/O MECHANICAL AORTIC VALVE REPLACEMENT: ICD-10-CM

## 2022-02-22 DIAGNOSIS — Z95.2 S/P AVR: ICD-10-CM

## 2022-02-22 LAB
INTERNATIONAL NORMALIZATION RATIO, POC: 2.7
PROTHROMBIN TIME, POC: NORMAL

## 2022-02-22 PROCEDURE — 85610 PROTHROMBIN TIME: CPT | Performed by: FAMILY MEDICINE

## 2022-02-22 PROCEDURE — 99999 PR OFFICE/OUTPT VISIT,PROCEDURE ONLY: CPT | Performed by: FAMILY MEDICINE

## 2022-03-08 DIAGNOSIS — L30.9 DERMATITIS: ICD-10-CM

## 2022-03-08 RX ORDER — TRIAMCINOLONE ACETONIDE 1 MG/G
CREAM TOPICAL
Qty: 453.6 G | Refills: 1 | Status: SHIPPED | OUTPATIENT
Start: 2022-03-08 | End: 2022-10-04 | Stop reason: SDUPTHER

## 2022-03-11 ENCOUNTER — OFFICE VISIT (OUTPATIENT)
Dept: FAMILY MEDICINE CLINIC | Age: 67
End: 2022-03-11
Payer: MEDICARE

## 2022-03-11 VITALS
BODY MASS INDEX: 26.84 KG/M2 | DIASTOLIC BLOOD PRESSURE: 68 MMHG | SYSTOLIC BLOOD PRESSURE: 122 MMHG | HEIGHT: 64 IN | OXYGEN SATURATION: 93 % | HEART RATE: 74 BPM | WEIGHT: 157.2 LBS

## 2022-03-11 DIAGNOSIS — F33.3 SEVERE EPISODE OF RECURRENT MAJOR DEPRESSIVE DISORDER, WITH PSYCHOTIC FEATURES (HCC): ICD-10-CM

## 2022-03-11 DIAGNOSIS — J44.9 CHRONIC OBSTRUCTIVE PULMONARY DISEASE, UNSPECIFIED COPD TYPE (HCC): Primary | ICD-10-CM

## 2022-03-11 DIAGNOSIS — F41.9 ANXIETY: Chronic | ICD-10-CM

## 2022-03-11 DIAGNOSIS — Z95.2 S/P AVR: ICD-10-CM

## 2022-03-11 DIAGNOSIS — I10 ESSENTIAL HYPERTENSION: ICD-10-CM

## 2022-03-11 DIAGNOSIS — F22 PARANOIA (HCC): ICD-10-CM

## 2022-03-11 DIAGNOSIS — K11.7 XEROSTOMIA: ICD-10-CM

## 2022-03-11 DIAGNOSIS — K21.01 GASTROESOPHAGEAL REFLUX DISEASE WITH ESOPHAGITIS AND HEMORRHAGE: Chronic | ICD-10-CM

## 2022-03-11 LAB
A/G RATIO: 1.7 (ref 1.1–2.2)
ALBUMIN SERPL-MCNC: 4.3 G/DL (ref 3.4–5)
ALP BLD-CCNC: 95 U/L (ref 40–129)
ALT SERPL-CCNC: 14 U/L (ref 10–40)
ANION GAP SERPL CALCULATED.3IONS-SCNC: 12 MMOL/L (ref 3–16)
AST SERPL-CCNC: 27 U/L (ref 15–37)
BILIRUB SERPL-MCNC: 0.4 MG/DL (ref 0–1)
BUN BLDV-MCNC: 14 MG/DL (ref 7–20)
CALCIUM SERPL-MCNC: 9.5 MG/DL (ref 8.3–10.6)
CHLORIDE BLD-SCNC: 99 MMOL/L (ref 99–110)
CO2: 24 MMOL/L (ref 21–32)
CREAT SERPL-MCNC: 0.9 MG/DL (ref 0.8–1.3)
GFR AFRICAN AMERICAN: >60
GFR NON-AFRICAN AMERICAN: >60
GLUCOSE BLD-MCNC: 81 MG/DL (ref 70–99)
HCT VFR BLD CALC: 37.9 % (ref 40.5–52.5)
HEMOGLOBIN: 12.5 G/DL (ref 13.5–17.5)
INTERNATIONAL NORMALIZATION RATIO, POC: 1.4
MCH RBC QN AUTO: 28.3 PG (ref 26–34)
MCHC RBC AUTO-ENTMCNC: 33 G/DL (ref 31–36)
MCV RBC AUTO: 85.7 FL (ref 80–100)
PDW BLD-RTO: 17.4 % (ref 12.4–15.4)
PLATELET # BLD: 305 K/UL (ref 135–450)
PMV BLD AUTO: 7.9 FL (ref 5–10.5)
POTASSIUM SERPL-SCNC: 5 MMOL/L (ref 3.5–5.1)
PROTHROMBIN TIME, POC: ABNORMAL
RBC # BLD: 4.42 M/UL (ref 4.2–5.9)
SODIUM BLD-SCNC: 135 MMOL/L (ref 136–145)
TOTAL PROTEIN: 6.9 G/DL (ref 6.4–8.2)
WBC # BLD: 6.6 K/UL (ref 4–11)

## 2022-03-11 PROCEDURE — 3023F SPIROM DOC REV: CPT | Performed by: FAMILY MEDICINE

## 2022-03-11 PROCEDURE — G0009 ADMIN PNEUMOCOCCAL VACCINE: HCPCS | Performed by: FAMILY MEDICINE

## 2022-03-11 PROCEDURE — G8427 DOCREV CUR MEDS BY ELIG CLIN: HCPCS | Performed by: FAMILY MEDICINE

## 2022-03-11 PROCEDURE — G8417 CALC BMI ABV UP PARAM F/U: HCPCS | Performed by: FAMILY MEDICINE

## 2022-03-11 PROCEDURE — 4040F PNEUMOC VAC/ADMIN/RCVD: CPT | Performed by: FAMILY MEDICINE

## 2022-03-11 PROCEDURE — 85610 PROTHROMBIN TIME: CPT | Performed by: FAMILY MEDICINE

## 2022-03-11 PROCEDURE — 3017F COLORECTAL CA SCREEN DOC REV: CPT | Performed by: FAMILY MEDICINE

## 2022-03-11 PROCEDURE — 90732 PPSV23 VACC 2 YRS+ SUBQ/IM: CPT | Performed by: FAMILY MEDICINE

## 2022-03-11 PROCEDURE — 4004F PT TOBACCO SCREEN RCVD TLK: CPT | Performed by: FAMILY MEDICINE

## 2022-03-11 PROCEDURE — 1123F ACP DISCUSS/DSCN MKR DOCD: CPT | Performed by: FAMILY MEDICINE

## 2022-03-11 PROCEDURE — G8482 FLU IMMUNIZE ORDER/ADMIN: HCPCS | Performed by: FAMILY MEDICINE

## 2022-03-11 PROCEDURE — 99213 OFFICE O/P EST LOW 20 MIN: CPT | Performed by: FAMILY MEDICINE

## 2022-03-11 RX ORDER — AMMONIUM LACTATE 12 G/100G
LOTION TOPICAL
Qty: 225 G | Refills: 1 | Status: SHIPPED | OUTPATIENT
Start: 2022-03-11 | End: 2022-05-18 | Stop reason: CLARIF

## 2022-03-11 RX ORDER — ALBUTEROL SULFATE 90 UG/1
2 AEROSOL, METERED RESPIRATORY (INHALATION) EVERY 4 HOURS PRN
Qty: 1 EACH | Refills: 1 | Status: SHIPPED | OUTPATIENT
Start: 2022-03-11 | End: 2022-03-30

## 2022-03-11 ASSESSMENT — PATIENT HEALTH QUESTIONNAIRE - PHQ9
8. MOVING OR SPEAKING SO SLOWLY THAT OTHER PEOPLE COULD HAVE NOTICED. OR THE OPPOSITE, BEING SO FIGETY OR RESTLESS THAT YOU HAVE BEEN MOVING AROUND A LOT MORE THAN USUAL: 0
SUM OF ALL RESPONSES TO PHQ QUESTIONS 1-9: 0
2. FEELING DOWN, DEPRESSED OR HOPELESS: 0
1. LITTLE INTEREST OR PLEASURE IN DOING THINGS: 0
SUM OF ALL RESPONSES TO PHQ QUESTIONS 1-9: 0
7. TROUBLE CONCENTRATING ON THINGS, SUCH AS READING THE NEWSPAPER OR WATCHING TELEVISION: 0
6. FEELING BAD ABOUT YOURSELF - OR THAT YOU ARE A FAILURE OR HAVE LET YOURSELF OR YOUR FAMILY DOWN: 0
9. THOUGHTS THAT YOU WOULD BE BETTER OFF DEAD, OR OF HURTING YOURSELF: 0
5. POOR APPETITE OR OVEREATING: 0
SUM OF ALL RESPONSES TO PHQ9 QUESTIONS 1 & 2: 0
3. TROUBLE FALLING OR STAYING ASLEEP: 0
4. FEELING TIRED OR HAVING LITTLE ENERGY: 0
10. IF YOU CHECKED OFF ANY PROBLEMS, HOW DIFFICULT HAVE THESE PROBLEMS MADE IT FOR YOU TO DO YOUR WORK, TAKE CARE OF THINGS AT HOME, OR GET ALONG WITH OTHER PEOPLE: 0

## 2022-03-11 ASSESSMENT — ENCOUNTER SYMPTOMS
WHEEZING: 0
EYE PAIN: 0
BLOOD IN STOOL: 0
VOMITING: 0
SHORTNESS OF BREATH: 0
DIARRHEA: 0
TROUBLE SWALLOWING: 0
ABDOMINAL PAIN: 0
CHEST TIGHTNESS: 0
NAUSEA: 0

## 2022-03-11 NOTE — PROGRESS NOTES
3/11/2022    Bertram Bernheim    Chief Complaint   Patient presents with    3 Month Follow-Up     No complaints.  Other     INR       HPI  History was obtained from the patient. Yuli Dickerson is a 73 y.o. male who presents today with routine follow-up history of COPD, history of aortic valve replacement with mechanical valve, CHF, anxiety, history of depression/paranoia/bipolar disorder followed by psychiatry. GE reflux, and intermittent insomnia. Overall he is gaining his strength back and breathing has improved. His INR was down to 1.4 and had been therapeutic in the 2.5-3.5 range previously. He admits he has not missed any doses of Coumadin but perhaps was in a lot more solid. Please see instructions for Coumadin will increased the dose of Coumadin a day to 50 mg in return to 10 mg daily recheck INR within a week. He is gaining strength in a bit of weight. Mood is bright at this time. No shortness of breath or chest pain reported. Clinically looks like he is doing much better. REVIEW OF SYMPTOMS    Review of Systems   Constitutional: Negative for activity change, diaphoresis, fatigue and fever. HENT: Negative for congestion, hearing loss, mouth sores and trouble swallowing. Eyes: Negative for pain and visual disturbance. Respiratory: Negative for chest tightness, shortness of breath and wheezing. Patient with history of COPD currently doing well   Cardiovascular: Negative for chest pain and palpitations. Patient is status post aortic valve replacement with mechanical heart valve. Gastrointestinal: Negative for abdominal pain, blood in stool, diarrhea, nausea and vomiting. Endocrine: Negative for polydipsia and polyuria. Genitourinary: Negative for dysuria, frequency and urgency. Musculoskeletal: Negative for arthralgias, gait problem and neck stiffness. Skin: Negative for rash. Allergic/Immunologic: Negative for environmental allergies.    Neurological: Negative for dizziness, seizures, speech difficulty and weakness. Hematological: Does not bruise/bleed easily. Psychiatric/Behavioral: Positive for decreased concentration and dysphoric mood. Negative for agitation, confusion, hallucinations and suicidal ideas. The patient is nervous/anxious. PAST MEDICAL HISTORY  Past Medical History:   Diagnosis Date    Acid reflux     Acute frontal sinusitis 7/22/2009    Alcohol abuse     \"I Drink Average 2 Beers A Day\"    Anesthesia     \" I get agitated\"    Anxiety     Arthritis     \"Right Shoulder, Neck, Left Knee\"    Atypical mycobacterial infection     Broken teeth     Upper And Lower     [facility]est[facility] failure[facility]     COPD (chronic obstructive pulmonary disease) ([facility])     Sees Dr. Bob Granados In [place], [address] Cough     Frequent Cough With Green Sputum    Depression     Depression     Dyspnea 2/23/2018    H/O cardiovascular MUGA stress test 05/14/2019    EF 50%, NORMAL LVEF, NORMAL WALL MOTION.    H/O echocardiogram 05/22/2014    VI=>63-51%, LV systolic function is low normal, mild aortic valve calcification, no pericardial effusion    H/O echocardiogram 12/12/2018    EF 45-50%    History of 24 hour EKG monitoring 6/6/14    24 hr holter monitor. Ventricular ectopics were noted in single and couplet forms. Supraventricular ectopics were noted in single and pair forms.     [place] (hard of hearing)     Bilateral Ears    Hyperlipidemia     Hypertension     Irritant contact dermatitis due to other chemical products 8/26/2019    Other drug allergy(995.27) 7/22/2009    S/P AVR 12/2003    Mechanical valvue     Shortness of breath     Teeth missing     Upper And Lower       FAMILY HISTORY  Family History   Problem Relation Age of Onset    Cancer Mother         Lymphoma    Diabetes Mother     High Blood Pressure Mother     High Cholesterol Mother     Obesity Mother     Vision Loss Mother         Wore Glasses    Heart Disease Father         \"Heart Failure\"   Chavis COPD Father     Asthma Sister     High Blood Pressure Sister     High Cholesterol Sister     Other Sister         pre diabetic    COPD Sister     Diabetes Sister         \"Pre Diabetes\"    No Known Problems Son        SOCIAL HISTORY  Social History     Socioeconomic History    Marital status:      Spouse name: Not on file    Number of children: 1    Years of education: 15    Highest education level: Not on file   Occupational History    Not on file   Tobacco Use    Smoking status: Never Smoker    Smokeless tobacco: Current User     Types: Snuff, Chew   Vaping Use    Vaping Use: Never used   Substance and Sexual Activity    Alcohol use: Yes     Alcohol/week: 4.0 - 5.0 standard drinks     Types: 4 - 5 Cans of beer per week     Comment: last drink stated 2 weeks ago, 2-3 tyimes within a couple weeks of beer    Drug use: No    Sexual activity: Not Currently   Other Topics Concern    Not on file   Social History Narrative    Not on file     Social Determinants of Health     Financial Resource Strain: Low Risk     Difficulty of Paying Living Expenses: Not hard at all   Food Insecurity: No Food Insecurity    Worried About Running Out of Food in the Last Year: Never true    Suzi of Food in the Last Year: Never true   Transportation Needs:     Lack of Transportation (Medical): Not on file    Lack of Transportation (Non-Medical):  Not on file   Physical Activity:     Days of Exercise per Week: Not on file    Minutes of Exercise per Session: Not on file   Stress:     Feeling of Stress : Not on file   Social Connections:     Frequency of Communication with Friends and Family: Not on file    Frequency of Social Gatherings with Friends and Family: Not on file    Attends Latter day Services: Not on file    Active Member of Clubs or Organizations: Not on file    Attends Club or Organization Meetings: Not on file    Marital Status: Not on file   Intimate Partner Violence:     Fear of Current or Ex-Partner: Not on file    Emotionally Abused: Not on file    Physically Abused: Not on file    Sexually Abused: Not on file   Housing Stability:     Unable to Pay for Housing in the Last Year: Not on file    Number of Rony in the Last Year: Not on file    Unstable Housing in the Last Year: Not on file        SURGICAL HISTORY  Past Surgical History:   Procedure Laterality Date   [address]    \"Done Twice\"   [address]  12/17/2003    \"Aortic Valve Replacement, Mechanical Valve\"    COLONOSCOPY  2006    Polyps Removed    DENTAL SURGERY      Teeth Extracted In Past    ENDOSCOPY, COLON, DIAGNOSTIC  In Past    HEMIARTHROPLASTY SHOULDER FRACTURE Right 1/29/2019    SHOULDER HEMIARTHROPLASTY performed by Javy Oliveira DO at [address] [facility] Left 1992   [address]    \"Cauterized Because My Sperm Was Low\"    ROTATOR CUFF REPAIR Right 2008   [address] Right 10/25/2021    RIGHT HIP TOTAL ARTHROPLASTY performed by Javy Oliveira DO at [address] [facility] 11/10/2021    EGD BIOPSY performed by Huy Lynn MD at [facility]  Current Outpatient Medications   Medication Sig Dispense Refill    Multiple Vitamin (MULTIVITAMINS PO) Take by mouth      CALCIUM PO Take by mouth      ammonium lactate (LAC-HYDRIN) 12 % lotion Apply topically daily. 225 g 1    albuterol sulfate HFA (PROAIR HFA) 108 (90 Base) MCG/ACT inhaler Inhale 2 puffs into the lungs every 4 hours as needed for Wheezing or Shortness of Breath 1 each 1    triamcinolone (KENALOG) 0.1 % cream Apply topically 2 times daily.  453.6 g 1    warfarin (COUMADIN) 5 MG tablet take 1-2 tablets by mouth daily as directed 60 tablet 2    simvastatin (ZOCOR) 40 MG tablet take 1 tablet by mouth at bedtime 90 tablet 1    melatonin 10 MG CAPS capsule Take 10 mg by mouth nightly      guaiFENesin (MUCINEX) 600 MG extended release tablet Take 1,200 mg by mouth every 12 hours      ferrous sulfate (FE TABS) 325 (65 Fe) MG EC tablet Take 1 tablet by mouth 2 times daily 90 tablet 3    venlafaxine (EFFEXOR XR) 150 MG extended release capsule Take 1 capsule by mouth daily 30 capsule 5    pantoprazole (PROTONIX) 40 MG tablet 40 mg PO BID for 30 days then 40 mg PO daily to continue 180 tablet 1    albuterol (ACCUNEB) 0.63 MG/3ML nebulizer solution Take 1 ampule by nebulization every 6 hours as needed for Wheezing      KRILL OIL OMEGA-3 PO Take by mouth      sodium chloride, Inhalant, 3 % nebulizer solution       cetirizine (ZYRTEC) 10 MG tablet Take 10 mg by mouth \"Alternate With Singulair\"      NASAL SPRAY SALINE NA by Nasal route daily Over The Counter      Multiple Vitamins-Minerals (CENTRUM PO) Take by mouth daily      Nebulizer MISC Inhale into the lungs as needed      ipratropium-albuterol (DUONEB) 0.5-2.5 (3) MG/3ML SOLN nebulizer solution Inhale 3 mLs into the lungs 4 times daily (Patient not taking: Reported on 3/11/2022) 360 mL 5    montelukast (SINGULAIR) 10 MG tablet Take 1 tablet by mouth nightly \"Alternate With Zyrtec\" 30 tablet 5    calcium carbonate (OSCAL) 500 MG TABS tablet Take 500 mg by mouth daily  (Patient not taking: Reported on 3/11/2022)      Acetaminophen (TYLENOL 8 HOUR PO) Take 500 mg by mouth as needed Over The Counter  (Patient not taking: Reported on 3/11/2022)       No current facility-administered medications for this visit.        ALLERGIES  Allergies   Allergen Reactions    Ciprofloxacin Hcl Hives and Swelling    Levaquin [Levofloxacin] Hives and Swelling    Other Nausea And Vomiting     \"Allergic To Tylox\"    Ceftin [Cefuroxime]        PHYSICAL EXAM    /68 (Site: Right Upper Arm, Position: Sitting, Cuff Size: Medium Adult)   Pulse 74   Ht 5' 4\" (1.626 m)   Wt 157 lb 3.2 oz (71.3 kg) SpO2 93%   BMI 26.98 kg/m²     Physical Exam  Vitals and nursing note reviewed. Constitutional:       General: He is not in acute distress. Appearance: He is well-developed. He is not ill-appearing or toxic-appearing. HENT:      Head: Normocephalic and atraumatic. Nose: No congestion. Mouth/Throat:      Mouth: Mucous membranes are moist.      Pharynx: Oropharynx is clear. Eyes:      Pupils: Pupils are equal, round, and reactive to light. Cardiovascular:      Rate and Rhythm: Normal rate and regular rhythm. Heart sounds: Normal heart sounds. No murmur heard. Pulmonary:      Effort: Pulmonary effort is normal. No respiratory distress. Breath sounds: Normal breath sounds. No wheezing, rhonchi or rales. Abdominal:      Palpations: Abdomen is soft. Musculoskeletal:         General: No swelling or deformity. Normal range of motion. Cervical back: Normal range of motion and neck supple. No rigidity. Lymphadenopathy:      Cervical: No cervical adenopathy. Skin:     General: Skin is warm and dry. Coloration: Skin is not jaundiced. Findings: No bruising. Neurological:      General: No focal deficit present. Mental Status: He is alert and oriented to person, place, and time. Mental status is at baseline. Cranial Nerves: No cranial nerve deficit. Motor: No weakness. Psychiatric:         Mood and Affect: Mood normal.         Thought Content: Thought content normal.         ASSESSMENT & PLAN     Diagnosis Orders   1. Chronic obstructive pulmonary disease, unspecified COPD type ([facility]     2. S/P AVR  POCT INR    CBC   3. Xerostomia  ammonium lactate (LAC-HYDRIN) 12 % lotion   4. Anxiety     5. Severe episode of recurrent major depressive disorder, with psychotic features ([facility]     6. Paranoia ([facility]     7. Essential hypertension  CBC    Comprehensive Metabolic Panel   8.  Gastroesophageal reflux disease with esophagitis and hemorrhage     At this

## 2022-03-18 ENCOUNTER — TELEPHONE (OUTPATIENT)
Dept: FAMILY MEDICINE CLINIC | Age: 67
End: 2022-03-18

## 2022-03-18 DIAGNOSIS — D50.0 IRON DEFICIENCY ANEMIA DUE TO CHRONIC BLOOD LOSS: Primary | ICD-10-CM

## 2022-03-18 RX ORDER — FERROUS SULFATE 325(65) MG
325 TABLET ORAL
Qty: 90 TABLET | Refills: 1 | Status: SHIPPED | OUTPATIENT
Start: 2022-03-18 | End: 2022-09-07

## 2022-03-18 NOTE — TELEPHONE ENCOUNTER
CBC and CMP look good. Hemoglobin is 12.5. Plan: Continue on same regimen except make his ferrous sulfate 325 mg 1/day. Recheck CBC in 1 to 2 months.

## 2022-03-25 ENCOUNTER — NURSE ONLY (OUTPATIENT)
Dept: FAMILY MEDICINE CLINIC | Age: 67
End: 2022-03-25
Payer: MEDICARE

## 2022-03-25 DIAGNOSIS — Z95.2 S/P AVR: ICD-10-CM

## 2022-03-25 LAB
INTERNATIONAL NORMALIZATION RATIO, POC: 1.2
PROTHROMBIN TIME, POC: ABNORMAL

## 2022-03-25 PROCEDURE — 85610 PROTHROMBIN TIME: CPT | Performed by: FAMILY MEDICINE

## 2022-03-25 NOTE — PROGRESS NOTES
Current dose 10 mg daily. Inr 1.2. Per Santiago Pulliam patient to change dose to 15mg today and then alternate 10/12.5mg daily, recheck inr in 1 week.

## 2022-03-30 ENCOUNTER — HOSPITAL ENCOUNTER (EMERGENCY)
Age: 67
Discharge: HOME | End: 2022-03-30
Payer: OTHER GOVERNMENT

## 2022-03-30 ENCOUNTER — APPOINTMENT (OUTPATIENT)
Dept: GENERAL RADIOLOGY | Age: 67
End: 2022-03-30
Payer: OTHER GOVERNMENT

## 2022-03-30 VITALS
OXYGEN SATURATION: 96 % | BODY MASS INDEX: 26.8 KG/M2 | RESPIRATION RATE: 18 BRPM | TEMPERATURE: 98 F | WEIGHT: 157 LBS | SYSTOLIC BLOOD PRESSURE: 142 MMHG | HEIGHT: 64 IN | HEART RATE: 76 BPM | DIASTOLIC BLOOD PRESSURE: 90 MMHG

## 2022-03-30 DIAGNOSIS — R79.89 ELEVATED BRAIN NATRIURETIC PEPTIDE (BNP) LEVEL: ICD-10-CM

## 2022-03-30 DIAGNOSIS — J44.9 CHRONIC OBSTRUCTIVE PULMONARY DISEASE, UNSPECIFIED COPD TYPE (HCC): ICD-10-CM

## 2022-03-30 DIAGNOSIS — R53.83 FATIGUE, UNSPECIFIED TYPE: Primary | ICD-10-CM

## 2022-03-30 DIAGNOSIS — Z86.79 HISTORY OF CHRONIC CHF: ICD-10-CM

## 2022-03-30 LAB
ALBUMIN SERPL-MCNC: 4.4 GM/DL (ref 3.4–5)
ALP BLD-CCNC: 100 IU/L (ref 40–129)
ALT SERPL-CCNC: 15 U/L (ref 10–40)
ANION GAP SERPL CALCULATED.3IONS-SCNC: 14 MMOL/L (ref 4–16)
AST SERPL-CCNC: 28 IU/L (ref 15–37)
BACTERIA: NEGATIVE /HPF
BASOPHILS ABSOLUTE: 0.1 K/CU MM
BASOPHILS RELATIVE PERCENT: 0.6 % (ref 0–1)
BILIRUB SERPL-MCNC: 0.3 MG/DL (ref 0–1)
BILIRUBIN URINE: NEGATIVE MG/DL
BLOOD, URINE: NEGATIVE
BUN BLDV-MCNC: 12 MG/DL (ref 6–23)
CALCIUM SERPL-MCNC: 8.9 MG/DL (ref 8.3–10.6)
CHLORIDE BLD-SCNC: 101 MMOL/L (ref 99–110)
CLARITY: CLEAR
CO2: 21 MMOL/L (ref 21–32)
COLOR: YELLOW
CREAT SERPL-MCNC: 0.8 MG/DL (ref 0.9–1.3)
DIFFERENTIAL TYPE: ABNORMAL
EKG ATRIAL RATE: 76 BPM
EKG DIAGNOSIS: NORMAL
EKG P AXIS: 65 DEGREES
EKG P-R INTERVAL: 162 MS
EKG Q-T INTERVAL: 418 MS
EKG QRS DURATION: 112 MS
EKG QTC CALCULATION (BAZETT): 470 MS
EKG R AXIS: 48 DEGREES
EKG T AXIS: 65 DEGREES
EKG VENTRICULAR RATE: 76 BPM
EOSINOPHILS ABSOLUTE: 0 K/CU MM
EOSINOPHILS RELATIVE PERCENT: 0.4 % (ref 0–3)
FOLATE: >20 NG/ML (ref 3.1–17.5)
GFR AFRICAN AMERICAN: >60 ML/MIN/1.73M2
GFR NON-AFRICAN AMERICAN: >60 ML/MIN/1.73M2
GLUCOSE BLD-MCNC: 100 MG/DL (ref 70–99)
GLUCOSE, URINE: NEGATIVE MG/DL
HCT VFR BLD CALC: 38.4 % (ref 42–52)
HEMOGLOBIN: 12.5 GM/DL (ref 13.5–18)
IMMATURE NEUTROPHIL %: 0.3 % (ref 0–0.43)
INR BLD: 2.4 INDEX
IRON: 132 UG/DL (ref 59–158)
KETONES, URINE: NEGATIVE MG/DL
LEUKOCYTE ESTERASE, URINE: ABNORMAL
LYMPHOCYTES ABSOLUTE: 1.3 K/CU MM
LYMPHOCYTES RELATIVE PERCENT: 14.2 % (ref 24–44)
MCH RBC QN AUTO: 28.7 PG (ref 27–31)
MCHC RBC AUTO-ENTMCNC: 32.6 % (ref 32–36)
MCV RBC AUTO: 88.3 FL (ref 78–100)
MONOCYTES ABSOLUTE: 0.9 K/CU MM
MONOCYTES RELATIVE PERCENT: 9.6 % (ref 0–4)
NITRITE URINE, QUANTITATIVE: NEGATIVE
NUCLEATED RBC %: 0 %
PCT TRANSFERRIN: 34 % (ref 10–44)
PDW BLD-RTO: 16.5 % (ref 11.7–14.9)
PH, URINE: 7 (ref 5–8)
PLATELET # BLD: 260 K/CU MM (ref 140–440)
PMV BLD AUTO: 9.3 FL (ref 7.5–11.1)
POTASSIUM SERPL-SCNC: 4.2 MMOL/L (ref 3.5–5.1)
PRO-BNP: 2414 PG/ML
PROTEIN UA: NEGATIVE MG/DL
PROTHROMBIN TIME: 31.3 SECONDS (ref 11.7–14.5)
RBC # BLD: 4.35 M/CU MM (ref 4.6–6.2)
RBC URINE: ABNORMAL /HPF (ref 0–3)
SEGMENTED NEUTROPHILS ABSOLUTE COUNT: 6.8 K/CU MM
SEGMENTED NEUTROPHILS RELATIVE PERCENT: 74.9 % (ref 36–66)
SODIUM BLD-SCNC: 136 MMOL/L (ref 135–145)
SPECIFIC GRAVITY UA: 1.01 (ref 1–1.03)
TOTAL IMMATURE NEUTOROPHIL: 0.03 K/CU MM
TOTAL IRON BINDING CAPACITY: 392 UG/DL (ref 250–450)
TOTAL NUCLEATED RBC: 0 K/CU MM
TOTAL PROTEIN: 7 GM/DL (ref 6.4–8.2)
TRICHOMONAS: ABNORMAL /HPF
TROPONIN T: <0.01 NG/ML
UNSATURATED IRON BINDING CAPACITY: 260 UG/DL (ref 110–370)
UROBILINOGEN, URINE: 0.2 MG/DL (ref 0.2–1)
VITAMIN B-12: 462.1 PG/ML (ref 211–911)
WBC # BLD: 9 K/CU MM (ref 4–10.5)
WBC UA: 1 /HPF (ref 0–2)

## 2022-03-30 PROCEDURE — 99285 EMERGENCY DEPT VISIT HI MDM: CPT

## 2022-03-30 PROCEDURE — 6370000000 HC RX 637 (ALT 250 FOR IP): Performed by: PHYSICIAN ASSISTANT

## 2022-03-30 PROCEDURE — 94640 AIRWAY INHALATION TREATMENT: CPT

## 2022-03-30 PROCEDURE — 81001 URINALYSIS AUTO W/SCOPE: CPT

## 2022-03-30 PROCEDURE — 80053 COMPREHEN METABOLIC PANEL: CPT

## 2022-03-30 PROCEDURE — 83550 IRON BINDING TEST: CPT

## 2022-03-30 PROCEDURE — 71045 X-RAY EXAM CHEST 1 VIEW: CPT

## 2022-03-30 PROCEDURE — 85610 PROTHROMBIN TIME: CPT

## 2022-03-30 PROCEDURE — 93010 ELECTROCARDIOGRAM REPORT: CPT | Performed by: INTERNAL MEDICINE

## 2022-03-30 PROCEDURE — 83540 ASSAY OF IRON: CPT

## 2022-03-30 PROCEDURE — 85025 COMPLETE CBC W/AUTO DIFF WBC: CPT

## 2022-03-30 PROCEDURE — 84484 ASSAY OF TROPONIN QUANT: CPT

## 2022-03-30 PROCEDURE — 82607 VITAMIN B-12: CPT

## 2022-03-30 PROCEDURE — 93005 ELECTROCARDIOGRAM TRACING: CPT | Performed by: PHYSICIAN ASSISTANT

## 2022-03-30 PROCEDURE — 83880 ASSAY OF NATRIURETIC PEPTIDE: CPT

## 2022-03-30 PROCEDURE — 82746 ASSAY OF FOLIC ACID SERUM: CPT

## 2022-03-30 PROCEDURE — 94664 DEMO&/EVAL PT USE INHALER: CPT

## 2022-03-30 RX ORDER — ALBUTEROL SULFATE 90 UG/1
2 AEROSOL, METERED RESPIRATORY (INHALATION) ONCE
Status: COMPLETED | OUTPATIENT
Start: 2022-03-30 | End: 2022-03-30

## 2022-03-30 RX ORDER — ALBUTEROL SULFATE 2.5 MG/3ML
2.5 SOLUTION RESPIRATORY (INHALATION) EVERY 6 HOURS PRN
Qty: 125 EACH | Refills: 0 | Status: SHIPPED | OUTPATIENT
Start: 2022-03-30 | End: 2022-10-24 | Stop reason: SDUPTHER

## 2022-03-30 RX ORDER — SODIUM CHLORIDE FOR INHALATION 3 %
VIAL, NEBULIZER (ML) INHALATION PRN
Qty: 125 ML | Refills: 0 | Status: SHIPPED | OUTPATIENT
Start: 2022-03-30

## 2022-03-30 RX ORDER — IPRATROPIUM BROMIDE AND ALBUTEROL SULFATE 2.5; .5 MG/3ML; MG/3ML
1 SOLUTION RESPIRATORY (INHALATION) ONCE
Status: DISCONTINUED | OUTPATIENT
Start: 2022-03-30 | End: 2022-03-30

## 2022-03-30 RX ORDER — PREDNISONE 20 MG/1
20 TABLET ORAL ONCE
Status: COMPLETED | OUTPATIENT
Start: 2022-03-30 | End: 2022-03-30

## 2022-03-30 RX ADMIN — Medication 2 PUFF: at 11:44

## 2022-03-30 RX ADMIN — PREDNISONE 20 MG: 20 TABLET ORAL at 11:41

## 2022-03-30 RX ADMIN — ALBUTEROL SULFATE 2 PUFF: 90 AEROSOL, METERED RESPIRATORY (INHALATION) at 11:44

## 2022-03-30 NOTE — ED NOTES
Pt amb to bathroom for UA. Denies any complaints at this time. Will cont to monitor.       Shae Parker RN  03/30/22 [id number]

## 2022-03-30 NOTE — ED PROVIDER NOTES
[address] ENCOUNTER        Pt Name: Carl Lara  MRN: [id number]  [place] 1955  Date of evaluation: 3/30/2022  Provider: Shaggy Staton PA-C  PCP: Aleksandar France MD  Note Started: 11:25 AM EDT       VICENTE. I have evaluated this patient. My supervising physician was available for consultation. Mikey Alvarenga MD      CHIEF COMPLAINT       Chief Complaint   Patient presents with    Fatigue       HISTORY OF PRESENT ILLNESS   (Location, Timing/Onset, Context/Setting, Quality, Duration, Modifying Factors, Severity, Associated Signs and Symptoms)  Note limiting factors. Chief Complaint: Fatigue    Carl Lara is a 73 y.o. male who presents with complaint of fatigue. Progressive over the past few weeks. The patient has a history of AVR, TKR right hip, upper GI bleed with endoscopy and treatment of ulcers with transfusion due to symptomatic anemia. Patient states he was feeling well up until a few weeks ago when he began to have increasing fatigue. The patient was seen by PCP March 11, 2022 without complaint of fatigue at that time. The patient was seen for his health problems consisting of COPD, anxiety, status post AVR, depression, paranoia, hypertension and GERD. Patient is on warfarin due to the AVR. His last INR was 1.2 date March 25, 2022. The patient's last CBC was March 11, 2022 resulting at 12.5. Patient did have a low hemoglobin on November 9, 2021 at 4.3. After treatment of transfusion improved to 8.4. He continues on iron replacement along with his PPI and followed by GI. At this time he is at his baseline shortness of breath. Patient does request a breathing treatment while here. I placed order for DuoNeb x1. Patient reports no chest pain, gastrointestinal or urinary complaints. No unusual headache, arthralgia or myalgia.     Nursing Notes were all reviewed and agreed with or any disagreements were addressed in the HPI. REVIEW OF SYSTEMS    (2-9 systems for level 4, 10 or more for level 5)     Review of Systems    Positives and Pertinent negatives as per HPI. Except as noted above in the ROS, all other systems were reviewed and negative. PAST MEDICAL HISTORY     Past Medical History:   Diagnosis Date    Acid reflux     Acute frontal sinusitis 7/22/2009    Alcohol abuse     \"I Drink Average 2 Beers A Day\"    Anesthesia     \" I get agitated\"    Anxiety     Arthritis     \"Right Shoulder, Neck, Left Knee\"    Atypical mycobacterial infection     Broken teeth     Upper And Lower     [facility]congestive [facility] failure[facility]     COPD (chronic obstructive pulmonary disease) ([facility])     Sees Dr. Javier King In [place], [facility] [place] Cough     Frequent Cough With Green Sputum    Depression     Depression     Dyspnea 2/23/2018    H/O cardiovascular MUGA stress test 05/14/2019    EF 50%, NORMAL LVEF, NORMAL WALL MOTION.    H/O echocardiogram 05/22/2014    UZ=>21-41%, LV systolic function is low normal, mild aortic valve calcification, no pericardial effusion    H/O echocardiogram 12/12/2018    EF 45-50%    History of 24 hour EKG monitoring 6/6/14    24 hr holter monitor. Ventricular ectopics were noted in single and couplet forms. Supraventricular ectopics were noted in single and pair forms.     [place] (hard of hearing)     Bilateral Ears    Hyperlipidemia     Hypertension     Irritant contact dermatitis due to other chemical products 8/26/2019    Other drug allergy(995.27) 7/22/2009    S/P AVR 12/2003    Mechanical valvue     Shortness of breath     Teeth missing     Upper And Lower         SURGICAL HISTORY     Past Surgical History:   Procedure Laterality Date    BRONCHOSCOPY  1996    \"Done Twice\"    CARDIAC VALVE REPLACEMENT  12/17/2003    \"Aortic Valve Replacement, Mechanical Valve\"    COLONOSCOPY  2006    Polyps Removed    DENTAL SURGERY      Teeth Extracted In Past    ENDOSCOPY, COLON, DIAGNOSTIC  In Past    HEMIARTHROPLASTY SHOULDER FRACTURE Right 1/29/2019    SHOULDER HEMIARTHROPLASTY performed by Hola Ibrahim DO at [address] ARTHROSCOPY Left 1992    LUNG BIOPSY Right 1996   [address] [facility]    \"Cauterized Because My Sperm Was Low\"    ROTATOR CUFF REPAIR Right 2008    TONSILLECTOMY  1959 Or 1960    TOTAL HIP ARTHROPLASTY Right 10/25/2021    RIGHT HIP TOTAL ARTHROPLASTY performed by Hola Ibrahim DO at [address] N/A 11/10/2021    EGD BIOPSY performed by Prashant Johnson MD at [facility]       Previous Medications    ACETAMINOPHEN (TYLENOL 8 HOUR PO)    Take 500 mg by mouth as needed Over The Counter     AMMONIUM LACTATE (LAC-HYDRIN) 12 % LOTION    Apply topically daily.     CALCIUM CARBONATE (OSCAL) 500 MG TABS TABLET    Take 500 mg by mouth daily     CALCIUM PO    Take by mouth    CETIRIZINE (ZYRTEC) 10 MG TABLET    Take 10 mg by mouth \"Alternate With Singulair\"    FERROUS SULFATE (FE TABS) 325 (65 FE) MG EC TABLET    Take 1 tablet by mouth 2 times daily    FERROUS SULFATE (IRON 325) 325 (65 FE) MG TABLET    Take 1 tablet by mouth daily (with breakfast)    GUAIFENESIN (MUCINEX) 600 MG EXTENDED RELEASE TABLET    Take 1,200 mg by mouth every 12 hours    IPRATROPIUM-ALBUTEROL (DUONEB) 0.5-2.5 (3) MG/3ML SOLN NEBULIZER SOLUTION    Inhale 3 mLs into the lungs 4 times daily    KRILL OIL OMEGA-3 PO    Take by mouth    MELATONIN 10 MG CAPS CAPSULE    Take 10 mg by mouth nightly    MONTELUKAST (SINGULAIR) 10 MG TABLET    Take 1 tablet by mouth nightly \"Alternate With Zyrtec\"    MULTIPLE VITAMIN (MULTIVITAMINS PO)    Take by mouth    MULTIPLE VITAMINS-MINERALS (CENTRUM PO)    Take by mouth daily    NASAL SPRAY SALINE NA    by Nasal route daily Over The Counter    NEBULIZER MISC    Inhale into the lungs as needed    PANTOPRAZOLE (PROTONIX) 40 MG TABLET    40 mg PO BID for 30 days then 40 mg PO daily to continue    SIMVASTATIN (ZOCOR) 40 MG TABLET    take 1 tablet by mouth at bedtime    TRIAMCINOLONE (KENALOG) 0.1 % CREAM    Apply topically 2 times daily. VENLAFAXINE (EFFEXOR XR) 150 MG EXTENDED RELEASE CAPSULE    Take 1 capsule by mouth daily    WARFARIN (COUMADIN) 5 MG TABLET    take 1-2 tablets by mouth daily as directed         ALLERGIES     Ciprofloxacin hcl, Levaquin [levofloxacin], Other, and Ceftin [cefuroxime]    FAMILYHISTORY       Family History   Problem Relation Age of Onset    Cancer Mother         Lymphoma    Diabetes Mother     High Blood Pressure Mother     High Cholesterol Mother     Obesity Mother     Vision Loss Mother         Wore Glasses    Heart Disease Father         \"Heart Failure\"   Jamesetta Medal COPD Father     Asthma Sister     High Blood Pressure Sister     High Cholesterol Sister     Other Sister         pre diabetic    COPD Sister     Diabetes Sister         \"Pre Diabetes\"    No Known Problems Son           SOCIAL HISTORY       Social History     Tobacco Use    Smoking status: Never Smoker    Smokeless tobacco: Current User     Types: Snuff, Chew   Vaping Use    Vaping Use: Never used   Substance Use Topics    Alcohol use:  Yes     Alcohol/week: 4.0 - 5.0 standard drinks     Types: 4 - 5 Cans of beer per week     Comment: last drink stated 2 weeks ago, 2-3 tyimes within a couple weeks of beer    Drug use: No       SCREENINGS    Juliet Coma Scale  Eye Opening: Spontaneous  Best Verbal Response: Oriented  Best Motor Response: Obeys commands  Juliet Coma Scale Score: 15        PHYSICAL EXAM    (up to 7 for level 4, 8 or more for level 5)     ED Triage Vitals   BP Temp Temp Source Pulse Resp SpO2 Height Weight   03/30/22 1115 03/30/22 1115 03/30/22 1115 03/30/22 1115 03/30/22 1115 03/30/22 1115 03/30/22 1109 03/30/22 1109   (!) 152/81 98 °F (36.7 °C) Oral 74 18 97 % 5' 3.5\" (1.613 m) 157 lb (71.2 kg)       Physical Exam  Vitals and nursing note reviewed. Constitutional:       Appearance: Normal appearance. He is well-developed and normal weight. HENT:      Head: Normocephalic and atraumatic. Right Ear: External ear normal.      Left Ear: External ear normal.      Mouth/Throat:      Mouth: Mucous membranes are moist.      Pharynx: Oropharynx is clear. Eyes:      General: No scleral icterus. Right eye: No discharge. Left eye: No discharge. Conjunctiva/sclera: Conjunctivae normal.   Cardiovascular:      Rate and Rhythm: Normal rate and regular rhythm. Heart sounds: Normal heart sounds. Pulmonary:      Effort: Pulmonary effort is normal.      Breath sounds: Wheezing present. Comments: Subtle wheezes appreciated in the chest.  Suspect this to be baseline. No crackles noted. Abdominal:      General: Abdomen is flat. Bowel sounds are normal.      Palpations: Abdomen is soft. Tenderness: There is no abdominal tenderness. Musculoskeletal:         General: Normal range of motion. Cervical back: Normal range of motion and neck supple. Right lower leg: No edema. Left lower leg: No edema. Skin:     General: Skin is warm and dry. Neurological:      General: No focal deficit present. Mental Status: He is alert and oriented to person, place, and time. Mental status is at baseline. Psychiatric:         Mood and Affect: Mood normal.         Behavior: Behavior normal.         Thought Content:  Thought content normal.         Judgment: Judgment normal.         DIAGNOSTIC RESULTS   LABS:    Labs Reviewed   CBC WITH AUTO DIFFERENTIAL - Abnormal; Notable for the following components:       Result Value    RBC 4.35 (*)     Hemoglobin 12.5 (*)     Hematocrit 38.4 (*)     RDW 16.5 (*)     Segs Relative 74.9 (*)     Lymphocytes % 14.2 (*)     Monocytes % 9.6 (*)     All other components within normal limits   COMPREHENSIVE METABOLIC PANEL W/ REFLEX TO MG FOR LOW K - Abnormal; Notable for the following components:    CREATININE 0.8 (*)     Glucose 100 (*)     All other components within normal limits   BRAIN NATRIURETIC PEPTIDE - Abnormal; Notable for the following components:    Pro-BNP 2,414 (*)     All other components within normal limits   PROTIME-INR - Abnormal; Notable for the following components:    Protime 31.3 (*)     All other components within normal limits   URINALYSIS WITH REFLEX TO CULTURE - Abnormal; Notable for the following components:    Leukocyte Esterase, Urine TRACE (*)     All other components within normal limits   VITAMIN B12 & FOLATE - Abnormal; Notable for the following components:    Folate >20.0 (*)     All other components within normal limits   TROPONIN   IRON AND TIBC       When ordered only abnormal lab results are displayed. All other labs were within normal range or not returned as of this dictation. EKG: When ordered, EKG's are interpreted by the Emergency Department Physician in the absence of a cardiologist.  Please see their note for interpretation of EKG. RADIOLOGY:   Non-plain film images such as CT, Ultrasound and MRI are read by the radiologist. Plain radiographic images are visualized and preliminarily interpreted by the ED Provider with the below findings:        Interpretation per the Radiologist below, if available at the time of this note:    XR CHEST PORTABLE   Final Result   No acute process. No results found.         PROCEDURES   Unless otherwise noted below, none     Procedures    CRITICAL CARE TIME       CONSULTS:  None      EMERGENCY DEPARTMENT COURSE and DIFFERENTIAL DIAGNOSIS/MDM:   Vitals:    Vitals:    03/30/22 1109 03/30/22 1115 03/30/22 1202   BP:  (!) 152/81 136/81   Pulse:  74 77   Resp:  18 19   Temp:  98 °F (36.7 °C)    TempSrc:  Oral    SpO2:  97% 94%   Weight: 157 lb (71.2 kg)     Height: 5' 3.5\" (1.613 m)         Patient was given the following medications:  Medications   predniSONE (DELTASONE) tablet 20 mg (20 mg Oral Given 3/30/22 1141)   albuterol sulfate  (90 Base) MCG/ACT inhaler 2 puff (2 puffs Inhalation Given 3/30/22 1144)   ipratropium (ATROVENT HFA) 17 MCG/ACT inhaler 2 puff (2 puffs Inhalation Given 3/30/22 1144)         Patient presenting with fatigue and slight cough. History of CHF and COPD. X-ray showing no evidence volume overload and this is consistent with exam.  Few wheezing noted. Breathing treatment given with improvement. Patient BNP elevated at 2414. The patient with history of CHF. Again no volume overload at this time. I will not treat this number. I will defer to cardiology. The patient with slight increase cough likely related to COPD but cannot rule out cough related to CHF. The patient with fatigue cannot exclude mild CHF at this time. Diuretics not indicated on an emergent basis. No sign of anemia. Patient reassured. He will contact and follow with his cardiologist.  I did refill the patient's albuterol 0.083% as well as his nebulized saline 3%. These were sent to [facility] pharmacy at his request.  Patient reassured. Patient follow with his PCP for general health care. Patient to follow with pulmonary medicine as scheduled. The patient did express understanding of diagnosis and the treatment plan. FINAL IMPRESSION      1. Fatigue, unspecified type    2. History of chronic CHF    3. Elevated brain natriuretic peptide (BNP) level    4. Chronic obstructive pulmonary disease, unspecified COPD type [facility])          DISPOSITION/PLAN   DISPOSITION Decision To Discharge 03/30/2022 12:47:01 PM      PATIENT REFERRED TO:  [address]  [address] [place] [address] [facility]  [phone]    Schedule an appointment as soon as possible for a visit in 2 days      Tami Troy MD  [address] [facility]  [phone]    Schedule an appointment as soon as possible for a visit in 1 week      [facility] Emergency Department  [place] [id number]  [phone]  Go to   If symptoms worsen      DISCHARGE MEDICATIONS:  New Prescriptions    ALBUTEROL (PROVENTIL) (2.5 MG/3ML) 0.083% NEBULIZER SOLUTION    Take 3 mLs by nebulization every 6 hours as needed for Wheezing or Shortness of Breath       DISCONTINUED MEDICATIONS:  Discontinued Medications    ALBUTEROL (ACCUNEB) 0.63 MG/3ML NEBULIZER SOLUTION    Take 1 ampule by nebulization every 6 hours as needed for Wheezing    ALBUTEROL SULFATE HFA (PROAIR HFA) 108 (90 BASE) MCG/ACT INHALER    Inhale 2 puffs into the lungs every 4 hours as needed for Wheezing or Shortness of Breath              (Please note that portions of this note were completed with a voice recognition program.  Efforts were made to edit the dictations but occasionally words are mis-transcribed. )    Jaswant Oliver PA-C (electronically signed)           Jaswant Oliver PA-C  03/30/22 [id number]

## 2022-03-30 NOTE — ED PROVIDER NOTES
EKG Interpretation    Interpreted by emergency department physician    Rhythm: normal sinus   Rate: normal  Axis: normal  Ectopy: premature ventricular contractions (infrequent)  Conduction: normal  ST Segments: normal  T Waves: normal  Q Waves: none    Clinical Impression: Sinus rhythm with occasional PVCs and left ventricular hypertrophy    MD Salome Choi MD  03/30/22 4125

## 2022-03-30 NOTE — ED TRIAGE NOTES
Pt laying in bed, c/o fatique x2 weeks. Denies any other complaints. Resp even & non-labored. Will cont to monitor.

## 2022-04-04 ENCOUNTER — OFFICE VISIT (OUTPATIENT)
Dept: CARDIOLOGY CLINIC | Age: 67
End: 2022-04-04
Payer: OTHER GOVERNMENT

## 2022-04-04 ENCOUNTER — NURSE ONLY (OUTPATIENT)
Dept: FAMILY MEDICINE CLINIC | Age: 67
End: 2022-04-04
Payer: MEDICARE

## 2022-04-04 VITALS
BODY MASS INDEX: 26.84 KG/M2 | HEART RATE: 76 BPM | DIASTOLIC BLOOD PRESSURE: 68 MMHG | SYSTOLIC BLOOD PRESSURE: 132 MMHG | WEIGHT: 157.2 LBS | HEIGHT: 64 IN

## 2022-04-04 DIAGNOSIS — I50.22 CHRONIC SYSTOLIC HEART FAILURE (HCC): Primary | Chronic | ICD-10-CM

## 2022-04-04 DIAGNOSIS — I10 ESSENTIAL HYPERTENSION: ICD-10-CM

## 2022-04-04 DIAGNOSIS — I38 VALVULAR HEART DISEASE: ICD-10-CM

## 2022-04-04 DIAGNOSIS — Z95.2 S/P AVR: ICD-10-CM

## 2022-04-04 DIAGNOSIS — E78.2 MIXED HYPERLIPIDEMIA: Chronic | ICD-10-CM

## 2022-04-04 DIAGNOSIS — R53.83 FATIGUE, UNSPECIFIED TYPE: ICD-10-CM

## 2022-04-04 LAB
INTERNATIONAL NORMALIZATION RATIO, POC: 3.4
PROTHROMBIN TIME, POC: NORMAL

## 2022-04-04 PROCEDURE — 4004F PT TOBACCO SCREEN RCVD TLK: CPT | Performed by: NURSE PRACTITIONER

## 2022-04-04 PROCEDURE — G8417 CALC BMI ABV UP PARAM F/U: HCPCS | Performed by: NURSE PRACTITIONER

## 2022-04-04 PROCEDURE — 99214 OFFICE O/P EST MOD 30 MIN: CPT | Performed by: NURSE PRACTITIONER

## 2022-04-04 PROCEDURE — G8427 DOCREV CUR MEDS BY ELIG CLIN: HCPCS | Performed by: NURSE PRACTITIONER

## 2022-04-04 PROCEDURE — 1123F ACP DISCUSS/DSCN MKR DOCD: CPT | Performed by: NURSE PRACTITIONER

## 2022-04-04 PROCEDURE — 4040F PNEUMOC VAC/ADMIN/RCVD: CPT | Performed by: NURSE PRACTITIONER

## 2022-04-04 PROCEDURE — 3017F COLORECTAL CA SCREEN DOC REV: CPT | Performed by: NURSE PRACTITIONER

## 2022-04-04 PROCEDURE — 85610 PROTHROMBIN TIME: CPT | Performed by: FAMILY MEDICINE

## 2022-04-04 ASSESSMENT — ENCOUNTER SYMPTOMS: SHORTNESS OF BREATH: 0

## 2022-04-04 NOTE — PROGRESS NOTES
Current dose 10/12.5 mg alternating . Inr 3.4. Per Dr. Mario Lord keep the same and recheck in 2 weeks.

## 2022-04-04 NOTE — PROGRESS NOTES
[facility] ([facility] PHYSICAL REHABILITATION [facility] [address], [address] [facility],Third Floor  Phone: [phone]    Fax [phone]                  Marcelle Marie MD, Amanda Pettit MD, Vernóica Barrera MD, MD Consuelo Mcqueen MD, Thom Wilson MD, Anna Stephens MD, [address] [facility]        Cardiology Progress Note      4/4/2022    RE: Corby Haley  (1955)                             Primary cardiologist: Dr. Consuelo Grimes       Subjective:  CC:   1. Chronic systolic heart failure (HCC)    2. Valvular heart disease    3. Essential hypertension    4. Mixed hyperlipidemia    5. Fatigue, unspecified type        HPI: Corby Haley, who is a  73y.o. year old male with a past medical history as listed below. Patient presents to the office for follow up on CHF, VHD, fatigue, and hyperlipidemia. In November 2021 patient had right hip orthoplasty then developed bleeding ulcer resulting in severe anemia. Recently patient has complained of increased fatigue over the last 2 weeks. Was evaluated in emergency department and BNP was elevated at that time, chest x-ray did not show fluid overload. Hemoglobin had remained stable. Patient denies any chest pain, shortness of breath, dizziness, syncope, or palpitations.     Past Medical History:   Diagnosis Date    Acid reflux     Acute frontal sinusitis 7/22/2009    Alcohol abuse     \"I Drink Average 2 Beers A Day\"    Anesthesia     \" I get agitated\"    Anxiety     Arthritis     \"Right Shoulder, Neck, Left Knee\"    Atypical mycobacterial infection     Broken teeth     Upper And Lower     CHF (congestive heart failure) (HCC)     COPD (chronic obstructive pulmonary disease) ([facility])     Sees Dr. Christi Amaya [place], [address]     Cough     Frequent Cough With Green Sputum    Depression     Depression     Dyspnea 2/23/2018  H/O cardiovascular MUGA stress test 05/14/2019    EF 50%, NORMAL LVEF, NORMAL WALL MOTION.    H/O echocardiogram 05/22/2014    IS=>94-06%, LV systolic function is low normal, mild aortic valve calcification, no pericardial effusion    H/O echocardiogram 12/12/2018    EF 45-50%    History of 24 hour EKG monitoring 6/6/14    24 hr holter monitor. Ventricular ectopics were noted in single and couplet forms. Supraventricular ectopics were noted in single and pair forms.  [place] (hard of hearing)     Bilateral Ears    Hyperlipidemia     Hypertension     Irritant contact dermatitis due to other chemical products 8/26/2019    Other drug allergy(995.27) 7/22/2009    S/P AVR 12/2003    Mechanical valvue     Shortness of breath     Teeth missing     Upper And Lower       Current Outpatient Medications   Medication Sig Dispense Refill    albuterol (PROVENTIL) (2.5 MG/3ML) 0.083% nebulizer solution Take 3 mLs by nebulization every 6 hours as needed for Wheezing or Shortness of Breath 125 each 0    sodium chloride, Inhalant, 3 % nebulizer solution Take by nebulization as needed for Other (COPD/shortness of breath) 125 mL 0    ferrous sulfate (IRON 325) 325 (65 Fe) MG tablet Take 1 tablet by mouth daily (with breakfast) 90 tablet 1    CALCIUM PO Take by mouth      ammonium lactate (LAC-HYDRIN) 12 % lotion Apply topically daily. 225 g 1    triamcinolone (KENALOG) 0.1 % cream Apply topically 2 times daily.  453.6 g 1    warfarin (COUMADIN) 5 MG tablet take 1-2 tablets by mouth daily as directed 60 tablet 2    simvastatin (ZOCOR) 40 MG tablet take 1 tablet by mouth at bedtime 90 tablet 1    melatonin 10 MG CAPS capsule Take 10 mg by mouth nightly      guaiFENesin (MUCINEX) 600 MG extended release tablet Take 1,200 mg by mouth every 12 hours      ferrous sulfate (FE TABS) 325 (65 Fe) MG EC tablet Take 1 tablet by mouth 2 times daily 90 tablet 3    venlafaxine (EFFEXOR XR) 150 MG extended release capsule Take 1 capsule by mouth daily 30 capsule 5    pantoprazole (PROTONIX) 40 MG tablet 40 mg PO BID for 30 days then 40 mg PO daily to continue 180 tablet 1    ipratropium-albuterol (DUONEB) 0.5-2.5 (3) MG/3ML SOLN nebulizer solution Inhale 3 mLs into the lungs 4 times daily 360 mL 5    KRILL OIL OMEGA-3 PO Take by mouth      cetirizine (ZYRTEC) 10 MG tablet Take 10 mg by mouth \"Alternate With Singulair\"      NASAL SPRAY SALINE NA by Nasal route daily Over The Counter      Acetaminophen (TYLENOL 8 HOUR PO) Take 500 mg by mouth as needed Over The Counter       Multiple Vitamins-Minerals (CENTRUM PO) Take by mouth daily      Nebulizer MISC Inhale into the lungs as needed       No current facility-administered medications for this visit. Review of Systems:  Review of Systems   Constitutional: Positive for fatigue. Respiratory: Negative for shortness of breath. Cardiovascular: Negative for chest pain, palpitations and leg swelling. Musculoskeletal: Negative. Skin: Negative. Neurological: Negative for dizziness and weakness. All other systems reviewed and are negative. Objective:      Physical Exam:  /68 (Site: Left Upper Arm, Position: Sitting, Cuff Size: Medium Adult)   Pulse 76   Ht 5' 4\" (1.626 m)   Wt 157 lb 3.2 oz (71.3 kg)   BMI 26.98 kg/m²   Wt Readings from Last 3 Encounters:   04/04/22 157 lb 3.2 oz (71.3 kg)   03/30/22 157 lb (71.2 kg)   03/11/22 157 lb 3.2 oz (71.3 kg)     Body mass index is 26.98 kg/m². Physical exam:  Physical Exam  Constitutional:       Appearance: He is well-developed. Cardiovascular:      Rate and Rhythm: Normal rate and regular rhythm. Pulses: Intact distal pulses. Dorsalis pedis pulses are 2+ on the right side and 2+ on the left side. Posterior tibial pulses are 2+ on the right side and 2+ on the left side. Heart sounds: Murmur heard.     Machinery midsystolic murmur is present at the upper right sternal border radiating to the neck. Pulmonary:      Effort: Pulmonary effort is normal.      Breath sounds: Normal breath sounds. Musculoskeletal:         General: Normal range of motion. Skin:     General: Skin is warm and dry. Neurological:      Mental Status: He is alert and oriented to person, place, and time. DATA:  No results found for: CKTOTAL, CKMB, CKMBINDEX, TROPONINI  BNP:  No results found for: BNP  PT/INR:  No results found for: PTINR  Lab Results   Component Value Date    LABA1C 5.0 11/10/2021    LABA1C 5.5 07/01/2020     Lab Results   Component Value Date    CHOL 126 07/01/2020    TRIG 57 07/01/2020    HDL 45 06/11/2021    LDLCALC 133 (H) 06/11/2021    LDLDIRECT 69 07/01/2020     Lab Results   Component Value Date    ALT 15 03/30/2022    AST 28 03/30/2022     TSH:    Lab Results   Component Value Date    TSH 1.57 06/11/2021       Vitals:    04/04/22 1129   BP: 132/68   Pulse: 76       Echo: 10/25/21   Technically difficult examination due to patient immobility s/p hip   arthroplasty. Left ventricular systolic function is low normal.   Ejection fraction is visually estimated at 50%. Moderate left ventricular hypertrophy. prosthetic valve in aortic position; mean PG: 15 mmHg. LACEY: 1.70 cm sq. DVI:0.4. AT: 25 ms. Trace aortic regurgitation noted. Stress test: 5/14/19  No ischemia       The 10-year ASCVD risk score (Erica Martínez et al., 2013) is: 14.2%    Values used to calculate the score:      Age: 73 years      Sex: Male      Is Non- : No      Diabetic: No      Tobacco smoker: No      Systolic Blood Pressure: 139 mmHg      Is BP treated: No      HDL Cholesterol: 45 mg/dL      Total Cholesterol: 200 mg/dL      Assessment/ Plan:     1.  Chronic systolic heart failure (HCC)    Appears euvolemic   Cont with current medications   Monitor weight daily  Limit sodium to < 2000 mg a day  Limit water to < 64 oz in a day  Call the office if a weight gain of >3 lbs in 2 days or > 5 lbs in a week is noted. Last testing: Echo 2018 EF 45-50% Muga in 2019 showed 50 %      2. Mixed hyperlipidemia  -At or near goal Yes     -He is to continue current medications (Zocor 40 mg) Hepatic function panel WNL. No abdominal pain. No myalgias.      -The nature of cardiac risk has been fully discussed with this patient. I have made him aware of his LDL target goal given his cardiovascular risk analysis. I have discussed the appropriate diet. The need for lifelong compliance in order to reduce risk is stressed. A regular exercise program is recommended to help achieve and maintain normal body weight, fitness and improve lipid balance. A written copy of a low fat, low cholesterol diet has been given to the patient.      Valvular heart disease  -Recent echocardiogram shows EF 50% and valve is in stable position.     -Patient had aortic valve replacement in 2003. Patient on coumadin for mechanical valve. Fatigue  -New onset of fatigue over the last 2 weeks. Elevated BNP on recent labs. Hgb stable. Will get echo. Patient seen, interviewed and examined. Testing was reviewed. Patient is encouraged to exercise even a brisk walk for 30 minutes at least 3 to 4 times a week. Lifestyle and risk factor modificatons discussed. Various goals are discussed and questions answered. Continue current medications. Appropriate prescriptions are addressed. Questions answered and patient verbalizes understanding. Call for any problems, questions, or concerns. Pt is to follow up in 1 months for Cardiac management    Electronically signed by Janeth Francis.  VANGIE Mercedes CNP on 4/4/2022 at 12:42 PM

## 2022-04-05 ENCOUNTER — PROCEDURE VISIT (OUTPATIENT)
Dept: CARDIOLOGY CLINIC | Age: 67
End: 2022-04-05
Payer: OTHER GOVERNMENT

## 2022-04-05 DIAGNOSIS — I38 VALVULAR HEART DISEASE: ICD-10-CM

## 2022-04-05 DIAGNOSIS — R53.83 FATIGUE, UNSPECIFIED TYPE: ICD-10-CM

## 2022-04-05 DIAGNOSIS — I50.22 CHRONIC SYSTOLIC HEART FAILURE (HCC): Chronic | ICD-10-CM

## 2022-04-05 DIAGNOSIS — I10 ESSENTIAL HYPERTENSION: ICD-10-CM

## 2022-04-05 DIAGNOSIS — E78.2 MIXED HYPERLIPIDEMIA: Chronic | ICD-10-CM

## 2022-04-05 DIAGNOSIS — Z95.2 S/P AVR: Chronic | ICD-10-CM

## 2022-04-05 LAB
LV EF: 43 %
LVEF MODALITY: NORMAL

## 2022-04-05 PROCEDURE — 93306 TTE W/DOPPLER COMPLETE: CPT | Performed by: INTERNAL MEDICINE

## 2022-04-06 ENCOUNTER — TELEPHONE (OUTPATIENT)
Dept: CARDIOLOGY CLINIC | Age: 67
End: 2022-04-06

## 2022-04-06 DIAGNOSIS — I50.22 CHRONIC SYSTOLIC HEART FAILURE (HCC): Primary | ICD-10-CM

## 2022-04-06 NOTE — TELEPHONE ENCOUNTER
----- Message from [facility]. VANGIE Marie - CNP sent at 4/6/2022  8:20 AM EDT -----  EF is reduced when compared to prior study, please schedule NM stress to R/O ischemia involvement. Left message for pt to give a call back regarding results and to set up an NM [employer] appt.   [address] [facility]

## 2022-04-06 NOTE — TELEPHONE ENCOUNTER
----- Message from VANGIE Sethi CNP sent at 4/6/2022  8:20 AM EDT -----  EF is reduced when compared to prior study, please schedule NM stress to R/O ischemia involvement. PT answered, scheduled appt. on 4/20/22 for nm stress per livia -cnp.   [address] [facility]

## 2022-04-08 ENCOUNTER — TELEPHONE (OUTPATIENT)
Dept: FAMILY MEDICINE CLINIC | Age: 67
End: 2022-04-08

## 2022-04-08 NOTE — TELEPHONE ENCOUNTER
Spoke with Dinorah Damon per Dr. Gerhardt Credit note: Ting Toney to go back to 10 mg alternating with 7.5 and recheck within 2 to 3 weeks on INR Dinorah Damon will give message to Ricardo Dia.

## 2022-04-08 NOTE — TELEPHONE ENCOUNTER
To Dr. Annie Arriaga--    Patient just had his INR checked on 4/4/22 and it was at 3.4------he said he generally takes 12.5 mg of coumadin one day and the next day takes 10 mg. (Rotating)      He needs to know what amount of coumadin he should take for the weekend please.

## 2022-04-12 ENCOUNTER — TELEPHONE (OUTPATIENT)
Dept: FAMILY MEDICINE CLINIC | Age: 67
End: 2022-04-12

## 2022-04-12 ENCOUNTER — TELEPHONE (OUTPATIENT)
Dept: CARDIOLOGY CLINIC | Age: 67
End: 2022-04-12

## 2022-04-12 DIAGNOSIS — J44.9 CHRONIC OBSTRUCTIVE PULMONARY DISEASE, UNSPECIFIED COPD TYPE (HCC): Primary | ICD-10-CM

## 2022-04-12 DIAGNOSIS — J84.9 CHRONIC INTERSTITIAL LUNG DISEASE (HCC): ICD-10-CM

## 2022-04-12 DIAGNOSIS — Z95.2 H/O MECHANICAL AORTIC VALVE REPLACEMENT: ICD-10-CM

## 2022-04-12 NOTE — TELEPHONE ENCOUNTER
Patient called he wants this added to his records Mycobacterium avium complex  This is the condition he wanted Evan to know about he told her he would call back with this information

## 2022-04-19 ENCOUNTER — ANTI-COAG VISIT (OUTPATIENT)
Dept: FAMILY MEDICINE CLINIC | Age: 67
End: 2022-04-19

## 2022-04-19 ENCOUNTER — NURSE ONLY (OUTPATIENT)
Dept: FAMILY MEDICINE CLINIC | Age: 67
End: 2022-04-19
Payer: MEDICARE

## 2022-04-19 DIAGNOSIS — Z95.2 H/O MECHANICAL AORTIC VALVE REPLACEMENT: ICD-10-CM

## 2022-04-19 DIAGNOSIS — Z95.2 S/P AVR: ICD-10-CM

## 2022-04-19 LAB
INTERNATIONAL NORMALIZATION RATIO, POC: 2.2
PROTHROMBIN TIME, POC: ABNORMAL

## 2022-04-19 PROCEDURE — 99999 PR OFFICE/OUTPT VISIT,PROCEDURE ONLY: CPT | Performed by: FAMILY MEDICINE

## 2022-04-19 PROCEDURE — 85610 PROTHROMBIN TIME: CPT | Performed by: FAMILY MEDICINE

## 2022-04-20 ENCOUNTER — PROCEDURE VISIT (OUTPATIENT)
Dept: CARDIOLOGY CLINIC | Age: 67
End: 2022-04-20
Payer: MEDICARE

## 2022-04-20 ENCOUNTER — TELEPHONE (OUTPATIENT)
Dept: FAMILY MEDICINE CLINIC | Age: 67
End: 2022-04-20

## 2022-04-20 DIAGNOSIS — R06.09 DOE (DYSPNEA ON EXERTION): Primary | ICD-10-CM

## 2022-04-20 DIAGNOSIS — I50.22 CHRONIC SYSTOLIC HEART FAILURE (HCC): ICD-10-CM

## 2022-04-20 LAB
LV EF: 30 %
LVEF MODALITY: NORMAL

## 2022-04-20 PROCEDURE — 93017 CV STRESS TEST TRACING ONLY: CPT | Performed by: INTERNAL MEDICINE

## 2022-04-20 PROCEDURE — 93018 CV STRESS TEST I&R ONLY: CPT | Performed by: INTERNAL MEDICINE

## 2022-04-20 PROCEDURE — A9500 TC99M SESTAMIBI: HCPCS | Performed by: INTERNAL MEDICINE

## 2022-04-20 PROCEDURE — 93016 CV STRESS TEST SUPVJ ONLY: CPT | Performed by: INTERNAL MEDICINE

## 2022-04-20 PROCEDURE — 78452 HT MUSCLE IMAGE SPECT MULT: CPT | Performed by: INTERNAL MEDICINE

## 2022-04-21 ENCOUNTER — TELEMEDICINE (OUTPATIENT)
Dept: FAMILY MEDICINE CLINIC | Age: 67
End: 2022-04-21
Payer: MEDICARE

## 2022-04-21 DIAGNOSIS — Z00.00 MEDICARE ANNUAL WELLNESS VISIT, SUBSEQUENT: Primary | ICD-10-CM

## 2022-04-21 PROCEDURE — 3017F COLORECTAL CA SCREEN DOC REV: CPT | Performed by: FAMILY MEDICINE

## 2022-04-21 PROCEDURE — G0439 PPPS, SUBSEQ VISIT: HCPCS | Performed by: FAMILY MEDICINE

## 2022-04-21 PROCEDURE — 1123F ACP DISCUSS/DSCN MKR DOCD: CPT | Performed by: FAMILY MEDICINE

## 2022-04-21 PROCEDURE — 4040F PNEUMOC VAC/ADMIN/RCVD: CPT | Performed by: FAMILY MEDICINE

## 2022-04-21 ASSESSMENT — PATIENT HEALTH QUESTIONNAIRE - PHQ9
5. POOR APPETITE OR OVEREATING: 0
3. TROUBLE FALLING OR STAYING ASLEEP: 1
8. MOVING OR SPEAKING SO SLOWLY THAT OTHER PEOPLE COULD HAVE NOTICED. OR THE OPPOSITE, BEING SO FIGETY OR RESTLESS THAT YOU HAVE BEEN MOVING AROUND A LOT MORE THAN USUAL: 0
1. LITTLE INTEREST OR PLEASURE IN DOING THINGS: 0
9. THOUGHTS THAT YOU WOULD BE BETTER OFF DEAD, OR OF HURTING YOURSELF: 0
SUM OF ALL RESPONSES TO PHQ QUESTIONS 1-9: 3
4. FEELING TIRED OR HAVING LITTLE ENERGY: 1
SUM OF ALL RESPONSES TO PHQ QUESTIONS 1-9: 3
10. IF YOU CHECKED OFF ANY PROBLEMS, HOW DIFFICULT HAVE THESE PROBLEMS MADE IT FOR YOU TO DO YOUR WORK, TAKE CARE OF THINGS AT HOME, OR GET ALONG WITH OTHER PEOPLE: 0
SUM OF ALL RESPONSES TO PHQ QUESTIONS 1-9: 3
7. TROUBLE CONCENTRATING ON THINGS, SUCH AS READING THE NEWSPAPER OR WATCHING TELEVISION: 1
SUM OF ALL RESPONSES TO PHQ9 QUESTIONS 1 & 2: 0
SUM OF ALL RESPONSES TO PHQ QUESTIONS 1-9: 3
6. FEELING BAD ABOUT YOURSELF - OR THAT YOU ARE A FAILURE OR HAVE LET YOURSELF OR YOUR FAMILY DOWN: 0
2. FEELING DOWN, DEPRESSED OR HOPELESS: 0

## 2022-04-21 ASSESSMENT — LIFESTYLE VARIABLES
HOW OFTEN DURING THE LAST YEAR HAVE YOU FAILED TO DO WHAT WAS NORMALLY EXPECTED FROM YOU BECAUSE OF DRINKING: 0
HOW OFTEN DURING THE LAST YEAR HAVE YOU FOUND THAT YOU WERE NOT ABLE TO STOP DRINKING ONCE YOU HAD STARTED: 0
HAS A RELATIVE, FRIEND, DOCTOR, OR ANOTHER HEALTH PROFESSIONAL EXPRESSED CONCERN ABOUT YOUR DRINKING OR SUGGESTED YOU CUT DOWN: 4
HAVE YOU OR SOMEONE ELSE BEEN INJURED AS A RESULT OF YOUR DRINKING: 0
HOW OFTEN DURING THE LAST YEAR HAVE YOU NEEDED AN ALCOHOLIC DRINK FIRST THING IN THE MORNING TO GET YOURSELF GOING AFTER A NIGHT OF HEAVY DRINKING: 0
HOW MANY STANDARD DRINKS CONTAINING ALCOHOL DO YOU HAVE ON A TYPICAL DAY: 7 TO 9
HOW OFTEN DURING THE LAST YEAR HAVE YOU HAD A FEELING OF GUILT OR REMORSE AFTER DRINKING: 0
HOW OFTEN DO YOU HAVE A DRINK CONTAINING ALCOHOL: 4 OR MORE TIMES A WEEK
HOW OFTEN DURING THE LAST YEAR HAVE YOU BEEN UNABLE TO REMEMBER WHAT HAPPENED THE NIGHT BEFORE BECAUSE YOU HAD BEEN DRINKING: 0

## 2022-04-21 NOTE — PROGRESS NOTES
Medicare Annual Wellness Visit    Nella Shine is here for Medicare AWV    Assessment & Plan   Medicare annual wellness visit, subsequent      Recommendations for Preventive Services Due: see orders and patient instructions/AVS.  Recommended screening schedule for the next 5-10 years is provided to the patient in written form: see Patient Instructions/AVS.     No follow-ups on file. Subjective       Patient's complete Health Risk Assessment and screening values have been reviewed and are found in Flowsheets. The following problems were reviewed today and where indicated follow up appointments were made and/or referrals ordered. Positive Risk Factor Screenings with Interventions:    Fall Risk:  Do you feel unsteady or are you worried about falling? : no  2 or more falls in past year?: no  Fall with injury in past year?: (!) yes (broke hip, fell backwards when reaching for something)     Fall Risk Interventions:    · Patient declines any further evaluation/treatment for this issue  · patient states he fell backwards when reaching for something and broke his hip, and is doing much better    Cognitive:   Words recalled: 2 Words Recalled  Total Score Interpretation: Abnormal Mini-Cog  Did the patient refuse to take the cognition test?: No    Cognitive Impairment Interventions:  · Patient declines any further evaluation/treatment for cognitive impairment  · Patient did not sound as if he was cognitively impaired     Tobacco Use:     Tobacco Use: High Risk    Smoking Tobacco Use: Never Smoker    Smokeless Tobacco Use: Current User     E-Cigarettes/Vaping Use     Questions Responses    E-Cigarette/Vaping Use Never User    Start Date     Passive Exposure     Quit Date     Counseling Given     Comments         Substance Use - Tobacco Interventions:  patient is not ready to work toward tobacco cessation at this time    Alcohol Screening:  AUDIT-C:   Alcohol Use: Heavy Drinker    Frequency of Alcohol Consumption: 4 or more times a week    Average Number of Drinks: 7 to 9    Frequency of Binge Drinking: Daily or almost daily     AUDIT Total Score: 15  A total score of 8 or more is associated with harmful or hazardous drinking. A score of 13 or more in women, and 15 or more in men, is likely to indicate alcohol dependence.     Substance Use - Alcohol Interventions:  Reviewed recommended alcohol consumption guidelines with the patient, Patient is not ready to change his/her alcohol consumption behavior at this time        General Health and ACP:  General  In general, how would you say your health is?: Good  In the past 7 days, have you experienced any of the following: New or Increased Pain, New or Increased Fatigue, Loneliness, Social Isolation, Stress or Anger?: (!) Yes  Select all that apply: (!) New or Increased Fatigue,Anger (recent fatigue, seeing cardiologist; anger at [employer])  Do you get the social and emotional support that you need?: Yes  Do you have a Living Will?: (!) No    Advance Directives     Power of [facility] Will ACP-Advance Directive ACP-Power of     Not on File Not on File Not on File Not on File      General Health Risk Interventions:  · Fatigue: Patient states fatigue and is seeing Cardiology for this  · Anger: patient's comments regarding reasons for stress and/or anger: states anger at the [address]  · No Living Will: [address] addressed with patient today    Health Habits/Nutrition:     Physical Activity: Insufficiently Active    Days of Exercise per Week: 5 days    Minutes of Exercise per Session: 20 min     Have you lost any weight without trying in the past 3 months?: No     Have you seen the dentist within the past year?: (!) No    Health Habits/Nutrition Interventions:  · Inadequate physical activity:  patient agrees to increase physical activity as follows: states he will be able to walk more when the weather cooperates  · Nutritional issues:  patient is not ready to address his/her nutritional/weight issues at this time  · Dental exam overdue:  patient encouraged to make appointment with his/her dentist    Hearing/Vision:  Do you or your family notice any trouble with your hearing that hasn't been managed with hearing aids?: No  Do you have difficulty driving, watching TV, or doing any of your daily activities because of your eyesight?: No  Have you had an eye exam within the past year?: (!) No  No exam data present    Hearing/Vision Interventions:  · Vision concerns:  patient encouraged to make appointment with his/her eye specialist     ADLs:  In the past 7 days, did you need help from others to perform any of the following everyday activities: Eating, dressing, grooming, bathing, toileting, or walking/balance?: No  In the past 7 days, did you need help from others to take care of any of the following: Laundry, housekeeping, banking/finances, shopping, telephone use, food preparation, transportation, or taking medications?: (!) Yes  Select all that apply: (!) Transportation (friend, sister or [employer] bus)    ADL Interventions:  · Patient declines any further evaluation/treatment for this issue  · patient states he has friends, his sister who will drive him, or he takes public transportion ([employer])          Objective      Patient-Reported Vitals  No data recorded     Unable to obtain 3 vital signs due to patient not having equipment to take blood pressure/temperature. Allergies   Allergen Reactions    Ciprofloxacin Hcl Hives and Swelling    Levaquin [Levofloxacin] Hives and Swelling    Other Nausea And Vomiting     \"Allergic To Tylox\"    Ceftin [Cefuroxime]      Prior to Visit Medications    Medication Sig Taking?  Authorizing Provider   Calcium Carbonate-Vitamin D (CALCIUM-VITAMIN D3 PO) Take by mouth Yes Historical Provider, MD   albuterol (PROVENTIL) (2.5 MG/3ML) 0.083% nebulizer solution Take 3 mLs by nebulization every 6 hours as needed for Wheezing or Shortness of Breath Yes Amira Mcconnell PA-C   sodium chloride, Inhalant, 3 % nebulizer solution Take by nebulization as needed for Other (COPD/shortness of breath) Yes Amira Mcconnell PA-C   ferrous sulfate (IRON 325) 325 (65 Fe) MG tablet Take 1 tablet by mouth daily (with breakfast) Yes Srikanth Ortega MD   CALCIUM PO Take by mouth Yes Historical Provider, MD   ammonium lactate (LAC-HYDRIN) 12 % lotion Apply topically daily. Yes Srikanth Ortega MD   triamcinolone (KENALOG) 0.1 % cream Apply topically 2 times daily.  Yes Srikanth Ortega MD   warfarin (COUMADIN) 5 MG tablet take 1-2 tablets by mouth daily as directed Yes Rui Jack MD   simvastatin (ZOCOR) 40 MG tablet take 1 tablet by mouth at bedtime Yes Rui Jack MD   melatonin 10 MG CAPS capsule Take 10 mg by mouth nightly Yes Historical Provider, MD   guaiFENesin (MUCINEX) 600 MG extended release tablet Take 1,200 mg by mouth every 12 hours Yes Historical Provider, MD   venlafaxine (EFFEXOR XR) 150 MG extended release capsule Take 1 capsule by mouth daily Yes Srikanth Ortega MD   pantoprazole (PROTONIX) 40 MG tablet 40 mg PO BID for 30 days then 40 mg PO daily to continue Yes Kelby Cervantes MD   ipratropium-albuterol (DUONEB) 0.5-2.5 (3) MG/3ML SOLN nebulizer solution Inhale 3 mLs into the lungs 4 times daily Yes Ivonne Patel MD   KRILL OIL OMEGA-3 PO Take by mouth Yes Historical Provider, MD   cetirizine (ZYRTEC) 10 MG tablet Take 10 mg by mouth \"Alternate With Singulair\" Yes Historical Provider, MD   NASAL SPRAY SALINE NA by Nasal route daily Over The Counter Yes Historical Provider, MD   Multiple Vitamins-Minerals (CENTRUM PO) Take by mouth daily Yes Historical Provider, MD   Nebulizer MISC Inhale into the lungs as needed Yes Historical Provider, MD   ferrous sulfate (FE TABS) 325 (65 Fe) MG EC tablet Take 1 tablet by mouth 2 times daily  Patient not taking: Reported on 4/21/2022  Ragena Goodpasture, PA   Acetaminophen (TYLENOL 8 HOUR PO) Take 500 mg by mouth as needed Over The Counter   Patient not taking: Reported on 4/21/2022  Historical Provider, MD [place] (Including outside providers/suppliers regularly involved in providing care):   Patient Care Team:  Carlos Portillo MD as PCP - General (Family Medicine)  Carlos Portillo MD as PCP - [facility] Empaneled Provider    Reviewed and updated this visit:  Tobacco  Allergies  Meds  Med Hx  Surg Hx  Soc Hx  Fam Hx           Lelia Collins, was evaluated through a synchronous (real-time) audio-video encounter. The patient (or guardian if applicable) is aware that this is a billable service, which includes applicable co-pays. This Virtual Visit was conducted with patient's (and/or legal guardian's) consent. The visit was conducted pursuant to the emergency declaration under the [address] [place] [address] and the [employer] Act. Patient identification was verified, and a caregiver was present when appropriate. The patient was located at home in a state where the provider was licensed to provide care. This encounter was performed under my, Mamta Preston, direct supervision, 4/21/2022. Alexsandra Goss LPN, [birth year], performed the documented evaluation under the direct supervision of the attending physician. Lelia Collins, was evaluated through a synchronous (real-time) audio encounter. The patient (or guardian if applicable) is aware that this is a billable service, which includes applicable co-pays. This Virtual Visit was conducted with patient's (and/or legal guardian's) consent. The visit was conducted pursuant to the emergency declaration under the [address] [place] [address] and the [employer] Act.   Patient identification was verified, and a caregiver was present when appropriate. The patient was located at home in a state where the provider was licensed to provide care. Total time spent for this encounter: Not billed by time    --Guerita Duvall LPN on 5/27/4765 at 54:60 AM    An electronic signature was used to authenticate this note.

## 2022-04-21 NOTE — PATIENT INSTRUCTIONS
Personalized Preventive Plan for Rhea Swanson - 4/21/2022  Medicare offers a range of preventive health benefits. Some of the tests and screenings are paid in full while other may be subject to a deductible, co-insurance, and/or copay. Some of these benefits include a comprehensive review of your medical history including lifestyle, illnesses that may run in your family, and various assessments and screenings as appropriate. After reviewing your medical record and screening and assessments performed today your provider may have ordered immunizations, labs, imaging, and/or referrals for you. A list of these orders (if applicable) as well as your Preventive Care list are included within your After Visit Summary for your review. Other Preventive Recommendations:    · A preventive eye exam performed by an eye specialist is recommended every 1-2 years to screen for glaucoma; cataracts, macular degeneration, and other eye disorders. · A preventive dental visit is recommended every 6 months. · Try to get at least 150 minutes of exercise per week or 10,000 steps per day on a pedometer . · Order or download the FREE \"Exercise & Physical Activity: Your Everyday Guide\" from [employer]. Call 5-624.966.7085 or search [employer] online. · You need 8951-3647 mg of calcium and 6002-2061 IU of vitamin D per day. It is possible to meet your calcium requirement with diet alone, but a vitamin D supplement is usually necessary to meet this goal.  · When exposed to the sun, use a sunscreen that protects against both UVA and UVB radiation with an SPF of 30 or greater. Reapply every 2 to 3 hours or after sweating, drying off with a towel, or swimming. · Always wear a seat belt when traveling in a car. Always wear a helmet when riding a bicycle or motorcycle.

## 2022-04-22 ENCOUNTER — TELEPHONE (OUTPATIENT)
Dept: CARDIOLOGY CLINIC | Age: 67
End: 2022-04-22

## 2022-04-22 NOTE — TELEPHONE ENCOUNTER
Summary    Supervising physician Dr. Nita Madrid .   Kimberly Jackson stress test, depressed LVEF, increased EDV, Myocardial perfusion    scan shows small size, moderate intensity,NON reversible perfusion defect in    APICAL wall.        Recommendation    Office visit to discuss results       Pt answered, scheduled appt. for pt to come in and discuss results w dr Marcus Marie on 4/28.   [facility]

## 2022-04-28 ENCOUNTER — TELEPHONE (OUTPATIENT)
Dept: CARDIOLOGY CLINIC | Age: 67
End: 2022-04-28

## 2022-04-28 ENCOUNTER — OFFICE VISIT (OUTPATIENT)
Dept: CARDIOLOGY CLINIC | Age: 67
End: 2022-04-28
Payer: MEDICARE

## 2022-04-28 ENCOUNTER — TELEPHONE (OUTPATIENT)
Dept: FAMILY MEDICINE CLINIC | Age: 67
End: 2022-04-28

## 2022-04-28 VITALS
BODY MASS INDEX: 25.96 KG/M2 | SYSTOLIC BLOOD PRESSURE: 124 MMHG | WEIGHT: 155.8 LBS | HEIGHT: 65 IN | DIASTOLIC BLOOD PRESSURE: 78 MMHG | HEART RATE: 90 BPM | OXYGEN SATURATION: 98 %

## 2022-04-28 DIAGNOSIS — I38 VALVULAR HEART DISEASE: Primary | ICD-10-CM

## 2022-04-28 DIAGNOSIS — R06.09 DOE (DYSPNEA ON EXERTION): ICD-10-CM

## 2022-04-28 PROCEDURE — 3017F COLORECTAL CA SCREEN DOC REV: CPT | Performed by: INTERNAL MEDICINE

## 2022-04-28 PROCEDURE — G8428 CUR MEDS NOT DOCUMENT: HCPCS | Performed by: INTERNAL MEDICINE

## 2022-04-28 PROCEDURE — 1123F ACP DISCUSS/DSCN MKR DOCD: CPT | Performed by: INTERNAL MEDICINE

## 2022-04-28 PROCEDURE — G8417 CALC BMI ABV UP PARAM F/U: HCPCS | Performed by: INTERNAL MEDICINE

## 2022-04-28 PROCEDURE — 4004F PT TOBACCO SCREEN RCVD TLK: CPT | Performed by: INTERNAL MEDICINE

## 2022-04-28 PROCEDURE — 99214 OFFICE O/P EST MOD 30 MIN: CPT | Performed by: INTERNAL MEDICINE

## 2022-04-28 PROCEDURE — 4040F PNEUMOC VAC/ADMIN/RCVD: CPT | Performed by: INTERNAL MEDICINE

## 2022-04-28 RX ORDER — LISINOPRIL 2.5 MG/1
1.25 TABLET ORAL DAILY
Qty: 30 TABLET | Refills: 3 | Status: SHIPPED | OUTPATIENT
Start: 2022-04-28 | End: 2022-06-07

## 2022-04-28 NOTE — PROGRESS NOTES
Paul Peace MD        OFFICE  FOLLOWUP NOTE    Chief complaints: patient is here for management of prosthetic AVR, DM, HTN,DEPRESSED LVEF 45%, DYSLPIDEMIA, BRONCHIATAIS AND ALE TB, PRE dm    F.u after hospital discharge, he was light headed found to be pre DM, STRESS TEST IS ABNORMAL, LVEF IS LOW ALSO    Subjective: patient feels better, no chest pain, no shortness of breath, no dizziness, no palpitations    HPI Karel Saldana is a 73 y. o.year old who  has a past medical history of Acid reflux, Acute frontal sinusitis, Alcohol abuse, Anesthesia, Anxiety, Arthritis, Atypical mycobacterial infection, Broken teeth, CHF (congestive heart failure) ([facility]), COPD (chronic obstructive pulmonary disease) ([facility] [address] Cough, Depression, Depression, Dyspnea, H/O cardiovascular MUGA stress test, H/O echocardiogram, H/O echocardiogram, History of 24 hour EKG monitoring, [place] (hard of hearing), Hyperlipidemia, Hypertension, Irritant contact dermatitis due to other chemical products, Mycobacterium avium complex ([facility] [address] Other drug allergy(995.27), S/P AVR, Shortness of breath, and Teeth missing. and presents for management of prosthetic AVR, DM, HTN,DEPRESSED LVEF 45%, DYSLPIDEMIA, BRONCHIATAIS AND ALE TB which are well controlled      Current Outpatient Medications   Medication Sig Dispense Refill    Calcium Carbonate-Vitamin D (CALCIUM-VITAMIN D3 PO) Take by mouth      albuterol (PROVENTIL) (2.5 MG/3ML) 0.083% nebulizer solution Take 3 mLs by nebulization every 6 hours as needed for Wheezing or Shortness of Breath 125 each 0    sodium chloride, Inhalant, 3 % nebulizer solution Take by nebulization as needed for Other (COPD/shortness of breath) 125 mL 0    ferrous sulfate (IRON 325) 325 (65 Fe) MG tablet Take 1 tablet by mouth daily (with breakfast) 90 tablet 1    CALCIUM PO Take by mouth      ammonium lactate (LAC-HYDRIN) 12 % lotion Apply topically daily.  225 g 1    triamcinolone (KENALOG) 0.1 % cream Apply topically 2 times daily. 453.6 g 1    warfarin (COUMADIN) 5 MG tablet take 1-2 tablets by mouth daily as directed 60 tablet 2    simvastatin (ZOCOR) 40 MG tablet take 1 tablet by mouth at bedtime 90 tablet 1    melatonin 10 MG CAPS capsule Take 10 mg by mouth nightly      guaiFENesin (MUCINEX) 600 MG extended release tablet Take 1,200 mg by mouth every 12 hours      ferrous sulfate (FE TABS) 325 (65 Fe) MG EC tablet Take 1 tablet by mouth 2 times daily 90 tablet 3    venlafaxine (EFFEXOR XR) 150 MG extended release capsule Take 1 capsule by mouth daily 30 capsule 5    pantoprazole (PROTONIX) 40 MG tablet 40 mg PO BID for 30 days then 40 mg PO daily to continue 180 tablet 1    ipratropium-albuterol (DUONEB) 0.5-2.5 (3) MG/3ML SOLN nebulizer solution Inhale 3 mLs into the lungs 4 times daily 360 mL 5    KRILL OIL OMEGA-3 PO Take by mouth      cetirizine (ZYRTEC) 10 MG tablet Take 10 mg by mouth \"Alternate With Singulair\"      NASAL SPRAY SALINE NA by Nasal route daily Over The Counter      Acetaminophen (TYLENOL 8 HOUR PO) Take 500 mg by mouth as needed Over The Counter      Multiple Vitamins-Minerals (CENTRUM PO) Take by mouth daily      Nebulizer MISC Inhale into the lungs as needed       No current facility-administered medications for this visit.      Allergies: Ciprofloxacin hcl, Levaquin [levofloxacin], Other, and Ceftin [cefuroxime]  Past Medical History:   Diagnosis Date    Acid reflux     Acute frontal sinusitis 07/22/2009    Alcohol abuse     \"I Drink Average 2 Beers A Day\"    Anesthesia     \" I get agitated\"    Anxiety     Arthritis     \"Right Shoulder, Neck, Left Knee\"    Atypical mycobacterial infection     Broken teeth     Upper And Lower     CHF (congestive heart failure) ([facility])     COPD (chronic obstructive pulmonary disease) ([facility])     Sees Dr. Jie Skinner In [place], [address]     Cough     Frequent Cough With Green Sputum    Depression     Depression     Dyspnea 02/23/2018    H/O cardiovascular MUGA stress test 05/14/2019    EF 50%, NORMAL LVEF, NORMAL WALL MOTION.    H/O echocardiogram 05/22/2014    RY=>46-92%, LV systolic function is low normal, mild aortic valve calcification, no pericardial effusion    H/O echocardiogram 12/12/2018    EF 45-50%    History of 24 hour EKG monitoring 06/06/2014    24 hr holter monitor. Ventricular ectopics were noted in single and couplet forms. Supraventricular ectopics were noted in single and pair forms.     [place] (hard of hearing)     Bilateral Ears    Hyperlipidemia     Hypertension     Irritant contact dermatitis due to other chemical products 08/26/2019    Mycobacterium avium complex ([facility] [address]     Other drug allergy(995.27) 07/22/2009    S/P AVR 12/2003    Mechanical valvue     Shortness of breath     Teeth missing     Upper And Lower     Past Surgical History:   Procedure Laterality Date    BRONCHOSCOPY  1996    \"Done Twice\"    CARDIAC VALVE REPLACEMENT  12/17/2003    \"Aortic Valve Replacement, Mechanical Valve\"    COLONOSCOPY  2006    Polyps Removed    DENTAL SURGERY      Teeth Extracted In Past    ENDOSCOPY, COLON, DIAGNOSTIC  In Past    HEMIARTHROPLASTY SHOULDER FRACTURE Right 1/29/2019    SHOULDER HEMIARTHROPLASTY performed by Harvel Olszewski, DO at [facility] ARTHROSCOPY Left 1992    LUNG BIOPSY Right 1996   [facility], [address]    \"Cauterized Because My Sperm Was Low\"    ROTATOR CUFF REPAIR Right 2008    TONSILLECTOMY  1959 Or 1960    TOTAL HIP ARTHROPLASTY Right 10/25/2021    RIGHT HIP TOTAL ARTHROPLASTY performed by Harvel Olszewski, DO at [address] N/A 11/10/2021    EGD BIOPSY performed by Geri Grayson MD at [facility] ENDOSCOPY     Family History   Problem Relation Age of Onset    Cancer Mother         Lymphoma    Diabetes Mother     High Blood Pressure Mother     High Cholesterol Mother     Obesity Mother     Vision Loss Mother         Wore Glasses    Heart Disease Father         \"Heart Failure\"   [facility] COPD Father     Asthma Sister     High Blood Pressure Sister     High Cholesterol Sister     Other Sister         pre diabetic    COPD Sister     Diabetes Sister         \"Pre Diabetes\"    No Known Problems Son      Social History     Tobacco Use    Smoking status: Never Smoker    Smokeless tobacco: Current User     Types: Snuff, Chew   Substance Use Topics    Alcohol use: Yes     Alcohol/week: 8.0 - 10.0 standard drinks     Types: 8 - 10 Cans of beer per week     Comment: 5-6 can beer daily      [unfilled]  Review of Systems:   · Constitutional: No Fever or Weight Loss   · Eyes: No Decreased Vision  · ENT: No Headaches, Hearing Loss or Vertigo  · Cardiovascular: No chest pain, dyspnea on exertion, palpitations or loss of consciousness  · Respiratory: No cough or wheezing    · Gastrointestinal: No abdominal pain, appetite loss, blood in stools, constipation, diarrhea or heartburn  · Genitourinary: No dysuria, trouble voiding, or hematuria  · Musculoskeletal:  No gait disturbance, weakness or joint complaints  · Integumentary: No rash or pruritis  · Neurological: No TIA or stroke symptoms  · Psychiatric: No anxiety or depression  · Endocrine: No malaise, fatigue or temperature intolerance  · Hematologic/Lymphatic: No bleeding problems, blood clots or swollen lymph nodes  · Allergic/Immunologic: No nasal congestion or hives  All systems negative except as marked. Objective:  /78   Pulse 90   Ht 5' 5\" (1.651 m)   Wt 155 lb 12.8 oz (70.7 kg)   SpO2 98%   BMI 25.93 kg/m²   Wt Readings from Last 3 Encounters:   04/28/22 155 lb 12.8 oz (70.7 kg)   04/04/22 157 lb 3.2 oz (71.3 kg)   03/30/22 157 lb (71.2 kg)     Body mass index is 25.93 kg/m². GENERAL - Alert, oriented, pleasant, in no apparent distress,normal grooming  HEENT - pupils are intact, cornea intact, conjunctive normal color, ears are normal in exam,  Neck - Supple.   No jugular venous distention noted. No carotid bruits, no apical -carotid delay  Respiratory:  Normal breath sounds, No respiratory distress, No wheezing, No chest tenderness. ,no use of accessory muscles, diaphragm movement is normal  Cardiovascular: (PMI) apex non displaced,no lifts no thrills, no s3,no s4, Normal heart rate, Normal rhythm, No murmurs, No rubs, No gallops. Carotid arteries pulse and amplitude are normal no bruit, no abdominal bruit noted ( normal abdominal aorta ausculation),   Extremities - No cyanosis, clubbing, or significant edema, no varicose veins    Abdomen - No masses, tenderness, or organomegaly, no hepato-splenomegally, no bruits  Musculoskeletal - No significant edema, no kyphosis or scoliosis, no deformity in any extremity noted, muscle strength and tone are normal  Skin: no ulcer,no scar,no stasis dermatitis   Neurologic - alert oriented times 3,Cranial nerves II through XII are grossly intact. There were no gross focal neurologic abnormalities. Psychiatric: normal mood and affect    No results found for: CKTOTAL, CKMB, CKMBINDEX, TROPONINI  BNP:  No results found for: BNP  PT/INR:  No results found for: PTINR  Lab Results   Component Value Date    LABA1C 5.0 11/10/2021    LABA1C 5.5 07/01/2020     Lab Results   Component Value Date    CHOL 126 07/01/2020    TRIG 57 07/01/2020    HDL 45 06/11/2021    LDLCALC 133 (H) 06/11/2021    LDLDIRECT 69 07/01/2020     Lab Results   Component Value Date    ALT 15 03/30/2022    AST 28 03/30/2022     TSH:    Lab Results   Component Value Date    TSH 1.57 06/11/2021       Impression:  Naresh Cat is a 73 y. o.year old who  has a past medical history of Acid reflux, Acute frontal sinusitis, Alcohol abuse, Anesthesia, Anxiety, Arthritis, Atypical mycobacterial infection, Broken teeth, CHF (congestive heart failure) ([facility]), COPD (chronic obstructive pulmonary disease) ([facility] [address] Cough, Depression, Depression, Dyspnea, H/O cardiovascular MUGA stress test, H/O echocardiogram, H/O

## 2022-04-28 NOTE — TELEPHONE ENCOUNTER
Notified pt to stop coumadin on 5/12/22 and not to restart it until after [address] [facility] as Doctor directs him. He has a Mechanical Valve so he will call PCP for Lovenox inj. He does not go to coumadin clinic.

## 2022-04-28 NOTE — LETTER
[facility]      [address] WITH POSSIBLE PERCUTANEOUS CORONARY INTERVENTION     Patient Name: Corby Haley   : 1955   MRN# [id number]    Date of Procedure: 22 Time: 9 AM Arrival Time: 7 AM    The catheterization and angiogram are usually outpatient procedures, however if stenting is needed you may need to stay overnight. You will need to arrive at the hospital two hours before the procedure. You will go to registration in the main lobby. You will need to arrange for someone to drive you home. HOSPITAL:  [facility])  Call to [place] at: [phone] 1-2 days before your procedure. Please have blood work done 1 to 2 days before procedure at    [facility]. X Please do not have anything by mouth after midnight prior to or 8 hours before the procedure. X You may take your medications with a sip of water in the morning before your procedure or take them                    with you. X If you take COUMADIN, it should be held 5 days prior to cath. (STOP ON 22 PT NOTIFIED 22 BA)      Patient Signature:  _________________________     Staff Giving Instructions_______________________________                     [place] ([facility]    Dr. Consuelo Grimes     Patient Name: Corby Haley   : 1955   MRN# [id number]    Date of Procedure: 22 Time: 9 AM     DIAGNOSIS:   Z01.810    LEFT HEART CATHETERIZATION WITH POSSIBLE PERCUTANEOUS CORONARY INTERVENTION                 X CBC  X BMP          X PT  &  PTT                               ? PLEASE CALL ABNORMAL RESULTS TO THE  PHYSICIAN? ATTENTION PATIENT:   Pretesting is to be done before the cath. You do not have to fast for the lab work. You must go to the [facility] behind the [facility] at [address] to have this lab work done.     Phone: [phone]   Hours: 7:00 am to 4:45 pm            8:00 am to 12:00 pm Saturday      PHYSICIAN SIGNATURE:            DATE:_________________Time:______________                      [facility]    Pre Cath Lab Orders     Patient Name: Madison Rodas   : 1955   MRN# [id number]    Pre Cath Lab orders     1. IV of 0.9 NS@ 75ml/hr started 2 hours prior to procedure. (#20 Gauge Insyte or larger)  2. Diazepam (Valium) 5 mg po ONCE in Cath Lab. 3. Diphenhydramine (Benadryl) 25 mg ONCE. PHYSICIAN SIGNATURE:      DATE:         TIME:______________                                                                         *This consent is applicable for 30 days following patient signature*  PROCEDURAL INFORMED [address] / PROCEDURE  I (We), Madison Rodas authorize, Dr. Baruch Barthel  and such assistants as may be selected by him/her, to perform the following operation/procedures:   LEFT HEART CATHETERIZATION WITH POSSIBLE PERCUTANEOUS CORONARY INTERVENTION   Note: If unable to obtain consent prior to an emergent procedure, document the emergent reason in the medical record. This procedure has been explained to my (our) satisfaction and included in the explanation was:   A) the intended benefit, nature, and extent of the procedure to be performed;   B) the significant risks involved and the probability of success;   C) alternative procedures and methods of treatment;   D) the dangers and probable consequences of such alternatives (including no procedure or treatment); E) the expected consequences of the procedure on my future health;   F) whether other qualified individuals would be performing important surgical tasks and / or whether  would be present to advise or support the procedure.      I (we) understand that there are other risks of infection and other serious complications in the pre-operative/procedural and postoperative/procedural stages of my (our) care. I (we) have asked all of the questions which I (we) thought were important in deciding whether or not to undergo treatment or diagnosis. These questions have been answered to my (our) satisfaction. I (we) understand that no assurance can be given that the procedure will be a success, and no guarantee or warranty of success has been given to me (us). 2. It has been explained to me (us) that during the course of the operation/procedure, unforeseen conditions may be revealed that necessitate extension of the original procedure(s) or different procedure(s) than those set forth in Paragraph 1. I (we) authorize and request that the above-named physician, his/her assistants or his/her designees, perform procedures as necessary and desirable if deemed to be in my (our) best interest.     3. I acknowledge that other health care personnel may be observing this procedure for the purpose of medical education or other specified purposes as may be necessary as requested and/or approved by my (our) physician. 4. I (we) consent to the disposal by the hospital Pathologist of the removed tissue, parts or organs in accordance with hospital policy. 5. I do_____ do not______ consent to the use of a local infiltration pain blocking agent that will be used by my provider/surgical provider to help alleviate pain during my procedure. 6. I do_____ do not_____ consent to an emergent blood transfusion in the case of a life-threatening situation that requires blood components to be administered. This consent is valid for 24 hours from the beginning of the procedure. 7. This patient does _____ or does not ______currently have a DNR status/order. If DNR order is in place, obtain \"Addendum to the Surgical Consent for ALL Patients with a DNR Order\" to address temo-operative status for limited intervention or DNR suspension.      8. I have read and fully understand the above Consent for Operation/Procedure and that all blanks were completed before I signed the consent.       ____________________________________ _________   ________/_________am/pm   Signature of Patient or legal representative Printed Name / Relationship Date / Time       ____________________________________ _______ _________/__________ am/pm   Witness to Signature Printed Name Date / Time    (If patient is unable to sign or is a minor, complete the following)     Patient is a minor, ____years of age, or unable to sign because: _____________________________________   _________________________________________________________________________________________   Lalita Ping If a phone consent is obtained, consent will be documented by using two health care professionals, each affirming that the consenting party has no questions and gives consent for the procedure discussed with the physician/provider.  _________________________________ ___________________ ______/_____am/pm   2nd witness to phone consent Printed name Date / Time     Informed consent:   I have provided the explanation described above in section 1 to the patient and/or legal representative.  I have provided the patient and/or legal representative with an opportunity to ask any questions about the proposed operation/procedure.       ______________________________ _____________   _________/__________am/pm   Provider Signature Date / Time     Revised 2021             Patrick Willingham      PROCED[facility] TO SCHEDULE:    [address] [facility] [place] [address]     Patient Name: Arun Díaz   : 1955   MRN# [id number]    Home Phone Number: [phone]   Weight:    Wt Readings from Last 3 Encounters:   22 155 lb 12.8 oz (70.7 kg)   22 157 lb 3.2 oz (71.3 kg)   22 157 lb (71.2 kg)        Insurance: Payor: Mansoor Da Silva / Plan: [facility] [employer] / Product Type: *No Product type* /     Date of Procedure: 22 Time: 9 AM Arrival Time: 7 AM    Diagnosis:  ABN NM  Allergies:    Allergies   Allergen Reactions    Ciprofloxacin Hcl Hives and Swelling    Levaquin [Levofloxacin] Hives and Swelling    Other Nausea And Vomiting     \"Allergic To Tylox\"    Ceftin [Cefuroxime]             Tonja Hendricks RN                     Patient Name: Carl Lara   : 1955   MRN# [id number]        PATIENT SIGNATURE:   __________DATE:         TIME:______________

## 2022-04-29 ENCOUNTER — TELEPHONE (OUTPATIENT)
Dept: CARDIOLOGY CLINIC | Age: 67
End: 2022-04-29

## 2022-04-29 NOTE — TELEPHONE ENCOUNTER
Per pt when he called PMD in reference to them handling the inj.for \"bridging thinners\" They said that we would need to handle that for him.  Pt Is just wanting to make sure that he is prepared and informed for what needs to happen for the \"bridging of thinners after LHC\" which is 5/17/22 so he said not urgent but didn't want anything to fall between the cracks

## 2022-05-02 ENCOUNTER — TELEPHONE (OUTPATIENT)
Dept: FAMILY MEDICINE CLINIC | Age: 67
End: 2022-05-02

## 2022-05-02 NOTE — TELEPHONE ENCOUNTER
I talked with Dr. Duron Money office and they assured me that they will cover pt with Lovenox. They will contact pt right away and said I don't need to call him back.

## 2022-05-02 NOTE — TELEPHONE ENCOUNTER
PT IS HAVING A HEART CATH 5/17  NEEDS TO STOP COUMADIN AS OF 5/12  AND DO THE BRIDGE OF LOVENOX AND RESTART DATE OF COUMADIN. PLEASE CALL PT AND GIVE THE INSTRUCTIONS.

## 2022-05-03 ENCOUNTER — NURSE ONLY (OUTPATIENT)
Dept: FAMILY MEDICINE CLINIC | Age: 67
End: 2022-05-03
Payer: MEDICARE

## 2022-05-03 ENCOUNTER — ANTI-COAG VISIT (OUTPATIENT)
Dept: FAMILY MEDICINE CLINIC | Age: 67
End: 2022-05-03

## 2022-05-03 DIAGNOSIS — Z95.2 S/P AVR: ICD-10-CM

## 2022-05-03 LAB
INTERNATIONAL NORMALIZATION RATIO, POC: 2.3
PROTHROMBIN TIME, POC: ABNORMAL

## 2022-05-03 PROCEDURE — 85610 PROTHROMBIN TIME: CPT | Performed by: FAMILY MEDICINE

## 2022-05-04 DIAGNOSIS — Z95.2 H/O MECHANICAL AORTIC VALVE REPLACEMENT: Primary | ICD-10-CM

## 2022-05-04 RX ORDER — ENOXAPARIN SODIUM 100 MG/ML
1 INJECTION SUBCUTANEOUS EVERY 12 HOURS
Qty: 4.8 ML | Refills: 0 | Status: SHIPPED | OUTPATIENT
Start: 2022-05-04 | End: 2022-05-07

## 2022-05-04 NOTE — TELEPHONE ENCOUNTER
Re:    enoxaparin (LOVENOX) 40 MG/0.4ML     Please call to possibly change the dosing so patient doesn't have to use 2 shots per day. Acosta Amezcua, at [address] [place] [address] will explain.       Karen Arriaza [id number] [facility] [phone]

## 2022-05-04 NOTE — TELEPHONE ENCOUNTER
Shea Dennison or Monica, I already sent these instructions over [employer] so the patient has a copy of them. Please call the patient and make sure he understands these instructions. The pharmacist can demonstrate how to do Lovenox injections when he picks up the medication. If he is not comfortable performing the first injection by himself, though, he can schedule nursing visit for 5/13/22, and we can show him how to do these. He will need nursing visits on Monday 5/16/22 for INR and Friday 5/20/22 for INR checks. .. PATIENT INSTRUCTIONS:  Carlos Yeh like you are scheduled for a heart cath with Dr. Finn Leblanc on 5/17/22. You are high risk for blood clots due to your mechanical heart valve, so your anticoagulation will need to be \"bridged. \" That means that we will have you take self-administered Lovenox shots twice daily while you're off the warfarin to prevent a blood clot in the days before your surgery. Here is the plan. I will have my nurse call you and explain this and answer any questions you may have, but I want you to have it in writing so that you can refer back to it if needed:    Bridging Anticoagulation  Hold Warfarin 5 days prior to the surgery. .. so starting on Thursday 5/12/22 do NOT take any Warfarin)  Start Lovenox 3 days prior to the surgery. .. so starting on Saturday 5/14/22, take Lovenox 70 mg twice daily (every 12 hours)  Check INR the day before surgery. .. on Monday 5/16/22  Hold Lovenox 24 hours prior to surgery (omit the evening dose on the night before surgery). .. so take the morning dose of Lovenox on Monday 5/16/22, but do NOT take the evening dose of Lovenox on Monday 5/16/22   Restart Warfarin 24 hours after surgery (the evening the day after surgery). .. so take Warfarin Wednesday evening 5/18/22. .. You will return to your dose of 10 mg daily. Check INR within first few days of restarting Warfarin. .. on Friday 5/20/22     The pharmacist can demonstrate how to preform the Lovenox injections, but you do not feel confident doing the first injection by yourself, you can schedule a nursing visit on Friday 5/13/22, and we can show you how to administer these. Please let us know if you have any questions. Hope your procedure goes well!   Carole Boles

## 2022-05-04 NOTE — TELEPHONE ENCOUNTER
It says \"Lisa at [employer] will explain. \" Please call and find out what the problem is.     (I DO want to stick to BID dosing because the half life of Lovenox is 12 hours max, and this patient is high risk for clotting).

## 2022-05-05 NOTE — TELEPHONE ENCOUNTER
80 mg/0.8 mL and 40 mg/0.4 mL are the same concentration, so either is fine, whatever is easier for the patient. It would be 0.7 mL every 12 hours for either one, that is correct. Thanks!

## 2022-05-05 NOTE — TELEPHONE ENCOUNTER
Spoke with the patient per David's note. Patient agreed and voiced understanding. Helped the patient reset his [employer] password at the time of the call and informed him these instructions were sent through his [employer] account as well to reference if needed. Asked the patient to contact the office with any questions or concerns. Patient voiced understanding.

## 2022-05-05 NOTE — TELEPHONE ENCOUNTER
Can you call the [facility] house back and let them know we gave the patient thorough instructions for bridging, sent in his Lovenox, and scheduled his INRs? I think they've called more than once about this.

## 2022-05-05 NOTE — TELEPHONE ENCOUNTER
Called the pharmacy and spoke with Magnolia Jefferson, she stated they also have the dose of 80mg/0.8mL and didn't know if you wanted the patient on this dose. She stated these are single use syringes. Please note script written is for 40mg/0.4mL and sig says to inject 0.7mLs into the skin every 12 hours. Also message to the patient stated inject 70mg into the skin BID. Please clarify what dose the patient should be on.

## 2022-05-05 NOTE — TELEPHONE ENCOUNTER
Spoke with the pharmacist and gave verbal to change dose dispensed to the 80/0.8mL and patient to inject 0.7mLs BID. Pharmacist voiced understanding.

## 2022-05-10 ENCOUNTER — NURSE ONLY (OUTPATIENT)
Dept: FAMILY MEDICINE CLINIC | Age: 67
End: 2022-05-10
Payer: MEDICARE

## 2022-05-10 DIAGNOSIS — I50.22 CHRONIC SYSTOLIC HEART FAILURE (HCC): Chronic | ICD-10-CM

## 2022-05-10 LAB — INTERNATIONAL NORMALIZATION RATIO, POC: 1.4

## 2022-05-10 PROCEDURE — 85610 PROTHROMBIN TIME: CPT | Performed by: FAMILY MEDICINE

## 2022-05-10 NOTE — PROGRESS NOTES
Current dose 10mg qd. Change to 15 mg one day and 10 mg the next before procedure. Restart on the 18th Per David's instructions given to Patient.

## 2022-05-13 ENCOUNTER — HOSPITAL ENCOUNTER (OUTPATIENT)
Age: 67
Discharge: HOME | End: 2022-05-13
Payer: MEDICARE

## 2022-05-13 ENCOUNTER — NURSE ONLY (OUTPATIENT)
Dept: CARDIOLOGY CLINIC | Age: 67
End: 2022-05-13
Payer: MEDICARE

## 2022-05-13 VITALS — DIASTOLIC BLOOD PRESSURE: 78 MMHG | SYSTOLIC BLOOD PRESSURE: 124 MMHG

## 2022-05-13 DIAGNOSIS — Z01.810 PRE-OPERATIVE CARDIOVASCULAR EXAMINATION: ICD-10-CM

## 2022-05-13 LAB
ANION GAP SERPL CALCULATED.3IONS-SCNC: 12 MMOL/L (ref 4–16)
APTT: 31.8 SECONDS (ref 25.1–37.1)
BASOPHILS ABSOLUTE: 0 K/CU MM
BASOPHILS RELATIVE PERCENT: 0.3 % (ref 0–1)
BUN BLDV-MCNC: 13 MG/DL (ref 6–23)
CALCIUM SERPL-MCNC: 8.7 MG/DL (ref 8.3–10.6)
CHLORIDE BLD-SCNC: 99 MMOL/L (ref 99–110)
CO2: 25 MMOL/L (ref 21–32)
CREAT SERPL-MCNC: 0.9 MG/DL (ref 0.9–1.3)
DIFFERENTIAL TYPE: ABNORMAL
EOSINOPHILS ABSOLUTE: 0.1 K/CU MM
EOSINOPHILS RELATIVE PERCENT: 0.7 % (ref 0–3)
GFR AFRICAN AMERICAN: >60 ML/MIN/1.73M2
GFR NON-AFRICAN AMERICAN: >60 ML/MIN/1.73M2
GLUCOSE BLD-MCNC: 107 MG/DL (ref 70–99)
HCT VFR BLD CALC: 36.5 % (ref 42–52)
HEMOGLOBIN: 11.7 GM/DL (ref 13.5–18)
IMMATURE NEUTROPHIL %: 0.3 % (ref 0–0.43)
INR BLD: 1.67 INDEX
LYMPHOCYTES ABSOLUTE: 1.3 K/CU MM
LYMPHOCYTES RELATIVE PERCENT: 14.6 % (ref 24–44)
MCH RBC QN AUTO: 30.6 PG (ref 27–31)
MCHC RBC AUTO-ENTMCNC: 32.1 % (ref 32–36)
MCV RBC AUTO: 95.5 FL (ref 78–100)
MONOCYTES ABSOLUTE: 0.9 K/CU MM
MONOCYTES RELATIVE PERCENT: 9.6 % (ref 0–4)
NUCLEATED RBC %: 0 %
PDW BLD-RTO: 16.1 % (ref 11.7–14.9)
PLATELET # BLD: 320 K/CU MM (ref 140–440)
PMV BLD AUTO: 9.1 FL (ref 7.5–11.1)
POTASSIUM SERPL-SCNC: 4.4 MMOL/L (ref 3.5–5.1)
PROTHROMBIN TIME: 21.6 SECONDS (ref 11.7–14.5)
RBC # BLD: 3.82 M/CU MM (ref 4.6–6.2)
SEGMENTED NEUTROPHILS ABSOLUTE COUNT: 6.6 K/CU MM
SEGMENTED NEUTROPHILS RELATIVE PERCENT: 74.5 % (ref 36–66)
SODIUM BLD-SCNC: 136 MMOL/L (ref 135–145)
TOTAL IMMATURE NEUTOROPHIL: 0.03 K/CU MM
TOTAL NUCLEATED RBC: 0 K/CU MM
WBC # BLD: 8.8 K/CU MM (ref 4–10.5)

## 2022-05-13 PROCEDURE — 99211 OFF/OP EST MAY X REQ PHY/QHP: CPT | Performed by: INTERNAL MEDICINE

## 2022-05-13 PROCEDURE — 36415 COLL VENOUS BLD VENIPUNCTURE: CPT

## 2022-05-13 PROCEDURE — 85025 COMPLETE CBC W/AUTO DIFF WBC: CPT

## 2022-05-13 PROCEDURE — 85610 PROTHROMBIN TIME: CPT

## 2022-05-13 PROCEDURE — 85730 THROMBOPLASTIN TIME PARTIAL: CPT

## 2022-05-13 PROCEDURE — 80048 BASIC METABOLIC PNL TOTAL CA: CPT

## 2022-05-13 NOTE — PROGRESS NOTES
Patient was here in office & educated on [facility] for Dx: [place] Rings. Procedure is scheduled for 5/17/22 @ 9:00, w/arrival @ 7:00, @ [facility]. Pre-admission orders were given to patient for labs, which are due 5/13/22 @ [facility]. Procedure and risks were explained to patient. Consent forms were signed. Instructions were given to patient to remain NPO after midnight the night before procedure. Patient may take morning meds the morning of procedure with small amount of water. Patient is asked to call hospital @ [phone], 1 to 2 days before procedure to pre-register. Patient was notified that procedure could be delayed due to an emergency. Patient voiced understanding.  Copies of consent, pre-testing orders, & information sheet scanned into media

## 2022-05-16 ENCOUNTER — NURSE ONLY (OUTPATIENT)
Dept: FAMILY MEDICINE CLINIC | Age: 67
End: 2022-05-16
Payer: MEDICARE

## 2022-05-16 DIAGNOSIS — Z95.2 S/P AVR: ICD-10-CM

## 2022-05-16 LAB
INTERNATIONAL NORMALIZATION RATIO, POC: 1.1
PROTHROMBIN TIME, POC: ABNORMAL

## 2022-05-16 PROCEDURE — 85610 PROTHROMBIN TIME: CPT | Performed by: FAMILY MEDICINE

## 2022-05-16 NOTE — PROGRESS NOTES
Patient is currently on Lovenox due to having a procedure on 5/18/22. Current INR is 1.1 per Dr. Cole Greer patient is to recheck INR 5-7 days after restarting warfarin.

## 2022-05-17 ENCOUNTER — HOSPITAL ENCOUNTER (OUTPATIENT)
Dept: CARDIAC CATH/INVASIVE PROCEDURES | Age: 67
Discharge: HOME | End: 2022-05-17
Attending: INTERNAL MEDICINE | Admitting: INTERNAL MEDICINE
Payer: MEDICARE

## 2022-05-17 VITALS
BODY MASS INDEX: 25.83 KG/M2 | OXYGEN SATURATION: 98 % | DIASTOLIC BLOOD PRESSURE: 66 MMHG | HEART RATE: 77 BPM | RESPIRATION RATE: 16 BRPM | SYSTOLIC BLOOD PRESSURE: 125 MMHG | TEMPERATURE: 96.4 F | HEIGHT: 65 IN | WEIGHT: 155 LBS

## 2022-05-17 PROCEDURE — C1894 INTRO/SHEATH, NON-LASER: HCPCS

## 2022-05-17 PROCEDURE — 2580000003 HC RX 258: Performed by: INTERNAL MEDICINE

## 2022-05-17 PROCEDURE — 6360000004 HC RX CONTRAST MEDICATION

## 2022-05-17 PROCEDURE — 93454 CORONARY ARTERY ANGIO S&I: CPT | Performed by: INTERNAL MEDICINE

## 2022-05-17 PROCEDURE — 6360000002 HC RX W HCPCS

## 2022-05-17 PROCEDURE — 93454 CORONARY ARTERY ANGIO S&I: CPT

## 2022-05-17 PROCEDURE — 6370000000 HC RX 637 (ALT 250 FOR IP): Performed by: INTERNAL MEDICINE

## 2022-05-17 PROCEDURE — 2709999900 HC NON-CHARGEABLE SUPPLY

## 2022-05-17 PROCEDURE — C1769 GUIDE WIRE: HCPCS

## 2022-05-17 RX ORDER — DIAZEPAM 5 MG/1
5 TABLET ORAL ONCE
Status: COMPLETED | OUTPATIENT
Start: 2022-05-17 | End: 2022-05-17

## 2022-05-17 RX ORDER — SODIUM CHLORIDE 9 MG/ML
INJECTION, SOLUTION INTRAVENOUS CONTINUOUS
Status: DISCONTINUED | OUTPATIENT
Start: 2022-05-17 | End: 2022-05-17 | Stop reason: HOSPADM

## 2022-05-17 RX ORDER — DIPHENHYDRAMINE HCL 25 MG
25 TABLET ORAL ONCE
Status: COMPLETED | OUTPATIENT
Start: 2022-05-17 | End: 2022-05-17

## 2022-05-17 RX ADMIN — DIPHENHYDRAMINE HYDROCHLORIDE 25 MG: 25 TABLET ORAL at 07:14

## 2022-05-17 RX ADMIN — DIAZEPAM 5 MG: 5 TABLET ORAL at 07:14

## 2022-05-17 RX ADMIN — SODIUM CHLORIDE: 9 INJECTION, SOLUTION INTRAVENOUS at 07:14

## 2022-05-17 NOTE — H&P
Chief complaints: patient is here for management of prosthetic AVR, DM, HTN,DEPRESSED LVEF 45%, DYSLPIDEMIA, BRONCHIATAIS AND ALE TB, PRE dm     F.u after hospital discharge, he was light headed found to be pre DM, STRESS TEST IS ABNORMAL, LVEF IS LOW ALSO     Subjective: patient feels better, no chest pain, no shortness of breath, no dizziness, no palpitations     HPI Mildred Castleman is a 73 y. o.year old who  has a past medical history of Acid reflux, Acute frontal sinusitis, Alcohol abuse, Anesthesia, Anxiety, Arthritis, Atypical mycobacterial infection, Broken teeth, CHF (congestive heart failure) ([facility]), COPD (chronic obstructive pulmonary disease) ([facility] [address] Cough, Depression, Depression, Dyspnea, H/O cardiovascular MUGA stress test, H/O echocardiogram, H/O echocardiogram, History of 24 hour EKG monitoring, [place] (hard of hearing), Hyperlipidemia, Hypertension, Irritant contact dermatitis due to other chemical products, Mycobacterium avium complex ([facility] [address] Other drug allergy(995.27), S/P AVR, Shortness of breath, and Teeth missing. and presents for management of prosthetic AVR, DM, HTN,DEPRESSED LVEF 45%, DYSLPIDEMIA, BRONCHIATAIS AND ALE TB which are well controlled        Current Facility-Administered Medications          Current Outpatient Medications   Medication Sig Dispense Refill    Calcium Carbonate-Vitamin D (CALCIUM-VITAMIN D3 PO) Take by mouth        albuterol (PROVENTIL) (2.5 MG/3ML) 0.083% nebulizer solution Take 3 mLs by nebulization every 6 hours as needed for Wheezing or Shortness of Breath 125 each 0    sodium chloride, Inhalant, 3 % nebulizer solution Take by nebulization as needed for Other (COPD/shortness of breath) 125 mL 0    ferrous sulfate (IRON 325) 325 (65 Fe) MG tablet Take 1 tablet by mouth daily (with breakfast) 90 tablet 1    CALCIUM PO Take by mouth        ammonium lactate (LAC-HYDRIN) 12 % lotion Apply topically daily.  225 g 1    triamcinolone (KENALOG) 0.1 % cream Apply topically 2 Green Sputum    Depression      Depression      Dyspnea 02/23/2018    H/O cardiovascular MUGA stress test 05/14/2019     EF 50%, NORMAL LVEF, NORMAL WALL MOTION.    H/O echocardiogram 05/22/2014     RL=>75-26%, LV systolic function is low normal, mild aortic valve calcification, no pericardial effusion    H/O echocardiogram 12/12/2018     EF 45-50%    History of 24 hour EKG monitoring 06/06/2014     24 hr holter monitor. Ventricular ectopics were noted in single and couplet forms. Supraventricular ectopics were noted in single and pair forms.     [place] (hard of hearing)       Bilateral Ears    Hyperlipidemia      Hypertension      Irritant contact dermatitis due to other chemical products 08/26/2019    Mycobacterium avium complex ([address]      Other drug allergy(995.27) 07/22/2009    S/P AVR 12/2003     Mechanical valvue     Shortness of breath      Teeth missing       Upper And Lower         Past Surgical History         Past Surgical History:   Procedure Laterality Date    BRONCHOSCOPY   1996     \"Done Twice\"    CARDIAC VALVE REPLACEMENT   12/17/2003     \"Aortic Valve Replacement, Mechanical Valve\"    COLONOSCOPY   2006     Polyps Removed    DENTAL SURGERY         Teeth Extracted In Past    ENDOSCOPY, COLON, DIAGNOSTIC   In Past    HEMIARTHROPLASTY SHOULDER FRACTURE Right 1/29/2019     SHOULDER HEMIARTHROPLASTY performed by Osmin Lu DO at [facility] ARTHROSCOPY Left 1992    LUNG BIOPSY Right 1996    OTHER SURGICAL HISTORY   1992     \"Cauterized Because My Sperm Was Low\"    ROTATOR CUFF REPAIR Right 2008    TONSILLECTOMY   1959 Or 1960    TOTAL HIP ARTHROPLASTY Right 10/25/2021     RIGHT HIP TOTAL ARTHROPLASTY performed by Osmin Lu DO at [address] N/A 11/10/2021     EGD BIOPSY performed by Sal Adhikari MD at [facility] ENDOSCOPY         Family History         Family History   Problem Relation Age of Onset    Cancer Mother           Lymphoma  Diabetes Mother      High Blood Pressure Mother      High Cholesterol Mother      Obesity Mother      Vision Loss Mother           Wore Glasses    Heart Disease Father           \"Heart Failure\"    COPD Father      Asthma Sister      High Blood Pressure Sister      High Cholesterol Sister      Other Sister           pre diabetic    COPD Sister      Diabetes Sister           \"Pre Diabetes\"    No Known Problems Son           Social History            Tobacco Use    Smoking status: Never Smoker    Smokeless tobacco: Current User       Types: Snuff, Chew   Substance Use Topics    Alcohol use: Yes       Alcohol/week: 8.0 - 10.0 standard drinks       Types: 8 - 10 Cans of beer per week       Comment: 5-6 can beer daily      [unfilled]  Review of Systems:   · Constitutional: No Fever or Weight Loss   · Eyes: No Decreased Vision  · ENT: No Headaches, Hearing Loss or Vertigo  · Cardiovascular: No chest pain, dyspnea on exertion, palpitations or loss of consciousness  · Respiratory: No cough or wheezing    · Gastrointestinal: No abdominal pain, appetite loss, blood in stools, constipation, diarrhea or heartburn  · Genitourinary: No dysuria, trouble voiding, or hematuria  · Musculoskeletal:  No gait disturbance, weakness or joint complaints  · Integumentary: No rash or pruritis  · Neurological: No TIA or stroke symptoms  · Psychiatric: No anxiety or depression  · Endocrine: No malaise, fatigue or temperature intolerance  · Hematologic/Lymphatic: No bleeding problems, blood clots or swollen lymph nodes  · Allergic/Immunologic: No nasal congestion or hives  All systems negative except as marked. Objective:  /78   Pulse 90   Ht 5' 5\" (1.651 m)   Wt 155 lb 12.8 oz (70.7 kg)   SpO2 98%   BMI 25.93 kg/m²       Wt Readings from Last 3 Encounters:   04/28/22 155 lb 12.8 oz (70.7 kg)   04/04/22 157 lb 3.2 oz (71.3 kg)   03/30/22 157 lb (71.2 kg)      Body mass index is 25.93 kg/m².   GENERAL - Alert, oriented, pleasant, in no apparent distress,normal grooming  HEENT - pupils are intact, cornea intact, conjunctive normal color, ears are normal in exam,  Neck - Supple. No jugular venous distention noted. No carotid bruits, no apical -carotid delay  Respiratory:  Normal breath sounds, No respiratory distress, No wheezing, No chest tenderness. ,no use of accessory muscles, diaphragm movement is normal  Cardiovascular: (PMI) apex non displaced,no lifts no thrills, no s3,no s4, Normal heart rate, Normal rhythm, No murmurs, No rubs, No gallops. Carotid arteries pulse and amplitude are normal no bruit, no abdominal bruit noted ( normal abdominal aorta ausculation),   Extremities - No cyanosis, clubbing, or significant edema, no varicose veins    Abdomen - No masses, tenderness, or organomegaly, no hepato-splenomegally, no bruits  Musculoskeletal - No significant edema, no kyphosis or scoliosis, no deformity in any extremity noted, muscle strength and tone are normal  Skin: no ulcer,no scar,no stasis dermatitis   Neurologic - alert oriented times 3,Cranial nerves II through XII are grossly intact. There were no gross focal neurologic abnormalities. Psychiatric: normal mood and affect     No results found for: CKTOTAL, CKMB, CKMBINDEX, TROPONINI  BNP:  No results found for: BNP  PT/INR:  No results found for: PTINR        Lab Results   Component Value Date     LABA1C 5.0 11/10/2021     LABA1C 5.5 07/01/2020            Lab Results   Component Value Date     CHOL 126 07/01/2020     TRIG 57 07/01/2020     HDL 45 06/11/2021     LDLCALC 133 (H) 06/11/2021     LDLDIRECT 69 07/01/2020            Lab Results   Component Value Date     ALT 15 03/30/2022     AST 28 03/30/2022      TSH:          Lab Results   Component Value Date     TSH 1.57 06/11/2021         Impression:  Barrie Poe is a 73 y. o.year old who  has a past medical history of Acid reflux, Acute frontal sinusitis, Alcohol abuse, Anesthesia, Anxiety, Arthritis, Atypical mycobacterial infection, Broken teeth, CHF (congestive heart failure) ([facility] [address] COPD (chronic obstructive pulmonary disease) (HCC), Cough, Depression, Depression, Dyspnea, H/O cardiovascular MUGA stress test, H/O echocardiogram, H/O echocardiogram, History of 24 hour EKG monitoring, [place] (hard of hearing), Hyperlipidemia, Hypertension, Irritant contact dermatitis due to other chemical products, Mycobacterium avium complex ([facility] [address] Other drug allergy(995.27), S/P AVR, Shortness of breath, and Teeth missing. and presents with      Plan:  1. muga SHOWED NORMAL LVEF, his echo LVEF was 40-45%,will not ace inhibitor HIS LVEF WAS 50 % IN ECHO LAST YR, AND STRESS TEST SHOWED APICAL INFARCTION WITH LVEF 30%. [facility] scheduled,risk and benefit explained and consent obtained  2. HTN: STABLE  3. Metallic aortic valve: continue coumadin  4. Dyslipidemia: continue statins  5.  Bronchiectasis and ? myobacterium Avium TB: had lung biopsy in 1996, CONTINUE CARE AS PER PULMONARY  1.

## 2022-05-17 NOTE — PROGRESS NOTES
Pt ambulated to br and voided returned to room r radial drsg dry and intact no hematoma.   Dc instructions reviewed with pt and family verbalizes understanding

## 2022-05-23 DIAGNOSIS — Z79.01 ANTICOAGULANT LONG-TERM USE: ICD-10-CM

## 2022-05-23 RX ORDER — VENLAFAXINE HYDROCHLORIDE 150 MG/1
CAPSULE, EXTENDED RELEASE ORAL
Qty: 90 CAPSULE | Refills: 1 | Status: SHIPPED | OUTPATIENT
Start: 2022-05-23 | End: 2022-07-11

## 2022-05-23 RX ORDER — VENLAFAXINE HYDROCHLORIDE 150 MG/1
150 CAPSULE, EXTENDED RELEASE ORAL DAILY
Qty: 30 CAPSULE | Refills: 5 | Status: SHIPPED | OUTPATIENT
Start: 2022-05-23

## 2022-05-23 RX ORDER — WARFARIN SODIUM 5 MG/1
TABLET ORAL
Qty: 60 TABLET | Refills: 2 | Status: SHIPPED | OUTPATIENT
Start: 2022-05-23 | End: 2022-09-13

## 2022-05-26 ENCOUNTER — OFFICE VISIT (OUTPATIENT)
Dept: CARDIOLOGY CLINIC | Age: 67
End: 2022-05-26
Payer: MEDICARE

## 2022-05-26 VITALS
SYSTOLIC BLOOD PRESSURE: 136 MMHG | WEIGHT: 156 LBS | DIASTOLIC BLOOD PRESSURE: 82 MMHG | HEART RATE: 60 BPM | HEIGHT: 65 IN | BODY MASS INDEX: 25.99 KG/M2 | OXYGEN SATURATION: 97 %

## 2022-05-26 DIAGNOSIS — R00.1 BRADYCARDIA: Primary | ICD-10-CM

## 2022-05-26 PROCEDURE — 4004F PT TOBACCO SCREEN RCVD TLK: CPT | Performed by: INTERNAL MEDICINE

## 2022-05-26 PROCEDURE — G8417 CALC BMI ABV UP PARAM F/U: HCPCS | Performed by: INTERNAL MEDICINE

## 2022-05-26 PROCEDURE — 99214 OFFICE O/P EST MOD 30 MIN: CPT | Performed by: INTERNAL MEDICINE

## 2022-05-26 PROCEDURE — 3017F COLORECTAL CA SCREEN DOC REV: CPT | Performed by: INTERNAL MEDICINE

## 2022-05-26 PROCEDURE — 1124F ACP DISCUSS-NO DSCNMKR DOCD: CPT | Performed by: INTERNAL MEDICINE

## 2022-05-26 PROCEDURE — G8427 DOCREV CUR MEDS BY ELIG CLIN: HCPCS | Performed by: INTERNAL MEDICINE

## 2022-05-26 NOTE — PROGRESS NOTES
Shawnee Cantu MD        OFFICE  FOLLOWUP NOTE    Chief complaints: patient is here for management of CAD . prosthetic AVR, DM, HTN,DEPRESSED LVEF 45%, DYSLPIDEMIA, BRONCHIATAIS AND ALE TB, PRE dm    F/u after LHC, mild CAD on LAD,    Subjective: was dizziness yesterday, no palpitations    HPI Arianne Leonardo is a 73 y. o.year old who  has a past medical history of Acid reflux, Acute frontal sinusitis, Alcohol abuse, Anesthesia, Anxiety, Arthritis, Atypical mycobacterial infection, Broken teeth, CHF (congestive heart failure) ([facility]), COPD (chronic obstructive pulmonary disease) ([address] Cough, Depression, Depression, Dyspnea, H/O cardiovascular MUGA stress test, H/O echocardiogram, H/O echocardiogram, History of 24 hour EKG monitoring, [place] (hard of hearing), Hyperlipidemia, Hypertension, Irritant contact dermatitis due to other chemical products, Mycobacterium avium complex ([address] Other drug allergy(995.27), S/P AVR, Shortness of breath, and Teeth missing.  and presents for management of Cprosthetic AVR, DM, HTN,DEPRESSED LVEF 45%, DYSLPIDEMIA, BRONCHIATAIS AND ALE TB, PRE dm, which are well controlled      Current Outpatient Medications   Medication Sig Dispense Refill    warfarin (COUMADIN) 5 MG tablet take 1-2 tablets by mouth daily as directed 60 tablet 2    venlafaxine (EFFEXOR XR) 150 MG extended release capsule Take 1 capsule by mouth daily 30 capsule 5    venlafaxine (EFFEXOR XR) 150 MG extended release capsule take 1 capsule by mouth once daily 90 capsule 1    lisinopril (PRINIVIL;ZESTRIL) 2.5 MG tablet Take 0.5 tablets by mouth daily 30 tablet 3    Calcium Carbonate-Vitamin D (CALCIUM-VITAMIN D3 PO) Take by mouth      albuterol (PROVENTIL) (2.5 MG/3ML) 0.083% nebulizer solution Take 3 mLs by nebulization every 6 hours as needed for Wheezing or Shortness of Breath 125 each 0    sodium chloride, Inhalant, 3 % nebulizer solution Take by nebulization as needed for Other (COPD/shortness of breath) 125 mL 0    ferrous sulfate (IRON 325) 325 (65 Fe) MG tablet Take 1 tablet by mouth daily (with breakfast) 90 tablet 1    CALCIUM PO Take by mouth      triamcinolone (KENALOG) 0.1 % cream Apply topically 2 times daily. 453.6 g 1    simvastatin (ZOCOR) 40 MG tablet take 1 tablet by mouth at bedtime 90 tablet 1    melatonin 10 MG CAPS capsule Take 10 mg by mouth nightly      guaiFENesin (MUCINEX) 600 MG extended release tablet Take 1,200 mg by mouth every 12 hours      ferrous sulfate (FE TABS) 325 (65 Fe) MG EC tablet Take 1 tablet by mouth 2 times daily 90 tablet 3    pantoprazole (PROTONIX) 40 MG tablet 40 mg PO BID for 30 days then 40 mg PO daily to continue 180 tablet 1    ipratropium-albuterol (DUONEB) 0.5-2.5 (3) MG/3ML SOLN nebulizer solution Inhale 3 mLs into the lungs 4 times daily 360 mL 5    KRILL OIL OMEGA-3 PO Take by mouth      cetirizine (ZYRTEC) 10 MG tablet Take 10 mg by mouth \"Alternate With Singulair\"      NASAL SPRAY SALINE NA by Nasal route daily Over The Counter      Acetaminophen (TYLENOL 8 HOUR PO) Take 500 mg by mouth as needed Over The Counter      Multiple Vitamins-Minerals (CENTRUM PO) Take by mouth daily      Nebulizer MISC Inhale into the lungs as needed       No current facility-administered medications for this visit.      Allergies: Ciprofloxacin hcl, Levaquin [levofloxacin], Other, and Ceftin [cefuroxime]  Past Medical History:   Diagnosis Date    Acid reflux     Acute frontal sinusitis 07/22/2009    Alcohol abuse     \"I Drink Average 2 Beers A Day\"    Anesthesia     \" I get agitated\"    Anxiety     Arthritis     \"Right Shoulder, Neck, Left Knee\"    Atypical mycobacterial infection     Broken teeth     Upper And Lower     [facility]     COPD (chronic obstructive pulmonary disease) ([facility])     [place] Dr. Jie Skinner [place], [address] [facility]     Cough     Frequent Cough With Green Sputum    Depression     Depression     Dyspnea 02/23/2018    H/O cardiovascular MUGA stress test 05/14/2019    EF 50%, NORMAL LVEF, NORMAL WALL MOTION.    H/O echocardiogram 05/22/2014    OI=>74-19%, LV systolic function is low normal, mild aortic valve calcification, no pericardial effusion    H/O echocardiogram 12/12/2018    EF 45-50%    History of 24 hour EKG monitoring 06/06/2014    24 hr holter monitor. Ventricular ectopics were noted in single and couplet forms. Supraventricular ectopics were noted in single and pair forms.     [place] (hard of hearing)     Bilateral Ears    Hyperlipidemia     Hypertension     Irritant contact dermatitis due to other chemical products 08/26/2019    Mycobacterium avium complex ([facility] [address]     Other drug allergy(995.27) 07/22/2009    S/P AVR 12/2003    Mechanical valvue     Shortness of breath     Teeth missing     Upper And Lower     Past Surgical History:   Procedure Laterality Date    BRONCHOSCOPY  1996    \"Done Twice\"    CARDIAC VALVE REPLACEMENT  12/17/2003    \"Aortic Valve Replacement, Mechanical Valve\"    COLONOSCOPY  2006    Polyps Removed    DENTAL SURGERY      Teeth Extracted In Past    ENDOSCOPY, COLON, DIAGNOSTIC  In Past    HEMIARTHROPLASTY SHOULDER FRACTURE Right 1/29/2019    SHOULDER HEMIARTHROPLASTY performed by Monique Corbett DO at [address] [facility] ARTHROSCOPY Left 1992    LUNG BIOPSY Right 1996   [address] [facility],  [address]    \"Cauterized Because My Sperm Was Low\"    ROTATOR CUFF REPAIR Right 2008    TONSILLECTOMY  1959 Or 1960    TOTAL HIP ARTHROPLASTY Right 10/25/2021    RIGHT HIP TOTAL ARTHROPLASTY performed by Monique Corbett DO at [address] [facility] N/A 11/10/2021    EGD BIOPSY performed by Chris Malik MD at [facility] ENDOSCOPY     Family History   Problem Relation Age of Onset    Cancer Mother         Lymphoma    Diabetes Mother     High Blood Pressure Mother     High Cholesterol Mother     Obesity Mother     Vision Loss Mother         Wore Glasses  Heart Disease Father         \"Heart Failure\"   [place] COPD Father     Asthma Sister     High Blood Pressure Sister     High Cholesterol Sister     Other Sister         pre diabetic    COPD Sister     Diabetes Sister         \"Pre Diabetes\"    No Known Problems Son      Social History     Tobacco Use    Smoking status: Never Smoker    Smokeless tobacco: Current User     Types: Snuff, Chew   Substance Use Topics    Alcohol use: Yes     Alcohol/week: 8.0 - 10.0 standard drinks     Types: 8 - 10 Cans of beer per week     Comment: 5-6 can beer daily      [unfilled]  Review of Systems:   · Constitutional: No Fever or Weight Loss   · Eyes: No Decreased Vision  · ENT: No Headaches, Hearing Loss or Vertigo  · Cardiovascular: No chest pain, dyspnea on exertion, palpitations or loss of consciousness  · Respiratory: No cough or wheezing    · Gastrointestinal: No abdominal pain, appetite loss, blood in stools, constipation, diarrhea or heartburn  · Genitourinary: No dysuria, trouble voiding, or hematuria  · Musculoskeletal:  No gait disturbance, weakness or joint complaints  · Integumentary: No rash or pruritis  · Neurological: No TIA or stroke symptoms  · Psychiatric: No anxiety or depression  · Endocrine: No malaise, fatigue or temperature intolerance  · Hematologic/Lymphatic: No bleeding problems, blood clots or swollen lymph nodes  · Allergic/Immunologic: No nasal congestion or hives  All systems negative except as marked. Objective:  /82   Pulse 60   Ht 5' 5\" (1.651 m)   Wt 156 lb (70.8 kg)   SpO2 97%   BMI 25.96 kg/m²   Wt Readings from Last 3 Encounters:   05/26/22 156 lb (70.8 kg)   05/18/22 155 lb (70.3 kg)   05/17/22 155 lb (70.3 kg)     Body mass index is 25.96 kg/m². GENERAL - Alert, oriented, pleasant, in no apparent distress,normal grooming  HEENT - pupils are intact, cornea intact, conjunctive normal color, ears are normal in exam,  Neck - Supple. No jugular venous distention noted.  No carotid bruits, no apical -carotid delay  Respiratory:  Normal breath sounds, No respiratory distress, No wheezing, No chest tenderness. ,no use of accessory muscles, diaphragm movement is normal  Cardiovascular: (PMI) apex non displaced,no lifts no thrills, no s3,no s4, Normal heart rate, Normal rhythm, No murmurs, No rubs, No gallops. Carotid arteries pulse and amplitude are normal no bruit, no abdominal bruit noted ( normal abdominal aorta ausculation),   Extremities - No cyanosis, clubbing, or significant edema, no varicose veins    Abdomen - No masses, tenderness, or organomegaly, no hepato-splenomegally, no bruits  Musculoskeletal - No significant edema, no kyphosis or scoliosis, no deformity in any extremity noted, muscle strength and tone are normal  Skin: no ulcer,no scar,no stasis dermatitis   Neurologic - alert oriented times 3,Cranial nerves II through XII are grossly intact. There were no gross focal neurologic abnormalities. Psychiatric: normal mood and affect    No results found for: CKTOTAL, CKMB, CKMBINDEX, TROPONINI  BNP:  No results found for: BNP  PT/INR:  No results found for: PTINR  Lab Results   Component Value Date    LABA1C 5.0 11/10/2021    LABA1C 5.5 07/01/2020     Lab Results   Component Value Date    CHOL 126 07/01/2020    TRIG 57 07/01/2020    HDL 45 06/11/2021    LDLCALC 133 (H) 06/11/2021    LDLDIRECT 69 07/01/2020     Lab Results   Component Value Date    ALT 15 03/30/2022    AST 28 03/30/2022     TSH:    Lab Results   Component Value Date    TSH 1.57 06/11/2021       Impression:  Carmen Lopez is a 73 y. o.year old who  has a past medical history of Acid reflux, Acute frontal sinusitis, Alcohol abuse, Anesthesia, Anxiety, Arthritis, Atypical mycobacterial infection, Broken teeth, CHF [facility]congestive heart failure) ([facility]), COPD (chronic obstructive pulmonary disease) ([facility] [address] Cough, Depression, Depression, Dyspnea, H/O cardiovascular MUGA stress test, H/O echocardiogram, H/O echocardiogram, History of 24 hour EKG monitoring, [place] (hard of hearing), Hyperlipidemia, Hypertension, Irritant contact dermatitis due to other chemical products, Mycobacterium avium complex ([address] Other drug allergy(995.27), S/P AVR, Shortness of breath, and Teeth missing. and presents with     Plan:  1. muga SHOWED NORMAL LVEF, his echo LVEF was 40-45%,will not ace inhibitor HIS LVEF WAS 50 % IN ECHO LAST YR, AND STRESS TEST SHOWED APICAL INFARCTION WITH LVEF 30%. LHC\ Showed mid mid LAD CAD with calcified segment, will medically treat  2. Mild CAD of mild LAD with calcification: continue statin, add baby aspirin, cannot use BB due to bradycardia, check lipids  3. Dizziness: has low heart rate, today he felt it was 40 bpm, will get 24 hrs holter monitor  4. HTN: STABLE  5. Metallic aortic valve: continue coumadin  6. Dyslipidemia: continue statins, check lipids  7. Bronchiectasis and ? myobacterium Avium TB: had lung biopsy in 1996, CONTINUE CARE AS PER PULMONARY  All labs, medications and tests reviewed, continue all other medications of all above medical condition listed as is.     @[facility]@

## 2022-05-27 ENCOUNTER — NURSE ONLY (OUTPATIENT)
Dept: FAMILY MEDICINE CLINIC | Age: 67
End: 2022-05-27
Payer: MEDICARE

## 2022-05-27 DIAGNOSIS — R00.1 BRADYCARDIA: ICD-10-CM

## 2022-05-27 DIAGNOSIS — Z95.2 S/P AVR: ICD-10-CM

## 2022-05-27 LAB
ALBUMIN SERPL-MCNC: 4.8 G/DL (ref 3.4–5)
ALP BLD-CCNC: 97 U/L (ref 40–129)
ALT SERPL-CCNC: 15 U/L (ref 10–40)
AST SERPL-CCNC: 25 U/L (ref 15–37)
BILIRUB SERPL-MCNC: 0.4 MG/DL (ref 0–1)
BILIRUBIN DIRECT: <0.2 MG/DL (ref 0–0.3)
BILIRUBIN, INDIRECT: NORMAL MG/DL (ref 0–1)
CHOLESTEROL, TOTAL: 191 MG/DL (ref 0–199)
HDLC SERPL-MCNC: 78 MG/DL (ref 40–60)
INTERNATIONAL NORMALIZATION RATIO, POC: 1.8
LDL CHOLESTEROL CALCULATED: 103 MG/DL
PROTHROMBIN TIME, POC: ABNORMAL
TOTAL PROTEIN: 7.5 G/DL (ref 6.4–8.2)
TRIGL SERPL-MCNC: 52 MG/DL (ref 0–150)
VLDLC SERPL CALC-MCNC: 10 MG/DL

## 2022-05-27 PROCEDURE — 85610 PROTHROMBIN TIME: CPT | Performed by: FAMILY MEDICINE

## 2022-05-27 NOTE — PROGRESS NOTES
Patient is currently on 10/10/15mg alternating. Current INR is 1.8 per Dr. Cole Greer patient is to take 15mg today and keep dose the same recheck INR in 1 week.

## 2022-06-01 ENCOUNTER — NURSE ONLY (OUTPATIENT)
Dept: CARDIOLOGY CLINIC | Age: 67
End: 2022-06-01
Payer: MEDICARE

## 2022-06-01 ENCOUNTER — TELEPHONE (OUTPATIENT)
Dept: CARDIOLOGY CLINIC | Age: 67
End: 2022-06-01

## 2022-06-01 DIAGNOSIS — R00.1 BRADYCARDIA: Primary | ICD-10-CM

## 2022-06-01 PROCEDURE — 93225 XTRNL ECG REC<48 HRS REC: CPT | Performed by: INTERNAL MEDICINE

## 2022-06-01 NOTE — PROGRESS NOTES
24 hour holter monitor applied [email or web address]  for Bradycardia  Serial # [id number] . Instructed patient on monitor and proper use. Instructed on diary. When to remove and bring it back. Must leave the holter monitor on  without removing for the duration of time ordered. Answered all questions the patient had. Instructed patient to call [facility] at [phone] with any questions or concerns with the monitor.

## 2022-06-01 NOTE — TELEPHONE ENCOUNTER
The pt called- DOS 4/4/2022- he has [facility] Medicare primary, then he has coverage thru [place]- he paid the [address] and it doesnt look like it went to the [place] coverage-   I emailed [email or web address]  Requesting>  Please file to the [employer]- then the pt should get a refund.

## 2022-06-03 ENCOUNTER — ANTI-COAG VISIT (OUTPATIENT)
Dept: FAMILY MEDICINE CLINIC | Age: 67
End: 2022-06-03

## 2022-06-03 ENCOUNTER — TELEPHONE (OUTPATIENT)
Dept: CARDIOLOGY CLINIC | Age: 67
End: 2022-06-03

## 2022-06-03 ENCOUNTER — NURSE ONLY (OUTPATIENT)
Dept: FAMILY MEDICINE CLINIC | Age: 67
End: 2022-06-03
Payer: MEDICARE

## 2022-06-03 DIAGNOSIS — M19.011 LOCALIZED OSTEOARTHRITIS OF RIGHT SHOULDER: ICD-10-CM

## 2022-06-03 DIAGNOSIS — Z95.2 H/O MECHANICAL AORTIC VALVE REPLACEMENT: ICD-10-CM

## 2022-06-03 DIAGNOSIS — Z95.2 S/P AVR: ICD-10-CM

## 2022-06-03 LAB
INTERNATIONAL NORMALIZATION RATIO, POC: 3.3
PROTHROMBIN TIME, POC: ABNORMAL

## 2022-06-03 PROCEDURE — 99999 PR OFFICE/OUTPT VISIT,PROCEDURE ONLY: CPT | Performed by: FAMILY MEDICINE

## 2022-06-03 PROCEDURE — 85610 PROTHROMBIN TIME: CPT | Performed by: FAMILY MEDICINE

## 2022-06-03 NOTE — TELEPHONE ENCOUNTER
This is the response from billing on filing the pt visits to the [place];  : Frida Horan usually can only be billed as primary and they do not pay as secondary. I did find a [facility] authorization number on file but it does appear that the 4/5/22 visit denied stating REFERRAL ABSENT.   I can send this out with the Cristela number that I found but if it denies then it is the patients responsibility to obtain that authorization number and referral.

## 2022-06-07 ENCOUNTER — TELEPHONE (OUTPATIENT)
Dept: CARDIOLOGY CLINIC | Age: 67
End: 2022-06-07

## 2022-06-07 NOTE — TELEPHONE ENCOUNTER
Patient called since he has been Lisinopril he has noticed that he has devolved a cough and feels light headed

## 2022-06-07 NOTE — TELEPHONE ENCOUNTER
Returned call to patient and advised him that I was waiting for the report from [facility] and I would call him back as soon as I did get his results. He voiced understanding.

## 2022-06-19 PROCEDURE — 93227 XTRNL ECG REC<48 HR R&I: CPT | Performed by: INTERNAL MEDICINE

## 2022-06-22 ENCOUNTER — TELEPHONE (OUTPATIENT)
Dept: CARDIOLOGY CLINIC | Age: 67
End: 2022-06-22

## 2022-06-22 NOTE — TELEPHONE ENCOUNTER
Patient returned call and was given monitor results. Patient also requesting lab results. Message sent to staff to call with results.

## 2022-06-22 NOTE — TELEPHONE ENCOUNTER
Left message advising patient of holter monitor results.     this is a 24 Holter monitor, it was found to rule out bradycardia which was noted in office, minimum heart rate is 64 bpm, average heart rate is 89, maximum heart rate is 151, there was a brief episode of 3-4 beats of fast supraventricular run most likely from atrial tachycardia, there were few PVCs noted, patient was not on beta-blocker in past due to bradycardia, when discussing office use of bb

## 2022-07-08 ENCOUNTER — OFFICE VISIT (OUTPATIENT)
Dept: FAMILY MEDICINE CLINIC | Age: 67
End: 2022-07-08
Payer: MEDICARE

## 2022-07-08 VITALS
WEIGHT: 156 LBS | DIASTOLIC BLOOD PRESSURE: 72 MMHG | BODY MASS INDEX: 25.99 KG/M2 | OXYGEN SATURATION: 98 % | SYSTOLIC BLOOD PRESSURE: 118 MMHG | HEART RATE: 88 BPM | HEIGHT: 65 IN

## 2022-07-08 DIAGNOSIS — E78.2 MIXED HYPERLIPIDEMIA: Chronic | ICD-10-CM

## 2022-07-08 DIAGNOSIS — F33.41 RECURRENT MAJOR DEPRESSIVE DISORDER, IN PARTIAL REMISSION (HCC): Chronic | ICD-10-CM

## 2022-07-08 DIAGNOSIS — J44.9 CHRONIC OBSTRUCTIVE PULMONARY DISEASE, UNSPECIFIED COPD TYPE (HCC): Primary | Chronic | ICD-10-CM

## 2022-07-08 DIAGNOSIS — F41.9 ANXIETY: Chronic | ICD-10-CM

## 2022-07-08 DIAGNOSIS — Z95.2 S/P AVR: ICD-10-CM

## 2022-07-08 DIAGNOSIS — K21.01 GASTROESOPHAGEAL REFLUX DISEASE WITH ESOPHAGITIS AND HEMORRHAGE: Chronic | ICD-10-CM

## 2022-07-08 LAB
INTERNATIONAL NORMALIZATION RATIO, POC: 3.3
PROTHROMBIN TIME, POC: 40

## 2022-07-08 PROCEDURE — 3023F SPIROM DOC REV: CPT | Performed by: FAMILY MEDICINE

## 2022-07-08 PROCEDURE — 3017F COLORECTAL CA SCREEN DOC REV: CPT | Performed by: FAMILY MEDICINE

## 2022-07-08 PROCEDURE — 4004F PT TOBACCO SCREEN RCVD TLK: CPT | Performed by: FAMILY MEDICINE

## 2022-07-08 PROCEDURE — G8427 DOCREV CUR MEDS BY ELIG CLIN: HCPCS | Performed by: FAMILY MEDICINE

## 2022-07-08 PROCEDURE — 99213 OFFICE O/P EST LOW 20 MIN: CPT | Performed by: FAMILY MEDICINE

## 2022-07-08 PROCEDURE — G8417 CALC BMI ABV UP PARAM F/U: HCPCS | Performed by: FAMILY MEDICINE

## 2022-07-08 PROCEDURE — 85610 PROTHROMBIN TIME: CPT | Performed by: FAMILY MEDICINE

## 2022-07-08 PROCEDURE — 1124F ACP DISCUSS-NO DSCNMKR DOCD: CPT | Performed by: FAMILY MEDICINE

## 2022-07-08 RX ORDER — ALBUTEROL SULFATE 90 UG/1
AEROSOL, METERED RESPIRATORY (INHALATION)
Qty: 18 G | Refills: 5 | Status: SHIPPED | OUTPATIENT
Start: 2022-07-08

## 2022-07-08 RX ORDER — ALBUTEROL SULFATE 90 UG/1
AEROSOL, METERED RESPIRATORY (INHALATION)
COMMUNITY
Start: 2022-04-01 | End: 2022-07-08 | Stop reason: SDUPTHER

## 2022-07-08 ASSESSMENT — ENCOUNTER SYMPTOMS
WHEEZING: 0
TROUBLE SWALLOWING: 0
BLOOD IN STOOL: 0
DIARRHEA: 0
VOMITING: 0
NAUSEA: 0
ABDOMINAL PAIN: 0
EYE PAIN: 0
CHEST TIGHTNESS: 0
SHORTNESS OF BREATH: 0

## 2022-07-08 NOTE — PROGRESS NOTES
7/8/2022    Collin Elmore    Chief Complaint   Patient presents with    Follow-up     4 month follow up and protime, INR 3.3    Shortness of Breath     patient reports SOB while walking, requesting refill on rescue inhaler       HPI  History was obtained from patient. Vero Ramsey is a 73 y.o. male who presents today with routine follow-up on COPD. History of aortic valve replacement on Coumadin therapy, depression, bipolar disorder, history of paranoia, as well as insomnia and GE reflux. Patient states he is doing very well. Labs in March and April 22 look good. INR today was 3.3. Patient denies increased shortness of breath but does need a refill on rescue inhaler. On review he will also need COVID booster #2 and later this summer gets his Shingrix shots. He has had no flare of GERD symptoms and weight appears stable. Neuropsych wise he has also been doing well. REVIEW OF SYMPTOMS    Review of Systems   Constitutional: Negative for activity change and fatigue. HENT: Negative for congestion, hearing loss, mouth sores and trouble swallowing. Eyes: Negative for pain and visual disturbance. Respiratory: Negative for chest tightness, shortness of breath and wheezing. Known history of COPD with only rare flare and uses rescue inhaler occasionally along with as needed albuterol aerosols. Cardiovascular: Negative for chest pain and palpitations. Gastrointestinal: Negative for abdominal pain, blood in stool, diarrhea, nausea and vomiting. Patient with history of GE reflux. Endocrine: Negative for polydipsia and polyuria. Genitourinary: Negative for dysuria, frequency and urgency. Musculoskeletal: Negative for arthralgias, gait problem and neck stiffness. Skin: Negative for rash. Allergic/Immunologic: Negative for environmental allergies. Neurological: Negative for dizziness, seizures, speech difficulty and weakness. Hematological: Does not bruise/bleed easily. Psychiatric/Behavioral: Positive for dysphoric mood. Negative for agitation, confusion and hallucinations. The patient is nervous/anxious. Patient with history of depression, bipolar disorder and episodes of paranoia currently doing well. PAST MEDICAL HISTORY  Past Medical History:   Diagnosis Date    Acid reflux     Acute frontal sinusitis 07/22/2009    Alcohol abuse     \"I Drink Average 2 Beers A Day\"    Anesthesia     \" I get agitated\"    Anxiety     Arthritis     \"Right Shoulder, Neck, Left Knee\"    Atypical mycobacterial infection     Broken teeth     Upper And Lower     [facility]congest[facility] failure) [facility]     COPD (chronic obstructive pulmonary disease) ([facility])     Sees Dr. Rogelio Lopez In [address], [address]     Cough     Frequent Cough With Green Sputum    Depression     Depression     Dyspnea 02/23/2018    H/O cardiac catheterization 05/17/2022    LAD: Moderate Lumen Irregularity. CALCIFIED mid LAD segment.  H/O cardiovascular MUGA stress test 05/14/2019    EF 50%, NORMAL LVEF, NORMAL WALL MOTION.    H/O echocardiogram 05/22/2014    AC=>48-72%, LV systolic function is low normal, mild aortic valve calcification, no pericardial effusion    H/O echocardiogram 12/12/2018    EF 45-50%    History of 24 hour EKG monitoring 06/06/2014    24 hr holter monitor. Ventricular ectopics were noted in single and couplet forms. Supraventricular ectopics were noted in single and pair forms.     [place] (hard of hearing)     Bilateral Ears    Hyperlipidemia     Hypertension     Irritant contact dermatitis due to other chemical products 08/26/2019    Mycobacterium avium complex ([address]     Other drug allergy(995.27) 07/22/2009    S/P AVR 12/2003    Mechanical valvue     Shortness of breath     Teeth missing     Upper And Lower       FAMILY HISTORY  Family History   Problem Relation Age of Onset    Cancer Mother         Lymphoma    Diabetes Mother     High Blood Pressure Mother     High Cholesterol Mother     Obesity Mother     Vision Loss Mother         Wore Glasses    Heart Disease Father         \"Heart Failure\"   Triny Mina COPD Father     Asthma Sister     High Blood Pressure Sister     High Cholesterol Sister     Other Sister         pre diabetic    COPD Sister    Triny Mina Diabetes Sister         \"Pre Diabetes\"    No Known Problems Son        SOCIAL HISTORY  Social History     Socioeconomic History    Marital status:      Spouse name: None    Number of children: 1    Years of education: 15    Highest education level: None   Occupational History    None   Tobacco Use    Smoking status: Never Smoker    Smokeless tobacco: Current User     Types: Snuff, Chew   Vaping Use    Vaping Use: Never used   Substance and Sexual Activity    Alcohol use: Yes     Alcohol/week: 8.0 - 10.0 standard drinks     Types: 8 - 10 Cans of beer per week     Comment: 5-6 can beer daily    Drug use: No    Sexual activity: Not Currently   Other Topics Concern    None   Social History Narrative    None     Social Determinants of Health     Financial Resource Strain: Low Risk     Difficulty of Paying Living Expenses: Not hard at all   Food Insecurity: No Food Insecurity    Worried About Running Out of Food in the Last Year: Never true    Suzi of Food in the Last Year: Never true   Transportation Needs:     Lack of Transportation (Medical): Not on file    Lack of Transportation (Non-Medical):  Not on file   Physical Activity: Insufficiently Active    Days of Exercise per Week: 5 days    Minutes of Exercise per Session: 20 min   Stress:     Feeling of Stress : Not on file   Social Connections:     Frequency of Communication with Friends and Family: Not on file    Frequency of Social Gatherings with Friends and Family: Not on file    Attends Tenriism Services: Not on file    Active Member of Clubs or Organizations: Not on file    Attends Club or Organization Meetings: Not on file  Marital Status: Not on file   Intimate Partner Violence:     Fear of Current or Ex-Partner: Not on file    Emotionally Abused: Not on file    Physically Abused: Not on file    Sexually Abused: Not on file   Housing Stability:     Unable to Pay for Housing in the Last Year: Not on file    Number of [place] in the Last Year: Not on file    Unstable Housing in the Last Year: Not on file        SURGICAL HISTORY  Past Surgical History:   Procedure Laterality Date   [address]    \"Done Twice\"   [address]  12/17/2003    \"Aortic Valve Replacement, Mechanical Valve\"    COLONOSCOPY  2006    Polyps Removed    DENTAL SURGERY      Teeth Extracted In Past    ENDOSCOPY, COLON, DIAGNOSTIC  In Past    HEMIARTHROPLASTY SHOULDER FRACTURE Right 1/29/2019    SHOULDER HEMIARTHROPLASTY performed by Nelly Jones DO at [address] [facility] Left 1992   [address]    \"Cauterized Because My Sperm Was Low\"   [address] Right 2008   [address] Right 10/25/2021    RIGHT HIP TOTAL ARTHROPLASTY performed by Nelly Jones DO at [place] [address]. 11/10/2021    EGD BIOPSY performed by Cara Murrell MD at [facility]  Current Outpatient Medications   Medication Sig Dispense Refill    albuterol sulfate HFA (PROVENTIL;VENTOLIN;PROAIR) 108 (90 Base) MCG/ACT inhaler inhale 2 puffs by mouth and INTO THE LUNGS every 4 hours if needed 18 g 5    warfarin (COUMADIN) 5 MG tablet take 1-2 tablets by mouth daily as directed 60 tablet 2    venlafaxine (EFFEXOR XR) 150 MG extended release capsule Take 1 capsule by mouth daily 30 capsule 5    venlafaxine (EFFEXOR XR) 150 MG extended release capsule take 1 capsule by mouth once daily 90 capsule 1    Calcium Carbonate-Vitamin D (CALCIUM-VITAMIN D3 PO) Take by mouth      albuterol (PROVENTIL) (2.5 MG/3ML) 0.083% nebulizer solution Take 3 mLs by nebulization every 6 hours as needed for Wheezing or Shortness of Breath 125 each 0    sodium chloride, Inhalant, 3 % nebulizer solution Take by nebulization as needed for Other (COPD/shortness of breath) 125 mL 0    ferrous sulfate (IRON 325) 325 (65 Fe) MG tablet Take 1 tablet by mouth daily (with breakfast) 90 tablet 1    CALCIUM PO Take by mouth      triamcinolone (KENALOG) 0.1 % cream Apply topically 2 times daily. 453.6 g 1    simvastatin (ZOCOR) 40 MG tablet take 1 tablet by mouth at bedtime 90 tablet 1    melatonin 10 MG CAPS capsule Take 10 mg by mouth nightly      guaiFENesin (MUCINEX) 600 MG extended release tablet Take 1,200 mg by mouth every 12 hours      ferrous sulfate (FE TABS) 325 (65 Fe) MG EC tablet Take 1 tablet by mouth 2 times daily 90 tablet 3    pantoprazole (PROTONIX) 40 MG tablet 40 mg PO BID for 30 days then 40 mg PO daily to continue 180 tablet 1    ipratropium-albuterol (DUONEB) 0.5-2.5 (3) MG/3ML SOLN nebulizer solution Inhale 3 mLs into the lungs 4 times daily 360 mL 5    KRILL OIL OMEGA-3 PO Take by mouth      cetirizine (ZYRTEC) 10 MG tablet Take 10 mg by mouth \"Alternate With Singulair\"      NASAL SPRAY SALINE NA by Nasal route daily Over The Counter      Acetaminophen (TYLENOL 8 HOUR PO) Take 500 mg by mouth as needed Over The Counter      Multiple Vitamins-Minerals (CENTRUM PO) Take by mouth daily      Nebulizer MISC Inhale into the lungs as needed       No current facility-administered medications for this visit.        ALLERGIES  Allergies   Allergen Reactions    Ciprofloxacin Hcl Hives and Swelling    Levaquin [Levofloxacin] Hives and Swelling    Other Nausea And Vomiting     \"Allergic To Tylox\"    Ceftin [Cefuroxime]     Lisinopril      cough       PHYSICAL EXAM    /72   Pulse 88   Ht 5' 5\" (1.651 m)   Wt 156 lb (70.8 kg)   SpO2 98%   BMI 25.96 kg/m²     Physical Exam  Vitals and nursing note reviewed. Constitutional:       General: He is not in acute distress. Appearance: Normal appearance. He is well-developed. He is not toxic-appearing. HENT:      Head: Normocephalic and atraumatic. Nose: Nose normal.      Mouth/Throat:      Mouth: Mucous membranes are moist.      Pharynx: Oropharynx is clear. Eyes:      Pupils: Pupils are equal, round, and reactive to light. Cardiovascular:      Rate and Rhythm: Normal rate and regular rhythm. Heart sounds: Normal heart sounds. No murmur heard. Pulmonary:      Effort: Pulmonary effort is normal. No respiratory distress. Breath sounds: Normal breath sounds. No wheezing, rhonchi or rales. Abdominal:      Palpations: Abdomen is soft. Musculoskeletal:         General: No swelling or deformity. Normal range of motion. Cervical back: Normal range of motion and neck supple. No rigidity. Skin:     General: Skin is warm and dry. Coloration: Skin is not jaundiced. Findings: No bruising. Neurological:      General: No focal deficit present. Mental Status: He is alert and oriented to person, place, and time. Mental status is at baseline. Cranial Nerves: No cranial nerve deficit. Motor: No weakness. Psychiatric:         Mood and Affect: Mood normal.         Behavior: Behavior normal.         Thought Content: Thought content normal.         ASSESSMENT & PLAN     Diagnosis Orders   1. Chronic obstructive pulmonary disease, unspecified COPD type ([address]     2. S/P AVR  POCT INR   3. Mixed hyperlipidemia     4. Anxiety     5. Recurrent major depressive disorder, in partial remission ([address]     6. Gastroesophageal reflux disease with esophagitis and hemorrhage     Currently Maverick Foote is doing well -no change in regimen at this point. He is to continue with healthy lifestyle & regular exercise. Get COVID booster #2 in near future and then get Shingrix shots later this summer and fall.   Call with changes or problems. He is to continue with monthly INRs. Return in about 4 months (around 11/8/2022).          Electronically signed by Maggy Dumont MD on 7/8/2022

## 2022-07-11 ENCOUNTER — INITIAL CONSULT (OUTPATIENT)
Dept: PULMONOLOGY | Age: 67
End: 2022-07-11
Payer: MEDICARE

## 2022-07-11 VITALS
DIASTOLIC BLOOD PRESSURE: 60 MMHG | SYSTOLIC BLOOD PRESSURE: 122 MMHG | HEART RATE: 97 BPM | WEIGHT: 155.6 LBS | HEIGHT: 64 IN | BODY MASS INDEX: 26.56 KG/M2 | OXYGEN SATURATION: 98 %

## 2022-07-11 DIAGNOSIS — G47.33 OSA (OBSTRUCTIVE SLEEP APNEA): ICD-10-CM

## 2022-07-11 DIAGNOSIS — J44.9 CHRONIC OBSTRUCTIVE PULMONARY DISEASE, UNSPECIFIED COPD TYPE (HCC): Chronic | ICD-10-CM

## 2022-07-11 DIAGNOSIS — R06.02 SOBOE (SHORTNESS OF BREATH ON EXERTION): ICD-10-CM

## 2022-07-11 DIAGNOSIS — G47.10 HYPERSOMNIA: ICD-10-CM

## 2022-07-11 DIAGNOSIS — J47.9 BRONCHIECTASIS WITHOUT COMPLICATION (HCC): Primary | ICD-10-CM

## 2022-07-11 DIAGNOSIS — E66.3 OVERWEIGHT (BMI 25.0-29.9): ICD-10-CM

## 2022-07-11 PROCEDURE — 3017F COLORECTAL CA SCREEN DOC REV: CPT | Performed by: INTERNAL MEDICINE

## 2022-07-11 PROCEDURE — 3023F SPIROM DOC REV: CPT | Performed by: INTERNAL MEDICINE

## 2022-07-11 PROCEDURE — 99205 OFFICE O/P NEW HI 60 MIN: CPT | Performed by: INTERNAL MEDICINE

## 2022-07-11 PROCEDURE — 1124F ACP DISCUSS-NO DSCNMKR DOCD: CPT | Performed by: INTERNAL MEDICINE

## 2022-07-11 PROCEDURE — 4004F PT TOBACCO SCREEN RCVD TLK: CPT | Performed by: INTERNAL MEDICINE

## 2022-07-11 PROCEDURE — G8427 DOCREV CUR MEDS BY ELIG CLIN: HCPCS | Performed by: INTERNAL MEDICINE

## 2022-07-11 PROCEDURE — G8417 CALC BMI ABV UP PARAM F/U: HCPCS | Performed by: INTERNAL MEDICINE

## 2022-07-11 ASSESSMENT — SLEEP AND FATIGUE QUESTIONNAIRES
HOW LIKELY ARE YOU TO NOD OFF OR FALL ASLEEP IN A CAR, WHILE STOPPED FOR A FEW MINUTES IN TRAFFIC: 0
HOW LIKELY ARE YOU TO NOD OFF OR FALL ASLEEP WHILE SITTING INACTIVE IN A PUBLIC PLACE: 3
NECK CIRCUMFERENCE (INCHES): 15.5
ESS TOTAL SCORE: 10
HOW LIKELY ARE YOU TO NOD OFF OR FALL ASLEEP WHILE SITTING AND READING: 2
HOW LIKELY ARE YOU TO NOD OFF OR FALL ASLEEP WHILE LYING DOWN TO REST IN THE AFTERNOON WHEN CIRCUMSTANCES PERMIT: 3
HOW LIKELY ARE YOU TO NOD OFF OR FALL ASLEEP WHEN YOU ARE A PASSENGER IN A CAR FOR AN HOUR WITHOUT A BREAK: 2
HOW LIKELY ARE YOU TO NOD OFF OR FALL ASLEEP WHILE WATCHING TV: 0
HOW LIKELY ARE YOU TO NOD OFF OR FALL ASLEEP WHILE SITTING AND TALKING TO SOMEONE: 0
HOW LIKELY ARE YOU TO NOD OFF OR FALL ASLEEP WHILE SITTING QUIETLY AFTER LUNCH WITHOUT ALCOHOL: 0

## 2022-07-11 ASSESSMENT — ENCOUNTER SYMPTOMS
ABDOMINAL PAIN: 0
BACK PAIN: 0
EYE ITCHING: 0
SHORTNESS OF BREATH: 1
EYE DISCHARGE: 0
ABDOMINAL DISTENTION: 0
COUGH: 1

## 2022-07-11 NOTE — PROGRESS NOTES
Lauren Mi  1955  Referring Provider: Alea Omer MD    Subjective:     Chief Complaint   Patient presents with    COPD     referral       HPI  Gunner Pratt is a 73 y.o. male has been referred for the COPD. He has no h/o smoking. He chewed tobacco and snuff. He is retired now. He worked in commercial construction. He is not on oxygen. He is on Albuterol prn. He has cough, phlegm-clear, no hemoptysis,  No loss of weight, good appetite, SOBOE some. He had his flu vaccine, COVID vaccine with a booster. He had last PFT in [place]. He snores and not sure if he stops breathing in the night. He goes to bed at 9:30 PM and gets up 2:30 AM. He is tired. He has no morning headaches. He is less sleepy during the day time particularly after lunch. Current Outpatient Medications   Medication Sig Dispense Refill    albuterol-ipratropium (COMBIVENT RESPIMAT)  MCG/ACT AERS inhaler Inhale 1 puff into the lungs every 6 hours as needed for Wheezing 1 each 1    albuterol sulfate HFA (PROVENTIL;VENTOLIN;PROAIR) 108 (90 Base) MCG/ACT inhaler inhale 2 puffs by mouth and INTO THE LUNGS every 4 hours if needed 18 g 5    warfarin (COUMADIN) 5 MG tablet take 1-2 tablets by mouth daily as directed 60 tablet 2    venlafaxine (EFFEXOR XR) 150 MG extended release capsule Take 1 capsule by mouth daily 30 capsule 5    Calcium Carbonate-Vitamin D (CALCIUM-VITAMIN D3 PO) Take by mouth      sodium chloride, Inhalant, 3 % nebulizer solution Take by nebulization as needed for Other (COPD/shortness of breath) 125 mL 0    ferrous sulfate (IRON 325) 325 (65 Fe) MG tablet Take 1 tablet by mouth daily (with breakfast) 90 tablet 1    CALCIUM PO Take by mouth      triamcinolone (KENALOG) 0.1 % cream Apply topically 2 times daily.  453.6 g 1    simvastatin (ZOCOR) 40 MG tablet take 1 tablet by mouth at bedtime 90 tablet 1    melatonin 10 MG CAPS capsule Take 10 mg by mouth nightly      guaiFENesin (MUCINEX) 600 MG extended release tablet Take 1,200 mg by mouth every 12 hours      ferrous sulfate (FE TABS) 325 (65 Fe) MG EC tablet Take 1 tablet by mouth 2 times daily 90 tablet 3    pantoprazole (PROTONIX) 40 MG tablet 40 mg PO BID for 30 days then 40 mg PO daily to continue 180 tablet 1    KRILL OIL OMEGA-3 PO Take by mouth      cetirizine (ZYRTEC) 10 MG tablet Take 10 mg by mouth \"Alternate With Singulair\"      NASAL SPRAY SALINE NA by Nasal route daily Over The Counter      Acetaminophen (TYLENOL 8 HOUR PO) Take 500 mg by mouth as needed Over The Counter      Multiple Vitamins-Minerals (CENTRUM PO) Take by mouth daily      Nebulizer MISC Inhale into the lungs as needed      albuterol (PROVENTIL) (2.5 MG/3ML) 0.083% nebulizer solution Take 3 mLs by nebulization every 6 hours as needed for Wheezing or Shortness of Breath (Patient not taking: Reported on 7/11/2022) 125 each 0    ipratropium-albuterol (DUONEB) 0.5-2.5 (3) MG/3ML SOLN nebulizer solution Inhale 3 mLs into the lungs 4 times daily (Patient not taking: Reported on 7/11/2022) 360 mL 5     No current facility-administered medications for this visit.        Allergies   Allergen Reactions    Ciprofloxacin Hcl Hives and Swelling    Levaquin [Levofloxacin] Hives and Swelling    Other Nausea And Vomiting     \"Allergic To Tylox\"    Ceftin [Cefuroxime]     Lisinopril      cough       Past Medical History:   Diagnosis Date    Acid reflux     Acute frontal sinusitis 07/22/2009    Alcohol abuse     \"I Drink Average 2 Beers A Day\"    Anesthesia     \" I get agitated\"    Anxiety     Arthritis     \"Right Shoulder, Neck, Left Knee\"    Atypical mycobacterial infection     Broken teeth     Upper And Lower     CHF (congestive heart failure) ([address]     COPD [facility]     Sees Dr. Trini Jarvis In [place] [address] [address] [facility]     Cough     Frequent Cough With Green Sputum    Depression     Depression     Dyspnea 02/23/2018  H/O cardiac catheterization 05/17/2022    LAD: Moderate Lumen Irregularity. CALCIFIED mid LAD segment.  H/O cardiovascular MUGA stress test 05/14/2019    EF 50%, NORMAL LVEF, NORMAL WALL MOTION.    H/O echocardiogram 05/22/2014    AS=>92-10%, LV systolic function is low normal, mild aortic valve calcification, no pericardial effusion    H/O echocardiogram 12/12/2018    EF 45-50%    History of 24 hour EKG monitoring 06/06/2014    24 hr holter monitor. Ventricular ectopics were noted in single and couplet forms. Supraventricular ectopics were noted in single and pair forms.     [place]     Bilateral Ears    Hyperlipidemia     Hypertension     Irritant contact dermatitis due to other chemical products 08/26/2019    Mycobacterium avium complex ([address]     Other drug allergy(995.27) 07/22/2009    S/P AVR 12/2003    Mechanical valvue     Shortness of breath     Teeth missing     Upper And Lower       Past Surgical History:   Procedure Laterality Date    BRONCHOSCOPY  1996    \"Done Twice\"    CARDIAC VALVE REPLACEMENT  12/17/2003    \"Aortic Valve Replacement, Mechanical Valve\"    COLONOSCOPY  2006    Polyps Removed    DENTAL SURGERY      Teeth Extracted In Past    ENDOSCOPY, COLON, DIAGNOSTIC  In Past    HEMIARTHROPLASTY SHOULDER FRACTURE Right 1/29/2019    SHOULDER HEMIARTHROPLASTY performed [facility]i [facility]oria DO [facility]    LUNG BIOPSY Right 1996   [facility]  [address]    \"Cauterized Because My Sperm Was Low\"    ROTATOR CUFF REPAIR Right 2008    TONSILLECTOMY  1959 Or 1960    TOTAL HIP ARTHROPLASTY Right 10/25/2021    RIGHT HIP TOTAL ARTHROPLASTY performed by Flori Soria DO at . [address] N/A 11/10/2021    EGD BIOPSY performed by Katie Bueno MD at [facility] History     Socioeconomic History    Marital status:      Spouse name: None    Number of children: 1    Years of education: 15    Highest education level: None   Occupational History    None   Tobacco Use    Smoking status: Never Smoker    Smokeless tobacco: Current User     Types: Snuff, Chew   Vaping Use    Vaping Use: Never used   Substance and Sexual Activity    Alcohol use: Yes     Alcohol/week: 8.0 - 10.0 standard drinks     Types: 8 - 10 Cans of beer per week     Comment: 5-6 can beer daily    Drug use: No    Sexual activity: Not Currently   Other Topics Concern    None   Social History Narrative    None     Social Determinants of Health     Financial Resource Strain: Low Risk     Difficulty of Paying Living Expenses: Not hard at all   Food Insecurity: No Food Insecurity    Worried About Running Out of Food in the Last Year: Never true    Suzi of Food in the Last Year: Never true   Transportation Needs:     Lack of Transportation (Medical): Not on file    Lack of Transportation (Non-Medical): Not on file   Physical Activity: Insufficiently Active    Days of Exercise per Week: 5 days    Minutes of Exercise per Session: 20 min   Stress:     Feeling of Stress : Not on file   Social Connections:     Frequency of Communication with Friends and Family: Not on file    Frequency of Social Gatherings with Friends and Family: Not on file    Attends Episcopalian Services: Not on file    Active Member of Clubs or Organizations: Not on file    Attends Club or Organization Meetings: Not on file    Marital Status: Not on file   Intimate Partner Violence:     Fear of Current or Ex-Partner: Not on file    Emotionally Abused: Not on file    Physically Abused: Not on file    Sexually Abused: Not on file   Housing Stability:     Unable to Pay for Housing in the Last Year: Not on file    Number of [place] in the Last Year: Not on file    Unstable Housing in the Last Year: Not on file       Review of Systems   Constitutional: Negative for fatigue. HENT: Negative for congestion and postnasal drip. Eyes: Negative for discharge and itching. Respiratory: Positive for cough and shortness of breath. Cardiovascular: Negative for chest pain and leg swelling. Gastrointestinal: Negative for abdominal distention and abdominal pain. Endocrine: Negative for cold intolerance and heat intolerance. Genitourinary: Negative for enuresis and frequency. Musculoskeletal: Negative for arthralgias and back pain. Allergic/Immunologic: Negative for environmental allergies and food allergies. Neurological: Negative for light-headedness and headaches. Hematological: Negative for adenopathy. Psychiatric/Behavioral: Negative for agitation and behavioral problems. Objective:   /60   Pulse 97   Ht 5' 4\" (1.626 m)   Wt 155 lb 9.6 oz (70.6 kg)   SpO2 98%   BMI 26.71 kg/m²   Body mass index is 26.71 kg/m². Sleep Medicine 7/11/2022   Sitting and reading 2   Watching TV 0   Sitting, inactive in a public place (e.g. a theatre or a meeting) 3   As a passenger in a car for an hour without a break 2   Lying down to rest in the afternoon when circumstances permit 3   Sitting and talking to someone 0   Sitting quietly after a lunch without alcohol 0   In a car, while stopped for a few minutes in traffic 0   Total score 10   Neck circumference (Inches) 15.5     Mallampati 2    Physical Exam  Vitals reviewed. Constitutional:       Appearance: Normal appearance. HENT:      Head: Normocephalic and atraumatic. Nose: Nose normal.      Mouth/Throat:      Mouth: Mucous membranes are moist.   Eyes:      Extraocular Movements: Extraocular movements intact. Pupils: Pupils are equal, round, and reactive to light. Cardiovascular:      Rate and Rhythm: Normal rate and regular rhythm. Pulses: Normal pulses. Heart sounds: Normal heart sounds. Pulmonary:      Effort: Pulmonary effort is normal.      Breath sounds: Normal breath sounds. Abdominal:      General: Abdomen is flat.       Palpations: Abdomen is soft. Musculoskeletal:         General: Normal range of motion. Cervical back: Normal range of motion and neck supple. Skin:     General: Skin is warm and dry. Neurological:      General: No focal deficit present. Mental Status: He is alert and oriented to person, place, and time. Psychiatric:         Mood and Affect: Mood normal.         Behavior: Behavior normal.         Radiology: None    Assessment and Plan     Problem List        Respiratory    COPD (chronic obstructive pulmonary disease) (HCC) (Chronic)      He has no h/o smoking  PFT  6 MWT  Get yearly flu vaccine         Relevant Medications    cetirizine (ZYRTEC) 10 MG tablet    NASAL SPRAY SALINE NA    ipratropium-albuterol (DUONEB) 0.5-2.5 (3) MG/3ML SOLN nebulizer solution    guaiFENesin (MUCINEX) 600 MG extended release tablet    albuterol (PROVENTIL) (2.5 MG/3ML) 0.083% nebulizer solution    sodium chloride, Inhalant, 3 % nebulizer solution    albuterol sulfate HFA (PROVENTIL;VENTOLIN;PROAIR) 108 (90 Base) MCG/ACT inhaler    albuterol-ipratropium (COMBIVENT RESPIMAT)  MCG/ACT AERS inhaler    Other Relevant Orders    Full PFT Study With Bronchodilator    6 Minute Walk Test    Bronchiectasis (HCC) - Primary (Chronic)    Relevant Orders    CT CHEST WO CONTRAST    RADHA (obstructive sleep apnea)      He has symptoms of RADHA  Advised PSG  Loose weight         Relevant Orders    Baseline Diagnostic Sleep Study       Other    SOBOE (shortness of breath on exertion)      Being followed by Cardiology  I'll do a PFT and a 6 MWT         Relevant Orders    Full PFT Study With Bronchodilator    6 Minute Walk Test    Hypersomnia      Advised to go for the PSG  Loose weight         Overweight (BMI 25.0-29. 9)      Advised to loose weight                    Follow-Up:    Return in about 4 weeks (around 8/8/2022) for PFT, CT chest, Sleep Study, 6 MWT.      Progress notes sent to the referring Provider    Yoshi Delgado MD MD  7/11/2022  1:06 PM

## 2022-07-28 ENCOUNTER — NURSE ONLY (OUTPATIENT)
Dept: FAMILY MEDICINE CLINIC | Age: 67
End: 2022-07-28
Payer: MEDICARE

## 2022-07-28 ENCOUNTER — ANTI-COAG VISIT (OUTPATIENT)
Dept: FAMILY MEDICINE CLINIC | Age: 67
End: 2022-07-28

## 2022-07-28 DIAGNOSIS — Z95.2 H/O MECHANICAL AORTIC VALVE REPLACEMENT: Primary | ICD-10-CM

## 2022-07-28 DIAGNOSIS — Z95.2 S/P AVR: ICD-10-CM

## 2022-07-28 LAB
INTERNATIONAL NORMALIZATION RATIO, POC: 4.5
PROTHROMBIN TIME, POC: 53.9

## 2022-07-28 PROCEDURE — 99999 PR OFFICE/OUTPT VISIT,PROCEDURE ONLY: CPT | Performed by: FAMILY MEDICINE

## 2022-07-28 PROCEDURE — 85610 PROTHROMBIN TIME: CPT | Performed by: FAMILY MEDICINE

## 2022-07-28 NOTE — PROGRESS NOTES
Patient is currently on 10mg daily. Current INR 4.5.  Per Lorrie NP take 5 mg qd x 2 days then resume 10 mg qd recheck 2 weeks

## 2022-08-08 ENCOUNTER — HOSPITAL ENCOUNTER (OUTPATIENT)
Dept: PULMONOLOGY | Age: 67
Discharge: HOME | End: 2022-08-08
Payer: MEDICARE

## 2022-08-08 ENCOUNTER — HOSPITAL ENCOUNTER (OUTPATIENT)
Dept: CT IMAGING | Age: 67
Discharge: HOME | End: 2022-08-08
Payer: MEDICARE

## 2022-08-08 DIAGNOSIS — R06.02 SOBOE (SHORTNESS OF BREATH ON EXERTION): ICD-10-CM

## 2022-08-08 DIAGNOSIS — J47.9 BRONCHIECTASIS WITHOUT COMPLICATION (HCC): ICD-10-CM

## 2022-08-08 DIAGNOSIS — J44.9 CHRONIC OBSTRUCTIVE PULMONARY DISEASE, UNSPECIFIED COPD TYPE (HCC): Chronic | ICD-10-CM

## 2022-08-08 LAB
DISTANCE WALKED: 540 FT
DLCO %PRED: 0 %
DLCO PRED: NORMAL
DLCO/VA %PRED: NORMAL
DLCO/VA PRED: NORMAL
DLCO/VA: NORMAL
DLCO: NORMAL
EXPIRATORY TIME-POST: NORMAL
EXPIRATORY TIME: NORMAL
FEF 25-75% %CHNG: NORMAL
FEF 25-75% %PRED-POST: NORMAL
FEF 25-75% %PRED-PRE: NORMAL
FEF 25-75% PRED: NORMAL
FEF 25-75%-POST: NORMAL
FEF 25-75%-PRE: NORMAL
FEV1 %PRED-POST: 0 %
FEV1 %PRED-PRE: 0 %
FEV1 PRED: NORMAL
FEV1-POST: NORMAL
FEV1-PRE: NORMAL
FEV1/FVC %PRED-POST: NORMAL
FEV1/FVC %PRED-PRE: NORMAL
FEV1/FVC PRED: NORMAL
FEV1/FVC-POST: 0 %
FEV1/FVC-PRE: 0 %
FVC %PRED-POST: NORMAL
FVC %PRED-PRE: NORMAL
FVC PRED: NORMAL
FVC-POST: NORMAL
FVC-PRE: NORMAL
GAW %PRED: NORMAL
GAW PRED: NORMAL
GAW: NORMAL
IC %PRED: NORMAL
IC PRED: NORMAL
IC: NORMAL
MEP: NORMAL
MIP: NORMAL
MVV %PRED-PRE: NORMAL
MVV PRED: NORMAL
MVV-PRE: NORMAL
PEF %PRED-POST: NORMAL
PEF %PRED-PRE: NORMAL
PEF PRED: NORMAL
PEF%CHNG: NORMAL
PEF-POST: NORMAL
PEF-PRE: NORMAL
RAW %PRED: NORMAL
RAW PRED: NORMAL
RAW: NORMAL
RV %PRED: NORMAL
RV PRED: NORMAL
RV: NORMAL
SPO2: 95 %
SVC %PRED: NORMAL
SVC PRED: NORMAL
SVC: NORMAL
TLC %PRED: 0 %
TLC PRED: NORMAL
TLC: NORMAL
VA %PRED: NORMAL
VA PRED: NORMAL
VA: NORMAL
VTG %PRED: NORMAL
VTG PRED: NORMAL
VTG: NORMAL

## 2022-08-08 PROCEDURE — 94618 PULMONARY STRESS TESTING: CPT

## 2022-08-08 PROCEDURE — 71250 CT THORAX DX C-: CPT

## 2022-08-08 ASSESSMENT — PULMONARY FUNCTION TESTS
FEV1/FVC_POST: 0
FEV1_PERCENT_PREDICTED_POST: 0
FEV1_PERCENT_PREDICTED_PRE: 0
FEV1/FVC_PRE: 0

## 2022-08-08 NOTE — PROGRESS NOTES
[facility] Pulmonary Function Lab - Six Minute Walk  Test Performed on: Room Air_X____ Oxygen at _____ lpm via N/C  Assist Device Used During Test:    None__X__ Cane____ Walker___   Shortness of Breath - Isaura's Scale  0 Nothing at all 5 Severe    0.5 Very very slight 6    1 Very slight 7 Very severe   2 Slight 8     3 Moderate 9 Very very severe   4 Somewhat severe 10 Maximal      Time SpO2 Heart Rate Respiratory Rate Modified Isauras Scale Other      Baseline        97    %         81   PRE   20          0           1 minute                    97    %        97            1           2 minutes              93    %        102           2           3 minutes               94    %         103              3     Pt Stopped      4 minutes                      %      Refused   To complete      6  min walk     and pft      5 minutes                  %                           6 minutes                     %                       Recovery   x 1 Min                          %           POST             Recovery   x 2 Min                           %                          Number of Laps_3_ X 170 feet 510 + _30_ additional feet = Total _540_feet  Stopped or paused before 6 minutes? No____ Yes _X___   Pre Blood Pressure: 102 / _61_    Post Blood Pressure:_  _/___    Pt could not control his coughing, became light headed, had to sit down. Said he could not continue walk or do PFT.     Interpretation:

## 2022-08-11 ENCOUNTER — NURSE ONLY (OUTPATIENT)
Dept: FAMILY MEDICINE CLINIC | Age: 67
End: 2022-08-11
Payer: MEDICARE

## 2022-08-11 ENCOUNTER — ANTI-COAG VISIT (OUTPATIENT)
Dept: FAMILY MEDICINE CLINIC | Age: 67
End: 2022-08-11

## 2022-08-11 ENCOUNTER — TELEPHONE (OUTPATIENT)
Dept: PULMONOLOGY | Age: 67
End: 2022-08-11

## 2022-08-11 DIAGNOSIS — E78.2 MIXED HYPERLIPIDEMIA: ICD-10-CM

## 2022-08-11 DIAGNOSIS — Z95.2 H/O MECHANICAL AORTIC VALVE REPLACEMENT: Primary | ICD-10-CM

## 2022-08-11 DIAGNOSIS — Z95.2 S/P AVR: ICD-10-CM

## 2022-08-11 LAB
INTERNATIONAL NORMALIZATION RATIO, POC: 6.4
PROTHROMBIN TIME, POC: 76.3

## 2022-08-11 PROCEDURE — 85610 PROTHROMBIN TIME: CPT | Performed by: FAMILY MEDICINE

## 2022-08-11 PROCEDURE — 99999 PR OFFICE/OUTPT VISIT,PROCEDURE ONLY: CPT | Performed by: STUDENT IN AN ORGANIZED HEALTH CARE EDUCATION/TRAINING PROGRAM

## 2022-08-11 RX ORDER — PANTOPRAZOLE SODIUM 40 MG/1
TABLET, DELAYED RELEASE ORAL
Qty: 90 TABLET | Refills: 1 | Status: SHIPPED | OUTPATIENT
Start: 2022-08-11

## 2022-08-11 RX ORDER — SIMVASTATIN 40 MG
TABLET ORAL
Qty: 90 TABLET | Refills: 1 | Status: SHIPPED | OUTPATIENT
Start: 2022-08-11

## 2022-08-19 ENCOUNTER — ANTI-COAG VISIT (OUTPATIENT)
Dept: FAMILY MEDICINE CLINIC | Age: 67
End: 2022-08-19

## 2022-08-19 ENCOUNTER — NURSE ONLY (OUTPATIENT)
Dept: FAMILY MEDICINE CLINIC | Age: 67
End: 2022-08-19
Payer: MEDICARE

## 2022-08-19 DIAGNOSIS — Z95.2 S/P AVR: ICD-10-CM

## 2022-08-19 DIAGNOSIS — Z95.2 S/P AVR: Primary | Chronic | ICD-10-CM

## 2022-08-19 LAB
INTERNATIONAL NORMALIZATION RATIO, POC: 2.9
PROTHROMBIN TIME, POC: 34.5

## 2022-08-19 PROCEDURE — 85610 PROTHROMBIN TIME: CPT | Performed by: FAMILY MEDICINE

## 2022-08-19 PROCEDURE — 99999 PR OFFICE/OUTPT VISIT,PROCEDURE ONLY: CPT | Performed by: FAMILY MEDICINE

## 2022-08-23 ENCOUNTER — APPOINTMENT (OUTPATIENT)
Dept: CT IMAGING | Age: 67
DRG: 291 | End: 2022-08-23
Payer: OTHER GOVERNMENT

## 2022-08-23 ENCOUNTER — TELEPHONE (OUTPATIENT)
Dept: FAMILY MEDICINE CLINIC | Age: 67
End: 2022-08-23

## 2022-08-23 ENCOUNTER — HOSPITAL ENCOUNTER (INPATIENT)
Age: 67
LOS: 3 days | Discharge: HOME | DRG: 291 | End: 2022-08-26
Attending: INTERNAL MEDICINE | Admitting: INTERNAL MEDICINE
Payer: OTHER GOVERNMENT

## 2022-08-23 ENCOUNTER — APPOINTMENT (OUTPATIENT)
Dept: GENERAL RADIOLOGY | Age: 67
DRG: 291 | End: 2022-08-23
Payer: OTHER GOVERNMENT

## 2022-08-23 DIAGNOSIS — I50.9 ACUTE CONGESTIVE HEART FAILURE, UNSPECIFIED HEART FAILURE TYPE (HCC): Primary | ICD-10-CM

## 2022-08-23 DIAGNOSIS — S09.90XA INJURY OF HEAD, INITIAL ENCOUNTER: ICD-10-CM

## 2022-08-23 DIAGNOSIS — S00.81XA FOREHEAD ABRASION, INITIAL ENCOUNTER: ICD-10-CM

## 2022-08-23 DIAGNOSIS — R06.00 DYSPNEA AND RESPIRATORY ABNORMALITIES: ICD-10-CM

## 2022-08-23 DIAGNOSIS — R06.89 DYSPNEA AND RESPIRATORY ABNORMALITIES: ICD-10-CM

## 2022-08-23 PROBLEM — I50.33 ACUTE ON CHRONIC DIASTOLIC (CONGESTIVE) HEART FAILURE (HCC): Status: ACTIVE | Noted: 2022-08-23

## 2022-08-23 LAB
ADENOVIRUS DETECTION BY PCR: NOT DETECTED
ALBUMIN SERPL-MCNC: 3.9 GM/DL (ref 3.4–5)
ALP BLD-CCNC: 101 IU/L (ref 40–129)
ALT SERPL-CCNC: 23 U/L (ref 10–40)
ANION GAP SERPL CALCULATED.3IONS-SCNC: 11 MMOL/L (ref 4–16)
APTT: 31.2 SECONDS (ref 25.1–37.1)
AST SERPL-CCNC: 30 IU/L (ref 15–37)
BASOPHILS ABSOLUTE: 0 K/CU MM
BASOPHILS RELATIVE PERCENT: 0.6 % (ref 0–1)
BILIRUB SERPL-MCNC: 0.4 MG/DL (ref 0–1)
BORDETELLA PARAPERTUSSIS BY PCR: NOT DETECTED
BORDETELLA PERTUSSIS PCR: NOT DETECTED
BUN BLDV-MCNC: 14 MG/DL (ref 6–23)
CALCIUM SERPL-MCNC: 8.6 MG/DL (ref 8.3–10.6)
CHLAMYDOPHILA PNEUMONIA PCR: NOT DETECTED
CHLORIDE BLD-SCNC: 104 MMOL/L (ref 99–110)
CO2: 23 MMOL/L (ref 21–32)
CORONAVIRUS 229E PCR: NOT DETECTED
CORONAVIRUS HKU1 PCR: NOT DETECTED
CORONAVIRUS NL63 PCR: NOT DETECTED
CORONAVIRUS OC43 PCR: NOT DETECTED
CREAT SERPL-MCNC: 0.8 MG/DL (ref 0.9–1.3)
DIFFERENTIAL TYPE: ABNORMAL
EOSINOPHILS ABSOLUTE: 0.2 K/CU MM
EOSINOPHILS RELATIVE PERCENT: 2.7 % (ref 0–3)
GFR AFRICAN AMERICAN: >60 ML/MIN/1.73M2
GFR NON-AFRICAN AMERICAN: >60 ML/MIN/1.73M2
GLUCOSE BLD-MCNC: 132 MG/DL (ref 70–99)
HCT VFR BLD CALC: 39.2 % (ref 42–52)
HEMOGLOBIN: 12.8 GM/DL (ref 13.5–18)
HUMAN METAPNEUMOVIRUS PCR: NOT DETECTED
IMMATURE NEUTROPHIL %: 0.5 % (ref 0–0.43)
INFLUENZA A BY PCR: NOT DETECTED
INFLUENZA A H1 (2009) PCR: NOT DETECTED
INFLUENZA A H1 PANDEMIC PCR: NOT DETECTED
INFLUENZA A H3 PCR: NOT DETECTED
INFLUENZA B BY PCR: NOT DETECTED
INR BLD: 2.2 INDEX
LYMPHOCYTES ABSOLUTE: 1.1 K/CU MM
LYMPHOCYTES RELATIVE PERCENT: 16.6 % (ref 24–44)
MCH RBC QN AUTO: 32.2 PG (ref 27–31)
MCHC RBC AUTO-ENTMCNC: 32.7 % (ref 32–36)
MCV RBC AUTO: 98.5 FL (ref 78–100)
MONOCYTES ABSOLUTE: 0.7 K/CU MM
MONOCYTES RELATIVE PERCENT: 10.4 % (ref 0–4)
MYCOPLASMA PNEUMONIAE PCR: NOT DETECTED
NUCLEATED RBC %: 0 %
PARAINFLUENZA 1 PCR: NOT DETECTED
PARAINFLUENZA 2 PCR: NOT DETECTED
PARAINFLUENZA 3 PCR: NOT DETECTED
PARAINFLUENZA 4 PCR: NOT DETECTED
PDW BLD-RTO: 15 % (ref 11.7–14.9)
PLATELET # BLD: 225 K/CU MM (ref 140–440)
PMV BLD AUTO: 9.4 FL (ref 7.5–11.1)
POTASSIUM SERPL-SCNC: 4.4 MMOL/L (ref 3.5–5.1)
PRO-BNP: ABNORMAL PG/ML
PROCALCITONIN: 0.02
PROTHROMBIN TIME: 28.6 SECONDS (ref 11.7–14.5)
RBC # BLD: 3.98 M/CU MM (ref 4.6–6.2)
RHINOVIRUS ENTEROVIRUS PCR: NOT DETECTED
RSV PCR: NOT DETECTED
SARS-COV-2, NAAT: NOT DETECTED
SARS-COV-2: NOT DETECTED
SEGMENTED NEUTROPHILS ABSOLUTE COUNT: 4.4 K/CU MM
SEGMENTED NEUTROPHILS RELATIVE PERCENT: 69.2 % (ref 36–66)
SODIUM BLD-SCNC: 138 MMOL/L (ref 135–145)
TOTAL IMMATURE NEUTOROPHIL: 0.03 K/CU MM
TOTAL NUCLEATED RBC: 0 K/CU MM
TOTAL PROTEIN: 5.8 GM/DL (ref 6.4–8.2)
TROPONIN T: <0.01 NG/ML
TROPONIN T: <0.01 NG/ML
WBC # BLD: 6.4 K/CU MM (ref 4–10.5)

## 2022-08-23 PROCEDURE — 2580000003 HC RX 258: Performed by: NURSE PRACTITIONER

## 2022-08-23 PROCEDURE — 84145 PROCALCITONIN (PCT): CPT

## 2022-08-23 PROCEDURE — 6370000000 HC RX 637 (ALT 250 FOR IP): Performed by: INTERNAL MEDICINE

## 2022-08-23 PROCEDURE — 84484 ASSAY OF TROPONIN QUANT: CPT

## 2022-08-23 PROCEDURE — 99223 1ST HOSP IP/OBS HIGH 75: CPT | Performed by: INTERNAL MEDICINE

## 2022-08-23 PROCEDURE — 85610 PROTHROMBIN TIME: CPT

## 2022-08-23 PROCEDURE — 6370000000 HC RX 637 (ALT 250 FOR IP): Performed by: NURSE PRACTITIONER

## 2022-08-23 PROCEDURE — 36415 COLL VENOUS BLD VENIPUNCTURE: CPT

## 2022-08-23 PROCEDURE — 94761 N-INVAS EAR/PLS OXIMETRY MLT: CPT

## 2022-08-23 PROCEDURE — 71045 X-RAY EXAM CHEST 1 VIEW: CPT

## 2022-08-23 PROCEDURE — 87635 SARS-COV-2 COVID-19 AMP PRB: CPT

## 2022-08-23 PROCEDURE — 85730 THROMBOPLASTIN TIME PARTIAL: CPT

## 2022-08-23 PROCEDURE — 80053 COMPREHEN METABOLIC PANEL: CPT

## 2022-08-23 PROCEDURE — 70450 CT HEAD/BRAIN W/O DYE: CPT

## 2022-08-23 PROCEDURE — 6360000002 HC RX W HCPCS: Performed by: PHYSICIAN ASSISTANT

## 2022-08-23 PROCEDURE — 96374 THER/PROPH/DIAG INJ IV PUSH: CPT

## 2022-08-23 PROCEDURE — 0202U NFCT DS 22 TRGT SARS-COV-2: CPT

## 2022-08-23 PROCEDURE — 85025 COMPLETE CBC W/AUTO DIFF WBC: CPT

## 2022-08-23 PROCEDURE — 96375 TX/PRO/DX INJ NEW DRUG ADDON: CPT

## 2022-08-23 PROCEDURE — 1200000000 HC SEMI PRIVATE

## 2022-08-23 PROCEDURE — 93005 ELECTROCARDIOGRAM TRACING: CPT | Performed by: PHYSICIAN ASSISTANT

## 2022-08-23 PROCEDURE — 6370000000 HC RX 637 (ALT 250 FOR IP): Performed by: PHYSICIAN ASSISTANT

## 2022-08-23 PROCEDURE — 94640 AIRWAY INHALATION TREATMENT: CPT

## 2022-08-23 PROCEDURE — 83880 ASSAY OF NATRIURETIC PEPTIDE: CPT

## 2022-08-23 PROCEDURE — 99285 EMERGENCY DEPT VISIT HI MDM: CPT

## 2022-08-23 RX ORDER — IPRATROPIUM BROMIDE AND ALBUTEROL SULFATE 2.5; .5 MG/3ML; MG/3ML
1 SOLUTION RESPIRATORY (INHALATION) EVERY 4 HOURS PRN
Status: DISCONTINUED | OUTPATIENT
Start: 2022-08-23 | End: 2022-08-26 | Stop reason: HOSPADM

## 2022-08-23 RX ORDER — FERROUS SULFATE 325(65) MG
325 TABLET ORAL
Status: DISCONTINUED | OUTPATIENT
Start: 2022-08-24 | End: 2022-08-26 | Stop reason: HOSPADM

## 2022-08-23 RX ORDER — SODIUM CHLORIDE 0.9 % (FLUSH) 0.9 %
5-40 SYRINGE (ML) INJECTION PRN
Status: DISCONTINUED | OUTPATIENT
Start: 2022-08-23 | End: 2022-08-26 | Stop reason: HOSPADM

## 2022-08-23 RX ORDER — ATORVASTATIN CALCIUM 10 MG/1
20 TABLET, FILM COATED ORAL DAILY
Status: DISCONTINUED | OUTPATIENT
Start: 2022-08-23 | End: 2022-08-26 | Stop reason: HOSPADM

## 2022-08-23 RX ORDER — POLYETHYLENE GLYCOL 3350 17 G/17G
17 POWDER, FOR SOLUTION ORAL DAILY PRN
Status: DISCONTINUED | OUTPATIENT
Start: 2022-08-23 | End: 2022-08-26 | Stop reason: HOSPADM

## 2022-08-23 RX ORDER — ASPIRIN 81 MG/1
81 TABLET, CHEWABLE ORAL DAILY
Status: DISCONTINUED | OUTPATIENT
Start: 2022-08-23 | End: 2022-08-26 | Stop reason: HOSPADM

## 2022-08-23 RX ORDER — FUROSEMIDE 10 MG/ML
40 INJECTION INTRAMUSCULAR; INTRAVENOUS 2 TIMES DAILY
Status: COMPLETED | OUTPATIENT
Start: 2022-08-24 | End: 2022-08-24

## 2022-08-23 RX ORDER — FUROSEMIDE 10 MG/ML
20 INJECTION INTRAMUSCULAR; INTRAVENOUS ONCE
Status: COMPLETED | OUTPATIENT
Start: 2022-08-23 | End: 2022-08-23

## 2022-08-23 RX ORDER — SODIUM CHLORIDE 0.9 % (FLUSH) 0.9 %
5-40 SYRINGE (ML) INJECTION EVERY 12 HOURS SCHEDULED
Status: DISCONTINUED | OUTPATIENT
Start: 2022-08-23 | End: 2022-08-26 | Stop reason: HOSPADM

## 2022-08-23 RX ORDER — GUAIFENESIN 600 MG/1
600 TABLET, EXTENDED RELEASE ORAL 2 TIMES DAILY
Status: DISCONTINUED | OUTPATIENT
Start: 2022-08-23 | End: 2022-08-26 | Stop reason: HOSPADM

## 2022-08-23 RX ORDER — VENLAFAXINE HYDROCHLORIDE 37.5 MG/1
150 CAPSULE, EXTENDED RELEASE ORAL
Status: DISCONTINUED | OUTPATIENT
Start: 2022-08-24 | End: 2022-08-26 | Stop reason: HOSPADM

## 2022-08-23 RX ORDER — ONDANSETRON 2 MG/ML
4 INJECTION INTRAMUSCULAR; INTRAVENOUS EVERY 6 HOURS PRN
Status: DISCONTINUED | OUTPATIENT
Start: 2022-08-23 | End: 2022-08-26 | Stop reason: HOSPADM

## 2022-08-23 RX ORDER — WARFARIN SODIUM 2.5 MG/1
5 TABLET ORAL DAILY
Status: DISCONTINUED | OUTPATIENT
Start: 2022-08-23 | End: 2022-08-24

## 2022-08-23 RX ORDER — PANTOPRAZOLE SODIUM 40 MG/1
40 TABLET, DELAYED RELEASE ORAL
Status: DISCONTINUED | OUTPATIENT
Start: 2022-08-24 | End: 2022-08-26 | Stop reason: HOSPADM

## 2022-08-23 RX ORDER — SODIUM CHLORIDE 9 MG/ML
INJECTION, SOLUTION INTRAVENOUS PRN
Status: DISCONTINUED | OUTPATIENT
Start: 2022-08-23 | End: 2022-08-26 | Stop reason: HOSPADM

## 2022-08-23 RX ORDER — ACETAMINOPHEN 650 MG/1
650 SUPPOSITORY RECTAL EVERY 6 HOURS PRN
Status: DISCONTINUED | OUTPATIENT
Start: 2022-08-23 | End: 2022-08-26 | Stop reason: HOSPADM

## 2022-08-23 RX ORDER — ALBUTEROL SULFATE 90 UG/1
2 AEROSOL, METERED RESPIRATORY (INHALATION) ONCE
Status: COMPLETED | OUTPATIENT
Start: 2022-08-23 | End: 2022-08-23

## 2022-08-23 RX ORDER — ONDANSETRON 4 MG/1
4 TABLET, ORALLY DISINTEGRATING ORAL EVERY 8 HOURS PRN
Status: DISCONTINUED | OUTPATIENT
Start: 2022-08-23 | End: 2022-08-26 | Stop reason: HOSPADM

## 2022-08-23 RX ORDER — ALBUTEROL SULFATE 90 UG/1
2 AEROSOL, METERED RESPIRATORY (INHALATION) EVERY 4 HOURS PRN
Status: DISCONTINUED | OUTPATIENT
Start: 2022-08-23 | End: 2022-08-26 | Stop reason: HOSPADM

## 2022-08-23 RX ORDER — IPRATROPIUM BROMIDE AND ALBUTEROL SULFATE 2.5; .5 MG/3ML; MG/3ML
1 SOLUTION RESPIRATORY (INHALATION)
Status: DISCONTINUED | OUTPATIENT
Start: 2022-08-23 | End: 2022-08-23

## 2022-08-23 RX ORDER — PREDNISONE 20 MG/1
40 TABLET ORAL DAILY
Status: DISCONTINUED | OUTPATIENT
Start: 2022-08-24 | End: 2022-08-26 | Stop reason: HOSPADM

## 2022-08-23 RX ORDER — METHYLPREDNISOLONE SODIUM SUCCINATE 125 MG/2ML
60 INJECTION, POWDER, LYOPHILIZED, FOR SOLUTION INTRAMUSCULAR; INTRAVENOUS ONCE
Status: COMPLETED | OUTPATIENT
Start: 2022-08-23 | End: 2022-08-23

## 2022-08-23 RX ORDER — METHYLPREDNISOLONE SODIUM SUCCINATE 40 MG/ML
40 INJECTION, POWDER, LYOPHILIZED, FOR SOLUTION INTRAMUSCULAR; INTRAVENOUS EVERY 8 HOURS
Status: CANCELLED | OUTPATIENT
Start: 2022-08-24 | End: 2022-08-25

## 2022-08-23 RX ORDER — AMOXICILLIN AND CLAVULANATE POTASSIUM 875; 125 MG/1; MG/1
1 TABLET, FILM COATED ORAL EVERY 12 HOURS SCHEDULED
Status: DISCONTINUED | OUTPATIENT
Start: 2022-08-23 | End: 2022-08-26 | Stop reason: HOSPADM

## 2022-08-23 RX ORDER — CETIRIZINE HYDROCHLORIDE 10 MG/1
10 TABLET ORAL
Status: DISCONTINUED | OUTPATIENT
Start: 2022-08-24 | End: 2022-08-26 | Stop reason: HOSPADM

## 2022-08-23 RX ORDER — ACETAMINOPHEN 325 MG/1
650 TABLET ORAL EVERY 6 HOURS PRN
Status: DISCONTINUED | OUTPATIENT
Start: 2022-08-23 | End: 2022-08-26 | Stop reason: HOSPADM

## 2022-08-23 RX ORDER — IPRATROPIUM BROMIDE AND ALBUTEROL SULFATE 2.5; .5 MG/3ML; MG/3ML
1 SOLUTION RESPIRATORY (INHALATION) ONCE
Status: DISCONTINUED | OUTPATIENT
Start: 2022-08-23 | End: 2022-08-23

## 2022-08-23 RX ORDER — DOXYCYCLINE HYCLATE 100 MG
100 TABLET ORAL EVERY 12 HOURS
Status: DISCONTINUED | OUTPATIENT
Start: 2022-08-23 | End: 2022-08-23

## 2022-08-23 RX ORDER — ALBUTEROL SULFATE 90 UG/1
2 AEROSOL, METERED RESPIRATORY (INHALATION) 4 TIMES DAILY
Status: DISCONTINUED | OUTPATIENT
Start: 2022-08-23 | End: 2022-08-26 | Stop reason: HOSPADM

## 2022-08-23 RX ADMIN — SODIUM CHLORIDE, PRESERVATIVE FREE 10 ML: 5 INJECTION INTRAVENOUS at 20:18

## 2022-08-23 RX ADMIN — METHYLPREDNISOLONE SODIUM SUCCINATE 60 MG: 125 INJECTION, POWDER, FOR SOLUTION INTRAMUSCULAR; INTRAVENOUS at 14:29

## 2022-08-23 RX ADMIN — GUAIFENESIN 600 MG: 600 TABLET, EXTENDED RELEASE ORAL at 20:17

## 2022-08-23 RX ADMIN — ATORVASTATIN CALCIUM 20 MG: 10 TABLET, FILM COATED ORAL at 20:17

## 2022-08-23 RX ADMIN — ALBUTEROL SULFATE 2 PUFF: 90 AEROSOL, METERED RESPIRATORY (INHALATION) at 20:15

## 2022-08-23 RX ADMIN — AMOXICILLIN AND CLAVULANATE POTASSIUM 1 TABLET: 875; 125 TABLET, FILM COATED ORAL at 17:52

## 2022-08-23 RX ADMIN — ALBUTEROL SULFATE 2 PUFF: 90 AEROSOL, METERED RESPIRATORY (INHALATION) at 15:31

## 2022-08-23 RX ADMIN — Medication 2 PUFF: at 20:17

## 2022-08-23 RX ADMIN — Medication 2 PUFF: at 15:31

## 2022-08-23 RX ADMIN — FUROSEMIDE 20 MG: 10 INJECTION, SOLUTION INTRAVENOUS at 15:50

## 2022-08-23 RX ADMIN — WARFARIN SODIUM 5 MG: 2.5 TABLET ORAL at 20:47

## 2022-08-23 ASSESSMENT — ENCOUNTER SYMPTOMS
SINUS PRESSURE: 1
ABDOMINAL PAIN: 0
SHORTNESS OF BREATH: 1
COUGH: 1

## 2022-08-23 ASSESSMENT — PAIN SCALES - GENERAL: PAINLEVEL_OUTOF10: 0

## 2022-08-23 NOTE — H&P
History and Physical      Name:  Shashi Amezcua /Age/Sex: 1955  (73 y.o. male)   MRN & CSN:  [id number] & [id number] Admission Date/Time: 2022 12:19 PM   Location:  ED30/ED-30 PCP: Maggy Dumont MD       Hospital Day: 1     Attending physician: Dr. Matilde Mcclain and Plan:   Shashi Amezcua is a 73 y.o.  male  who presents with Shortness of breath    Assessment and plan:     Acute on chronic diastolic heart failure: Chest x-ray demonstrated findings consistent with CHF and/or pneumonitis atypical pneumonia. No detectable pleural effusion or large area of pulmonary consolidation. Last echo-() EF 40 to 45%, mild LVH. -Inpatient  -Diuresis-Lasix 40 mg twice daily x2 doses  -Cardiology following  -BMP with reflex to mag daily  -Intake and output  -Diet restrictions low-sodium and 2 L fluid    Sinusitis-sphenoid, right maxillary: CT head-no acute care normally, thickened secretions in the sphenoid sinuses and right maxillary sinus. Patient endorses frontal head pressure  Bronchiectasis, history of Mycobacterium AVM TBA .  -Augmentin twice daily for 7 days  -Continue Mucinex twice daily  -Acapella  -Mucinex  -Viral respiratory PCR negative  -prednisone 40 mg daily    Posttussive syncope  Head injury on anticoagulation  -Patient states has had a coughing fit as syncopal episode and fell and hit his head right forehead. CT head negative  -PT OT eval and treat    Status post AVR-mechanical aortic valve:    Therapeutic INR-2.2  -Continue Coumadin  -Consult to pharmacy for Coumadin dosing  -PT/INR daily    Other chronic medical conditions  -Dyslipidemia: Continue statin therapy  lipids in a.m. per cardiology  -History of chronic lung disease  Mycobacterium AVM complex  -Hypertension  -Mild CAD of LAD with calcification-continue statin, and baby aspirin per cardiology, not on beta-blocker due to bradycardia    Chronic Conditions: Continue all home medications except as stated above or contraindicated. BMI 26.71: kg/m2 Life style modifications    Disposition: Inpatient. Patient was accepted for continue evaluation and treatment and improvement of clinical symptoms. Discussed assessment and treatment plan with the admitting and supervising physician who agrees with the plan. Diet Cardiac   DVT Prophylaxis [] Lovenox, []  Heparin, [] SCDs, [x] Warfarin  [] NOAC     GI Prophylaxis [x] PPI,  [] H2 Blocker,  [] Carafate,  [] Diet/Tube Feeds   Code Status Full  ADM - sister Trang     History of Present Illness:     Chief Complaint: Shortness of breath  Casa Christian is a 73 y.o.  male, with past medical history significant for diastolic heart failure, CKD, dyslipidemia, status post AVR mechanical valve, chronic anticoagulation, hypertension, Mycobacterium avium complex who presented to the emergency room with complaint of redness of breath. The present condition started 7 days ago patient states he started to feel more short of breath states on Sunday he started to have a worsening cough or shortness of breath states \"I coughed so hard he passed out \"patient states he did fall forward and hit his head on a carpeted floor. He is anticoagulated on Coumadin for aortic valve replacement. Patient denies any headache, neck pain or vision changes. Denies any other episodes of posttussive syncope since Sunday. Patient denies any numbness or tingling or focal weaknesses in extremities. Patient denies any chest pain or palpitations. Denies any pain with deep breaths or pleuritic pain. She does endorse having productive cough that was yellow to green at times. Patient denies any dysuria, urgency or frequency denies any abdominal pain nausea vomiting. Patient states he came to emergency room today due to worsening shortness of breath with exertion. On Examination,the patient is able to state history and onset of symptoms.   Cardiology was consulted from the emergency room recommended admission for diaphoresis for exacerbation of CHF versus bronchiectasis. Patient was treated emergency room with nebulizers, 20 mg IV Lasix with improvement in symptoms. At the ED, vital signs;T97.9,HR 97, RR 16, /87 mm/hg,SPO2 98% RA. Significant laboratories were the following: Creatinine 0.8, glucose 132, proBNP 10,533, H&H-12.8/39.2  EKG-sinus rhythm, heart rate 86, QTc 473, nonspecific T wave changes. Reviewed  The patient was brought to the ED and was subsequently admitted for  further evaluation. and management          Review of Systems   Constitutional:  Negative for chills, fatigue and fever. HENT:  Positive for congestion and sinus pressure. Respiratory:  Positive for cough and shortness of breath. Cardiovascular:  Positive for chest pain. Gastrointestinal:  Negative for abdominal pain. Genitourinary:  Negative for dysuria, frequency and urgency. Musculoskeletal: Negative. Skin: Negative. Neurological:  Positive for syncope. Negative for dizziness and light-headedness. Hematological:  Bruises/bleeds easily. Psychiatric/Behavioral: Negative. Objective:   No intake or output data in the 24 hours ending 08/23/22 1636   Vitals:   Vitals:    08/23/22 1551   BP: (!) 121/91   Pulse: 92   Resp:    Temp:    SpO2: 97%     Physical Exam:   GEN- Awake male, NAD, sitting up in bed, appears to be stated age. EYES- Pupils are equally round. Rhenda Izabel HENT- Mucous membranes are moist.   NECK- Supple, no apparent thyromegaly or masses. RESP-coarse breath sounds right lower lobe otherwise lung sounds are clear, symmetric chest movement while on room air. Respirations nonlabored,  CARDIO/VASC-   Regular rate and rhythm,  Peripheral pulses equal bilaterally and palpable. GI- Abdomen is soft, no tenderness, masses, or guarding. Bowel sounds present. MSK- No gross joint deformities.   EXTREMITIES- pulses intact, nonpitting lower extremity edema  SKIN- Normal coloration, warm, dry.  NEURO-Cranial nerves appear grossly intact, normal speech, no lateralizing weakness. PSYCH-Awake, alert, oriented x 4. Past Medical History:      Past Medical History:   Diagnosis Date    Acid reflux     Acute frontal sinusitis 07/22/2009    Alcohol abuse     \"I Drink Average 2 Beers A Day\"    Anesthesia     \" I get agitated\"    Anxiety     Arthritis     \"Right Shoulder, Neck, Left Knee\"    Atypical mycobacterial infection     Broken teeth     Upper And Lower     CHF (congestive heart failure) ([facility])     COPD (chronic obstructive pulmonary disease) ([facility])     Sees Dr. Arabella Velarde In [place], [address]     Cough     Frequent Cough With Green Sputum    Depression     Depression     Dyspnea 02/23/2018    H/O cardiac catheterization 05/17/2022    LAD: Moderate Lumen Irregularity. CALCIFIED mid LAD segment. H/O cardiovascular MUGA stress test 05/14/2019    EF 50%, NORMAL LVEF, NORMAL WALL MOTION. H/O echocardiogram 05/22/2014    CW=>96-33%, LV systolic function is low normal, mild aortic valve calcification, no pericardial effusion    H/O echocardiogram 12/12/2018    EF 45-50%    History of 24 hour EKG monitoring 06/06/2014    24 hr holter monitor. Ventricular ectopics were noted in single and couplet forms. Supraventricular ectopics were noted in single and pair forms. [place] (hard of hearing)     Bilateral Ears    Hyperlipidemia     Hypertension     Irritant contact dermatitis due to other chemical products 08/26/2019    Mycobacterium avium complex ([facility] [address]     Other drug allergy(995.27) 07/22/2009    S/P AVR 12/2003    Mechanical valvue     Shortness of breath     Teeth missing     Upper And Lower     PSHX:  has a past surgical history that includes Lung biopsy (Right, 1996); Rotator cuff repair (Right, 2008); Dental surgery; Cardiac valve replacement (12/17/2003); Colonoscopy (2006); Endoscopy, colon, diagnostic (In Past); Tonsillectomy (1959 Or 1960); bronchoscopy (1996);  Knee arthroscopy (Left, 1992); other surgical history (1992); HEMIARTHROPLASTY SHOULDER FRACTURE (Right, 1/29/2019); Total hip arthroplasty (Right, 10/25/2021); and Upper gastrointestinal endoscopy (N/A, 11/10/2021). Allergies: Allergies   Allergen Reactions    Ciprofloxacin Hcl Hives and Swelling    Levaquin [Levofloxacin] Hives and Swelling    Other Nausea And Vomiting     \"Allergic To Tylox\"    Ceftin [Cefuroxime]     Lisinopril      cough       FAM HX: family history includes Asthma in his sister; COPD in his father and sister; Cancer in his mother; Diabetes in his mother and sister; Heart Disease in his father; High Blood Pressure in his mother and sister; High Cholesterol in his mother and sister; No Known Problems in his son; Obesity in his mother; Other in his sister; Vision Loss in his mother. Soc HX:   Social History     Socioeconomic History    Marital status:      Spouse name: None    Number of children: 1    Years of education: 12    Highest education level: None   Tobacco Use    Smoking status: Never    Smokeless tobacco: Current     Types: Snuff, Chew   Vaping Use    Vaping Use: Never used   Substance and Sexual Activity    Alcohol use: Yes     Alcohol/week: 8.0 - 10.0 standard drinks     Types: 8 - 10 Cans of beer per week     Comment: 5-6 can beer daily    Drug use: No    Sexual activity: Not Currently     Social Determinants of Health     Financial Resource Strain: Low Risk     Difficulty of Paying Living Expenses: Not hard at all   Food Insecurity: No Food Insecurity    Worried About Running Out of Food in the Last Year: Never true    Ran Out of Food in the Last Year: Never true   Physical Activity: Insufficiently Active    Days of Exercise per Week: 5 days    Minutes of Exercise per Session: 20 min       Social and family history reviewed with patient/family. Medications:     Home Medication   Prior to Admission medications    Medication Sig Start Date End Date Taking?  Authorizing Provider simvastatin (ZOCOR) 40 MG tablet take 1 tablet by mouth at bedtime 8/11/22  Yes Alka Bateman MD   pantoprazole (PROTONIX) 40 MG tablet 40 mg PO daily 8/11/22  Yes Alka Bateman MD   albuterol-ipratropium (COMBIVENT RESPIMAT)  MCG/ACT AERS inhaler Inhale 1 puff into the lungs every 6 hours as needed for Wheezing 7/11/22  Yes Nayeli Powell MD   albuterol sulfate HFA (PROVENTIL;VENTOLIN;PROAIR) 108 (90 Base) MCG/ACT inhaler inhale 2 puffs by mouth and INTO THE LUNGS every 4 hours if needed 7/8/22  Yes Alka Bateman MD   warfarin (COUMADIN) 5 MG tablet take 1-2 tablets by mouth daily as directed 5/23/22  Yes Alka Bateman MD   venlafaxine (EFFEXOR XR) 150 MG extended release capsule Take 1 capsule by mouth daily 5/23/22  Yes Alka Bateman MD   Calcium Carbonate-Vitamin D (CALCIUM-VITAMIN D3 PO) Take by mouth   Yes Historical Provider, MD   albuterol (PROVENTIL) (2.5 MG/3ML) 0.083% nebulizer solution Take 3 mLs by nebulization every 6 hours as needed for Wheezing or Shortness of Breath 3/30/22  Yes Denilson Haro PA-C   sodium chloride, Inhalant, 3 % nebulizer solution Take by nebulization as needed for Other (COPD/shortness of breath) 3/30/22  Yes Denilson Haro PA-C   ferrous sulfate (IRON 325) 325 (65 Fe) MG tablet Take 1 tablet by mouth daily (with breakfast) 3/18/22  Yes Alka Bateman MD   guaiFENesin (MUCINEX) 600 MG extended release tablet Take 1,200 mg by mouth every 12 hours   Yes Historical Provider, MD   KRILL OIL OMEGA-3 PO Take by mouth   Yes Historical Provider, MD   cetirizine (ZYRTEC) 10 MG tablet Take 10 mg by mouth \"Alternate With Singulair\"   Yes Historical Provider, MD   NASAL SPRAY SALINE NA by Nasal route daily Over The Counter   Yes Historical Provider, MD   Multiple Vitamins-Minerals (CENTRUM PO) Take by mouth daily   Yes Historical Provider, MD   triamcinolone (KENALOG) 0.1 % cream Apply topically 2 times daily.  3/8/22   Doc Denny Brando Mcleod MD   ipratropium-albuterol (DUONEB) 0.5-2.5 (3) MG/3ML SOLN nebulizer solution Inhale 3 mLs into the lungs 4 times daily  Patient not taking: No sig reported 8/27/20   Ximena Martin MD   Acetaminophen (TYLENOL 8 HOUR PO) Take 500 mg by mouth as needed Over The Counter    Historical Provider, MD   Nebulizer MISC Inhale into the lungs as needed    Historical Provider, MD     Medications:    aspirin  81 mg Oral Daily      Infusions:   PRN Meds:      Recent Labs     08/23/22  1310   WBC 6.4   HGB 12.8*   HCT 39.2*         Recent Labs     08/23/22  1310      K 4.4      CO2 23   BUN 14   CREATININE 0.8*     Recent Labs     08/23/22  1310   AST 30   ALT 23   BILITOT 0.4   ALKPHOS 101     Recent Labs     08/23/22  1310   INR 2.20     Recent Labs     08/23/22  1310   [address] <0.010        Imaging reviewed  CT HEAD WO CONTRAST    Result Date: 8/23/2022  EXAMINATION: CT OF THE HEAD WITHOUT CONTRAST  8/23/2022 1:25 pm TECHNIQUE: CT of the head was performed without the administration of intravenous contrast. Automated exposure control, iterative reconstruction, and/or weight based adjustment of the mA/kV was utilized to reduce the radiation dose to as low as reasonably achievable. COMPARISON: 10/25/2021. HISTORY: ORDERING SYSTEM PROVIDED HISTORY: fall head injury on Sunday on coumadin TECHNOLOGIST PROVIDED HISTORY: Has a \"code stroke\" or \"stroke alert\" been called? ->No Reason for exam:->fall head injury on Sunday on coumadin Decision Support Exception - unselect if not a suspected or confirmed emergency medical condition->Emergency Medical Condition (MA) Reason for Exam: fall head injury on Sunday on coumadin FINDINGS: BRAIN/VENTRICLES: The cerebral and cerebellar parenchyma demonstrate volume loss. No areas of hemorrhage, mass, or midline shift. No abnormal extra-axial fluid collections. The ventricles are proportional to the cerebral sulci.  There is an area of encephalomalacia along the lateral left frontal lobe, stable. No abnormal extra-axial fluid collections. The ventricles are proportional to the cerebral sulci. Gray-white differentiation is maintained. ORBITS: The visualized portion of the orbits demonstrate no acute abnormality. SINUSES: There is scattered paranasal sinus disease with thickened secretions in the sphenoid sinuses and right maxillary sinus, correlate with signs of acute sinusitis. The mastoid air cells are clear. SOFT TISSUES/SKULL:  The calvarium is intact. No appreciable scalp soft tissue swelling. 1. No acute intracranial abnormality. 2. Thickened secretions in the sphenoid sinuses and right maxillary sinus, correlate with signs of acute sinusitis. CT CHEST WO CONTRAST    Result Date: 8/10/2022  EXAMINATION: CT OF THE CHEST WITHOUT CONTRAST 8/8/2022 12:23 pm TECHNIQUE: CT of the chest was performed without the administration of intravenous contrast. Multiplanar reformatted images are provided for review. Automated exposure control, iterative reconstruction, and/or weight based adjustment of the mA/kV was utilized to reduce the radiation dose to as low as reasonably achievable. COMPARISON: 08/18/2020 HISTORY: ORDERING SYSTEM PROVIDED HISTORY: Bronchiectasis without complication ([facility] [address] TECHNOLOGIST PROVIDED HISTORY: Reason for Exam: Bronchiectasis without complication Additional signs and symptoms: none Relevant Medical/Surgical History: hx of copd FINDINGS: Mediastinum: Enlarged right lower paratracheal lymph node measuring 1.3 cm in short axis (2:36). A few calcified right hilar and subcarinal lymph nodes. . No pericardial effusion. No mass. Prosthetic aortic valve. Mild cardiomegaly. Mitral valve annulus calcifications. Heavy coronary artery calcifications. Mild to moderate calcifications of the thoracic aorta and its branches. Lungs/pleura: Mild centrilobular emphysema.   Unchanged bronchiectasis throughout the lower lobes bilaterally and involving the left upper lobe. A few scattered impacted bronchioles. Tree-in-bud nodular opacities in the medial aspect of the right upper lobe and to a lesser extent within the right lower lobe. Ground-glass opacities in the right lower lobe anteriorly. Erleen Blair No pleural effusion or pneumothorax. Upper Abdomen: Multiple scattered calcified granulomas throughout the liver and spleen. . Soft Tissues/Bones: Normal soft tissues. No acute osseous abnormality. Status post CABG. Mild multilevel degenerative disc disease. Partially visualized right shoulder arthroplasty. Unchanged bronchiectasis predominantly involving the lung bases and left upper lobe with a few associated impacted bronchioles. Tree-in-bud nodular opacities in the right upper lobe and right lower lobe likely secondary to underlying infectious/inflammatory etiology. Ground-glass opacity in the right lower lobe, which could be secondary to underlying infectious/inflammatory etiology versus aspiration. Mediastinal lymphadenopathy, likely reactive. The findings were sent to the Radiology Results [id number] at 3:15 pm on 8/10/2022 to be communicated to a licensed caregiver. XR CHEST PORTABLE    Result Date: 8/23/2022  EXAMINATION: ONE XRAY VIEW OF THE CHEST 8/23/2022 12:29 pm COMPARISON: 03/30/2022 at 1139 hours HISTORY: ORDERING SYSTEM PROVIDED HISTORY: chest pain TECHNOLOGIST PROVIDED HISTORY: Reason for exam:->chest pain Reason for Exam: sob   chest pain FINDINGS: Cardiomegaly, diffuse bronchovascular marking prominence subjectively worse compared to 03/30/2022. Findings are consistent with congestive heart failure and or pneumonitis/atypical pneumonia with underlying chronic lung disease. No pulmonary consolidations, detectable pleural effusions, pneumothorax or mediastinal widening. Stable postoperative changes in the AP view. Findings consistent with congestive heart failure and/or pneumonitis/atypical pneumonia.   Findings are accentuated by underlying chronic lung disease without detectable pleural effusion or large area pulmonary consolidation.           Electronically signed by VANGIE De Leon CNP on 8/23/2022 at 4:36 PM

## 2022-08-23 NOTE — CONSULTS
missing. Past surgical history:   has a past surgical history that includes Lung biopsy (Right, 1996); Rotator cuff repair (Right, 2008); Dental surgery; Cardiac valve replacement (12/17/2003); Colonoscopy (2006); Endoscopy, colon, diagnostic (In Past); Tonsillectomy (1959 Or 1960); bronchoscopy (1996); Knee arthroscopy (Left, 1992); other surgical history (1992); HEMIARTHROPLASTY SHOULDER FRACTURE (Right, 1/29/2019); Total hip arthroplasty (Right, 10/25/2021); and Upper gastrointestinal endoscopy (N/A, 11/10/2021). Social History:   reports that he has never smoked. His smokeless tobacco use includes snuff and chew. He reports current alcohol use of about 8.0 - 10.0 standard drinks per week. He reports that he does not use drugs.   Family history:   no family history of CAD, STROKE of DM    Allergies   Allergen Reactions    Ciprofloxacin Hcl Hives and Swelling    Levaquin [Levofloxacin] Hives and Swelling    Other Nausea And Vomiting     \"Allergic To Tylox\"    Ceftin [Cefuroxime]     Lisinopril      cough       furosemide (LASIX) injection 20 mg, Once      Current Facility-Administered Medications   Medication Dose Route Frequency Provider Last Rate Last Admin    furosemide (LASIX) injection 20 mg  20 mg IntraVENous Once Alexis Galaviz PA-C         Current Outpatient Medications   Medication Sig Dispense Refill    simvastatin (ZOCOR) 40 MG tablet take 1 tablet by mouth at bedtime 90 tablet 1    pantoprazole (PROTONIX) 40 MG tablet 40 mg PO daily 90 tablet 1    albuterol-ipratropium (COMBIVENT RESPIMAT)  MCG/ACT AERS inhaler Inhale 1 puff into the lungs every 6 hours as needed for Wheezing 1 each 1    albuterol sulfate HFA (PROVENTIL;VENTOLIN;PROAIR) 108 (90 Base) MCG/ACT inhaler inhale 2 puffs by mouth and INTO THE LUNGS every 4 hours if needed 18 g 5    warfarin (COUMADIN) 5 MG tablet take 1-2 tablets by mouth daily as directed 60 tablet 2    venlafaxine (EFFEXOR XR) 150 MG extended release capsule Take 1 capsule by mouth daily 30 capsule 5    Calcium Carbonate-Vitamin D (CALCIUM-VITAMIN D3 PO) Take by mouth      albuterol (PROVENTIL) (2.5 MG/3ML) 0.083% nebulizer solution Take 3 mLs by nebulization every 6 hours as needed for Wheezing or Shortness of Breath (Patient not taking: Reported on 7/11/2022) 125 each 0    sodium chloride, Inhalant, 3 % nebulizer solution Take by nebulization as needed for Other (COPD/shortness of breath) 125 mL 0    ferrous sulfate (IRON 325) 325 (65 Fe) MG tablet Take 1 tablet by mouth daily (with breakfast) 90 tablet 1    CALCIUM PO Take by mouth      triamcinolone (KENALOG) 0.1 % cream Apply topically 2 times daily.  453.6 g 1    melatonin 10 MG CAPS capsule Take 10 mg by mouth nightly      guaiFENesin (MUCINEX) 600 MG extended release tablet Take 1,200 mg by mouth every 12 hours      ferrous sulfate (FE TABS) 325 (65 Fe) MG EC tablet Take 1 tablet by mouth 2 times daily 90 tablet 3    ipratropium-albuterol (DUONEB) 0.5-2.5 (3) MG/3ML SOLN nebulizer solution Inhale 3 mLs into the lungs 4 times daily (Patient not taking: Reported on 7/11/2022) 360 mL 5    KRILL OIL OMEGA-3 PO Take by mouth      cetirizine (ZYRTEC) 10 MG tablet Take 10 mg by mouth \"Alternate With Singulair\"      NASAL SPRAY SALINE NA by Nasal route daily Over The Counter      Acetaminophen (TYLENOL 8 HOUR PO) Take 500 mg by mouth as needed Over The Counter      Multiple Vitamins-Minerals (CENTRUM PO) Take by mouth daily      Nebulizer MISC Inhale into the lungs as needed       Review of Systems:   Constitutional: No Fever or Weight Loss   Eyes: No Decreased Vision  ENT: No Headaches, Hearing Loss or Vertigo  Cardiovascular: No chest pain, dyspnea on exertion, palpitations or loss of consciousness  Respiratory: No cough or wheezing    Gastrointestinal: No abdominal pain, appetite loss, blood in stools, constipation, diarrhea or heartburn  Genitourinary: No dysuria, trouble voiding, or hematuria  Musculoskeletal:  No gait disturbance, weakness or joint complaints  Integumentary: No rash or pruritis  Neurological: No TIA or stroke symptoms  Psychiatric: No anxiety or depression  Endocrine: No malaise, fatigue or temperature intolerance  Hematologic/Lymphatic: No bleeding problems, blood clots or swollen lymph nodes  Allergic/Immunologic: No nasal congestion or hives  All systems negative except as marked. Physical Examination:    Vitals:    08/23/22 1528   BP: 103/82   Pulse: 88   Resp: 18   Temp: afebrile   SpO2: 98%      Wt Readings from Last 3 Encounters:   07/11/22 155 lb 9.6 oz (70.6 kg)   07/08/22 156 lb (70.8 kg)   05/26/22 156 lb (70.8 kg)     There is no height or weight on file to calculate BMI. General Appearance:  No distress, conversant    Constitutional:  Well developed, Well nourished, No acute distress, Non-toxic appearance. HENT:  Normocephalic, Atraumatic, Bilateral external ears normal, Oropharynx moist, No oral exudates, Nose normal. Neck- Normal range of motion, No tenderness, Supple, No stridor,no apical-carotid delay, no carotid bruit  Eyes:  PERRL, EOMI, Conjunctiva normal, No discharge. Respiratory:decreased breath sounds, No respiratory distress, No wheezing, No chest tenderness. ,no use of accessory muscles, diaphragm movement is normal  Cardiovascular: (PMI) apex non displaced,no lifts no thrills, no s3,no s4, Normal heart rate, Normal rhythm, No murmurs, No rubs, No gallops. Carotid arteries pulse and amplitude are normal no bruit, no abdominal bruit noted ( normal abdominal aorta ausculation), femoral arteries pulse and amplitude are normal no bruit, pedal pulses are normal  GI:  Bowel sounds normal, Soft, No tenderness, No masses, No pulsatile masses, no hepatosplenomegally, no bruits  : External genitalia appear normal, No masses or lesions. No discharge. No CVA tenderness. Musculoskeletal:  Intact distal pulses, No edema, No tenderness, No cyanosis, No clubbing.  Good range of motion in all major joints. No tenderness to palpation or major deformities noted. Back- No tenderness. Integument:  Warm, Dry, No erythema, No rash. Skin: no rash, no ulcers  Lymphatic:  No lymphadenopathy noted. Neurologic:  Alert & oriented x 3, Normal motor function, Normal sensory function, No focal deficits noted. Psychiatric:  Affect normal, Judgment normal, Mood normal.   Lab Review   Recent Labs     08/23/22  1310   WBC 6.4   HGB 12.8*   HCT 39.2*         Recent Labs     08/23/22  1310      K 4.4      CO2 23   BUN 14   CREATININE 0.8*     Recent Labs     08/23/22  1310   AST 30   ALT 23   BILITOT 0.4   ALKPHOS 101     No results for input(s): TROPONINI in the last 72 hours. No results found for: BNP  Lab Results   Component Value Date    INR 2.20 08/23/2022    PROTIME 28.6 (H) 08/23/2022         EKG: Sinus    Chest Xray: Mild congestive    ECHO: Ending  Labs, echo, meds reviewed  Assessment: 73 y. o.year old with PMH of  has a past medical history of Acid reflux, Acute frontal sinusitis, Alcohol abuse, Anesthesia, Anxiety, Arthritis, Atypical mycobacterial infection, Broken teeth, CHF (congestive heart failure) (HCC), COPD (chronic obstructive pulmonary disease) ([address] Cough, Depression, Depression, Dyspnea, H/O cardiac catheterization, H/O cardiovascular MUGA stress test, H/O echocardiogram, H/O echocardiogram, History of 24 hour EKG monitoring, [place] (hard of hearing), Hyperlipidemia, Hypertension, Irritant contact dermatitis due to other chemical products, Mycobacterium avium complex ([address] Other drug allergy(995.27), S/P AVR, Shortness of breath, and Teeth missing.       Recommendations:    Shortness of breath, combination of CHF exacerbation and history of bronchiectasis, his lungs sound diminished breath sounds most likely has a COPD exacerbation also, will recommend to get admitted to the hospital start IV Lasix and nebulizer treatment with steroids   Mild CAD of mild LAD with

## 2022-08-23 NOTE — ED PROVIDER NOTES
12 lead EKG per my interpretation:  Normal Sinus Rhythm at 86  Axis is   Normal  QTc is  within an acceptable range  There is no specific T wave changes appreciated; nonspecific T wave inversion in aVL. There is no specific ST wave changes appreciated. No STEMI    Prior EKG to compare with was available and no clinically significant change no morphology with stable T wave inversion to aVL. Boo Gupta MD        Patient is discussed with me and agree with plan for admission for further evaluation treatment.      Boo Gupta MD  08/23/22 5936       Boo Gupta MD  08/23/22 1662

## 2022-08-23 NOTE — ED PROVIDER NOTES
EMERGENCY DEPARTMENT ENCOUNTER    [facility] EMERGENCY DEPARTMENT        TRIAGE CHIEF COMPLAINT:   Shortness of Breath (Onset since sunday)      [place]:  Santosh Ernst is a 73 y.o. male that presents for shortness of breath. Onset was prior to arrival, this past Sunday. Context is patient denies any known sick contacts, fever or chills. States that he is having worsening shortness of breath with exertion. States on Sunday, \"I coughed so hard I passed out. \"  He did strike his right forehead on the ground and sustained an abrasion there but denies any head ache, neck pain, vision changes vision loss nausea or vomiting. No further episodes of posttussive syncope since and he has been otherwise awake ambulatory without numbness tingling weakness in the upper or lower extremities. Patient is on Coumadin for history of aortic valve replacement. Past medical history includes COPD, CHF, hyperlipidemia, hypertension and Mycobacterium avium complex. Does utilize home inhalers and nebulizer treatments without improvement of shortness of breath. Denying any increasing lower leg swelling, calf pain or previous history of DVT/PE. No pleuritic or exertional chest pain at this time. Cough is productive with yellow sputum. No reported fever or chills. Denying any other abdominal urinary complaints. Denying any blood or clear fluid from the ears nose or mouth since time of injury.     ROS:  General:  No fevers, no chills   Cardiovascular:  No chest pain, no palpitations  Respiratory:  See HPI    Gastrointestinal:  No pain, no nausea, no vomiting, no diarrhea  Musculoskeletal:  No muscle pain, no joint pain  Skin:  No rash, no pruritis, no easy bruising  Neurologic:  No speech problems, no headache, no extremity numbness, no extremity tingling, no extremity weakness  Psychiatric:  No anxiety  Genitourinary:  No dysuria, no hematuria  Extremities:  No edema    Past Medical History: Right [facility]    \"Cauterized Because My Sperm Was Low\"    ROTATOR CUFF REPAIR Right 2008    [place]    TOTAL HIP ARTHROPLASTY Right 10/25/2021    RIGHT HIP TOTAL ARTHROPLASTY performed by Rosales Mendez DO at [facility] N/A 11/10/2021    EGD BIOPSY performed by Dominique Ugalde MD at [facility] ENDOSCOPY     Family History   Problem Relation Age of Onset    Cancer Mother         Lymphoma    Diabetes Mother     High Blood Pressure Mother     High Cholesterol Mother     Obesity Mother     Vision Loss Mother         Wore Glasses    Heart Disease Father         \"Heart Failure\"    COPD Father     Asthma Sister     High Blood Pressure Sister     High Cholesterol Sister     Other Sister         pre diabetic    COPD Sister     Diabetes Sister         \"Pre Diabetes\"    No Known Problems Son      Social History     Socioeconomic History    Marital status:      Spouse name: Not on file    Number of children: 1    Years of education: 12    Highest education level: Not on file   Occupational History    Not on file   Tobacco Use    Smoking status: Never    Smokeless tobacco: Current     Types: Snuff, Chew   Vaping Use    Vaping Use: Never used   Substance and Sexual Activity    Alcohol use:  Yes     Alcohol/week: 8.0 - 10.0 standard drinks     Types: 8 - 10 Cans of beer per week     Comment: 5-6 can beer daily    Drug use: No    Sexual activity: Not Currently   Other Topics Concern    Not on file   Social History Narrative    Not on file     Social Determinants of Health     Financial Resource Strain: Low Risk     Difficulty of Paying Living Expenses: Not hard at all   Food Insecurity: No Food Insecurity    Worried About Running Out of Food in the Last Year: Never true    Ran Out of Food in the Last Year: Never true   Transportation Needs: Not on file   Physical Activity: Insufficiently Active    Days of Exercise per Week: 5 days    Minutes of Exercise per Session: 20 min   Stress: Not on file   Social Connections: Not on file   Intimate Partner Violence: Not on file   Housing Stability: Not on file     Current Facility-Administered Medications   Medication Dose Route Frequency Provider Last Rate Last Admin    aspirin chewable tablet 81 mg  81 mg Oral Daily Mercedez Matute MD         Current Outpatient Medications   Medication Sig Dispense Refill    simvastatin (ZOCOR) 40 MG tablet take 1 tablet by mouth at bedtime 90 tablet 1    pantoprazole (PROTONIX) 40 MG tablet 40 mg PO daily 90 tablet 1    albuterol-ipratropium (COMBIVENT RESPIMAT)  MCG/ACT AERS inhaler Inhale 1 puff into the lungs every 6 hours as needed for Wheezing 1 each 1    albuterol sulfate HFA (PROVENTIL;VENTOLIN;PROAIR) 108 (90 Base) MCG/ACT inhaler inhale 2 puffs by mouth and INTO THE LUNGS every 4 hours if needed 18 g 5    warfarin (COUMADIN) 5 MG tablet take 1-2 tablets by mouth daily as directed 60 tablet 2    venlafaxine (EFFEXOR XR) 150 MG extended release capsule Take 1 capsule by mouth daily 30 capsule 5    Calcium Carbonate-Vitamin D (CALCIUM-VITAMIN D3 PO) Take by mouth      albuterol (PROVENTIL) (2.5 MG/3ML) 0.083% nebulizer solution Take 3 mLs by nebulization every 6 hours as needed for Wheezing or Shortness of Breath (Patient not taking: Reported on 7/11/2022) 125 each 0    sodium chloride, Inhalant, 3 % nebulizer solution Take by nebulization as needed for Other (COPD/shortness of breath) 125 mL 0    ferrous sulfate (IRON 325) 325 (65 Fe) MG tablet Take 1 tablet by mouth daily (with breakfast) 90 tablet 1    CALCIUM PO Take by mouth      triamcinolone (KENALOG) 0.1 % cream Apply topically 2 times daily.  453.6 g 1    melatonin 10 MG CAPS capsule Take 10 mg by mouth nightly      guaiFENesin (MUCINEX) 600 MG extended release tablet Take 1,200 mg by mouth every 12 hours      ferrous sulfate (FE TABS) 325 (65 Fe) MG EC tablet Take 1 tablet by mouth 2 times daily 90 tablet 3    ipratropium-albuterol (DUONEB) 0.5-2.5 (3) MG/3ML SOLN nebulizer solution Inhale 3 mLs into the lungs 4 times daily (Patient not taking: Reported on 7/11/2022) 360 mL 5    KRILL OIL OMEGA-3 PO Take by mouth      cetirizine (ZYRTEC) 10 MG tablet Take 10 mg by mouth \"Alternate With Singulair\"      NASAL SPRAY SALINE NA by Nasal route daily Over The Counter      Acetaminophen (TYLENOL 8 HOUR PO) Take 500 mg by mouth as needed Over The Counter      Multiple Vitamins-Minerals (CENTRUM PO) Take by mouth daily      Nebulizer MISC Inhale into the lungs as needed       Allergies   Allergen Reactions    Ciprofloxacin Hcl Hives and Swelling    Levaquin [Levofloxacin] Hives and Swelling    Other Nausea And Vomiting     \"Allergic To Tylox\"    Ceftin [Cefuroxime]     Lisinopril      cough       Nursing Notes Reviewed  PHYSICAL EXAM    VITAL SIGNS: BP (!) 121/91   Pulse 92   Temp 97.9 °F (36.6 °C) (Oral)   Resp 18   SpO2 97%    Constitutional:  Well developed, Well nourished, In no acute distress  Head:  Normocephalic, Atraumatic. Healing abrasion to the right lateral forehead without palpable crepitus step-offs of the bony skull or facial bones. No blood or clear fluid from the ears nose or mouth. Negative raccoon eyes. Negative bolaños sign. Eyes: PERRL. EOMI. no hyphema. Anterior chamber the eyes are clear. Sclera clear. Conjunctiva normal, No discharge. Neck/Lymphatics: Supple, no JVD, trachea is midline. No midline step-offs crepitus or tenderness of the bony cervical spine. Patient moving neck in all directions without pain or deficits. Cardiovascular:  RRR, Normal S1 & S2     Peripheral Vascular: Distal pulses 2+, Capillary refill <2seconds  Respiratory:  Respirations nonnlabored, speaking full sentences out difficulty. 98% on room air. Coarse air exchange bilaterally, scattered rhonchi and rales. No retractions or accessory muscle use    Abdomen:   Bowel sounds normal in all quadrants, Soft, Non tender/Nondistended, No palpable abdominal masses. Musculoskeletal: BUE/BLE symmetrical without atrophy or deformities. Calves are supple nontender. No pretibial pitting edema  Integument:  Warm, Dry, Intact, Skin turgor and texture normal  Neurologic:  Alert & oriented x3 , No focal deficits noted. Cranial nerves II through XII grossly intact. No slurred speech. No facial droop. Normal gross motor coordination & motor strength bilateral upper and lower extremities.     Psychiatric:  Affect appropriate      I have reviewed and interpreted all of the currently available lab results from this visit (if applicable):  Results for orders placed or performed during the hospital encounter of 08/23/22   COVID-19, Rapid    Specimen: Nasopharyngeal   Result Value Ref Range    SARS-CoV-2, NAAT NOT DETECTED NOT DETECTED   CBC with Auto Differential   Result Value Ref Range    WBC 6.4 4.0 - 10.5 K/CU MM    RBC 3.98 (L) 4.6 - 6.2 M/CU MM    Hemoglobin 12.8 (L) 13.5 - 18.0 GM/DL    Hematocrit 39.2 (L) 42 - 52 %    MCV 98.5 78 - 100 FL    MCH 32.2 (H) 27 - 31 PG    MCHC 32.7 32.0 - 36.0 %    RDW 15.0 (H) 11.7 - 14.9 %    Platelets 778 090 - 179 K/CU MM    MPV 9.4 7.5 - 11.1 FL    Differential Type AUTOMATED DIFFERENTIAL     Segs Relative 69.2 (H) 36 - 66 %    Lymphocytes % 16.6 (L) 24 - 44 %    Monocytes % 10.4 (H) 0 - 4 %    Eosinophils % 2.7 0 - 3 %    Basophils % 0.6 0 - 1 %    Segs Absolute 4.4 K/CU MM    Lymphocytes Absolute 1.1 K/CU MM    Monocytes Absolute 0.7 K/CU MM    Eosinophils Absolute 0.2 K/CU MM    Basophils Absolute 0.0 K/CU MM    Nucleated RBC % 0.0 %    Total Nucleated RBC 0.0 K/CU MM    Total Immature Neutrophil 0.03 K/CU MM    Immature Neutrophil % 0.5 (H) 0 - 0.43 %   Comprehensive Metabolic Panel   Result Value Ref Range    Sodium 138 135 - 145 MMOL/L    Potassium 4.4 3.5 - 5.1 MMOL/L    Chloride 104 99 - 110 mMol/L    CO2 23 21 - 32 MMOL/L    BUN 14 6 - 23 MG/DL    Creatinine 0.8 (L) 0.9 - 1.3 MG/DL radiographs:  CT CHEST WO CONTRAST    Result Date: 8/10/2022  EXAMINATION: CT OF THE CHEST WITHOUT CONTRAST 8/8/2022 12:23 pm TECHNIQUE: CT of the chest was performed without the administration of intravenous contrast. Multiplanar reformatted images are provided for review. Automated exposure control, iterative reconstruction, and/or weight based adjustment of the mA/kV was utilized to reduce the radiation dose to as low as reasonably achievable. COMPARISON: 08/18/2020 HISTORY: ORDERING SYSTEM PROVIDED HISTORY: Bronchiectasis without complication ([facility] [address] TECHNOLOGIST PROVIDED HISTORY: Reason for Exam: Bronchiectasis without complication Additional signs and symptoms: none Relevant Medical/Surgical History: hx of copd FINDINGS: Mediastinum: Enlarged right lower paratracheal lymph node measuring 1.3 cm in short axis (2:36). A few calcified right hilar and subcarinal lymph nodes. . No pericardial effusion. No mass. Prosthetic aortic valve. Mild cardiomegaly. Mitral valve annulus calcifications. Heavy coronary artery calcifications. Mild to moderate calcifications of the thoracic aorta and its branches. Lungs/pleura: Mild centrilobular emphysema. Unchanged bronchiectasis throughout the lower lobes bilaterally and involving the left upper lobe. A few scattered impacted bronchioles. Tree-in-bud nodular opacities in the medial aspect of the right upper lobe and to a lesser extent within the right lower lobe. Ground-glass opacities in the right lower lobe anteriorly. Estil Harrington No pleural effusion or pneumothorax. Upper Abdomen: Multiple scattered calcified granulomas throughout the liver and spleen. . Soft Tissues/Bones: Normal soft tissues. No acute osseous abnormality. Status post CABG. Mild multilevel degenerative disc disease. Partially visualized right shoulder arthroplasty. Unchanged bronchiectasis predominantly involving the lung bases and left upper lobe with a few associated impacted bronchioles.  Tree-in-bud heart failure type ([address]    2. Injury of head, initial encounter    3. Forehead abrasion, initial encounter    4. Dyspnea and respiratory abnormalities        Patient presents with shortness of breath, productive cough with fall and head injury on Coumadin. On exam, well-appearing nontoxic 56-year-old male, no acute respiratory distress. Awake and alert x3, following all commands. Healing abrasion to the right lateral forehead without palpable crepitus step-offs of the bony skull or facial bone. No midline C-spine tenderness range of motion of the cervical spine. Lungs are coarse air exchange with scattered rhonchi and rales throughout. No stridor. Abdomen soft nontender. Symmetrical pulses in the upper or lower extremities. No pitting edema or asymmetry of the lower legs. Patient placed on telemetry and continuous pulse ox monitoring. Given IV Solu-Medrol and DuoNeb while in the ED. CBC with normal white count, hemoglobin is 12.8. CMP without significant derangement. Normal renal function. Troponin within normal range. BNP is significantly elevated 10,533. Negative rapid COVID. INR is therapeutic 2.20. CT head shows no evidence of acute bony fracture or intracranial process or bleed. There is thickened secretions in the sphenoid sinuses and right maxillary sinus consistent with acute sinusitis. Chest x-ray shows findings consistent with CHF and/or pneumonitis/atypical pneumonia. Patient has no documented fevers or other pneumonia/infectious complaints. Presentation does appear concerning for new onset CHF possible underlying component for COPD exacerbation. .  Echocardiogram from April 22 shows an EF of 40 to 45%. Did add on IV Lasix. I did consult with on-call cardiology, Dr. Rita Ruiz the patient states he was scheduled already to be seen by [facility] tomorrow. Cardiology did evaluate patient in the ED and does agree/recommend plan for admission for new onset CHF and for diuresis.   This plan was discussed with patient who verbalizes understanding agreement. I did consult with WILLEM Peoples CNP - hospitalist - and discussed patient's history, ED presentation/course including any pertinent laboratory findings and imaging study findings. He/she agrees to hospital admission. Patient is admitted to the hospital in stable condition. In consideration of current COVID19 pandemic, with effort to minimize unnecessary provider exposure, this patient was seen at bedside by me independently. However, in compliance with current hospital VICENTE/ED protocol, prior to admission I did discuss this patient case with emergency department physician, Dr. Treva Alcaraz, who did agree with ED workup/evaluation and plan for admission however but ED attending physician did not independently evaluate the patient. Of note, this Pt was NOT admitted to the ICU. Diagnosis and plan discussed in detail with patient who understands and agrees. Disposition referral (if applicable):  No follow-up provider specified.     Disposition medications (if applicable):  New Prescriptions    No medications on file         (Please note that portions of this note may have been completed with a voice recognition program. Efforts were made to edit the dictations but occasionally words are mis-transcribed.)         Saira Escamilla PA-C  08/23/22 0385

## 2022-08-23 NOTE — TELEPHONE ENCOUNTER
To Dr. Maegan Torres    Patient has been admitted to [facility] for fluid around his lungs----may have some heart issues. Patient was coughing a lot.

## 2022-08-24 LAB
ANION GAP SERPL CALCULATED.3IONS-SCNC: 8 MMOL/L (ref 4–16)
BUN BLDV-MCNC: 14 MG/DL (ref 6–23)
CALCIUM SERPL-MCNC: 9.1 MG/DL (ref 8.3–10.6)
CHLORIDE BLD-SCNC: 101 MMOL/L (ref 99–110)
CHOLESTEROL: 149 MG/DL
CO2: 28 MMOL/L (ref 21–32)
CREAT SERPL-MCNC: 0.8 MG/DL (ref 0.9–1.3)
EKG ATRIAL RATE: 86 BPM
EKG DIAGNOSIS: NORMAL
EKG P AXIS: 69 DEGREES
EKG P-R INTERVAL: 176 MS
EKG Q-T INTERVAL: 396 MS
EKG QRS DURATION: 100 MS
EKG QTC CALCULATION (BAZETT): 473 MS
EKG R AXIS: 9 DEGREES
EKG T AXIS: 84 DEGREES
EKG VENTRICULAR RATE: 86 BPM
GFR AFRICAN AMERICAN: >60 ML/MIN/1.73M2
GFR NON-AFRICAN AMERICAN: >60 ML/MIN/1.73M2
GLUCOSE BLD-MCNC: 126 MG/DL (ref 70–99)
HDLC SERPL-MCNC: 59 MG/DL
INR BLD: 1.67 INDEX
LDL CHOLESTEROL CALCULATED: 80 MG/DL
POTASSIUM SERPL-SCNC: 5.1 MMOL/L (ref 3.5–5.1)
PROTHROMBIN TIME: 21.6 SECONDS (ref 11.7–14.5)
SODIUM BLD-SCNC: 137 MMOL/L (ref 135–145)
TRIGL SERPL-MCNC: 49 MG/DL
TROPONIN T: <0.01 NG/ML

## 2022-08-24 PROCEDURE — 84484 ASSAY OF TROPONIN QUANT: CPT

## 2022-08-24 PROCEDURE — 36415 COLL VENOUS BLD VENIPUNCTURE: CPT

## 2022-08-24 PROCEDURE — 94761 N-INVAS EAR/PLS OXIMETRY MLT: CPT

## 2022-08-24 PROCEDURE — 1200000000 HC SEMI PRIVATE

## 2022-08-24 PROCEDURE — 93010 ELECTROCARDIOGRAM REPORT: CPT | Performed by: INTERNAL MEDICINE

## 2022-08-24 PROCEDURE — 97165 OT EVAL LOW COMPLEX 30 MIN: CPT

## 2022-08-24 PROCEDURE — 6370000000 HC RX 637 (ALT 250 FOR IP): Performed by: INTERNAL MEDICINE

## 2022-08-24 PROCEDURE — 2580000003 HC RX 258: Performed by: NURSE PRACTITIONER

## 2022-08-24 PROCEDURE — 6370000000 HC RX 637 (ALT 250 FOR IP): Performed by: NURSE PRACTITIONER

## 2022-08-24 PROCEDURE — 80048 BASIC METABOLIC PNL TOTAL CA: CPT

## 2022-08-24 PROCEDURE — 99233 SBSQ HOSP IP/OBS HIGH 50: CPT | Performed by: INTERNAL MEDICINE

## 2022-08-24 PROCEDURE — 93306 TTE W/DOPPLER COMPLETE: CPT

## 2022-08-24 PROCEDURE — 94640 AIRWAY INHALATION TREATMENT: CPT

## 2022-08-24 PROCEDURE — 93308 TTE F-UP OR LMTD: CPT

## 2022-08-24 PROCEDURE — 80061 LIPID PANEL: CPT

## 2022-08-24 PROCEDURE — 85610 PROTHROMBIN TIME: CPT

## 2022-08-24 PROCEDURE — 6360000002 HC RX W HCPCS: Performed by: NURSE PRACTITIONER

## 2022-08-24 PROCEDURE — 6360000004 HC RX CONTRAST MEDICATION

## 2022-08-24 RX ORDER — WARFARIN SODIUM 2.5 MG/1
7.5 TABLET ORAL DAILY
Status: DISCONTINUED | OUTPATIENT
Start: 2022-08-24 | End: 2022-08-26

## 2022-08-24 RX ADMIN — ALBUTEROL SULFATE 2 PUFF: 90 AEROSOL, METERED RESPIRATORY (INHALATION) at 20:20

## 2022-08-24 RX ADMIN — AMOXICILLIN AND CLAVULANATE POTASSIUM 1 TABLET: 875; 125 TABLET, FILM COATED ORAL at 08:43

## 2022-08-24 RX ADMIN — Medication 1 TABLET: at 08:43

## 2022-08-24 RX ADMIN — FERROUS SULFATE TAB 325 MG (65 MG ELEMENTAL FE) 325 MG: 325 (65 FE) TAB at 08:44

## 2022-08-24 RX ADMIN — Medication 2 PUFF: at 20:22

## 2022-08-24 RX ADMIN — PREDNISONE 40 MG: 20 TABLET ORAL at 08:43

## 2022-08-24 RX ADMIN — ALBUTEROL SULFATE 2 PUFF: 90 AEROSOL, METERED RESPIRATORY (INHALATION) at 15:56

## 2022-08-24 RX ADMIN — FUROSEMIDE 40 MG: 10 INJECTION, SOLUTION INTRAMUSCULAR; INTRAVENOUS at 18:43

## 2022-08-24 RX ADMIN — AMOXICILLIN AND CLAVULANATE POTASSIUM 1 TABLET: 875; 125 TABLET, FILM COATED ORAL at 22:10

## 2022-08-24 RX ADMIN — GUAIFENESIN 600 MG: 600 TABLET, EXTENDED RELEASE ORAL at 08:43

## 2022-08-24 RX ADMIN — Medication 2 PUFF: at 15:56

## 2022-08-24 RX ADMIN — Medication 2 PUFF: at 12:11

## 2022-08-24 RX ADMIN — SODIUM CHLORIDE, PRESERVATIVE FREE 10 ML: 5 INJECTION INTRAVENOUS at 22:09

## 2022-08-24 RX ADMIN — Medication 2 PUFF: at 08:17

## 2022-08-24 RX ADMIN — ALBUTEROL SULFATE 2 PUFF: 90 AEROSOL, METERED RESPIRATORY (INHALATION) at 12:12

## 2022-08-24 RX ADMIN — VENLAFAXINE HYDROCHLORIDE 150 MG: 37.5 CAPSULE, EXTENDED RELEASE ORAL at 08:43

## 2022-08-24 RX ADMIN — SODIUM CHLORIDE, PRESERVATIVE FREE 10 ML: 5 INJECTION INTRAVENOUS at 10:54

## 2022-08-24 RX ADMIN — PERFLUTREN 4.4 MG: 6.52 INJECTION, SUSPENSION INTRAVENOUS at 14:48

## 2022-08-24 RX ADMIN — FUROSEMIDE 40 MG: 10 INJECTION, SOLUTION INTRAMUSCULAR; INTRAVENOUS at 12:28

## 2022-08-24 RX ADMIN — CETIRIZINE HYDROCHLORIDE 10 MG: 10 TABLET, FILM COATED ORAL at 22:10

## 2022-08-24 RX ADMIN — PANTOPRAZOLE SODIUM 40 MG: 40 TABLET, DELAYED RELEASE ORAL at 05:37

## 2022-08-24 RX ADMIN — ASPIRIN 81 MG CHEWABLE TABLET 81 MG: 81 TABLET CHEWABLE at 08:43

## 2022-08-24 RX ADMIN — WARFARIN SODIUM 7.5 MG: 2.5 TABLET ORAL at 17:44

## 2022-08-24 RX ADMIN — ALBUTEROL SULFATE 2 PUFF: 90 AEROSOL, METERED RESPIRATORY (INHALATION) at 08:17

## 2022-08-24 RX ADMIN — GUAIFENESIN 600 MG: 600 TABLET, EXTENDED RELEASE ORAL at 22:10

## 2022-08-24 RX ADMIN — ATORVASTATIN CALCIUM 20 MG: 10 TABLET, FILM COATED ORAL at 22:10

## 2022-08-24 NOTE — PROGRESS NOTES
Comprehensive Nutrition Assessment    Type and Reason for Visit:  Initial, Positive Nutrition Screen, Consult, Patient Education (Heart failure, Weight loss, Appetite loss)    Nutrition Recommendations/Plan:   Continue current diet with fluid restriction   May offer oral nutrition supplement later on in stay if po intake reduces   Record and monitor weights, fluids, and PO intakes      Nutrition Assessment:    Admitted with acute on chronic diastolic heart failure. Pt on low sodium diet with fluid restriction of 2000 ml. Pt states consuming all of his breakfast, no difficulty chewing/swallowing, no N/V/P. Reports no significant wt loss, only a few pounds likely due to fluids. States had a reduced appetite due to coughing since Sunday (8/21). Reviewed over low sodium diet, states that he has followed it in the past. Has difficulty with pre-made meals, provided low sodium options for meals. Provided handout over heart failure nutrition therapy for the undernourished to prevent any further wasting. Follow as low nutrition risk. Nutrition Related Findings:    Receiving lasix. Wound Type: None       Current Nutrition Intake & Therapies:    Average Meal Intake: % (per pt)  Average Supplements Intake: None Ordered  ADULT DIET; Regular; Low Sodium (2 gm); 2000 ml    Anthropometric Measures:  Height: 5' 4\" (162.6 cm)  Ideal Body Weight (IBW): 130 lbs (59 kg)    Admission Body Weight: 152 lb 5.4 oz (69.1 kg)  Current Body Weight: 152 lb 5.4 oz (69.1 kg), 117.2 % IBW. Weight Source: Bed Scale  Current BMI (kg/m2): 26.1  Usual Body Weight: 156 lb (70.8 kg) (7/2022)  % Weight Change (Calculated): -2.3  Weight Adjustment For: No Adjustment  BMI Categories: Overweight (BMI 25.0-29. 9)    Estimated Daily Nutrient Needs:  Energy Requirements Based On: Kcal/kg  Weight Used for Energy Requirements: Current  Energy (kcal/day): 4065-8143 (22-25 kcals/kg)  Weight Used for Protein Requirements: Ideal  Protein (g/day): 59-71 (1-1.2 g/kg)  Method Used for Fluid Requirements: 1 ml/kcal  Fluid (ml/day): 6538-5821, or 2000 per MD    Nutrition Diagnosis:   Predicted inadequate energy intake related to cardiac dysfunction, acute injury/trauma as evidenced by weight loss, poor intake prior to admission    Nutrition Interventions:   Food and/or Nutrient Delivery: Continue Current Diet  Nutrition Education/Counseling: Survival skills/brief education completed  Coordination of Nutrition Care: Continue to monitor while inpatient  Plan of Care discussed with: patient    Goals:  Goals: PO intake 50% or greater, by next RD assessment     Nutrition Monitoring and Evaluation:   Behavioral-Environmental Outcomes: None Identified  Food/Nutrient Intake Outcomes: Food and Nutrient Intake, Supplement Intake  Physical Signs/Symptoms Outcomes: Biochemical Data, Weight, Fluid Status or Edema, Meal Time Behavior    Discharge Planning:     Too soon to determine     Cheryle Edward, RD, LD  Contact: [phone]

## 2022-08-24 NOTE — CARE COORDINATION
CM into see pt to initiate a safe discharge plan. Cm introduced self and explained role of CM. Pt is kind, alert and oriented. Pt lives alone in a one floor apartment. Pt shared that he does not use any DME. Pt is supported by a sister and friend and if needed. Pt has a PCP. Pt has insurance and is able to obtain meds. [facility] also has [facility]. Pt shared that he uses the [employer] for transportation. Pt denies any needs or concerns when discharged. Discharge plan is home alone. Support from a sister. PCP and insurance. Has transportation. Declined needs   CM provided card and encouraged to call for any needs or concern. CM is available if any needs arise.

## 2022-08-24 NOTE — CONSULTS
Nutrition Education    Educated on low sodium options, encouraged cooking more with less salt, and monitoring weights. Provided heart failure nutrition therapy for the undernourished, due to poor po intake prior to admission. Learners: Patient  Readiness: Acceptance  Method: Explanation and Handout  Response: Verbalizes Understanding  Contact name and number provided.     Tomasz Parnell RD, LD  Contact Number: [phone]

## 2022-08-24 NOTE — PLAN OF CARE
Problem: Discharge Planning  Goal: Discharge to home or other facility with appropriate resources  Outcome: Progressing  Flowsheets (Taken 8/23/2022 1957)  Discharge to home or other facility with appropriate resources: Identify barriers to discharge with patient and caregiver     Problem: Pain  Goal: Verbalizes/displays adequate comfort level or baseline comfort level  Outcome: Progressing     Problem: Safety - Adult  Goal: Free from fall injury  Outcome: Progressing

## 2022-08-24 NOTE — PROGRESS NOTES
Report given to nurse assuming this patient's care and the nurse made aware that a 4 eye assessment still needs to be completed for this patient's admission.

## 2022-08-24 NOTE — PROGRESS NOTES
V2.0  [facility] Hospitalist Progress Note      Name:  Suzanne Singh /Age/Sex: 1955  (73 y.o. male)   MRN & CSN:  [id number] & [id number] Encounter Date/Time: 2022 12:59 PM EDT    Location:  [facility] PCP: Kobi Duncan MD       Hospital Day: 2    Assessment and Plan:   Suzanne Singh is a 73 y.o. male  who presents with Acute on chronic diastolic (congestive) heart failure ([address]      1. Acute on chronic systolic heart failure  - Chest x-ray demonstrated findings consistent with CHF and/or pneumonitis atypical pneumonia. No detectable pleural effusion or large area of pulmonary consolidation.    -Last echo-() EF 40 to 45%, mild LVH. -Continue diuresis with Lasix  -Cardiology following  -Strict I's and O's/monitor electrolytes       2. Acute sinusitis-sphenoid, right maxillary  -CT head-no acute care normally, thickened secretions in the sphenoid sinuses and right maxillary sinus. -Augmentin twice daily for 7 days  -Continue Mucinex twice daily  -Viral respiratory PCR negative  -prednisone 40 mg daily     3. H/O Bronchiectesis with Post-tussive Syncope   -Patient states has had a coughing fit as syncopal episode and fell and hit his head right forehead. CT head negative  -PT OT eval and treat     4. Status post AVR-mechanical aortic valve   -Therapeutic INR-2.2  -Continue Coumadin  -Consult to pharmacy for Coumadin dosing  -PT/INR daily     Other chronic medical conditions  -Dyslipidemia: Continue statin therapy  lipids in a.m. per cardiology  -History of chronic lung disease  Mycobacterium AVM complex  -Hypertension  -Mild CAD of LAD with calcification-continue statin, and baby aspirin per cardiology, not on beta-blocker due to bradycardia  Diet ADULT DIET; Regular;  Low Sodium (2 gm); 2000 ml   DVT Prophylaxis [] Lovenox, []  Heparin, [] SCDs, [] Ambulation,  [] Eliquis, [] Xarelto  [] Coumadin   Code Status Full Code   Disposition From: Home  Expected Disposition: Home  Estimated Date of Discharge: TBD  Patient requires continued admission due to dyspnea secondary to CHF exacerbation   Surrogate Decision Maker/ POA      Subjective:     Chief Complaint: Shortness of Breath (Onset since sunday)     Patient seen and examined at bedside this morning. Reports improvement in his shortness of breath. Denies chest pain, nausea vomiting, fever or chills. Remains tachycardic. Blood pressure stable  Objective: Intake/Output Summary (Last 24 hours) at 8/24/2022 1259  Last data filed at 8/24/2022 0348  Gross per 24 hour   Intake --   Output 600 ml   Net -600 ml        Vitals:   Vitals:    08/24/22 0802   BP: 116/77   Pulse: 83   Resp: 20   Temp: 98.2 °F (36.8 °C)   SpO2: 96%       Physical Exam:     General: NAD  Eyes: EOMI  ENT: neck supple  Cardiovascular: Tachycardic  Respiratory: Clear to auscultation  Gastrointestinal: Soft, non tender  Genitourinary: no suprapubic tenderness  Musculoskeletal: No edema  Skin: warm, dry  Neuro: Alert. Psych: Mood appropriate.      Medications:   Medications:    warfarin  7.5 mg Oral Daily    perflutren lipid microspheres        aspirin  81 mg Oral Daily    calcium-cholecalciferol  1 tablet Oral Daily with breakfast    cetirizine  10 mg Oral Q48H    ferrous sulfate  325 mg Oral Daily with breakfast    pantoprazole  40 mg Oral QAM AC    atorvastatin  20 mg Oral Daily    venlafaxine  150 mg Oral Daily with breakfast    sodium chloride flush  5-40 mL IntraVENous 2 times per day    furosemide  40 mg IntraVENous BID    guaiFENesin  600 mg Oral BID    amoxicillin-clavulanate  1 tablet Oral 2 times per day    predniSONE  40 mg Oral Daily    albuterol sulfate HFA  2 puff Inhalation 4x daily    ipratropium  2 puff Inhalation 4x daily      Infusions:    sodium chloride       PRN Meds: sodium chloride flush, 5-40 mL, PRN  sodium chloride, , PRN  ondansetron, 4 mg, Q8H PRN   Or  ondansetron, 4 mg, Q6H PRN  polyethylene glycol, 17 g, Daily PRN  acetaminophen, 650 mg, Q6H PRN   Or  acetaminophen, 650 mg, Q6H PRN  ipratropium-albuterol, 1 ampule, Q4H PRN  albuterol sulfate HFA, 2 puff, Q4H PRN        Labs      Recent Results (from the past 24 hour(s))   CBC with Auto Differential    Collection Time: 08/23/22  1:10 PM   Result Value Ref Range    WBC 6.4 4.0 - 10.5 K/CU MM    RBC 3.98 (L) 4.6 - 6.2 M/CU MM    Hemoglobin 12.8 (L) 13.5 - 18.0 GM/DL    Hematocrit 39.2 (L) 42 - 52 %    MCV 98.5 78 - 100 FL    MCH 32.2 (H) 27 - 31 PG    MCHC 32.7 32.0 - 36.0 %    RDW 15.0 (H) 11.7 - 14.9 %    Platelets 557 065 - 976 K/CU MM    MPV 9.4 7.5 - 11.1 FL    Differential Type AUTOMATED DIFFERENTIAL     Segs Relative 69.2 (H) 36 - 66 %    Lymphocytes % 16.6 (L) 24 - 44 %    Monocytes % 10.4 (H) 0 - 4 %    Eosinophils % 2.7 0 - 3 %    Basophils % 0.6 0 - 1 %    Segs Absolute 4.4 K/CU MM    Lymphocytes Absolute 1.1 K/CU MM    Monocytes Absolute 0.7 K/CU MM    Eosinophils Absolute 0.2 K/CU MM    Basophils Absolute 0.0 K/CU MM    Nucleated RBC % 0.0 %    Total Nucleated RBC 0.0 K/CU MM    Total Immature Neutrophil 0.03 K/CU MM    Immature Neutrophil % 0.5 (H) 0 - 0.43 %   Comprehensive Metabolic Panel    Collection Time: 08/23/22  1:10 PM   Result Value Ref Range    Sodium 138 135 - 145 MMOL/L    Potassium 4.4 3.5 - 5.1 MMOL/L    Chloride 104 99 - 110 mMol/L    CO2 23 21 - 32 MMOL/L    BUN 14 6 - 23 MG/DL    Creatinine 0.8 (L) 0.9 - 1.3 MG/DL    Glucose 132 (H) 70 - 99 MG/DL    Calcium 8.6 8.3 - 10.6 MG/DL    Albumin 3.9 3.4 - 5.0 GM/DL    Total Protein 5.8 (L) 6.4 - 8.2 GM/DL    Total Bilirubin 0.4 0.0 - 1.0 MG/DL    ALT 23 10 - 40 U/L    AST 30 15 - 37 IU/L    Alkaline Phosphatase 101 40 - 129 IU/L    GFR Non-African American >60 >60 mL/min/1.73m2    GFR African American >60 >60 mL/min/1.73m2    Anion Gap 11 4 - 16   Troponin    Collection Time: 08/23/22  1:10 PM   Result Value Ref Range    Troponin T <0.010 <0.01 NG/ML   Brain Natriuretic Peptide    Collection Time: 08/23/22  1:10 PM   Result Value Ref Range    Pro-BNP 10,533 (H) <300 PG/ML   COVID-19, Rapid    Collection Time: 08/23/22  1:10 PM    Specimen: Nasopharyngeal   Result Value Ref Range    SARS-CoV-2, NAAT NOT DETECTED NOT DETECTED   Protime/INR & PTT    Collection Time: 08/23/22  1:10 PM   Result Value Ref Range    Protime 28.6 (H) 11.7 - 14.5 SECONDS    INR 2.20 INDEX    aPTT 31.2 25.1 - 37.1 SECONDS   EKG 12 Lead    Collection Time: 08/23/22  1:40 PM   Result Value Ref Range    Ventricular Rate 86 BPM    Atrial Rate 86 BPM    P-R Interval 176 ms    QRS Duration 100 ms    Q-T Interval 396 ms    QTc Calculation (Bazett) 473 ms    P Axis 69 degrees    R Axis 9 degrees    T Axis 84 degrees    Diagnosis       Normal sinus rhythm  Nonspecific T wave abnormality  Abnormal ECG  When compared with ECG of 30-MAR-2022 11:23,  premature ventricular complexes are no longer present  Nonspecific T wave abnormality now evident in Anterior leads  Confirmed by Anthony Velazquez MD, Odilon Conroy ([id number]) on 8/24/2022 7:27:46 AM     Procalcitonin    Collection Time: 08/23/22  3:10 PM   Result Value Ref Range    Procalcitonin 0.020    Respiratory Panel, Molecular, with COVID-19 (Restricted: peds pts or suitable admitted adults)    Collection Time: 08/23/22  4:54 PM    Specimen: Nasopharyngeal   Result Value Ref Range    Adenovirus Detection by PCR NOT DETECTED NOT DETECTED    Coronavirus 229E PCR NOT DETECTED NOT DETECTED    Coronavirus HKU1 PCR NOT DETECTED NOT DETECTED    Coronavirus NL63 PCR NOT DETECTED NOT DETECTED    Coronavirus OC43 PCR NOT DETECTED NOT DETECTED    SARS-CoV-2 NOT DETECTED NOT DETECTED    Human Metapneumovirus PCR NOT DETECTED NOT DETECTED    Rhinovirus Enterovirus PCR NOT DETECTED NOT DETECTED    Influenza A by PCR NOT DETECTED NOT DETECTED    Influenza A H1 Pandemic PCR NOT DETECTED NOT DETECTED    Influenza A H1 (2009) PCR NOT DETECTED NOT DETECTED    Influenza A H3 PCR NOT DETECTED NOT DETECTED    Influenza B by PCR NOT DETECTED NOT DETECTED Parainfluenza 1 PCR NOT DETECTED NOT DETECTED    Parainfluenza 2 PCR NOT DETECTED NOT DETECTED    Parainfluenza 3 PCR NOT DETECTED NOT DETECTED    Parainfluenza 4 PCR NOT DETECTED NOT DETECTED    RSV PCR NOT DETECTED NOT DETECTED    Bordetella parapertussis by PCR NOT DETECTED NOT DETECTED    B Pertussis by PCR NOT DETECTED NOT DETECTED    Chlamydophila Pneumonia PCR NOT DETECTED NOT DETECTED    Mycoplasma pneumo by PCR NOT DETECTED NOT DETECTED   Troponin    Collection Time: 08/23/22  8:56 PM   Result Value Ref Range    Troponin T <0.010 <0.01 NG/ML   Protime-INR    Collection Time: 08/24/22  5:10 AM   Result Value Ref Range    Protime 21.6 (H) 11.7 - 14.5 SECONDS    INR 1.67 INDEX   Basic Metabolic Panel w/ Reflex to MG    Collection Time: 08/24/22  5:10 AM   Result Value Ref Range    Sodium 137 135 - 145 MMOL/L    Potassium 5.1 3.5 - 5.1 MMOL/L    Chloride 101 99 - 110 mMol/L    CO2 28 21 - 32 MMOL/L    Anion Gap 8 4 - 16    BUN 14 6 - 23 MG/DL    Creatinine 0.8 (L) 0.9 - 1.3 MG/DL    Glucose 126 (H) 70 - 99 MG/DL    Calcium 9.1 8.3 - 10.6 MG/DL    GFR Non-African American >60 >60 mL/min/1.73m2    GFR African American >60 >60 mL/min/1.73m2   Troponin    Collection Time: 08/24/22  5:10 AM   Result Value Ref Range    Troponin T <0.010 <0.01 NG/ML   Lipid panel    Collection Time: 08/24/22  5:10 AM   Result Value Ref Range    Triglycerides 49 <150 MG/DL    Cholesterol 149 <200 MG/DL    HDL 59 >40 MG/DL    LDL Calculated 80 <100 MG/DL        Imaging/Diagnostics Last 24 Hours   CT HEAD WO CONTRAST    Result Date: 8/23/2022  EXAMINATION: CT OF THE HEAD WITHOUT CONTRAST  8/23/2022 1:25 pm TECHNIQUE: CT of the head was performed without the administration of intravenous contrast. Automated exposure control, iterative reconstruction, and/or weight based adjustment of the mA/kV was utilized to reduce the radiation dose to as low as reasonably achievable. COMPARISON: 10/25/2021.  HISTORY: ORDERING SYSTEM PROVIDED HISTORY: fall head injury on Sunday on coumadin TECHNOLOGIST PROVIDED HISTORY: Has a \"code stroke\" or \"stroke alert\" been called? ->No Reason for exam:->fall head injury on Sunday on coumadin Decision Support Exception - unselect if not a suspected or confirmed emergency medical condition->Emergency Medical Condition (MA) Reason for Exam: fall head injury on Sunday on coumadin FINDINGS: BRAIN/VENTRICLES: The cerebral and cerebellar parenchyma demonstrate volume loss. No areas of hemorrhage, mass, or midline shift. No abnormal extra-axial fluid collections. The ventricles are proportional to the cerebral sulci. There is an area of encephalomalacia along the lateral left frontal lobe, stable. No abnormal extra-axial fluid collections. The ventricles are proportional to the cerebral sulci. Gray-white differentiation is maintained. ORBITS: The visualized portion of the orbits demonstrate no acute abnormality. SINUSES: There is scattered paranasal sinus disease with thickened secretions in the sphenoid sinuses and right maxillary sinus, correlate with signs of acute sinusitis. The mastoid air cells are clear. SOFT TISSUES/SKULL:  The calvarium is intact. No appreciable scalp soft tissue swelling. 1. No acute intracranial abnormality. 2. Thickened secretions in the sphenoid sinuses and right maxillary sinus, correlate with signs of acute sinusitis. XR CHEST PORTABLE    Result Date: 8/23/2022  EXAMINATION: ONE XRAY VIEW OF THE CHEST 8/23/2022 12:29 pm COMPARISON: 03/30/2022 at 1139 hours HISTORY: ORDERING SYSTEM PROVIDED HISTORY: chest pain TECHNOLOGIST PROVIDED HISTORY: Reason for exam:->chest pain Reason for Exam: sob   chest pain FINDINGS: Cardiomegaly, diffuse bronchovascular marking prominence subjectively worse compared to 03/30/2022. Findings are consistent with congestive heart failure and or pneumonitis/atypical pneumonia with underlying chronic lung disease.   No pulmonary consolidations, detectable pleural effusions, pneumothorax or mediastinal widening. Stable postoperative changes in the AP view. Findings consistent with congestive heart failure and/or pneumonitis/atypical pneumonia. Findings are accentuated by underlying chronic lung disease without detectable pleural effusion or large area pulmonary consolidation.        Electronically signed by Sonny Mcfarlane MD on 8/24/2022 at 12:59 PM

## 2022-08-24 NOTE — PROGRESS NOTES
Today's plan: Echo shows severe depressed LVEF 10 to 15%, will repeat a stress test     Admit Date:  8/23/2022    Subjective: Better shortness of breath with Lasix      Chief complaints on admission  Chief Complaint   Patient presents with    Shortness of Breath     Onset since sunday         History of present illness:Manas is a 73 y. o.year old who  presents with had concerns including Shortness of Breath (Onset since sunday). Past medical history:    has a past medical history of Acid reflux, Acute frontal sinusitis, Alcohol abuse, Anesthesia, Anxiety, Arthritis, Atypical mycobacterial infection, Broken teeth, CHF (congestive heart failure) ([facility]), COPD (chronic obstructive pulmonary disease) ([facility] [address] Cough, Depression, Depression, Dyspnea, H/O cardiac catheterization, H/O cardiovascular MUGA stress test, H/O echocardiogram, H/O echocardiogram, History of 24 hour EKG monitoring, [place] (hard of hearing), Hyperlipidemia, Hypertension, Irritant contact dermatitis due to other chemical products, Mycobacterium avium complex ([facility] [address] Other drug allergy(995.27), S/P AVR, Shortness of breath, and Teeth missing. Past surgical history:   has a past surgical history that includes Lung biopsy (Right, 1996); Rotator cuff repair (Right, 2008); Dental surgery; Cardiac valve replacement (12/17/2003); Colonoscopy (2006); Endoscopy, colon, diagnostic (In Past); Tonsillectomy (1959 Or 1960); bronchoscopy (1996); Knee arthroscopy (Left, 1992); other surgical history (1992); HEMIARTHROPLASTY SHOULDER FRACTURE (Right, 1/29/2019); Total hip arthroplasty (Right, 10/25/2021); and Upper gastrointestinal endoscopy (N/A, 11/10/2021). Social History:   reports that he has never smoked. His smokeless tobacco use includes snuff and chew. He reports current alcohol use of about 8.0 - 10.0 standard drinks per week. He reports that he does not use drugs.   Family history:  family history includes Asthma in his sister; COPD in his father and sister; Cancer in his mother; Diabetes in his mother and sister; Heart Disease in his father; High Blood Pressure in his mother and sister; High Cholesterol in his mother and sister; No Known Problems in his son; Obesity in his mother; Other in his sister; Vision Loss in his mother. Allergies   Allergen Reactions    Ciprofloxacin Hcl Hives and Swelling    Levaquin [Levofloxacin] Hives and Swelling    Other Nausea And Vomiting     \"Allergic To Tylox\"    Ceftin [Cefuroxime]     Lisinopril      cough         Objective:   /81   Pulse 97   Temp 97.9 °F (36.6 °C) (Oral)   Resp 16   Ht 5' 4\" (1.626 m)   Wt 152 lb 4.8 oz (69.1 kg)   SpO2 93%   BMI 26.14 kg/m²     Intake/Output Summary (Last 24 hours) at 8/24/2022 1641  Last data filed at 8/24/2022 0348  Gross per 24 hour   Intake --   Output 600 ml   Net -600 ml       TELEMETRY:      Physical Exam:  Constitutional:  Well developed, Well nourished, No acute distress, Non-toxic appearance. HENT:  Normocephalic, Atraumatic, Bilateral external ears normal, Oropharynx moist, No oral exudates, Nose normal. Neck- Normal range of motion, No tenderness, Supple, No stridor. Eyes:  PERRL, EOMI, Conjunctiva normal, No discharge. Respiratory:  Normal breath sounds, No respiratory distress, No wheezing, No chest tenderness. ,no use of accessory muscles, diaphragm movement is normal  Cardiovascular: (PMI) apex non displaced,no lifts no thrills, no s3,no s4, Normal heart rate, Normal rhythm, No murmurs, No rubs, No gallops. Carotid arteries pulse and amplitude are normal no bruit, no abdominal bruit noted ( normal abdominal aorta ausculation), femoral arteries pulse and amplitude are normal no bruit, pedal pulses are normal  GI:  Bowel sounds normal, Soft, No tenderness, No masses, No pulsatile masses. : External genitalia appear normal, No masses or lesions. No discharge. No CVA tenderness.    Musculoskeletal:  Intact distal pulses, No edema, No tenderness, No cyanosis, No clubbing. Good range of motion in all major joints. No tenderness to palpation or major deformities noted. Back- No tenderness. Integument:  Warm, Dry, No erythema, No rash. Lymphatic:  No lymphadenopathy noted. Neurologic:  Alert & oriented x 3, Normal motor function, Normal sensory function, No focal deficits noted. Psychiatric:  Affect normal, Judgment normal, Mood normal.     Medications:    warfarin  7.5 mg Oral Daily    aspirin  81 mg Oral Daily    calcium-cholecalciferol  1 tablet Oral Daily with breakfast    cetirizine  10 mg Oral Q48H    ferrous sulfate  325 mg Oral Daily with breakfast    pantoprazole  40 mg Oral QAM AC    atorvastatin  20 mg Oral Daily    venlafaxine  150 mg Oral Daily with breakfast    sodium chloride flush  5-40 mL IntraVENous 2 times per day    furosemide  40 mg IntraVENous BID    guaiFENesin  600 mg Oral BID    amoxicillin-clavulanate  1 tablet Oral 2 times per day    predniSONE  40 mg Oral Daily    albuterol sulfate HFA  2 puff Inhalation 4x daily    ipratropium  2 puff Inhalation 4x daily      sodium chloride       sodium chloride flush, sodium chloride, ondansetron **OR** ondansetron, polyethylene glycol, acetaminophen **OR** acetaminophen, ipratropium-albuterol, albuterol sulfate HFA    Lab Data:  CBC:   Recent Labs     08/23/22  1310   WBC 6.4   HGB 12.8*   HCT 39.2*   MCV 98.5        BMP:   Recent Labs     08/23/22  1310 08/24/22  0510    137   K 4.4 5.1    101   CO2 23 28   BUN 14 14   CREATININE 0.8* 0.8*     LIVER PROFILE:   Recent Labs     08/23/22  1310   AST 30   ALT 23   BILITOT 0.4   ALKPHOS 101     PT/INR:   Recent Labs     08/23/22  1310 08/24/22  0510   PROTIME 28.6* 21.6*   INR 2.20 1.67     APTT:   Recent Labs     08/23/22  1310   APTT 31.2     BNP:  No results for input(s): BNP in the last 72 hours. TROPONIN: @TROPONINI:3@      Assessment:  73 y. 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       Tejal Crowe MD, MD 8/24/2022 4:41 PM

## 2022-08-24 NOTE — PROGRESS NOTES
PHARMACY ANTICOAGULATION MONITORING SERVICE    Estuardo Ang is a 73 y.o. male on warfarin therapy for Valve Replacement. Pharmacy consulted by Dimitri Jean Baptiste for monitoring and adjustment of treatment. Indication for anticoagulation: Valve Replacement  INR goal: 2.5-3.5  Warfarin dose prior to admission: 5-10mg daily as directed    Pertinent Laboratory Values   Recent Labs     08/23/22  1310 08/24/22  0510   INR 2.20 1.67   HGB 12.8*  --    HCT 39.2*  --      --        Assessment/Plan:  INR 1.67  Warfarin 5mg administered yesterday. Initiate warfarin 7.5mg daily starting today. Pharmacy will continue to monitor and adjust warfarin therapy as indicated    Thank you for the consult.   Blayne Costa, [facility], [facility]  8/24/2022 9:42 AM

## 2022-08-24 NOTE — PROGRESS NOTES
Occupational Therapy    [facility] ACUTE CARE OCCUPATIONAL THERAPY EVALUATION  Sherrie Dodson, 1955, 1114/1114-A, 8/24/2022    History  [place]:  The primary encounter diagnosis was Acute congestive heart failure, unspecified heart failure type ([facility] [address] Diagnoses of Injury of head, initial encounter, Forehead abrasion, initial encounter, and Dyspnea and respiratory abnormalities were also pertinent to this visit. Patient  has a past medical history of Acid reflux, Acute frontal sinusitis, Alcohol abuse, Anesthesia, Anxiety, Arthritis, Atypical mycobacterial infection, Broken teeth, CHF (congestive heart failure) ([facility]), COPD (chronic obstructive pulmonary disease) ([place] [address] Cough, Depression, Depression, Dyspnea, H/O cardiac catheterization, H/O cardiovascular MUGA stress test, H/O echocardiogram, H/O echocardiogram, History of 24 hour EKG monitoring, [place] (hard of hearing), Hyperlipidemia, Hypertension, Irritant contact dermatitis due to other chemical products, Mycobacterium avium complex ([facility] [address] Other drug allergy(995.27), S/P AVR, Shortness of breath, and Teeth missing. Patient  has a past surgical history that includes Lung biopsy (Right, 1996); Rotator cuff repair (Right, 2008); Dental surgery; Cardiac valve replacement (12/17/2003); Colonoscopy (2006); Endoscopy, colon, diagnostic (In Past); Tonsillectomy (1959 Or 1960); bronchoscopy (1996); Knee arthroscopy (Left, 1992); other surgical history (1992); HEMIARTHROPLASTY SHOULDER FRACTURE (Right, 1/29/2019); Total hip arthroplasty (Right, 10/25/2021); and Upper gastrointestinal endoscopy (N/A, 11/10/2021). Subjective:  Patient states:  \"I want to get up and get walking again\".     Pain:  No.    Communication with other providers:  Handoff to RN  Restrictions: General Precautions, Fall Risk    Home Setup/Prior level of function  Social/Functional History  Lives With: Alone  Type of Home: House  Home Layout: One level  Home Access: Ramped entrance  Bathroom Shower/Tub: Tub/Shower unit  Bathroom Toilet: Standard  Bathroom Accessibility: Accessible  Has the patient had two or more falls in the past year or any fall with injury in the past year?: No  ADL Assistance: [address]: Independent  Homemaking Responsibilities: Yes  Ambulation Assistance: Independent  Transfer Assistance: Independent  Active : Yes  Mode of Transportation: Car    Examination of body systems (includes body structures/functions, activity/participation limitations):  Observation:  Supine in bed upon arrival, agreeable to therapy   Vision:  WFL  Hearing:  [facility]  Cardiopulmonary:  No 02 needs. Body Systems and functions:  ROM R/L:  WFL. Strength R/L:  5/5,   Sensation: WFL  Tone: Normal  Coordination: WFL  Perception: WNL    Activities of Daily Living (ADLs):  Feeding: Independent  Grooming: Supervision (washing hands in stand at sink)  UB bathing: Independent  LB bathing: Supervision (washing temo area/buttocks sitting upright on chair)  UB dressing: Independent  LB dressing: Supervision (threading underwear in stand to prepare for toileting)  Toileting: Supervision (pt toileted on standard commode this session, see LB bathing/dressing for details)    Cognitive and Psychosocial Functioning:  Overall cognitive status: WNL  Affect: Normal        Mobility:  Supine to sit: Independent  Transfers: Supervision  Sitting balance:  Independent. Standing balance:  Supervision. Functional Mobility Supervision ~200 feet  Toilet/Shower Transfers: Supervision to/from standard commode   Sit to supine: Supervision                 Treatment:  Self Care Training:   Cues were given for safety, sequence, UE/LE placement, visual cues, and balance. Activities performed today included grooming, LB bathing/dressing, toileting, toilet transfer     Safety: patient left in bed with bed alarm, call light within reach, RN notified, gait belt used.     Assessment:  Pt is a 74 yo male admitted from home for acute on chronic CHF. Pt at baseline is Independent for ADLs Independent for high level IADLs and Independent for functional transfers/mobility. Pt currently presents near baseline Independent level and is safe to return home when medically appropriate. Complexity: Low  Prognosis: Good, no significant barriers to participation at this time.    Plan  Times per Week: eval and discharge     Equipment: defer        Recommendations for NURSING activity: Up to chair for all 3 meals and up to standard commode for all toileting needs    Time:   Time in: 1239  Time out: 1251  Timed treatment minutes: 0 minutes  Total time: 12 minutes    Electronically signed by:    Cindy SANCHEZ/WILLEM [id number]  1:54 PM,8/24/2022

## 2022-08-25 ENCOUNTER — APPOINTMENT (OUTPATIENT)
Dept: NUCLEAR MEDICINE | Age: 67
DRG: 291 | End: 2022-08-25
Payer: OTHER GOVERNMENT

## 2022-08-25 LAB
ANION GAP SERPL CALCULATED.3IONS-SCNC: 12 MMOL/L (ref 4–16)
BUN BLDV-MCNC: 21 MG/DL (ref 6–23)
CALCIUM SERPL-MCNC: 8.9 MG/DL (ref 8.3–10.6)
CHLORIDE BLD-SCNC: 98 MMOL/L (ref 99–110)
CO2: 27 MMOL/L (ref 21–32)
CREAT SERPL-MCNC: 0.9 MG/DL (ref 0.9–1.3)
GFR AFRICAN AMERICAN: >60 ML/MIN/1.73M2
GFR NON-AFRICAN AMERICAN: >60 ML/MIN/1.73M2
GLUCOSE BLD-MCNC: 106 MG/DL (ref 70–99)
INR BLD: 1.83 INDEX
LV EF: 24 %
LVEF MODALITY: NORMAL
POTASSIUM SERPL-SCNC: 4.1 MMOL/L (ref 3.5–5.1)
PROTHROMBIN TIME: 23.7 SECONDS (ref 11.7–14.5)
SODIUM BLD-SCNC: 137 MMOL/L (ref 135–145)

## 2022-08-25 PROCEDURE — 6360000002 HC RX W HCPCS: Performed by: INTERNAL MEDICINE

## 2022-08-25 PROCEDURE — 85610 PROTHROMBIN TIME: CPT

## 2022-08-25 PROCEDURE — 93017 CV STRESS TEST TRACING ONLY: CPT

## 2022-08-25 PROCEDURE — 78452 HT MUSCLE IMAGE SPECT MULT: CPT

## 2022-08-25 PROCEDURE — 6370000000 HC RX 637 (ALT 250 FOR IP): Performed by: INTERNAL MEDICINE

## 2022-08-25 PROCEDURE — 94640 AIRWAY INHALATION TREATMENT: CPT

## 2022-08-25 PROCEDURE — 36415 COLL VENOUS BLD VENIPUNCTURE: CPT

## 2022-08-25 PROCEDURE — 6370000000 HC RX 637 (ALT 250 FOR IP): Performed by: NURSE PRACTITIONER

## 2022-08-25 PROCEDURE — 80048 BASIC METABOLIC PNL TOTAL CA: CPT

## 2022-08-25 PROCEDURE — 2580000003 HC RX 258: Performed by: NURSE PRACTITIONER

## 2022-08-25 PROCEDURE — 94761 N-INVAS EAR/PLS OXIMETRY MLT: CPT

## 2022-08-25 PROCEDURE — 1200000000 HC SEMI PRIVATE

## 2022-08-25 PROCEDURE — 99232 SBSQ HOSP IP/OBS MODERATE 35: CPT | Performed by: INTERNAL MEDICINE

## 2022-08-25 RX ORDER — LOSARTAN POTASSIUM 25 MG/1
25 TABLET ORAL DAILY
Status: DISCONTINUED | OUTPATIENT
Start: 2022-08-25 | End: 2022-08-26 | Stop reason: HOSPADM

## 2022-08-25 RX ORDER — SPIRONOLACTONE 50 MG/1
25 TABLET, FILM COATED ORAL DAILY
Status: DISCONTINUED | OUTPATIENT
Start: 2022-08-25 | End: 2022-08-26 | Stop reason: HOSPADM

## 2022-08-25 RX ADMIN — Medication 1 TABLET: at 13:10

## 2022-08-25 RX ADMIN — GUAIFENESIN 600 MG: 600 TABLET, EXTENDED RELEASE ORAL at 13:09

## 2022-08-25 RX ADMIN — AMOXICILLIN AND CLAVULANATE POTASSIUM 1 TABLET: 875; 125 TABLET, FILM COATED ORAL at 21:34

## 2022-08-25 RX ADMIN — PREDNISONE 40 MG: 20 TABLET ORAL at 13:10

## 2022-08-25 RX ADMIN — ALBUTEROL SULFATE 2 PUFF: 90 AEROSOL, METERED RESPIRATORY (INHALATION) at 19:39

## 2022-08-25 RX ADMIN — ALBUTEROL SULFATE 2 PUFF: 90 AEROSOL, METERED RESPIRATORY (INHALATION) at 07:21

## 2022-08-25 RX ADMIN — ALBUTEROL SULFATE 2 PUFF: 90 AEROSOL, METERED RESPIRATORY (INHALATION) at 13:30

## 2022-08-25 RX ADMIN — SODIUM CHLORIDE, PRESERVATIVE FREE 10 ML: 5 INJECTION INTRAVENOUS at 13:10

## 2022-08-25 RX ADMIN — AMOXICILLIN AND CLAVULANATE POTASSIUM 1 TABLET: 875; 125 TABLET, FILM COATED ORAL at 13:10

## 2022-08-25 RX ADMIN — ATORVASTATIN CALCIUM 20 MG: 10 TABLET, FILM COATED ORAL at 21:35

## 2022-08-25 RX ADMIN — VENLAFAXINE HYDROCHLORIDE 150 MG: 37.5 CAPSULE, EXTENDED RELEASE ORAL at 13:09

## 2022-08-25 RX ADMIN — LOSARTAN POTASSIUM 25 MG: 25 TABLET, FILM COATED ORAL at 16:02

## 2022-08-25 RX ADMIN — FERROUS SULFATE TAB 325 MG (65 MG ELEMENTAL FE) 325 MG: 325 (65 FE) TAB at 13:09

## 2022-08-25 RX ADMIN — Medication 2 PUFF: at 19:39

## 2022-08-25 RX ADMIN — SPIRONOLACTONE 25 MG: 50 TABLET ORAL at 16:02

## 2022-08-25 RX ADMIN — Medication 2 PUFF: at 13:30

## 2022-08-25 RX ADMIN — WARFARIN SODIUM 7.5 MG: 2.5 TABLET ORAL at 18:00

## 2022-08-25 RX ADMIN — SODIUM CHLORIDE, PRESERVATIVE FREE 10 ML: 5 INJECTION INTRAVENOUS at 21:36

## 2022-08-25 RX ADMIN — PANTOPRAZOLE SODIUM 40 MG: 40 TABLET, DELAYED RELEASE ORAL at 06:02

## 2022-08-25 RX ADMIN — ALBUTEROL SULFATE 2 PUFF: 90 AEROSOL, METERED RESPIRATORY (INHALATION) at 16:10

## 2022-08-25 RX ADMIN — ASPIRIN 81 MG CHEWABLE TABLET 81 MG: 81 TABLET CHEWABLE at 13:10

## 2022-08-25 RX ADMIN — REGADENOSON 0.4 MG: 0.08 INJECTION, SOLUTION INTRAVENOUS at 11:28

## 2022-08-25 RX ADMIN — Medication 2 PUFF: at 07:21

## 2022-08-25 RX ADMIN — GUAIFENESIN 600 MG: 600 TABLET, EXTENDED RELEASE ORAL at 21:35

## 2022-08-25 RX ADMIN — Medication 2 PUFF: at 16:10

## 2022-08-25 NOTE — PROGRESS NOTES
Physician Progress Note      Darron Carrizales  KIRAN #:                  [id number]  :                       1955  ADMIT DATE:       2022 12:19 PM  DISCH DATE:  RESPONDING  PROVIDER #:        Lilian Wayne        QUERY TEXT:    Stage of Chronic Kidney Disease: Please provide further specificity, if known. Clinical indicators include: pro-bnp, ckd, creatinine, probnp, bun  Options provided:  -- Chronic kidney disease stage 1  -- Chronic kidney disease stage 2  -- Chronic kidney disease stage 3  -- Chronic kidney disease stage 3a  -- Chronic kidney disease stage 3b  -- Chronic kidney disease stage 4  -- Chronic kidney disease stage 5  -- Chronic kidney disease stage 5, requiring dialysis  -- End stage renal disease  -- Other - I will add my own diagnosis  -- Disagree - Not applicable / Not valid  -- Disagree - Clinically Unable to determine / Unknown        PROVIDER RESPONSE TEXT:    Provider dismissed this query because it was not applicable to the patient or   not a valid query.   does not have CKD      Electronically signed by:  Lilian Wayne 2022 9:00 PM

## 2022-08-25 NOTE — PROGRESS NOTES
[facility] [place] Representative Aniya Galicia) contacted at [phone] and order faxed to Jacob Peace at [phone].  Representative coming to instruct patient and apply [employer].

## 2022-08-25 NOTE — PROGRESS NOTES
CARDIOLOGY  NOTE        Name:  Norma Clarke /Age/Sex: 1955  (73 y.o. male)   MRN & CSN:  [id number] & [id number] Admission Date/Time: 2022 12:19 PM   Location:  [address] [place] [address] PCP: Akash Hill MD       Hospital Day: 3        PLAN FROM CARDIOLOGY FOR TODAY:   Patient is getting a Cardiolite stress test today      - cardiology consult is for: Congestive heart failure    -  Interval history: Getting Cardiolite today    ASSESSMENT/ PLAN:      HFrEF EF has diminished since the last testing showing HFrEF patient is being diuresed getting a Cardiolite stress test done today we will recheck  Patient is status post AVR mechanical valve on Coumadin  Hyperlipidemia compliant patient is on Lipitor we will check the last LDL  Hypertension stable  Bronchiectasis patient had possible AEL in the past stable now  Heart cath from May of this year reviewed         Subjective: Todays complain: Patient no shortness of breath    HPI:  Cristino Sandhoff is a 73 y. o.year old who and presents with had concerns including Shortness of Breath (Onset since ). Chief Complaint   Patient presents with    Shortness of Breath     Onset since            Objective: Temperature:  Current - Temp: 97.3 °F (36.3 °C);  Max - Temp  Av.7 °F (36.5 °C)  Min: 97.3 °F (36.3 °C)  Max: 97.9 °F (36.6 °C)    Respiratory Rate : Resp  Av.8  Min: 16  Max: 18    Pulse Range: Pulse  Av.8  Min: 70  Max: 97    Blood Presuure Range:  Systolic (07BEG), OGM:125 , Min:93 , EGW:743   ; Diastolic (40VNI), ZRO:78, Min:69, Max:81      Pulse ox Range: SpO2  Av %  Min: 93 %  Max: 97 %    24hr I & O:    Intake/Output Summary (Last 24 hours) at 2022 08  Last data filed at 2022 2200  Gross per 24 hour   Intake --   Output 800 ml   Net -800 ml         BP 99/72   Pulse 70   Temp 97.3 °F (36.3 °C) (Oral)   Resp 16   Ht 5' 4\" (1.626 m)   Wt 146 lb 6.2 oz (66.4 kg) SpO2 97%   BMI 25.13 kg/m²           Review of Systems:    No shortness of breath    TELEMETRY: Sinus   has a past medical history of Acid reflux, Acute frontal sinusitis, Alcohol abuse, Anesthesia, Anxiety, Arthritis, Atypical mycobacterial infection, Broken teeth, CHF (congestive heart failure) (HCC), COPD (chronic obstructive pulmonary disease) ([facility] [address] Cough, Depression, Depression, Dyspnea, H/O cardiac catheterization, H/O cardiovascular MUGA stress test, H/O echocardiogram, H/O echocardiogram, History of 24 hour EKG monitoring, [place] (hard of hearing), Hyperlipidemia, Hypertension, Irritant contact dermatitis due to other chemical products, Mycobacterium avium complex ([facility] [address] Other drug allergy(995.27), S/P AVR, Shortness of breath, and Teeth missing. has a past surgical history that includes Lung biopsy (Right, 1996); Rotator cuff repair (Right, 2008); Dental surgery; Cardiac valve replacement (12/17/2003); Colonoscopy (2006); Endoscopy, colon, diagnostic (In Past); Tonsillectomy (1959 Or 1960); bronchoscopy (1996); Knee arthroscopy (Left, 1992); other surgical history (1992); HEMIARTHROPLASTY SHOULDER FRACTURE (Right, 1/29/2019); Total hip arthroplasty (Right, 10/25/2021); and Upper gastrointestinal endoscopy (N/A, 11/10/2021). Physical Exam:  General:  Awake, alert, NAD  Head:normal  Eye: Pupils equal and round  Neck:  No JVD, no carotid bruit noted   Chest:  Clear to auscultation, no signs of respiratory distress  Cardiovascular:  Normal rate and rhythm. S1 and S2 noted.  No murmurs rubs or gallops  Abdomen:   nontender  Extremities: Trace edema  Pulses; palpable  Neuro: grossly normal    Medications:    warfarin  7.5 mg Oral Daily    aspirin  81 mg Oral Daily    calcium-cholecalciferol  1 tablet Oral Daily with breakfast    cetirizine  10 mg Oral Q48H    ferrous sulfate  325 mg Oral Daily with breakfast    pantoprazole  40 mg Oral QAM AC    atorvastatin  20 mg Oral Daily    venlafaxine  150 mg Oral Daily with breakfast    sodium chloride flush  5-40 mL IntraVENous 2 times per day    guaiFENesin  600 mg Oral BID    amoxicillin-clavulanate  1 tablet Oral 2 times per day    predniSONE  40 mg Oral Daily    albuterol sulfate HFA  2 puff Inhalation 4x daily    ipratropium  2 puff Inhalation 4x daily      sodium chloride       technetium sestamibi, technetium sestamibi, sodium chloride flush, sodium chloride, ondansetron **OR** ondansetron, polyethylene glycol, acetaminophen **OR** acetaminophen, ipratropium-albuterol, albuterol sulfate HFA    Lab Data:  CBC:   Recent Labs     08/23/22  1310   WBC 6.4   HGB 12.8*   HCT 39.2*   MCV 98.5        BMP:   Recent Labs     08/23/22  1310 08/24/22  0510 08/25/22  0429    137 137   K 4.4 5.1 4.1    101 98*   CO2 23 28 27   BUN 14 14 21   CREATININE 0.8* 0.8* 0.9     LIVER PROFILE:   Recent Labs     08/23/22  1310   AST 30   ALT 23   BILITOT 0.4   ALKPHOS 101     PT/INR:   Recent Labs     08/23/22  1310 08/24/22  0510 08/25/22  0429   PROTIME 28.6* 21.6* 23.7*   INR 2.20 1.67 1.83     APTT:   Recent Labs     08/23/22  1310   APTT 31.2     BNP:  No results for input(s): BNP in the last 72 hours. TROPONIN: No results for input(s): TROPONINI in the last 72 hours. Recent Labs     08/23/22  1310 08/23/22  2056 08/24/22  0510   TROPONINT <0.010 <0.010 <0.010        Labs, consult, tests reviewed                    Gerardo Shipley MD, PA-C 8/25/2022 8:27 AM     Please note this report has been partially produced using speech recognition software and may contain errors related to that system including errors in grammar, punctuation, and spelling, as well as words and phrases that may be inappropriate. If there are any questions or concerns please feel free to contact the dictating provider for clarification.

## 2022-08-25 NOTE — PROGRESS NOTES
PRN  technetium sestamibi, 30 millicurie, ONCE PRN  sodium chloride flush, 5-40 mL, PRN  sodium chloride, , PRN  ondansetron, 4 mg, Q8H PRN   Or  ondansetron, 4 mg, Q6H PRN  polyethylene glycol, 17 g, Daily PRN  acetaminophen, 650 mg, Q6H PRN   Or  acetaminophen, 650 mg, Q6H PRN  ipratropium-albuterol, 1 ampule, Q4H PRN  albuterol sulfate HFA, 2 puff, Q4H PRN      Labs      Recent Results (from the past 24 hour(s))   Protime-INR    Collection Time: 08/25/22  4:29 AM   Result Value Ref Range    Protime 23.7 (H) 11.7 - 14.5 SECONDS    INR 1.83 INDEX   Basic Metabolic Panel w/ Reflex to MG    Collection Time: 08/25/22  4:29 AM   Result Value Ref Range    Sodium 137 135 - 145 MMOL/L    Potassium 4.1 3.5 - 5.1 MMOL/L    Chloride 98 (L) 99 - 110 mMol/L    CO2 27 21 - 32 MMOL/L    Anion Gap 12 4 - 16    BUN 21 6 - 23 MG/DL    Creatinine 0.9 0.9 - 1.3 MG/DL    Glucose 106 (H) 70 - 99 MG/DL    Calcium 8.9 8.3 - 10.6 MG/DL    GFR Non-African American >60 >60 mL/min/1.73m2    GFR African American >60 >60 mL/min/1.73m2        Imaging/Diagnostics Last 24 Hours   CT HEAD WO CONTRAST    Result Date: 8/23/2022  EXAMINATION: CT OF THE HEAD WITHOUT CONTRAST  8/23/2022 1:25 pm TECHNIQUE: CT of the head was performed without the administration of intravenous contrast. Automated exposure control, iterative reconstruction, and/or weight based adjustment of the mA/kV was utilized to reduce the radiation dose to as low as reasonably achievable. COMPARISON: 10/25/2021. HISTORY: ORDERING SYSTEM PROVIDED HISTORY: fall head injury on Sunday on coumadin TECHNOLOGIST PROVIDED HISTORY: Has a \"code stroke\" or \"stroke alert\" been called? ->No Reason for exam:->fall head injury on Sunday on coumadin Decision Support Exception - unselect if not a suspected or confirmed emergency medical condition->Emergency Medical Condition (MA) Reason for Exam: fall head injury on Sunday on coumadin FINDINGS: BRAIN/VENTRICLES: The cerebral and cerebellar parenchyma

## 2022-08-25 NOTE — PROGRESS NOTES
PHARMACY ANTICOAGULATION MONITORING SERVICE    Bhavik Martínez is a 73 y.o. male on warfarin therapy for Valve Replacement. Pharmacy consulted by Gean Cheadle for monitoring and adjustment of treatment. Indication for anticoagulation: Valve Replacement  INR goal: 2.5-3.5  Warfarin dose prior to admission: 5-10mg daily as directed    Pertinent Laboratory Values   Recent Labs     08/23/22  1310 08/24/22  0510 08/25/22  0429   INR 2.20   < > 1.83   HGB 12.8*  --   --    HCT 39.2*  --   --      --   --     < > = values in this interval not displayed. Assessment/Plan:  INR 1.83 (1.67 yesterday)  Continue warfarin 7.5mg daily  Pharmacy will continue to monitor and adjust warfarin therapy as indicated    Thank you for the consult.   Amadou Cline [facility]  8/25/2022 8:58 AM

## 2022-08-25 NOTE — PROGRESS NOTES
Handout, a link to the American [employer] Association's Healthier Living with Heart Failure Interactive workbook and a list of heart failure related education available on the hospital TV and how to access it. 1 hour of education provided.

## 2022-08-26 VITALS
OXYGEN SATURATION: 97 % | BODY MASS INDEX: 25.07 KG/M2 | DIASTOLIC BLOOD PRESSURE: 71 MMHG | WEIGHT: 146.83 LBS | TEMPERATURE: 97.7 F | SYSTOLIC BLOOD PRESSURE: 95 MMHG | HEIGHT: 64 IN | HEART RATE: 83 BPM | RESPIRATION RATE: 18 BRPM

## 2022-08-26 LAB
ANION GAP SERPL CALCULATED.3IONS-SCNC: 9 MMOL/L (ref 4–16)
BUN BLDV-MCNC: 19 MG/DL (ref 6–23)
CALCIUM SERPL-MCNC: 8.8 MG/DL (ref 8.3–10.6)
CHLORIDE BLD-SCNC: 99 MMOL/L (ref 99–110)
CO2: 25 MMOL/L (ref 21–32)
CREAT SERPL-MCNC: 0.7 MG/DL (ref 0.9–1.3)
GFR AFRICAN AMERICAN: >60 ML/MIN/1.73M2
GFR NON-AFRICAN AMERICAN: >60 ML/MIN/1.73M2
GLUCOSE BLD-MCNC: 123 MG/DL (ref 70–99)
INR BLD: 1.72 INDEX
POTASSIUM SERPL-SCNC: 4.5 MMOL/L (ref 3.5–5.1)
PROTHROMBIN TIME: 22.3 SECONDS (ref 11.7–14.5)
SODIUM BLD-SCNC: 133 MMOL/L (ref 135–145)

## 2022-08-26 PROCEDURE — 2580000003 HC RX 258: Performed by: NURSE PRACTITIONER

## 2022-08-26 PROCEDURE — 36415 COLL VENOUS BLD VENIPUNCTURE: CPT

## 2022-08-26 PROCEDURE — 85610 PROTHROMBIN TIME: CPT

## 2022-08-26 PROCEDURE — 94761 N-INVAS EAR/PLS OXIMETRY MLT: CPT

## 2022-08-26 PROCEDURE — 6370000000 HC RX 637 (ALT 250 FOR IP): Performed by: INTERNAL MEDICINE

## 2022-08-26 PROCEDURE — 6370000000 HC RX 637 (ALT 250 FOR IP): Performed by: NURSE PRACTITIONER

## 2022-08-26 PROCEDURE — 99232 SBSQ HOSP IP/OBS MODERATE 35: CPT | Performed by: INTERNAL MEDICINE

## 2022-08-26 PROCEDURE — 80048 BASIC METABOLIC PNL TOTAL CA: CPT

## 2022-08-26 PROCEDURE — 94664 DEMO&/EVAL PT USE INHALER: CPT

## 2022-08-26 PROCEDURE — 94640 AIRWAY INHALATION TREATMENT: CPT

## 2022-08-26 RX ORDER — LOSARTAN POTASSIUM 25 MG/1
25 TABLET ORAL DAILY
Qty: 30 TABLET | Refills: 3 | Status: SHIPPED | OUTPATIENT
Start: 2022-08-27 | End: 2022-10-27 | Stop reason: DRUGHIGH

## 2022-08-26 RX ORDER — ASPIRIN 81 MG/1
81 TABLET, CHEWABLE ORAL DAILY
Qty: 30 TABLET | Refills: 1 | Status: SHIPPED | OUTPATIENT
Start: 2022-08-27

## 2022-08-26 RX ORDER — WARFARIN SODIUM 2.5 MG/1
7.5 TABLET ORAL DAILY
Status: DISCONTINUED | OUTPATIENT
Start: 2022-08-27 | End: 2022-08-26 | Stop reason: HOSPADM

## 2022-08-26 RX ORDER — SPIRONOLACTONE 25 MG/1
25 TABLET ORAL DAILY
Qty: 30 TABLET | Refills: 3 | Status: SHIPPED | OUTPATIENT
Start: 2022-08-27

## 2022-08-26 RX ORDER — AMOXICILLIN AND CLAVULANATE POTASSIUM 875; 125 MG/1; MG/1
1 TABLET, FILM COATED ORAL 2 TIMES DAILY
Qty: 8 TABLET | Refills: 0 | Status: SHIPPED | OUTPATIENT
Start: 2022-08-26 | End: 2022-08-30

## 2022-08-26 RX ORDER — PREDNISONE 20 MG/1
40 TABLET ORAL DAILY
Qty: 4 TABLET | Refills: 0 | Status: SHIPPED | OUTPATIENT
Start: 2022-08-27 | End: 2022-08-29

## 2022-08-26 RX ORDER — WARFARIN SODIUM 2.5 MG/1
10 TABLET ORAL
Status: DISCONTINUED | OUTPATIENT
Start: 2022-08-26 | End: 2022-08-26 | Stop reason: HOSPADM

## 2022-08-26 RX ADMIN — GUAIFENESIN 600 MG: 600 TABLET, EXTENDED RELEASE ORAL at 09:46

## 2022-08-26 RX ADMIN — ASPIRIN 81 MG CHEWABLE TABLET 81 MG: 81 TABLET CHEWABLE at 09:45

## 2022-08-26 RX ADMIN — Medication 2 PUFF: at 08:20

## 2022-08-26 RX ADMIN — SPIRONOLACTONE 25 MG: 50 TABLET ORAL at 09:45

## 2022-08-26 RX ADMIN — PANTOPRAZOLE SODIUM 40 MG: 40 TABLET, DELAYED RELEASE ORAL at 05:28

## 2022-08-26 RX ADMIN — FERROUS SULFATE TAB 325 MG (65 MG ELEMENTAL FE) 325 MG: 325 (65 FE) TAB at 09:49

## 2022-08-26 RX ADMIN — SODIUM CHLORIDE, PRESERVATIVE FREE 10 ML: 5 INJECTION INTRAVENOUS at 09:49

## 2022-08-26 RX ADMIN — Medication 1 TABLET: at 09:49

## 2022-08-26 RX ADMIN — Medication 2 PUFF: at 15:45

## 2022-08-26 RX ADMIN — ALBUTEROL SULFATE 2 PUFF: 90 AEROSOL, METERED RESPIRATORY (INHALATION) at 15:45

## 2022-08-26 RX ADMIN — ACETAMINOPHEN 650 MG: 325 TABLET ORAL at 09:40

## 2022-08-26 RX ADMIN — VENLAFAXINE HYDROCHLORIDE 150 MG: 37.5 CAPSULE, EXTENDED RELEASE ORAL at 09:53

## 2022-08-26 RX ADMIN — LOSARTAN POTASSIUM 25 MG: 25 TABLET, FILM COATED ORAL at 09:46

## 2022-08-26 RX ADMIN — ALBUTEROL SULFATE 2 PUFF: 90 AEROSOL, METERED RESPIRATORY (INHALATION) at 08:19

## 2022-08-26 RX ADMIN — ALBUTEROL SULFATE 2 PUFF: 90 AEROSOL, METERED RESPIRATORY (INHALATION) at 12:22

## 2022-08-26 RX ADMIN — AMOXICILLIN AND CLAVULANATE POTASSIUM 1 TABLET: 875; 125 TABLET, FILM COATED ORAL at 09:45

## 2022-08-26 RX ADMIN — Medication 2 PUFF: at 12:23

## 2022-08-26 RX ADMIN — PREDNISONE 40 MG: 20 TABLET ORAL at 09:46

## 2022-08-26 ASSESSMENT — PAIN SCALES - GENERAL: PAINLEVEL_OUTOF10: 0

## 2022-08-26 NOTE — RT PROTOCOL NOTE
RT Inhaler-Nebulizer Bronchodilator Protocol Note    There is a bronchodilator order in the chart from a provider indicating to follow the RT Bronchodilator Protocol and there is an Initiate RT Inhaler-Nebulizer Bronchodilator Protocol order as well (see protocol at bottom of note). CXR Findings:  No results found. The findings from the last RT Protocol Assessment were as follows:   History Pulmonary Disease: None or smoker <15 pack years  Respiratory Pattern: Regular pattern and RR 12-20 bpm  Breath Sounds: Slightly diminished and/or crackles  Cough: Strong, spontaneous, non-productive  Indication for Bronchodilator Therapy: On home bronchodilators (Patient states that he usually takes them on a schedule)  Bronchodilator Assessment Score: 2    Aerosolized bronchodilator medication orders have been revised according to the RT Inhaler-Nebulizer Bronchodilator Protocol below. Respiratory Therapist to perform RT Therapy Protocol Assessment initially then follow the protocol. Repeat RT Therapy Protocol Assessment PRN for score 0-3 or on second treatment, BID, and PRN for scores above 3. No Indications - adjust the frequency to every 6 hours PRN wheezing or bronchospasm, if no treatments needed after 48 hours then discontinue using Per Protocol order mode. If indication present, adjust the RT bronchodilator orders based on the Bronchodilator Assessment Score as indicated below. Use Inhaler orders unless patient has one or more of the following: on home nebulizer, not able to hold breath for 10 seconds, is not alert and oriented, cannot activate and use MDI correctly, or respiratory rate 25 breaths per minute or more, then use the equivalent nebulizer order(s) with same Frequency and PRN reasons based on the score. If a patient is on this medication at home then do not decrease Frequency below that used at home.     0-3 - enter or revise RT bronchodilator order(s) to equivalent RT Bronchodilator order with Frequency of every 4 hours PRN for wheezing or increased work of breathing using Per Protocol order mode. 4-6 - enter or revise RT Bronchodilator order(s) to two equivalent RT bronchodilator orders with one order with BID Frequency and one order with Frequency of every 4 hours PRN wheezing or increased work of breathing using Per Protocol order mode. 7-10 - enter or revise RT Bronchodilator order(s) to two equivalent RT bronchodilator orders with one order with TID Frequency and one order with Frequency of every 4 hours PRN wheezing or increased work of breathing using Per Protocol order mode. 11-13 - enter or revise RT Bronchodilator order(s) to one equivalent RT bronchodilator order with QID Frequency and an Albuterol order with Frequency of every 4 hours PRN wheezing or increased work of breathing using Per Protocol order mode. Greater than 13 - enter or revise RT Bronchodilator order(s) to one equivalent RT bronchodilator order with every 4 hours Frequency and an Albuterol order with Frequency of every 2 hours PRN wheezing or increased work of breathing using Per Protocol order mode. RT to enter RT Home Evaluation for COPD & MDI Assessment order using Per Protocol order mode.     Electronically signed by Sondra Cardona RCP on 8/26/2022 at 12:22 PM

## 2022-08-26 NOTE — PROGRESS NOTES
PHARMACY ANTICOAGULATION MONITORING SERVICE    Santosh Ernst is a 73 y.o. male on warfarin therapy for AVR. Pharmacy consulted by Irma Herring for monitoring and adjustment of treatment. Indication for anticoagulation: AVR  INR goal: 2.5 - 3.5  Warfarin dose prior to admission: 5mg for 2 days, then 7.5mg for 1 day, repeating    Pertinent Laboratory Values   Recent Labs     08/26/22  0450   INR 1.72       Assessment/Plan:  Drug Interactions: none at this time  INR was therapeutic on admission @ 2.2  INR trending down today @ 1.72  Will order Warfarin 10mg today, then resume 7.5mg daily  Pharmacy will continue to monitor and adjust warfarin therapy as indicated    Thank you for the consult.   Hayley Smith, [facility]  8/26/2022 2:26 PM

## 2022-08-26 NOTE — DISCHARGE SUMMARY
Discharge Summary    Name:  Francisco Clark /Age/Sex: 1955  (73 y.o. male)   MRN & CSN:  [id number] & [id number] Admission Date/Time: 2022 12:19 PM   Attending:  Kerline Donis MD Discharging Physician: Kerline Donis MD     Hospital Course:   Francisco Clark is a 73 y.o.  male  who presents with Acute on chronic diastolic (congestive) heart failure [facility]    Hospital Course. Patient is a 60-year-old male past medical history of TAVR, anticoagulated with Coumadin, dyslipidemia, hypertension, CAD who was admitted for acute on chronic systolic congestive heart failure. Patient had an echocardiogram this admission showed which revealed an EF of 10-15% with severely depressed ventricular function. This was attributed to alcoholic cardiomyopathy. Patient was also treated with antibiotics for acute sinusitis. Patient was seen by cardiology during hospitalization. Cardiology recommended LifeVest and recommended outpatient follow-up. Patient was discharged back home in stable condition on appropriate cardiac medications per cardiology recommendations. He was advised to follow-up with cardiology and PCP for transition of care. The patient expressed appropriate understanding of and agreement with the discharge recommendations, medications, and plan.      Consults this admission:  IP CONSULT TO HOSPITALIST  IP CONSULT TO CARDIOLOGY  IP CONSULT TO DIETITIAN  IP CONSULT TO PHARMACY  IP CONSULT TO CARDIAC REHAB    Discharge Instruction:   Follow up appointments:   Primary care physician:  within 2 weeks    Diet: Cardiac  Activity: activity as tolerated  Disposition: Discharged to:   [x]Home, [][facility], []SNF, []Acute Rehab, []Hospice   Condition on discharge: Stable    Discharge Medications:        Medication List        START taking these medications      amoxicillin-clavulanate 875-125 MG per tablet  Commonly known as: AUGMENTIN  Take 1 tablet by mouth 2 times daily for 4 days     aspirin 81 MG chewable tablet  Take 1 tablet by mouth daily  Start taking on: August 27, 2022     losartan 25 MG tablet  Commonly known as: COZAAR  Take 1 tablet by mouth daily  Start taking on: August 27, 2022     predniSONE 20 MG tablet  Commonly known as: DELTASONE  Take 2 tablets by mouth daily for 2 doses  Start taking on: August 27, 2022     spironolactone 25 MG tablet  Commonly known as: ALDACTONE  Take 1 tablet by mouth daily  Start taking on: August 27, 2022            CHANGE how you take these medications      albuterol-ipratropium  MCG/ACT Aers inhaler  Commonly known as: COMBIVENT RESPIMAT  Inhale 1 puff into the lungs every 6 hours as needed for Wheezing  What changed: Another medication with the same name was removed. Continue taking this medication, and follow the directions you see here. CONTINUE taking these medications      * albuterol (2.5 MG/3ML) 0.083% nebulizer solution  Commonly known as: PROVENTIL  Take 3 mLs by nebulization every 6 hours as needed for Wheezing or Shortness of Breath     * albuterol sulfate  (90 Base) MCG/ACT inhaler  Commonly known as: PROVENTIL;VENTOLIN;PROAIR  inhale 2 puffs by mouth and INTO THE LUNGS every 4 hours if needed     CALCIUM-VITAMIN D3 PO     CENTRUM PO     cetirizine 10 MG tablet  Commonly known as: ZYRTEC     ferrous sulfate 325 (65 Fe) MG tablet  Commonly known as: IRON 325  Take 1 tablet by mouth daily (with breakfast)     guaiFENesin 600 MG extended release tablet  Commonly known as: MUCINEX     KRILL OIL OMEGA-3 PO     NASAL SPRAY SALINE NA     Nebulizer Misc     pantoprazole 40 MG tablet  Commonly known as: PROTONIX  40 mg PO daily     simvastatin 40 MG tablet  Commonly known as: ZOCOR  take 1 tablet by mouth at bedtime     sodium chloride (Inhalant) 3 % nebulizer solution  Take by nebulization as needed for Other (COPD/shortness of breath)     triamcinolone 0.1 % cream  Commonly known as: KENALOG  Apply topically 2 times daily.      TYLENOL 8 HOUR PO venlafaxine 150 MG extended release capsule  Commonly known as: EFFEXOR XR  Take 1 capsule by mouth daily     warfarin 5 MG tablet  Commonly known as: COUMADIN  Take as directed. If you are unsure how to take this medication, talk to your nurse or doctor. Original instructions: take 1-2 tablets by mouth daily as directed           * This list has 2 medication(s) that are the same as other medications prescribed for you. Read the directions carefully, and ask your doctor or other care provider to review them with you. STOP taking these medications      CALCIUM PO               Where to Get Your Medications        These medications were sent to [address] [place] [address], Monique [phone] Margo Olguin [phone]  [address] [place] [id number]      Phone: [phone]   amoxicillin-clavulanate 875125 MG per tablet  aspirin 81 MG chewable tablet  losartan 25 MG tablet  predniSONE 20 MG tablet  spironolactone 25 MG tablet         Objective Findings at Discharge:   BP 95/71   Pulse 83   Temp 97.7 °F (36.5 °C) (Oral)   Resp 17   Ht 5' 4\" (1.626 m)   Wt 146 lb 13.2 oz (66.6 kg)   SpO2 97%   BMI 25.20 kg/m²            PHYSICAL EXAM   GEN Awake, Alert, in no apparent distress  HEENT Atraumatic, nomocephalic, PERRLA, EOMI  NECK Supple, no lymphadenopaty  RESP Clear lungs to auscultation bilaterally  CARDIO/VASC Normal S1/S2. RRRR. No mumurs. GI Abdomen is soft without significant tenderness, masses, or guarding. Bowel sounds +  MSK No gross joint deformities. SKIN Normal coloration, warm, dry. NEURO Cranial nerves appear grossly intact, normal speech, no lateralizing weakness. PSYCH Awake, alert, oriented x 3. Affect appropriate.     BMP/CBC  Recent Labs     08/23/22  1310 08/24/22  0510 08/25/22  0429 08/26/22  0450    137 137 133*   K 4.4 5.1 4.1 4.5    101 98* 99   CO2 23 28 27 25   BUN 14 14 21 19   CREATININE 0.8* 0.8* 0.9 0.7*   WBC 6.4  --   --   --    HCT 39.2*  --   --   --      --   --   --        IMAGING:      Discharge Time of 35 minutes    Electronically signed by David Pacheco MD on 8/26/2022 at 12:14 PM

## 2022-08-26 NOTE — PROGRESS NOTES
CARDIOLOGY  NOTE        Name:  Berenice Soulier /Age/Sex: 1955  (73 y.o. male)   MRN & CSN:  [id number] & [id number] Admission Date/Time: 2022 12:19 PM   Location:  [address] [place] [address] PCP: Jett High MD       Hospital Day: 4        PLAN FROM CARDIOLOGY FOR TODAY:   EF is low on Cardiolite however no ischemia seen patient will get a LifeVest and medication adjustment has been done and he will be followed up as an outpatient    - cardiology consult is for: Congestive heart failure    -  Interval history: Breathing better    ASSESSMENT/ PLAN:      HFrEF EF medications were adjusted and a LifeVest has been arranged   patient is status post AVR mechanical valve on Coumadin  Hyperlipidemia compliant patient is on Lipitor we will check the last LDL  Hypertension stable  Bronchiectasis patient had possible ALE in the past stable now  Heart cath from May of this year reviewed had no CAD  Will be followed up as an outpatient1         Subjective: Todays complain: Patient no shortness of breath    HPI:  Ash Chau is a 73 y. o.year old who and presents with had concerns including Shortness of Breath (Onset since ). Chief Complaint   Patient presents with    Shortness of Breath     Onset since            Objective: Temperature:  Current - Temp: 97.7 °F (36.5 °C);  Max - Temp  Av.6 °F (36.4 °C)  Min: 97.5 °F (36.4 °C)  Max: 97.7 °F (36.5 °C)    Respiratory Rate : Resp  Av  Min: 16  Max: 21    Pulse Range: Pulse  Av.3  Min: 79  Max: 92    Blood Presuure Range:  Systolic (14EKO), YUZ:318 , Min:95 , GZA:563   ; Diastolic (72XWN), BRENDAN:29, Min:65, Max:71      Pulse ox Range: SpO2  Av %  Min: 94 %  Max: 97 %    24hr I & O:  No intake or output data in the 24 hours ending 22 1459        BP 95/71   Pulse 83   Temp 97.7 °F (36.5 °C) (Oral)   Resp 17   Ht 5' 4\" (1.626 m)   Wt 146 lb 13.2 oz (66.6 kg)   SpO2 97%   BMI 25.20 kg/m²           Review of Systems:    No shortness of breath    TELEMETRY: Sinus   has a past medical history of Acid reflux, Acute frontal sinusitis, Alcohol abuse, Anesthesia, Anxiety, Arthritis, Atypical mycobacterial infection, Broken teeth, CHF (congestive heart failure) (HCC), COPD (chronic obstructive pulmonary disease) ([facility] [address] Cough, Depression, Depression, Dyspnea, H/O cardiac catheterization, H/O cardiovascular MUGA stress test, H/O echocardiogram, H/O echocardiogram, History of 24 hour EKG monitoring, [place] (hard of hearing), Hyperlipidemia, Hypertension, Irritant contact dermatitis due to other chemical products, Mycobacterium avium complex ([facility] [address] Other drug allergy(995.27), S/P AVR, Shortness of breath, and Teeth missing. has a past surgical history that includes Lung biopsy (Right, 1996); Rotator cuff repair (Right, 2008); Dental surgery; Cardiac valve replacement (12/17/2003); Colonoscopy (2006); Endoscopy, colon, diagnostic (In Past); Tonsillectomy (1959 Or 1960); bronchoscopy (1996); Knee arthroscopy (Left, 1992); other surgical history (1992); HEMIARTHROPLASTY SHOULDER FRACTURE (Right, 1/29/2019); Total hip arthroplasty (Right, 10/25/2021); and Upper gastrointestinal endoscopy (N/A, 11/10/2021). Physical Exam:  General:  Awake, alert, NAD  Head:normal  Eye: Pupils equal and round  Neck:  No JVD, no carotid bruit noted   Chest:  Clear to auscultation, no signs of respiratory distress  Cardiovascular:  Normal rate and rhythm. S1 and S2 noted.  No murmurs rubs or gallops  Abdomen:   nontender  Extremities: Trace edema  Pulses; palpable  Neuro: grossly normal    Medications:    [START ON 8/27/2022] warfarin  7.5 mg Oral Daily    warfarin  10 mg Oral Once    spironolactone  25 mg Oral Daily    losartan  25 mg Oral Daily    aspirin  81 mg Oral Daily    calcium-cholecalciferol  1 tablet Oral Daily with breakfast    cetirizine  10 mg Oral Q48H    ferrous sulfate  325 mg Oral Daily with breakfast pantoprazole  40 mg Oral QAM AC    atorvastatin  20 mg Oral Daily    venlafaxine  150 mg Oral Daily with breakfast    sodium chloride flush  5-40 mL IntraVENous 2 times per day    guaiFENesin  600 mg Oral BID    amoxicillin-clavulanate  1 tablet Oral 2 times per day    predniSONE  40 mg Oral Daily    albuterol sulfate HFA  2 puff Inhalation 4x daily    ipratropium  2 puff Inhalation 4x daily      sodium chloride       technetium sestamibi, technetium sestamibi, sodium chloride flush, sodium chloride, ondansetron **OR** ondansetron, polyethylene glycol, acetaminophen **OR** acetaminophen, ipratropium-albuterol, albuterol sulfate HFA    Lab Data:  CBC:   No results for input(s): WBC, HGB, HCT, MCV, PLT in the last 72 hours. BMP:   Recent Labs     08/24/22 0510 08/25/22 0429 08/26/22  0450    137 133*   K 5.1 4.1 4.5    98* 99   CO2 28 27 25   BUN 14 21 19   CREATININE 0.8* 0.9 0.7*     LIVER PROFILE:   No results for input(s): AST, ALT, LIPASE, BILIDIR, BILITOT, ALKPHOS in the last 72 hours. Invalid input(s): AMYLASE,  ALB    PT/INR:   Recent Labs     08/24/22 0510 08/25/22 0429 08/26/22  0450   PROTIME 21.6* 23.7* 22.3*   INR 1.67 1.83 1.72     APTT:   No results for input(s): APTT in the last 72 hours. BNP:  No results for input(s): BNP in the last 72 hours. TROPONIN: No results for input(s): TROPONINI in the last 72 hours. Recent Labs     08/23/22 2056 08/24/22  0510   TROPONINT <0.010 <0.010        Labs, consult, tests reviewed                    Anastasia Claros MD, PA-C 8/26/2022 2:59 PM     Please note this report has been partially produced using speech recognition software and may contain errors related to that system including errors in grammar, punctuation, and spelling, as well as words and phrases that may be inappropriate. If there are any questions or concerns please feel free to contact the dictating provider for clarification.

## 2022-08-29 ENCOUNTER — CARE COORDINATION (OUTPATIENT)
Dept: CASE MANAGEMENT | Age: 67
End: 2022-08-29

## 2022-08-29 DIAGNOSIS — I50.33 ACUTE ON CHRONIC DIASTOLIC (CONGESTIVE) HEART FAILURE (HCC): Primary | ICD-10-CM

## 2022-08-29 PROCEDURE — 1111F DSCHRG MED/CURRENT MED MERGE: CPT | Performed by: FAMILY MEDICINE

## 2022-08-29 NOTE — CARE COORDINATION
[facility] Transitions Initial Follow Up Call    Call within 2 business days of discharge: Yes    Patient: Guillermo Baker Patient : 1955   MRN: [id number]  Reason for Admission: Acute CHF  Discharge Date: 22 RARS: Readmission Risk Score: 15.9      Last Discharge [facility]       Date Complaint Diagnosis Description Type Department Provider    22 Shortness of Breath Acute congestive heart failure, unspecified heart failure type ([address] . .. ED to Hosp-Admission (Discharged) (ADMITTED) [facility] 1N Sariah Lopez MD; [facility]. .. Spoke with: Servando Bah, patient    Scheduled appointment with Specialist-has appt on 22 with cardiology  Obtained and reviewed discharge summary and/or continuity of care documents  Education of patient/family/caregiver/guardian to support self-management-when to contact providers    Contacted patient for initial care transition follow up. Devota Offer states that he is doing good so far. Reports shortness of breath has \"improved\" since returning home and using breathing tx. He is not overexerting himself. Taking rest periods as needed. Has minimal cough which has improved. Denies having any c/o chest pain/discomfort, syncopal or near syncopal episodes, swelling. Reports he is monitoring his weight. Discussed importance of monitoring weight, when to weigh himself and when to contact provider with a weight gain of 3 lbs within 24 hour or 5 lbs within a week. He verbalized understanding. He is eating and drinking fluids w/o issues. States he is monitoring his sodium intake. Discussed benefits of speaking with dietician. He declines referral to dietician at this time. Reviewed medication list.  1111F completed. He confirmed he picked up the new medications-Augmentin, Aspirin, Losartan, Prednisone, Spironolactone. Also reviewed changed and stopped medication. He does not have any questions regarding his medications.     Patient has a follow up with cardiology tomorrow, 22 with NP. Also discussed needing hospital follow up with PCP. He states he plans to call the office in regards to his Protime appointment and will request appointment with PCP at this time. Discussed when to contact providers with any new or worsening symptoms. He verbalized understanding. No questions or needs at this time. Transitions of Care Initial Call    Was this an external facility discharge? No Discharge Facility: [facility]     Challenges to be reviewed by the provider   Additional needs identified to be addressed with provider: No  none             Method of communication with provider : none    Advance Care Planning:   Does patient have an Advance Directive: not on file. Care Transition Nurse contacted the patient by telephone to perform post hospital discharge assessment. Verified name and  with patient as identifiers. Provided introduction to self, and explanation of the CTN role. CTN reviewed discharge instructions, medical action plan and red flags with patient who verbalized understanding. Patient given an opportunity to ask questions and does not have any further questions or concerns at this time. Were discharge instructions available to patient? Yes. Reviewed appropriate site of care based on symptoms and resources available to patient including: PCP  Specialist. The patient agrees to contact the PCP office for questions related to their healthcare. Medication reconciliation was performed with patient, who verbalizes understanding of administration of home medications. Was patient discharged with a pulse oximeter? no    CTN provided contact information. Plan for follow-up call in 3-5 days based on severity of symptoms and risk factors. Plan for next call: symptom management-SOB, weight gain, cough, ensure follow up appointment with PCP is scheduled.     Care Transitions 24 Hour Call    Do you have a copy of your discharge instructions?: Yes  Do you have all of your prescriptions and are they filled?: Yes  Have you been contacted by a [employer]?: No  Have you scheduled your follow up appointment?: Yes  Do you feel like you have everything you need to keep you well at home?: Yes  Care Transitions Interventions   Home Care Waiver: Declined        Transportation Support: Declined     Registered Dietician: Declined DME Assistance: Declined     Senior Services: Declined             Follow Up  Future Appointments   Date Time Provider Chante Resendez   8/30/2022 11:40 AM Evan Wu APRN - CNP [facility]   8/31/2022  1:20 PM SCHEDULE, [address] F[address] NURSE [facility] F[address] [facility]   9/23/2022 10:00 AM Claude Munson MD [address] PULM [facility]   11/7/2022  4:00 PM Amanda Perez DO [address] SPM [facility]   11/9/2022  1:15 PM Jim Martinez MD [address] FPS [facility]   4/27/2023  8:30 AM SCHEDULE, [address] AWV LPN [address] FPS [facility]       Monica Wiley, RN  Care Transition Nurse

## 2022-08-30 ENCOUNTER — OFFICE VISIT (OUTPATIENT)
Dept: CARDIOLOGY CLINIC | Age: 67
End: 2022-08-30
Payer: MEDICARE

## 2022-08-30 ENCOUNTER — TELEPHONE (OUTPATIENT)
Dept: CARDIOLOGY CLINIC | Age: 67
End: 2022-08-30

## 2022-08-30 VITALS — SYSTOLIC BLOOD PRESSURE: 110 MMHG | DIASTOLIC BLOOD PRESSURE: 74 MMHG | OXYGEN SATURATION: 98 % | HEART RATE: 86 BPM

## 2022-08-30 DIAGNOSIS — I38 VALVULAR HEART DISEASE: ICD-10-CM

## 2022-08-30 DIAGNOSIS — E78.2 MIXED HYPERLIPIDEMIA: Chronic | ICD-10-CM

## 2022-08-30 DIAGNOSIS — I50.22 CHRONIC SYSTOLIC HEART FAILURE (HCC): Primary | Chronic | ICD-10-CM

## 2022-08-30 DIAGNOSIS — I42.6 ALCOHOLIC CARDIOMYOPATHY (HCC): ICD-10-CM

## 2022-08-30 DIAGNOSIS — I50.22 CHRONIC SYSTOLIC CONGESTIVE HEART FAILURE (HCC): Primary | ICD-10-CM

## 2022-08-30 PROCEDURE — 99214 OFFICE O/P EST MOD 30 MIN: CPT | Performed by: NURSE PRACTITIONER

## 2022-08-30 PROCEDURE — 3017F COLORECTAL CA SCREEN DOC REV: CPT | Performed by: NURSE PRACTITIONER

## 2022-08-30 PROCEDURE — 4004F PT TOBACCO SCREEN RCVD TLK: CPT | Performed by: NURSE PRACTITIONER

## 2022-08-30 PROCEDURE — 1111F DSCHRG MED/CURRENT MED MERGE: CPT | Performed by: NURSE PRACTITIONER

## 2022-08-30 PROCEDURE — G8417 CALC BMI ABV UP PARAM F/U: HCPCS | Performed by: NURSE PRACTITIONER

## 2022-08-30 PROCEDURE — G8428 CUR MEDS NOT DOCUMENT: HCPCS | Performed by: NURSE PRACTITIONER

## 2022-08-30 PROCEDURE — 1124F ACP DISCUSS-NO DSCNMKR DOCD: CPT | Performed by: NURSE PRACTITIONER

## 2022-08-30 RX ORDER — BLOOD-GLUCOSE METER
1 KIT MISCELLANEOUS DAILY
Qty: 1 KIT | Refills: 0 | Status: SHIPPED | OUTPATIENT
Start: 2022-08-30 | End: 2022-08-30

## 2022-08-30 RX ORDER — METOPROLOL SUCCINATE 25 MG/1
25 TABLET, EXTENDED RELEASE ORAL DAILY
Qty: 30 TABLET | Refills: 3 | Status: SHIPPED | OUTPATIENT
Start: 2022-08-30 | End: 2022-08-30

## 2022-08-30 RX ORDER — GLUCOSAMINE HCL/CHONDROITIN SU 500-400 MG
CAPSULE ORAL
Qty: 30 STRIP | Refills: 5 | Status: SHIPPED | OUTPATIENT
Start: 2022-08-30

## 2022-08-30 RX ORDER — BLOOD-GLUCOSE METER
1 KIT MISCELLANEOUS DAILY
Qty: 1 KIT | Refills: 0 | Status: SHIPPED | OUTPATIENT
Start: 2022-08-30 | End: 2022-09-28

## 2022-08-30 RX ORDER — METOPROLOL SUCCINATE 25 MG/1
12.5 TABLET, EXTENDED RELEASE ORAL DAILY
Qty: 30 TABLET | Refills: 3 | Status: SHIPPED | OUTPATIENT
Start: 2022-08-30 | End: 2022-10-13 | Stop reason: SDUPTHER

## 2022-08-30 RX ORDER — LANCETS 30 GAUGE
1 EACH MISCELLANEOUS 2 TIMES DAILY
Qty: 100 EACH | Refills: 5 | Status: SHIPPED | OUTPATIENT
Start: 2022-08-30

## 2022-08-30 ASSESSMENT — ENCOUNTER SYMPTOMS: SHORTNESS OF BREATH: 0

## 2022-08-30 NOTE — TELEPHONE ENCOUNTER
[facility] called asking for information on how many times a day patient to test , also asking if they can add lancets strips alcohol pads to order

## 2022-08-30 NOTE — TELEPHONE ENCOUNTER
Pharmacy called stating that two different prescriptions have been sent over for metoprolol and she needs clarification.

## 2022-08-30 NOTE — PROGRESS NOTES
[facility] PH[facility], [address]  Phone: [phone]    Fax [phone]                  Renaldo Clark MD, Mercy He MD, Karlos Barney MD, MD May Roche MD, Derrick Taylor MD, Marita Escudero MD, [facility], APRN       Dipti Davis, APRN  [place] Roca, APRN       Krystin Hodgson, APRN        Cardiology Progress Note      8/30/2022    RE: Dennis Romero  (1955)                             Primary cardiologist: Dr. May Luu       Subjective:  CC:   1. Chronic systolic heart failure ([address]    2. Alcoholic cardiomyopathy ([address]    3. Valvular heart disease    4. Mixed hyperlipidemia        HPI: Dennis Romero, who is a  73y.o. year old male with a past medical history as listed below. Patient presents to the office for follow up on CHF, VHD, fatigue, and hyperlipidemia. In November 2021 patient had right hip orthoplasty then developed bleeding ulcer resulting in severe anemia. Patient had increased fatigue and shortness of breath resulting in hospitalization August 2022. Patient found to have newly reduced EF 10-15% which was attributed to alcoholic cardiomyopathy. LHC at that time showed moderate disease of LAD. Patient was placed on LifeVest at that time. Patient denies any chest pain, shortness of breath, dizziness, syncope, or palpitations.     Past Medical History:   Diagnosis Date    Acid reflux     Acute frontal sinusitis 07/22/2009    Alcohol abuse     \"I Drink Average 2 Beers A Day\"    Anesthesia     \" I get agitated\"    Anxiety     Arthritis     \"Right Shoulder, Neck, Left Knee\"    Atypical mycobacterial infection     Broken teeth     Upper And Lower     CHF (congestive heart failure) (HCC)     COPD (chronic obstructive pulmonary disease) (HCC)     Sees Dr. Irasema Morales In [place], [address]     Cough     Frequent Cough With Green Sputum    Depression Depression     Dyspnea 02/23/2018    H/O cardiac catheterization 05/17/2022    LAD: Moderate Lumen Irregularity. CALCIFIED mid LAD segment. H/O cardiovascular MUGA stress test 05/14/2019    EF 50%, NORMAL LVEF, NORMAL WALL MOTION. H/O echocardiogram 05/22/2014    IP=>86-43%, LV systolic function is low normal, mild aortic valve calcification, no pericardial effusion    H/O echocardiogram 12/12/2018    EF 45-50%    History of 24 hour EKG monitoring 06/06/2014    24 hr holter monitor. Ventricular ectopics were noted in single and couplet forms. Supraventricular ectopics were noted in single and pair forms.     [place] (hard of hearing)     Bilateral Ears    Hyperlipidemia     Hypertension     Irritant contact dermatitis due to other chemical products 08/26/2019    Mycobacterium avium complex ([facility]     Other drug allergy(995.27) 07/22/2009    S/P AVR 12/2003    Mechanical valvue     Shortness of breath     Teeth missing     Upper And Lower       Current Outpatient Medications   Medication Sig Dispense Refill    empagliflozin (JARDIANCE) 10 MG tablet Take 1 tablet by mouth daily 30 tablet 3    metoprolol succinate (TOPROL XL) 25 MG extended release tablet Take 0.5 tablets by mouth daily 30 tablet 3    glucose monitoring (FREESTYLE FREEDOM) kit 1 kit by Does not apply route daily 1 kit 0    amoxicillin-clavulanate (AUGMENTIN) 875-125 MG per tablet Take 1 tablet by mouth 2 times daily for 4 days 8 tablet 0    spironolactone (ALDACTONE) 25 MG tablet Take 1 tablet by mouth daily 30 tablet 3    losartan (COZAAR) 25 MG tablet Take 1 tablet by mouth daily 30 tablet 3    aspirin 81 MG chewable tablet Take 1 tablet by mouth daily 30 tablet 1    simvastatin (ZOCOR) 40 MG tablet take 1 tablet by mouth at bedtime 90 tablet 1    pantoprazole (PROTONIX) 40 MG tablet 40 mg PO daily 90 tablet 1    albuterol-ipratropium (COMBIVENT RESPIMAT)  MCG/ACT AERS inhaler Inhale 1 puff into the lungs every 6 hours as needed for Wheezing 1 each 1    albuterol sulfate HFA (PROVENTIL;VENTOLIN;PROAIR) 108 (90 Base) MCG/ACT inhaler inhale 2 puffs by mouth and INTO THE LUNGS every 4 hours if needed 18 g 5    warfarin (COUMADIN) 5 MG tablet take 1-2 tablets by mouth daily as directed 60 tablet 2    venlafaxine (EFFEXOR XR) 150 MG extended release capsule Take 1 capsule by mouth daily 30 capsule 5    Calcium Carbonate-Vitamin D (CALCIUM-VITAMIN D3 PO) Take by mouth      albuterol (PROVENTIL) (2.5 MG/3ML) 0.083% nebulizer solution Take 3 mLs by nebulization every 6 hours as needed for Wheezing or Shortness of Breath 125 each 0    sodium chloride, Inhalant, 3 % nebulizer solution Take by nebulization as needed for Other (COPD/shortness of breath) 125 mL 0    ferrous sulfate (IRON 325) 325 (65 Fe) MG tablet Take 1 tablet by mouth daily (with breakfast) 90 tablet 1    triamcinolone (KENALOG) 0.1 % cream Apply topically 2 times daily. 453.6 g 1    guaiFENesin (MUCINEX) 600 MG extended release tablet Take 1,200 mg by mouth every 12 hours      KRILL OIL OMEGA-3 PO Take by mouth      cetirizine (ZYRTEC) 10 MG tablet Take 10 mg by mouth \"Alternate With Singulair\"      NASAL SPRAY SALINE NA by Nasal route daily Over The Counter      Acetaminophen (TYLENOL 8 HOUR PO) Take 500 mg by mouth as needed Over The Counter      Multiple Vitamins-Minerals (CENTRUM PO) Take by mouth daily      Nebulizer MISC Inhale into the lungs as needed       No current facility-administered medications for this visit. Review of Systems:  Review of Systems   Constitutional:  Positive for fatigue. Respiratory:  Negative for shortness of breath. Cardiovascular:  Negative for chest pain, palpitations and leg swelling. Musculoskeletal: Negative. Skin: Negative. Neurological:  Negative for dizziness and weakness. All other systems reviewed and are negative.        Objective:      Physical Exam:  /74 (Site: Left Upper Arm, Position: Sitting, Cuff Size: Medium Adult) Pulse 86   SpO2 98%   Wt Readings from Last 3 Encounters:   08/26/22 146 lb 13.2 oz (66.6 kg)   07/11/22 155 lb 9.6 oz (70.6 kg)   07/08/22 156 lb (70.8 kg)     There is no height or weight on file to calculate BMI. Physical exam:  Physical Exam  Constitutional:       Appearance: He is well-developed. Cardiovascular:      Rate and Rhythm: Normal rate and regular rhythm. Pulses: Intact distal pulses. Dorsalis pedis pulses are 2+ on the right side and 2+ on the left side. Posterior tibial pulses are 2+ on the right side and 2+ on the left side. Heart sounds: Murmur heard. Machinery midsystolic murmur is present at the upper right sternal border radiating to the neck. Pulmonary:      Effort: Pulmonary effort is normal.      Breath sounds: Normal breath sounds. Musculoskeletal:         General: Normal range of motion. Skin:     General: Skin is warm and dry. Neurological:      Mental Status: He is alert and oriented to person, place, and time. DATA:  No results found for: CKTOTAL, CKMB, CKMBINDEX, TROPONINI  BNP:  No results found for: BNP  PT/INR:  No results found for: PTINR  Lab Results   Component Value Date    LABA1C 5.0 11/10/2021    LABA1C 5.5 07/01/2020     Lab Results   Component Value Date    CHOL 149 08/24/2022    TRIG 49 08/24/2022    HDL 59 08/24/2022    LDLCALC 80 08/24/2022    LDLDIRECT 69 07/01/2020     Lab Results   Component Value Date    ALT 23 08/23/2022    AST 30 08/23/2022     TSH:    Lab Results   Component Value Date/Time    TSH 1.57 06/11/2021 01:52 PM       Vitals:    08/30/22 1151   BP: 110/74   Pulse: 86   SpO2: 98%       Echo: 8/24/22  Limited Echo to eval for EF. Definity used to enhance endocardial border. Left ventricular systolic function is severely depressed. Ejection fraction is visually estimated at 10-15%. Left ventricular size is moderately increased . Indeterminate diastolic function.    Basal ant/lat and wall appears to be akinetic. Severely dilated left atrium. Severely dilated right atrium 4.94 cm   Severely dilated right ventricle 5.47 cm   Essentially normal right ventricle function. Normally functioning mechanical prosthetic valve in aortic position. Mitral annular calcification is present on PMLV. Mild to moderate mitral regurgitation. Tricuspid valve is structurally normal.   Mild to moderate tricuspid regurgitation; RVSP: 25 mmHg. No evidence of any pericardial effusion. Stress test: 8/25/22  Abnormal LV EF 24%. LHC:5/17/22  No ischemia     LMCA: Normal.     LAD: Moderate Lumen Irregularity. CALCIFIED mid LAD segment     LCx: Normal.     RCA: Normal.    The 10-year ASCVD risk score ([place]am [place]., et al., 2013) is: 8.5%    Values used to calculate the score:      Age: 73 years      Sex: Male      Is Non- : No      Diabetic: No      Tobacco smoker: No      Systolic Blood Pressure: 018 mmHg      Is BP treated: Yes      HDL Cholesterol: 59 MG/DL      Total Cholesterol: 149 MG/DL      Assessment/ Plan:     Chronic systolic heart failure (HCC)  Appears euvolemic   Cont with current medications   Monitor weight daily  Limit sodium to < 2000 mg a day  Limit water to < 64 oz in a day  Call the office if a weight gain of >3 lbs in 2 days or > 5 lbs in a week is noted. Last testing: Echo 8/24/22 EF 77-99%    Alcoholic Cardiomyopathy  -Recent heart specialization for fluid overload. EF significantly reduced 10-15%, EF and April 40-45%. Patient now has LifeVest on. Patient was drinking 6 pack of beer daily. Stressed the importance of compliance with medications and abstaining from alcohol. Continue with GDMT losartan 25 mg daily, Aldactone 25 mg daily, will add SGLT2 (Jardiance 10 mg). Patient has not tolerated beta-blocker in the past due to bradycardia, current HR elevated will trial low dose Toprol XL 12.5 mg daily.   Refer to heart failure clinic      Mixed hyperlipidemia  -At or near goal Yes     -He is to continue current medications (Zocor 40 mg) Hepatic function panel WNL. No abdominal pain. No myalgias.      -The nature of cardiac risk has been fully discussed with this patient. I have made him aware of his LDL target goal given his cardiovascular risk analysis. I have discussed the appropriate diet. The need for lifelong compliance in order to reduce risk is stressed. A regular exercise program is recommended to help achieve and maintain normal body weight, fitness and improve lipid balance. A written copy of a low fat, low cholesterol diet has been given to the patient. Valvular heart disease  -Patient had aortic valve replacement in 2003. Patient on coumadin for mechanical valve. Severely dilated right and left atrium and right ventricle, EF no reduced at 10-15%. Patient seen, interviewed and examined. Testing was reviewed. Patient is encouraged to exercise even a brisk walk for 30 minutes at least 3 to 4 times a week. Lifestyle and risk factor modificatons discussed. Various goals are discussed and questions answered. Continue current medications. Appropriate prescriptions are addressed. Questions answered and patient verbalizes understanding. Call for any problems, questions, or concerns. Pt is to follow up in 2 weeks for Cardiac management    Electronically signed by Frosty Mcardle.  VANGIE Wu CNP on 8/30/2022 at 12:19 PM

## 2022-08-31 ENCOUNTER — OFFICE VISIT (OUTPATIENT)
Dept: FAMILY MEDICINE CLINIC | Age: 67
End: 2022-08-31
Payer: MEDICARE

## 2022-08-31 VITALS
BODY MASS INDEX: 25.27 KG/M2 | OXYGEN SATURATION: 95 % | WEIGHT: 148 LBS | HEIGHT: 64 IN | HEART RATE: 95 BPM | SYSTOLIC BLOOD PRESSURE: 116 MMHG | DIASTOLIC BLOOD PRESSURE: 70 MMHG

## 2022-08-31 DIAGNOSIS — I50.9 CONGESTIVE HEART FAILURE, UNSPECIFIED HF CHRONICITY, UNSPECIFIED HEART FAILURE TYPE (HCC): ICD-10-CM

## 2022-08-31 DIAGNOSIS — F31.75 BIPOLAR DISORDER, IN PARTIAL REMISSION, MOST RECENT EPISODE DEPRESSED (HCC): ICD-10-CM

## 2022-08-31 DIAGNOSIS — Z92.89 HISTORY OF RECENT HOSPITALIZATION: Primary | ICD-10-CM

## 2022-08-31 DIAGNOSIS — Z95.2 H/O MECHANICAL AORTIC VALVE REPLACEMENT: ICD-10-CM

## 2022-08-31 DIAGNOSIS — J44.9 CHRONIC OBSTRUCTIVE PULMONARY DISEASE, UNSPECIFIED COPD TYPE (HCC): Chronic | ICD-10-CM

## 2022-08-31 DIAGNOSIS — E11.9 TYPE 2 DIABETES MELLITUS WITHOUT COMPLICATION, WITHOUT LONG-TERM CURRENT USE OF INSULIN (HCC): ICD-10-CM

## 2022-08-31 DIAGNOSIS — I42.6 ALCOHOLIC CARDIOMYOPATHY (HCC): ICD-10-CM

## 2022-08-31 DIAGNOSIS — Z95.2 S/P AVR: ICD-10-CM

## 2022-08-31 LAB
ANION GAP SERPL CALCULATED.3IONS-SCNC: 9 MMOL/L (ref 3–16)
BUN BLDV-MCNC: 21 MG/DL (ref 7–20)
CALCIUM SERPL-MCNC: 9.3 MG/DL (ref 8.3–10.6)
CHLORIDE BLD-SCNC: 101 MMOL/L (ref 99–110)
CO2: 29 MMOL/L (ref 21–32)
CREAT SERPL-MCNC: 0.9 MG/DL (ref 0.8–1.3)
GFR AFRICAN AMERICAN: >60
GFR NON-AFRICAN AMERICAN: >60
GLUCOSE BLD-MCNC: 95 MG/DL (ref 70–99)
HCT VFR BLD CALC: 40.9 % (ref 40.5–52.5)
HEMOGLOBIN: 13.8 G/DL (ref 13.5–17.5)
INTERNATIONAL NORMALIZATION RATIO, POC: 2
MAGNESIUM: 2.3 MG/DL (ref 1.8–2.4)
MCH RBC QN AUTO: 32.6 PG (ref 26–34)
MCHC RBC AUTO-ENTMCNC: 33.7 G/DL (ref 31–36)
MCV RBC AUTO: 96.7 FL (ref 80–100)
PDW BLD-RTO: 15.6 % (ref 12.4–15.4)
PLATELET # BLD: 283 K/UL (ref 135–450)
PMV BLD AUTO: 7.4 FL (ref 5–10.5)
POTASSIUM SERPL-SCNC: 4.7 MMOL/L (ref 3.5–5.1)
PROTHROMBIN TIME, POC: NORMAL
RBC # BLD: 4.23 M/UL (ref 4.2–5.9)
SODIUM BLD-SCNC: 139 MMOL/L (ref 136–145)
WBC # BLD: 8.8 K/UL (ref 4–11)

## 2022-08-31 PROCEDURE — 99214 OFFICE O/P EST MOD 30 MIN: CPT | Performed by: FAMILY MEDICINE

## 2022-08-31 PROCEDURE — 3046F HEMOGLOBIN A1C LEVEL >9.0%: CPT | Performed by: FAMILY MEDICINE

## 2022-08-31 PROCEDURE — 1111F DSCHRG MED/CURRENT MED MERGE: CPT | Performed by: FAMILY MEDICINE

## 2022-08-31 PROCEDURE — 1124F ACP DISCUSS-NO DSCNMKR DOCD: CPT | Performed by: FAMILY MEDICINE

## 2022-08-31 PROCEDURE — 3017F COLORECTAL CA SCREEN DOC REV: CPT | Performed by: FAMILY MEDICINE

## 2022-08-31 PROCEDURE — 3023F SPIROM DOC REV: CPT | Performed by: FAMILY MEDICINE

## 2022-08-31 PROCEDURE — G8427 DOCREV CUR MEDS BY ELIG CLIN: HCPCS | Performed by: FAMILY MEDICINE

## 2022-08-31 PROCEDURE — G8417 CALC BMI ABV UP PARAM F/U: HCPCS | Performed by: FAMILY MEDICINE

## 2022-08-31 PROCEDURE — 2022F DILAT RTA XM EVC RTNOPTHY: CPT | Performed by: FAMILY MEDICINE

## 2022-08-31 PROCEDURE — 85610 PROTHROMBIN TIME: CPT | Performed by: FAMILY MEDICINE

## 2022-08-31 PROCEDURE — 4004F PT TOBACCO SCREEN RCVD TLK: CPT | Performed by: FAMILY MEDICINE

## 2022-09-01 ENCOUNTER — CARE COORDINATION (OUTPATIENT)
Dept: CASE MANAGEMENT | Age: 67
End: 2022-09-01

## 2022-09-01 LAB
ESTIMATED AVERAGE GLUCOSE: 116.9 MG/DL
HBA1C MFR BLD: 5.7 %

## 2022-09-01 ASSESSMENT — ENCOUNTER SYMPTOMS
DIARRHEA: 0
SHORTNESS OF BREATH: 1
CHEST TIGHTNESS: 0
WHEEZING: 0
BLOOD IN STOOL: 0
ABDOMINAL PAIN: 0
TROUBLE SWALLOWING: 0
NAUSEA: 0
VOMITING: 0
EYE PAIN: 0

## 2022-09-01 NOTE — PROGRESS NOTES
9/1/2022    Maureen Friday    Chief Complaint   Patient presents with    Follow-Up from Hospital     Fluid overload    Discuss Labs       HPI  History was obtained from the patient. Ari Packer is a 73 y.o. male who presents today with history of recent hospitalization basically for CHF and fluid overload he was in the hospital 823 through 826 he admits he had not been watching his diet had been drinking a lot of beer probably had a mild COPD component also feels much better he was treated with decongestive therapy his appetite is back mood remains really pretty good. INR today was slightly low at 2.0 he received instructions for Coumadin therapy as he has a history of aortic valve replacement (mechanical). Patient does have evidence of a severe cardiomyopathy most likely from acute alcohol exposure. His breathing is better and I suspect his cardiomyopathy is also improving already. I believe he is already had post hospital follow-up with cardiology. Monisha Mckinney REVIEW OF SYMPTOMS    Review of Systems   Constitutional:  Negative for activity change and fatigue. HENT:  Negative for congestion, hearing loss, mouth sores and trouble swallowing. Eyes:  Negative for pain and visual disturbance. Respiratory:  Positive for shortness of breath. Negative for chest tightness and wheezing. Cardiovascular:  Negative for chest pain and palpitations. Gastrointestinal:  Negative for abdominal pain, blood in stool, diarrhea, nausea and vomiting. Endocrine: Negative for polydipsia and polyuria. Genitourinary:  Negative for dysuria, frequency and urgency. Musculoskeletal:  Negative for arthralgias, gait problem and neck stiffness. Skin:  Negative for rash. Allergic/Immunologic: Negative for environmental allergies. Neurological:  Negative for dizziness, seizures, speech difficulty and weakness. Hematological:  Does not bruise/bleed easily.    Psychiatric/Behavioral:  Negative for agitation, confusion, hallucinations and suicidal ideas. The patient is not nervous/anxious. PAST MEDICAL HISTORY  Past Medical History:   Diagnosis Date    Acid reflux     Acute frontal sinusitis 07/22/2009    Alcohol abuse     \"I Drink Average 2 Beers A Day\"    Anesthesia     \" I get agitated\"    Anxiety     Arthritis     \"Right Shoulder, Neck, Left Knee\"    Atypical mycobacterial infection     Broken teeth     Upper And Lower     CHF (congestive heart failure) ([facility])     COPD (chronic obstructive pulmonary disease) ([facility])     Sees Dr. Nelly Haque In [place], [address]     Cough     Frequent Cough With Green Sputum    Depression     Depression     Dyspnea 02/23/2018    H/O cardiac catheterization 05/17/2022    LAD: Moderate Lumen Irregularity. CALCIFIED mid LAD segment. H/O cardiovascular MUGA stress test 05/14/2019    EF 50%, NORMAL LVEF, NORMAL WALL MOTION. H/O echocardiogram 05/22/2014    MM=>36-58%, LV systolic function is low normal, mild aortic valve calcification, no pericardial effusion    H/O echocardiogram 12/12/2018    EF 45-50%    History of 24 hour EKG monitoring 06/06/2014    24 hr holter monitor. Ventricular ectopics were noted in single and couplet forms. Supraventricular ectopics were noted in single and pair forms.     [place] (hard of hearing)     Bilateral Ears    Hyperlipidemia     Hypertension     Irritant contact dermatitis due to other chemical products 08/26/2019    Mycobacterium avium complex ([facility] [address]     Other drug allergy(995.27) 07/22/2009    S/P AVR 12/2003    Mechanical valvue     Shortness of breath     Teeth missing     Upper And Lower    Type 2 diabetes mellitus 8/31/2022       FAMILY HISTORY  Family History   Problem Relation Age of Onset    Cancer Mother         Lymphoma    Diabetes Mother     High Blood Pressure Mother     High Cholesterol Mother     Obesity Mother     Vision Loss Mother         Wore Glasses    Heart Disease Father         \"Heart Failure\"    COPD Father     Asthma Sister     High Blood Pressure Sister     High Cholesterol Sister     Other Sister         pre diabetic    COPD Sister     Diabetes Sister         \"Pre Diabetes\"    No Known Problems Son        SOCIAL HISTORY  Social History     Socioeconomic History    Marital status:      Spouse name: None    Number of children: 1    Years of education: 12    Highest education level: None   Tobacco Use    Smoking status: Never    Smokeless tobacco: Current     Types: Snuff, Chew   Vaping Use    Vaping Use: Never used   Substance and Sexual Activity    Alcohol use: Not Currently     Comment: 1/2 bottle of smirnoff    Drug use: No    Sexual activity: Not Currently     Social Determinants of Health     Financial Resource Strain: Low Risk     Difficulty of Paying Living Expenses: Not hard at all   Food Insecurity: No Food Insecurity    Worried About Running Out of Food in the Last Year: Never true    Ran Out of Food in the Last Year: Never true   Physical Activity: Insufficiently Active    Days of Exercise per Week: 5 days    Minutes of Exercise per Session: 20 min        SURGICAL HISTORY  Past Surgical History:   Procedure Laterality Date    BRONCHOSCOPY  1996    \"Done Twice\"    CARDIAC VALVE REPLACEMENT  12/17/2003    \"Aortic Valve Replacement, Mechanical Valve\"    COLONOSCOPY  2006    Polyps Removed    DENTAL SURGERY      Teeth Extracted In Past    ENDOSCOPY, COLON, DIAGNOSTIC  In Past    HEMIARTHROPLASTY SHOULDER FRACTURE Right 1/29/2019    SHOULDER HEMIARTHROPLASTY performed by Kyra Osullivan DO at [address] [place] [address]    \"Cauterized Because My Sperm Was Low\"    ROTATOR CUFF REPAIR Right 2008    [address] Right 10/25/2021    RIGHT HIP TOTAL ARTHROPLASTY performed by Kyra Osullivan DO at [address] [place] [address] 11/10/2021    EGD BIOPSY performed by Robyn Crockett MD at [facility] CURRENT MEDICATIONS  Current Outpatient Medications   Medication Sig Dispense Refill    empagliflozin (JARDIANCE) 10 MG tablet Take 1 tablet by mouth daily 30 tablet 3    metoprolol succinate (TOPROL XL) 25 MG extended release tablet Take 0.5 tablets by mouth daily 30 tablet 3    glucose monitoring (FREESTYLE FREEDOM) kit 1 kit by Does not apply route daily 1 kit 0    Lancets MISC 1 each by Does not apply route 2 times daily 100 each 5    blood glucose monitor strips Test BG daily  & as needed for symptoms of irregular blood glucose. Dispense sufficient amount for indicated testing frequency plus additional to accommodate PRN testing needs.  30 strip 5    spironolactone (ALDACTONE) 25 MG tablet Take 1 tablet by mouth daily 30 tablet 3    losartan (COZAAR) 25 MG tablet Take 1 tablet by mouth daily 30 tablet 3    aspirin 81 MG chewable tablet Take 1 tablet by mouth daily 30 tablet 1    simvastatin (ZOCOR) 40 MG tablet take 1 tablet by mouth at bedtime 90 tablet 1    pantoprazole (PROTONIX) 40 MG tablet 40 mg PO daily 90 tablet 1    albuterol-ipratropium (COMBIVENT RESPIMAT)  MCG/ACT AERS inhaler Inhale 1 puff into the lungs every 6 hours as needed for Wheezing 1 each 1    albuterol sulfate HFA (PROVENTIL;VENTOLIN;PROAIR) 108 (90 Base) MCG/ACT inhaler inhale 2 puffs by mouth and INTO THE LUNGS every 4 hours if needed 18 g 5    warfarin (COUMADIN) 5 MG tablet take 1-2 tablets by mouth daily as directed 60 tablet 2    venlafaxine (EFFEXOR XR) 150 MG extended release capsule Take 1 capsule by mouth daily 30 capsule 5    Calcium Carbonate-Vitamin D (CALCIUM-VITAMIN D3 PO) Take by mouth      albuterol (PROVENTIL) (2.5 MG/3ML) 0.083% nebulizer solution Take 3 mLs by nebulization every 6 hours as needed for Wheezing or Shortness of Breath 125 each 0    sodium chloride, Inhalant, 3 % nebulizer solution Take by nebulization as needed for Other (COPD/shortness of breath) 125 mL 0    ferrous sulfate (IRON 325) 325 (65 Fe) MG tablet Take 1 tablet by mouth daily (with breakfast) 90 tablet 1    triamcinolone (KENALOG) 0.1 % cream Apply topically 2 times daily. 453.6 g 1    guaiFENesin (MUCINEX) 600 MG extended release tablet Take 1,200 mg by mouth every 12 hours      KRILL OIL OMEGA-3 PO Take by mouth      cetirizine (ZYRTEC) 10 MG tablet Take 10 mg by mouth \"Alternate With Singulair\"      NASAL SPRAY SALINE NA by Nasal route daily Over The Counter      Acetaminophen (TYLENOL 8 HOUR PO) Take 500 mg by mouth as needed Over The Counter      Multiple Vitamins-Minerals (CENTRUM PO) Take by mouth daily      Nebulizer MISC Inhale into the lungs as needed       No current facility-administered medications for this visit. ALLERGIES  Allergies   Allergen Reactions    Ciprofloxacin Hcl Hives and Swelling    Levaquin [Levofloxacin] Hives and Swelling    Other Nausea And Vomiting     \"Allergic To Tylox\"    Ceftin [Cefuroxime]     Lisinopril      cough       PHYSICAL EXAM    /70 (Site: Left Upper Arm, Position: Sitting, Cuff Size: Medium Adult)   Pulse 95   Ht 5' 4\" (1.626 m)   Wt 148 lb (67.1 kg)   SpO2 95%   BMI 25.40 kg/m²     Physical Exam  Vitals and nursing note reviewed. Constitutional:       General: He is not in acute distress. Appearance: Normal appearance. He is well-developed. He is not ill-appearing, toxic-appearing or diaphoretic. HENT:      Head: Normocephalic and atraumatic. Nose: Nose normal.      Mouth/Throat:      Mouth: Mucous membranes are moist.      Pharynx: Oropharynx is clear. Eyes:      Pupils: Pupils are equal, round, and reactive to light. Cardiovascular:      Rate and Rhythm: Normal rate and regular rhythm. Heart sounds: Normal heart sounds. No murmur heard. No gallop. Pulmonary:      Effort: Pulmonary effort is normal. No respiratory distress. Breath sounds: Normal breath sounds. No wheezing, rhonchi or rales.    Abdominal:      Palpations: Abdomen is soft.   Musculoskeletal:         General: No swelling or deformity. Normal range of motion. Cervical back: Normal range of motion and neck supple. No rigidity. Lymphadenopathy:      Cervical: No cervical adenopathy. Skin:     General: Skin is warm and dry. Coloration: Skin is not jaundiced. Findings: No bruising. Neurological:      General: No focal deficit present. Mental Status: He is alert and oriented to person, place, and time. Mental status is at baseline. Cranial Nerves: No cranial nerve deficit. Motor: No weakness. Gait: Gait normal.   Psychiatric:         Mood and Affect: Mood normal.         Behavior: Behavior normal.         Thought Content: Thought content normal.       ASSESSMENT & PLAN     Diagnosis Orders   1. History of recent hospitalization        2. Type 2 diabetes mellitus without complication, without long-term current use of insulin (HCC)  Hemoglobin A1C      3. H/O mechanical aortic valve replacement        4. Chronic obstructive pulmonary disease, unspecified COPD type ([facility] [facility]  CBC      5. Alcoholic cardiomyopathy ([address]        6. Bipolar disorder, in partial remission, most recent episode depressed ([address]        7. Congestive heart failure, unspecified HF chronicity, unspecified heart failure type ([address]  Basic Metabolic Panel    Magnesium      8. S/P AVR  POCT INR      This point patient to continue on current regimen. Follow-up closely with cardiology will need new COVID-vaccine when available in the next few weeks. We will check BMP, mag level, CBC and A1c today and have him follow-up for results. Would like to see him back as directed call with questions or problems. Avoid all alcohol exposure be compliant with all meds. Recheck INR as directed in 1 week    Return in about 4 weeks (around 9/28/2022).          Electronically signed by Daniella Pabon MD on 9/1/2022

## 2022-09-01 NOTE — CARE COORDINATION
[facility] Transitions Follow Up Call    2022    Patient: Dennis Romero  Patient : 1955   MRN: [id number]  Reason for Admission: Acute CHF  Discharge Date: 22 RARS: Readmission Risk Score: 15.9         Spoke with: Aristides Luna, patient    Contacted patient for care transition follow up. Esdras Estrella states that he is doing well. Denies having any c/o chest pain/discomfort, swelling, weight gain. Continues to become short of breath but states breathing is much improved. Minimal cough, mainly in the mornings. Fatigue has improved as well. Reports he is sleeping better throughout the night. Discussed diet. He states he is monitoring his salt intake and not adding any additional salt to his foods. Also discussed alcohol intake. He informed CTN that he has decreased his alcohol intake. Informed him per PCP, avoid all alcohol exposure. He verbalized understanding and states he is trying. Briefly discussed when to contact provider with any new or worsening symptoms. He verbalized understanding. No questions or needs at this time. Care Transitions Follow Up Call    Needs to be reviewed by the provider   Additional needs identified to be addressed with provider: No  none             Method of communication with provider : none      Care Transition Nurse contacted the patient by telephone to follow up after admission on 22. Verified name and  with patient as identifiers. Addressed changes since last contact: none  Discussed follow-up appointments. If no appointment was previously scheduled, appointment scheduling offered: Yes. Is follow up appointment scheduled within 7 days of discharge? Yes. CTN reviewed discharge instructions, medical action plan and red flags with patient and discussed any barriers to care and/or understanding of plan of care after discharge.  Discussed appropriate site of care based on symptoms and resources available to patient including: PCP  Specialist. The patient agrees to contact the PCP office for questions related to their healthcare. Patients top risk factors for readmission: depression  medical condition-CHF, Alcohol abuse, Alcoholic cardiomyopathy, COPD, Chronic interstitial  lung disease, DM  polypharmacy  Interventions to address risk factors: Education of patient/family/caregiver/guardian to support self-management-when to contact providers    CTN provided contact information for future needs. Plan for follow-up call in 7-10 days based on severity of symptoms and risk factors. Plan for next call: symptom management-any new or worsening symptoms    Care Transitions Subsequent and Final Call    Subsequent and Final Calls  Do you have any questions related to your medications?: No  Do you currently have any active services?: No  Do you have any needs or concerns that I can assist you with?: No  Care Transitions Interventions   Home Care Waiver: Declined        Transportation Support: Declined     Registered Dietician: Declined DME Assistance: Declined     Senior Services: Declined    Other Interventions: Follow Up  Future Appointments   Date Time Provider hCante Resendez   9/7/2022  1:30 PM SCHEDULE, SRMX FPS NURSE SRMX [facility]   9/15/2022  1:30 PM VANGIE Dugan - CNP HRTFAILCTR [facility]   9/23/2022 10:00 AM Brett Case MD [address] [facility]   9/28/2022  1:00 PM Emmanuel Closs, [address] [place] [address]   11/7/2022  4:00 PM Alen Gr DO [facility] SPM [facility]   11/9/2022  1:15 PM Jelly Baxter MD [facility]   4/27/2023  8:30 AM Kristina Hairston [facility] AWV LPN [facility]       Madelaine He, RN  Care Transition Nurse

## 2022-09-07 ENCOUNTER — ANTI-COAG VISIT (OUTPATIENT)
Dept: FAMILY MEDICINE CLINIC | Age: 67
End: 2022-09-07

## 2022-09-07 ENCOUNTER — NURSE ONLY (OUTPATIENT)
Dept: FAMILY MEDICINE CLINIC | Age: 67
End: 2022-09-07
Payer: MEDICARE

## 2022-09-07 DIAGNOSIS — Z95.2 S/P AVR: ICD-10-CM

## 2022-09-07 DIAGNOSIS — Z95.2 S/P AVR: Primary | Chronic | ICD-10-CM

## 2022-09-07 LAB
INTERNATIONAL NORMALIZATION RATIO, POC: 4.8
PROTHROMBIN TIME, POC: 58.1

## 2022-09-07 PROCEDURE — 85610 PROTHROMBIN TIME: CPT | Performed by: FAMILY MEDICINE

## 2022-09-07 PROCEDURE — 99999 PR OFFICE/OUTPT VISIT,PROCEDURE ONLY: CPT | Performed by: FAMILY MEDICINE

## 2022-09-07 RX ORDER — FERROUS SULFATE 325(65) MG
TABLET ORAL
Qty: 90 TABLET | Refills: 1 | Status: SHIPPED | OUTPATIENT
Start: 2022-09-07

## 2022-09-08 ENCOUNTER — CARE COORDINATION (OUTPATIENT)
Dept: CASE MANAGEMENT | Age: 67
End: 2022-09-08

## 2022-09-08 NOTE — CARE COORDINATION
[facility] Transitions Follow Up Call    2022    Patient: Thomas Caldwell  Patient : 1955   MRN: [id number]  Reason for Admission:  Acute CHF  Discharge Date: 22 RARS: Readmission Risk Score: 15.9         Spoke with: Rita Walton, patient    Contacted patient for care transition follow up. [place] He states that he is doing pretty well. Denies having any c/o chest pain/discomfort, pressure, tightness, increased shortness of breath, swelling. Reports breathing has improved. Still may get short of breath with exertion. Continues to monitor his weight. Reports he had a weight gain of 3.5 lbs within 28-30 hours but lost the weight he gained the next day. Weight is back down and stable. Discussed when to contact provider with a weight gain of 3 lbs within 24 hours or 5 lbs within a week. He verbalized understanding. He continues to monitor his diet. Discussed when to contact provider with any new or worsening symptoms. He verbalized understanding. No questions or needs at this time. Care Transitions Follow Up Call    Needs to be reviewed by the provider   Additional needs identified to be addressed with provider: No  none             Method of communication with provider : none      Care Transition Nurse contacted the patient by telephone to follow up after admission on 22. Verified name and  with patient as identifiers. Addressed changes since last contact:  Had a weight gain of 3.5 lbs but lost the weight the next day. Discussed follow-up appointments. If no appointment was previously scheduled, appointment scheduling offered: Yes. Is follow up appointment scheduled within 7 days of discharge? Yes. CTN reviewed discharge instructions, medical action plan and red flags with patient and discussed any barriers to care and/or understanding of plan of care after discharge.  Discussed appropriate site of care based on symptoms and resources available to patient including: PCP  Specialist. The patient agrees to contact the PCP office for questions related to their healthcare. Patients top risk factors for readmission: CHF, Alcohol abuse, Alcoholic cardiomyopathy, COPD, Chronic interstitial  lung disease, DM  Interventions to address risk factors: Education of patient/family/caregiver/guardian to support self-management-when to contact providers    CTN provided contact information for future needs. Plan for follow-up call in 7-10 days based on severity of symptoms and risk factors. Plan for next call: symptom management-SOB, weight gain, swelling      Care Transitions Subsequent and Final Call    Subsequent and Final Calls  Do you have any ongoing symptoms?: Yes  Patient-reported symptoms: Shortness of Breath  Have your medications changed?: No  Do you have any questions related to your medications?: No  Do you have any needs or concerns that I can assist you with?: No  Care Transitions Interventions   Home Care Waiver: Declined        Transportation Support: Declined     Registered Dietician: Declined DME Assistance: Declined     Senior Services: Declined    Other Interventions: Follow Up  Future Appointments   Date Time Provider Chante Resendez   9/14/2022  1:20 PM SCHEDULE, [facility] FPS NURSE SRMX [facility]   9/15/2022  1:30 PM Evan Wu, APRN - CNP HRTFAILCTR [facility]   9/23/2022 10:00 AM Nathaly Suarez MD [facility] PULM [facility]   9/28/2022  1:00 PM Shazia Carney [facility] FPS [address] [place] [address]   11/7/2022  4:00 PM Toni Trent DO [facility] SPM [facility]   11/9/2022  1:15 PM Dylon Sebastian MD [facility]   4/27/2023  8:30 AM [facility] FPS AWV LPN [facility]       Javan Lacey, RN  Care Transition Nurse

## 2022-09-09 DIAGNOSIS — J01.90 ACUTE SINUSITIS, RECURRENCE NOT SPECIFIED, UNSPECIFIED LOCATION: Primary | ICD-10-CM

## 2022-09-09 RX ORDER — AZITHROMYCIN 250 MG/1
250 TABLET, FILM COATED ORAL SEE ADMIN INSTRUCTIONS
Qty: 6 TABLET | Refills: 0 | Status: SHIPPED | OUTPATIENT
Start: 2022-09-09 | End: 2022-09-14

## 2022-09-09 NOTE — TELEPHONE ENCOUNTER
Re:    amoxicillin-clavulanate (AUGMENTIN) 875-125 MG per tablet     Patient would like another refill of this antibiotic because he feels like the sinus infection is back. Please let him know if you will do this.     [facility] on [address] [facility]

## 2022-09-09 NOTE — TELEPHONE ENCOUNTER
Called and spoke with the patient, reports green colored nasal drainage, sinus pressure, cough, and drainage in back of throat. Patient stated he has not been tested for covid since he was in the hospital on 8/23/22. Please advise.

## 2022-09-09 NOTE — TELEPHONE ENCOUNTER
Patient should probably have a home COVID blood test done or go to urgent care to have it done to be on safe side.   The meantime to cover for possible sinusitis send in a Z-Emanuel as directed/no refill

## 2022-09-13 DIAGNOSIS — Z79.01 ANTICOAGULANT LONG-TERM USE: ICD-10-CM

## 2022-09-13 RX ORDER — WARFARIN SODIUM 5 MG/1
TABLET ORAL
Qty: 60 TABLET | Refills: 2 | Status: SHIPPED | OUTPATIENT
Start: 2022-09-13

## 2022-09-14 ENCOUNTER — NURSE ONLY (OUTPATIENT)
Dept: FAMILY MEDICINE CLINIC | Age: 67
End: 2022-09-14
Payer: MEDICARE

## 2022-09-14 ENCOUNTER — CARE COORDINATION (OUTPATIENT)
Dept: CASE MANAGEMENT | Age: 67
End: 2022-09-14

## 2022-09-14 DIAGNOSIS — Z95.2 S/P AVR: ICD-10-CM

## 2022-09-14 PROCEDURE — 85610 PROTHROMBIN TIME: CPT | Performed by: FAMILY MEDICINE

## 2022-09-14 NOTE — CARE COORDINATION
[facility] Transitions Follow Up Call    2022    Patient: Lester Sheppard  Patient : 1955   MRN: [id number]  Reason for Admission:  Acute CHF  Discharge Date: 22 RARS: Readmission Risk Score: 15.9    Attempted to contact patient for care transition follow up. Unable to reach patient. Left message with contact information and request for call back. Follow Up  Future Appointments   Date Time Provider Chante Resendez   9/15/2022  1:30 PM VANGIE Dugan - CNP HRTFAILCTR [facility]   2022  1:20 PM SCHEDULE, [address] FPS NURSE [address] FPS [facility]   2022 10:00 AM Olamide Maldonado MD [address] [place] [address]   2022  1:00 PM Saray Mcmahan, [place] [address] FPS [address] [place] [address]   2022  4:00 PM David Posadas DO [address] SPM [facility]   2022  1:15 PM Yohan Malhotra MD [address] FPS [facility]   2023  8:30 AM [address] FPS AWV LPN [address] FPS [facility]       Evan Peralta, RN  Care Transition Nurse

## 2022-09-15 ENCOUNTER — OFFICE VISIT (OUTPATIENT)
Dept: CARDIOLOGY CLINIC | Age: 67
End: 2022-09-15
Payer: MEDICARE

## 2022-09-15 VITALS
HEART RATE: 87 BPM | WEIGHT: 153.2 LBS | SYSTOLIC BLOOD PRESSURE: 105 MMHG | DIASTOLIC BLOOD PRESSURE: 80 MMHG | HEIGHT: 64 IN | RESPIRATION RATE: 16 BRPM | OXYGEN SATURATION: 96 % | BODY MASS INDEX: 26.15 KG/M2

## 2022-09-15 DIAGNOSIS — I50.22 CHRONIC SYSTOLIC HEART FAILURE (HCC): Chronic | ICD-10-CM

## 2022-09-15 DIAGNOSIS — I42.6 ALCOHOLIC CARDIOMYOPATHY (HCC): Primary | ICD-10-CM

## 2022-09-15 DIAGNOSIS — I38 VALVULAR HEART DISEASE: ICD-10-CM

## 2022-09-15 LAB — INTERNATIONAL NORMALIZATION RATIO, POC: 2.8

## 2022-09-15 PROCEDURE — 4004F PT TOBACCO SCREEN RCVD TLK: CPT | Performed by: NURSE PRACTITIONER

## 2022-09-15 PROCEDURE — 1124F ACP DISCUSS-NO DSCNMKR DOCD: CPT | Performed by: NURSE PRACTITIONER

## 2022-09-15 PROCEDURE — 3017F COLORECTAL CA SCREEN DOC REV: CPT | Performed by: NURSE PRACTITIONER

## 2022-09-15 PROCEDURE — 1111F DSCHRG MED/CURRENT MED MERGE: CPT | Performed by: NURSE PRACTITIONER

## 2022-09-15 PROCEDURE — G8427 DOCREV CUR MEDS BY ELIG CLIN: HCPCS | Performed by: NURSE PRACTITIONER

## 2022-09-15 PROCEDURE — G8417 CALC BMI ABV UP PARAM F/U: HCPCS | Performed by: NURSE PRACTITIONER

## 2022-09-15 PROCEDURE — 99214 OFFICE O/P EST MOD 30 MIN: CPT | Performed by: NURSE PRACTITIONER

## 2022-09-15 ASSESSMENT — ENCOUNTER SYMPTOMS: SHORTNESS OF BREATH: 0

## 2022-09-15 NOTE — PROGRESS NOTES
[address]      Visit Date: 9/15/2022  Cardiologist:  Dr. Gustavo Navarro   Primary Care Physician: Dr. Kely Montana MD    Eddie Packer is a 73 y.o. male who presents today for:  CC:   1. Alcoholic cardiomyopathy ([address]    2. Chronic systolic heart failure (HCC)    3. Valvular heart disease        HPI:   Eddie Packer is a 73 y.o. male who presents to the office as a new patient visit in the heart failure clinic. Patient presents to the office for follow up on CHF, VHD, fatigue, and hyperlipidemia. In November 2021 patient had right hip orthoplasty then developed bleeding ulcer resulting in severe anemia. Patient had increased fatigue and shortness of breath resulting in hospitalization August 2022. Patient found to have newly reduced EF 10-15% which was attributed to alcoholic cardiomyopathy. LHC at that time showed moderate disease of LAD. Patient was placed on LifeVest at that time. Chief Complaint   Patient presents with    Congestive Heart Failure    Follow-up       Patient has:  Last hospital admission related to Heart Failure:  8/23/22  baseline BNP 2100   baseline weight 153  Chest Pain: no  Worsening SOB/orthopnea/PND: yes  Edema: no  Any extra diuretic use: no  Weight gain: yes   Compliant checking home weight: yes  Fatigue: yes  Abdominal bloating: no  Appetite: good  Difficulty sleeping: no  RADHA: no  Cough: no  Compliant checking blood pressure: yes  Compliant with medication regimen: yes    Vaccinations:    flu No  pneumonia Yes  COVID 19 No      Device: No       Activity: fair  Can you walk 1-2 blocks or do a moderate amount of house/yard work? Yes    NYHA Class: II   Class I   Patients with no limitation of activities; they suffer no symptoms from ordinary activities. Class II   Patients with slight, mild limitation of activity; they are comfortable with rest or with mild exertion.    Class III   Patients with marked limitation of activity; they are comfortable only at rest.   Class IV   Patients who should be at complete rest, confined to bed or chair; any physical activity brings on discomfort and symptoms occur at rest.    Sodium Restrictions: 2g  Fluid Restrictions: 48-64 oz/day  Sodium and fluid restriction compliance: yes      Past Medical History:   Diagnosis Date    Acid reflux     Acute frontal sinusitis 07/22/2009    Alcohol abuse     \"I Drink Average 2 Beers A Day\"    Anesthesia     \" I get agitated\"    Anxiety     Arthritis     \"Right Shoulder, Neck, Left Knee\"    Atypical mycobacterial infection     Broken teeth     Upper And Lower     CHF (congestive heart failure) (HCC)     COPD (chronic obstructive pulmonary disease) ([facility]     Sees Dr. Mel Gibbs [place], [address]     Cough     Frequent Cough With Green Sputum    Depression     Depression     Dyspnea 02/23/2018    H/O cardiac catheterization 05/17/2022    LAD: Moderate Lumen Irregularity. CALCIFIED mid LAD segment. H/O cardiovascular MUGA stress test 05/14/2019    EF 50%, NORMAL LVEF, NORMAL WALL MOTION. H/O echocardiogram 05/22/2014    CH=>36-82%, LV systolic function is low normal, mild aortic valve calcification, no pericardial effusion    H/O echocardiogram 12/12/2018    EF 45-50%    History of 24 hour EKG monitoring 06/06/2014    24 hr holter monitor. Ventricular ectopics were noted in single and couplet forms. Supraventricular ectopics were noted in single and pair forms.     [place] (hard of hearing)     Bilateral Ears    Hyperlipidemia     Hypertension     Irritant contact dermatitis due to other chemical products 08/26/2019    Mycobacterium avium complex ([facility]     Other drug allergy(995.27) 07/22/2009    S/P AVR 12/2003    Mechanical valvue     Shortness of breath     Teeth missing     Upper And Lower    Type 2 diabetes mellitus 8/31/2022     Past Surgical History:   Procedure Laterality Date    BRONCHOSCOPY  1996    \"Done Twice\"    CARDIAC VALVE REPLACEMENT  12/17/2003    \"Aortic Valve Replacement, Mechanical Valve\"    COLONOSCOPY  2006    Polyps Removed    DENTAL SURGERY      Teeth Extracted In Past    ENDOSCOPY, COLON, DIAGNOSTIC  In Past    HEMIARTHROPLASTY SHOULDER FRACTURE Right 1/29/2019    SHOULDER HEMIARTHROPLASTY performed by Sterling Quinn DO at [facility]. ARTHROSCOPY Left 1992    LUNG BIOPSY Right [facility]    \"Cauterized Because My Sperm Was Low\"    ROTATOR CUFF REPAIR Right 2008    [place]    TOTAL HIP ARTHROPLASTY Right 10/25/2021    RIGHT HIP TOTAL ARTHROPLASTY performed by Sterling Quinn DO at [facility] [address] N/A 11/10/2021    EGD BIOPSY performed by Patricia Valdez MD at [facility] ENDOSCOPY     Family History   Problem Relation Age of Onset    Cancer Mother         Lymphoma    Diabetes Mother     High Blood Pressure Mother     High Cholesterol Mother     Obesity Mother     Vision Loss Mother         Wore Glasses    Heart Disease Father         \"Heart Failure\"    COPD Father     Asthma Sister     High Blood Pressure Sister     High Cholesterol Sister     Other Sister         pre diabetic    COPD Sister     Diabetes Sister         \"Pre Diabetes\"    No Known Problems Son      Social History     Tobacco Use    Smoking status: Never    Smokeless tobacco: Current     Types: Snuff, Chew   Substance Use Topics    Alcohol use: Not Currently     Comment: 1/2 bottle of belgica     Current Outpatient Medications   Medication Sig Dispense Refill    warfarin (COUMADIN) 5 MG tablet take 1 TO 2 tablets by mouth once daily as directed 60 tablet 2    FEROSUL 325 (65 Fe) MG tablet take 1 tablet by mouth once daily with breakfast 90 tablet 1    empagliflozin (JARDIANCE) 10 MG tablet Take 1 tablet by mouth daily 30 tablet 3    metoprolol succinate (TOPROL XL) 25 MG extended release tablet Take 0.5 tablets by mouth daily 30 tablet 3    glucose monitoring (FREESTYLE FREEDOM) kit 1 kit by Does not apply route daily 1 kit 0 Lancets MISC 1 each by Does not apply route 2 times daily 100 each 5    blood glucose monitor strips Test BG daily  & as needed for symptoms of irregular blood glucose. Dispense sufficient amount for indicated testing frequency plus additional to accommodate PRN testing needs. 30 strip 5    spironolactone (ALDACTONE) 25 MG tablet Take 1 tablet by mouth daily 30 tablet 3    losartan (COZAAR) 25 MG tablet Take 1 tablet by mouth daily 30 tablet 3    aspirin 81 MG chewable tablet Take 1 tablet by mouth daily 30 tablet 1    simvastatin (ZOCOR) 40 MG tablet take 1 tablet by mouth at bedtime 90 tablet 1    pantoprazole (PROTONIX) 40 MG tablet 40 mg PO daily 90 tablet 1    albuterol-ipratropium (COMBIVENT RESPIMAT)  MCG/ACT AERS inhaler Inhale 1 puff into the lungs every 6 hours as needed for Wheezing 1 each 1    albuterol sulfate HFA (PROVENTIL;VENTOLIN;PROAIR) 108 (90 Base) MCG/ACT inhaler inhale 2 puffs by mouth and INTO THE LUNGS every 4 hours if needed 18 g 5    venlafaxine (EFFEXOR XR) 150 MG extended release capsule Take 1 capsule by mouth daily 30 capsule 5    Calcium Carbonate-Vitamin D (CALCIUM-VITAMIN D3 PO) Take by mouth      albuterol (PROVENTIL) (2.5 MG/3ML) 0.083% nebulizer solution Take 3 mLs by nebulization every 6 hours as needed for Wheezing or Shortness of Breath 125 each 0    sodium chloride, Inhalant, 3 % nebulizer solution Take by nebulization as needed for Other (COPD/shortness of breath) 125 mL 0    triamcinolone (KENALOG) 0.1 % cream Apply topically 2 times daily.  453.6 g 1    guaiFENesin (MUCINEX) 600 MG extended release tablet Take 1,200 mg by mouth every 12 hours      KRILL OIL OMEGA-3 PO Take by mouth      cetirizine (ZYRTEC) 10 MG tablet Take 10 mg by mouth \"Alternate With Singulair\"      NASAL SPRAY SALINE NA by Nasal route daily Over The Counter      Acetaminophen (TYLENOL 8 HOUR PO) Take 500 mg by mouth as needed Over The Counter      Multiple Vitamins-Minerals (CENTRUM PO) Take by mouth daily      Nebulizer MISC Inhale into the lungs as needed       No current facility-administered medications for this visit. Allergies   Allergen Reactions    Ciprofloxacin Hcl Hives and Swelling    Levaquin [Levofloxacin] Hives and Swelling    Other Nausea And Vomiting     \"Allergic To Tylox\"    Ceftin [Cefuroxime]     Lisinopril      cough       SUBJECTIVE:     Review of Systems   Respiratory:  Negative for shortness of breath. Cardiovascular:  Negative for chest pain, palpitations and leg swelling. Musculoskeletal: Negative. Skin: Negative. Neurological:  Negative for dizziness and weakness. All other systems reviewed and are negative. OBJECTIVE:   Today's Vitals:  /80 (Site: Left Upper Arm, Position: Sitting, Cuff Size: Medium Adult)   Pulse 87   Resp 16   Ht 5' 4\" (1.626 m)   Wt 153 lb 3.2 oz (69.5 kg)   SpO2 96%   BMI 26.30 kg/m²     Wt Readings from Last 3 Encounters:   09/15/22 153 lb 3.2 oz (69.5 kg)   08/31/22 148 lb (67.1 kg)   08/26/22 146 lb 13.2 oz (66.6 kg)     BP Readings from Last 3 Encounters:   09/15/22 105/80   08/31/22 116/70   08/30/22 110/74     Pulse Readings from Last 3 Encounters:   09/15/22 87   08/31/22 95   08/30/22 86     Body mass index is 26.3 kg/m². Physical Exam  Constitutional:       Appearance: He is well-developed. Cardiovascular:      Rate and Rhythm: Normal rate and regular rhythm. Pulses: Intact distal pulses. Dorsalis pedis pulses are 2+ on the right side and 2+ on the left side. Posterior tibial pulses are 2+ on the right side and 2+ on the left side. Heart sounds: S1 normal and S2 normal. Murmur heard. Machinery midsystolic murmur is present with a grade of 3/6 at the upper right sternal border radiating to the neck. Pulmonary:      Effort: Pulmonary effort is normal.      Breath sounds: Normal breath sounds. Musculoskeletal:         General: Normal range of motion.    Skin:     General: Skin is warm and dry. Neurological:      Mental Status: He is alert and oriented to person, place, and time. 6 minute walk test:  Did not walk do to time constraints. We will ambulate at next visit. Most recent ECHO: 8/24/22  Limited Echo to eval for EF. Definity used to enhance endocardial border. Left ventricular systolic function is severely depressed. Ejection fraction is visually estimated at 10-15%. Left ventricular size is moderately increased . Indeterminate diastolic function. Basal ant/lat and wall appears to be akinetic. Severely dilated left atrium. Severely dilated right atrium 4.94 cm   Severely dilated right ventricle 5.47 cm   Essentially normal right ventricle function. Normally functioning mechanical prosthetic valve in aortic position. Mitral annular calcification is present on PMLV. Mild to moderate mitral regurgitation. Tricuspid valve is structurally normal.   Mild to moderate tricuspid regurgitation; RVSP: 25 mmHg. No evidence of any pericardial effusion.     Results reviewed:  BNP:   Lab Results   Component Value Date    PROBNP 10,533 (H) 08/23/2022     CBC:   Lab Results   Component Value Date/Time    WBC 8.8 08/31/2022 02:13 PM    RBC 4.23 08/31/2022 02:13 PM    HGB 13.8 08/31/2022 02:13 PM    HCT 40.9 08/31/2022 02:13 PM     08/31/2022 02:13 PM     CMP:    Lab Results   Component Value Date/Time     08/31/2022 02:13 PM    K 4.7 08/31/2022 02:13 PM     08/31/2022 02:13 PM    CO2 29 08/31/2022 02:13 PM    BUN 21 08/31/2022 02:13 PM    CREATININE 0.9 08/31/2022 02:13 PM    GFRAA >60 08/31/2022 02:13 PM    AGRATIO 1.7 03/11/2022 01:55 PM    LABGLOM >60 08/31/2022 02:13 PM    GLUCOSE 95 08/31/2022 02:13 PM    CALCIUM 9.3 08/31/2022 02:13 PM     Hepatic Function Panel:    Lab Results   Component Value Date/Time    ALKPHOS 101 08/23/2022 01:10 PM    ALT 23 08/23/2022 01:10 PM    AST 30 08/23/2022 01:10 PM    PROT 5.8 08/23/2022 01:10 PM    BILITOT 0.4 08/23/2022 01:10 PM    BILIDIR <0.2 05/27/2022 10:29 AM    IBILI see below 05/27/2022 10:29 AM    LABALBU 3.9 08/23/2022 01:10 PM     Magnesium:    Lab Results   Component Value Date/Time    MG 2.30 08/31/2022 02:13 PM     PT/INR:    Lab Results   Component Value Date/Time    PROTIME 58.1 09/07/2022 01:35 PM    INR 2.8 09/15/2022 10:32 AM    INR 1.72 08/26/2022 04:50 AM     Lipids:    Lab Results   Component Value Date/Time    TRIG 49 08/24/2022 05:10 AM    HDL 59 08/24/2022 05:10 AM    LDLCALC 80 08/24/2022 05:10 AM    LDLDIRECT 69 07/01/2020 07:00 AM    LABVLDL 10 05/27/2022 10:29 AM       Iron Studies:  No components found for: FE,  TIBC,  FERRITIN    Iron Deficiency Anemia:  No IV Iron Therapy:  No  2017 ACC/AHA HF Guidelines:   intravenous iron replacement in patients with New York Heart Association (NYHA) class II and III HF and iron deficiency (ferritin <100 ng/ml or 100-300 ng/ml if transferrin saturation <20%), to improve functional status and QoL. ASSESSMENT AND PLAN:     Chronic systolic heart failure (HCC)  Appears euvolemic   Cont with current medications   Monitor weight daily  Limit sodium to < 2000 mg a day  Limit water to < 64 oz in a day  Call the office if a weight gain of >3 lbs in 2 days or > 5 lbs in a week is noted. Last testing: Echo 8/24/22 EF 40-13%     Alcoholic Cardiomyopathy  -Recent heart specialization for fluid overload. EF significantly reduced 10-15%, EF and April 40-45%. Patient now has LifeVest on. Patient was drinking 6 pack of beer daily. Stressed the importance of compliance with medications and abstaining from alcohol. Continue with GDMT losartan 25 mg daily, Aldactone 25 mg daily, previously added SGLT2 (Jardiance 10 mg). Patient has not tolerated beta-blocker in the past due to bradycardia, patient toleratingToprol XL 12.5 mg daily. Recent sinus infection with antibotics and steroids, has cough.      Valvular heart disease  -Patient had aortic valve replacement in 2003. Patient on coumadin for mechanical valve. Severely dilated right and left atrium and right ventricle, EF no reduced at 10-15%. Patient was instructed to call the [facility] for changes in the following symptoms:   Weight gain of 3 pounds in 1 day or 5 pounds in 1 week  Increased shortness of breath  Shortness of breath while laying down  Cough  Chest pain  Swelling in feet, ankles or legs  Tenderness or bloating in the abdomen  Fatigue   Decreased appetite or nausea   Confusion      No follow-ups on file. or sooner if needed     Patient given educational materials - see patient instructions. We discussed the importance of weighing oneself and recording daily. We also discussed the importance of a lowsodium diet, higher sodium foods to avoid and better low sodium food options. Discussed use, benefit, and side effects of prescribed medications. All patient questions answered. Patient verbalizes understanding of plan of care using teach back method, and is agreeable to the treatment plan. Copy of note to be sent to consulting provider and primary cardiologist   Electronicallysigned by Chelo Mott.  VANGIE Mcgovern CNP on 9/15/2022 at 2:45 PM

## 2022-09-15 NOTE — PROGRESS NOTES
CHF CLINICAL STAFF DOCUMENTATION    Have you had any Chest Pain? - No    Do you had any Shortness of Breath - Yes  If Yes - When  with coughing     Have you had any dizziness - Yes   blood glucose was 91 at the time   When do you feel dizzy none   How long does it last .3  minutes     Do you have any edema -  No  swelling in none      Are you on fluid restrictions - Yes    Amount -  2 bottles of water daily - coffee  Are you on sodium restrictions - Yes   Amount - no adding salt / rinsing salt off     Do you feel fatigued - Yes    Do you have a cough - Yes    Lung sounds -    Normal - Yes   Abnormal -     Do you have abdominal bloating - No    How is your appetite - good    Do you have difficulty sleeping - Yes  Able to lie flat? - Yes    Do you have a history of sleep apnea - No      did not do sleep apnea test   CPAP no    6 min.  Walk:  deferred   Pre heart rate   Time walked   Distance    Post heart rate        Have you had Vaccine for Covid - Yes    Have you had Flu Vaccine - No    Have you had Pneumonia Vaccine - Yes

## 2022-09-20 ENCOUNTER — CARE COORDINATION (OUTPATIENT)
Dept: CASE MANAGEMENT | Age: 67
End: 2022-09-20

## 2022-09-20 NOTE — CARE COORDINATION
[facility] Transitions Follow Up Call    2022    Patient: Sade Bashir  Patient : 1955   MRN: [id number]  Reason for Admission: Acute CHF  Discharge Date: 22 RARS: Readmission Risk Score: 15.9    2nd attempt to contact patient for care transition follow up. Unable to reach patient. Left message with contact information and request for call back. Episode to be resolved and CTN to sign off if return call is not received from the patient. Follow Up  Future Appointments   Date Time Provider Chante Resendez   2022  1:20 PM SCHEDULE, SRMX FPS NURSE SRMX FPS [facility]   2022 10:00 AM Tata Trotter MD [address] [facility]   2022  1:00 PM MICHAEL Sims [address] [place] FPS [facility]   10/13/2022  1:30 PM Evan Sellers, APRN - CNP [id number] [facility]   2022  4:00 PM Kyra Osullivan DO [address] [place] SPM [facility]   2022  1:15 PM Dolly Camacho MD [address] [place] FPS [facility]   2023  8:30 AM [address] [place] FPS AWV LPN [address] [place] FPS [facility]       Areli Parra, RN  Care Transition Nurse

## 2022-09-21 ENCOUNTER — NURSE ONLY (OUTPATIENT)
Dept: FAMILY MEDICINE CLINIC | Age: 67
End: 2022-09-21
Payer: MEDICARE

## 2022-09-21 DIAGNOSIS — Z95.2 S/P AVR: ICD-10-CM

## 2022-09-21 LAB
INTERNATIONAL NORMALIZATION RATIO, POC: 1.3
PROTHROMBIN TIME, POC: 1.3

## 2022-09-21 PROCEDURE — 85610 PROTHROMBIN TIME: CPT | Performed by: FAMILY MEDICINE

## 2022-09-22 ENCOUNTER — CARE COORDINATION (OUTPATIENT)
Dept: CASE MANAGEMENT | Age: 67
End: 2022-09-22

## 2022-09-22 NOTE — CARE COORDINATION
[facility] Transitions Follow Up Call    2022    Patient: Regla Owens  Patient : 1955   MRN: [id number]  Reason for Admission:  Acute CHF  Discharge Date: 22 RARS: Readmission Risk Score: 15.9         Spoke with: Maru Stovall, patient    Received incoming call from patient. Left message stating that he is returning call from the other day. He can be reached at [phone]. CTN called patient for final care transition follow up. Annalisa Hernandez states that he seems to be doing pretty good. Denies having any c/o chest pain/discomfort, pressure, tightness, increased shortness of breath, swelling. Reports he continues to monitor his weight daily and weight has been stable. We discussed when to contact provider with a weight gain of 3 lbs within 24 hours or 5 lbs within a week. He verbalized understanding. Reports he still has a cough after eating. Denies having any difficulty swallowing. States the cough after eating has been occurring for many years. He is eating small amounts of food at a time. Denies having any nausea/vomiting. States his doctors are aware of this but no one seems to know what is causing this. States he was sent to multiple specialist as well as sent to [place] and [place]. Blood sugars have been ranging between . Discussed when to contact providers with any new or worsening symptoms. He verbalized understanding. He does not have any questions or needs at this time. No further outreach. Care Transitions Follow Up Call    Needs to be reviewed by the provider   Additional needs identified to be addressed with provider: No  none             Method of communication with provider : none      Care Transition Nurse contacted the patient by telephone to follow up after admission on 22. Verified name and  with patient as identifiers. Addressed changes since last contact: none  Discussed follow-up appointments.  If no appointment was previously

## 2022-09-23 ENCOUNTER — OFFICE VISIT (OUTPATIENT)
Dept: PULMONOLOGY | Age: 67
End: 2022-09-23
Payer: MEDICARE

## 2022-09-23 VITALS
DIASTOLIC BLOOD PRESSURE: 68 MMHG | OXYGEN SATURATION: 97 % | WEIGHT: 142 LBS | HEIGHT: 65 IN | BODY MASS INDEX: 23.66 KG/M2 | SYSTOLIC BLOOD PRESSURE: 118 MMHG | HEART RATE: 103 BPM

## 2022-09-23 DIAGNOSIS — G47.10 HYPERSOMNIA: ICD-10-CM

## 2022-09-23 DIAGNOSIS — J47.9 BRONCHIECTASIS WITHOUT COMPLICATION (HCC): Chronic | ICD-10-CM

## 2022-09-23 DIAGNOSIS — G47.33 OSA (OBSTRUCTIVE SLEEP APNEA): ICD-10-CM

## 2022-09-23 DIAGNOSIS — E66.3 OVERWEIGHT (BMI 25.0-29.9): ICD-10-CM

## 2022-09-23 DIAGNOSIS — R06.02 SOBOE (SHORTNESS OF BREATH ON EXERTION): ICD-10-CM

## 2022-09-23 PROCEDURE — G8427 DOCREV CUR MEDS BY ELIG CLIN: HCPCS | Performed by: INTERNAL MEDICINE

## 2022-09-23 PROCEDURE — G8420 CALC BMI NORM PARAMETERS: HCPCS | Performed by: INTERNAL MEDICINE

## 2022-09-23 PROCEDURE — 99214 OFFICE O/P EST MOD 30 MIN: CPT | Performed by: INTERNAL MEDICINE

## 2022-09-23 ASSESSMENT — ENCOUNTER SYMPTOMS
EYE ITCHING: 0
BACK PAIN: 0
ABDOMINAL DISTENTION: 0
SHORTNESS OF BREATH: 0
EYE DISCHARGE: 0
ABDOMINAL PAIN: 0
COUGH: 1

## 2022-09-23 NOTE — PROGRESS NOTES
losartan (COZAAR) 25 MG tablet Take 1 tablet by mouth daily 30 tablet 3    aspirin 81 MG chewable tablet Take 1 tablet by mouth daily 30 tablet 1    simvastatin (ZOCOR) 40 MG tablet take 1 tablet by mouth at bedtime 90 tablet 1    pantoprazole (PROTONIX) 40 MG tablet 40 mg PO daily 90 tablet 1    albuterol-ipratropium (COMBIVENT RESPIMAT)  MCG/ACT AERS inhaler Inhale 1 puff into the lungs every 6 hours as needed for Wheezing 1 each 1    albuterol sulfate HFA (PROVENTIL;VENTOLIN;PROAIR) 108 (90 Base) MCG/ACT inhaler inhale 2 puffs by mouth and INTO THE LUNGS every 4 hours if needed 18 g 5    venlafaxine (EFFEXOR XR) 150 MG extended release capsule Take 1 capsule by mouth daily 30 capsule 5    Calcium Carbonate-Vitamin D (CALCIUM-VITAMIN D3 PO) Take by mouth      albuterol (PROVENTIL) (2.5 MG/3ML) 0.083% nebulizer solution Take 3 mLs by nebulization every 6 hours as needed for Wheezing or Shortness of Breath 125 each 0    sodium chloride, Inhalant, 3 % nebulizer solution Take by nebulization as needed for Other (COPD/shortness of breath) 125 mL 0    triamcinolone (KENALOG) 0.1 % cream Apply topically 2 times daily. 453.6 g 1    guaiFENesin (MUCINEX) 600 MG extended release tablet Take 1,200 mg by mouth every 12 hours      KRILL OIL OMEGA-3 PO Take by mouth      cetirizine (ZYRTEC) 10 MG tablet Take 10 mg by mouth \"Alternate With Singulair\"      NASAL SPRAY SALINE NA by Nasal route daily Over The Counter      Acetaminophen (TYLENOL 8 HOUR PO) Take 500 mg by mouth as needed Over The Counter      Multiple Vitamins-Minerals (CENTRUM PO) Take by mouth daily      Nebulizer MISC Inhale into the lungs as needed       No current facility-administered medications for this visit.        Allergies   Allergen Reactions    Ciprofloxacin Hcl Hives and Swelling    Levaquin [Levofloxacin] Hives and Swelling    Other Nausea And Vomiting     \"Allergic To Tylox\"    Ceftin [Cefuroxime]     Lisinopril      cough       Past Medical History:   Diagnosis Date    Acid reflux     Acute frontal sinusitis 07/22/2009    Alcohol abuse     \"I Drink Average 2 Beers A Day\"    Anesthesia     \" I get agitated\"    Anxiety     Arthritis     \"Right Shoulder, Neck, Left Knee\"    Atypical mycobacterial infection     Broken teeth     Upper And Lower     CHF (congestive heart failure) ([facility])     COPD (chronic obstructive pulmonary disease) ([facility])     Sees Dr. Alexander Arteaga In [place], [address]     Cough     Frequent Cough With Green Sputum    Depression     Depression     Dyspnea 02/23/2018    H/O cardiac catheterization 05/17/2022    LAD: Moderate Lumen Irregularity. CALCIFIED mid LAD segment. H/O cardiovascular MUGA stress test 05/14/2019    EF 50%, NORMAL LVEF, NORMAL WALL MOTION. H/O echocardiogram 05/22/2014    JM=>46-46%, LV systolic function is low normal, mild aortic valve calcification, no pericardial effusion    H/O echocardiogram 12/12/2018    EF 45-50%    History of 24 hour EKG monitoring 06/06/2014    24 hr holter monitor. Ventricular ectopics were noted in single and couplet forms. Supraventricular ectopics were noted in single and pair forms.     [place] (hard of hearing)     Bilateral Ears    Hyperlipidemia     Hypertension     Irritant contact dermatitis due to other chemical products 08/26/2019    Mycobacterium avium complex ([facility] [address]     Other drug allergy(995.27) 07/22/2009    S/P AVR 12/2003    Mechanical valvue     Shortness of breath     Teeth missing     Upper And Lower    Type 2 diabetes mellitus 8/31/2022       Past Surgical History:   Procedure Laterality Date    BRONCHOSCOPY  1996    \"Done Twice\"    CARDIAC VALVE REPLACEMENT  12/17/2003    \"Aortic Valve Replacement, Mechanical Valve\"    COLONOSCOPY  2006    Polyps Removed    DENTAL SURGERY      Teeth Extracted In Past    ENDOSCOPY, COLON, DIAGNOSTIC  In Past    HEMIARTHROPLASTY SHOULDER FRACTURE Right 1/29/2019    SHOULDER HEMIARTHROPLASTY performed by Rell Dickerson DO at [facility] OR    KNEE ARTHROSCOPY Left 1992    LUNG BIOPSY Right 1996    OTHER SURGICAL HISTORY  1992    \"Cauterized Because My Sperm Was Low\"    ROTATOR CUFF REPAIR Right 2008    [place]    TOTAL HIP ARTHROPLASTY Right 10/25/2021    RIGHT HIP TOTAL ARTHROPLASTY performed by Nelly Jones DO at [facility] N/A 11/10/2021    EGD BIOPSY performed by Cara Murrell MD at [facility] ENDOSCOPY       Social History     Socioeconomic History    Marital status:      Spouse name: None    Number of children: 1    Years of education: 12    Highest education level: None   Tobacco Use    Smoking status: Never    Smokeless tobacco: Current     Types: Snuff, Chew   Vaping Use    Vaping Use: Never used   Substance and Sexual Activity    Alcohol use: Not Currently     Comment: 1/2 bottle of smirnoff    Drug use: No    Sexual activity: Not Currently     Social Determinants of Health     Financial Resource Strain: Low Risk     Difficulty of Paying Living Expenses: Not hard at all   Food Insecurity: No Food Insecurity    Worried About Running Out of Food in the Last Year: Never true    Ran Out of Food in the Last Year: Never true   Physical Activity: Insufficiently Active    Days of Exercise per Week: 5 days    Minutes of Exercise per Session: 20 min       Review of Systems   Constitutional:  Positive for fatigue. HENT:  Negative for congestion and postnasal drip. Eyes:  Negative for discharge and itching. Respiratory:  Positive for cough. Negative for shortness of breath. Cardiovascular:  Negative for chest pain and leg swelling. Gastrointestinal:  Negative for abdominal distention and abdominal pain. Endocrine: Negative for cold intolerance and heat intolerance. Genitourinary:  Negative for enuresis and frequency. Musculoskeletal:  Negative for arthralgias and back pain. Allergic/Immunologic: Negative for environmental allergies and food allergies.    Neurological: (Chronic)      It is unknown reason, ? chemical exposure, ? Viral  Advised CPT  Abx when he has worsening  PFT  6 MWT         RADHA (obstructive sleep apnea)      Advised to go for the PSG  Loose weight            Other    SOBOE (shortness of breath on exertion)      Albuterol prn  PFT  6 MWT         Relevant Orders    Full PFT Study With Bronchodilator    6 Minute Walk Test    Hypersomnia       Advised to go for the PSG  Loose weight           Overweight (BMI 25.0-29. 9)      Advised to loose weight                 Follow-Up:    Return in about 2 months (around 11/23/2022) for 6 MWT, PFT, Sleep Study.      Progress notes sent to the referring Provider    Miguelangel Witt MD MD  9/23/2022  10:45 AM

## 2022-09-23 NOTE — ASSESSMENT & PLAN NOTE
It is unknown reason, ? chemical exposure, ?  Viral  Advised CPT  Abx when he has worsening  PFT  6 MWT

## 2022-09-27 ENCOUNTER — TELEPHONE (OUTPATIENT)
Dept: FAMILY MEDICINE CLINIC | Age: 67
End: 2022-09-27

## 2022-09-27 DIAGNOSIS — J32.9 CHRONIC SINUSITIS, UNSPECIFIED LOCATION: Primary | ICD-10-CM

## 2022-09-27 RX ORDER — AMOXICILLIN AND CLAVULANATE POTASSIUM 875; 125 MG/1; MG/1
1 TABLET, FILM COATED ORAL 2 TIMES DAILY
Qty: 20 TABLET | Refills: 0 | Status: SHIPPED | OUTPATIENT
Start: 2022-09-27 | End: 2022-10-07

## 2022-09-27 NOTE — TELEPHONE ENCOUNTER
Patient should be using a vaporizer and plenty of fluids. Make sure he is taking something such as Flonase nasal spray 2 sprays each nostril once a day and change antibiotic to Augmentin 875 1 p.o. twice daily for 10 days.   Make sure he is taking over-the-counter probiotic also and set up sinus x-rays at [facility].

## 2022-09-28 ENCOUNTER — OFFICE VISIT (OUTPATIENT)
Dept: FAMILY MEDICINE CLINIC | Age: 67
End: 2022-09-28
Payer: MEDICARE

## 2022-09-28 VITALS — HEIGHT: 65 IN | WEIGHT: 146 LBS | BODY MASS INDEX: 24.32 KG/M2

## 2022-09-28 DIAGNOSIS — Z23 NEED FOR VACCINATION: Primary | ICD-10-CM

## 2022-09-28 DIAGNOSIS — Z95.2 H/O MECHANICAL AORTIC VALVE REPLACEMENT: ICD-10-CM

## 2022-09-28 DIAGNOSIS — I50.22 CHRONIC SYSTOLIC HEART FAILURE (HCC): Chronic | ICD-10-CM

## 2022-09-28 DIAGNOSIS — J44.9 CHRONIC OBSTRUCTIVE PULMONARY DISEASE, UNSPECIFIED COPD TYPE (HCC): Chronic | ICD-10-CM

## 2022-09-28 PROCEDURE — G0008 ADMIN INFLUENZA VIRUS VAC: HCPCS | Performed by: PHYSICIAN ASSISTANT

## 2022-09-28 PROCEDURE — 3017F COLORECTAL CA SCREEN DOC REV: CPT | Performed by: PHYSICIAN ASSISTANT

## 2022-09-28 PROCEDURE — 1124F ACP DISCUSS-NO DSCNMKR DOCD: CPT | Performed by: PHYSICIAN ASSISTANT

## 2022-09-28 PROCEDURE — G8427 DOCREV CUR MEDS BY ELIG CLIN: HCPCS | Performed by: PHYSICIAN ASSISTANT

## 2022-09-28 PROCEDURE — 90694 VACC AIIV4 NO PRSRV 0.5ML IM: CPT | Performed by: PHYSICIAN ASSISTANT

## 2022-09-28 PROCEDURE — G8420 CALC BMI NORM PARAMETERS: HCPCS | Performed by: PHYSICIAN ASSISTANT

## 2022-09-28 PROCEDURE — 99214 OFFICE O/P EST MOD 30 MIN: CPT | Performed by: PHYSICIAN ASSISTANT

## 2022-09-28 PROCEDURE — 3023F SPIROM DOC REV: CPT | Performed by: PHYSICIAN ASSISTANT

## 2022-09-28 PROCEDURE — 4004F PT TOBACCO SCREEN RCVD TLK: CPT | Performed by: PHYSICIAN ASSISTANT

## 2022-09-28 NOTE — PROGRESS NOTES
9/28/2022    [facility]    Chief Complaint   Patient presents with    1 Month Follow-Up     Presenting for 1 month follow up, CHF. Last visit with Dr. Fred Echols. Breathing better but still using nebulizer and inhaler. Other     Currently on second round of abx, dx: sinusitis. Needs INR :     Immunizations       HPI  History was obtained from pt. Ortiz Goode is a 73 y.o. male with a PMHx as listed below who presents today for 1 month follow up - he was having breathing issues due to copd and chf. Pt is feeling a little better, especially due to the change in the weather. They did change his med and pulm has ordered pfts and sleep study. Cardiology added on jardiance and toprol xl 12.5 mg. Pt to monitor weight and limit salt and fluids, abstain from alcohol. Symptomatically pt is slowly getting better. Pt wants flu vaccine and INT    1. Need for vaccination    2. Chronic systolic heart failure ([address]    3. Chronic obstructive pulmonary disease, unspecified COPD type ([address]    4. H/O mechanical aortic valve replacement           REVIEW OF SYMPTOMS    Review of Systems    PAST MEDICAL HISTORY  Past Medical History:   Diagnosis Date    Acid reflux     Acute frontal sinusitis 07/22/2009    Alcohol abuse     \"I Drink Average 2 Beers A Day\"    Anesthesia     \" I get agitated\"    Anxiety     Arthritis     \"Right Shoulder, Neck, Left Knee\"    Atypical mycobacterial infection     Broken teeth     Upper And Lower     CHF (congestive heart failure) ([facility])     COPD (chronic obstructive pulmonary disease) ([facility])     Sees Dr. Rogelio Lopez In [place], [address]     Cough     Frequent Cough With Green Sputum    Depression     Depression     Dyspnea 02/23/2018    H/O cardiac catheterization 05/17/2022    LAD: Moderate Lumen Irregularity. CALCIFIED mid LAD segment. H/O cardiovascular MUGA stress test 05/14/2019    EF 50%, NORMAL LVEF, NORMAL WALL MOTION.     H/O echocardiogram 05/22/2014    EF=>50-55%, LV systolic function is low normal, mild aortic valve calcification, no pericardial effusion    H/O echocardiogram 12/12/2018    EF 45-50%    History of 24 hour EKG monitoring 06/06/2014    24 hr holter monitor. Ventricular ectopics were noted in single and couplet forms. Supraventricular ectopics were noted in single and pair forms.     [place] (hard of hearing)     Bilateral Ears    Hyperlipidemia     Hypertension     Irritant contact dermatitis due to other chemical products 08/26/2019    Mycobacterium avium complex ([address]     Other drug allergy(995.27) 07/22/2009    S/P AVR 12/2003    Mechanical valvue     Shortness of breath     Teeth missing     Upper And Lower    Type 2 diabetes mellitus 8/31/2022       FAMILY HISTORY  Family History   Problem Relation Age of Onset    Cancer Mother         Lymphoma    Diabetes Mother     High Blood Pressure Mother     High Cholesterol Mother     Obesity Mother     Vision Loss Mother         Wore Glasses    Heart Disease Father         \"Heart Failure\"    COPD Father     Asthma Sister     High Blood Pressure Sister     High Cholesterol Sister     Other Sister         pre diabetic    COPD Sister     Diabetes Sister         \"Pre Diabetes\"    No Known Problems Son        SOCIAL HISTORY  Social History     Socioeconomic History    Marital status:      Spouse name: None    Number of children: 1    Years of education: 12    Highest education level: None   Tobacco Use    Smoking status: Never    Smokeless tobacco: Current     Types: Snuff, Chew   Vaping Use    Vaping Use: Never used   Substance and Sexual Activity    Alcohol use: Not Currently     Comment: 1/2 bottle of smirnoff    Drug use: No    Sexual activity: Not Currently     Social Determinants of Health     Financial Resource Strain: Low Risk     Difficulty of Paying Living Expenses: Not hard at all   Food Insecurity: No Food Insecurity    Worried About Running Out of Food in the Last Year: Never true    Ran Out of Food in the Last Year: Never true   Physical Activity: Insufficiently Active    Days of Exercise per Week: 5 days    Minutes of Exercise per Session: 20 min        SURGICAL HISTORY  Past Surgical History:   Procedure Laterality Date    BRONCHOSCOPY  1996    \"Done Twice\"    CARDIAC VALVE REPLACEMENT  12/17/2003    \"Aortic Valve Replacement, Mechanical Valve\"    COLONOSCOPY  2006    Polyps Removed    DENTAL SURGERY      Teeth Extracted In Past    ENDOSCOPY, COLON, DIAGNOSTIC  In Past    HEMIARTHROPLASTY SHOULDER FRACTURE Right 1/29/2019    SHOULDER HEMIARTHROPLASTY performed by Toni Trent DO at [facility]. ARTHROSCOPY Left 1992    LUNG BIOPSY Right [facility]    \"Cauterized Because My Sperm Was Low\"    ROTATOR CUFF REPAIR Right 2008    TONSILLECTOMY  1959 Or 1960    TOTAL HIP ARTHROPLASTY Right 10/25/2021    RIGHT HIP TOTAL ARTHROPLASTY performed by Toni Trent DO at [place] [address] 11/10/2021    EGD BIOPSY performed by Kavin Kelly MD at [facility]  Current Outpatient Medications   Medication Sig Dispense Refill    Multiple Vitamin (MULTI-VITAMIN DAILY PO) Take by mouth      amoxicillin-clavulanate (AUGMENTIN) 875-125 MG per tablet Take 1 tablet by mouth 2 times daily for 10 days 20 tablet 0    warfarin (COUMADIN) 5 MG tablet take 1 TO 2 tablets by mouth once daily as directed 60 tablet 2    FEROSUL 325 (65 Fe) MG tablet take 1 tablet by mouth once daily with breakfast 90 tablet 1    empagliflozin (JARDIANCE) 10 MG tablet Take 1 tablet by mouth daily 30 tablet 3    metoprolol succinate (TOPROL XL) 25 MG extended release tablet Take 0.5 tablets by mouth daily 30 tablet 3    Lancets MISC 1 each by Does not apply route 2 times daily 100 each 5    blood glucose monitor strips Test BG daily  & as needed for symptoms of irregular blood glucose.  Dispense sufficient amount for indicated testing frequency plus additional to accommodate PRN testing needs. 30 strip 5    spironolactone (ALDACTONE) 25 MG tablet Take 1 tablet by mouth daily 30 tablet 3    losartan (COZAAR) 25 MG tablet Take 1 tablet by mouth daily 30 tablet 3    aspirin 81 MG chewable tablet Take 1 tablet by mouth daily 30 tablet 1    simvastatin (ZOCOR) 40 MG tablet take 1 tablet by mouth at bedtime 90 tablet 1    pantoprazole (PROTONIX) 40 MG tablet 40 mg PO daily 90 tablet 1    albuterol-ipratropium (COMBIVENT RESPIMAT)  MCG/ACT AERS inhaler Inhale 1 puff into the lungs every 6 hours as needed for Wheezing 1 each 1    albuterol sulfate HFA (PROVENTIL;VENTOLIN;PROAIR) 108 (90 Base) MCG/ACT inhaler inhale 2 puffs by mouth and INTO THE LUNGS every 4 hours if needed 18 g 5    venlafaxine (EFFEXOR XR) 150 MG extended release capsule Take 1 capsule by mouth daily 30 capsule 5    Calcium Carbonate-Vitamin D (CALCIUM-VITAMIN D3 PO) Take by mouth      sodium chloride, Inhalant, 3 % nebulizer solution Take by nebulization as needed for Other (COPD/shortness of breath) 125 mL 0    triamcinolone (KENALOG) 0.1 % cream Apply topically 2 times daily. 453.6 g 1    NASAL SPRAY SALINE NA by Nasal route daily Over The Counter      Multiple Vitamins-Minerals (CENTRUM PO) Take by mouth daily      Nebulizer MISC Inhale into the lungs as needed      albuterol (PROVENTIL) (2.5 MG/3ML) 0.083% nebulizer solution Take 3 mLs by nebulization every 6 hours as needed for Wheezing or Shortness of Breath 125 each 0    guaiFENesin (MUCINEX) 600 MG extended release tablet Take 1,200 mg by mouth every 12 hours      KRILL OIL OMEGA-3 PO Take by mouth      cetirizine (ZYRTEC) 10 MG tablet Take 10 mg by mouth \"Alternate With Singulair\"      Acetaminophen (TYLENOL 8 HOUR PO) Take 500 mg by mouth as needed Over The Counter       No current facility-administered medications for this visit.        ALLERGIES  Allergies   Allergen Reactions    Ciprofloxacin Hcl Hives and Swelling    Levaquin [Levofloxacin] Hives and Swelling    Other Nausea And Vomiting     \"Allergic To Tylox\"    Ceftin [Cefuroxime]     Lisinopril      cough       PHYSICAL EXAM    Ht 5' 5\" (1.651 m)   Wt 146 lb (66.2 kg)   BMI 24.30 kg/m²     Physical Exam  Constitutional:       Appearance: Normal appearance. He is normal weight. HENT:      Head: Normocephalic and atraumatic. Right Ear: Tympanic membrane and external ear normal.      Left Ear: Tympanic membrane and external ear normal.      Nose: No rhinorrhea. Mouth/Throat:      Mouth: Mucous membranes are moist.      Pharynx: Oropharynx is clear. No oropharyngeal exudate or posterior oropharyngeal erythema. Eyes:      General: No scleral icterus. Extraocular Movements: Extraocular movements intact. Conjunctiva/sclera: Conjunctivae normal.      Pupils: Pupils are equal, round, and reactive to light. Cardiovascular:      Rate and Rhythm: Normal rate and regular rhythm. Pulses: Normal pulses. Heart sounds: Murmur heard. No friction rub. No gallop. Pulmonary:      Effort: Pulmonary effort is normal.      Breath sounds: Normal breath sounds. No wheezing, rhonchi or rales. Abdominal:      General: Bowel sounds are normal. There is no distension. Palpations: Abdomen is soft. There is no mass. Tenderness: There is no abdominal tenderness. There is no right CVA tenderness, left CVA tenderness, guarding or rebound. Musculoskeletal:         General: No deformity. Normal range of motion. Cervical back: Normal range of motion and neck supple. No rigidity. No muscular tenderness. Right lower leg: No edema. Left lower leg: No edema. Skin:     General: Skin is warm and dry. Capillary Refill: Capillary refill takes less than 2 seconds. Findings: No bruising, erythema or rash. Neurological:      General: No focal deficit present. Mental Status: He is alert and oriented to person, place, and time.       Coordination: Coordination normal.      Gait: Gait normal.   Psychiatric:         Mood and Affect: Mood normal.         Behavior: Behavior normal.       ASSESSMENT & PLAN    1. Need for vaccination    - Influenza, FLUAD, (age 72 y+), IM, Preservative Free, 0.5 mL    2. Chronic systolic heart failure ([facility] [address]  Patient slowly returning to normal state of health with improving shortness of breath  Patient is slowly increasing his exercise tolerance through walking  Patient is to stand from alcohol, limit fluid and salt intake  Patient is compliant with medication changes per cardiology    3. Chronic obstructive pulmonary disease, unspecified COPD type ([facility] [address]  Continuing to use inhalers for symptomatic control    4. H/O mechanical aortic valve replacement  INR today is 1.3. Patient is on amoxicillin. Recommend increasing Coumadin dose to 10, 10, 5, with a recheck in 2 weeks    Return if symptoms worsen or fail to improve. Electronically signed by Oscar Moreno on 9/28/2022      Comment: Please note this report has been produced using speech recognition software and may contain errors related to that system including errors in grammar, punctuation, and spelling, as well as words and phrases that may be inappropriate. If there are any questions or concerns please feel free to contact the dictating provider for clarification.

## 2022-10-04 DIAGNOSIS — L30.9 DERMATITIS: ICD-10-CM

## 2022-10-04 RX ORDER — TRIAMCINOLONE ACETONIDE 1 MG/G
CREAM TOPICAL
Qty: 453.6 G | Refills: 1 | Status: SHIPPED | OUTPATIENT
Start: 2022-10-04

## 2022-10-12 ENCOUNTER — NURSE ONLY (OUTPATIENT)
Dept: FAMILY MEDICINE CLINIC | Age: 67
End: 2022-10-12
Payer: MEDICARE

## 2022-10-12 DIAGNOSIS — Z95.2 S/P AVR: ICD-10-CM

## 2022-10-12 LAB — INTERNATIONAL NORMALIZATION RATIO, POC: 2.8

## 2022-10-12 PROCEDURE — 85610 PROTHROMBIN TIME: CPT | Performed by: FAMILY MEDICINE

## 2022-10-13 ENCOUNTER — OFFICE VISIT (OUTPATIENT)
Dept: CARDIOLOGY CLINIC | Age: 67
End: 2022-10-13
Payer: MEDICARE

## 2022-10-13 ENCOUNTER — TELEPHONE (OUTPATIENT)
Dept: PULMONOLOGY | Age: 67
End: 2022-10-13

## 2022-10-13 VITALS
WEIGHT: 152.5 LBS | HEIGHT: 65 IN | RESPIRATION RATE: 18 BRPM | BODY MASS INDEX: 25.41 KG/M2 | OXYGEN SATURATION: 92 % | DIASTOLIC BLOOD PRESSURE: 84 MMHG | HEART RATE: 92 BPM | SYSTOLIC BLOOD PRESSURE: 125 MMHG

## 2022-10-13 DIAGNOSIS — I42.6 ALCOHOLIC CARDIOMYOPATHY (HCC): ICD-10-CM

## 2022-10-13 DIAGNOSIS — I50.33 ACUTE ON CHRONIC DIASTOLIC (CONGESTIVE) HEART FAILURE (HCC): Primary | ICD-10-CM

## 2022-10-13 DIAGNOSIS — I38 VALVULAR HEART DISEASE: ICD-10-CM

## 2022-10-13 PROCEDURE — 1124F ACP DISCUSS-NO DSCNMKR DOCD: CPT | Performed by: NURSE PRACTITIONER

## 2022-10-13 PROCEDURE — 99214 OFFICE O/P EST MOD 30 MIN: CPT | Performed by: NURSE PRACTITIONER

## 2022-10-13 PROCEDURE — G8417 CALC BMI ABV UP PARAM F/U: HCPCS | Performed by: NURSE PRACTITIONER

## 2022-10-13 PROCEDURE — G8484 FLU IMMUNIZE NO ADMIN: HCPCS | Performed by: NURSE PRACTITIONER

## 2022-10-13 PROCEDURE — 4004F PT TOBACCO SCREEN RCVD TLK: CPT | Performed by: NURSE PRACTITIONER

## 2022-10-13 PROCEDURE — 3017F COLORECTAL CA SCREEN DOC REV: CPT | Performed by: NURSE PRACTITIONER

## 2022-10-13 PROCEDURE — G8427 DOCREV CUR MEDS BY ELIG CLIN: HCPCS | Performed by: NURSE PRACTITIONER

## 2022-10-13 RX ORDER — METOPROLOL SUCCINATE 25 MG/1
25 TABLET, EXTENDED RELEASE ORAL DAILY
Qty: 30 TABLET | Refills: 3 | Status: SHIPPED | OUTPATIENT
Start: 2022-10-13

## 2022-10-13 ASSESSMENT — ENCOUNTER SYMPTOMS: SHORTNESS OF BREATH: 0

## 2022-10-13 NOTE — PROGRESS NOTES
[address]      Visit Date: 10/13/2022  Cardiologist:  Dr. Shahid Painter   Primary Care Physician: Dr. Susan Weeks MD    Percy Kaur is a 73 y.o. male who presents today for:  CC:   1. Acute on chronic diastolic (congestive) heart failure ([facility]    2. Alcoholic cardiomyopathy ([facility]    3. Valvular heart disease        HPI:   Percy Kaur is a 73 y.o. male who presents to the office as a new patient visit in the heart failure clinic. Patient presents to the office for follow up on CHF, VHD, fatigue, and hyperlipidemia. In November 2021 patient had right hip orthoplasty then developed bleeding ulcer resulting in severe anemia. Patient had increased fatigue and shortness of breath resulting in hospitalization August 2022. Patient found to have newly reduced EF 10-15% which was attributed to alcoholic cardiomyopathy. LHC at that time showed moderate disease of LAD. Patient was placed on LifeVest at that time. Chief Complaint   Patient presents with    Congestive Heart Failure     F/u       Patient has:  Last hospital admission related to Heart Failure:  8/23/22  baseline BNP 2100   baseline weight 153, Patient reports home weight of 148. Mild weight gain of 4 lbs. Chest Pain: no  Worsening SOB/orthopnea/PND: yes  Edema: no  Any extra diuretic use: no  Weight gain: yes   Compliant checking home weight: yes  Fatigue: yes  Abdominal bloating: no  Appetite: good  Difficulty sleeping: no  RADHA: no  Cough: no  Compliant checking blood pressure: yes  Compliant with medication regimen: yes    Vaccinations:    flu No  pneumonia Yes  COVID 19 No      Device: No       Activity: fair  Can you walk 1-2 blocks or do a moderate amount of house/yard work? Yes    NYHA Class: II   Class I   Patients with no limitation of activities; they suffer no symptoms from ordinary activities. Class II   Patients with slight, mild limitation of activity; they are comfortable with rest or with mild exertion. Class III   Patients with marked limitation of activity; they are comfortable only at rest.   Class IV   Patients who should be at complete rest, confined to bed or chair; any physical activity brings on discomfort and symptoms occur at rest.    Sodium Restrictions: 2g  Fluid Restrictions: 48-64 oz/day  Sodium and fluid restriction compliance: yes      Past Medical History:   Diagnosis Date    Acid reflux     Acute frontal sinusitis 07/22/2009    Alcohol abuse     \"I Drink Average 2 Beers A Day\"    Anesthesia     \" I get agitated\"    Anxiety     Arthritis     \"Right Shoulder, Neck, Left Knee\"    Atypical mycobacterial infection     Broken teeth     Upper And Lower     CHF (congestive heart failure) (HCC)     COPD (chronic obstructive pulmonary disease) ([facility] [address] [facility]     Sees Dr. Radha Stephenson In [address] [place] [address], [address] [facility]     Cough     Frequent Cough With Green Sputum    Depression     Depression     Dyspnea 02/23/2018    H/O cardiac catheterization 05/17/2022    LAD: Moderate Lumen Irregularity. CALCIFIED mid LAD segment. H/O cardiovascular MUGA stress test 05/14/2019    EF 50%, NORMAL LVEF, NORMAL WALL MOTION. H/O echocardiogram 05/22/2014    XB=>67-47%, LV systolic function is low normal, mild aortic valve calcification, no pericardial effusion    H/O echocardiogram 12/12/2018    EF 45-50%    History of 24 hour EKG monitoring 06/06/2014    24 hr holter monitor. Ventricular ectopics were noted in single and couplet forms. Supraventricular ectopics were noted in single and pair forms.     [place] (hard of hearing)     Bilateral Ears    Hyperlipidemia     Hypertension     Irritant contact dermatitis due to other chemical products 08/26/2019    Mycobacterium avium complex ([facility] [address] [facility]     Other drug allergy(995.27) 07/22/2009    S/P AVR 12/2003    Mechanical valvue     Shortness of breath     Teeth missing     Upper And Lower    Type 2 diabetes mellitus 8/31/2022     Past Surgical History:   Procedure Laterality Date BRONCHOSCOPY  1996    \"Done Twice\"    CARDIAC VALVE REPLACEMENT  12/17/2003    \"Aortic Valve Replacement, Mechanical Valve\"    COLONOSCOPY  2006    Polyps Removed    DENTAL SURGERY      Teeth Extracted In Past    ENDOSCOPY, COLON, DIAGNOSTIC  In Past    HEMIARTHROPLASTY SHOULDER FRACTURE Right 1/29/2019    SHOULDER HEMIARTHROPLASTY performed by Jessica Oden DO at [address] ARTHROSCOPY Left 1992    LUNG BIOPSY Right [address]    \"Cauterized Because My Sperm Was Low\"    ROTATOR CUFF REPAIR Right 2008    TONSILLECTOMY  1959 Or 1960    TOTAL HIP ARTHROPLASTY Right 10/25/2021    RIGHT HIP TOTAL ARTHROPLASTY performed by Jessica Oden DO at [address] N/A 11/10/2021    EGD BIOPSY performed by Flaquita Garcia MD at [facility] ENDOSCOPY     Family History   Problem Relation Age of Onset    Cancer Mother         Lymphoma    Diabetes Mother     High Blood Pressure Mother     High Cholesterol Mother     Obesity Mother     Vision Loss Mother         Wore Glasses    Heart Disease Father         \"Heart Failure\"    COPD Father     Asthma Sister     High Blood Pressure Sister     High Cholesterol Sister     Other Sister         pre diabetic    COPD Sister     Diabetes Sister         \"Pre Diabetes\"    No Known Problems Son      Social History     Tobacco Use    Smoking status: Never    Smokeless tobacco: Current     Types: Snuff, Chew   Substance Use Topics    Alcohol use: Not Currently     Comment: 1/2 bottle of smirnoff     Current Outpatient Medications   Medication Sig Dispense Refill    metoprolol succinate (TOPROL XL) 25 MG extended release tablet Take 1 tablet by mouth daily 30 tablet 3    triamcinolone (KENALOG) 0.1 % cream Apply topically 2 times daily.  453.6 g 1    Multiple Vitamin (MULTI-VITAMIN DAILY PO) Take by mouth      warfarin (COUMADIN) 5 MG tablet take 1 TO 2 tablets by mouth once daily as directed 60 tablet 2    FEROSUL 325 (65 Fe) MG tablet take 1 tablet by mouth once daily with breakfast 90 tablet 1    empagliflozin (JARDIANCE) 10 MG tablet Take 1 tablet by mouth daily 30 tablet 3    blood glucose monitor strips Test BG daily  & as needed for symptoms of irregular blood glucose. Dispense sufficient amount for indicated testing frequency plus additional to accommodate PRN testing needs.  30 strip 5    spironolactone (ALDACTONE) 25 MG tablet Take 1 tablet by mouth daily 30 tablet 3    losartan (COZAAR) 25 MG tablet Take 1 tablet by mouth daily 30 tablet 3    aspirin 81 MG chewable tablet Take 1 tablet by mouth daily 30 tablet 1    simvastatin (ZOCOR) 40 MG tablet take 1 tablet by mouth at bedtime 90 tablet 1    pantoprazole (PROTONIX) 40 MG tablet 40 mg PO daily 90 tablet 1    albuterol-ipratropium (COMBIVENT RESPIMAT)  MCG/ACT AERS inhaler Inhale 1 puff into the lungs every 6 hours as needed for Wheezing 1 each 1    albuterol sulfate HFA (PROVENTIL;VENTOLIN;PROAIR) 108 (90 Base) MCG/ACT inhaler inhale 2 puffs by mouth and INTO THE LUNGS every 4 hours if needed 18 g 5    venlafaxine (EFFEXOR XR) 150 MG extended release capsule Take 1 capsule by mouth daily 30 capsule 5    Calcium Carbonate-Vitamin D (CALCIUM-VITAMIN D3 PO) Take by mouth      albuterol (PROVENTIL) (2.5 MG/3ML) 0.083% nebulizer solution Take 3 mLs by nebulization every 6 hours as needed for Wheezing or Shortness of Breath 125 each 0    sodium chloride, Inhalant, 3 % nebulizer solution Take by nebulization as needed for Other (COPD/shortness of breath) 125 mL 0    guaiFENesin (MUCINEX) 600 MG extended release tablet Take 1,200 mg by mouth every 12 hours      KRILL OIL OMEGA-3 PO Take by mouth      cetirizine (ZYRTEC) 10 MG tablet Take 10 mg by mouth \"Alternate With Singulair\"      NASAL SPRAY SALINE NA by Nasal route daily Over The Counter      Acetaminophen (TYLENOL 8 HOUR PO) Take 500 mg by mouth as needed Over The Counter      Multiple Vitamins-Minerals (CENTRUM PO) Take by mouth daily Nebulizer MISC Inhale into the lungs as needed      Lancets MISC 1 each by Does not apply route 2 times daily 100 each 5     No current facility-administered medications for this visit. Allergies   Allergen Reactions    Ciprofloxacin Hcl Hives and Swelling    Levaquin [Levofloxacin] Hives and Swelling    Other Nausea And Vomiting     \"Allergic To Tylox\"    Ceftin [Cefuroxime]     Lisinopril      cough       SUBJECTIVE:     Review of Systems   Respiratory:  Negative for shortness of breath. Cardiovascular:  Negative for chest pain, palpitations and leg swelling. Musculoskeletal: Negative. Skin: Negative. Neurological:  Negative for dizziness and weakness. All other systems reviewed and are negative. OBJECTIVE:   Today's Vitals:  /84 (Site: Right Upper Arm, Position: Sitting, Cuff Size: Medium Adult)   Pulse 92   Resp 18   Ht 5' 5\" (1.651 m)   Wt 152 lb 8 oz (69.2 kg)   SpO2 92%   BMI 25.38 kg/m²     Wt Readings from Last 3 Encounters:   10/13/22 152 lb 8 oz (69.2 kg)   09/28/22 146 lb (66.2 kg)   09/23/22 142 lb (64.4 kg)     BP Readings from Last 3 Encounters:   10/13/22 125/84   09/23/22 118/68   09/15/22 105/80     Pulse Readings from Last 3 Encounters:   10/13/22 92   09/23/22 (!) 103   09/15/22 87     Body mass index is 25.38 kg/m². Physical Exam  Constitutional:       Appearance: He is well-developed. Cardiovascular:      Rate and Rhythm: Normal rate and regular rhythm. Pulses: Intact distal pulses. Dorsalis pedis pulses are 2+ on the right side and 2+ on the left side. Posterior tibial pulses are 2+ on the right side and 2+ on the left side. Heart sounds: S1 normal and S2 normal. Murmur heard. Machinery midsystolic murmur is present with a grade of 3/6 at the upper right sternal border radiating to the neck. Pulmonary:      Effort: Pulmonary effort is normal.      Breath sounds: Normal breath sounds.    Musculoskeletal:         General: Normal range of motion. Skin:     General: Skin is warm and dry. Neurological:      Mental Status: He is alert and oriented to person, place, and time. 6 minute walk test:  4:30 min 520 feet     Most recent ECHO: 8/24/22  Limited Echo to eval for EF. Definity used to enhance endocardial border. Left ventricular systolic function is severely depressed. Ejection fraction is visually estimated at 10-15%. Left ventricular size is moderately increased . Indeterminate diastolic function. Basal ant/lat and wall appears to be akinetic. Severely dilated left atrium. Severely dilated right atrium 4.94 cm   Severely dilated right ventricle 5.47 cm   Essentially normal right ventricle function. Normally functioning mechanical prosthetic valve in aortic position. Mitral annular calcification is present on PMLV. Mild to moderate mitral regurgitation. Tricuspid valve is structurally normal.   Mild to moderate tricuspid regurgitation; RVSP: 25 mmHg. No evidence of any pericardial effusion.     Results reviewed:  BNP:   Lab Results   Component Value Date    PROBNP 10,533 (H) 08/23/2022     CBC:   Lab Results   Component Value Date/Time    WBC 8.8 08/31/2022 02:13 PM    RBC 4.23 08/31/2022 02:13 PM    HGB 13.8 08/31/2022 02:13 PM    HCT 40.9 08/31/2022 02:13 PM     08/31/2022 02:13 PM     CMP:    Lab Results   Component Value Date/Time     08/31/2022 02:13 PM    K 4.7 08/31/2022 02:13 PM     08/31/2022 02:13 PM    CO2 29 08/31/2022 02:13 PM    BUN 21 08/31/2022 02:13 PM    CREATININE 0.9 08/31/2022 02:13 PM    GFRAA >60 08/31/2022 02:13 PM    AGRATIO 1.7 03/11/2022 01:55 PM    LABGLOM >60 08/31/2022 02:13 PM    GLUCOSE 95 08/31/2022 02:13 PM    CALCIUM 9.3 08/31/2022 02:13 PM     Hepatic Function Panel:    Lab Results   Component Value Date/Time    ALKPHOS 101 08/23/2022 01:10 PM    ALT 23 08/23/2022 01:10 PM    AST 30 08/23/2022 01:10 PM    PROT 5.8 08/23/2022 01:10 PM    BILITOT 0.4 08/23/2022 01:10 PM    BILIDIR <0.2 05/27/2022 10:29 AM    IBILI see below 05/27/2022 10:29 AM    LABALBU 3.9 08/23/2022 01:10 PM     Magnesium:    Lab Results   Component Value Date/Time    MG 2.30 08/31/2022 02:13 PM     PT/INR:    Lab Results   Component Value Date/Time    PROTIME 1.3 09/21/2022 02:00 AM    INR 2.8 10/12/2022 01:32 PM    INR 1.72 08/26/2022 04:50 AM     Lipids:    Lab Results   Component Value Date/Time    TRIG 49 08/24/2022 05:10 AM    HDL 59 08/24/2022 05:10 AM    LDLCALC 80 08/24/2022 05:10 AM    LDLDIRECT 69 07/01/2020 07:00 AM    LABVLDL 10 05/27/2022 10:29 AM       Iron Studies:  No components found for: FE,  TIBC,  FERRITIN    Iron Deficiency Anemia:  No IV Iron Therapy:  No  2017 ACC/AHA HF Guidelines:   intravenous iron replacement in patients with New York Heart Association (NYHA) class II and III HF and iron deficiency (ferritin <100 ng/ml or 100-300 ng/ml if transferrin saturation <20%), to improve functional status and QoL. ASSESSMENT AND PLAN:     Chronic systolic heart failure (HCC)  Appears euvolemic   Cont with current medications   Monitor weight daily  Limit sodium to < 2000 mg a day  Limit water to < 64 oz in a day  Call the office if a weight gain of >3 lbs in 2 days or > 5 lbs in a week is noted. Last testing: Echo 8/24/22 EF 14-10%     Alcoholic Cardiomyopathy  -Recent heart specialization for fluid overload. EF significantly reduced 10-15%, EF and April 40-45%. Patient now has LifeVest.  Patient was drinking 6 pack of beer daily. Stressed the importance of compliance with medications and abstaining from alcohol. Continue with GDMT losartan 25 mg daily, Toprol XL 12.5 mg, Aldactone 25 mg daily, SGLT2 (Jardiance 10 mg). Patient has not tolerated beta-blocker in the past due to bradycardia, patient toleratingToprol XL will increase to 25 mg daily. Will get MUGA in December    Valvular heart disease  -Patient had aortic valve replacement in 2003.  Patient on coumadin for mechanical valve. Severely dilated right and left atrium and right ventricle, EF no reduced at 10-15%. Patient was instructed to call the [address] for changes in the following symptoms:   Weight gain of 3 pounds in 1 day or 5 pounds in 1 week  Increased shortness of breath  Shortness of breath while laying down  Cough  Chest pain  Swelling in feet, ankles or legs  Tenderness or bloating in the abdomen  Fatigue   Decreased appetite or nausea   Confusion      No follow-ups on file. or sooner if needed     Patient given educational materials - see patient instructions. We discussed the importance of weighing oneself and recording daily. We also discussed the importance of a lowsodium diet, higher sodium foods to avoid and better low sodium food options. Discussed use, benefit, and side effects of prescribed medications. All patient questions answered. Patient verbalizes understanding of plan of care using teach back method, and is agreeable to the treatment plan. Copy of note to be sent to consulting provider and primary cardiologist   Electronicallysigned by Abelardo Mcginnis.  VANGIE Kauffman CNP on 10/13/2022 at 2:18 PM

## 2022-10-13 NOTE — PATIENT INSTRUCTIONS
HEART FAILURE - CONGESTIVE  HEART FAILURE  INSTRUCTIONS:    Increase Toprol XL to 25 mg daily. MEDICATIONS:  Please notify the the heart failure clinic R.N. or your doctor if you are not able to take your medications for any reason. WEIGHT MONITORING:   Weigh yourself  everyday in the morning after urination and record the weight on your weight log. Notify the doctor/clinic nurse of a weight gain of 3 pounds or more in 1 day   OR  a total of 5 pounds or more in 1 week             DIET  Cardiac heart healthy diet- Low saturated / low trans fat, no added salt, caffeine restricted,   Low sodium diet- no  more than 2,000mg (2 grams) of salt / sodium per day (which equals to a little less than  a teaspoon of salt)  The doctor may also recommend a fluid limit -  Fluid restriction- 2,000 ml (milliliters) = 64 ounces = you can have 8 glasses of fluid per day (each glass 8 ounces)    Avoid using salt at the table, avoid / limit use of canned soups, processed / packaged foods, salted snacks, olives and pickles. Do not use a salt substitute without checking with the doctor. (Mrs. Gilma Beal is safe to use). NOTIFY THE  DOCTOR THE FIRST DAY OF ONSET OF ANY OF THESE   SYMPTOMS:   Weight gain of 3 pounds or more in 1 day         OR 5 pounds or more in one week  More shortness of breath  More swelling in stomach, legs, ankles or feet  Feeling more tired, No energy  Dry hacky cough  Dizziness  More chest pain / discomfort           Please visit  American Heart Association Healthy Living with Heart Failure  at [email or web address]      [address] [facility]  Heart Failure Clinic  MICHELLE PROCTOR, R.N.  Phone: [phone] or [place]

## 2022-10-13 NOTE — PROGRESS NOTES
CHF CLINICAL STAFF DOCUMENTATION    Have you had any Chest Pain? - No    Do you had any Shortness of Breath - Yes  If Yes - When on exertion    Have you had any dizziness - Yes  When do you feel dizzy walking   How long does it last .45  seconds     Do you have any edema -  No  swelling in none      Are you on fluid restrictions - no   Amount - three quarts - re-instructed   Are you on sodium restrictions - Yes   Amount - avoiding processed foods    Do you feel fatigued - Yes  - recovering from sinus infection  Do you have a cough - Yes    Lung sounds -    Normal - Yes   Abnormal -     Do you have abdominal bloating - No    How is your appetite - good    Do you have difficulty sleeping - No  Able to lie flat? - Yes    Do you have a history of sleep apnea - No  CPAP no    6 min.  Walk:   Pre heart rate 92  Time walked  4.5 minutes  Distance 520    Post heart rate 112       Have you had Flu Vaccine - Yes  Covid Vaccine - yes   Have you had Pneumonia Vaccine - Yes

## 2022-10-21 ENCOUNTER — OFFICE VISIT (OUTPATIENT)
Dept: FAMILY MEDICINE CLINIC | Age: 67
End: 2022-10-21
Payer: MEDICARE

## 2022-10-21 VITALS
OXYGEN SATURATION: 96 % | BODY MASS INDEX: 24.99 KG/M2 | HEART RATE: 89 BPM | HEIGHT: 65 IN | DIASTOLIC BLOOD PRESSURE: 62 MMHG | WEIGHT: 150 LBS | SYSTOLIC BLOOD PRESSURE: 118 MMHG

## 2022-10-21 DIAGNOSIS — J44.9 CHRONIC OBSTRUCTIVE PULMONARY DISEASE, UNSPECIFIED COPD TYPE (HCC): Chronic | ICD-10-CM

## 2022-10-21 DIAGNOSIS — R05.9 COUGH, UNSPECIFIED TYPE: ICD-10-CM

## 2022-10-21 DIAGNOSIS — J30.1 SEASONAL ALLERGIC RHINITIS DUE TO POLLEN: ICD-10-CM

## 2022-10-21 DIAGNOSIS — J01.90 ACUTE SINUSITIS, RECURRENCE NOT SPECIFIED, UNSPECIFIED LOCATION: Primary | ICD-10-CM

## 2022-10-21 PROCEDURE — 3017F COLORECTAL CA SCREEN DOC REV: CPT | Performed by: FAMILY MEDICINE

## 2022-10-21 PROCEDURE — 1124F ACP DISCUSS-NO DSCNMKR DOCD: CPT | Performed by: FAMILY MEDICINE

## 2022-10-21 PROCEDURE — G8427 DOCREV CUR MEDS BY ELIG CLIN: HCPCS | Performed by: FAMILY MEDICINE

## 2022-10-21 PROCEDURE — G8484 FLU IMMUNIZE NO ADMIN: HCPCS | Performed by: FAMILY MEDICINE

## 2022-10-21 PROCEDURE — 4004F PT TOBACCO SCREEN RCVD TLK: CPT | Performed by: FAMILY MEDICINE

## 2022-10-21 PROCEDURE — 3023F SPIROM DOC REV: CPT | Performed by: FAMILY MEDICINE

## 2022-10-21 PROCEDURE — 99213 OFFICE O/P EST LOW 20 MIN: CPT | Performed by: FAMILY MEDICINE

## 2022-10-21 PROCEDURE — G8420 CALC BMI NORM PARAMETERS: HCPCS | Performed by: FAMILY MEDICINE

## 2022-10-21 RX ORDER — SULFAMETHOXAZOLE AND TRIMETHOPRIM 800; 160 MG/1; MG/1
1 TABLET ORAL 2 TIMES DAILY
Qty: 20 TABLET | Refills: 0 | Status: SHIPPED | OUTPATIENT
Start: 2022-10-21 | End: 2022-10-31

## 2022-10-21 RX ORDER — PREDNISONE 10 MG/1
10 TABLET ORAL
Qty: 15 TABLET | Refills: 0 | Status: SHIPPED | OUTPATIENT
Start: 2022-10-21 | End: 2022-10-26

## 2022-10-21 ASSESSMENT — ENCOUNTER SYMPTOMS
COUGH: 1
ABDOMINAL PAIN: 0
NAUSEA: 0
VOMITING: 0
EYE PAIN: 0
DIARRHEA: 0
WHEEZING: 0
SINUS PRESSURE: 1
TROUBLE SWALLOWING: 0
BLOOD IN STOOL: 0
SHORTNESS OF BREATH: 0
CHEST TIGHTNESS: 0

## 2022-10-21 NOTE — PROGRESS NOTES
10/21/2022    Eve Templeton    Chief Complaint   Patient presents with    Sinus Problem     X 1 month ago. Was seen 09/28/22 for same issue. Green drainage, Sinus congestion. Short of breath. HPI  History was obtained from the patient. Maribell Montesinos is a 73 y.o. male who presents today with history of sinus pressure and purulent drainage increased cough has history of probable allergies this time checked for COVID at home and was negative had similar issues about a month ago got better then recurred. He has been using his COPD medications faithfully. Mood remains a positive meds otherwise well-tolerated no change in current Rx for the symptoms. Denies high fever or chills. REVIEW OF SYMPTOMS    Review of Systems   Constitutional:  Negative for activity change and fatigue. HENT:  Positive for postnasal drip, sinus pressure and sneezing. Negative for congestion, hearing loss, mouth sores and trouble swallowing. Eyes:  Negative for pain and visual disturbance. Respiratory:  Positive for cough. Negative for chest tightness, shortness of breath and wheezing. Cardiovascular:  Negative for chest pain and palpitations. Gastrointestinal:  Negative for abdominal pain, blood in stool, diarrhea, nausea and vomiting. Endocrine: Negative for polydipsia and polyuria. Genitourinary:  Negative for dysuria, frequency and urgency. Musculoskeletal:  Negative for arthralgias, gait problem and neck stiffness. Skin:  Negative for rash. Allergic/Immunologic: Negative for environmental allergies. Neurological:  Negative for dizziness, seizures, speech difficulty and weakness. Hematological:  Does not bruise/bleed easily. Psychiatric/Behavioral:  Positive for dysphoric mood. Negative for agitation, confusion, hallucinations, self-injury and suicidal ideas. The patient is nervous/anxious.       PAST MEDICAL HISTORY  Past Medical History:   Diagnosis Date    Acid reflux     Acute frontal sinusitis 07/22/2009 Alcohol abuse     \"I Drink Average 2 Beers A Day\"    Anesthesia     \" I get agitated\"    Anxiety     Arthritis     \"Right Shoulder, Neck, Left Knee\"    Atypical mycobacterial infection     Broken teeth     Upper And Lower     CHF (congestive heart failure) ([facility])     COPD (chronic obstructive pulmonary disease) ([facility])     Sees Dr. Ranulfo Jones In [place], [address]     Cough     Frequent Cough With Green Sputum    Depression     Depression     Dyspnea 02/23/2018    H/O cardiac catheterization 05/17/2022    LAD: Moderate Lumen Irregularity. CALCIFIED mid LAD segment. H/O cardiovascular MUGA stress test 05/14/2019    EF 50%, NORMAL LVEF, NORMAL WALL MOTION. H/O echocardiogram 05/22/2014    MD=>52-64%, LV systolic function is low normal, mild aortic valve calcification, no pericardial effusion    H/O echocardiogram 12/12/2018    EF 45-50%    History of 24 hour EKG monitoring 06/06/2014    24 hr holter monitor. Ventricular ectopics were noted in single and couplet forms. Supraventricular ectopics were noted in single and pair forms.     [place] (hard of hearing)     Bilateral Ears    Hyperlipidemia     Hypertension     Irritant contact dermatitis due to other chemical products 08/26/2019    Mycobacterium avium complex ([facility]     Other drug allergy(995.27) 07/22/2009    S/P AVR 12/2003    Mechanical valvue     Shortness of breath     Teeth missing     Upper And Lower    Type 2 diabetes mellitus 8/31/2022       FAMILY HISTORY  Family History   Problem Relation Age of Onset    Cancer Mother         Lymphoma    Diabetes Mother     High Blood Pressure Mother     High Cholesterol Mother     Obesity Mother     Vision Loss Mother         Wore Glasses    Heart Disease Father         \"Heart Failure\"    COPD Father     Asthma Sister     High Blood Pressure Sister     High Cholesterol Sister     Other Sister         pre diabetic    COPD Sister     Diabetes Sister         \"Pre Diabetes\"    No Known Problems Son SOCIAL HISTORY  Social History     Socioeconomic History    Marital status:     Number of children: 1    Years of education: 12   Tobacco Use    Smoking status: Never    Smokeless tobacco: Current     Types: Snuff, Chew   Vaping Use    Vaping Use: Never used   Substance and Sexual Activity    Alcohol use: Not Currently     Comment: 1/2 bottle of smirnoff    Drug use: No    Sexual activity: Not Currently     Social Determinants of Health     Financial Resource Strain: Low Risk     Difficulty of Paying Living Expenses: Not hard at all   Food Insecurity: No Food Insecurity    Worried About Running Out of Food in the Last Year: Never true    Ran Out of Food in the Last Year: Never true   Physical Activity: Insufficiently Active    Days of Exercise per Week: 5 days    Minutes of Exercise per Session: 20 min        SURGICAL HISTORY  Past Surgical History:   Procedure Laterality Date    BRONCHOSCOPY  1996    \"Done Twice\"    CARDIAC VALVE REPLACEMENT  12/17/2003    \"Aortic Valve Replacement, Mechanical Valve\"    COLONOSCOPY  2006    Polyps Removed    DENTAL SURGERY      Teeth Extracted In Past    ENDOSCOPY, COLON, DIAGNOSTIC  In Past    HEMIARTHROPLASTY SHOULDER FRACTURE Right 1/29/2019    SHOULDER HEMIARTHROPLASTY performed by Ilya Lynn DO at [facility] Left 1992    LUNG BIOPSY Right [address] [place] [address]    \"Cauterized Because My Sperm Was Low\"    ROTATOR CUFF REPAIR Right 2008    [address] Right 10/25/2021    RIGHT HIP TOTAL ARTHROPLASTY performed by Ilya Lynn DO at [facility] 11/10/2021    EGD BIOPSY performed by Donovan Bailey MD at [facility]  Current Outpatient Medications   Medication Sig Dispense Refill    Fluticasone Propionate (FLONASE NA) by Nasal route      sulfamethoxazole-trimethoprim (BACTRIM DS;SEPTRA DS) 800-160 MG per tablet Take 1 tablet by mouth 2 times daily for 10 days 20 tablet 0    predniSONE (DELTASONE) 10 MG tablet Take 1 tablet by mouth 3 times daily (with meals) for 5 days 15 tablet 0    albuterol-ipratropium (COMBIVENT RESPIMAT)  MCG/ACT AERS inhaler Inhale 1 puff into the lungs every 6 hours as needed for Wheezing 1 each 5    metoprolol succinate (TOPROL XL) 25 MG extended release tablet Take 1 tablet by mouth daily 30 tablet 3    triamcinolone (KENALOG) 0.1 % cream Apply topically 2 times daily. 453.6 g 1    Multiple Vitamin (MULTI-VITAMIN DAILY PO) Take by mouth      warfarin (COUMADIN) 5 MG tablet take 1 TO 2 tablets by mouth once daily as directed 60 tablet 2    FEROSUL 325 (65 Fe) MG tablet take 1 tablet by mouth once daily with breakfast 90 tablet 1    empagliflozin (JARDIANCE) 10 MG tablet Take 1 tablet by mouth daily 30 tablet 3    Lancets MISC 1 each by Does not apply route 2 times daily 100 each 5    blood glucose monitor strips Test BG daily  & as needed for symptoms of irregular blood glucose. Dispense sufficient amount for indicated testing frequency plus additional to accommodate PRN testing needs.  30 strip 5    spironolactone (ALDACTONE) 25 MG tablet Take 1 tablet by mouth daily 30 tablet 3    losartan (COZAAR) 25 MG tablet Take 1 tablet by mouth daily 30 tablet 3    aspirin 81 MG chewable tablet Take 1 tablet by mouth daily 30 tablet 1    simvastatin (ZOCOR) 40 MG tablet take 1 tablet by mouth at bedtime 90 tablet 1    pantoprazole (PROTONIX) 40 MG tablet 40 mg PO daily 90 tablet 1    albuterol sulfate HFA (PROVENTIL;VENTOLIN;PROAIR) 108 (90 Base) MCG/ACT inhaler inhale 2 puffs by mouth and INTO THE LUNGS every 4 hours if needed 18 g 5    venlafaxine (EFFEXOR XR) 150 MG extended release capsule Take 1 capsule by mouth daily 30 capsule 5    Calcium Carbonate-Vitamin D (CALCIUM-VITAMIN D3 PO) Take by mouth      albuterol (PROVENTIL) (2.5 MG/3ML) 0.083% nebulizer solution Take 3 mLs by nebulization every 6 hours as needed for Wheezing or Shortness of Breath 125 each 0    sodium chloride, Inhalant, 3 % nebulizer solution Take by nebulization as needed for Other (COPD/shortness of breath) 125 mL 0    guaiFENesin (MUCINEX) 600 MG extended release tablet Take 1,200 mg by mouth in the morning and 1,200 mg in the evening. Taking tid. Les Pimple KRILL OIL OMEGA-3 PO Take by mouth      cetirizine (ZYRTEC) 10 MG tablet Take 10 mg by mouth \"Alternate With Singulair\"      NASAL SPRAY SALINE NA by Nasal route daily Over The Counter      Acetaminophen (TYLENOL 8 HOUR PO) Take 500 mg by mouth as needed Over The Counter      Multiple Vitamins-Minerals (CENTRUM PO) Take by mouth daily      Nebulizer MISC Inhale into the lungs as needed       No current facility-administered medications for this visit. ALLERGIES  Allergies   Allergen Reactions    Ciprofloxacin Hcl Hives and Swelling    Levaquin [Levofloxacin] Hives and Swelling    Other Nausea And Vomiting     \"Allergic To Tylox\"    Ceftin [Cefuroxime]     Lisinopril      cough       PHYSICAL EXAM    /62 (Site: Right Upper Arm, Position: Sitting, Cuff Size: Medium Adult)   Pulse 89   Ht 5' 5\" (1.651 m)   Wt 150 lb (68 kg)   SpO2 96%   BMI 24.96 kg/m²     Physical Exam  Vitals and nursing note reviewed. Constitutional:       General: He is not in acute distress. Appearance: Normal appearance. He is well-developed. He is ill-appearing. He is not toxic-appearing or diaphoretic. HENT:      Head: Normocephalic and atraumatic. Nose: Congestion and rhinorrhea present. Mouth/Throat:      Mouth: Mucous membranes are moist.      Pharynx: Posterior oropharyngeal erythema present. Eyes:      Pupils: Pupils are equal, round, and reactive to light. Cardiovascular:      Rate and Rhythm: Normal rate and regular rhythm. Heart sounds: Normal heart sounds. No murmur heard. No gallop. Pulmonary:      Effort: Pulmonary effort is normal. No respiratory distress. Breath sounds: Normal breath sounds. No wheezing, rhonchi or rales. Abdominal:      Palpations: Abdomen is soft. Musculoskeletal:         General: No deformity. Normal range of motion. Cervical back: Normal range of motion and neck supple. No rigidity. Lymphadenopathy:      Cervical: No cervical adenopathy. Skin:     General: Skin is warm and dry. Coloration: Skin is not jaundiced. Findings: No bruising or lesion. Neurological:      General: No focal deficit present. Mental Status: He is alert and oriented to person, place, and time. Mental status is at baseline. Cranial Nerves: No cranial nerve deficit. Motor: No weakness. Gait: Gait normal.   Psychiatric:         Thought Content: Thought content normal.       ASSESSMENT & PLAN     Diagnosis Orders   1. Acute sinusitis, recurrence not specified, unspecified location        2. Seasonal allergic rhinitis due to pollen        3. Chronic obstructive pulmonary disease, unspecified COPD type ([facility]        4. Cough, unspecified type        At this point he is to push fluids and rest ,use vaporizer if needed ,Tylenol for discomfort and stay on usual regimen otherwisewe will treat today with prednisone 10 mg 3 times daily for 5 days in addition to his usual COPD Rx. Add Bactrim DS p.o. twice daily monitor INRs closely while on the Bactrim. He is to call with issues or changes. Advised to get COVID booster when feeling better from current infection. Return in about 3 months (around 1/21/2023), or if symptoms worsen or fail to improve.          Electronically signed by Keely Nelson MD on 10/21/2022

## 2022-10-24 RX ORDER — ALBUTEROL SULFATE 2.5 MG/3ML
2.5 SOLUTION RESPIRATORY (INHALATION) EVERY 6 HOURS PRN
Qty: 120 EACH | Refills: 0 | Status: SHIPPED | OUTPATIENT
Start: 2022-10-24

## 2022-10-26 ENCOUNTER — NURSE ONLY (OUTPATIENT)
Dept: FAMILY MEDICINE CLINIC | Age: 67
End: 2022-10-26
Payer: MEDICARE

## 2022-10-26 DIAGNOSIS — Z95.2 S/P AVR: ICD-10-CM

## 2022-10-26 LAB — INTERNATIONAL NORMALIZATION RATIO, POC: 2.8

## 2022-10-26 PROCEDURE — 85610 PROTHROMBIN TIME: CPT | Performed by: FAMILY MEDICINE

## 2022-10-27 ENCOUNTER — NURSE ONLY (OUTPATIENT)
Dept: CARDIOLOGY CLINIC | Age: 67
End: 2022-10-27

## 2022-10-27 VITALS
HEIGHT: 65 IN | WEIGHT: 155.6 LBS | HEART RATE: 86 BPM | BODY MASS INDEX: 25.92 KG/M2 | OXYGEN SATURATION: 98 % | SYSTOLIC BLOOD PRESSURE: 122 MMHG | DIASTOLIC BLOOD PRESSURE: 70 MMHG

## 2022-10-27 DIAGNOSIS — I42.0 DILATED CARDIOMYOPATHY (HCC): Primary | ICD-10-CM

## 2022-10-27 RX ORDER — LOSARTAN POTASSIUM 50 MG/1
50 TABLET ORAL DAILY
Qty: 30 TABLET | Refills: 2 | Status: SHIPPED | OUTPATIENT
Start: 2022-10-27

## 2022-10-27 NOTE — PATIENT INSTRUCTIONS
Please be informed that if you contact our office outside of normal business hours the physician on call cannot help with any scheduling or rescheduling issues, procedure instruction questions or any type of medication issue. We advise you for any urgent/emergency that you go to the nearest emergency room! PLEASE CALL OUR OFFICE DURING NORMAL BUSINESS HOURS    Monday - Friday   8 am to 5 pm    [place]: [phone]    [place]:  [phone]    **It is YOUR responsibilty to bring medication bottles and/or updated medication list to [address] [place] [address]. This will allow us to better serve you and all your healthcare needs**    Increase losartan to 50 mg daily  Patient can take losartan 25 mg x 2 tablets daily until prescription is finished. Thank you for allowing us to care for you today! We want to ensure we can follow your treatment plan and we strive to give you the best outcomes and experience possible. If you ever have a life threatening emergency and call 911 - for an ambulance (EMS)   Our providers can only care for you at:   [facility] or [facility]. Even if you have someone take you or you drive yourself we can only care for you in a [facility] facility. Our providers are not setup at the other healthcare locations!

## 2022-10-27 NOTE — PROGRESS NOTES
Vitals:    10/27/22 1033   BP: 122/70   Pulse: 86   SpO2: 98%     Patient here today for optimization of heart failure medication  Patient denies chest pain, palpitations, shortness of breath, lightheadedness, dizziness, edema or syncope  There is room to uptitrate blood pressure medications at this time  We will increase losartan to 50 mg daily  Patient to continue taking Toprol XL 25 mg daily    Patient to return in 2 weeks for blood pressure check  I did discuss with patient about optimization of heart failure medications and that our goal is to get his blood pressure as low as possible without symptoms to offload the stress on the heart and improve heart function  Patient voiced understanding and agrees to plan

## 2022-11-02 ENCOUNTER — ANTI-COAG VISIT (OUTPATIENT)
Dept: FAMILY MEDICINE CLINIC | Age: 67
End: 2022-11-02

## 2022-11-02 ENCOUNTER — NURSE ONLY (OUTPATIENT)
Dept: FAMILY MEDICINE CLINIC | Age: 67
End: 2022-11-02

## 2022-11-02 DIAGNOSIS — Z95.2 H/O MECHANICAL AORTIC VALVE REPLACEMENT: Primary | ICD-10-CM

## 2022-11-02 LAB
INTERNATIONAL NORMALIZATION RATIO, POC: 3.6
PROTHROMBIN TIME, POC: 42.8

## 2022-11-02 PROCEDURE — 99999 PR OFFICE/OUTPT VISIT,PROCEDURE ONLY: CPT | Performed by: FAMILY MEDICINE

## 2022-11-02 NOTE — PROGRESS NOTES
Pt states current warfarin 10/10/5 mg. Inr 3.6 pt will be finishing 10 day rx  bactrim ds 11/4/22. Per dr James Doss have pt return to 5/10 mg recheck pt inr next 11/7/22 or 11/8/22.

## 2022-11-03 ENCOUNTER — HOSPITAL ENCOUNTER (OUTPATIENT)
Dept: PULMONOLOGY | Age: 67
Discharge: HOME | End: 2022-11-03
Payer: MEDICARE

## 2022-11-03 DIAGNOSIS — R06.02 SOBOE (SHORTNESS OF BREATH ON EXERTION): ICD-10-CM

## 2022-11-03 LAB
DISTANCE WALKED: 985 FT
SPO2: 96 %

## 2022-11-03 PROCEDURE — 94618 PULMONARY STRESS TESTING: CPT

## 2022-11-03 NOTE — PROGRESS NOTES
[facility] Pulmonary Function Lab - Six Minute Walk  Test Performed on: Room Air__X__ Oxygen at _____ lpm via N/C  Assist Device Used During Test:    None__X_ Cane____ Walker___   Shortness of Breath - Isaura's Scale  0 Nothing at all 5 Severe    0.5 Very very slight 6    1 Very slight 7 Very severe   2 Slight 8     3 Moderate 9 Very very severe   4 Somewhat severe 10 Maximal      Time SpO2 Heart Rate Respiratory Rate Modified Isauras Scale Other      Baseline       96          %  86 18 0           1 minute                 98            % 85   1           2 minutes              98         %  86  2           3 minutes              97          %  96     3           4 minutes           98           %  100      3           5 minutes       98           %  98      3           6 minutes          97           %  106     3        Recovery   x 1 Min             +98             %  86     22 2        Recovery   x 2 Min               99            %  83       1         Number of Laps_5__ X 170 feet _850__+ _135_ additional feet = Total _985_ft  Stopped or paused before 6 minutes?  No__X__ Yes _____   Pre Blood Pressure: _131_ / _61__    Post Blood Pressure:_ 129 _/_68__  Interpretation:

## 2022-11-03 NOTE — PROGRESS NOTES
Unable to do PFT. Pt is currently taking Prednisone. Pt will reschedule PFT at least two weeks after his last prednisone.

## 2022-11-07 ENCOUNTER — OFFICE VISIT (OUTPATIENT)
Dept: ORTHOPEDIC SURGERY | Age: 67
End: 2022-11-07
Payer: MEDICARE

## 2022-11-07 VITALS
HEIGHT: 65 IN | WEIGHT: 155 LBS | BODY MASS INDEX: 25.83 KG/M2 | RESPIRATION RATE: 16 BRPM | DIASTOLIC BLOOD PRESSURE: 75 MMHG | SYSTOLIC BLOOD PRESSURE: 108 MMHG

## 2022-11-07 DIAGNOSIS — Z96.641 S/P TOTAL RIGHT HIP ARTHROPLASTY: Primary | ICD-10-CM

## 2022-11-07 PROCEDURE — 3017F COLORECTAL CA SCREEN DOC REV: CPT | Performed by: ORTHOPAEDIC SURGERY

## 2022-11-07 PROCEDURE — G8484 FLU IMMUNIZE NO ADMIN: HCPCS | Performed by: ORTHOPAEDIC SURGERY

## 2022-11-07 PROCEDURE — G8427 DOCREV CUR MEDS BY ELIG CLIN: HCPCS | Performed by: ORTHOPAEDIC SURGERY

## 2022-11-07 PROCEDURE — 1124F ACP DISCUSS-NO DSCNMKR DOCD: CPT | Performed by: ORTHOPAEDIC SURGERY

## 2022-11-07 PROCEDURE — G8417 CALC BMI ABV UP PARAM F/U: HCPCS | Performed by: ORTHOPAEDIC SURGERY

## 2022-11-07 PROCEDURE — 99212 OFFICE O/P EST SF 10 MIN: CPT | Performed by: ORTHOPAEDIC SURGERY

## 2022-11-07 PROCEDURE — 4004F PT TOBACCO SCREEN RCVD TLK: CPT | Performed by: ORTHOPAEDIC SURGERY

## 2022-11-07 NOTE — PROGRESS NOTES
Patient returns to the office today for FU of the right MICHAEL DOS 10/25/22.  Pt states pain today is a 0/10 Pt states overall she is doing well and has no concerns at this time

## 2022-11-08 ENCOUNTER — ANTI-COAG VISIT (OUTPATIENT)
Dept: FAMILY MEDICINE CLINIC | Age: 67
End: 2022-11-08

## 2022-11-08 ENCOUNTER — NURSE ONLY (OUTPATIENT)
Dept: FAMILY MEDICINE CLINIC | Age: 67
End: 2022-11-08
Payer: MEDICARE

## 2022-11-08 DIAGNOSIS — Z95.2 S/P AVR: ICD-10-CM

## 2022-11-08 DIAGNOSIS — Z95.2 S/P AVR: Primary | Chronic | ICD-10-CM

## 2022-11-08 LAB
INTERNATIONAL NORMALIZATION RATIO, POC: 4.5
PROTHROMBIN TIME, POC: 54.1

## 2022-11-08 PROCEDURE — 85610 PROTHROMBIN TIME: CPT | Performed by: FAMILY MEDICINE

## 2022-11-08 PROCEDURE — 99999 PR OFFICE/OUTPT VISIT,PROCEDURE ONLY: CPT | Performed by: FAMILY MEDICINE

## 2022-11-08 ASSESSMENT — ENCOUNTER SYMPTOMS
COLOR CHANGE: 0
BACK PAIN: 0
CHEST TIGHTNESS: 0

## 2022-11-08 NOTE — PROGRESS NOTES
Subjective:      Patient ID: Eve Templeton is a 78 y.o. male. Patient returns to the office today for FU of the right MICHAEL DOS 10/25/22. Pt states pain today is a 0/10 Pt states overall she is doing well and has no concerns at this time    He comes in today for his 1 year postop recheck after right MICHAEL for his femoral neck fracture. He states that since I saw him last he has not had any issues with his right hip. Patient denies any new injury to the involved extremity/ joint, denies numbness or tingling in the involved extremity and denies fever or chills. Knee Pain   Pertinent negatives include no numbness. Review of Systems   Constitutional:  Negative for activity change, chills and fever. Respiratory:  Negative for chest tightness. Cardiovascular:  Negative for chest pain. Musculoskeletal:  Negative for arthralgias, back pain, gait problem, joint swelling and myalgias. Skin:  Negative for color change, pallor, rash and wound. Neurological:  Negative for weakness and numbness. Past Medical History:   Diagnosis Date    Acid reflux     Acute frontal sinusitis 07/22/2009    Alcohol abuse     \"I Drink Average 2 Beers A Day\"    Anesthesia     \" I get agitated\"    Anxiety     Arthritis     \"Right Shoulder, Neck, Left Knee\"    Atypical mycobacterial infection     Broken teeth     Upper And Lower     CHF (congestive heart failure) ([facility])     COPD (chronic obstructive pulmonary disease) ([facility])     Sees Dr. Ranulfo Jones In [place], [facility] [address]     Cough     Frequent Cough With Green Sputum    Depression     Depression     Dyspnea 02/23/2018    H/O cardiac catheterization 05/17/2022    LAD: Moderate Lumen Irregularity. CALCIFIED mid LAD segment. H/O cardiovascular MUGA stress test 05/14/2019    EF 50%, NORMAL LVEF, NORMAL WALL MOTION.     H/O echocardiogram 05/22/2014    MG=>28-85%, LV systolic function is low normal, mild aortic valve calcification, no pericardial effusion    H/O echocardiogram 12/12/2018    EF 45-50%    History of 24 hour EKG monitoring 06/06/2014    24 hr holter monitor. Ventricular ectopics were noted in single and couplet forms. Supraventricular ectopics were noted in single and pair forms. [place] (hard of hearing)     Bilateral Ears    Hyperlipidemia     Hypertension     Irritant contact dermatitis due to other chemical products 08/26/2019    Mycobacterium avium complex ([address]     Other drug allergy(995.27) 07/22/2009    S/P AVR 12/2003    Mechanical valvue     Shortness of breath     Teeth missing     Upper And Lower    Type 2 diabetes mellitus 8/31/2022       Objective:   Physical Exam  Constitutional:       Appearance: He is well-developed. HENT:      Head: Normocephalic. Eyes:      Pupils: Pupils are equal, round, and reactive to light. Pulmonary:      Effort: Pulmonary effort is normal.   Musculoskeletal:         General: No tenderness or deformity. Normal range of motion. Cervical back: Normal range of motion. Right hip: No deformity, lacerations, tenderness, bony tenderness or crepitus. Normal range of motion. Normal strength. Left hip: No deformity, lacerations, tenderness, bony tenderness or crepitus. Normal range of motion. Normal strength. Right knee: Normal.      Left knee: Normal.   Skin:     General: Skin is warm and dry. Capillary Refill: Capillary refill takes less than 2 seconds. Coloration: Skin is not pale. Findings: No erythema or rash. Neurological:      Mental Status: He is alert and oriented to person, place, and time. Right hip-Skin intact with no erythema, ecchymosis or lacerations present. No groin pain with range of motion of the right hip.     XR HIP 1 VW W PELVIS RIGHT    Result Date: 11/7/2022  XRAY X-ray 2 views of the right hip and AP pelvis obtained and reviewed by me today in the office demonstrates age appropriate bone density throughout with well-positioned right total hip arthroplasty, there has been no change in position components compared to prior x-rays, no signs of loosening or wear, no acute osseous abnormalities. Impression: Stable right total hip arthroplasty with no acute process. Assessment:      Right MICHAEL, 1 year      Plan:      I discussed with him today his x-ray findings. I explained to him that his implants are stable and remain in good alignment. I discussed with him today that he is continuing to progress extremely well. Continue weight-bearing as tolerated. Continue range of motion exercises as instructed. Ice and elevate as needed. Tylenol or Motrin for pain. Follow up will be as needed.           Carl Fang,

## 2022-11-08 NOTE — PROGRESS NOTES
Pt states current warfarin 10/5mg. Inr 4.5. pt finished rx bactrim 11/6/22. Per Jairon RODRIGUEZ hold warfarin 24 hours restart with 5 mg. Resume 5/10 mg recheck in 2 weeks.

## 2022-11-11 ENCOUNTER — TELEPHONE (OUTPATIENT)
Dept: CARDIOLOGY CLINIC | Age: 67
End: 2022-11-11

## 2022-11-11 ENCOUNTER — NURSE ONLY (OUTPATIENT)
Dept: CARDIOLOGY CLINIC | Age: 67
End: 2022-11-11
Payer: MEDICARE

## 2022-11-11 VITALS
HEART RATE: 74 BPM | HEIGHT: 65 IN | WEIGHT: 158.2 LBS | BODY MASS INDEX: 26.36 KG/M2 | SYSTOLIC BLOOD PRESSURE: 126 MMHG | DIASTOLIC BLOOD PRESSURE: 74 MMHG

## 2022-11-11 PROCEDURE — 99211 OFF/OP EST MAY X REQ PHY/QHP: CPT | Performed by: NURSE PRACTITIONER

## 2022-11-11 RX ORDER — METOPROLOL SUCCINATE 25 MG/1
25 TABLET, EXTENDED RELEASE ORAL 2 TIMES DAILY
Qty: 60 TABLET | Refills: 3 | Status: SHIPPED | OUTPATIENT
Start: 2022-11-11

## 2022-11-11 NOTE — PROGRESS NOTES
Vitals:    11/11/22 1110   BP: 126/74   Pulse: 74     Patient here today for optimization of heart failure medications.   We will continue losartan 50 mg daily  We will increase Toprol-XL to 25 mg twice daily    Patient to follow-up in 2 weeks for blood pressure check

## 2022-11-11 NOTE — TELEPHONE ENCOUNTER
Patient in the office today and voiced concerns about whether or not this office receives his  [address] information. Placed call to patient and confirmed I am viewing and receiving his LIFEVEST readings. Patient voiced satisfaction. No other concerns voiced.

## 2022-11-15 ENCOUNTER — OFFICE VISIT (OUTPATIENT)
Dept: FAMILY MEDICINE CLINIC | Age: 67
End: 2022-11-15
Payer: MEDICARE

## 2022-11-15 VITALS
SYSTOLIC BLOOD PRESSURE: 120 MMHG | OXYGEN SATURATION: 98 % | BODY MASS INDEX: 27.06 KG/M2 | DIASTOLIC BLOOD PRESSURE: 78 MMHG | WEIGHT: 162.4 LBS | HEIGHT: 65 IN | HEART RATE: 77 BPM

## 2022-11-15 DIAGNOSIS — B96.89 ACUTE BACTERIAL SINUSITIS: Primary | ICD-10-CM

## 2022-11-15 DIAGNOSIS — J32.9 CHRONIC SINUSITIS, UNSPECIFIED LOCATION: ICD-10-CM

## 2022-11-15 DIAGNOSIS — J01.90 ACUTE BACTERIAL SINUSITIS: Primary | ICD-10-CM

## 2022-11-15 PROCEDURE — 99214 OFFICE O/P EST MOD 30 MIN: CPT | Performed by: PHYSICIAN ASSISTANT

## 2022-11-15 PROCEDURE — G8427 DOCREV CUR MEDS BY ELIG CLIN: HCPCS | Performed by: PHYSICIAN ASSISTANT

## 2022-11-15 PROCEDURE — 4004F PT TOBACCO SCREEN RCVD TLK: CPT | Performed by: PHYSICIAN ASSISTANT

## 2022-11-15 PROCEDURE — 3017F COLORECTAL CA SCREEN DOC REV: CPT | Performed by: PHYSICIAN ASSISTANT

## 2022-11-15 PROCEDURE — G8484 FLU IMMUNIZE NO ADMIN: HCPCS | Performed by: PHYSICIAN ASSISTANT

## 2022-11-15 PROCEDURE — 1124F ACP DISCUSS-NO DSCNMKR DOCD: CPT | Performed by: PHYSICIAN ASSISTANT

## 2022-11-15 PROCEDURE — G8417 CALC BMI ABV UP PARAM F/U: HCPCS | Performed by: PHYSICIAN ASSISTANT

## 2022-11-15 RX ORDER — AZITHROMYCIN 250 MG/1
250 TABLET, FILM COATED ORAL SEE ADMIN INSTRUCTIONS
Qty: 6 TABLET | Refills: 0 | Status: SHIPPED | OUTPATIENT
Start: 2022-11-15 | End: 2022-11-20

## 2022-11-15 RX ORDER — AZELASTINE 1 MG/ML
1 SPRAY, METERED NASAL 2 TIMES DAILY
Qty: 30 ML | Refills: 0 | Status: SHIPPED | OUTPATIENT
Start: 2022-11-15

## 2022-11-15 NOTE — PROGRESS NOTES
11/15/2022    Nati Cools    Chief Complaint   Patient presents with    Sinus Problem     Started about 2 weeks ago after completing prednisone and bactrim, nasal drainage, noticed its changed color and is now yellowish green, has taken mucinex 3 times daily and nasal spray. HPI  History was obtained from pt. Kimberly Carrasco is a 78 y.o. male with a PMHx as listed below who presents today for ongoing sinus issues. Pt was recently on prednisone and bactrim, was on an abx before that. Still having yellowish nasal drainage that causes coughing, some reddish tinged sputum. no facial pain or headaches, no fevers. 1. Acute bacterial sinusitis    2. Chronic sinusitis, unspecified location           REVIEW OF SYMPTOMS    Review of Systems    PAST MEDICAL HISTORY  Past Medical History:   Diagnosis Date    Acid reflux     Acute frontal sinusitis 07/22/2009    Alcohol abuse     \"I Drink Average 2 Beers A Day\"    Anesthesia     \" I get agitated\"    Anxiety     Arthritis     \"Right Shoulder, Neck, Left Knee\"    Atypical mycobacterial infection     Broken teeth     Upper And Lower     CHF (congestive heart failure) ([facility])     COPD (chronic obstructive pulmonary disease) ([facility])     Sees Dr. Lyssa Jarvis [place], [address]     Cough     Frequent Cough With Green Sputum    Depression     Depression     Dyspnea 02/23/2018    H/O cardiac catheterization 05/17/2022    LAD: Moderate Lumen Irregularity. CALCIFIED mid LAD segment. H/O cardiovascular MUGA stress test 05/14/2019    EF 50%, NORMAL LVEF, NORMAL WALL MOTION. H/O echocardiogram 05/22/2014    GW=>12-15%, LV systolic function is low normal, mild aortic valve calcification, no pericardial effusion    H/O echocardiogram 12/12/2018    EF 45-50%    History of 24 hour EKG monitoring 06/06/2014    24 hr holter monitor. Ventricular ectopics were noted in single and couplet forms. Supraventricular ectopics were noted in single and pair forms.     [place] (hard of hearing)     Bilateral Ears    Hyperlipidemia     Hypertension     Irritant contact dermatitis due to other chemical products 08/26/2019    Mycobacterium avium complex ([facility] [address]     Other drug allergy(995.27) 07/22/2009    S/P AVR 12/2003    Mechanical valvue     Shortness of breath     Teeth missing     Upper And Lower    Type 2 diabetes mellitus 8/31/2022       FAMILY HISTORY  Family History   Problem Relation Age of Onset    Cancer Mother         Lymphoma    Diabetes Mother     High Blood Pressure Mother     High Cholesterol Mother     Obesity Mother     Vision Loss Mother         Wore Glasses    Heart Disease Father         \"Heart Failure\"    COPD Father     Asthma Sister     High Blood Pressure Sister     High Cholesterol Sister     Other Sister         pre diabetic    COPD Sister     Diabetes Sister         \"Pre Diabetes\"    No Known Problems Son        SOCIAL HISTORY  Social History     Socioeconomic History    Marital status:     Number of children: 1    Years of education: 12   Tobacco Use    Smoking status: Never    Smokeless tobacco: Current     Types: Snuff, Chew   Vaping Use    Vaping Use: Never used   Substance and Sexual Activity    Alcohol use: Not Currently     Comment: 1/2 bottle of smirnoff    Drug use: No    Sexual activity: Not Currently     Social Determinants of Health     Financial Resource Strain: Low Risk     Difficulty of Paying Living Expenses: Not hard at all   Food Insecurity: No Food Insecurity    Worried About Running Out of Food in the Last Year: Never true    Ran Out of Food in the Last Year: Never true   Physical Activity: Insufficiently Active    Days of Exercise per Week: 5 days    Minutes of Exercise per Session: 20 min        SURGICAL HISTORY  Past Surgical History:   Procedure Laterality Date    BRONCHOSCOPY  1996    \"Done Twice\"    CARDIAC VALVE REPLACEMENT  12/17/2003    \"Aortic Valve Replacement, Mechanical Valve\"    COLONOSCOPY  2006    Polyps Removed    DENTAL SURGERY      Teeth Extracted In Past    ENDOSCOPY, COLON, DIAGNOSTIC  In Past    HEMIARTHROPLASTY SHOULDER FRACTURE Right 1/29/2019    SHOULDER [facility] ARTHROSCOPY Left 1992    LUNG BIOPSY Right [facility]    \"Cauterized Because My Sperm Was Low\"    ROTATOR CUFF REPAIR Right 2008    TONSILLECTOMY  1959 Or 1960    TOTAL HIP ARTHROPLASTY Right 10/25/2021    RIGHT HIP TOTAL ARTHROPLASTY performed by Carol Manzanares DO at [address] [place] [address] 11/10/2021    EGD BIOPSY performed by Julianne Rojo MD at [facility]  Current Outpatient Medications   Medication Sig Dispense Refill    azelastine (ASTELIN) 0.1 % nasal spray 1 spray by Nasal route 2 times daily Use in each nostril as directed 30 mL 0    azithromycin (ZITHROMAX) 250 MG tablet Take 1 tablet by mouth See Admin Instructions for 5 days 500mg on day 1 followed by 250mg on days 2 - 5 6 tablet 0    metoprolol succinate (TOPROL XL) 25 MG extended release tablet Take 1 tablet by mouth 2 times daily 60 tablet 3    losartan (COZAAR) 50 MG tablet Take 1 tablet by mouth daily 30 tablet 2    albuterol (PROVENTIL) (2.5 MG/3ML) 0.083% nebulizer solution Take 3 mLs by nebulization every 6 hours as needed for Wheezing or Shortness of Breath 120 each 0    Fluticasone Propionate (FLONASE NA) by Nasal route      albuterol-ipratropium (COMBIVENT RESPIMAT)  MCG/ACT AERS inhaler Inhale 1 puff into the lungs every 6 hours as needed for Wheezing 1 each 5    triamcinolone (KENALOG) 0.1 % cream Apply topically 2 times daily.  453.6 g 1    Multiple Vitamin (MULTI-VITAMIN DAILY PO) Take by mouth      warfarin (COUMADIN) 5 MG tablet take 1 TO 2 tablets by mouth once daily as directed 60 tablet 2    FEROSUL 325 (65 Fe) MG tablet take 1 tablet by mouth once daily with breakfast 90 tablet 1    empagliflozin (JARDIANCE) 10 MG tablet Take 1 tablet by mouth daily 30 tablet 3    Lancets MISC 1 each by Does not apply route 2 times daily 100 each 5    blood glucose monitor strips Test BG daily  & as needed for symptoms of irregular blood glucose. Dispense sufficient amount for indicated testing frequency plus additional to accommodate PRN testing needs. 30 strip 5    spironolactone (ALDACTONE) 25 MG tablet Take 1 tablet by mouth daily 30 tablet 3    aspirin 81 MG chewable tablet Take 1 tablet by mouth daily 30 tablet 1    simvastatin (ZOCOR) 40 MG tablet take 1 tablet by mouth at bedtime 90 tablet 1    pantoprazole (PROTONIX) 40 MG tablet 40 mg PO daily 90 tablet 1    albuterol sulfate HFA (PROVENTIL;VENTOLIN;PROAIR) 108 (90 Base) MCG/ACT inhaler inhale 2 puffs by mouth and INTO THE LUNGS every 4 hours if needed 18 g 5    venlafaxine (EFFEXOR XR) 150 MG extended release capsule Take 1 capsule by mouth daily 30 capsule 5    Calcium Carbonate-Vitamin D (CALCIUM-VITAMIN D3 PO) Take by mouth      sodium chloride, Inhalant, 3 % nebulizer solution Take by nebulization as needed for Other (COPD/shortness of breath) 125 mL 0    guaiFENesin (MUCINEX) 600 MG extended release tablet Take 1,200 mg by mouth in the morning and 1,200 mg in the evening. Taking tid. [place] [employer] KRILL OIL OMEGA-3 PO Take by mouth      cetirizine (ZYRTEC) 10 MG tablet Take 10 mg by mouth \"Alternate With Singulair\"      NASAL SPRAY SALINE NA by Nasal route daily Over The Counter      Acetaminophen (TYLENOL 8 HOUR PO) Take 500 mg by mouth as needed Over The Counter      Multiple Vitamins-Minerals (CENTRUM PO) Take by mouth daily      Nebulizer MISC Inhale into the lungs as needed       No current facility-administered medications for this visit.        ALLERGIES  Allergies   Allergen Reactions    Ciprofloxacin Hcl Hives and Swelling    Levaquin [Levofloxacin] Hives and Swelling    Other Nausea And Vomiting     \"Allergic To Tylox\"    Ceftin [Cefuroxime]     Lisinopril      cough       PHYSICAL EXAM    /78 (Site: Right Upper Arm, Position: Sitting, Cuff Size: Medium Adult)   Pulse 77   Ht 5' 5\" (1.651 m)   Wt 162 lb 6.4 oz (73.7 kg)   SpO2 98%   BMI 27.02 kg/m²     Physical Exam  Constitutional:       Appearance: Normal appearance. He is normal weight. HENT:      Head: Normocephalic and atraumatic. Right Ear: Tympanic membrane and external ear normal.      Left Ear: Tympanic membrane and external ear normal.      Nose: Congestion and rhinorrhea present. Mouth/Throat:      Mouth: Mucous membranes are moist.      Pharynx: Oropharynx is clear. Posterior oropharyngeal erythema present. No oropharyngeal exudate. Eyes:      General: No scleral icterus. Extraocular Movements: Extraocular movements intact. Conjunctiva/sclera: Conjunctivae normal.      Pupils: Pupils are equal, round, and reactive to light. Cardiovascular:      Rate and Rhythm: Normal rate. Rhythm irregular. Pulses: Normal pulses. Heart sounds: Normal heart sounds. No murmur heard. No friction rub. No gallop. Pulmonary:      Effort: Pulmonary effort is normal.      Breath sounds: Normal breath sounds. No wheezing, rhonchi or rales. Abdominal:      General: Bowel sounds are normal. There is no distension. Palpations: Abdomen is soft. There is no mass. Tenderness: There is no abdominal tenderness. There is no right CVA tenderness, left CVA tenderness, guarding or rebound. Musculoskeletal:         General: No deformity. Normal range of motion. Cervical back: Normal range of motion and neck supple. No rigidity. No muscular tenderness. Right lower leg: No edema. Left lower leg: No edema. Skin:     General: Skin is warm and dry. Capillary Refill: Capillary refill takes less than 2 seconds. Findings: No bruising, erythema or rash. Neurological:      General: No focal deficit present. Mental Status: He is alert and oriented to person, place, and time. Coordination: Coordination normal.      Gait: Gait normal.   Psychiatric:         Mood and Affect: Mood normal.         Behavior: Behavior normal.       ASSESSMENT & PLAN    1. Acute bacterial sinusitis    - Amb External Referral To ENT  - azithromycin (ZITHROMAX) 250 MG tablet; Take 1 tablet by mouth See Admin Instructions for 5 days 500mg on day 1 followed by 250mg on days 2 - 5  Dispense: 6 tablet; Refill: 0    2. Chronic sinusitis, unspecified location  Has completed 2-3 rounds of abx  - Amb External Referral To ENT  - azelastine (ASTELIN) 0.1 % nasal spray; 1 spray by Nasal route 2 times daily Use in each nostril as directed  Dispense: 30 mL; Refill: 0    Return if symptoms worsen or fail to improve. Electronically signed by Oscar Jean on 11/15/2022      Comment: Please note this report has been produced using speech recognition software and may contain errors related to that system including errors in grammar, punctuation, and spelling, as well as words and phrases that may be inappropriate. If there are any questions or concerns please feel free to contact the dictating provider for clarification.

## 2022-11-22 ENCOUNTER — NURSE ONLY (OUTPATIENT)
Dept: FAMILY MEDICINE CLINIC | Age: 67
End: 2022-11-22
Payer: MEDICARE

## 2022-11-22 DIAGNOSIS — Z95.2 S/P AVR: ICD-10-CM

## 2022-11-22 LAB
INTERNATIONAL NORMALIZATION RATIO, POC: 3
PROTHROMBIN TIME, POC: 0

## 2022-11-22 PROCEDURE — 85610 PROTHROMBIN TIME: CPT | Performed by: FAMILY MEDICINE

## 2022-11-28 ENCOUNTER — TELEPHONE (OUTPATIENT)
Dept: CARDIOLOGY CLINIC | Age: 67
End: 2022-11-28

## 2022-11-28 ENCOUNTER — OFFICE VISIT (OUTPATIENT)
Dept: CARDIOLOGY CLINIC | Age: 67
End: 2022-11-28
Payer: MEDICARE

## 2022-11-28 ENCOUNTER — TELEPHONE (OUTPATIENT)
Dept: PULMONOLOGY | Age: 67
End: 2022-11-28

## 2022-11-28 VITALS
BODY MASS INDEX: 27.09 KG/M2 | DIASTOLIC BLOOD PRESSURE: 68 MMHG | WEIGHT: 162.6 LBS | HEIGHT: 65 IN | SYSTOLIC BLOOD PRESSURE: 120 MMHG | OXYGEN SATURATION: 95 % | HEART RATE: 73 BPM

## 2022-11-28 DIAGNOSIS — I42.0 DILATED CARDIOMYOPATHY (HCC): Primary | ICD-10-CM

## 2022-11-28 DIAGNOSIS — I50.33 ACUTE ON CHRONIC DIASTOLIC (CONGESTIVE) HEART FAILURE (HCC): Primary | ICD-10-CM

## 2022-11-28 DIAGNOSIS — E78.2 MIXED HYPERLIPIDEMIA: ICD-10-CM

## 2022-11-28 DIAGNOSIS — R06.02 SOBOE (SHORTNESS OF BREATH ON EXERTION): Primary | ICD-10-CM

## 2022-11-28 PROCEDURE — 1124F ACP DISCUSS-NO DSCNMKR DOCD: CPT | Performed by: INTERNAL MEDICINE

## 2022-11-28 PROCEDURE — 3017F COLORECTAL CA SCREEN DOC REV: CPT | Performed by: INTERNAL MEDICINE

## 2022-11-28 PROCEDURE — G8427 DOCREV CUR MEDS BY ELIG CLIN: HCPCS | Performed by: INTERNAL MEDICINE

## 2022-11-28 PROCEDURE — 99214 OFFICE O/P EST MOD 30 MIN: CPT | Performed by: INTERNAL MEDICINE

## 2022-11-28 PROCEDURE — G8417 CALC BMI ABV UP PARAM F/U: HCPCS | Performed by: INTERNAL MEDICINE

## 2022-11-28 PROCEDURE — 4004F PT TOBACCO SCREEN RCVD TLK: CPT | Performed by: INTERNAL MEDICINE

## 2022-11-28 PROCEDURE — G8484 FLU IMMUNIZE NO ADMIN: HCPCS | Performed by: INTERNAL MEDICINE

## 2022-11-28 NOTE — PATIENT INSTRUCTIONS
Thank you for allowing us to care for you today! We want to ensure we can follow your treatment plan and we strive to give you the best outcomes and experience possible. If you ever have a life threatening emergency and call 911 - for an ambulance (EMS)   Our providers can only care for you at:   [facility] or [facility]. Even if you have someone take you or you drive yourself we can only care for you in a [address] [facility] facility. Our providers are not setup at the other healthcare locations! Please be informed that if you contact our office outside of normal business hours the physician on call cannot help with any scheduling or rescheduling issues, procedure instruction questions or any type of medication issue. We advise you for any urgent/emergency that you go to the nearest emergency room! PLEASE CALL OUR OFFICE DURING NORMAL BUSINESS HOURS    Monday - Friday   8 am to 5 pm    [place]: [phone]    [place]:  [phone]  **It is YOUR responsibilty to bring medication bottles and/or updated medication list to [address] [place] [address]. This will allow us to better serve you and all your healthcare needs**  [facility] Locations - No appointment necessary. Sites open Monday to Friday. Call your preferred location for test preparation, business hours and other information you need. [employer] accepts [employer]. [facility] [address], [address] [facility]  Phone: [phone] THE [facility] [address]  [address].   THE [facility], [address] [place] [address]  Phone: [phone]

## 2022-11-28 NOTE — TELEPHONE ENCOUNTER
Pt unable to complete PFT due to prednisone, they did close out the order. Please put in new order, thank you!

## 2022-11-28 NOTE — TELEPHONE ENCOUNTER
Patient called to get a order placed to have a lipids checked , he discussed today with Dr Wilmar Santos

## 2022-11-28 NOTE — PROGRESS NOTES
Manuel Sandoval MD        OFFICE  FOLLOWUP NOTE    Chief complaints: patient is here for management of cardiomyopathy, CAD, HTN. Metallic aortic valve, dyslipidemia, bronchiectasis    Subjective: patient feels better, no chest pain, + shortness of breath, no dizziness, no palpitations    DAVID Gill is a 78 y. o.year old who  has a past medical history of Acid reflux, Acute frontal sinusitis, Alcohol abuse, Anesthesia, Anxiety, Arthritis, Atypical mycobacterial infection, Broken teeth, CHF (congestive heart failure) ([facility]), COPD (chronic obstructive pulmonary disease) ([address] Cough, Depression, Depression, Dyspnea, H/O cardiac catheterization, H/O cardiovascular MUGA stress test, H/O echocardiogram, H/O echocardiogram, History of 24 hour EKG monitoring, [place] (hard of hearing), Hyperlipidemia, Hypertension, Irritant contact dermatitis due to other chemical products, Mycobacterium avium complex ([address] Other drug allergy(995.27), S/P AVR, Shortness of breath, Teeth missing, and Type 2 diabetes mellitus. and presents for management of cardiomyopathy, CAD, HTN. Metallic aortic valve, dyslipidemia, bronchiectasis which are well controlled    He could use life vest, he had 6 different life vests and all had malfunction.   Current Outpatient Medications   Medication Sig Dispense Refill    azelastine (ASTELIN) 0.1 % nasal spray 1 spray by Nasal route 2 times daily Use in each nostril as directed 30 mL 0    metoprolol succinate (TOPROL XL) 25 MG extended release tablet Take 1 tablet by mouth 2 times daily 60 tablet 3    losartan (COZAAR) 50 MG tablet Take 1 tablet by mouth daily 30 tablet 2    albuterol (PROVENTIL) (2.5 MG/3ML) 0.083% nebulizer solution Take 3 mLs by nebulization every 6 hours as needed for Wheezing or Shortness of Breath 120 each 0    albuterol-ipratropium (COMBIVENT RESPIMAT)  MCG/ACT AERS inhaler Inhale 1 puff into the lungs every 6 hours as needed for Wheezing 1 each 5    triamcinolone (KENALOG) 0.1 % cream Apply topically 2 times daily. 453.6 g 1    Multiple Vitamin (MULTI-VITAMIN DAILY PO) Take by mouth      warfarin (COUMADIN) 5 MG tablet take 1 TO 2 tablets by mouth once daily as directed 60 tablet 2    FEROSUL 325 (65 Fe) MG tablet take 1 tablet by mouth once daily with breakfast 90 tablet 1    empagliflozin (JARDIANCE) 10 MG tablet Take 1 tablet by mouth daily 30 tablet 3    Lancets MISC 1 each by Does not apply route 2 times daily 100 each 5    blood glucose monitor strips Test BG daily  & as needed for symptoms of irregular blood glucose. Dispense sufficient amount for indicated testing frequency plus additional to accommodate PRN testing needs. 30 strip 5    spironolactone (ALDACTONE) 25 MG tablet Take 1 tablet by mouth daily 30 tablet 3    aspirin 81 MG chewable tablet Take 1 tablet by mouth daily 30 tablet 1    simvastatin (ZOCOR) 40 MG tablet take 1 tablet by mouth at bedtime 90 tablet 1    pantoprazole (PROTONIX) 40 MG tablet 40 mg PO daily 90 tablet 1    albuterol sulfate HFA (PROVENTIL;VENTOLIN;PROAIR) 108 (90 Base) MCG/ACT inhaler inhale 2 puffs by mouth and INTO THE LUNGS every 4 hours if needed 18 g 5    venlafaxine (EFFEXOR XR) 150 MG extended release capsule Take 1 capsule by mouth daily 30 capsule 5    Calcium Carbonate-Vitamin D (CALCIUM-VITAMIN D3 PO) Take by mouth      sodium chloride, Inhalant, 3 % nebulizer solution Take by nebulization as needed for Other (COPD/shortness of breath) 125 mL 0    guaiFENesin (MUCINEX) 600 MG extended release tablet Take 1,200 mg by mouth in the morning and 1,200 mg in the evening. Taking tid. Viktor Patella       KRILL OIL OMEGA-3 PO Take by mouth      cetirizine (ZYRTEC) 10 MG tablet Take 10 mg by mouth \"Alternate With Singulair\"      Acetaminophen (TYLENOL 8 HOUR PO) Take 500 mg by mouth as needed Over The Counter      Multiple Vitamins-Minerals (CENTRUM PO) Take by mouth daily      Nebulizer MISC Inhale into the lungs as needed      Fluticasone Propionate (FLONASE NA) by Nasal route (Patient not taking: Reported on 11/28/2022)      NASAL SPRAY SALINE NA by Nasal route daily Over The Counter       No current facility-administered medications for this visit. Allergies: Ciprofloxacin hcl, Levaquin [levofloxacin], Other, Ceftin [cefuroxime], and Lisinopril  Past Medical History:   Diagnosis Date    Acid reflux     Acute frontal sinusitis 07/22/2009    Alcohol abuse     \"I Drink Average 2 Beers A Day\"    Anesthesia     \" I get agitated\"    Anxiety     Arthritis     \"Right Shoulder, Neck, Left Knee\"    Atypical mycobacterial infection     Broken teeth     Upper And Lower     CHF (congestive heart failure) ([facility])     COPD (chronic obstructive pulmonary disease) ([facility])     Sees Dr. Lyssa Jarvis [place], [address] [facility]     Cough     Frequent Cough With Green Sputum    Depression     Depression     Dyspnea 02/23/2018    H/O cardiac catheterization 05/17/2022    LAD: Moderate Lumen Irregularity. CALCIFIED mid LAD segment. H/O cardiovascular MUGA stress test 05/14/2019    EF 50%, NORMAL LVEF, NORMAL WALL MOTION. H/O echocardiogram 05/22/2014    XW=>54-34%, LV systolic function is low normal, mild aortic valve calcification, no pericardial effusion    H/O echocardiogram 12/12/2018    EF 45-50%    History of 24 hour EKG monitoring 06/06/2014    24 hr holter monitor. Ventricular ectopics were noted in single and couplet forms. Supraventricular ectopics were noted in single and pair forms.     [place] (hard of hearing)     Bilateral Ears    Hyperlipidemia     Hypertension     Irritant contact dermatitis due to other chemical products 08/26/2019    Mycobacterium avium complex ([facility] [address]     Other drug allergy(995.27) 07/22/2009    S/P AVR 12/2003    Mechanical valvue     Shortness of breath     Teeth missing     Upper And Lower    Type 2 diabetes mellitus 8/31/2022     Past Surgical History:   Procedure Laterality Date    BRONCHOSCOPY  1996    \"Done Twice\" CARDIAC VALVE REPLACEMENT  12/17/2003    \"Aortic Valve Replacement, Mechanical Valve\"    COLONOSCOPY  2006    Polyps Removed    DENTAL SURGERY      Teeth Extracted In Past    ENDOSCOPY, COLON, DIAGNOSTIC  In Past    HEMIARTHROPLASTY SHOULDER FRACTURE Right 1/29/2019    SHOULDER HEMIARTHROPLASTY performed by Teddy Olszewski, DO at [address] ARTHROSCOPY Left 1992    LUNG BIOPSY Right [address] [facility]    \"Cauterized Because My Sperm Was Low\"    ROTATOR CUFF REPAIR Right 2008    TONSILLECTOMY  1959 Or 1960    TOTAL HIP ARTHROPLASTY Right 10/25/2021    RIGHT HIP TOTAL ARTHROPLASTY performed by Teddy Olszewski, DO at [address] [facility] N/A 11/10/2021    EGD BIOPSY performed by Francisco Gardiner MD at [facility] ENDOSCOPY     Family History   Problem Relation Age of Onset    Cancer Mother         Lymphoma    Diabetes Mother     High Blood Pressure Mother     High Cholesterol Mother     Obesity Mother     Vision Loss Mother         Wore Glasses    Heart Disease Father         \"Heart Failure\"    COPD Father     Asthma Sister     High Blood Pressure Sister     High Cholesterol Sister     Other Sister         pre diabetic    COPD Sister     Diabetes Sister         \"Pre Diabetes\"    No Known Problems Son      Social History     Tobacco Use    Smoking status: Never    Smokeless tobacco: Current     Types: Snuff, Chew   Substance Use Topics    Alcohol use: Not Currently     Comment: coffee 5 cups daily      [unfilled]  Review of Systems:   Constitutional: No Fever or Weight Loss   Eyes: No Decreased Vision  ENT: No Headaches, Hearing Loss or Vertigo  Cardiovascular: No chest pain, mild dyspnea on exertion, palpitations or loss of consciousness  Respiratory: No cough or wheezing    Gastrointestinal: No abdominal pain, appetite loss, blood in stools, constipation, diarrhea or heartburn  Genitourinary: No dysuria, trouble voiding, or hematuria  Musculoskeletal:  No gait disturbance, weakness or joint complaints  Integumentary: No rash or pruritis  Neurological: No TIA or stroke symptoms  Psychiatric: No anxiety or depression  Endocrine: No malaise, fatigue or temperature intolerance  Hematologic/Lymphatic: No bleeding problems, blood clots or swollen lymph nodes  Allergic/Immunologic: No nasal congestion or hives  All systems negative except as marked. Objective:  /68 (Site: Left Upper Arm, Position: Sitting, Cuff Size: Medium Adult)   Pulse 73   Ht 5' 5\" (1.651 m)   Wt 162 lb 9.6 oz (73.8 kg)   SpO2 95%   BMI 27.06 kg/m²   Wt Readings from Last 3 Encounters:   11/28/22 162 lb 9.6 oz (73.8 kg)   11/15/22 162 lb 6.4 oz (73.7 kg)   11/11/22 158 lb 3.2 oz (71.8 kg)     Body mass index is 27.06 kg/m². GENERAL - Alert, oriented, pleasant, in no apparent distress,normal grooming  HEENT - pupils are intact, cornea intact, conjunctive normal color, ears are normal in exam,  Neck - Supple. No jugular venous distention noted. No carotid bruits, no apical -carotid delay  Respiratory:  Normal breath sounds, No respiratory distress, No wheezing, No chest tenderness. ,no use of accessory muscles, diaphragm movement is normal  Cardiovascular: (PMI) apex non displaced,no lifts no thrills, no s3,no s4, Normal heart rate, Normal rhythm, No murmurs, No rubs, No gallops. Carotid arteries pulse and amplitude are normal no bruit, no abdominal bruit noted ( normal abdominal aorta ausculation),   Extremities - No cyanosis, clubbing, or significant edema, no varicose veins    Abdomen - No masses, tenderness, or organomegaly, no hepato-splenomegally, no bruits  Musculoskeletal - No significant edema, no kyphosis or scoliosis, no deformity in any extremity noted, muscle strength and tone are normal  Skin: no ulcer,no scar,no stasis dermatitis   Neurologic - alert oriented times 3,Cranial nerves II through XII are grossly intact. There were no gross focal neurologic abnormalities.    Psychiatric: normal mood and affect    No results found for: CKTOTAL, CKMB, CKMBINDEX, TROPONINI  BNP:  No results found for: BNP  PT/INR:  No results found for: PTINR  Lab Results   Component Value Date    LABA1C 5.7 08/31/2022    LABA1C 5.0 11/10/2021     Lab Results   Component Value Date    CHOL 149 08/24/2022    TRIG 49 08/24/2022    HDL 59 08/24/2022    LDLCALC 80 08/24/2022    LDLDIRECT 69 07/01/2020     Lab Results   Component Value Date    ALT 23 08/23/2022    AST 30 08/23/2022     TSH:    Lab Results   Component Value Date/Time    TSH 1.57 06/11/2021 01:52 PM       Impression:  Krystin Reed is a 78 y. o.year old who  has a past medical history of Acid reflux, Acute frontal sinusitis, Alcohol abuse, Anesthesia, Anxiety, Arthritis, Atypical mycobacterial infection, Broken teeth, CHF (congestive heart failure) ([facility]), COPD (chronic obstructive pulmonary disease) ([facility] [address] Cough, Depression, Depression, Dyspnea, H/O cardiac catheterization, H/O cardiovascular MUGA stress test, H/O echocardiogram, H/O echocardiogram, History of 24 hour EKG monitoring, [place] (hard of hearing), Hyperlipidemia, Hypertension, Irritant contact dermatitis due to other chemical products, Mycobacterium avium complex ([facility] [address] Other drug allergy(995.27), S/P AVR, Shortness of breath, Teeth missing, and Type 2 diabetes mellitus. and presents with     Plan:  NICM, Cardiomyopathy,last echo shows worsening EF 10-15% in 8/25/22. which is again lower than before cath reviewed, stress test did not show ishcemia, he had inappropirate shock with lifevest and had tried 6 differrent lifevests, and finally returned it.will repeat echo again and if still < 35%, will refer to ICD  CAD of had mild to moderate LAD disease with calcification stress test showing apical infarction response is back in May of this year: continue statin, add baby aspirin, cannot use BB due to bradycardia, checked lipids, LDL is 80  HTN: STABLE  Metallic aortic valve: continue coumadin  Dyslipidemia: continue statins, checked lipids,  LDL IS 80  Bronchiectasis and ? myobacterium Avium TB: had lung biopsy in 1996, CONTINUE CARE AS PER PULMONARYAll labs,   All labs, medications and tests reviewed, continue all other medications of all above medical condition listed as is.     [unfilled]

## 2022-11-30 RX ORDER — SPIRONOLACTONE 25 MG/1
25 TABLET ORAL DAILY
Qty: 30 TABLET | Refills: 3 | Status: SHIPPED | OUTPATIENT
Start: 2022-11-30

## 2022-11-30 NOTE — TELEPHONE ENCOUNTER
----- Message from Rosie Carrillo sent at 11/30/2022  9:22 AM EST -----  Subject: Refill Request    QUESTIONS  Name of Medication? spironolactone (ALDACTONE) 25 MG tablet  Patient-reported dosage and instructions? 25mg one per day  How many days do you have left? 0  Preferred Pharmacy? [id number]  Pharmacy phone number (if available)? [phone]  ---------------------------------------------------------------------------  --------------  CALL BACK INFO  What is the best way for the office to contact you? OK to leave message on   voicemail  Preferred Call Back Phone Number? [phone]  ---------------------------------------------------------------------------  --------------  SCRIPT ANSWERS  Relationship to Patient?  Self

## 2022-12-01 RX ORDER — SODIUM CHLORIDE FOR INHALATION 3 %
VIAL, NEBULIZER (ML) INHALATION PRN
Qty: 125 ML | Refills: 0 | Status: SHIPPED | OUTPATIENT
Start: 2022-12-01 | End: 2022-12-02 | Stop reason: SDUPTHER

## 2022-12-02 RX ORDER — SODIUM CHLORIDE FOR INHALATION 3 %
3 VIAL, NEBULIZER (ML) INHALATION PRN
Qty: 125 ML | Refills: 0 | Status: SHIPPED | OUTPATIENT
Start: 2022-12-02

## 2022-12-05 ENCOUNTER — HOSPITAL ENCOUNTER (OUTPATIENT)
Age: 67
Discharge: HOME | End: 2022-12-05
Payer: MEDICARE

## 2022-12-05 LAB
CHOLESTEROL: 190 MG/DL
HDLC SERPL-MCNC: 74 MG/DL
LDL CHOLESTEROL CALCULATED: 95 MG/DL
TRIGL SERPL-MCNC: 106 MG/DL

## 2022-12-05 PROCEDURE — 36415 COLL VENOUS BLD VENIPUNCTURE: CPT

## 2022-12-05 PROCEDURE — 80061 LIPID PANEL: CPT

## 2022-12-07 DIAGNOSIS — L30.9 DERMATITIS: ICD-10-CM

## 2022-12-07 RX ORDER — TRIAMCINOLONE ACETONIDE 1 MG/G
CREAM TOPICAL
Qty: 453.6 G | Refills: 1 | Status: SHIPPED | OUTPATIENT
Start: 2022-12-07

## 2022-12-08 ENCOUNTER — TELEPHONE (OUTPATIENT)
Dept: FAMILY MEDICINE CLINIC | Age: 67
End: 2022-12-08

## 2022-12-08 ENCOUNTER — NURSE ONLY (OUTPATIENT)
Dept: FAMILY MEDICINE CLINIC | Age: 67
End: 2022-12-08

## 2022-12-08 ENCOUNTER — PROCEDURE VISIT (OUTPATIENT)
Dept: CARDIOLOGY CLINIC | Age: 67
End: 2022-12-08

## 2022-12-08 DIAGNOSIS — I42.0 DILATED CARDIOMYOPATHY (HCC): ICD-10-CM

## 2022-12-08 DIAGNOSIS — Z95.2 S/P AVR: ICD-10-CM

## 2022-12-08 LAB
INTERNATIONAL NORMALIZATION RATIO, POC: 2.5
PROTHROMBIN TIME, POC: 0

## 2022-12-08 RX ORDER — DOXYCYCLINE 100 MG/1
100 CAPSULE ORAL 2 TIMES DAILY
COMMUNITY

## 2022-12-08 NOTE — PROGRESS NOTES
INR: 2.5. Confirmed taking 5mg / 10mg alternating. Keep same dose. Recheck in 2 weeks. Per [facility].

## 2022-12-12 ENCOUNTER — TELEPHONE (OUTPATIENT)
Dept: CARDIOLOGY CLINIC | Age: 67
End: 2022-12-12

## 2022-12-12 RX ORDER — FERROUS SULFATE 325(65) MG
TABLET ORAL
Qty: 90 TABLET | Refills: 1 | OUTPATIENT
Start: 2022-12-12

## 2022-12-13 DIAGNOSIS — E78.2 MIXED HYPERLIPIDEMIA: ICD-10-CM

## 2022-12-13 RX ORDER — SIMVASTATIN 40 MG
TABLET ORAL
Qty: 90 TABLET | Refills: 1 | OUTPATIENT
Start: 2022-12-13

## 2022-12-13 RX ORDER — VENLAFAXINE HYDROCHLORIDE 150 MG/1
150 CAPSULE, EXTENDED RELEASE ORAL DAILY
Qty: 30 CAPSULE | Refills: 5 | Status: SHIPPED | OUTPATIENT
Start: 2022-12-13

## 2022-12-13 RX ORDER — FERROUS SULFATE 325(65) MG
TABLET ORAL
Qty: 90 TABLET | Refills: 1 | OUTPATIENT
Start: 2022-12-13

## 2022-12-15 RX ORDER — LOSARTAN POTASSIUM 25 MG/1
25 TABLET ORAL DAILY
Qty: 30 TABLET | Refills: 5 | OUTPATIENT
Start: 2022-12-15

## 2022-12-15 RX ORDER — LOSARTAN POTASSIUM 50 MG/1
50 TABLET ORAL DAILY
Qty: 30 TABLET | Refills: 2 | Status: CANCELLED | OUTPATIENT
Start: 2022-12-15

## 2022-12-15 NOTE — TELEPHONE ENCOUNTER
Patient called for a refill Losartan Potassium 25 mg he stated this was written when he was admitted 8/22 appt 1/23

## 2022-12-15 NOTE — TELEPHONE ENCOUNTER
Spoke to patient. We are not the office that orders this medication.  He will contact his cardiologist.

## 2022-12-22 ENCOUNTER — NURSE ONLY (OUTPATIENT)
Dept: FAMILY MEDICINE CLINIC | Age: 67
End: 2022-12-22
Payer: MEDICARE

## 2022-12-22 DIAGNOSIS — Z95.2 S/P AVR: ICD-10-CM

## 2022-12-22 LAB
INTERNATIONAL NORMALIZATION RATIO, POC: 5.8
PROTHROMBIN TIME, POC: 0

## 2022-12-22 PROCEDURE — 85610 PROTHROMBIN TIME: CPT | Performed by: FAMILY MEDICINE

## 2022-12-22 NOTE — PROGRESS NOTES
INR: 5.8. Confirmed taking 5mg / 10mg alternating. Per Dr. Sánchez Shows then alternate 2.5mg and 5 mg while on antibiotic. Recheck in 1 week.

## 2022-12-28 ENCOUNTER — NURSE ONLY (OUTPATIENT)
Dept: FAMILY MEDICINE CLINIC | Age: 67
End: 2022-12-28

## 2022-12-28 DIAGNOSIS — I50.22 CHRONIC SYSTOLIC HEART FAILURE (HCC): ICD-10-CM

## 2022-12-28 DIAGNOSIS — I38 VALVULAR HEART DISEASE: Primary | ICD-10-CM

## 2022-12-28 LAB — INTERNATIONAL NORMALIZATION RATIO, POC: 1.2

## 2022-12-31 DIAGNOSIS — Z79.01 ANTICOAGULANT LONG-TERM USE: ICD-10-CM

## 2023-01-03 RX ORDER — WARFARIN SODIUM 5 MG/1
TABLET ORAL
Qty: 60 TABLET | Refills: 2 | Status: SHIPPED | OUTPATIENT
Start: 2023-01-03

## 2023-01-09 ENCOUNTER — OFFICE VISIT (OUTPATIENT)
Dept: CARDIOLOGY CLINIC | Age: 68
End: 2023-01-09
Payer: MEDICARE

## 2023-01-09 VITALS
HEART RATE: 78 BPM | BODY MASS INDEX: 26.99 KG/M2 | OXYGEN SATURATION: 97 % | SYSTOLIC BLOOD PRESSURE: 108 MMHG | WEIGHT: 162 LBS | DIASTOLIC BLOOD PRESSURE: 66 MMHG | HEIGHT: 65 IN

## 2023-01-09 DIAGNOSIS — I50.22 CHRONIC SYSTOLIC CONGESTIVE HEART FAILURE (HCC): Primary | ICD-10-CM

## 2023-01-09 DIAGNOSIS — I42.6 ALCOHOLIC CARDIOMYOPATHY (HCC): ICD-10-CM

## 2023-01-09 PROCEDURE — 1124F ACP DISCUSS-NO DSCNMKR DOCD: CPT | Performed by: INTERNAL MEDICINE

## 2023-01-09 PROCEDURE — 3017F COLORECTAL CA SCREEN DOC REV: CPT | Performed by: INTERNAL MEDICINE

## 2023-01-09 PROCEDURE — 99214 OFFICE O/P EST MOD 30 MIN: CPT | Performed by: INTERNAL MEDICINE

## 2023-01-09 PROCEDURE — G8417 CALC BMI ABV UP PARAM F/U: HCPCS | Performed by: INTERNAL MEDICINE

## 2023-01-09 PROCEDURE — 4004F PT TOBACCO SCREEN RCVD TLK: CPT | Performed by: INTERNAL MEDICINE

## 2023-01-09 PROCEDURE — G8427 DOCREV CUR MEDS BY ELIG CLIN: HCPCS | Performed by: INTERNAL MEDICINE

## 2023-01-09 PROCEDURE — G8484 FLU IMMUNIZE NO ADMIN: HCPCS | Performed by: INTERNAL MEDICINE

## 2023-01-09 NOTE — PROGRESS NOTES
Zuleika Cutler MD        OFFICE  FOLLOWUP NOTE    Chief complaints: patient is here for management of cardiomyopathy, CAD, HTN. Metallic aortic valve, dyslipidemia, bronchiectasis    Subjective: patient feels better, no chest pain, some shortness of breath, no dizziness, no palpitations    HPI Lamberto Vergara is a 78 y. o.year old who  has a past medical history of Acid reflux, Acute frontal sinusitis, Alcohol abuse, Anesthesia, Anxiety, Arthritis, Atypical mycobacterial infection, Broken teeth, CHF (congestive heart failure) ([facility]), COPD (chronic obstructive pulmonary disease) ([facility] [address] Cough, Depression, Depression, Dyspnea, H/O cardiac catheterization, H/O cardiovascular MUGA stress test, H/O echocardiogram, H/O echocardiogram, History of 24 hour EKG monitoring, [place] (hard of hearing), Hx of Doppler echocardiogram, Hyperlipidemia, Hypertension, Irritant contact dermatitis due to other chemical products, Mycobacterium avium complex ([facility] [address] Other drug allergy(995.27), S/P AVR, Shortness of breath, Teeth missing, and Type 2 diabetes mellitus. and presents for management of CAD, DM, HTN, AFIB, which are well controlled      Current Outpatient Medications   Medication Sig Dispense Refill    empagliflozin (JARDIANCE) 10 MG tablet Take 1 tablet by mouth daily 30 tablet 5    warfarin (COUMADIN) 5 MG tablet take 1 TO 2 tablets by mouth once daily as directed 60 tablet 2    losartan (COZAAR) 25 MG tablet Take 1 tablet by mouth daily 30 tablet 5    venlafaxine (EFFEXOR XR) 150 MG extended release capsule Take 1 capsule by mouth daily 30 capsule 5    triamcinolone (KENALOG) 0.1 % cream Apply topically 2 times daily.  453.6 g 1    sodium chloride, Inhalant, 3 % nebulizer solution Take 3 mLs by nebulization as needed for Other (inhale via nebulizer 3 mL every 6 hours, PRN) 125 mL 0    spironolactone (ALDACTONE) 25 MG tablet Take 1 tablet by mouth daily 30 tablet 3    azelastine (ASTELIN) 0.1 % nasal spray 1 spray by Nasal route 2 times daily Use in each nostril as directed 30 mL 0    metoprolol succinate (TOPROL XL) 25 MG extended release tablet Take 1 tablet by mouth 2 times daily 60 tablet 3    albuterol (PROVENTIL) (2.5 MG/3ML) 0.083% nebulizer solution Take 3 mLs by nebulization every 6 hours as needed for Wheezing or Shortness of Breath 120 each 0    Fluticasone Propionate (FLONASE NA) by Nasal route      albuterol-ipratropium (COMBIVENT RESPIMAT)  MCG/ACT AERS inhaler Inhale 1 puff into the lungs every 6 hours as needed for Wheezing 1 each 5    Multiple Vitamin (MULTI-VITAMIN DAILY PO) Take by mouth      FEROSUL 325 (65 Fe) MG tablet take 1 tablet by mouth once daily with breakfast 90 tablet 1    Lancets MISC 1 each by Does not apply route 2 times daily 100 each 5    blood glucose monitor strips Test BG daily  & as needed for symptoms of irregular blood glucose. Dispense sufficient amount for indicated testing frequency plus additional to accommodate PRN testing needs. 30 strip 5    aspirin 81 MG chewable tablet Take 1 tablet by mouth daily 30 tablet 1    simvastatin (ZOCOR) 40 MG tablet take 1 tablet by mouth at bedtime 90 tablet 1    pantoprazole (PROTONIX) 40 MG tablet 40 mg PO daily 90 tablet 1    albuterol sulfate HFA (PROVENTIL;VENTOLIN;PROAIR) 108 (90 Base) MCG/ACT inhaler inhale 2 puffs by mouth and INTO THE LUNGS every 4 hours if needed 18 g 5    Calcium Carbonate-Vitamin D (CALCIUM-VITAMIN D3 PO) Take by mouth      guaiFENesin (MUCINEX) 600 MG extended release tablet Take 1,200 mg by mouth in the morning and 1,200 mg in the evening. Taking tid. Jonelle Raddle       KRILL OIL OMEGA-3 PO Take by mouth      cetirizine (ZYRTEC) 10 MG tablet Take 10 mg by mouth \"Alternate With Singulair\"      NASAL SPRAY SALINE NA by Nasal route daily Over The Counter      Acetaminophen (TYLENOL 8 HOUR PO) Take 500 mg by mouth as needed Over The Counter      Multiple Vitamins-Minerals (CENTRUM PO) Take by mouth daily      Nebulizer MISC Inhale into the lungs as needed       No current facility-administered medications for this visit. Allergies: Ciprofloxacin hcl, Levaquin [levofloxacin], Other, Ceftin [cefuroxime], and Lisinopril  Past Medical History:   Diagnosis Date    Acid reflux     Acute frontal sinusitis 07/22/2009    Alcohol abuse     \"I Drink Average 2 Beers A Day\"    Anesthesia     \" I get agitated\"    Anxiety     Arthritis     \"Right Shoulder, Neck, Left Knee\"    Atypical mycobacterial infection     Broken teeth     Upper And Lower     CHF (congestive heart failure) ([facility])     COPD (chronic obstructive pulmonary disease) ([facility])     Sees Dr. Samantha Smith In [facility] [address] [facility]     Cough     Frequent Cough With Green Sputum    Depression     Depression     Dyspnea 02/23/2018    H/O cardiac catheterization 05/17/2022    LAD: Moderate Lumen Irregularity. CALCIFIED mid LAD segment. H/O cardiovascular MUGA stress test 05/14/2019    EF 50%, NORMAL LVEF, NORMAL WALL MOTION. H/O echocardiogram 05/22/2014    YF=>10-94%, LV systolic function is low normal, mild aortic valve calcification, no pericardial effusion    H/O echocardiogram 12/12/2018    EF 45-50%    History of 24 hour EKG monitoring 06/06/2014    24 hr holter monitor. Ventricular ectopics were noted in single and couplet forms. Supraventricular ectopics were noted in single and pair forms. [place] (hard of hearing)     Bilateral Ears    Hx of Doppler echocardiogram 12/08/2022    EF 30-35% Mild TR.     Hyperlipidemia     Hypertension     Irritant contact dermatitis due to other chemical products 08/26/2019    Mycobacterium avium complex ([facility] [address]     Other drug allergy(995.27) 07/22/2009    S/P AVR 12/2003    Mechanical valvue     Shortness of breath     Teeth missing     Upper And Lower    Type 2 diabetes mellitus 08/31/2022     Past Surgical History:   Procedure Laterality Date    BRONCHOSCOPY  1996    \"Done Twice\"    CARDIAC VALVE REPLACEMENT  12/17/2003    \"Aortic Valve Replacement, Mechanical Valve\"    COLONOSCOPY  2006    Polyps Removed    DENTAL SURGERY      Teeth Extracted In Past    ENDOSCOPY, COLON, DIAGNOSTIC  In Past    HEMIARTHROPLASTY SHOULDER FRACTURE Right 1/29/2019    SHOULDER HEMIARTHROPLASTY performed by Gonzalo Guerra DO at [facility]. ARTHROSCOPY Left 1992    LUNG BIOPSY Right [address] [facility]    \"Cauterized Because My Sperm Was Low\"    ROTATOR CUFF REPAIR Right 2008    TONSILLECTOMY  1959 Or 1960    TOTAL HIP ARTHROPLASTY Right 10/25/2021    RIGHT HIP TOTAL ARTHROPLASTY performed by Gonzalo Guerra DO at [facility] N/A 11/10/2021    EGD BIOPSY performed by José Valero MD at [facility] ENDOSCOPY     Family History   Problem Relation Age of Onset    Cancer Mother         Lymphoma    Diabetes Mother     High Blood Pressure Mother     High Cholesterol Mother     Obesity Mother     Vision Loss Mother         Wore Glasses    Heart Disease Father         \"Heart Failure\"    COPD Father     Asthma Sister     High Blood Pressure Sister     High Cholesterol Sister     Other Sister         pre diabetic    COPD Sister     Diabetes Sister         \"Pre Diabetes\"    No Known Problems Son      Social History     Tobacco Use    Smoking status: Never    Smokeless tobacco: Current     Types: Snuff, Chew   Substance Use Topics    Alcohol use: Not Currently     Comment: caffeine: coffee 5 cups daily, 1 soda a day      [unfilled]  Review of Systems:   Constitutional: No Fever or Weight Loss   Eyes: No Decreased Vision  ENT: No Headaches, Hearing Loss or Vertigo  Cardiovascular: No chest pain, dyspnea on exertion, palpitations or loss of consciousness  Respiratory: No cough or wheezing    Gastrointestinal: No abdominal pain, appetite loss, blood in stools, constipation, diarrhea or heartburn  Genitourinary: No dysuria, trouble voiding, or hematuria  Musculoskeletal:  No gait disturbance, weakness or joint complaints  Integumentary: No rash or pruritis  Neurological: No TIA or stroke symptoms  Psychiatric: No anxiety or depression  Endocrine: No malaise, fatigue or temperature intolerance  Hematologic/Lymphatic: No bleeding problems, blood clots or swollen lymph nodes  Allergic/Immunologic: No nasal congestion or hives  All systems negative except as marked. Objective:  /66 (Site: Left Upper Arm, Position: Sitting, Cuff Size: Medium Adult)   Pulse 78   Ht 5' 5\" (1.651 m)   Wt 162 lb (73.5 kg)   SpO2 97%   BMI 26.96 kg/m²   Wt Readings from Last 3 Encounters:   01/09/23 162 lb (73.5 kg)   11/28/22 162 lb 9.6 oz (73.8 kg)   11/15/22 162 lb 6.4 oz (73.7 kg)     Body mass index is 26.96 kg/m². GENERAL - Alert, oriented, pleasant, in no apparent distress,normal grooming  HEENT - pupils are intact, cornea intact, conjunctive normal color, ears are normal in exam,  Neck - Supple. No jugular venous distention noted. No carotid bruits, no apical -carotid delay  Respiratory:  Normal breath sounds, No respiratory distress, No wheezing, No chest tenderness. ,no use of accessory muscles, diaphragm movement is normal  Cardiovascular: (PMI) apex non displaced,no lifts no thrills, no s3,no s4, Normal heart rate, Normal rhythm, No murmurs, No rubs, No gallops. Carotid arteries pulse and amplitude are normal no bruit, no abdominal bruit noted ( normal abdominal aorta ausculation),   Extremities - No cyanosis, clubbing, or significant edema, no varicose veins    Abdomen - No masses, tenderness, or organomegaly, no hepato-splenomegally, no bruits  Musculoskeletal - No significant edema, no kyphosis or scoliosis, no deformity in any extremity noted, muscle strength and tone are normal  Skin: no ulcer,no scar,no stasis dermatitis   Neurologic - alert oriented times 3,Cranial nerves II through XII are grossly intact. There were no gross focal neurologic abnormalities.    Psychiatric: normal mood and affect    No results found for: CKTOTAL, CKMB, CKMBINDEX, TROPONINI  BNP:  No results found for: BNP  PT/INR:  No results found for: PTINR  Lab Results   Component Value Date    LABA1C 5.7 08/31/2022    LABA1C 5.0 11/10/2021     Lab Results   Component Value Date    CHOL 190 12/05/2022    TRIG 106 12/05/2022    HDL 74 12/05/2022    LDLCALC 95 12/05/2022    LDLDIRECT 69 07/01/2020     Lab Results   Component Value Date    ALT 23 08/23/2022    AST 30 08/23/2022     TSH:    Lab Results   Component Value Date/Time    TSH 1.57 06/11/2021 01:52 PM       Impression:  Sona Buckner is a 78 y. o.year old who  has a past medical history of Acid reflux, Acute frontal sinusitis, Alcohol abuse, Anesthesia, Anxiety, Arthritis, Atypical mycobacterial infection, Broken teeth, CHF (congestive heart failure) ([facility] COPD (chronic obstructive pulmonary disease) ([facility] [address] Cough, Depression, Depression, Dyspnea, H/O cardiac catheterization, H/O cardiovascular MUGA stress test, H/O echocardiogram, H/O echocardiogram, History of 24 hour EKG monitoring, [place] (hard of hearing), Hx of Doppler echocardiogram, Hyperlipidemia, Hypertension, Irritant contact dermatitis due to other chemical products, Mycobacterium avium complex ([facility] [address] Other drug allergy(995.27), S/P AVR, Shortness of breath, Teeth missing, and Type 2 diabetes mellitus. and presents with     Plan:  NICM, Cardiomyopathy,last echo shows worsening EF 10-15% in 8/25/22. which is again lower than before cath reviewed, stress test did not show ishcemia, he had inappropirate shock with lifevest and had tried 6 differrent lifevests, and finally returned it. repeated echo again .  LEVF IS 30- 35%, will defer  ICD, continue jardiance, bb and losartan  CAD of had mild to moderate LAD disease with calcification stress test showing apical infarction response is back in May of this year: continue statin, add baby aspirin, cannot use BB due to bradycardia, checked lipids, LDL is 80  HTN: STABLE  Metallic aortic valve: continue coumadin  Dyslipidemia: continue statins, checked lipids,  LDL IS 80  Bronchiectasis and ? myobacterium Avium TB: had lung biopsy in 1996, CONTINUE CARE AS PER PULMONARYAll labs, All labs, medications and tests reviewed, continue all other medications of all above medical condition listed as is.     [unfilled]

## 2023-01-10 ENCOUNTER — HOSPITAL ENCOUNTER (OUTPATIENT)
Dept: PULMONOLOGY | Age: 68
Discharge: HOME | End: 2023-01-10
Payer: MEDICARE

## 2023-01-10 DIAGNOSIS — R06.02 SOBOE (SHORTNESS OF BREATH ON EXERTION): ICD-10-CM

## 2023-01-10 PROCEDURE — 94729 DIFFUSING CAPACITY: CPT

## 2023-01-10 PROCEDURE — 94727 GAS DIL/WSHOT DETER LNG VOL: CPT

## 2023-01-10 PROCEDURE — 94010 BREATHING CAPACITY TEST: CPT

## 2023-01-11 ENCOUNTER — NURSE ONLY (OUTPATIENT)
Dept: FAMILY MEDICINE CLINIC | Age: 68
End: 2023-01-11

## 2023-01-11 DIAGNOSIS — I38 VALVULAR HEART DISEASE: Primary | ICD-10-CM

## 2023-01-11 LAB — INTERNATIONAL NORMALIZATION RATIO, POC: 1.5

## 2023-01-11 NOTE — PROGRESS NOTES
INR: 1.5 Confirmed alternate 2.5mg and 5 mg by mistake instead of the 10mg/5mg . Take 10 mg tonight and tomorrow then resume normal schedule. Return in 1 week per [facility].

## 2023-01-18 ENCOUNTER — OFFICE VISIT (OUTPATIENT)
Dept: FAMILY MEDICINE CLINIC | Age: 68
End: 2023-01-18
Payer: MEDICARE

## 2023-01-18 VITALS
WEIGHT: 160 LBS | SYSTOLIC BLOOD PRESSURE: 110 MMHG | OXYGEN SATURATION: 97 % | HEIGHT: 65 IN | BODY MASS INDEX: 26.66 KG/M2 | DIASTOLIC BLOOD PRESSURE: 64 MMHG | HEART RATE: 79 BPM

## 2023-01-18 DIAGNOSIS — F33.3 SEVERE EPISODE OF RECURRENT MAJOR DEPRESSIVE DISORDER, WITH PSYCHOTIC FEATURES (HCC): ICD-10-CM

## 2023-01-18 DIAGNOSIS — Z86.79 HISTORY OF CHRONIC CHF: ICD-10-CM

## 2023-01-18 DIAGNOSIS — E78.2 MIXED HYPERLIPIDEMIA: ICD-10-CM

## 2023-01-18 DIAGNOSIS — G47.33 OSA (OBSTRUCTIVE SLEEP APNEA): ICD-10-CM

## 2023-01-18 DIAGNOSIS — E11.9 TYPE 2 DIABETES MELLITUS WITHOUT COMPLICATION, WITHOUT LONG-TERM CURRENT USE OF INSULIN (HCC): Primary | ICD-10-CM

## 2023-01-18 DIAGNOSIS — F41.9 ANXIETY: Chronic | ICD-10-CM

## 2023-01-18 DIAGNOSIS — Z95.2 S/P AVR: Chronic | ICD-10-CM

## 2023-01-18 LAB
INTERNATIONAL NORMALIZATION RATIO, POC: 2.5
PROTHROMBIN TIME, POC: NORMAL

## 2023-01-18 PROCEDURE — 3017F COLORECTAL CA SCREEN DOC REV: CPT | Performed by: FAMILY MEDICINE

## 2023-01-18 PROCEDURE — G8427 DOCREV CUR MEDS BY ELIG CLIN: HCPCS | Performed by: FAMILY MEDICINE

## 2023-01-18 PROCEDURE — 91312 COVID-19, PFIZER BIVALENT BOOSTER, DO NOT DILUTE, (AGE 12Y+), IM, 30 MCG/0.3 ML: CPT | Performed by: FAMILY MEDICINE

## 2023-01-18 PROCEDURE — G8417 CALC BMI ABV UP PARAM F/U: HCPCS | Performed by: FAMILY MEDICINE

## 2023-01-18 PROCEDURE — 3046F HEMOGLOBIN A1C LEVEL >9.0%: CPT | Performed by: FAMILY MEDICINE

## 2023-01-18 PROCEDURE — 0124A COVID-19, PFIZER BIVALENT BOOSTER, DO NOT DILUTE, (AGE 12Y+), IM, 30 MCG/0.3 ML: CPT | Performed by: FAMILY MEDICINE

## 2023-01-18 PROCEDURE — 1124F ACP DISCUSS-NO DSCNMKR DOCD: CPT | Performed by: FAMILY MEDICINE

## 2023-01-18 PROCEDURE — 4004F PT TOBACCO SCREEN RCVD TLK: CPT | Performed by: FAMILY MEDICINE

## 2023-01-18 PROCEDURE — 99214 OFFICE O/P EST MOD 30 MIN: CPT | Performed by: FAMILY MEDICINE

## 2023-01-18 PROCEDURE — 85610 PROTHROMBIN TIME: CPT | Performed by: FAMILY MEDICINE

## 2023-01-18 PROCEDURE — G8484 FLU IMMUNIZE NO ADMIN: HCPCS | Performed by: FAMILY MEDICINE

## 2023-01-18 PROCEDURE — 2022F DILAT RTA XM EVC RTNOPTHY: CPT | Performed by: FAMILY MEDICINE

## 2023-01-18 RX ORDER — PANTOPRAZOLE SODIUM 40 MG/1
TABLET, DELAYED RELEASE ORAL
Qty: 90 TABLET | Refills: 1 | Status: SHIPPED | OUTPATIENT
Start: 2023-01-18

## 2023-01-18 RX ORDER — SIMVASTATIN 40 MG
TABLET ORAL
Qty: 90 TABLET | Refills: 1 | Status: SHIPPED | OUTPATIENT
Start: 2023-01-18

## 2023-01-18 ASSESSMENT — ENCOUNTER SYMPTOMS
APNEA: 1
VOMITING: 0
CHEST TIGHTNESS: 0
SHORTNESS OF BREATH: 0
ABDOMINAL PAIN: 0
TROUBLE SWALLOWING: 0
NAUSEA: 0
BLOOD IN STOOL: 0
WHEEZING: 0
EYE PAIN: 0
DIARRHEA: 0

## 2023-01-18 NOTE — PROGRESS NOTES
1/18/2023    [facility] [place]    Chief Complaint   Patient presents with    3 Month Follow-Up    Sinus Problem     2-3 months. Saw ENT, put on doxy for 21 days. Came back. HPI  History was obtained from the patient. Robert Monk is a 78 y.o. male who presents today with routine recheck. History of diabetes mellitus type 2, depression, paranoia, these are doing fairly well and stable at this point. Patient does see a counselor and remains on same meds. His sleep apnea and sugar control has been reasonable. He is status post aortic valve replacement is on Coumadin INR today was 2.5. GERD symptoms show good overall control. His last A1c was 5.7% in August.  He is being followed by Dr. Zeyad Cho from ENT for recurrent sinusitis. Been through prolonged antibiotic treatment may be getting an MRI if symptoms recur again. On review he will need a COVID booster and refills. We will also try referring him to care management program.    REVIEW OF SYMPTOMS    Review of Systems   Constitutional:  Negative for activity change and fatigue. HENT:  Negative for congestion, hearing loss, mouth sores and trouble swallowing. Eyes:  Negative for pain and visual disturbance. Respiratory:  Positive for apnea. Negative for chest tightness, shortness of breath and wheezing. Cardiovascular:  Negative for chest pain and palpitations. Patient is stable but is status post aortic valve replacement. Gastrointestinal:  Negative for abdominal pain, blood in stool, diarrhea, nausea and vomiting. Endocrine: Negative for polydipsia and polyuria. Genitourinary:  Negative for dysuria, frequency and urgency. Musculoskeletal:  Negative for arthralgias, gait problem and neck stiffness. Skin:  Negative for rash. Allergic/Immunologic: Negative for environmental allergies. Neurological:  Negative for dizziness, seizures, speech difficulty and weakness. Hematological:  Does not bruise/bleed easily. Psychiatric/Behavioral:  Positive for dysphoric mood. Negative for agitation, confusion, hallucinations, self-injury and suicidal ideas. The patient is nervous/anxious. PAST MEDICAL HISTORY  Past Medical History:   Diagnosis Date    Acid reflux     Acute frontal sinusitis 07/22/2009    Alcohol abuse     \"I Drink Average 2 Beers A Day\"    Anesthesia     \" I get agitated\"    Anxiety     Arthritis     \"Right Shoulder, Neck, Left Knee\"    Atypical mycobacterial infection     Broken teeth     Upper And Lower     CHF (congestive heart failure) ([facility])     COPD (chronic obstructive pulmonary disease) ([facility])     Sees Dr. Margy Seth In [place], [address]     Cough     Frequent Cough With Green Sputum    Depression     Depression     Dyspnea 02/23/2018    H/O cardiac catheterization 05/17/2022    LAD: Moderate Lumen Irregularity. CALCIFIED mid LAD segment. H/O cardiovascular MUGA stress test 05/14/2019    EF 50%, NORMAL LVEF, NORMAL WALL MOTION. H/O echocardiogram 05/22/2014    IN=>46-70%, LV systolic function is low normal, mild aortic valve calcification, no pericardial effusion    H/O echocardiogram 12/12/2018    EF 45-50%    History of 24 hour EKG monitoring 06/06/2014    24 hr holter monitor. Ventricular ectopics were noted in single and couplet forms. Supraventricular ectopics were noted in single and pair forms. [place] (hard of hearing)     Bilateral Ears    Hx of Doppler echocardiogram 12/08/2022    EF 30-35% Mild TR.     Hyperlipidemia     Hypertension     Irritant contact dermatitis due to other chemical products 08/26/2019    Mycobacterium avium complex ([facility] [address]     Other drug allergy(995.27) 07/22/2009    S/P AVR 12/2003    Mechanical valvue     Shortness of breath     Teeth missing     Upper And Lower    Type 2 diabetes mellitus 08/31/2022       FAMILY HISTORY  Family History   Problem Relation Age of Onset    Cancer Mother         Lymphoma    Diabetes Mother     High Blood Pressure Mother High Cholesterol Mother     Obesity Mother     Vision Loss Mother         Wore Glasses    Heart Disease Father         \"Heart Failure\"    COPD Father     Asthma Sister     High Blood Pressure Sister     High Cholesterol Sister     Other Sister         pre diabetic    COPD Sister     Diabetes Sister         \"Pre Diabetes\"    No Known Problems Son        SOCIAL HISTORY  Social History     Socioeconomic History    Marital status:      Spouse name: None    Number of children: 1    Years of education: 12    Highest education level: None   Tobacco Use    Smoking status: Never    Smokeless tobacco: Current     Types: Snuff, Chew   Vaping Use    Vaping Use: Never used   Substance and Sexual Activity    Alcohol use: Not Currently     Comment: caffeine: coffee 5 cups daily, 1 soda a day    Drug use: No    Sexual activity: Not Currently     Social Determinants of Health     Physical Activity: Insufficiently Active    Days of Exercise per Week: 5 days    Minutes of Exercise per Session: 20 min        SURGICAL HISTORY  Past Surgical History:   Procedure Laterality Date    BRONCHOSCOPY  1996    \"Done Twice\"    CARDIAC VALVE REPLACEMENT  12/17/2003    \"Aortic Valve Replacement, Mechanical Valve\"    COLONOSCOPY  2006    Polyps Removed    DENTAL SURGERY      Teeth Extracted In Past    ENDOSCOPY, COLON, DIAGNOSTIC  In Past    HEMIARTHROPLASTY SHOULDER FRACTURE Right 1/29/2019    SHOULDER HEMIARTHROPLASTY performed by Anastasia Ellis DO at [address] Left 1992    LUNG BIOPSY Right [facility]    \"Cauterized Because My Sperm Was Low\"    ROTATOR CUFF REPAIR Right 2008    [address] Right 10/25/2021    RIGHT HIP TOTAL ARTHROPLASTY performed by Anastasia Ellis DO at [facility] N/A 11/10/2021    EGD BIOPSY performed by Suha Guadalupe MD at [facility]  Current Outpatient Medications   Medication Sig Dispense Refill    simvastatin (ZOCOR) 40 MG tablet take 1 tablet by mouth at bedtime 90 tablet 1    pantoprazole (PROTONIX) 40 MG tablet 40 mg PO daily 90 tablet 1    empagliflozin (JARDIANCE) 10 MG tablet Take 1 tablet by mouth daily 30 tablet 5    warfarin (COUMADIN) 5 MG tablet take 1 TO 2 tablets by mouth once daily as directed 60 tablet 2    losartan (COZAAR) 25 MG tablet Take 1 tablet by mouth daily 30 tablet 5    venlafaxine (EFFEXOR XR) 150 MG extended release capsule Take 1 capsule by mouth daily 30 capsule 5    spironolactone (ALDACTONE) 25 MG tablet Take 1 tablet by mouth daily 30 tablet 3    azelastine (ASTELIN) 0.1 % nasal spray 1 spray by Nasal route 2 times daily Use in each nostril as directed 30 mL 0    metoprolol succinate (TOPROL XL) 25 MG extended release tablet Take 1 tablet by mouth 2 times daily 60 tablet 3    albuterol (PROVENTIL) (2.5 MG/3ML) 0.083% nebulizer solution Take 3 mLs by nebulization every 6 hours as needed for Wheezing or Shortness of Breath 120 each 0    Multiple Vitamin (MULTI-VITAMIN DAILY PO) Take by mouth      FEROSUL 325 (65 Fe) MG tablet take 1 tablet by mouth once daily with breakfast 90 tablet 1    aspirin 81 MG chewable tablet Take 1 tablet by mouth daily 30 tablet 1    albuterol sulfate HFA (PROVENTIL;VENTOLIN;PROAIR) 108 (90 Base) MCG/ACT inhaler inhale 2 puffs by mouth and INTO THE LUNGS every 4 hours if needed 18 g 5    Calcium Carbonate-Vitamin D (CALCIUM-VITAMIN D3 PO) Take by mouth      guaiFENesin (MUCINEX) 600 MG extended release tablet Take 1,200 mg by mouth in the morning and 1,200 mg in the evening. Taking tid. Cleophus Lincoln KRILL OIL OMEGA-3 PO Take by mouth      cetirizine (ZYRTEC) 10 MG tablet Take 10 mg by mouth \"Alternate With Singulair\"      triamcinolone (KENALOG) 0.1 % cream Apply topically 2 times daily.  453.6 g 1    sodium chloride, Inhalant, 3 % nebulizer solution Take 3 mLs by nebulization as needed for Other (inhale via nebulizer 3 mL every 6 hours, PRN) 125 mL 0    Fluticasone Propionate (FLONASE NA) by Nasal route      albuterol-ipratropium (COMBIVENT RESPIMAT)  MCG/ACT AERS inhaler Inhale 1 puff into the lungs every 6 hours as needed for Wheezing 1 each 5    Lancets MISC 1 each by Does not apply route 2 times daily 100 each 5    blood glucose monitor strips Test BG daily  & as needed for symptoms of irregular blood glucose. Dispense sufficient amount for indicated testing frequency plus additional to accommodate PRN testing needs. 30 strip 5    NASAL SPRAY SALINE NA by Nasal route daily Over The Counter      Acetaminophen (TYLENOL 8 HOUR PO) Take 500 mg by mouth as needed Over The Counter      Multiple Vitamins-Minerals (CENTRUM PO) Take by mouth daily      Nebulizer MISC Inhale into the lungs as needed       No current facility-administered medications for this visit. ALLERGIES  Allergies   Allergen Reactions    Ciprofloxacin Hcl Hives and Swelling    Levaquin [Levofloxacin] Hives and Swelling    Other Nausea And Vomiting     \"Allergic To Tylox\"    Ceftin [Cefuroxime]     Lisinopril      cough       PHYSICAL EXAM    /64 (Site: Left Upper Arm, Position: Sitting, Cuff Size: Large Adult)   Pulse 79   Ht 5' 5\" (1.651 m)   Wt 160 lb (72.6 kg)   SpO2 97%   BMI 26.63 kg/m²     Physical Exam  Vitals and nursing note reviewed. Constitutional:       General: He is not in acute distress. Appearance: Normal appearance. He is well-developed. He is not toxic-appearing. HENT:      Head: Normocephalic and atraumatic. Nose: Nose normal. No congestion. Mouth/Throat:      Pharynx: No oropharyngeal exudate. Eyes:      Pupils: Pupils are equal, round, and reactive to light. Cardiovascular:      Rate and Rhythm: Normal rate and regular rhythm. Heart sounds: Normal heart sounds. No murmur heard. No gallop. Pulmonary:      Effort: Pulmonary effort is normal. No respiratory distress.       Breath sounds: Normal breath sounds. No wheezing, rhonchi or rales. Abdominal:      Palpations: Abdomen is soft. Musculoskeletal:         General: No swelling or deformity. Normal range of motion. Cervical back: Normal range of motion and neck supple. No rigidity. Lymphadenopathy:      Cervical: No cervical adenopathy. Skin:     General: Skin is warm and dry. Findings: No bruising. Neurological:      General: No focal deficit present. Mental Status: He is alert and oriented to person, place, and time. Mental status is at baseline. Cranial Nerves: No cranial nerve deficit. Motor: No weakness. Gait: Gait normal.   Psychiatric:         Mood and Affect: Mood normal.         Behavior: Behavior normal.         Thought Content: Thought content normal.       ASSESSMENT & PLAN     Diagnosis Orders   1. Type 2 diabetes mellitus without complication, without long-term current use of insulin [facility])  Ambulatory Referral to Care Management with Device (Remote Patient Monitoring)      2. Mixed hyperlipidemia  simvastatin (ZOCOR) 40 MG tablet      3. Severe episode of recurrent major depressive disorder, with psychotic features ([address]        4. S/P AVR  POCT INR    Ambulatory Referral to Care Management with Device (Remote Patient Monitoring)      5. RADHA (obstructive sleep apnea)        6. Anxiety        7. History of chronic CHF  Ambulatory Referral to Care Management with Device (Remote Patient Monitoring)      Patient did receive COVID booster today along with appropriate refills. We will make referral to care management. Recheck INR within 1 week. Call with changes or issues. Will need labs with next visit. Return in about 3 months (around 4/18/2023).          Electronically signed by Yoni Doyle MD on 1/18/2023

## 2023-01-20 ENCOUNTER — TELEPHONE (OUTPATIENT)
Dept: FAMILY MEDICINE CLINIC | Age: 68
End: 2023-01-20

## 2023-01-20 ENCOUNTER — CARE COORDINATION (OUTPATIENT)
Dept: CARE COORDINATION | Age: 68
End: 2023-01-20

## 2023-01-20 DIAGNOSIS — J40 BRONCHITIS: Primary | ICD-10-CM

## 2023-01-20 RX ORDER — AZITHROMYCIN 250 MG/1
250 TABLET, FILM COATED ORAL SEE ADMIN INSTRUCTIONS
Qty: 6 TABLET | Refills: 0 | Status: SHIPPED | OUTPATIENT
Start: 2023-01-20 | End: 2023-01-25

## 2023-01-20 NOTE — CARE COORDINATION
Spoke with patient briefly for ACM/ RPM follow up. Oriented to the role of ACM. Phone was disconnected. Attempted follow up contact with patient. LM with request for return call. Introduction letter sent via My Chart.

## 2023-01-20 NOTE — TELEPHONE ENCOUNTER
Please advise. Patient just seen 01/18/23. Attempted to call to get more information. No answer. Left message to return call.

## 2023-01-20 NOTE — TELEPHONE ENCOUNTER
Patient is returning to [facility] ----patient said the drainage and coughing has been going on for about 2-3 days.     [facility] on [address]

## 2023-01-24 ENCOUNTER — TELEPHONE (OUTPATIENT)
Dept: PHARMACY | Facility: CLINIC | Age: 68
End: 2023-01-24

## 2023-01-24 ENCOUNTER — CARE COORDINATION (OUTPATIENT)
Dept: CARE COORDINATION | Age: 68
End: 2023-01-24

## 2023-01-24 DIAGNOSIS — J40 BRONCHITIS: Primary | ICD-10-CM

## 2023-01-24 DIAGNOSIS — J44.9 CHRONIC OBSTRUCTIVE PULMONARY DISEASE, UNSPECIFIED COPD TYPE (HCC): ICD-10-CM

## 2023-01-24 DIAGNOSIS — I50.33 ACUTE ON CHRONIC DIASTOLIC (CONGESTIVE) HEART FAILURE (HCC): Primary | ICD-10-CM

## 2023-01-24 SDOH — ECONOMIC STABILITY: INCOME INSECURITY: IN THE LAST 12 MONTHS, WAS THERE A TIME WHEN YOU WERE NOT ABLE TO PAY THE MORTGAGE OR RENT ON TIME?: NO

## 2023-01-24 SDOH — HEALTH STABILITY: MENTAL HEALTH: HOW MANY STANDARD DRINKS CONTAINING ALCOHOL DO YOU HAVE ON A TYPICAL DAY?: PATIENT DECLINED

## 2023-01-24 SDOH — HEALTH STABILITY: PHYSICAL HEALTH: ON AVERAGE, HOW MANY MINUTES DO YOU ENGAGE IN EXERCISE AT THIS LEVEL?: 10 MIN

## 2023-01-24 SDOH — ECONOMIC STABILITY: TRANSPORTATION INSECURITY
IN THE PAST 12 MONTHS, HAS LACK OF TRANSPORTATION KEPT YOU FROM MEETINGS, WORK, OR FROM GETTING THINGS NEEDED FOR DAILY LIVING?: NO

## 2023-01-24 SDOH — ECONOMIC STABILITY: INCOME INSECURITY: HOW HARD IS IT FOR YOU TO PAY FOR THE VERY BASICS LIKE FOOD, HOUSING, MEDICAL CARE, AND HEATING?: NOT VERY HARD

## 2023-01-24 SDOH — HEALTH STABILITY: MENTAL HEALTH: HOW OFTEN DO YOU HAVE A DRINK CONTAINING ALCOHOL?: PATIENT DECLINED

## 2023-01-24 SDOH — SOCIAL STABILITY: SOCIAL NETWORK: HOW OFTEN DO YOU ATTENT MEETINGS OF THE CLUB OR ORGANIZATION YOU BELONG TO?: NEVER

## 2023-01-24 SDOH — ECONOMIC STABILITY: HOUSING INSECURITY: IN THE LAST 12 MONTHS, HOW MANY PLACES HAVE YOU LIVED?: 1

## 2023-01-24 SDOH — SOCIAL STABILITY: SOCIAL NETWORK
DO YOU BELONG TO ANY CLUBS OR ORGANIZATIONS SUCH AS CHURCH GROUPS UNIONS, FRATERNAL OR ATHLETIC GROUPS, OR SCHOOL GROUPS?: NO

## 2023-01-24 SDOH — ECONOMIC STABILITY: FOOD INSECURITY: WITHIN THE PAST 12 MONTHS, YOU WORRIED THAT YOUR FOOD WOULD RUN OUT BEFORE YOU GOT MONEY TO BUY MORE.: NEVER TRUE

## 2023-01-24 SDOH — SOCIAL STABILITY: SOCIAL NETWORK
IN A TYPICAL WEEK, HOW MANY TIMES DO YOU TALK ON THE PHONE WITH FAMILY, FRIENDS, OR NEIGHBORS?: MORE THAN THREE TIMES A WEEK

## 2023-01-24 SDOH — ECONOMIC STABILITY: TRANSPORTATION INSECURITY
IN THE PAST 12 MONTHS, HAS THE LACK OF TRANSPORTATION KEPT YOU FROM MEDICAL APPOINTMENTS OR FROM GETTING MEDICATIONS?: NO

## 2023-01-24 SDOH — HEALTH STABILITY: MENTAL HEALTH
STRESS IS WHEN SOMEONE FEELS TENSE, NERVOUS, ANXIOUS, OR CAN'T SLEEP AT NIGHT BECAUSE THEIR MIND IS TROUBLED. HOW STRESSED ARE YOU?: ONLY A LITTLE

## 2023-01-24 SDOH — SOCIAL STABILITY: SOCIAL NETWORK: ARE YOU MARRIED, WIDOWED, DIVORCED, SEPARATED, NEVER MARRIED, OR LIVING WITH A PARTNER?: DIVORCED

## 2023-01-24 SDOH — SOCIAL STABILITY: SOCIAL NETWORK: HOW OFTEN DO YOU ATTEND CHURCH OR RELIGIOUS SERVICES?: NEVER

## 2023-01-24 SDOH — HEALTH STABILITY: PHYSICAL HEALTH: ON AVERAGE, HOW MANY DAYS PER WEEK DO YOU ENGAGE IN MODERATE TO STRENUOUS EXERCISE (LIKE A BRISK WALK)?: 2 DAYS

## 2023-01-24 SDOH — ECONOMIC STABILITY: HOUSING INSECURITY
IN THE LAST 12 MONTHS, WAS THERE A TIME WHEN YOU DID NOT HAVE A STEADY PLACE TO SLEEP OR SLEPT IN A SHELTER (INCLUDING NOW)?: NO

## 2023-01-24 SDOH — ECONOMIC STABILITY: FOOD INSECURITY: WITHIN THE PAST 12 MONTHS, THE FOOD YOU BOUGHT JUST DIDN'T LAST AND YOU DIDN'T HAVE MONEY TO GET MORE.: NEVER TRUE

## 2023-01-24 SDOH — SOCIAL STABILITY: SOCIAL NETWORK: HOW OFTEN DO YOU GET TOGETHER WITH FRIENDS OR RELATIVES?: ONCE A WEEK

## 2023-01-24 NOTE — CARE COORDINATION
Ambulatory Care Coordination Note  1/24/2023    ACC: Richelle Douglas, KARI        Spoke with patient for [facility] follow up; Provider referral for enrollment. Oriented to the role of ACM. Patient consents to ongoing follow up. Of note:  Patient with recent sinus infection. Confirmed that he is taking Z-torie as directed. Encouraged patient to complete entire course of antibiotic. Patient reports he is following with ENT d/t occasional dizziness. Denies recent falls. Instructed on safety/ fall prevention measures. Medications:  Medication reconciliation completed. Patient reports that he is managing his own medications. Reports that he does struggle with the cost of his medication co-pays. Referral to be made to Med Assist.       DM:  Most recent BS 91. Patient reports that he is monitoring his BS daily. Denies s/s hypo/ hyperglycemia. Instructed on the rule of 15. COPD/ CHF at baseline:  Patient is using inhalers, med neb as directed; denies increased frequency. Denies increased SOB, CP or wheezing. Confirmed routine weight monitoring. Offered patient enrollment in the Remote Patient Monitoring (RPM) program for in-home monitoring: Yes, patient enrolled. Clarified that out of pocket could not be guaranteed and patient verbalized understanding and consents to enrollment. Remote Patient Monitoring Enrollment Note      Date/Time:  1/24/2023 10:57 AM    Offered patient enrollment in the [facility] Remote Patient Monitoring (RPM) program for in home monitoring for CHF, COPD, and Diabetes. Patient accepted RPM services.     Patient will be monitoring the following daily:  blood pressure reading  blood pressure heart rate  pulse ox reading  pulse ox heart rate  survey response  weight    ACM reviewed the information below with patient:    Emergency Contact (name and contact number): Gail Brochure  [phone]    [x] A member from the care coordination team will reach out to notify the patient once the RPM kit is ordered. [x] Once the kit is delivered, the [facility] team will contact the patient after [employer] deliver to assist with set up. [x] Determined BP cuff size: regular (9.05\"-15.74\")      [] Determined weight scale: regular (<330lbs)                                                 [] Hours of ACM monitoring - Monday-Friday [id number]                         All questions about RPM program answered at this time. Plan for next outreach:  confirm RPM,  CHF, DM, COPD zone review      Referrals made to Pharm/ Med Assist for support. Ambulatory Care Coordination Assessment    Care Coordination Protocol  Referral from Primary Care Provider: Yes  Week 1 - Initial Assessment     Do you have all of your prescriptions and are they filled?: Yes  Barriers to medication adherence: Other  Other barriers to medication adherence: financial  Are you able to afford your medications?: With Assistance  Medication Assistance Program: [facility] Meds/Vouchers  How often do you have trouble taking your medications the way you have been told to take them?: I always take them as prescribed. Ability to seek help/take action for Emergent Urgent situations i.e. fire, crime, inclement weather or health crisis. : Independent  Ability to ambulate to restroom: Independent  Ability handle personal hygeine needs (bathing/dressing/grooming): Independent  Ability to manage Medications: Independent  Ability to prepare Food Preparation: Independent  Ability to maintain home (clean home, laundry): Independent  Ability to drive and/or has transportation: Dependent  Ability to do shopping: Needs Assistance  Ability to manage finances:  Independent  Is patient able to live independently?: Yes     Current Housing: Private Residence, Apartment        Per the Fall Risk Screening, did the patient have 2 or more falls or 1 fall with injury in the past year?: No     Frequent urination at night?: No  Do you use rails/bars?: Yes  Do you have a non-slip tub mat?: No     Are you experiencing loss of meaning?: No  Are you experiencing loss of hope and peace?: No     Thinking about your patient's physical health needs, are there any symptoms or problems (risk indicators) you are unsure about that require further investigation?: No identified areas of uncertainly or problems already being investigated   Are the patients physical health problems impacting on their mental well-being?: No identified areas of concern   Are there any problems with your patients lifestyle behaviors (alcohol, drugs, diet, exercise) that are impacting on physical or mental well-being?: No identified areas of concern   Do you have any other concerns about your patients mental well-being? How would you rate their severity and impact on the patient?: No identified areas of concern   How would you rate their home environment in terms of safety and stability (including domestic violence, insecure housing, neighbor harassment)?: Consistently safe, supportive, stable, no identified problems   How do daily activities impact on the patient's well-being? (include current or anticipated unemployment, work, caregiving, access to transportation or other): No identified problems or perceived positive benefits   How would you rate their social network (family, work, friends)?: Good participation with social networks   How would you rate their financial resources (including ability to afford all required medical care)?: Financially secure, resources adequate, no identified problems   How wells does the patient now understand their health and well-being (symptoms, signs or risk factors) and what they need to do to manage their health?: Reasonable to good understanding and already engages in managing health or is willing to undertake better management   How well do you think your patient can engage in healthcare discussions?  (Barriers include language, deafness, aphasia, alcohol or drug problems, learning difficulties, concentration): Clear and open communication, no identified barriers   Do other services need to be involved to help this patient?: Other care/services not required at this time   Are current services involved with this patient well-coordinated? (Include coordination with other services you are now recommendation): All required care/services in place and well-coordinated   Suggested Interventions and Community Resources   Other Services or Interventions: [facility]   Medication Assistance Program: In Process                  Prior to Admission medications    Medication Sig Start Date End Date Taking? Authorizing Provider   azithromycin (ZITHROMAX) 250 MG tablet Take 1 tablet by mouth See Admin Instructions for 5 days 500mg on day 1 followed by 250mg on days 2 - 5 1/20/23 1/25/23 Yes Swetha Cabral MD   simvastatin (ZOCOR) 40 MG tablet take 1 tablet by mouth at bedtime 1/18/23  Yes Swetha Cabral MD   pantoprazole (PROTONIX) 40 MG tablet 40 mg PO daily 1/18/23  Yes Swetha Cabral MD   empagliflozin (JARDIANCE) 10 MG tablet Take 1 tablet by mouth daily 1/3/23  Yes Melanie Gorman APRN - CNP   warfarin (COUMADIN) 5 MG tablet take 1 TO 2 tablets by mouth once daily as directed 1/3/23  Yes Swetha Cabral MD   losartan (COZAAR) 25 MG tablet Take 1 tablet by mouth daily 12/19/22  Yes Evan Wu APRN - CNP   venlafaxine (EFFEXOR XR) 150 MG extended release capsule Take 1 capsule by mouth daily 12/13/22  Yes Swetha Cabral MD   triamcinolone (KENALOG) 0.1 % cream Apply topically 2 times daily.  12/7/22  Yes Swetha Cabral MD   sodium chloride, Inhalant, 3 % nebulizer solution Take 3 mLs by nebulization as needed for Other (inhale via nebulizer 3 mL every 6 hours, PRN) 12/2/22  Yes Shaina Grover MD   spironolactone (ALDACTONE) 25 MG tablet Take 1 tablet by mouth daily 11/30/22  Yes Swetha Cabral MD   azelastine (ASTELIN) 0.1 % nasal spray 1 spray by Nasal route 2 times daily Use in each nostril as directed 11/15/22  Yes MICHAEL Morfin   metoprolol succinate (TOPROL XL) 25 MG extended release tablet Take 1 tablet by mouth 2 times daily 11/11/22  Yes VANGIE Castanon CNP   albuterol (PROVENTIL) (2.5 MG/3ML) 0.083% nebulizer solution Take 3 mLs by nebulization every 6 hours as needed for Wheezing or Shortness of Breath 10/24/22  Yes Mignon Phoenix, MD   albuterol-ipratropium (COMBIVENT RESPIMAT)  MCG/ACT AERS inhaler Inhale 1 puff into the lungs every 6 hours as needed for Wheezing 10/17/22  Yes Mignon Phoenix, MD   Multiple Vitamin (MULTI-VITAMIN DAILY PO) Take by mouth   Yes Historical Provider, MD   FEROSUL 325 (65 Fe) MG tablet take 1 tablet by mouth once daily with breakfast 9/7/22  Yes Veronika Funk MD   Lancets MISC 1 each by Does not apply route 2 times daily 8/30/22  Yes VANGIE Dugan CNP   blood glucose monitor strips Test BG daily  & as needed for symptoms of irregular blood glucose. Dispense sufficient amount for indicated testing frequency plus additional to accommodate PRN testing needs. 8/30/22  Yes VANGIE Dugan CNP   aspirin 81 MG chewable tablet Take 1 tablet by mouth daily 8/27/22  Yes David Pacheco MD   albuterol sulfate HFA (PROVENTIL;VENTOLIN;PROAIR) 108 (90 Base) MCG/ACT inhaler inhale 2 puffs by mouth and INTO THE LUNGS every 4 hours if needed 7/8/22  Yes Veronika Funk MD   Calcium Carbonate-Vitamin D (CALCIUM-VITAMIN D3 PO) Take by mouth   Yes Historical Provider, MD   guaiFENesin (MUCINEX) 600 MG extended release tablet Take 1,200 mg by mouth in the morning and 1,200 mg in the evening. Taking tid. .   Yes Historical Provider, MD   KRILL OIL OMEGA-3 PO Take by mouth   Yes Historical Provider, MD   cetirizine (ZYRTEC) 10 MG tablet Take 10 mg by mouth \"Alternate With Singulair\"   Yes Historical Provider, MD   NASAL SPRAY SALINE NA by Nasal route daily Over The Counter   Yes Historical Provider, MD   Acetaminophen (TYLENOL 8 HOUR PO) Take 500 mg by mouth as needed Over The Counter   Yes Historical Provider, MD   Multiple Vitamins-Minerals (CENTRUM PO) Take by mouth daily   Yes Historical Provider, MD   Nebulizer MISC Inhale into the lungs as needed   Yes Historical Provider, MD   Fluticasone Propionate (FLONASE NA) by Nasal route  Patient not taking: Reported on 1/24/2023    Historical Provider, MD       Future Appointments   Date Time Provider [facility]ante Pr[facility]i   1/27/2023  1:20 PM Jerryl Holter, [facility] FPS NURSE [facility] FPS [facility]   2/13/2023 10:30 AM Shaina Grover MD [facility] PULM [facility]   4/11/2023 11:00 AM VANGIE Dugan - CNP [facility]   4/27/2023  8:30 AM [facility]PS AWV LPN [facility] FPS [facility]     ,   Diabetes Assessment               ,   Congestive Heart Failure Assessment    Are you currently restricting fluids?: No Restriction  Do you understand a low sodium diet?: Yes     No patient-reported symptoms      Symptoms:     Symptom course: stable     ,   COPD Assessment    Does the patient understand envrionmental exposure?: Yes  Is the patient able to verbalize Rescue vs. Long Acting medications?: Yes  Does the patient have a nebulizer?: Yes  Does the patient use a space with inhaled medications?: No     No patient-reported symptoms         Symptoms:     Have you had a recent diagnosis of pneumonia either by PCP or at a hospital?: No     , and   General Assessment    Do you have any symptoms that are causing concern?: No

## 2023-01-24 NOTE — TELEPHONE ENCOUNTER
Patient called and stated that he picked up the Z-Pac 1-23-23 and when he got home to take the first two doses he dropped the whole pack into a pot of water that was in the sink and they all dissolved.   Please send a new script to [facility]

## 2023-01-24 NOTE — TELEPHONE ENCOUNTER
Received a referral:  from Care Coordinator to review patient’s medications. Called patient to schedule a time to speak with a pharmacist over the telephone.     No answer left VM: Please contact us at  [phone] to schedule this appointment. Pharmacists are available Monday thru Friday 7:30 AM till 5:30 PM.    Callie Patton CPhT.   Population Health Clinical   [facility]  Toll free: [phone]

## 2023-01-24 NOTE — CARE COORDINATION
Remote Patient Kit Ordering Note      Date/Time:  1/24/2023 11:23 AM      [x] CCSS confirmed patient shipping address  [x] Patient will receive package over the next 2-4 business days. Someone 21 years or older must be present to sign for UPS delivery. [x] Patient to contact virtual installation-specific phone number listed in the patient instructions. [x] If the patient does not contact HRS within 24 hours, an [employer] [facility] [place] will call the patient directly: If the patient does not answer, HRS will follow up with the clinical team notifying them about the unsuccessful attempt to contact the patient. HRS will make three call attempts to the patient. [x] LPN will contact patient once equipment is active to welcome them to the program.                                                         [x] Hours of RPM monitoring - Monday-Friday [id number]                     All questions answered at this time. LPN made aware the RPM kit has been ordered. EMTP notified patient of RPM equipment order.

## 2023-01-24 NOTE — TELEPHONE ENCOUNTER
----- Message from Moisés Perez RN sent at 1/24/2023 11:01 AM EST -----  Hello! Referral to support compliance. Thank you!

## 2023-01-24 NOTE — PROGRESS NOTES
1/24/23 1:23 PM       Remote Patient Monitoring Treatment Plan    Received request from HERMILO/MELINA Santos RN to order remote patient monitoring for in home monitoring of CHF and COPD and order completed. Patient will be monitoring blood pressure   pulse ox   weight  survey questions. Patient will engage in Remote Patient Monitoring each day to develop the skills necessary for self management. RPM Care Team Responsibilities:   Alerts will be reviewed daily and addressed within 2-4 hours during operational hours (Monday -Friday 9 am-4 pm)  Alert response and intervention documented in patient medical record  Alert response escalated to PCP per protocol and documented in patient medical record  Patient monitored over approximately  days  Discharge from program based on self-management readiness    See care coordination encounters for additional details.       Phebe Goldmann, DNP, FNP-C, Remote Patient Monitoring NP, () [phone]

## 2023-01-25 RX ORDER — AZITHROMYCIN 500 MG/1
500 TABLET, FILM COATED ORAL DAILY
Qty: 1 PACKET | Refills: 0 | Status: SHIPPED | OUTPATIENT
Start: 2023-01-25 | End: 2023-01-28

## 2023-01-26 ENCOUNTER — SCHEDULED TELEPHONE ENCOUNTER (OUTPATIENT)
Dept: PULMONOLOGY | Age: 68
End: 2023-01-26
Payer: MEDICARE

## 2023-01-26 DIAGNOSIS — G47.33 OSA (OBSTRUCTIVE SLEEP APNEA): ICD-10-CM

## 2023-01-26 DIAGNOSIS — G47.10 HYPERSOMNIA: ICD-10-CM

## 2023-01-26 DIAGNOSIS — E66.3 OVERWEIGHT (BMI 25.0-29.9): ICD-10-CM

## 2023-01-26 DIAGNOSIS — R06.02 SOBOE (SHORTNESS OF BREATH ON EXERTION): Primary | ICD-10-CM

## 2023-01-26 DIAGNOSIS — J47.1 BRONCHIECTASIS WITH ACUTE EXACERBATION (HCC): ICD-10-CM

## 2023-01-26 PROCEDURE — 99214 OFFICE O/P EST MOD 30 MIN: CPT | Performed by: INTERNAL MEDICINE

## 2023-01-26 RX ORDER — ALBUTEROL SULFATE 90 UG/1
AEROSOL, METERED RESPIRATORY (INHALATION)
Qty: 18 G | Refills: 5 | Status: SHIPPED | OUTPATIENT
Start: 2023-01-26

## 2023-01-26 RX ORDER — ALBUTEROL SULFATE 2.5 MG/3ML
2.5 SOLUTION RESPIRATORY (INHALATION) EVERY 6 HOURS PRN
Qty: 120 EACH | Refills: 0 | Status: SHIPPED | OUTPATIENT
Start: 2023-01-26

## 2023-01-26 ASSESSMENT — ENCOUNTER SYMPTOMS
COUGH: 1
EYE ITCHING: 0
EYE DISCHARGE: 0
ABDOMINAL PAIN: 0
BACK PAIN: 0
ABDOMINAL DISTENTION: 0
SHORTNESS OF BREATH: 1

## 2023-01-26 NOTE — PROGRESS NOTES
Collin Elmore  1955  Referring Provider: Shayna Townsend MD    Subjective:     Chief Complaint   Patient presents with    3 Month Follow-Up    Results       HPI  Vero Ramsey is a 78 y.o. male is doing a telephone follow up visit. He was seen for the [address] [place] [address] Road. He is on Albuterol prn. He has cough with some PND, phlegm- clear to green, no hemoptysis, no loss of weight, good appetite. He has no Cystic Fibrosis. He had a PFT done on 01/10/23 showed that it was incomplete as he was coughing. He is going to Abx to day from Dr. Karolina Jameson. His last CT chest done on 08/08/22 showed:  Mild centrilobular emphysema. Unchanged bronchiectasis   throughout the lower lobes bilaterally and involving the left upper lobe. A   few scattered impacted bronchioles. Tree-in-bud nodular opacities in the   medial aspect of the right upper lobe and to a lesser extent within the right   lower lobe. Ground-glass opacities in the right lower lobe anteriorly. Gertrude Gallgracia No   pleural effusion or pneumothorax.          Current Outpatient Medications   Medication Sig Dispense Refill    albuterol (PROVENTIL) (2.5 MG/3ML) 0.083% nebulizer solution Take 3 mLs by nebulization every 6 hours as needed for Wheezing or Shortness of Breath 120 each 0    albuterol sulfate HFA (PROVENTIL;VENTOLIN;PROAIR) 108 (90 Base) MCG/ACT inhaler inhale 2 puffs by mouth and INTO THE LUNGS every 4 hours if needed 18 g 5    azithromycin (ZITHROMAX) 500 MG tablet Take 1 tablet by mouth daily for 3 days 1 packet 0    simvastatin (ZOCOR) 40 MG tablet take 1 tablet by mouth at bedtime 90 tablet 1    pantoprazole (PROTONIX) 40 MG tablet 40 mg PO daily 90 tablet 1    empagliflozin (JARDIANCE) 10 MG tablet Take 1 tablet by mouth daily 30 tablet 5    warfarin (COUMADIN) 5 MG tablet take 1 TO 2 tablets by mouth once daily as directed 60 tablet 2    losartan (COZAAR) 25 MG tablet Take 1 tablet by mouth daily 30 tablet 5    venlafaxine (EFFEXOR XR) 150 MG extended release capsule Take 1 capsule by mouth daily 30 capsule 5    triamcinolone (KENALOG) 0.1 % cream Apply topically 2 times daily. 453.6 g 1    sodium chloride, Inhalant, 3 % nebulizer solution Take 3 mLs by nebulization as needed for Other (inhale via nebulizer 3 mL every 6 hours, PRN) 125 mL 0    spironolactone (ALDACTONE) 25 MG tablet Take 1 tablet by mouth daily 30 tablet 3    azelastine (ASTELIN) 0.1 % nasal spray 1 spray by Nasal route 2 times daily Use in each nostril as directed 30 mL 0    metoprolol succinate (TOPROL XL) 25 MG extended release tablet Take 1 tablet by mouth 2 times daily 60 tablet 3    Fluticasone Propionate (FLONASE NA) by Nasal route      albuterol-ipratropium (COMBIVENT RESPIMAT)  MCG/ACT AERS inhaler Inhale 1 puff into the lungs every 6 hours as needed for Wheezing 1 each 5    Multiple Vitamin (MULTI-VITAMIN DAILY PO) Take by mouth      FEROSUL 325 (65 Fe) MG tablet take 1 tablet by mouth once daily with breakfast 90 tablet 1    Lancets MISC 1 each by Does not apply route 2 times daily 100 each 5    blood glucose monitor strips Test BG daily  & as needed for symptoms of irregular blood glucose. Dispense sufficient amount for indicated testing frequency plus additional to accommodate PRN testing needs. 30 strip 5    aspirin 81 MG chewable tablet Take 1 tablet by mouth daily 30 tablet 1    Calcium Carbonate-Vitamin D (CALCIUM-VITAMIN D3 PO) Take by mouth      guaiFENesin (MUCINEX) 600 MG extended release tablet Take 1,200 mg by mouth in the morning and 1,200 mg in the evening. Taking tid. Britany Ceredo       KRILL OIL OMEGA-3 PO Take by mouth      cetirizine (ZYRTEC) 10 MG tablet Take 10 mg by mouth \"Alternate With Singulair\"      NASAL SPRAY SALINE NA by Nasal route daily Over The Counter      Acetaminophen (TYLENOL 8 HOUR PO) Take 500 mg by mouth as needed Over The Counter      Multiple Vitamins-Minerals (CENTRUM PO) Take by mouth daily      Nebulizer MISC Inhale into the lungs as needed       No current facility-administered medications for this visit. Allergies   Allergen Reactions    Ciprofloxacin Hcl Hives and Swelling    Levaquin [Levofloxacin] Hives and Swelling    Other Nausea And Vomiting     \"Allergic To Tylox\"    Ceftin [Cefuroxime]     Lisinopril      cough       Past Medical History:   Diagnosis Date    Acid reflux     Acute frontal sinusitis 07/22/2009    Alcohol abuse     \"I Drink Average 2 Beers A Day\"    Anesthesia     \" I get agitated\"    Anxiety     Arthritis     \"Right Shoulder, Neck, Left Knee\"    Atypical mycobacterial infection     Broken teeth     Upper And Lower     CHF (congestive heart failure) ([facility])     COPD (chronic obstructive pulmonary disease) ([facility])     Sees Dr. Arian Oscar In [place], [address]     Cough     Frequent Cough With Green Sputum    Depression     Depression     Dyspnea 02/23/2018    H/O cardiac catheterization 05/17/2022    LAD: Moderate Lumen Irregularity. CALCIFIED mid LAD segment. H/O cardiovascular MUGA stress test 05/14/2019    EF 50%, NORMAL LVEF, NORMAL WALL MOTION. H/O echocardiogram 05/22/2014    TQ=>20-45%, LV systolic function is low normal, mild aortic valve calcification, no pericardial effusion    H/O echocardiogram 12/12/2018    EF 45-50%    History of 24 hour EKG monitoring 06/06/2014    24 hr holter monitor. Ventricular ectopics were noted in single and couplet forms. Supraventricular ectopics were noted in single and pair forms. [place] (hard of hearing)     Bilateral Ears    Hx of Doppler echocardiogram 12/08/2022    EF 30-35% Mild TR.     Hyperlipidemia     Hypertension     Irritant contact dermatitis due to other chemical products 08/26/2019    Mycobacterium avium complex ([facility] [address]     Other drug allergy(995.27) 07/22/2009    S/P AVR 12/2003    Mechanical valvue     Shortness of breath     Teeth missing     Upper And Lower    Type 2 diabetes mellitus 08/31/2022       Past Surgical History:   Procedure Laterality Date    BRONCHOSCOPY 1996    \"Done Twice\"    CARDIAC VALVE REPLACEMENT  12/17/2003    \"Aortic Valve Replacement, Mechanical Valve\"    COLONOSCOPY  2006    Polyps Removed    DENTAL SURGERY      Teeth Extracted In Past    ENDOSCOPY, COLON, DIAGNOSTIC  In Past    HEMIARTHROPLASTY SHOULDER FRACTURE Right 1/29/2019    [facility] [address] [address] ARTHROSCOPY Left 1992    LUNG BIOPSY Right [facility]    \"Cauterized Because My Sperm Was Low\"    ROTATOR CUFF REPAIR Right 2008    TONSILLECTOMY  1959 Or 1960    TOTAL HIP ARTHROPLASTY Right 10/25/2021    RIGHT HIP TOTAL ARTHROPLASTY performed by Janis Woods DO at [address] [facility] N/A 11/10/2021    EGD BIOPSY performed by Magnolia Blanco MD at [facility] ENDOSCOPY       Social History     Socioeconomic History    Marital status:     Number of children: 1    Years of education: 12   Tobacco Use    Smoking status: Never    Smokeless tobacco: Current     Types: Snuff, Chew   Vaping Use    Vaping Use: Never used   Substance and Sexual Activity    Alcohol use: Not Currently     Comment: caffeine: coffee 5 cups daily, 1 soda a day    Drug use: No    Sexual activity: Not Currently     Social Determinants of Health     Financial Resource Strain: Low Risk     Difficulty of Paying Living Expenses: Not very hard   Food Insecurity: No Food Insecurity    Worried About Running Out of Food in the Last Year: Never true    Ran Out of Food in the Last Year: Never true   Transportation Needs: No Transportation Needs    Lack of Transportation (Medical): No    Lack of Transportation (Non-Medical): No   Physical Activity: Insufficiently Active    Days of Exercise per Week: 2 days    Minutes of Exercise per Session: 10 min   Stress: No Stress Concern Present    Feeling of Stress :  Only a little   Social Connections: Socially Isolated    Frequency of Communication with Friends and Family: More than three times a week Frequency of Social Gatherings with Friends and Family: Once a week    Attends Church Services: Never    Active Member of Clubs or Organizations: No    Attends Club or Organization Meetings: Never    Marital Status:    Housing Stability: Low Risk     Unable to Pay for Housing in the Last Year: No    Number of [place] in the Last Year: 1    Unstable Housing in the Last Year: No       Review of Systems   Constitutional:  Negative for fatigue. HENT:  Negative for congestion and postnasal drip. Eyes:  Negative for discharge and itching. Respiratory:  Positive for cough and shortness of breath. Cardiovascular:  Negative for chest pain and leg swelling. Gastrointestinal:  Negative for abdominal distention and abdominal pain. Endocrine: Negative for cold intolerance and heat intolerance. Genitourinary:  Negative for enuresis and flank pain. Musculoskeletal:  Negative for arthralgias and back pain. Allergic/Immunologic: Negative for environmental allergies and food allergies. Neurological:  Negative for light-headedness and headaches. Hematological:  Negative for adenopathy. Psychiatric/Behavioral:  Negative for agitation and behavioral problems. Objective: There were no vitals taken for this visit. There is no height or weight on file to calculate BMI.   Sleep Medicine 7/11/2022   Sitting and reading 2   Watching TV 0   Sitting, inactive in a public place (e.g. a theatre or a meeting) 3   As a passenger in a car for an hour without a break 2   Lying down to rest in the afternoon when circumstances permit 3   Sitting and talking to someone 0   Sitting quietly after a lunch without alcohol 0   In a car, while stopped for a few minutes in traffic 0   [place] Sleepiness Score 10   Neck circumference (Inches) 15.5       Radiology:Reviewed    Assessment and Plan     Problem List          Respiratory    RADHA (obstructive sleep apnea)     He wants to do a HST  Loose weight Relevant Orders    Home Sleep Study    Bronchiectasis with acute exacerbation ([facility] [address]      C/w Abx  C/w Inhalers              Other    SOBOE (shortness of breath on exertion) - Primary    Relevant Orders    Full PFT Study With Bronchodilator    Hypersomnia      He wants to do a HST  Loose weight           Overweight (BMI 25.0-29. 9)      Loose weight                 Return in about 4 weeks (around 2/23/2023) for HST, PFT. Follow-Up:    Return in about 4 weeks (around 2/23/2023) for HST, PFT. Progress notes sent to the referring Provider    Lauren Mi is a 78 y.o. male being evaluated by a Virtual Visit (video visit) encounter to address concerns as mentioned above. A caregiver was present when appropriate. Due to this being a TeleHealth encounter (During DJXNQ-07 public health emergency), evaluation of the following organ systems was limited: Vitals/Constitutional/EENT/Resp/CV/GI//MS/Neuro/Skin/Heme-Lymph-Imm. Pursuant to the emergency declaration under the [address] [place] [address], [address] [place] [address] authority and the [employer] Act, this Virtual Visit was conducted with patient's (and/or legal guardian's) consent, to reduce the patient's risk of exposure to COVID-19 and provide necessary medical care. The patient (and/or legal guardian) has also been advised to contact this office for worsening conditions or problems, and seek emergency medical treatment and/or call 911 if deemed necessary. Patient identification was verified at the start of the visit: Yes    Total time spent for this encounter:     Services were provided through a video synchronous discussion virtually to substitute for in-person clinic visit. Patient and provider were located at their individual homes. --Asia Nazario MD on 1/26/2023 at 2:40 PM    An electronic signature was used to authenticate this note.      Asia Nazario MD MD  1/26/2023  2:40 PM

## 2023-01-27 ENCOUNTER — NURSE ONLY (OUTPATIENT)
Dept: FAMILY MEDICINE CLINIC | Age: 68
End: 2023-01-27

## 2023-01-27 DIAGNOSIS — Z95.2 S/P AVR: ICD-10-CM

## 2023-01-27 LAB — INTERNATIONAL NORMALIZATION RATIO, POC: 4

## 2023-01-30 NOTE — TELEPHONE ENCOUNTER
Called patient to schedule a time to speak with a pharmacist over the telephone. Patient briefly said, \"what? My doctor prescribes my medicine and my pharmacist fills it, I don't need this\" and ended the call. Will route back to coordinator. Callie Patton [facility]. [facility] free: [phone]     For Pharmacy Admin Tracking Only    Program: [address] in place:  No  Gap Closed?: No   Time Spent (min): 15     .

## 2023-01-31 ENCOUNTER — CARE COORDINATION (OUTPATIENT)
Dept: CARE COORDINATION | Age: 68
End: 2023-01-31

## 2023-01-31 NOTE — CARE COORDINATION
Ambulatory Care Coordination Note  1/31/2023    ACC: Daniella Quintana, RN        Spoke with patient for ACM follow up. Patient reports that he is getting over a sinus infection and is tired; but is feeling better. PCP follow up confirmed; patient verbalized plan to follow up with his PCP. CHF/ COPD:  symptoms are at baseline. Patient denies increased SOB, CP, wheezing, weight gain or swelling to extremities. Instructed on daily weight monitoring; report weight gain 2/3 lbs per day 5/per week. Offered patient enrollment in the Remote Patient Monitoring (RPM) program for in-home monitoring: Yes, patient enrolled. Patient confirms that he has received RPM equipment ; but has been busy and has not been able to call for installation. Patient confirms that he is still interested in RPM.  Encouraged patient to call for installation at his earliest convenience. Patient denies any questions or further needs at this time. ACM contact information provided should needs arise. Instructed patient on plan for hand off to Goldie/ ACM. Patient verbalized understanding. Plan for next outreach:  Confirm RPM    Ambulatory Care Coordination Assessment    Care Coordination Protocol  Referral from Primary Care Provider: Yes  Week 1 - Initial Assessment     Do you have all of your prescriptions and are they filled?: Yes  Barriers to medication adherence: Other  Other barriers to medication adherence: financial  Are you able to afford your medications?: With Assistance  Medication Assistance Program: [facility] Meds/Vouchers  How often do you have trouble taking your medications the way you have been told to take them?: I always take them as prescribed. Ability to seek help/take action for Emergent Urgent situations i.e. fire, crime, inclement weather or health crisis. : Independent  Ability to ambulate to restroom: Independent  Ability handle personal hygeine needs (bathing/dressing/grooming):  Independent  Ability to manage Medications: Independent  Ability to prepare Food Preparation: Independent  Ability to maintain home (clean home, laundry): Independent  Ability to drive and/or has transportation: Dependent  Ability to do shopping: Needs Assistance  Ability to manage finances: Independent  Is patient able to live independently?: Yes     Current Housing: Private Residence, Apartment        Per the Fall Risk Screening, did the patient have 2 or more falls or 1 fall with injury in the past year?: No     Frequent urination at night?: No  Do you use rails/bars?: Yes  Do you have a non-slip tub mat?: No     Are you experiencing loss of meaning?: No  Are you experiencing loss of hope and peace?: No     Thinking about your patient's physical health needs, are there any symptoms or problems (risk indicators) you are unsure about that require further investigation?: No identified areas of uncertainly or problems already being investigated   Are the patients physical health problems impacting on their mental well-being?: No identified areas of concern   Are there any problems with your patients lifestyle behaviors (alcohol, drugs, diet, exercise) that are impacting on physical or mental well-being?: No identified areas of concern   Do you have any other concerns about your patients mental well-being?  How would you rate their severity and impact on the patient?: No identified areas of concern   How would you rate their home environment in terms of safety and stability (including domestic violence, insecure housing, neighbor harassment)?: Consistently safe, supportive, stable, no identified problems   How do daily activities impact on the patient's well-being? (include current or anticipated unemployment, work, caregiving, access to transportation or other): No identified problems or perceived positive benefits   How would you rate their social network (family, work, friends)?: Good participation with social networks   How would you rate their financial resources (including ability to afford all required medical care)?: Financially secure, resources adequate, no identified problems   How wells does the patient now understand their health and well-being (symptoms, signs or risk factors) and what they need to do to manage their health?: Reasonable to good understanding and already engages in managing health or is willing to undertake better management   How well do you think your patient can engage in healthcare discussions? (Barriers include language, deafness, aphasia, alcohol or drug problems, learning difficulties, concentration): Clear and open communication, no identified barriers   Do other services need to be involved to help this patient?: Other care/services not required at this time   Are current services involved with this patient well-coordinated? (Include coordination with other services you are now recommendation): All required care/services in place and well-coordinated   Suggested Interventions and Community Resources   Other Services or Interventions: [facility]   Medication Assistance Program: In Process   Pharmacist: In Process                  Prior to Admission medications    Medication Sig Start Date End Date Taking?  Authorizing Provider   albuterol (PROVENTIL) (2.5 MG/3ML) 0.083% nebulizer solution Take 3 mLs by nebulization every 6 hours as needed for Wheezing or Shortness of Breath 1/26/23   Kuldip Hinojosa MD   albuterol sulfate HFA (PROVENTIL;VENTOLIN;PROAIR) 108 (90 Base) MCG/ACT inhaler inhale 2 puffs by mouth and INTO THE LUNGS every 4 hours if needed 1/26/23   Kuldip Hinojosa MD   simvastatin (ZOCOR) 40 MG tablet take 1 tablet by mouth at bedtime 1/18/23   Dannielle Mcfarland MD   pantoprazole (PROTONIX) 40 MG tablet 40 mg PO daily 1/18/23   Dannielle Mcfarland MD   empagliflozin (JARDIANCE) 10 MG tablet Take 1 tablet by mouth daily 1/3/23   VANGIE Garner - CNP   warfarin (COUMADIN) 5 MG tablet take 1 TO 2 tablets by mouth once daily as directed 1/3/23   Domenica Jarrell MD   losartan (COZAAR) 25 MG tablet Take 1 tablet by mouth daily 12/19/22   VANGIE Dugan CNP   venlafaxine (EFFEXOR XR) 150 MG extended release capsule Take 1 capsule by mouth daily 12/13/22   Domenica Jarrell MD   triamcinolone (KENALOG) 0.1 % cream Apply topically 2 times daily. 12/7/22   Domenica Jarrell MD   sodium chloride, Inhalant, 3 % nebulizer solution Take 3 mLs by nebulization as needed for Other (inhale via nebulizer 3 mL every 6 hours, PRN) 12/2/22   bAdullahi Gamez MD   spironolactone (ALDACTONE) 25 MG tablet Take 1 tablet by mouth daily 11/30/22   Domenica Jarrell MD   azelastine (ASTELIN) 0.1 % nasal spray 1 spray by Nasal route 2 times daily Use in each nostril as directed 11/15/22   MICHAEL Vee   metoprolol succinate (TOPROL XL) 25 MG extended release tablet Take 1 tablet by mouth 2 times daily 11/11/22   RosaVANGIE Banks CNP   Fluticasone Propionate (FLONASE NA) by Nasal route    Historical Provider, MD   albuterol-ipratropium (COMBIVENT RESPIMAT)  MCG/ACT AERS inhaler Inhale 1 puff into the lungs every 6 hours as needed for Wheezing 10/17/22   Abdullahi Gamez MD   Multiple Vitamin (MULTI-VITAMIN DAILY PO) Take by mouth    Historical Provider, MD   FEROSUL 325 (65 Fe) MG tablet take 1 tablet by mouth once daily with breakfast 9/7/22   Domenica Jarrell MD   Lancets MISC 1 each by Does not apply route 2 times daily 8/30/22   VANGIE Dugan CNP   blood glucose monitor strips Test BG daily  & as needed for symptoms of irregular blood glucose. Dispense sufficient amount for indicated testing frequency plus additional to accommodate PRN testing needs. 8/30/22   VANGIE Dugan CNP   aspirin 81 MG chewable tablet Take 1 tablet by mouth daily 8/27/22   Ranjit Haque MD   Calcium Carbonate-Vitamin D (CALCIUM-VITAMIN D3 PO) Take by mouth    Historical Provider, MD   guaiFENesin (MUCINEX) 600 MG extended release tablet Take 1,200 mg by mouth in the morning and 1,200 mg in the evening. Taking tid. Brooke Razo Historical Provider, MD   KRILL OIL OMEGA-3 PO Take by mouth    Historical Provider, MD   cetirizine (ZYRTEC) 10 MG tablet Take 10 mg by mouth \"Alternate With Singulair\"    Historical Provider, MD   NASAL SPRAY SALINE NA by Nasal route daily Over The Counter    Historical Provider, MD   Acetaminophen (TYLENOL 8 HOUR PO) Take 500 mg by mouth as needed Over The Counter    Historical Provider, MD   Multiple Vitamins-Minerals (CENTRUM PO) Take by mouth daily    Historical Provider, MD   Nebulizer MISC Inhale into the lungs as needed    Historical Provider, MD       Future Appointments   Date Time Provider Chante Resendez   2/2/2023  1:30 PM SCHEDULE, [address] [facility] NURSE [address] [facility] MMA   4/11/2023 11:00 AM Evan Wu, APRN - CNP [facility] MMA   4/27/2023  8:30 AM [facility] AWV LPN [facility] MMA     ,   Congestive Heart Failure Assessment    Are you currently restricting fluids?: No Restriction  Do you understand a low sodium diet?: Yes     No patient-reported symptoms      Symptoms:          ,   COPD Assessment    Does the patient understand envrionmental exposure?: Yes  Is the patient able to verbalize Rescue vs. Long Acting medications?: Yes  Does the patient have a nebulizer?: Yes  Does the patient use a space with inhaled medications?: No            Symptoms:          , and   General Assessment    Do you have any symptoms that are causing concern?: No

## 2023-02-06 ENCOUNTER — CARE COORDINATION (OUTPATIENT)
Dept: CASE MANAGEMENT | Age: 68
End: 2023-02-06

## 2023-02-06 NOTE — CARE COORDINATION
Remote Patient Monitoring Note      Date/Time:  2/6/2023 4:19 PM   1st attempted Welcome Call. LPN reviewed patients reported daily Remote Patient Monitoring metrics. All reported metrics are within alert parameters. Plan/Follow Up: Will continue to review, monitor and address alerts with follow up based on severity of symptoms and risk factors.      Current Patient Metrics ---- Blood Pressure: 124/63, 82bpm Pulseox: 98%, 78bpm Survey: C Weight: 151.4lbs Note Created at: 02/06/2023 04:19 PM ET ---- Time-Spent: 2 minutes 0 seconds

## 2023-02-07 ENCOUNTER — CARE COORDINATION (OUTPATIENT)
Dept: CASE MANAGEMENT | Age: 68
End: 2023-02-07

## 2023-02-07 NOTE — CARE COORDINATION
Remote Patient Monitoring Note      Date/Time:  2/7/2023 2:37 PM   2nd attempted Welcome Call    LPN reviewed patients reported daily Remote Patient Monitoring metrics. All reported metrics are within alert parameters. Plan/Follow Up: Will continue to review, monitor and address alerts with follow up based on severity of symptoms and risk factors.     Current Patient Metrics ---- Blood Pressure: 133/74, 81bpm Pulseox: -%, -bpm Survey: - Weight: 152.0lbs Note Created at: 02/07/2023 02:38 PM ET ---- Time-Spent: 2 minutes 0 seconds

## 2023-02-08 ENCOUNTER — CARE COORDINATION (OUTPATIENT)
Dept: CARE COORDINATION | Age: 68
End: 2023-02-08

## 2023-02-08 ENCOUNTER — CARE COORDINATION (OUTPATIENT)
Dept: CASE MANAGEMENT | Age: 68
End: 2023-02-08

## 2023-02-08 ENCOUNTER — NURSE ONLY (OUTPATIENT)
Dept: FAMILY MEDICINE CLINIC | Age: 68
End: 2023-02-08

## 2023-02-08 DIAGNOSIS — I38 VALVULAR HEART DISEASE: Primary | ICD-10-CM

## 2023-02-08 LAB — INTERNATIONAL NORMALIZATION RATIO, POC: 2.2

## 2023-02-08 NOTE — PROGRESS NOTES
INR: 2.2 , confirmed dose 10mg on Tuesday and Thursday 5mg all others. He is on 100mg doxycycline since last Friday.  3 days of 10mg and 4 days of 5mg breaking them up- recheck 2 weeks per Dr. Kelsey Lange

## 2023-02-08 NOTE — CARE COORDINATION
Remote Patient Monitoring Note      Date/Time:  2/8/2023 9:53 AM  LPN} attempted to contacted patient by telephone regarding  Welcome Call   received for 3rd Attempted Welcome Call. Background: COPD, CHF  Clinical Interventions: Reviewed and followed up on alerts and treatments-       Attempted to reach patient for RPM Welcome Call Call. Unable to reach patient. Left HIPAA Compliant message on Voice Mail to call. Phone number left on Voice Mail to call back. Sent E-mail to HERMILO Rain that patient is unable to be reached via phone for [employer]. Tablet will be paused until we can reach him. Will continue to follow. Chanclinton Singh, LARAN    [phone]  [facility] [place] [facility] Coordinator      Plan/Follow Up: Will continue to review, monitor and address alerts with follow up based on severity of symptoms and risk factors.      Current Patient Metrics ---- Blood Pressure: 134/76, 77bpm Pulseox: -%, -bpm Survey: - Weight: 151.4lbs Note Created at: 02/08/2023 10:08 AM ET ---- Time-Spent: 15 minutes 0 seconds

## 2023-02-09 ENCOUNTER — CARE COORDINATION (OUTPATIENT)
Dept: CASE MANAGEMENT | Age: 68
End: 2023-02-09

## 2023-02-09 ENCOUNTER — CARE COORDINATION (OUTPATIENT)
Dept: CARE COORDINATION | Age: 68
End: 2023-02-09

## 2023-02-09 NOTE — CARE COORDINATION
Remote Patient Monitoring Welcome Note  Date/Time:  2023 10:28 AM     Verified patients name and  as identifiers.  Completed and confirmed the following:     Emergency Contact: Emergency Contact : Trang Pickett  [phone]    [x] Patient received all RPM equipment (tablet, scale, blood pressure device and cuff, and pulse oximeter)  Cuff Size: Small  []  Regular   [x]  Large  []   Weight Scale: Regular   [x]  Bariatric  []               [x] Instructed patient keep box for use when returning equipment                                                          [x] Reviewed Patient Welcome Letter with patient                         [x] Reviewed expectations for patient and care team  [x] Reviewed RPM consent form         [x] Instructed patient to keep scale on flat surface                                                         [x] Instructed patient to keep tablet plugged in at all times                         [x] Instructed how to contact IT support (number listed on welcome letter)  [x] Provided Remote Patient Monitoring care  information                 All questions answered at this time.      Current Patient Metrics ---- Blood Pressure: -/-, -bpm Pulseox: -%, -bpm Survey: - Weight: -lbs Note Created at: 2023 10:30 AM ET ---- Time-Spent: 5 minutes 0 seconds

## 2023-02-09 NOTE — CARE COORDINATION
Ambulatory Care Coordination Note  2023    Patient Current Location:  Home: [address] [place] [address] [address]  [address] AKHTAR contacted the patient by telephone. Verified name and  with patient as identifiers. Provided introduction to self, and explanation of the ACM role. Challenges to be reviewed by the provider   Additional needs identified to be addressed with provider: No  none               Method of communication with provider: none. ACM: Gina Ceballos RN      Offered patient enrollment in the Remote Patient Monitoring (RPM) program for in-home monitoring: Yes, patient already enrolled. Lab Results       None            Care Coordination Interventions    Referral from Primary Care Provider: Yes  Suggested Interventions and Community Resources  Medication Assistance Program: In Process  Pharmacist: In Process  Other Services or Interventions: RPM          Goals Addressed                   This Visit's Progress     Conditions and Symptoms   On track     I will schedule office visits, as directed by my provider. I will keep my appointment or reschedule if I have to cancel. I will notify my provider of any barriers to my plan of care. I will follow my Zone Management tool to seek urgent or emergent care. I will notify my provider of any symptoms that indicate a worsening of my condition. Barriers: overwhelmed by complexity of regimen  Plan for overcoming my barriers:  Patient will utilize zone management tools to support symptom management. Confidence:  8/10  Anticipated Goal Completion Date:  23                Future Appointments   Date Time Provider Chante Resendez   2023  1:20 PM SCHEDULE, [facility] NURSE [facility] MMA   2023 11:00 AM Evan Wu APRN - CNP [facility]   2023  8:30 AM [facility] AWV LPN [address] [facility] MMA     Call to pt for acm f/u. Discussed resumption of care from current pause status of RPM. Educated on getting pulse ox.  Reports hand cold and Couldn't get it to get a reading. Plan: ACM will Request to unpause RPM. Advised pt acm will call Alexis Park LPN in regard to RPM. Spoke wto Alexis Park and she will unpause monitor and will call pt.

## 2023-02-10 ENCOUNTER — CARE COORDINATION (OUTPATIENT)
Dept: CASE MANAGEMENT | Age: 68
End: 2023-02-10

## 2023-02-10 NOTE — CARE COORDINATION
Remote Patient Monitoring Note      Date/Time:  2/10/2023 1:49 PM    LPN reviewed patients reported daily Remote Patient Monitoring metrics. All reported metrics are within alert parameters. Plan/Follow Up:  Will continue to review, monitor and address alerts with follow up based on severity of symptoms and risk factors  Current Patient Metrics ---- Blood Pressure: 151/71, 83bpm Pulseox: -%, -bpm Survey: - Weight: 148.8lbs Note Created at: 02/10/2023 01:50 PM ET ---- Time-Spent: 2 minutes 0 seconds

## 2023-02-13 ENCOUNTER — CARE COORDINATION (OUTPATIENT)
Dept: CARE COORDINATION | Age: 68
End: 2023-02-13

## 2023-02-13 NOTE — CARE COORDINATION
Paramedic attempted to contact patient in regards to RPM equipment concerns. Patient is unavailable at this time. Left HIPAA compliant message with Paramedic contact information as well as [facility] installation # [phone] and [address] [place] [address] Support # [phone], reason for call and request for call back. Will expect a return call.

## 2023-02-13 NOTE — CARE COORDINATION
Remote Patient Monitoring Note      Date/Time:  2/13/2023 2:58 PM    EMTP reviewed patients reported daily Remote Patient Monitoring metrics. All reported metrics are within alert parameters. Plan/Follow Up:  Will continue to review, monitor and address alerts with follow up based on severity of symptoms and risk factors--- Current Patient Metrics ---- Blood Pressure: 150/82, 80bpm Pulseox: -%, -bpm Survey: - Weight: 147.4lbs Note Created at: 02/13/2023 02:58 PM ET ---- Time-Spent: 2 minutes 0 seconds

## 2023-02-14 ENCOUNTER — CARE COORDINATION (OUTPATIENT)
Dept: CASE MANAGEMENT | Age: 68
End: 2023-02-14

## 2023-02-14 NOTE — CARE COORDINATION
Remote Alert Monitoring Note      Date/Time:  2/14/2023 11:14 AM  Patient Current Location: [place]    LPN attempted to contacted patient by telephone regarding yellow alert received for blood pressure reading (171/83).     Attempted to reach patient for RPM Yellow Alert Call. Unable to reach patient.  X 2  4:00 PM no response.   Left HIPAA Compliant message on Voice Mail to call.  Phone number left on Voice Mail to call back.    Will continue to follow.     Мария Carrasquillo LPN    [phone]  [facility] Coordinator      Background: COPD, CHF  Refer to 911 immediately if:  Patient unresponsive or unable to provide history  Change in cognition or sudden confusion  Patient unable to respond in complete sentences  Intense chest pain/tightness  Any concern for any clinical emergency  Red Alert: Provider response time of 1 hr required for any red alert requiring intervention  Yellow Alert: Provider response time of 3hr required for any escalated yellow alert    Plan/Follow Up: Will continue to review, monitor and address alerts with follow up based on severity of symptoms and risk factors.    Current Patient Metrics ---- Blood Pressure: 171/83, 83bpm Pulseox: -%, -bpm Survey: - Weight: 145.2lbs Note Created at: 02/14/2023 04:06 PM ET ---- Time-Spent: 3 minutes 0 seconds

## 2023-02-15 ENCOUNTER — CARE COORDINATION (OUTPATIENT)
Dept: CASE MANAGEMENT | Age: 68
End: 2023-02-15

## 2023-02-15 ENCOUNTER — CARE COORDINATION (OUTPATIENT)
Dept: CARE COORDINATION | Age: 68
End: 2023-02-15

## 2023-02-15 NOTE — CARE COORDINATION
EMTP placed call to patient to arrange RPM kit  through [address] [place] [address]. Reviewed with patient how to pack equipment in original packing. Verified patient's availability to schedule UPS  time. UPS  time requested. Anticipated  date range Anytime    Remote Patient Kit Ordering Note      Date/Time:  2/15/2023 12:00 PM      [x] [facility] confirmed patient shipping address  [x] Patient will receive package over the next 2-4 business days. Someone 21 years or older must be present to sign for UPS delivery. [x] Patient to contact virtual installation-specific phone number listed in the patient instructions. [x] If the patient does not contact HRS within 24 hours, an [id number] will call the patient directly: If the patient does not answer, HRS will follow up with the clinical team notifying them about the unsuccessful attempt to contact the patient. HRS will make three call attempts to the patient. [x] LPN will contact patient once equipment is active to welcome them to the program.                                                         [x] Hours of RPM monitoring - Monday-Friday [id number]                     All questions answered at this time. LPN made aware the RPM kit has been ordered. EMTP notified patient of RPM equipment order.      Replacement kit order completed

## 2023-02-16 ENCOUNTER — CARE COORDINATION (OUTPATIENT)
Dept: CARE COORDINATION | Age: 68
End: 2023-02-16

## 2023-02-16 NOTE — CARE COORDINATION
Remote Patient Monitoring Note      Date/Time:  2/16/2023 3:12 PM    EMTP reviewed patients reported daily Remote Patient Monitoring metrics. Patient has not updated daily metrics at this time. Plan/Follow Up:  Will continue to review, monitor and address alerts with follow up based on severity of symptoms and risk factors-- Current Patient Metrics ---- Blood Pressure: -/-, -bpm Pulseox: -%, -bpm Survey: - Weight: -lbs Note Created at: 02/16/2023 03:13 PM ET ---- Time-Spent: 2 minutes 0 seconds

## 2023-02-17 ENCOUNTER — CARE COORDINATION (OUTPATIENT)
Dept: CASE MANAGEMENT | Age: 68
End: 2023-02-17

## 2023-02-17 NOTE — CARE COORDINATION
Remote Patient Monitoring Note      Date/Time:  2/17/2023 2:54 PM  Patient Current Location: [facility]  LPN attempted to contacted patient by telephone regarding yellow alert received for No VS for 2 days. Background: COPD, CHF  Clinical Interventions: Reviewed and followed up on alerts and treatments-       Attempted to reach patient for RPM yellow Alert. Unable to reach patient. Left HIPAA Compliant message on Voice Mail to REMIND patient to put metrics in. Phone number left on Voice Mail to call back. Will continue to follow. Jose Bello LPN    [phone]  [address] [place] [address] / [facility] Coordinator      Plan/Follow Up: Will continue to review, monitor and address alerts with follow up based on severity of symptoms and risk factors.      Current Patient Metrics ---- Blood Pressure: -/-, -bpm Pulseox: -%, -bpm Survey: - Weight: -lbs Note Created at: 02/17/2023 02:56 PM ET ---- Time-Spent: 3 minutes 0 seconds

## 2023-02-20 ENCOUNTER — CARE COORDINATION (OUTPATIENT)
Dept: CASE MANAGEMENT | Age: 68
End: 2023-02-20

## 2023-02-20 NOTE — CARE COORDINATION
Remote Patient Monitoring Note      Date/Time:  2023 2:42 PM  Patient Current Location: [facility]  LPN contacted patient by telephone regarding yellow alert received for No VS for 5 days. Verified patients name and  as identifiers. Background: COPD, CHF  Clinical Interventions: Reviewed and followed up on alerts and treatments-       Called and reminded patient to put in VS for RPM. Patient waiting for new equipment. Plan/Follow Up: Will continue to review, monitor and address alerts with follow up based on severity of symptoms and risk factors.       Current Patient Metrics ---- Blood Pressure: -/-, -bpm Pulseox: -%, -bpm Survey: - Weight: -lbs Note Created at: 2023 02:44 PM ET ---- Time-Spent: 3 minutes 0 seconds

## 2023-02-21 ENCOUNTER — HOSPITAL ENCOUNTER (OUTPATIENT)
Age: 68
Setting detail: SPECIMEN
Discharge: HOME | End: 2023-02-21
Payer: MEDICARE

## 2023-02-21 ENCOUNTER — CARE COORDINATION (OUTPATIENT)
Dept: CASE MANAGEMENT | Age: 68
End: 2023-02-21

## 2023-02-21 DIAGNOSIS — Z79.01 ANTICOAGULANT LONG-TERM USE: ICD-10-CM

## 2023-02-21 PROCEDURE — 87070 CULTURE OTHR SPECIMN AEROBIC: CPT

## 2023-02-21 PROCEDURE — 87075 CULTR BACTERIA EXCEPT BLOOD: CPT

## 2023-02-21 PROCEDURE — 87205 SMEAR GRAM STAIN: CPT

## 2023-02-21 PROCEDURE — 87077 CULTURE AEROBIC IDENTIFY: CPT

## 2023-02-21 RX ORDER — WARFARIN SODIUM 5 MG/1
TABLET ORAL
Qty: 60 TABLET | Refills: 2 | Status: SHIPPED | OUTPATIENT
Start: 2023-02-21

## 2023-02-21 NOTE — CARE COORDINATION
Remote Patient Monitoring Note      Date/Time:  2/21/2023 2:27 PM  Patient Current Location: [place]   yellow alert received for No VS for 6 days. Background: COPD, CHF  Clinical Interventions: Reviewed and followed up on alerts and treatments-       Patient waiting for new equipment. Plan/Follow Up: Will continue to review, monitor and address alerts with follow up based on severity of symptoms and risk factors.      Current Patient Metrics ---- Blood Pressure: -/-, -bpm Pulseox: -%, -bpm Survey: - Weight: -lbs Note Created at: 02/21/2023 02:29 PM ET ---- Time-Spent: 3 minutes 0 seconds

## 2023-02-22 ENCOUNTER — NURSE ONLY (OUTPATIENT)
Dept: FAMILY MEDICINE CLINIC | Age: 68
End: 2023-02-22

## 2023-02-22 ENCOUNTER — CARE COORDINATION (OUTPATIENT)
Dept: CASE MANAGEMENT | Age: 68
End: 2023-02-22

## 2023-02-22 DIAGNOSIS — I38 VALVULAR HEART DISEASE: Primary | ICD-10-CM

## 2023-02-22 LAB — INTERNATIONAL NORMALIZATION RATIO, POC: 4.2

## 2023-02-22 NOTE — PROGRESS NOTES
INR: 4.2 , confirmed dose 3 days of 10mg and 4 days of 5mg breaking them up. Hold Tonight, Thursday and Saturday 5mg. All others are 10mg. Recheck in 2 weeks per Lorrie.

## 2023-02-22 NOTE — CARE COORDINATION
Remote Patient Monitoring Note      Date/Time:  2/22/2023 2:01 PM  Patient Current Location: [place]    yellow alert received for NO VS for 7 days. Background: COPD, CHF  Clinical Interventions: Reviewed and followed up on alerts and treatments-       Waiting for new Equipment. Plan/Follow Up: Will continue to review, monitor and address alerts with follow up based on severity of symptoms and risk factors.      Current Patient Metrics ---- Blood Pressure: -/-, -bpm Pulseox: -%, -bpm Survey: - Weight: -lbs Note Created at: 02/22/2023 02:03 PM ET ---- Time-Spent: 2 minutes 0 seconds

## 2023-02-23 ENCOUNTER — CARE COORDINATION (OUTPATIENT)
Dept: CARE COORDINATION | Age: 68
End: 2023-02-23

## 2023-02-23 ENCOUNTER — CARE COORDINATION (OUTPATIENT)
Dept: CASE MANAGEMENT | Age: 68
End: 2023-02-23

## 2023-02-23 NOTE — CARE COORDINATION
Remote Patient Monitoring Note      Date/Time:  2/23/2023 1:23 PM  Patient Current Location: [place]    yellow alert received for NO VS for 8 days. Background: COPD, CHF  Clinical Interventions: Reviewed and followed up on alerts and treatments-       Waiting for new equipment. Plan/Follow Up: Will continue to review, monitor and address alerts with follow up based on severity of symptoms and risk factors.      Current Patient Metrics ---- Blood Pressure: -/-, -bpm Pulseox: -%, -bpm Survey: - Weight: -lbs Note Created at: 02/23/2023 01:24 PM ET ---- Time-Spent: 3 minutes 0 seconds

## 2023-02-23 NOTE — CARE COORDINATION
Paramedic attempted to contact patient in regards to RPM kit replacement order/ delivery issues. Patient is unavailable at this time. Left HIPAA compliant message with Paramedic contact information, reason for call and request for call back. Will expect a return call.

## 2023-02-24 LAB
CULTURE: ABNORMAL
CULTURE: ABNORMAL
Lab: ABNORMAL
SPECIMEN: ABNORMAL

## 2023-02-27 ENCOUNTER — CARE COORDINATION (OUTPATIENT)
Dept: CARE COORDINATION | Age: 68
End: 2023-02-27

## 2023-02-28 ENCOUNTER — CARE COORDINATION (OUTPATIENT)
Dept: CARE COORDINATION | Age: 68
End: 2023-02-28

## 2023-02-28 NOTE — CARE COORDINATION
Ambulatory Care Coordination Note  2023    Patient Current Location:     Home: [address]  [place] [id number]  ACM contacted the patient by telephone. Verified name and  with patient as identifiers. Provided introduction to self, and explanation of the ACM role.     Challenges to be reviewed by the provider   Additional needs identified to be addressed with provider: Yes  none               Method of communication with provider: none.    ACM: Narcisa Romano RN      Offered patient enrollment in the Remote Patient Monitoring (RPM) program for in-home monitoring:  Pt was enrolled but having delivery issues. Not using RPM at current time. .    Lab Results       None            Care Coordination Interventions    Referral from Primary Care Provider: Yes  Suggested Interventions and Community Resources  Medication Assistance Program: In Process  Pharmacist: In Process  Other Services or Interventions: RPM          Goals Addressed                   This Visit's Progress     Conditions and Symptoms   On track     I will schedule office visits, as directed by my provider.  I will keep my appointment or reschedule if I have to cancel.  I will notify my provider of any barriers to my plan of care.  I will follow my Zone Management tool to seek urgent or emergent care.  I will notify my provider of any symptoms that indicate a worsening of my condition.    Barriers: overwhelmed by complexity of regimen  Plan for overcoming my barriers:  Patient will utilize zone management tools to support symptom management.  Confidence:  8/10  Anticipated Goal Completion Date:  23                Future Appointments   Date Time Provider Department Center   3/8/2023  1:30 PM SCHEDULE, [facility] NURSE [facility]   3/20/2023 11:15 AM Blake Slaughter MD [facility]ULM [facility]   2023 11:00 AM VANGIE Dugan - CNP [facility]   2023  8:30 AM [facility] AWV LPN [facility]   ,   Congestive Heart  Failure Assessment    Are you currently restricting fluids?: No Restriction  Do you understand a low sodium diet?: Yes  Do you understand how to read food labels?: Yes  How many restaurant meals do you eat per week?: 1-2     No patient-reported symptoms      Symptoms:  None: Yes      Symptom course: stable  Patient-reported weight (lb): 147.8  Weight trend: decreasing steadily  Salt intake watch compared to last visit: stable     ,   COPD Assessment    Does the patient understand envrionmental exposure?: Yes  Is the patient able to verbalize Rescue vs. Long Acting medications?: Yes  Does the patient have a nebulizer?: Yes  Does the patient use a space with inhaled medications?: No     No patient-reported symptoms         Symptoms:          , and   General Assessment    Do you have any symptoms that are causing concern?: No         Call to pt for acm fu. Discussed rpm concerns. Reports he Has new number not connected to call paris at [facility]. He is Not able to get complete kit delivered until that's done. Reports spoke to [facility] and Expecting a shipping label to ship current monitor back it is packed up and ready to go per pt. Saw ENT after culture and was prescribed for bactrim 20 days. Sinus infection. Reviewed chf and copd zm. Pt weighing daily. Weight at 147.8  He will call when he gets number switched over so rpm can be delivered/and ups can conact him. No way for ups to contact him right now.  Will update rpm team and continue to follow pt to support and educate on copd/chf.

## 2023-03-01 RX ORDER — FERROUS SULFATE 325(65) MG
TABLET ORAL
Qty: 90 TABLET | Refills: 1 | Status: SHIPPED | OUTPATIENT
Start: 2023-03-01

## 2023-03-06 ENCOUNTER — TELEPHONE (OUTPATIENT)
Dept: FAMILY MEDICINE CLINIC | Age: 68
End: 2023-03-06

## 2023-03-06 ENCOUNTER — TELEPHONE (OUTPATIENT)
Dept: CARDIOLOGY CLINIC | Age: 68
End: 2023-03-06

## 2023-03-06 NOTE — TELEPHONE ENCOUNTER
Patient called and stated that he tested Positive for COVID 2-27-23. Symptoms Tired and a cough,and exposed to a person who was positive with covid.  Call patient and advise   Requesting Paxlovid  [place]

## 2023-03-06 NOTE — TELEPHONE ENCOUNTER
Patient advised to avoid dextromethorphan and pseudoephedrine. Advised pt to call PCP to prescribe paxlovid.

## 2023-03-06 NOTE — TELEPHONE ENCOUNTER
Message noted. Unfortunately this patient is out of range for the Paxlovid as he was positive at least 7 days at this point.   As he is doing well would simply treat his mild COVID symptomatically

## 2023-03-06 NOTE — TELEPHONE ENCOUNTER
Visited with a friend who tested positive for covid. The next day, 02/27/23 tested positive for covid. He had no symptoms. Was tired. States he had a slight cough but it is better. He was concerned due to his heart and lung issues. He also states he is taking bactrim for 21 days per another doctor.

## 2023-03-07 DIAGNOSIS — L30.9 DERMATITIS: ICD-10-CM

## 2023-03-07 RX ORDER — TRIAMCINOLONE ACETONIDE 1 MG/G
CREAM TOPICAL
Qty: 453.6 G | Refills: 1 | Status: SHIPPED | OUTPATIENT
Start: 2023-03-07

## 2023-03-10 ENCOUNTER — CARE COORDINATION (OUTPATIENT)
Dept: CARE COORDINATION | Age: 68
End: 2023-03-10

## 2023-03-10 ASSESSMENT — LIFESTYLE VARIABLES
HOW MANY STANDARD DRINKS CONTAINING ALCOHOL DO YOU HAVE ON A TYPICAL DAY: PATIENT DECLINED
HOW OFTEN DO YOU HAVE A DRINK CONTAINING ALCOHOL: PATIENT DECLINED

## 2023-03-10 NOTE — CARE COORDINATION
Ambulatory Care Coordination Note      Patient Current Location:  Home: [address]  [place]     ACM contacted the patient by telephone. Verified name and  with patient as identifiers. Provided introduction to self, and explanation of the ACM role. Challenges to be reviewed by the provider   Additional needs identified to be addressed with provider: No  none               Method of communication with provider: none. ACM: Halie Soriano RN    Offered patient enrollment in the Remote Patient Monitoring (RPM) program for in-home monitoring:  Pt previously enrolled . Lab Results       None            Care Coordination Interventions    Referral from Primary Care Provider: Yes  Suggested Interventions and Community Resources  Medication Assistance Program: In Process  Pharmacist: In Process  Other Services or Interventions: RPM          Goals Addressed                   This Visit's Progress     Conditions and Symptoms   On track     I will schedule office visits, as directed by my provider. I will keep my appointment or reschedule if I have to cancel. I will notify my provider of any barriers to my plan of care. I will follow my Zone Management tool to seek urgent or emergent care. I will notify my provider of any symptoms that indicate a worsening of my condition. Barriers: overwhelmed by complexity of regimen  Plan for overcoming my barriers:  Patient will utilize zone management tools to support symptom management. Confidence:  8/10  Anticipated Goal Completion Date:  23                Future Appointments   Date Time Provider Chante Resendez   3/16/2023  1:30 PM Soraya Sampson [facility] FPS NURSE [facility] FPS [facility]   3/20/2023 11:15 AM Johnathan Andersen MD [facility] PULM [facility]   2023 11:00 AM Evan Wu, APRN - CNP [facility]   2023  8:30 AM [facility] AWV LPN [facility]   ,     Congestive Heart Failure Assessment    Are you currently restricting fluids?: No Restriction  Do you understand a low sodium diet?: Yes  Do you understand how to read food labels?: Yes  How many restaurant meals do you eat per week?: 1-2         Symptoms:          , and   COPD Assessment    Does the patient understand envrionmental exposure?: Yes  Is the patient able to verbalize Rescue vs. Long Acting medications?: Yes  Does the patient have a nebulizer?: Yes  Does the patient use a space with inhaled medications?: No     No patient-reported symptoms         Symptoms:            Call to pt for acm f/u. Reports Old rpm monitor is boxed up and ready to send out. Awaiting shipping label. Was told it would come by mail. Not rcvd yet. Goes to pulm at [facility] on 3/13. Reviewed covid emerency symptoms. Denies any symptoms or concerns at this time. ACM will inform RPM team of above info.

## 2023-03-16 ENCOUNTER — NURSE ONLY (OUTPATIENT)
Dept: FAMILY MEDICINE CLINIC | Age: 68
End: 2023-03-16

## 2023-03-16 DIAGNOSIS — I38 VALVULAR HEART DISEASE: Primary | ICD-10-CM

## 2023-03-20 ENCOUNTER — OFFICE VISIT (OUTPATIENT)
Dept: PULMONOLOGY | Age: 68
End: 2023-03-20
Payer: MEDICARE

## 2023-03-20 VITALS
SYSTOLIC BLOOD PRESSURE: 108 MMHG | DIASTOLIC BLOOD PRESSURE: 64 MMHG | BODY MASS INDEX: 25.92 KG/M2 | WEIGHT: 155.6 LBS | HEIGHT: 65 IN | HEART RATE: 85 BPM | OXYGEN SATURATION: 97 %

## 2023-03-20 DIAGNOSIS — R06.02 SOBOE (SHORTNESS OF BREATH ON EXERTION): ICD-10-CM

## 2023-03-20 DIAGNOSIS — E66.3 OVERWEIGHT (BMI 25.0-29.9): ICD-10-CM

## 2023-03-20 DIAGNOSIS — J47.9 BRONCHIECTASIS WITHOUT COMPLICATION (HCC): Chronic | ICD-10-CM

## 2023-03-20 DIAGNOSIS — G47.10 HYPERSOMNIA: ICD-10-CM

## 2023-03-20 DIAGNOSIS — G47.33 OSA (OBSTRUCTIVE SLEEP APNEA): ICD-10-CM

## 2023-03-20 DIAGNOSIS — J44.9 CHRONIC OBSTRUCTIVE PULMONARY DISEASE, UNSPECIFIED COPD TYPE (HCC): ICD-10-CM

## 2023-03-20 PROCEDURE — 99214 OFFICE O/P EST MOD 30 MIN: CPT | Performed by: INTERNAL MEDICINE

## 2023-03-20 ASSESSMENT — ENCOUNTER SYMPTOMS
BACK PAIN: 0
EYE DISCHARGE: 0
COUGH: 0
ABDOMINAL DISTENTION: 0
ABDOMINAL PAIN: 0
SHORTNESS OF BREATH: 0
EYE ITCHING: 0

## 2023-03-20 NOTE — PROGRESS NOTES
Negative for congestion and postnasal drip. Eyes:  Negative for discharge and itching. Respiratory:  Negative for cough and shortness of breath. Cardiovascular:  Negative for chest pain and leg swelling. Gastrointestinal:  Negative for abdominal distention and abdominal pain. Endocrine: Negative for cold intolerance and heat intolerance. Genitourinary:  Negative for enuresis and frequency. Musculoskeletal:  Negative for arthralgias and back pain. Allergic/Immunologic: Negative for environmental allergies and food allergies. Neurological:  Negative for light-headedness and headaches. Hematological:  Negative for adenopathy. Psychiatric/Behavioral:  Negative for agitation and behavioral problems. Objective:   /64   Pulse 85   Ht 5' 5\" (1.651 m)   Wt 155 lb 9.6 oz (70.6 kg)   SpO2 97%   BMI 25.89 kg/m²   Body mass index is 25.89 kg/m². Sleep Medicine 7/11/2022   Sitting and reading 2   Watching TV 0   Sitting, inactive in a public place (e.g. a theatre or a meeting) 3   As a passenger in a car for an hour without a break 2   Lying down to rest in the afternoon when circumstances permit 3   Sitting and talking to someone 0   Sitting quietly after a lunch without alcohol 0   In a car, while stopped for a few minutes in traffic 0   [place] Sleepiness Score 10   Neck circumference (Inches) 15.5     Mallampati 3    Physical Exam  Vitals reviewed. Constitutional:       Appearance: Normal appearance. HENT:      Head: Normocephalic and atraumatic. Nose: Nose normal.      Mouth/Throat:      Mouth: Mucous membranes are moist.   Eyes:      Extraocular Movements: Extraocular movements intact. Pupils: Pupils are equal, round, and reactive to light. Cardiovascular:      Rate and Rhythm: Normal rate and regular rhythm. Pulses: Normal pulses. Heart sounds: Normal heart sounds.    Pulmonary:      Effort: Pulmonary effort is normal.      Breath sounds: Normal breath

## 2023-03-23 ENCOUNTER — CARE COORDINATION (OUTPATIENT)
Dept: CASE MANAGEMENT | Age: 68
End: 2023-03-23

## 2023-03-23 NOTE — CARE COORDINATION
Remote Patient Monitoring Note      Date/Time:  3/23/2023 4:09 PM  Patient Current Location: [facility]  LPN attempted to contacted patient by telephone regarding yellow alert received for No VS for 25 days. Background: COPD, CHF  Clinical Interventions: Reviewed and followed up on alerts and treatments-       Attempted to reach patient for RPM yellow Alert. Unable to reach patient. Left HIPAA Compliant message on Voice Mail to REMIND patient to put metrics in. Phone number left on Voice Mail to call back. Routed message to HERMILO Granger that no VS for over 20 days. Will continue to follow. Efren Garrido LPN    [phone]  [facility] Coordinator     Plan/Follow Up: Will continue to review, monitor and address alerts with follow up based on severity of symptoms and risk factors.      Current Patient Metrics ---- Blood Pressure: -/-, -bpm Pulseox: -%, -bpm Survey: - Weight: -lbs Note Created at: 03/23/2023 04:11 PM ET ---- Time-Spent: 1 minutes 0 seconds

## 2023-03-28 ENCOUNTER — CARE COORDINATION (OUTPATIENT)
Dept: CARE COORDINATION | Age: 68
End: 2023-03-28

## 2023-03-28 ASSESSMENT — LIFESTYLE VARIABLES
HOW MANY STANDARD DRINKS CONTAINING ALCOHOL DO YOU HAVE ON A TYPICAL DAY: 5 OR 6
HOW OFTEN DO YOU HAVE A DRINK CONTAINING ALCOHOL: 4 OR MORE TIMES A WEEK

## 2023-03-28 NOTE — CARE COORDINATION
Ambulatory Care Coordination Note      Patient Current Location:  Home: [address]  [place]     ACM contacted the patient by telephone. Verified name and  with patient as identifiers. Provided introduction to self, and explanation of the ACM role. Challenges to be reviewed by the provider   Additional needs identified to be addressed with provider: No  none               Method of communication with provider: none. ACM: Chris Oconnor RN      Offered patient enrollment in the Remote Patient Monitoring (RPM) program for in-home monitoring: Patient declined. Lab Results       None            Care Coordination Interventions    Referral from Primary Care Provider: Yes  Suggested Interventions and Community Resources  Medication Assistance Program: In Process  Pharmacist: In Process  Other Services or Interventions: RPM          Goals Addressed                   This Visit's Progress     Conditions and Symptoms   On track     I will schedule office visits, as directed by my provider. I will keep my appointment or reschedule if I have to cancel. I will notify my provider of any barriers to my plan of care. I will follow my Zone Management tool to seek urgent or emergent care. I will notify my provider of any symptoms that indicate a worsening of my condition. Barriers: overwhelmed by complexity of regimen  Plan for overcoming my barriers:  Patient will utilize zone management tools to support symptom management. Confidence:  8/10  Anticipated Goal Completion Date:  23                Future Appointments   Date Time Provider Chante Resendez   3/30/2023  1:30 PM SCHEDULE, [facility] FPS NURSE [facility]   2023 11:00 AM VANGIE Storey - CNP [facility]   2023  8:30 AM [facility] FPS AWV LPN [facility] FPS [facility]   9034 11:00 AM [facility]     Congestive Heart Failure Assessment    Are you currently restricting fluids?: No

## 2023-03-29 ENCOUNTER — CARE COORDINATION (OUTPATIENT)
Dept: CARE COORDINATION | Age: 68
End: 2023-03-29

## 2023-03-29 DIAGNOSIS — J44.9 CHRONIC OBSTRUCTIVE PULMONARY DISEASE, UNSPECIFIED COPD TYPE (HCC): ICD-10-CM

## 2023-03-29 DIAGNOSIS — I50.33 ACUTE ON CHRONIC DIASTOLIC (CONGESTIVE) HEART FAILURE (HCC): Primary | ICD-10-CM

## 2023-03-29 RX ORDER — LOSARTAN POTASSIUM 25 MG/1
25 TABLET ORAL DAILY
Qty: 90 TABLET | Refills: 3 | Status: SHIPPED | OUTPATIENT
Start: 2023-03-29

## 2023-03-29 NOTE — PROGRESS NOTES
3/29/23 5:14 PM     Remote Patient Order Discontinued    Received request from Slade Judge RN  to discontinue order for remote patient monitoring of CHF and COPD and order completed.      Yojana Verdin DNP, FNP-C, Remote Patient Monitoring NP, (Ph) [phone]

## 2023-03-29 NOTE — CARE COORDINATION
Pt has current RPM kit boxed and ready to ship back as he wishes to return it. Was originally to have replacement kit but it was undeliverable due to ups/apartment/pt delivery issue. Will send RPM dc order at this time.

## 2023-03-30 ENCOUNTER — ANTI-COAG VISIT (OUTPATIENT)
Dept: FAMILY MEDICINE CLINIC | Age: 68
End: 2023-03-30

## 2023-03-30 ENCOUNTER — NURSE ONLY (OUTPATIENT)
Dept: FAMILY MEDICINE CLINIC | Age: 68
End: 2023-03-30
Payer: MEDICARE

## 2023-03-30 DIAGNOSIS — Z95.2 S/P AVR: Primary | Chronic | ICD-10-CM

## 2023-03-30 DIAGNOSIS — I38 VALVULAR HEART DISEASE: ICD-10-CM

## 2023-03-30 LAB
INTERNATIONAL NORMALIZATION RATIO, POC: 6.5
PROTHROMBIN TIME, POC: 78.5

## 2023-03-30 PROCEDURE — 85610 PROTHROMBIN TIME: CPT | Performed by: FAMILY MEDICINE

## 2023-03-30 PROCEDURE — 99999 PR OFFICE/OUTPT VISIT,PROCEDURE ONLY: CPT | Performed by: FAMILY MEDICINE

## 2023-03-30 NOTE — CARE COORDINATION
Paramedic attempted to contact patient in regards to RPM Return Kit order complications. Patient is unavailable at this time. Left HIPAA compliant message with Paramedic contact information, reason for call and request for call back. Will expect a return call.

## 2023-03-30 NOTE — PROGRESS NOTES
Confirmed  current coumadin dose 10 mg qd. Inr 6.5 pt finished rx bactrim 3/28/23.  Per Lorrie hold 2 days then resume 10 mg qd recheck 4/7/23

## 2023-04-05 ENCOUNTER — CARE COORDINATION (OUTPATIENT)
Dept: CARE COORDINATION | Age: 68
End: 2023-04-05

## 2023-04-06 ENCOUNTER — NURSE ONLY (OUTPATIENT)
Dept: FAMILY MEDICINE CLINIC | Age: 68
End: 2023-04-06

## 2023-04-06 DIAGNOSIS — I38 VALVULAR HEART DISEASE: ICD-10-CM

## 2023-04-06 LAB
INTERNATIONAL NORMALIZATION RATIO, POC: 3.6
PROTHROMBIN TIME, POC: 0

## 2023-04-06 NOTE — PROGRESS NOTES
Confirmed  current coumadin dose 10 mg qd. Inr 3.6. Per Danae Joe take 5 mg today and tomorrow then resume 10 mg qd. Re check in 2 weeks.

## 2023-04-18 RX ORDER — METOPROLOL SUCCINATE 25 MG/1
25 TABLET, EXTENDED RELEASE ORAL 2 TIMES DAILY
Qty: 60 TABLET | Refills: 3 | Status: SHIPPED | OUTPATIENT
Start: 2023-04-18

## 2023-04-20 ENCOUNTER — NURSE ONLY (OUTPATIENT)
Dept: FAMILY MEDICINE CLINIC | Age: 68
End: 2023-04-20

## 2023-04-20 DIAGNOSIS — I38 VALVULAR HEART DISEASE: ICD-10-CM

## 2023-04-20 LAB
INTERNATIONAL NORMALIZATION RATIO, POC: 6.2
PROTHROMBIN TIME, POC: 0

## 2023-04-20 NOTE — PROGRESS NOTES
Confirmed  current coumadin dose 10 mg qd. Inr 6.2. Per Eileen Gomez hold today and tomorrow then resume 10 mg qd. Re check in 5 days.

## 2023-04-25 ENCOUNTER — ANTI-COAG VISIT (OUTPATIENT)
Dept: FAMILY MEDICINE CLINIC | Age: 68
End: 2023-04-25

## 2023-04-25 ENCOUNTER — NURSE ONLY (OUTPATIENT)
Dept: FAMILY MEDICINE CLINIC | Age: 68
End: 2023-04-25
Payer: COMMERCIAL

## 2023-04-25 DIAGNOSIS — I38 VALVULAR HEART DISEASE: ICD-10-CM

## 2023-04-25 DIAGNOSIS — Z95.2 S/P AVR: Primary | Chronic | ICD-10-CM

## 2023-04-25 LAB
INTERNATIONAL NORMALIZATION RATIO, POC: 3.8
PROTHROMBIN TIME, POC: 45.3

## 2023-04-25 PROCEDURE — 99999 PR OFFICE/OUTPT VISIT,PROCEDURE ONLY: CPT | Performed by: PHYSICIAN ASSISTANT

## 2023-04-25 PROCEDURE — 85610 PROTHROMBIN TIME: CPT | Performed by: FAMILY MEDICINE

## 2023-04-25 NOTE — PROGRESS NOTES
Confirmed  held coumadin 4/21/23 & 4/22/23 restarted 10 mg qd. Inr 3.8. per Sukhdev Baron PA change to take 5 mg 1 d, 10 mg 6 d.  Recheck in 2 weeks

## 2023-04-27 ENCOUNTER — TELEMEDICINE (OUTPATIENT)
Dept: FAMILY MEDICINE CLINIC | Age: 68
End: 2023-04-27
Payer: MEDICARE

## 2023-04-27 DIAGNOSIS — Z00.00 MEDICARE ANNUAL WELLNESS VISIT, SUBSEQUENT: Primary | ICD-10-CM

## 2023-04-27 PROCEDURE — G0439 PPPS, SUBSEQ VISIT: HCPCS | Performed by: FAMILY MEDICINE

## 2023-04-27 PROCEDURE — 1124F ACP DISCUSS-NO DSCNMKR DOCD: CPT | Performed by: FAMILY MEDICINE

## 2023-04-27 ASSESSMENT — LIFESTYLE VARIABLES
HAS A RELATIVE, FRIEND, DOCTOR, OR ANOTHER HEALTH PROFESSIONAL EXPRESSED CONCERN ABOUT YOUR DRINKING OR SUGGESTED YOU CUT DOWN: 4
HOW OFTEN DO YOU HAVE A DRINK CONTAINING ALCOHOL: 4 OR MORE TIMES A WEEK
HAVE YOU OR SOMEONE ELSE BEEN INJURED AS A RESULT OF YOUR DRINKING: 0
HOW MANY STANDARD DRINKS CONTAINING ALCOHOL DO YOU HAVE ON A TYPICAL DAY: 7 TO 9
HOW OFTEN DURING THE LAST YEAR HAVE YOU NEEDED AN ALCOHOLIC DRINK FIRST THING IN THE MORNING TO GET YOURSELF GOING AFTER A NIGHT OF HEAVY DRINKING: 0
HOW OFTEN DURING THE LAST YEAR HAVE YOU FOUND THAT YOU WERE NOT ABLE TO STOP DRINKING ONCE YOU HAD STARTED: 1
HOW OFTEN DURING THE LAST YEAR HAVE YOU BEEN UNABLE TO REMEMBER WHAT HAPPENED THE NIGHT BEFORE BECAUSE YOU HAD BEEN DRINKING: 1
HOW OFTEN DURING THE LAST YEAR HAVE YOU HAD A FEELING OF GUILT OR REMORSE AFTER DRINKING: 0
HOW OFTEN DURING THE LAST YEAR HAVE YOU FAILED TO DO WHAT WAS NORMALLY EXPECTED FROM YOU BECAUSE OF DRINKING: 0

## 2023-04-27 ASSESSMENT — PATIENT HEALTH QUESTIONNAIRE - PHQ9
10. IF YOU CHECKED OFF ANY PROBLEMS, HOW DIFFICULT HAVE THESE PROBLEMS MADE IT FOR YOU TO DO YOUR WORK, TAKE CARE OF THINGS AT HOME, OR GET ALONG WITH OTHER PEOPLE: 0
6. FEELING BAD ABOUT YOURSELF - OR THAT YOU ARE A FAILURE OR HAVE LET YOURSELF OR YOUR FAMILY DOWN: 0
7. TROUBLE CONCENTRATING ON THINGS, SUCH AS READING THE NEWSPAPER OR WATCHING TELEVISION: 0
4. FEELING TIRED OR HAVING LITTLE ENERGY: 0
SUM OF ALL RESPONSES TO PHQ QUESTIONS 1-9: 0
SUM OF ALL RESPONSES TO PHQ QUESTIONS 1-9: 0
5. POOR APPETITE OR OVEREATING: 0
2. FEELING DOWN, DEPRESSED OR HOPELESS: 0
SUM OF ALL RESPONSES TO PHQ9 QUESTIONS 1 & 2: 0
8. MOVING OR SPEAKING SO SLOWLY THAT OTHER PEOPLE COULD HAVE NOTICED. OR THE OPPOSITE, BEING SO FIGETY OR RESTLESS THAT YOU HAVE BEEN MOVING AROUND A LOT MORE THAN USUAL: 0
SUM OF ALL RESPONSES TO PHQ QUESTIONS 1-9: 0
1. LITTLE INTEREST OR PLEASURE IN DOING THINGS: 0
SUM OF ALL RESPONSES TO PHQ QUESTIONS 1-9: 0
9. THOUGHTS THAT YOU WOULD BE BETTER OFF DEAD, OR OF HURTING YOURSELF: 0
3. TROUBLE FALLING OR STAYING ASLEEP: 0

## 2023-04-27 NOTE — PROGRESS NOTES
MG/3ML) 0.083% nebulizer solution Take 3 mLs by nebulization every 6 hours as needed for Wheezing or Shortness of Breath Yes Jackelin Simms MD   albuterol sulfate HFA (PROVENTIL;VENTOLIN;PROAIR) 108 (90 Base) MCG/ACT inhaler inhale 2 puffs by mouth and INTO THE LUNGS every 4 hours if needed Yes Jackelin Simms MD   simvastatin (ZOCOR) 40 MG tablet take 1 tablet by mouth at bedtime Yes Arabella Gaucher, MD   pantoprazole (PROTONIX) 40 MG tablet 40 mg PO daily Yes Arabella Gaucher, MD   empagliflozin (JARDIANCE) 10 MG tablet Take 1 tablet by mouth daily Yes VANGIE Chaves CNP   venlafaxine (EFFEXOR XR) 150 MG extended release capsule Take 1 capsule by mouth daily Yes Arabella Gaucher, MD   sodium chloride, Inhalant, 3 % nebulizer solution Take 3 mLs by nebulization as needed for Other (inhale via nebulizer 3 mL every 6 hours, PRN) Yes Jackelin Simms MD   spironolactone (ALDACTONE) 25 MG tablet Take 1 tablet by mouth daily Yes Arabella Gaucher, MD   Fluticasone Propionate (FLONASE NA) by Nasal route Yes Historical Provider, MD   albuterol-ipratropium (COMBIVENT RESPIMAT)  MCG/ACT AERS inhaler Inhale 1 puff into the lungs every 6 hours as needed for Wheezing Yes Jackelin Simms MD   Multiple Vitamin (MULTI-VITAMIN DAILY PO) Take by mouth Yes Historical Provider, MD   Lancets MISC 1 each by Does not apply route 2 times daily Yes VANGIE Dugan CNP   blood glucose monitor strips Test BG daily  & as needed for symptoms of irregular blood glucose. Dispense sufficient amount for indicated testing frequency plus additional to accommodate PRN testing needs. Yes VANGIE Dugan CNP   aspirin 81 MG chewable tablet Take 1 tablet by mouth daily Yes Hugo Haile MD   Calcium Carbonate-Vitamin D (CALCIUM-VITAMIN D3 PO) Take by mouth Yes Historical Provider, MD   guaiFENesin (MUCINEX) 600 MG extended release tablet Take 2 tablets by mouth in the morning and 2 tablets in

## 2023-04-27 NOTE — PATIENT INSTRUCTIONS
Personalized Preventive Plan for Colette Hamlin - 4/27/2023  Medicare offers a range of preventive health benefits. Some of the tests and screenings are paid in full while other may be subject to a deductible, co-insurance, and/or copay. Some of these benefits include a comprehensive review of your medical history including lifestyle, illnesses that may run in your family, and various assessments and screenings as appropriate. After reviewing your medical record and screening and assessments performed today your provider may have ordered immunizations, labs, imaging, and/or referrals for you. A list of these orders (if applicable) as well as your Preventive Care list are included within your After Visit Summary for your review. Other Preventive Recommendations:    A preventive eye exam performed by an eye specialist is recommended every 1-2 years to screen for glaucoma; cataracts, macular degeneration, and other eye disorders. A preventive dental visit is recommended every 6 months. Try to get at least 150 minutes of exercise per week or 10,000 steps per day on a pedometer . Order or download the FREE \"Exercise & Physical Activity: Your Everyday Guide\" from [employer]. Call 1-853.149.4385 or search [employer] online. You need 2960-9762 mg of calcium and 5388-1798 IU of vitamin D per day. It is possible to meet your calcium requirement with diet alone, but a vitamin D supplement is usually necessary to meet this goal.  When exposed to the sun, use a sunscreen that protects against both UVA and UVB radiation with an SPF of 30 or greater. Reapply every 2 to 3 hours or after sweating, drying off with a towel, or swimming. Always wear a seat belt when traveling in a car. Always wear a helmet when riding a bicycle or motorcycle.

## 2023-05-02 ENCOUNTER — CARE COORDINATION (OUTPATIENT)
Dept: CARE COORDINATION | Age: 68
End: 2023-05-02

## 2023-05-04 ENCOUNTER — HOSPITAL ENCOUNTER (OUTPATIENT)
Age: 68
Discharge: HOME | End: 2023-05-04
Payer: MEDICARE

## 2023-05-04 DIAGNOSIS — J47.9 BRONCHIECTASIS WITHOUT COMPLICATION (HCC): Chronic | ICD-10-CM

## 2023-05-04 LAB
IGA: 152 MG/DL (ref 69–382)
IGG,SERUM: 1087 MG/DL (ref 723–1685)
IGM,SERUM: 88 MG/DL (ref 62–277)

## 2023-05-04 PROCEDURE — 36415 COLL VENOUS BLD VENIPUNCTURE: CPT

## 2023-05-04 PROCEDURE — 82784 ASSAY IGA/IGD/IGG/IGM EACH: CPT

## 2023-05-09 ENCOUNTER — OFFICE VISIT (OUTPATIENT)
Dept: FAMILY MEDICINE CLINIC | Age: 68
End: 2023-05-09
Payer: MEDICARE

## 2023-05-09 VITALS
OXYGEN SATURATION: 97 % | HEIGHT: 65 IN | SYSTOLIC BLOOD PRESSURE: 120 MMHG | BODY MASS INDEX: 24.86 KG/M2 | WEIGHT: 149.2 LBS | HEART RATE: 61 BPM | DIASTOLIC BLOOD PRESSURE: 66 MMHG

## 2023-05-09 DIAGNOSIS — K40.90 NON-RECURRENT UNILATERAL INGUINAL HERNIA WITHOUT OBSTRUCTION OR GANGRENE: Primary | ICD-10-CM

## 2023-05-09 DIAGNOSIS — I38 VALVULAR HEART DISEASE: ICD-10-CM

## 2023-05-09 LAB
INTERNATIONAL NORMALIZATION RATIO, POC: 3.6
PROTHROMBIN TIME, POC: 43.2

## 2023-05-09 PROCEDURE — 1124F ACP DISCUSS-NO DSCNMKR DOCD: CPT | Performed by: PHYSICIAN ASSISTANT

## 2023-05-09 PROCEDURE — 99213 OFFICE O/P EST LOW 20 MIN: CPT | Performed by: PHYSICIAN ASSISTANT

## 2023-05-09 NOTE — PROGRESS NOTES
NASAL SPRAY SALINE NA by Nasal route daily Over The Counter      Acetaminophen (TYLENOL 8 HOUR PO) Take 500 mg by mouth as needed Over The Counter      Nebulizer MISC Inhale into the lungs as needed      doxycycline monohydrate (MONODOX) 100 MG capsule 1 capsule (Patient not taking: Reported on 4/27/2023)      azelastine (ASTELIN) 0.1 % nasal spray 1 spray by Nasal route 2 times daily Use in each nostril as directed (Patient not taking: Reported on 4/27/2023) 30 mL 0    Multiple Vitamins-Minerals (CENTRUM PO) Take by mouth daily (Patient not taking: Reported on 4/27/2023)       No current facility-administered medications for this visit. ALLERGIES  Allergies   Allergen Reactions    Ciprofloxacin Hcl Hives and Swelling    Levaquin [Levofloxacin] Hives and Swelling    Other Nausea And Vomiting     \"Allergic To Tylox 2\"    Ceftin [Cefuroxime]     Lisinopril      cough       PHYSICAL EXAM    /66 (Site: Left Upper Arm, Position: Sitting, Cuff Size: Medium Adult)   Pulse 61   Ht 5' 5\" (1.651 m)   Wt 149 lb 3.2 oz (67.7 kg)   SpO2 97%   BMI 24.83 kg/m²     Physical Exam  Constitutional:       Appearance: Normal appearance. He is normal weight. HENT:      Head: Normocephalic and atraumatic. Right Ear: Tympanic membrane and external ear normal.      Left Ear: Tympanic membrane and external ear normal.      Nose: No rhinorrhea. Mouth/Throat:      Mouth: Mucous membranes are moist.      Pharynx: Oropharynx is clear. No oropharyngeal exudate or posterior oropharyngeal erythema. Eyes:      General: No scleral icterus. Extraocular Movements: Extraocular movements intact. Conjunctiva/sclera: Conjunctivae normal.      Pupils: Pupils are equal, round, and reactive to light. Cardiovascular:      Rate and Rhythm: Normal rate and regular rhythm. Pulses: Normal pulses. Heart sounds: Normal heart sounds. No murmur heard. No friction rub. No gallop.    Pulmonary:      Effort:

## 2023-05-15 ENCOUNTER — CARE COORDINATION (OUTPATIENT)
Dept: CARE COORDINATION | Age: 68
End: 2023-05-15

## 2023-05-15 RX ORDER — SPIRONOLACTONE 25 MG/1
TABLET ORAL
Qty: 30 TABLET | Refills: 3 | Status: SHIPPED | OUTPATIENT
Start: 2023-05-15

## 2023-05-23 ENCOUNTER — ANTI-COAG VISIT (OUTPATIENT)
Dept: FAMILY MEDICINE CLINIC | Age: 68
End: 2023-05-23

## 2023-05-23 ENCOUNTER — NURSE ONLY (OUTPATIENT)
Dept: FAMILY MEDICINE CLINIC | Age: 68
End: 2023-05-23
Payer: MEDICARE

## 2023-05-23 DIAGNOSIS — I38 VALVULAR HEART DISEASE: ICD-10-CM

## 2023-05-23 DIAGNOSIS — Z95.2 S/P AVR: Primary | Chronic | ICD-10-CM

## 2023-05-23 LAB
INTERNATIONAL NORMALIZATION RATIO, POC: 1.5
PROTHROMBIN TIME, POC: 17.5

## 2023-05-23 PROCEDURE — 85610 PROTHROMBIN TIME: CPT | Performed by: FAMILY MEDICINE

## 2023-05-23 PROCEDURE — 99999 PR OFFICE/OUTPT VISIT,PROCEDURE ONLY: CPT | Performed by: FAMILY MEDICINE

## 2023-05-23 NOTE — PROGRESS NOTES
Confirmed  confirmed current warfarin dose 5 mg x 1 d & 10 mg x 6 d.  Inr 1.5 per dr Kelsey Lange change to 10 mg 1 qd recheck in 2 weeks

## 2023-05-24 ENCOUNTER — CARE COORDINATION (OUTPATIENT)
Dept: CARE COORDINATION | Age: 68
End: 2023-05-24

## 2023-05-24 ENCOUNTER — TELEPHONE (OUTPATIENT)
Dept: FAMILY MEDICINE CLINIC | Age: 68
End: 2023-05-24

## 2023-05-24 DIAGNOSIS — K40.90 INGUINAL HERNIA OF LEFT SIDE WITHOUT OBSTRUCTION OR GANGRENE: Primary | ICD-10-CM

## 2023-05-24 NOTE — CARE COORDINATION
Call to pt for chf outreach. Heart Failure Education outreach Date/Time: 2023 2:25 PM    Ambulatory Care Manager (ACM) contacted the patient by telephone to perform Ambulatory Care Coordination. Verified name and  with patient as identifiers. Provided introduction to self, and explanation of the Ambulatory Care Manager's role. ACM reviewed that a Health Healthy tips for the Summer packet has been sent to [facility]. ACM reviewed CHF zones, daily weights, the importance of low sodium diet, and healthy tips packet with the patient. Instructed patient to call their PCP if they have a weight gain of 3 lbs in 2 days or 5 lbs in a week. Patient reminded that there is a physician on call 24 hours a day / 7 days a week should the patient have questions or concerns. The patient verbalized understanding. .Reports he has not heard back in regard to scheduling test ordered by Lemuel Cortes at last ov. Noted US of scrotum was ordered at that visit. 3 way call made to central scheduling with pt on line in attempt to schedule. Per Gia Pham at central scheduling, does not have order /unable to see order for CHG/US scrotum and that was ordered on  by Lemuel Cortes. WIll forward to pcp offce.

## 2023-05-24 NOTE — TELEPHONE ENCOUNTER
To Carol Bender--    Patient said he thought he is suppose to get some imaging done for the possible hernia around lower abdomen area. Please let him know if you will do this.

## 2023-05-24 NOTE — TELEPHONE ENCOUNTER
Spoke to patient. Informed him that the US order is in the system. He will contact Central Scheduling.

## 2023-05-26 ENCOUNTER — CARE COORDINATION (OUTPATIENT)
Dept: CARE COORDINATION | Age: 68
End: 2023-05-26

## 2023-05-26 NOTE — CARE COORDINATION
Call to pt to inform of US ordered by pcp. Pt reports he is aware and Missed US apptmt today. Reports he will call back Tuesday to reschedule. Advised to call acm if would need any assistance. Denies needs at this time. Will f/u in a few weeks after u/s and review zm.

## 2023-06-06 ENCOUNTER — NURSE ONLY (OUTPATIENT)
Dept: FAMILY MEDICINE CLINIC | Age: 68
End: 2023-06-06
Payer: MEDICARE

## 2023-06-06 DIAGNOSIS — I38 VALVULAR HEART DISEASE: ICD-10-CM

## 2023-06-06 LAB
INTERNATIONAL NORMALIZATION RATIO, POC: 7.5
PROTHROMBIN TIME, POC: 90.4

## 2023-06-06 PROCEDURE — 85610 PROTHROMBIN TIME: CPT | Performed by: FAMILY MEDICINE

## 2023-06-06 NOTE — PROGRESS NOTES
Confirmed  confirmed current warfarin dose 10 mg QD. Inr 7.5 per Lorrie hold tonight, switch to 5 mg on wednesdays and sundays, 10 mg all other days and recheck either Monday or Thursday next week.

## 2023-06-19 RX ORDER — VENLAFAXINE HYDROCHLORIDE 150 MG/1
CAPSULE, EXTENDED RELEASE ORAL
Qty: 30 CAPSULE | Refills: 5 | Status: SHIPPED | OUTPATIENT
Start: 2023-06-19

## 2023-06-26 ENCOUNTER — NURSE ONLY (OUTPATIENT)
Dept: FAMILY MEDICINE CLINIC | Age: 68
End: 2023-06-26
Payer: MEDICARE

## 2023-06-26 DIAGNOSIS — I38 VALVULAR HEART DISEASE: ICD-10-CM

## 2023-06-26 LAB
INTERNATIONAL NORMALIZATION RATIO, POC: 1.3
PROTHROMBIN TIME, POC: 0

## 2023-06-26 PROCEDURE — 99999 PR OFFICE/OUTPT VISIT,PROCEDURE ONLY: CPT | Performed by: FAMILY MEDICINE

## 2023-06-26 PROCEDURE — 85610 PROTHROMBIN TIME: CPT | Performed by: FAMILY MEDICINE

## 2023-07-03 ENCOUNTER — CARE COORDINATION (OUTPATIENT)
Dept: CARE COORDINATION | Age: 68
End: 2023-07-03

## 2023-07-03 NOTE — CARE COORDINATION
Ambulatory Care Coordination Note  7/3/2023    Patient Current Location:  Home: [address] [place] [address], SS contacted the patient by telephone. Verified name and  with patient as identifiers. Provided introduction to self, and explanation of the KEN GUNN role. Challenges to be reviewed by the provider   Additional needs identified to be addressed with provider: No  none               Method of communication with provider: none. I spoke with the patient for continued Care Coordination follow up and education. Patient states he is doing fine. Breathing is at baseline. Denies increased SOB, cough or CP. We discussed Zone management tools for chronic disease(s) and patient denies current questions re: s/sx at this time. Educated on how to identify sx's that are worse than the baseline and the importance of early symptom recognition and reporting to prevent exacerbation which may lead to ED visits and hospital admissions. Patient has all medications needed. I advised patient to contact PCP office if needed. No further needs at this time.        Lab Results       None            Care Coordination Interventions    Referral from Primary Care Provider: Yes  Suggested Interventions and Community Resources  Medication Assistance Program: In Process  Pharmacist: In Process  Other Services or Interventions: RPM          Goals Addressed    None         Future Appointments   Date Time Provider [address]   2023  1:30 PM Melonie Nino [facility] FPS NURSE [facility] FPS [facility]   2023  1:45 PM Khadar Ruiz MD [facility]PS [facility]   10/12/2023 11:40 AM Sorin Crespo MD [facility]   2024  8:30 AM [facility] FPS AWV LPN [facility]

## 2023-07-11 ENCOUNTER — NURSE ONLY (OUTPATIENT)
Dept: FAMILY MEDICINE CLINIC | Age: 68
End: 2023-07-11
Payer: MEDICARE

## 2023-07-11 ENCOUNTER — ANTI-COAG VISIT (OUTPATIENT)
Dept: FAMILY MEDICINE CLINIC | Age: 68
End: 2023-07-11

## 2023-07-11 DIAGNOSIS — I38 VALVULAR HEART DISEASE: ICD-10-CM

## 2023-07-11 DIAGNOSIS — Z95.2 H/O MECHANICAL AORTIC VALVE REPLACEMENT: Primary | ICD-10-CM

## 2023-07-11 LAB
INTERNATIONAL NORMALIZATION RATIO, POC: 5.3
PROTHROMBIN TIME, POC: 63.4

## 2023-07-11 PROCEDURE — 85610 PROTHROMBIN TIME: CPT | Performed by: FAMILY MEDICINE

## 2023-07-11 PROCEDURE — 99999 PR OFFICE/OUTPT VISIT,PROCEDURE ONLY: CPT | Performed by: FAMILY MEDICINE

## 2023-07-11 NOTE — PROGRESS NOTES
Presenting for INR check. Confirmed 10 mg  x 6 d, 7.5 mg 1 d. Inr 5.3.  Per Leilani Bearden PA change to 7.5/7.5/10 mg recheck on 7/17/23

## 2023-07-17 ENCOUNTER — NURSE ONLY (OUTPATIENT)
Dept: FAMILY MEDICINE CLINIC | Age: 68
End: 2023-07-17
Payer: MEDICARE

## 2023-07-17 ENCOUNTER — CARE COORDINATION (OUTPATIENT)
Dept: CARE COORDINATION | Age: 68
End: 2023-07-17

## 2023-07-17 DIAGNOSIS — I38 VALVULAR HEART DISEASE: ICD-10-CM

## 2023-07-17 LAB
INTERNATIONAL NORMALIZATION RATIO, POC: 1.9
PROTHROMBIN TIME, POC: 0

## 2023-07-17 PROCEDURE — 85610 PROTHROMBIN TIME: CPT | Performed by: FAMILY MEDICINE

## 2023-07-17 PROCEDURE — 99999 PR OFFICE/OUTPT VISIT,PROCEDURE ONLY: CPT | Performed by: FAMILY MEDICINE

## 2023-07-17 NOTE — CARE COORDINATION
Ambulatory Care Coordination Note  2023    Patient Current Location: [place]     ACM contacted the patient by telephone. Verified name and  with patient as identifiers. Provided introduction to self, and explanation of the ACM role. Challenges to be reviewed by the provider   Additional needs identified to be addressed with provider: No  none               Method of communication with provider: none. ACM: Shama Prakash RN      Offered patient enrollment in the Remote Patient Monitoring (RPM) program for in-home monitoring: Patient declined. Lab Results       None            Care Coordination Interventions    Referral from Primary Care Provider: Yes  Suggested Interventions and Community Resources  Medication Assistance Program: In Process  Pharmacist: In Process  Other Services or Interventions: RPM          Goals Addressed    None         Future Appointments   Date Time Provider [address]   2023  1:45 PM Devera Libman [facility]PS NURSE [facility]   2023  1:45 PM Ida Palmer MD [facility]   10/12/2023 11:40 AM Nuvia Soria MD [facility]   2024  8:30 AM [facility] FPS AWV LPN [facility]   ,   Diabetes Assessment    Medic Alert ID: No  Meal Planning: Avoidance of concentrated sweets   How often do you test your blood sugar?: Other   Do you have barriers with adherence to non-pharmacologic self-management interventions?  (Nutrition/Exercise/Self-Monitoring): No   Have you ever had to go to the ED for symptoms of low blood sugar?: No       No patient-reported symptoms, Increase or Decrease trend in Blood Sugars        ,   Congestive Heart Failure Assessment    Are you currently restricting fluids?: No Restriction  Do you understand a low sodium diet?: Yes  Do you understand how to read food labels?: Yes  How many restaurant meals do you eat per week?: 1-2     No patient-reported symptoms      Symptoms:          ,   COPD Assessment    Does the patient understand
Jordi Fuentes)  Otolaryngology  [address] [place] [address]  Phone: [phone]  Fax: [phone]  Follow Up Time:

## 2023-07-17 NOTE — PROGRESS NOTES
Confirmed taking 7.5, 7.5, 10 mg alternating. 1.9 INR. Per Dr. Redd Hinton alternate 7.5 mg and 10 mg and recheck in one week.

## 2023-07-24 ENCOUNTER — NURSE ONLY (OUTPATIENT)
Dept: FAMILY MEDICINE CLINIC | Age: 68
End: 2023-07-24
Payer: MEDICARE

## 2023-07-24 DIAGNOSIS — I38 VALVULAR HEART DISEASE: ICD-10-CM

## 2023-07-24 LAB
INTERNATIONAL NORMALIZATION RATIO, POC: 3.7
PROTHROMBIN TIME, POC: 0

## 2023-07-24 PROCEDURE — 85610 PROTHROMBIN TIME: CPT | Performed by: FAMILY MEDICINE

## 2023-07-24 PROCEDURE — 99999 PR OFFICE/OUTPT VISIT,PROCEDURE ONLY: CPT | Performed by: FAMILY MEDICINE

## 2023-07-24 NOTE — PROGRESS NOTES
Confirmed taking 7.5, 10 mg alternating. 3.7 INR. Per Dr. Hailey Moyer 10 mg M, W, F and 7.5 mg all other days. Recheck in 3 weeks.

## 2023-08-04 ENCOUNTER — TELEPHONE (OUTPATIENT)
Dept: FAMILY MEDICINE CLINIC | Age: 68
End: 2023-08-04

## 2023-08-04 NOTE — TELEPHONE ENCOUNTER
Left message to return call. RA sent fax requesting 90 days for venlafaxine. Does he want a 90 day script?

## 2023-08-09 ENCOUNTER — OFFICE VISIT (OUTPATIENT)
Dept: FAMILY MEDICINE CLINIC | Age: 68
End: 2023-08-09
Payer: MEDICARE

## 2023-08-09 VITALS
DIASTOLIC BLOOD PRESSURE: 80 MMHG | HEART RATE: 73 BPM | SYSTOLIC BLOOD PRESSURE: 116 MMHG | OXYGEN SATURATION: 95 % | HEIGHT: 65 IN | BODY MASS INDEX: 24.16 KG/M2 | WEIGHT: 145 LBS

## 2023-08-09 DIAGNOSIS — F41.9 ANXIETY: Chronic | ICD-10-CM

## 2023-08-09 DIAGNOSIS — J01.90 ACUTE SINUSITIS, RECURRENCE NOT SPECIFIED, UNSPECIFIED LOCATION: ICD-10-CM

## 2023-08-09 DIAGNOSIS — Z95.2 S/P AVR: Chronic | ICD-10-CM

## 2023-08-09 DIAGNOSIS — E11.9 TYPE 2 DIABETES MELLITUS WITHOUT COMPLICATION, WITHOUT LONG-TERM CURRENT USE OF INSULIN (HCC): ICD-10-CM

## 2023-08-09 DIAGNOSIS — R19.7 DIARRHEA, UNSPECIFIED TYPE: ICD-10-CM

## 2023-08-09 DIAGNOSIS — Z23 NEED FOR VIRAL IMMUNIZATION: ICD-10-CM

## 2023-08-09 DIAGNOSIS — Z13.29 THYROID DISORDER SCREEN: ICD-10-CM

## 2023-08-09 DIAGNOSIS — R19.7 DIARRHEA, UNSPECIFIED TYPE: Primary | ICD-10-CM

## 2023-08-09 DIAGNOSIS — J44.9 CHRONIC OBSTRUCTIVE PULMONARY DISEASE, UNSPECIFIED COPD TYPE (HCC): Chronic | ICD-10-CM

## 2023-08-09 DIAGNOSIS — F31.75 BIPOLAR DISORDER, IN PARTIAL REMISSION, MOST RECENT EPISODE DEPRESSED (HCC): ICD-10-CM

## 2023-08-09 LAB
DEPRECATED RDW RBC AUTO: 13.9 % (ref 12.4–15.4)
HCT VFR BLD AUTO: 38.9 % (ref 40.5–52.5)
HGB BLD-MCNC: 13.7 G/DL (ref 13.5–17.5)
INTERNATIONAL NORMALIZATION RATIO, POC: 4
MCH RBC QN AUTO: 35.1 PG (ref 26–34)
MCHC RBC AUTO-ENTMCNC: 35.1 G/DL (ref 31–36)
MCV RBC AUTO: 100 FL (ref 80–100)
PLATELET # BLD AUTO: 274 K/UL (ref 135–450)
PMV BLD AUTO: 7.1 FL (ref 5–10.5)
PROTHROMBIN TIME, POC: NORMAL
RBC # BLD AUTO: 3.9 M/UL (ref 4.2–5.9)
WBC # BLD AUTO: 9 K/UL (ref 4–11)

## 2023-08-09 PROCEDURE — 0124A COVID-19, PFIZER BIVALENT, DO NOT DILUTE, (AGE 12Y+), IM, 30 MCG/0.3 ML: CPT | Performed by: FAMILY MEDICINE

## 2023-08-09 PROCEDURE — 85610 PROTHROMBIN TIME: CPT | Performed by: FAMILY MEDICINE

## 2023-08-09 PROCEDURE — 99214 OFFICE O/P EST MOD 30 MIN: CPT | Performed by: FAMILY MEDICINE

## 2023-08-09 PROCEDURE — 1124F ACP DISCUSS-NO DSCNMKR DOCD: CPT | Performed by: FAMILY MEDICINE

## 2023-08-09 PROCEDURE — 91312 COVID-19, PFIZER BIVALENT, DO NOT DILUTE, (AGE 12Y+), IM, 30 MCG/0.3 ML: CPT | Performed by: FAMILY MEDICINE

## 2023-08-09 RX ORDER — DOXYCYCLINE HYCLATE 100 MG
100 TABLET ORAL 2 TIMES DAILY
Qty: 14 TABLET | Refills: 0 | Status: SHIPPED | OUTPATIENT
Start: 2023-08-09 | End: 2023-08-16

## 2023-08-09 ASSESSMENT — ENCOUNTER SYMPTOMS
ABDOMINAL PAIN: 0
TROUBLE SWALLOWING: 0
COUGH: 1
VOMITING: 0
WHEEZING: 0
DIARRHEA: 1
NAUSEA: 0
CHEST TIGHTNESS: 0
BLOOD IN STOOL: 0
EYE PAIN: 0
SINUS PRESSURE: 1
SHORTNESS OF BREATH: 0

## 2023-08-09 NOTE — PROGRESS NOTES
8/9/2023    Leilani Laura    Chief Complaint   Patient presents with    3 Month Follow-Up    Sinus Problem     X14 days, mucinex, gatoraide. Diarrhea       HPI  History was obtained from the patient. Genevieve Green is a 78 y.o. male who presents today with routine recheck. Patient states been doing well but is been fighting off a sinus infection and has had some diarrhea now for about a week to 10 days sinus pressure for almost 2 weeks. No high fever he has made some diet changes drinking more fluids and Gatorade. His last INR was 4.0 we made changes per Coumadin flowsheet he is to recheck in 2 weeks. On review his last COVID booster was in January he is due for repeat COVID booster per current FDA recommendations. His depression and psychosis symptoms are stable. He has been compliant with for the most part all meds. Has not been checking sugars too much so we will do some lab work today. He denies increased chest pain or shortness of breath. Patient does have a history of aortic valve replacement, COPD, and interstitial lung disease. GERD symptoms are doing well. No reported blood or mucus in his stools with the diarrhea. During 2 blow out some very junky drainage. Hank Fothergill REVIEW OF SYMPTOMS    Review of Systems   Constitutional:  Negative for activity change and fatigue. HENT:  Positive for congestion and sinus pressure. Negative for hearing loss, mouth sores and trouble swallowing. Eyes:  Negative for pain and visual disturbance. Respiratory:  Positive for cough. Negative for chest tightness, shortness of breath and wheezing. Cardiovascular:  Negative for chest pain and palpitations. Patient with history of cardiomyopathy and aortic valve replacement with this mechanical) on Coumadin therapy. Gastrointestinal:  Positive for diarrhea. Negative for abdominal pain, blood in stool, nausea and vomiting. Genitourinary:  Negative for dysuria, frequency and urgency.    Musculoskeletal:

## 2023-08-09 NOTE — PROGRESS NOTES
INR 4.0  Per Dr Alka Atwood take 1/2 tab today then resume 7.5/10mg alternating. Recheck in 2 weeks.

## 2023-08-10 LAB
ALBUMIN SERPL-MCNC: 4.8 G/DL (ref 3.4–5)
ALBUMIN/GLOB SERPL: 1.7 {RATIO} (ref 1.1–2.2)
ALP SERPL-CCNC: 91 U/L (ref 40–129)
ALT SERPL-CCNC: 27 U/L (ref 10–40)
ANION GAP SERPL CALCULATED.3IONS-SCNC: 12 MMOL/L (ref 3–16)
AST SERPL-CCNC: 43 U/L (ref 15–37)
BILIRUB SERPL-MCNC: 0.3 MG/DL (ref 0–1)
BUN SERPL-MCNC: 10 MG/DL (ref 7–20)
CALCIUM SERPL-MCNC: 9.3 MG/DL (ref 8.3–10.6)
CHLORIDE SERPL-SCNC: 97 MMOL/L (ref 99–110)
CHOLEST SERPL-MCNC: 201 MG/DL (ref 0–199)
CO2 SERPL-SCNC: 25 MMOL/L (ref 21–32)
CREAT SERPL-MCNC: 0.7 MG/DL (ref 0.8–1.3)
EST. AVERAGE GLUCOSE BLD GHB EST-MCNC: 102.5 MG/DL
GFR SERPLBLD CREATININE-BSD FMLA CKD-EPI: >60 ML/MIN/{1.73_M2}
GLUCOSE SERPL-MCNC: 79 MG/DL (ref 70–99)
HBA1C MFR BLD: 5.2 %
HDLC SERPL-MCNC: 80 MG/DL (ref 40–60)
LDLC SERPL CALC-MCNC: 82 MG/DL
POTASSIUM SERPL-SCNC: 3.6 MMOL/L (ref 3.5–5.1)
PROT SERPL-MCNC: 7.6 G/DL (ref 6.4–8.2)
SODIUM SERPL-SCNC: 134 MMOL/L (ref 136–145)
TRIGL SERPL-MCNC: 193 MG/DL (ref 0–150)
TSH SERPL DL<=0.005 MIU/L-ACNC: 0.62 UIU/ML (ref 0.27–4.2)
VLDLC SERPL CALC-MCNC: 39 MG/DL

## 2023-08-15 RX ORDER — METOPROLOL SUCCINATE 25 MG/1
25 TABLET, EXTENDED RELEASE ORAL 2 TIMES DAILY
Qty: 60 TABLET | Refills: 5 | Status: SHIPPED | OUTPATIENT
Start: 2023-08-15

## 2023-08-16 RX ORDER — PANTOPRAZOLE SODIUM 40 MG/1
TABLET, DELAYED RELEASE ORAL
Qty: 90 TABLET | Refills: 1 | Status: SHIPPED | OUTPATIENT
Start: 2023-08-16

## 2023-08-23 ENCOUNTER — NURSE ONLY (OUTPATIENT)
Dept: FAMILY MEDICINE CLINIC | Age: 68
End: 2023-08-23

## 2023-08-23 DIAGNOSIS — Z95.2 H/O MECHANICAL AORTIC VALVE REPLACEMENT: Primary | ICD-10-CM

## 2023-08-23 LAB — INTERNATIONAL NORMALIZATION RATIO, POC: 7.1

## 2023-08-23 PROCEDURE — 99999 PR OFFICE/OUTPT VISIT,PROCEDURE ONLY: CPT | Performed by: FAMILY MEDICINE

## 2023-08-23 NOTE — PROGRESS NOTES
INR 7.1 Per Dr Lexus Carballo stop completely and Recheck in 2 days. Patient is also on 100mg of Doxycycline right now.

## 2023-08-28 ENCOUNTER — NURSE ONLY (OUTPATIENT)
Dept: FAMILY MEDICINE CLINIC | Age: 68
End: 2023-08-28
Payer: MEDICARE

## 2023-08-28 DIAGNOSIS — Z95.2 S/P AVR: Chronic | ICD-10-CM

## 2023-08-28 LAB
INTERNATIONAL NORMALIZATION RATIO, POC: 1.2
PROTHROMBIN TIME, POC: NORMAL

## 2023-08-28 PROCEDURE — 85610 PROTHROMBIN TIME: CPT | Performed by: FAMILY MEDICINE

## 2023-08-28 PROCEDURE — 99999 PR OFFICE/OUTPT VISIT,PROCEDURE ONLY: CPT | Performed by: FAMILY MEDICINE

## 2023-08-28 NOTE — PROGRESS NOTES
INR 1.2 Patient held Wednesday and Thursday. Started 7.5/10mg alternating on Friday.   Per Dr Mercado Pouch take a 10 today and return to 7.5/10mg alternating and recheck again on Friday

## 2023-08-29 ENCOUNTER — TELEPHONE (OUTPATIENT)
Dept: FAMILY MEDICINE CLINIC | Age: 68
End: 2023-08-29

## 2023-08-29 NOTE — TELEPHONE ENCOUNTER
Spoke to patient per Dr. Mitali Marie verbal patient needs to be seen this week. He agreed to come in on Thursday afternoon. Appointment changed.

## 2023-08-29 NOTE — TELEPHONE ENCOUNTER
----- Message from Mis Colon sent at 8/29/2023  1:30 PM EDT -----  Subject: Message to Provider    QUESTIONS  Information for Provider? pt needing to moira his protime appt   ---------------------------------------------------------------------------  --------------  [address]  [phone]; Do not leave any message, patient will call back for answer  ---------------------------------------------------------------------------  --------------  SCRIPT ANSWERS  Relationship to Patient?  Self

## 2023-09-05 ENCOUNTER — NURSE ONLY (OUTPATIENT)
Dept: FAMILY MEDICINE CLINIC | Age: 68
End: 2023-09-05
Payer: MEDICARE

## 2023-09-05 ENCOUNTER — ANTI-COAG VISIT (OUTPATIENT)
Dept: FAMILY MEDICINE CLINIC | Age: 68
End: 2023-09-05

## 2023-09-05 DIAGNOSIS — Z95.2 S/P AVR: Chronic | ICD-10-CM

## 2023-09-05 DIAGNOSIS — Z95.2 S/P AVR: Primary | Chronic | ICD-10-CM

## 2023-09-05 DIAGNOSIS — Z95.2 H/O MECHANICAL AORTIC VALVE REPLACEMENT: ICD-10-CM

## 2023-09-05 LAB
INTERNATIONAL NORMALIZATION RATIO, POC: 4.2
PROTHROMBIN TIME, POC: 50.5

## 2023-09-05 PROCEDURE — 85610 PROTHROMBIN TIME: CPT | Performed by: FAMILY MEDICINE

## 2023-09-05 PROCEDURE — 99999 PR OFFICE/OUTPT VISIT,PROCEDURE ONLY: CPT | Performed by: FAMILY MEDICINE

## 2023-09-05 NOTE — PROGRESS NOTES
Current warfarin 7.5/10mg alternating.  Inr 4.2. per Asa Anne RODRIGUEZ take 1/2 dose tonight then change to 7.5/7.5/10 mg recheck in 2 weeks

## 2023-09-15 RX ORDER — SPIRONOLACTONE 25 MG/1
TABLET ORAL
Qty: 30 TABLET | Refills: 3 | Status: SHIPPED | OUTPATIENT
Start: 2023-09-15

## 2023-09-16 DIAGNOSIS — E78.2 MIXED HYPERLIPIDEMIA: ICD-10-CM

## 2023-09-18 RX ORDER — SIMVASTATIN 40 MG
TABLET ORAL
Qty: 90 TABLET | Refills: 1 | Status: SHIPPED | OUTPATIENT
Start: 2023-09-18

## 2023-09-19 ENCOUNTER — NURSE ONLY (OUTPATIENT)
Dept: FAMILY MEDICINE CLINIC | Age: 68
End: 2023-09-19
Payer: MEDICARE

## 2023-09-19 ENCOUNTER — ANTI-COAG VISIT (OUTPATIENT)
Dept: FAMILY MEDICINE CLINIC | Age: 68
End: 2023-09-19

## 2023-09-19 DIAGNOSIS — Z95.2 S/P AVR: Chronic | ICD-10-CM

## 2023-09-19 DIAGNOSIS — Z95.2 S/P AVR: Primary | Chronic | ICD-10-CM

## 2023-09-19 LAB
INTERNATIONAL NORMALIZATION RATIO, POC: 5.2
PROTHROMBIN TIME, POC: 62.1

## 2023-09-19 PROCEDURE — 85610 PROTHROMBIN TIME: CPT | Performed by: FAMILY MEDICINE

## 2023-09-19 NOTE — PROGRESS NOTES
Current warfarin 7.5/7.5/10 mg inr is 5. 2. per dr Karla Garcia hold 1 dose then change to 7.5 mg qd recheck in 1 week

## 2023-09-20 ENCOUNTER — TELEPHONE (OUTPATIENT)
Dept: PHARMACY | Facility: CLINIC | Age: 68
End: 2023-09-20

## 2023-09-20 RX ORDER — FERROUS SULFATE 325(65) MG
TABLET ORAL
Qty: 90 TABLET | Refills: 1 | Status: SHIPPED | OUTPATIENT
Start: 2023-09-20

## 2023-09-20 NOTE — TELEPHONE ENCOUNTER
[facility] HEALTH CLINICAL PHARMACY: ADHERENCE REVIEW  Identified care gap per [employer]: fills at [facility]: ACE/ARB, Diabetes, and Statin adherence      ASSESSMENT    ACE/ARB ADHERENCE    Insurance Records claims through 09/10/2023 (Prior Year [address] = not reported; YTD [address] = 96% - PASSED):   LOSARTAN POT TAB 25MG last filled on 23 for 90 day supply. Next refill due: 12/3/23    Prescribed si tablet/capsule daily      [facility] Records claims through 09/10/2023 (Prior Year [address] = not reported; YTD [address] [place] [address] Street = 76%; Potential Fail Date: 23):   JARDIANCE TAB 10MG last filled on 23 for 30 day supply. Next refill due: 23    Prescribed si tablet/capsule daily    Per Reconcile Dispense History: last filled on 23 for 30 day supply. Per [facility]: will get 30 day supply ready to  since past due. Billed through [facility] [place] [facility] Lab Results   Component Value Date    LABA1C 5.2 2023    LABA1C 5.7 2022    LABA1C 5.0 11/10/2021     NOTE: A1c <9%     [facility] Records claims through 09/10/2023 (Prior Year PDC = not reported; YTD [address] [place] [address] Street = 89%; Potential Fail Date: 11/3/23):   SIMVASTATIN TAB 40MG last filled on 23 for 90 day supply. Next refill due: 23    Prescribed si tablet/capsule daily    Per Reconcile Dispense History: last filled on 23 for 90 day supply. Per [facility]: last picked up on 23 for 90 day supply.     Lab Results   Component Value Date    CHOL 201 (H) 2023    TRIG 193 (H) 2023    HDL 80 (H) 2023    LDLCALC 82 2023    LDLDIRECT 69 2020     ALT   Date Value Ref Range Status   2023 27 10 - 40 U/L Final     AST   Date Value Ref Range Status   2023 43 (H) 15 - 37 U/L Final     The 10-year ASCVD risk score (Abby MORRISON, et al., 2019) is: 19.2%    Values used to calculate the score:      Age: 78 years      Sex: Male      Is Non- : No

## 2023-09-26 ENCOUNTER — NURSE ONLY (OUTPATIENT)
Dept: FAMILY MEDICINE CLINIC | Age: 68
End: 2023-09-26
Payer: MEDICARE

## 2023-09-26 ENCOUNTER — ANTI-COAG VISIT (OUTPATIENT)
Dept: FAMILY MEDICINE CLINIC | Age: 68
End: 2023-09-26

## 2023-09-26 DIAGNOSIS — Z95.2 S/P AVR: Chronic | ICD-10-CM

## 2023-09-26 DIAGNOSIS — Z95.2 H/O MECHANICAL AORTIC VALVE REPLACEMENT: Primary | ICD-10-CM

## 2023-09-26 LAB
INTERNATIONAL NORMALIZATION RATIO, POC: 2
PROTHROMBIN TIME, POC: 23.9

## 2023-09-26 PROCEDURE — 85610 PROTHROMBIN TIME: CPT | Performed by: FAMILY MEDICINE

## 2023-09-26 NOTE — PROGRESS NOTES
Current warfarin 7.5 mg qd. Pt states was consuming alcohol last 3 nights so took 5 mg instead.  Inr 2.0. per Maximiliano RODRIGUEZ take 7.5 mg qd recheck in 2 weeks

## 2023-09-30 DIAGNOSIS — Z79.01 ANTICOAGULANT LONG-TERM USE: ICD-10-CM

## 2023-10-02 RX ORDER — WARFARIN SODIUM 5 MG/1
TABLET ORAL
Qty: 60 TABLET | Refills: 2 | Status: SHIPPED | OUTPATIENT
Start: 2023-10-02

## 2023-10-12 ENCOUNTER — NURSE ONLY (OUTPATIENT)
Dept: FAMILY MEDICINE CLINIC | Age: 68
End: 2023-10-12
Payer: MEDICARE

## 2023-10-12 ENCOUNTER — ANTI-COAG VISIT (OUTPATIENT)
Dept: FAMILY MEDICINE CLINIC | Age: 68
End: 2023-10-12

## 2023-10-12 DIAGNOSIS — Z95.2 H/O MECHANICAL AORTIC VALVE REPLACEMENT: Primary | ICD-10-CM

## 2023-10-12 DIAGNOSIS — Z95.2 S/P AVR: Chronic | ICD-10-CM

## 2023-10-12 LAB
INTERNATIONAL NORMALIZATION RATIO, POC: 2.2
PROTHROMBIN TIME, POC: 26.6

## 2023-10-12 PROCEDURE — 85610 PROTHROMBIN TIME: CPT | Performed by: FAMILY MEDICINE

## 2023-10-12 NOTE — PROGRESS NOTES
Current warfarin 7.5 mg qd.  Inr 2.2. per Parviz RODRIGUEZ change to 10 mg on Fri all other days 7.5 mg. Recheck 2 weeks

## 2023-10-17 ENCOUNTER — OFFICE VISIT (OUTPATIENT)
Dept: FAMILY MEDICINE CLINIC | Age: 68
End: 2023-10-17
Payer: MEDICARE

## 2023-10-17 VITALS
OXYGEN SATURATION: 96 % | SYSTOLIC BLOOD PRESSURE: 130 MMHG | TEMPERATURE: 98.7 F | DIASTOLIC BLOOD PRESSURE: 86 MMHG | HEIGHT: 65 IN | BODY MASS INDEX: 25.72 KG/M2 | WEIGHT: 154.4 LBS | HEART RATE: 70 BPM

## 2023-10-17 DIAGNOSIS — J06.9 ACUTE URI: Primary | ICD-10-CM

## 2023-10-17 DIAGNOSIS — J44.1 COPD EXACERBATION (HCC): ICD-10-CM

## 2023-10-17 DIAGNOSIS — L30.9 DERMATITIS: ICD-10-CM

## 2023-10-17 PROCEDURE — 99213 OFFICE O/P EST LOW 20 MIN: CPT | Performed by: PHYSICIAN ASSISTANT

## 2023-10-17 PROCEDURE — 1124F ACP DISCUSS-NO DSCNMKR DOCD: CPT | Performed by: PHYSICIAN ASSISTANT

## 2023-10-17 RX ORDER — TRIAMCINOLONE ACETONIDE 1 MG/G
CREAM TOPICAL
Qty: 453.6 G | Refills: 1 | Status: SHIPPED | OUTPATIENT
Start: 2023-10-17

## 2023-10-17 RX ORDER — ALBUTEROL SULFATE 90 UG/1
AEROSOL, METERED RESPIRATORY (INHALATION)
Qty: 18 G | Refills: 5 | Status: SHIPPED | OUTPATIENT
Start: 2023-10-17

## 2023-10-17 RX ORDER — DOXYCYCLINE HYCLATE 100 MG
100 TABLET ORAL 2 TIMES DAILY
Qty: 20 TABLET | Refills: 0 | Status: SHIPPED | OUTPATIENT
Start: 2023-10-17 | End: 2023-10-27

## 2023-10-17 RX ORDER — METHYLPREDNISOLONE 4 MG/1
TABLET ORAL
Qty: 1 KIT | Refills: 0 | Status: SHIPPED | OUTPATIENT
Start: 2023-10-17

## 2023-10-17 NOTE — PROGRESS NOTES
10/17/2023    [facility]    Chief Complaint   Patient presents with    Sinus Problem     Started 3 days ago, congestion, cough with thick dark mucus, has noticed today it was a little pink, has been using mucinex and nasal spray, was given doxycycline earlier on this year which did help. HPI  History was obtained from patient  Matt Pisano is a 78 y.o. male who presents today with complaints of 3-day history of nasal congestion, postnasal drip, cough, chest congestion, and occasional shortness of breath. He denies chest tightness, any obvious wheezing, hemoptysis, fever or chills. He denies nausea, vomiting. No known ill contacts. He is requesting doxycycline. He does have a well-established history of COPD and needs a refill of his albuterol inhaler. He is also requesting a refill of Kenalog cream for chronic hand dermatitis. PAST MEDICAL HISTORY  Past Medical History:   Diagnosis Date    Acid reflux     Acute frontal sinusitis 07/22/2009    Alcohol abuse     \"I Drink Average 2 Beers A Day\"    Anesthesia     \" I get agitated\"    Anxiety     Arthritis     \"Right Shoulder, Neck, Left Knee\"    Atypical mycobacterial infection     Broken teeth     Upper And Lower     CHF (congestive heart failure) ([facility])     COPD (chronic obstructive pulmonary disease) ([facility])     Sees Dr. Amari Granda In [facility]     Cough     Frequent Cough With Green Sputum    Depression     Depression     Dyspnea 02/23/2018    H/O cardiac catheterization 05/17/2022    LAD: Moderate Lumen Irregularity. CALCIFIED mid LAD segment. H/O cardiovascular MUGA stress test 05/14/2019    EF 50%, NORMAL LVEF, NORMAL WALL MOTION. H/O echocardiogram 05/22/2014    KK=>50-84%, LV systolic function is low normal, mild aortic valve calcification, no pericardial effusion    H/O echocardiogram 12/12/2018    EF 45-50%    History of 24 hour EKG monitoring 06/06/2014    24 hr holter monitor.   Ventricular ectopics were noted in

## 2023-10-18 LAB — SARS-COV-2 RNA RESP QL NAA+PROBE: NOT DETECTED

## 2023-10-20 ENCOUNTER — OFFICE VISIT (OUTPATIENT)
Dept: CARDIOLOGY CLINIC | Age: 68
End: 2023-10-20
Payer: MEDICARE

## 2023-10-20 VITALS
BODY MASS INDEX: 26.98 KG/M2 | HEIGHT: 64 IN | SYSTOLIC BLOOD PRESSURE: 130 MMHG | WEIGHT: 158 LBS | HEART RATE: 65 BPM | DIASTOLIC BLOOD PRESSURE: 60 MMHG

## 2023-10-20 DIAGNOSIS — I42.0 DILATED CARDIOMYOPATHY (HCC): ICD-10-CM

## 2023-10-20 DIAGNOSIS — I50.22 CHRONIC SYSTOLIC CONGESTIVE HEART FAILURE (HCC): ICD-10-CM

## 2023-10-20 DIAGNOSIS — I42.6 ALCOHOLIC CARDIOMYOPATHY (HCC): ICD-10-CM

## 2023-10-20 DIAGNOSIS — R06.09 DOE (DYSPNEA ON EXERTION): ICD-10-CM

## 2023-10-20 DIAGNOSIS — I50.33 ACUTE ON CHRONIC DIASTOLIC (CONGESTIVE) HEART FAILURE (HCC): ICD-10-CM

## 2023-10-20 DIAGNOSIS — I50.22 CHRONIC SYSTOLIC HEART FAILURE (HCC): Primary | Chronic | ICD-10-CM

## 2023-10-20 DIAGNOSIS — E78.2 MIXED HYPERLIPIDEMIA: ICD-10-CM

## 2023-10-20 PROCEDURE — 99214 OFFICE O/P EST MOD 30 MIN: CPT | Performed by: INTERNAL MEDICINE

## 2023-10-20 PROCEDURE — 93000 ELECTROCARDIOGRAM COMPLETE: CPT | Performed by: INTERNAL MEDICINE

## 2023-10-20 PROCEDURE — 1124F ACP DISCUSS-NO DSCNMKR DOCD: CPT | Performed by: INTERNAL MEDICINE

## 2023-10-20 NOTE — PROGRESS NOTES
Radha Schuster MD        OFFICE  FOLLOWUP NOTE    Chief complaints: patient is here for management of cardiomyopathy, CAD, HTN. Metallic aortic valve, dyslipidemia, bronchiectasis    Subjective: patient feels sinus congestion. no chest pain, no shortness of breath, no dizziness, no palpitations    HPI Kelton Eller is a 78 y. o.year old who  has a past medical history of Acid reflux, Acute frontal sinusitis, Alcohol abuse, Anesthesia, Anxiety, Arthritis, Atypical mycobacterial infection, Broken teeth, CHF (congestive heart failure) ([facility]), COPD (chronic obstructive pulmonary disease) ([address]), Cough, Depression, Depression, Dyspnea, H/O cardiac catheterization, H/O cardiovascular MUGA stress test, H/O echocardiogram, H/O echocardiogram, History of 24 hour EKG monitoring, [place] (hard of hearing), Hx of Doppler echocardiogram, Hyperlipidemia, Hypertension, Irritant contact dermatitis due to other chemical products, Mycobacterium avium complex ([address]), Other drug allergy(995.27), S/P AVR, Shortness of breath, Teeth missing, and Type 2 diabetes mellitus. and presents for management of cardiomyopathy, CAD, HTN. Metallic aortic valve, dyslipidemia, bronchiectasis which are well controlled      Current Outpatient Medications   Medication Sig Dispense Refill    doxycycline hyclate (VIBRA-TABS) 100 MG tablet Take 1 tablet by mouth 2 times daily for 10 days 20 tablet 0    methylPREDNISolone (MEDROL, MARKO,) 4 MG tablet Use as directed 1 kit 0    albuterol sulfate HFA (PROVENTIL;VENTOLIN;PROAIR) 108 (90 Base) MCG/ACT inhaler inhale 2 puffs by mouth and INTO THE LUNGS every 4 hours if needed 18 g 5    triamcinolone (KENALOG) 0.1 % cream Apply topically 2 times daily.  453.6 g 1    warfarin (COUMADIN) 5 MG tablet take 1 TO 2 tablets by mouth once daily as directed 60 tablet 2    FEROSUL 325 (65 Fe) MG tablet take 1 tablet by mouth once daily with breakfast 90 tablet 1    simvastatin (ZOCOR) 40 MG tablet take 1 tablet by mouth at

## 2023-10-24 ENCOUNTER — NURSE ONLY (OUTPATIENT)
Dept: FAMILY MEDICINE CLINIC | Age: 68
End: 2023-10-24
Payer: MEDICARE

## 2023-10-24 DIAGNOSIS — Z95.2 S/P AVR: Chronic | ICD-10-CM

## 2023-10-24 LAB
INTERNATIONAL NORMALIZATION RATIO, POC: 1.5
PROTHROMBIN TIME, POC: 1.5

## 2023-10-24 PROCEDURE — 85610 PROTHROMBIN TIME: CPT | Performed by: FAMILY MEDICINE

## 2023-11-01 ENCOUNTER — NURSE ONLY (OUTPATIENT)
Dept: FAMILY MEDICINE CLINIC | Age: 68
End: 2023-11-01

## 2023-11-01 DIAGNOSIS — I38 VALVULAR HEART DISEASE: Primary | ICD-10-CM

## 2023-11-01 DIAGNOSIS — Z95.2 H/O MECHANICAL AORTIC VALVE REPLACEMENT: ICD-10-CM

## 2023-11-01 LAB — INTERNATIONAL NORMALIZATION RATIO, POC: 2.7

## 2023-11-01 NOTE — PROGRESS NOTES
Current warfarin 10 mg, 7.5 mg, 7.5 mg repeat. Inr 2.7 today. Keep dose the same and recheck at appointment on November 8th.

## 2023-11-03 ENCOUNTER — TELEPHONE (OUTPATIENT)
Dept: CARDIOLOGY CLINIC | Age: 68
End: 2023-11-03

## 2023-11-03 NOTE — TELEPHONE ENCOUNTER
2023 Patient assistance application complete, scanned into [email or web address] and faxed to  [employer].  Awaiting response.

## 2023-11-09 ENCOUNTER — TELEPHONE (OUTPATIENT)
Dept: FAMILY MEDICINE CLINIC | Age: 68
End: 2023-11-09

## 2023-11-09 NOTE — TELEPHONE ENCOUNTER
----- Message from Salinas Wagner sent at 11/9/2023 11:40 AM EST -----  Subject: Message to Provider    QUESTIONS  Information for Provider? Pt needs flu shot and protine test.   ---------------------------------------------------------------------------  --------------  Yoni BEYRS  [phone]; OK to leave message on voicemail  ---------------------------------------------------------------------------  --------------  SCRIPT ANSWERS  Relationship to Patient?  Self

## 2023-11-14 ENCOUNTER — ANTI-COAG VISIT (OUTPATIENT)
Dept: FAMILY MEDICINE CLINIC | Age: 68
End: 2023-11-14

## 2023-11-14 ENCOUNTER — NURSE ONLY (OUTPATIENT)
Dept: FAMILY MEDICINE CLINIC | Age: 68
End: 2023-11-14
Payer: MEDICARE

## 2023-11-14 DIAGNOSIS — Z95.2 S/P AVR: Chronic | ICD-10-CM

## 2023-11-14 DIAGNOSIS — Z95.2 S/P AVR: Primary | Chronic | ICD-10-CM

## 2023-11-14 LAB
INTERNATIONAL NORMALIZATION RATIO, POC: 2.9
PROTHROMBIN TIME, POC: 35.3

## 2023-11-14 PROCEDURE — 85610 PROTHROMBIN TIME: CPT | Performed by: FAMILY MEDICINE

## 2023-11-28 ENCOUNTER — TELEPHONE (OUTPATIENT)
Dept: CARDIOLOGY CLINIC | Age: 68
End: 2023-11-28

## 2023-11-28 NOTE — TELEPHONE ENCOUNTER
Spoke with pt and made appt to discuss results with  on 12/4/23. Left Ventricle: Reduced left ventricular systolic function with a visually estimated EF of 30 - 35%. Left ventricle size is normal. Mildly increased wall thickness. Global hypokinesis present. Grade II diastolic dysfunction with increased LAP. Aortic Valve: Mechanical valve that is well-seated. AV mean gradient is 9 mmHg. AT 29. Trace paravalvular regurgitation noted along the struts. Mitral Valve: Mild regurgitation. Left Atrium: Left atrium is moderately dilated. Pericardium: No pericardial effusion.     OV to discuss

## 2023-11-29 ENCOUNTER — ANTI-COAG VISIT (OUTPATIENT)
Dept: FAMILY MEDICINE CLINIC | Age: 68
End: 2023-11-29

## 2023-11-29 ENCOUNTER — NURSE ONLY (OUTPATIENT)
Dept: FAMILY MEDICINE CLINIC | Age: 68
End: 2023-11-29
Payer: MEDICARE

## 2023-11-29 DIAGNOSIS — Z95.2 S/P AVR: Chronic | ICD-10-CM

## 2023-11-29 DIAGNOSIS — Z95.2 S/P AVR: Primary | Chronic | ICD-10-CM

## 2023-11-29 LAB
INTERNATIONAL NORMALIZATION RATIO, POC: 2.6
PROTHROMBIN TIME, POC: 31.7

## 2023-11-29 PROCEDURE — 85610 PROTHROMBIN TIME: CPT | Performed by: FAMILY MEDICINE

## 2023-12-04 ENCOUNTER — OFFICE VISIT (OUTPATIENT)
Dept: CARDIOLOGY CLINIC | Age: 68
End: 2023-12-04
Payer: OTHER GOVERNMENT

## 2023-12-04 VITALS
WEIGHT: 151.6 LBS | OXYGEN SATURATION: 97 % | HEIGHT: 64 IN | RESPIRATION RATE: 17 BRPM | HEART RATE: 62 BPM | SYSTOLIC BLOOD PRESSURE: 118 MMHG | DIASTOLIC BLOOD PRESSURE: 74 MMHG | BODY MASS INDEX: 25.88 KG/M2

## 2023-12-04 DIAGNOSIS — I38 VALVULAR HEART DISEASE: ICD-10-CM

## 2023-12-04 DIAGNOSIS — I10 ESSENTIAL HYPERTENSION: ICD-10-CM

## 2023-12-04 DIAGNOSIS — I50.22 CHRONIC SYSTOLIC CONGESTIVE HEART FAILURE (HCC): ICD-10-CM

## 2023-12-04 DIAGNOSIS — I50.22 CHRONIC SYSTOLIC HEART FAILURE (HCC): Primary | ICD-10-CM

## 2023-12-04 DIAGNOSIS — I50.33 ACUTE ON CHRONIC DIASTOLIC (CONGESTIVE) HEART FAILURE (HCC): ICD-10-CM

## 2023-12-04 DIAGNOSIS — R00.1 BRADYCARDIA: ICD-10-CM

## 2023-12-04 DIAGNOSIS — E78.2 MIXED HYPERLIPIDEMIA: ICD-10-CM

## 2023-12-04 DIAGNOSIS — R06.09 DOE (DYSPNEA ON EXERTION): ICD-10-CM

## 2023-12-04 DIAGNOSIS — I42.6 ALCOHOLIC CARDIOMYOPATHY (HCC): ICD-10-CM

## 2023-12-04 DIAGNOSIS — I42.0 DILATED CARDIOMYOPATHY (HCC): ICD-10-CM

## 2023-12-04 PROCEDURE — 99214 OFFICE O/P EST MOD 30 MIN: CPT | Performed by: INTERNAL MEDICINE

## 2023-12-04 PROCEDURE — 3074F SYST BP LT 130 MM HG: CPT | Performed by: INTERNAL MEDICINE

## 2023-12-04 PROCEDURE — 1124F ACP DISCUSS-NO DSCNMKR DOCD: CPT | Performed by: INTERNAL MEDICINE

## 2023-12-04 PROCEDURE — 3078F DIAST BP <80 MM HG: CPT | Performed by: INTERNAL MEDICINE

## 2023-12-04 RX ORDER — PANTOPRAZOLE SODIUM 40 MG/1
TABLET, DELAYED RELEASE ORAL
Qty: 90 TABLET | Refills: 1 | Status: SHIPPED | OUTPATIENT
Start: 2023-12-04

## 2023-12-04 RX ORDER — PANTOPRAZOLE SODIUM 40 MG/1
TABLET, DELAYED RELEASE ORAL
Qty: 90 TABLET | Refills: 1 | OUTPATIENT
Start: 2023-12-04

## 2023-12-04 RX ORDER — AZITHROMYCIN 250 MG/1
250 TABLET, FILM COATED ORAL SEE ADMIN INSTRUCTIONS
Qty: 6 TABLET | Refills: 0 | Status: SHIPPED | OUTPATIENT
Start: 2023-12-04 | End: 2023-12-09

## 2023-12-04 RX ORDER — LOSARTAN POTASSIUM 25 MG/1
25 TABLET ORAL DAILY
Qty: 90 TABLET | Refills: 3 | Status: SHIPPED | OUTPATIENT
Start: 2023-12-04

## 2023-12-04 NOTE — PROGRESS NOTES
Richard Boles MD        OFFICE  FOLLOWUP NOTE    Chief complaints: patient is here for management of  cardiomyopathy, CAD, HTN. Metallic aortic valve, dyslipidemia, bronchiectasis    Subjective: patient has sinus congestion, no chest pain, no shortness of breath, no dizziness, no palpitations    HPI Hermelinda Clemons is a 80 y. o.year old who  has a past medical history of Acid reflux, Acute frontal sinusitis, Alcohol abuse, Anesthesia, Anxiety, Arthritis, Atypical mycobacterial infection, Broken teeth, CHF (congestive heart failure) ([facility]), COPD (chronic obstructive pulmonary disease) ([address]), Cough, Depression, Depression, Dyspnea, H/O cardiac catheterization, H/O cardiovascular MUGA stress test, H/O echocardiogram, H/O echocardiogram, History of 24 hour EKG monitoring, [place] (hard of hearing), Hx of Doppler echocardiogram, Hyperlipidemia, Hypertension, Irritant contact dermatitis due to other chemical products, Mycobacterium avium complex ([address]), Other drug allergy(995.27), S/P AVR, Shortness of breath, Teeth missing, and Type 2 diabetes mellitus. and presents for management of  cardiomyopathy, CAD, HTN. Metallic aortic valve, dyslipidemia, bronchiectasiswhich are well controlled      Current Outpatient Medications   Medication Sig Dispense Refill    albuterol sulfate HFA (PROVENTIL;VENTOLIN;PROAIR) 108 (90 Base) MCG/ACT inhaler inhale 2 puffs by mouth and INTO THE LUNGS every 4 hours if needed 18 g 5    triamcinolone (KENALOG) 0.1 % cream Apply topically 2 times daily.  453.6 g 1    warfarin (COUMADIN) 5 MG tablet take 1 TO 2 tablets by mouth once daily as directed 60 tablet 2    FEROSUL 325 (65 Fe) MG tablet take 1 tablet by mouth once daily with breakfast 90 tablet 1    simvastatin (ZOCOR) 40 MG tablet take 1 tablet by mouth at bedtime 90 tablet 1    spironolactone (ALDACTONE) 25 MG tablet take 1 tablet by mouth once daily 30 tablet 3    empagliflozin (JARDIANCE) 10 MG tablet Take 1 tablet by mouth daily 14

## 2023-12-11 ENCOUNTER — OFFICE VISIT (OUTPATIENT)
Dept: FAMILY MEDICINE CLINIC | Age: 68
End: 2023-12-11
Payer: MEDICARE

## 2023-12-11 VITALS
OXYGEN SATURATION: 95 % | SYSTOLIC BLOOD PRESSURE: 120 MMHG | WEIGHT: 148.7 LBS | RESPIRATION RATE: 14 BRPM | DIASTOLIC BLOOD PRESSURE: 62 MMHG | HEART RATE: 73 BPM | BODY MASS INDEX: 25.39 KG/M2 | HEIGHT: 64 IN

## 2023-12-11 DIAGNOSIS — F41.9 ANXIETY: Chronic | ICD-10-CM

## 2023-12-11 DIAGNOSIS — G47.33 OSA (OBSTRUCTIVE SLEEP APNEA): ICD-10-CM

## 2023-12-11 DIAGNOSIS — F31.75 BIPOLAR DISORDER, IN PARTIAL REMISSION, MOST RECENT EPISODE DEPRESSED (HCC): ICD-10-CM

## 2023-12-11 DIAGNOSIS — Z95.2 S/P AVR: Chronic | ICD-10-CM

## 2023-12-11 DIAGNOSIS — R19.09 GROIN SWELLING: Primary | ICD-10-CM

## 2023-12-11 DIAGNOSIS — F33.41 RECURRENT MAJOR DEPRESSIVE DISORDER, IN PARTIAL REMISSION (HCC): Chronic | ICD-10-CM

## 2023-12-11 DIAGNOSIS — J44.9 CHRONIC OBSTRUCTIVE PULMONARY DISEASE, UNSPECIFIED COPD TYPE (HCC): Chronic | ICD-10-CM

## 2023-12-11 DIAGNOSIS — E11.9 TYPE 2 DIABETES MELLITUS WITHOUT COMPLICATION, WITHOUT LONG-TERM CURRENT USE OF INSULIN (HCC): ICD-10-CM

## 2023-12-11 LAB
INTERNATIONAL NORMALIZATION RATIO, POC: 8
PROTHROMBIN TIME, POC: 0

## 2023-12-11 PROCEDURE — G0008 ADMIN INFLUENZA VIRUS VAC: HCPCS | Performed by: FAMILY MEDICINE

## 2023-12-11 PROCEDURE — 99214 OFFICE O/P EST MOD 30 MIN: CPT | Performed by: FAMILY MEDICINE

## 2023-12-11 PROCEDURE — 3044F HG A1C LEVEL LT 7.0%: CPT | Performed by: FAMILY MEDICINE

## 2023-12-11 PROCEDURE — 85610 PROTHROMBIN TIME: CPT | Performed by: FAMILY MEDICINE

## 2023-12-11 PROCEDURE — 1124F ACP DISCUSS-NO DSCNMKR DOCD: CPT | Performed by: FAMILY MEDICINE

## 2023-12-11 PROCEDURE — 90674 CCIIV4 VAC NO PRSV 0.5 ML IM: CPT | Performed by: FAMILY MEDICINE

## 2023-12-11 ASSESSMENT — ENCOUNTER SYMPTOMS
CHEST TIGHTNESS: 0
VOMITING: 0
ABDOMINAL PAIN: 0
WHEEZING: 0
DIARRHEA: 0
EYE PAIN: 0
BLOOD IN STOOL: 0
SHORTNESS OF BREATH: 0
NAUSEA: 0
TROUBLE SWALLOWING: 0
APNEA: 1

## 2023-12-11 NOTE — PROGRESS NOTES
Injection given in left deltoid. Patient tolerated well. Current dose 7.5, 7.5, 7.5, 10 mg. Coumadin >8.0. Per Dr. Manuel Harris Hold coumadin, decrease alcohol and recheck INR in 2 days.
No cervical adenopathy. Skin:     General: Skin is warm and dry. Coloration: Skin is not jaundiced. Findings: No bruising. Neurological:      Mental Status: He is alert and oriented to person, place, and time. Cranial Nerves: No cranial nerve deficit. Motor: No weakness. Gait: Gait normal.   Psychiatric:         Mood and Affect: Mood normal.         Behavior: Behavior normal.         Thought Content: Thought content normal.         ASSESSMENT & PLAN     Diagnosis Orders   1. Groin swelling L        2. S/P AVR  POCT INR      3. Anxiety        4. Bipolar disorder, in partial remission, most recent episode depressed ([address])        5. Chronic obstructive pulmonary disease, unspecified COPD type ([address])        6. RADHA (obstructive sleep apnea)        7. Recurrent major depressive disorder, in partial remission ([address])        8. Type 2 diabetes mellitus without complication, without long-term current use of insulin (HCC)  CHG US SCROTUM & CONTENTS    CBC    Comprehensive Metabolic Panel    LIPID PANEL    Hemoglobin A1C    MICROALBUMIN / CREATININE URINE RATIO      Patient will hold Coumadin and recheck INR in 2 days cut back or stop all drinking. Did receive regular flu shot today and advised to get RSV and COVID shot in the next week or 2 at pharmacy. Check CBC, CMP, lipid panel, A1c, and urine for microalbumin today and have him follow-up for results. Will reschedule groin ultrasound if has definite hernia we will work on referral for surgery. Patient to call with other changes or problems. Return in about 3 months (around 3/11/2024), or if symptoms worsen or fail to improve.          Electronically signed by Luis Ashton MD on 12/11/2023

## 2023-12-12 LAB
ALBUMIN SERPL-MCNC: 4.9 G/DL (ref 3.4–5)
ALBUMIN/GLOB SERPL: 1.9 {RATIO} (ref 1.1–2.2)
ALP SERPL-CCNC: 121 U/L (ref 40–129)
ALT SERPL-CCNC: 26 U/L (ref 10–40)
ANION GAP SERPL CALCULATED.3IONS-SCNC: 14 MMOL/L (ref 3–16)
AST SERPL-CCNC: 50 U/L (ref 15–37)
BILIRUB SERPL-MCNC: 0.7 MG/DL (ref 0–1)
BUN SERPL-MCNC: 15 MG/DL (ref 7–20)
CALCIUM SERPL-MCNC: 9.1 MG/DL (ref 8.3–10.6)
CHLORIDE SERPL-SCNC: 91 MMOL/L (ref 99–110)
CHOLEST SERPL-MCNC: 202 MG/DL (ref 0–199)
CO2 SERPL-SCNC: 24 MMOL/L (ref 21–32)
CREAT SERPL-MCNC: 0.9 MG/DL (ref 0.8–1.3)
CREAT UR-MCNC: 137.4 MG/DL (ref 39–259)
DEPRECATED RDW RBC AUTO: 14.1 % (ref 12.4–15.4)
EST. AVERAGE GLUCOSE BLD GHB EST-MCNC: 105.4 MG/DL
GFR SERPLBLD CREATININE-BSD FMLA CKD-EPI: >60 ML/MIN/{1.73_M2}
GLUCOSE SERPL-MCNC: 79 MG/DL (ref 70–99)
HBA1C MFR BLD: 5.3 %
HCT VFR BLD AUTO: 40 % (ref 40.5–52.5)
HDLC SERPL-MCNC: 87 MG/DL (ref 40–60)
HGB BLD-MCNC: 13.9 G/DL (ref 13.5–17.5)
LDLC SERPL CALC-MCNC: 102 MG/DL
MCH RBC QN AUTO: 34 PG (ref 26–34)
MCHC RBC AUTO-ENTMCNC: 34.7 G/DL (ref 31–36)
MCV RBC AUTO: 98 FL (ref 80–100)
MICROALBUMIN UR DL<=1MG/L-MCNC: 4.3 MG/DL
MICROALBUMIN/CREAT UR: 31.3 MG/G (ref 0–30)
PLATELET # BLD AUTO: 290 K/UL (ref 135–450)
PMV BLD AUTO: 7 FL (ref 5–10.5)
POTASSIUM SERPL-SCNC: 4.3 MMOL/L (ref 3.5–5.1)
PROT SERPL-MCNC: 7.5 G/DL (ref 6.4–8.2)
RBC # BLD AUTO: 4.08 M/UL (ref 4.2–5.9)
SODIUM SERPL-SCNC: 129 MMOL/L (ref 136–145)
TRIGL SERPL-MCNC: 67 MG/DL (ref 0–150)
VLDLC SERPL CALC-MCNC: 13 MG/DL
WBC # BLD AUTO: 10.3 K/UL (ref 4–11)

## 2023-12-14 ENCOUNTER — NURSE ONLY (OUTPATIENT)
Dept: FAMILY MEDICINE CLINIC | Age: 68
End: 2023-12-14
Payer: MEDICARE

## 2023-12-14 DIAGNOSIS — Z95.2 S/P AVR: Chronic | ICD-10-CM

## 2023-12-14 LAB
INTERNATIONAL NORMALIZATION RATIO, POC: 1.7
PROTHROMBIN TIME, POC: 0

## 2023-12-14 PROCEDURE — 85610 PROTHROMBIN TIME: CPT | Performed by: FAMILY MEDICINE

## 2023-12-27 RX ORDER — VENLAFAXINE HYDROCHLORIDE 150 MG/1
CAPSULE, EXTENDED RELEASE ORAL
Qty: 30 CAPSULE | Refills: 5 | Status: SHIPPED | OUTPATIENT
Start: 2023-12-27

## 2023-12-28 ENCOUNTER — NURSE ONLY (OUTPATIENT)
Dept: FAMILY MEDICINE CLINIC | Age: 68
End: 2023-12-28
Payer: MEDICARE

## 2023-12-28 DIAGNOSIS — Z95.2 S/P AVR: Chronic | ICD-10-CM

## 2023-12-28 LAB
INTERNATIONAL NORMALIZATION RATIO, POC: 1.7
PROTHROMBIN TIME, POC: 0

## 2023-12-28 PROCEDURE — 85610 PROTHROMBIN TIME: CPT | Performed by: FAMILY MEDICINE

## 2024-01-05 DIAGNOSIS — Z79.01 ANTICOAGULANT LONG-TERM USE: ICD-10-CM

## 2024-01-05 RX ORDER — WARFARIN SODIUM 5 MG/1
TABLET ORAL
Qty: 60 TABLET | Refills: 2 | Status: SHIPPED | OUTPATIENT
Start: 2024-01-05

## 2024-01-11 ENCOUNTER — NURSE ONLY (OUTPATIENT)
Dept: FAMILY MEDICINE CLINIC | Age: 69
End: 2024-01-11
Payer: MEDICARE

## 2024-01-11 DIAGNOSIS — Z95.2 S/P AVR: Chronic | ICD-10-CM

## 2024-01-11 LAB
INTERNATIONAL NORMALIZATION RATIO, POC: 1.9
PROTHROMBIN TIME, POC: 0

## 2024-01-11 PROCEDURE — 85610 PROTHROMBIN TIME: CPT | Performed by: FAMILY MEDICINE

## 2024-01-11 NOTE — PROGRESS NOTES
Coumadin 1.9. Verified patient taking 7.5mg, 7.5mg, 7.5mg, 10mg. Took 7.5 mg he thinks last night. Per Dr. Martin take 10 mg today. 10 mg tomorrow. Then 10mg on Monday and Friday and recheck in a week.

## 2024-01-15 ENCOUNTER — OFFICE VISIT (OUTPATIENT)
Dept: PULMONOLOGY | Age: 69
End: 2024-01-15
Payer: MEDICARE

## 2024-01-15 VITALS
SYSTOLIC BLOOD PRESSURE: 114 MMHG | DIASTOLIC BLOOD PRESSURE: 62 MMHG | WEIGHT: 148 LBS | BODY MASS INDEX: 25.27 KG/M2 | HEART RATE: 50 BPM | HEIGHT: 64 IN

## 2024-01-15 DIAGNOSIS — J44.9 CHRONIC OBSTRUCTIVE PULMONARY DISEASE, UNSPECIFIED COPD TYPE (HCC): ICD-10-CM

## 2024-01-15 DIAGNOSIS — J47.1 BRONCHIECTASIS WITH ACUTE EXACERBATION (HCC): Primary | ICD-10-CM

## 2024-01-15 DIAGNOSIS — G47.33 OSA (OBSTRUCTIVE SLEEP APNEA): ICD-10-CM

## 2024-01-15 DIAGNOSIS — E66.3 OVERWEIGHT (BMI 25.0-29.9): ICD-10-CM

## 2024-01-15 DIAGNOSIS — G47.10 HYPERSOMNIA: ICD-10-CM

## 2024-01-15 PROCEDURE — 99214 OFFICE O/P EST MOD 30 MIN: CPT | Performed by: INTERNAL MEDICINE

## 2024-01-15 PROCEDURE — 1124F ACP DISCUSS-NO DSCNMKR DOCD: CPT | Performed by: INTERNAL MEDICINE

## 2024-01-15 RX ORDER — SPIRONOLACTONE 25 MG/1
TABLET ORAL
Qty: 30 TABLET | Refills: 3 | Status: SHIPPED | OUTPATIENT
Start: 2024-01-15

## 2024-01-15 ASSESSMENT — SLEEP AND FATIGUE QUESTIONNAIRES
HOW LIKELY ARE YOU TO NOD OFF OR FALL ASLEEP WHILE WATCHING TV: 2
NECK CIRCUMFERENCE (INCHES): 15
HOW LIKELY ARE YOU TO NOD OFF OR FALL ASLEEP WHILE SITTING QUIETLY AFTER LUNCH WITHOUT ALCOHOL: 3
HOW LIKELY ARE YOU TO NOD OFF OR FALL ASLEEP IN A CAR, WHILE STOPPED FOR A FEW MINUTES IN TRAFFIC: 1
HOW LIKELY ARE YOU TO NOD OFF OR FALL ASLEEP WHILE SITTING AND TALKING TO SOMEONE: 1
HOW LIKELY ARE YOU TO NOD OFF OR FALL ASLEEP WHILE SITTING AND READING: 0
HOW LIKELY ARE YOU TO NOD OFF OR FALL ASLEEP WHILE SITTING INACTIVE IN A PUBLIC PLACE: 3
HOW LIKELY ARE YOU TO NOD OFF OR FALL ASLEEP WHILE LYING DOWN TO REST IN THE AFTERNOON WHEN CIRCUMSTANCES PERMIT: 3
ESS TOTAL SCORE: 15
HOW LIKELY ARE YOU TO NOD OFF OR FALL ASLEEP WHEN YOU ARE A PASSENGER IN A CAR FOR AN HOUR WITHOUT A BREAK: 2

## 2024-01-15 ASSESSMENT — ENCOUNTER SYMPTOMS
EYE ITCHING: 0
SHORTNESS OF BREATH: 0
EYE DISCHARGE: 0
BACK PAIN: 0
ABDOMINAL DISTENTION: 0
ABDOMINAL PAIN: 0
COUGH: 0

## 2024-01-15 NOTE — PROGRESS NOTES
infection     Broken teeth     Upper And Lower     CHF (congestive heart failure) ([facility])     COPD (chronic obstructive pulmonary disease) ([facility])     Sees Dr. Díaz In [place] [place] [facility]     Cough     Frequent Cough With Green Sputum    Depression     Depression     Dyspnea 02/23/2018    H/O cardiac catheterization 05/17/2022    LAD: Moderate Lumen Irregularity.CALCIFIED mid LAD segment.    H/O cardiovascular MUGA stress test 05/14/2019    EF 50%, NORMAL LVEF, NORMAL WALL MOTION.    H/O echocardiogram 05/22/2014    EF=>50-55%, LV systolic function is low normal, mild aortic valve calcification, no pericardial effusion    H/O echocardiogram 12/12/2018    EF 45-50%    History of 24 hour EKG monitoring 06/06/2014    24 hr holter monitor.  Ventricular ectopics were noted in single and couplet forms. Supraventricular ectopics were noted in single and pair forms.    [place] (hard of hearing)     Bilateral Ears    Hx of Doppler echocardiogram 12/08/2022    EF 30-35% Mild TR.    Hyperlipidemia     Hypertension     Irritant contact dermatitis due to other chemical products 08/26/2019    Mycobacterium avium complex ([facility])     Other drug allergy(995.27) 07/22/2009    S/P AVR 12/2003    Mechanical valvue     Shortness of breath     Teeth missing     Upper And Lower    Type 2 diabetes mellitus 08/31/2022       Past Surgical History:   Procedure Laterality Date    BRONCHOSCOPY  1996    \"Done Twice\"    CARDIAC VALVE REPLACEMENT  12/17/2003    \"Aortic Valve Replacement, Mechanical Valve\"    COLONOSCOPY  2006    Polyps Removed    DENTAL SURGERY      Teeth Extracted In Past    ENDOSCOPY, COLON, DIAGNOSTIC  In Past    HEMIARTHROPLASTY SHOULDER FRACTURE Right 1/29/2019    SHOULDER HEMIARTHROPLASTY performed by Berto Duran DO at [facility] OR    KNEE ARTHROSCOPY Left 1992    LUNG BIOPSY Right 1996    OTHER SURGICAL HISTORY  1992    \"Cauterized Because My Sperm Was Low\"    ROTATOR CUFF REPAIR Right 2008    TONSILLECTOMY  1959

## 2024-01-25 ENCOUNTER — NURSE ONLY (OUTPATIENT)
Dept: FAMILY MEDICINE CLINIC | Age: 69
End: 2024-01-25
Payer: MEDICARE

## 2024-01-25 ENCOUNTER — HOSPITAL ENCOUNTER (OUTPATIENT)
Dept: ULTRASOUND IMAGING | Age: 69
Discharge: HOME | End: 2024-01-25
Payer: MEDICARE

## 2024-01-25 DIAGNOSIS — Z95.2 H/O MECHANICAL AORTIC VALVE REPLACEMENT: ICD-10-CM

## 2024-01-25 DIAGNOSIS — Z79.01 ANTICOAGULANT LONG-TERM USE: Primary | ICD-10-CM

## 2024-01-25 DIAGNOSIS — K40.90 INGUINAL HERNIA OF LEFT SIDE WITHOUT OBSTRUCTION OR GANGRENE: ICD-10-CM

## 2024-01-25 DIAGNOSIS — I38 VALVULAR HEART DISEASE: ICD-10-CM

## 2024-01-25 LAB
INTERNATIONAL NORMALIZATION RATIO, POC: 2.2
PROTHROMBIN TIME, POC: 0

## 2024-01-25 PROCEDURE — 76882 US LMTD JT/FCL EVL NVASC XTR: CPT

## 2024-01-25 PROCEDURE — 85610 PROTHROMBIN TIME: CPT | Performed by: FAMILY MEDICINE

## 2024-01-25 NOTE — PROGRESS NOTES
Coumadin 2.2. Verified patient taking  10mg on Monday and Friday 7.5 all other days. Per Brandt 10 mg Mon, Wed and Friday 7.5 all other days and recheck in 2 week.

## 2024-01-30 DIAGNOSIS — G47.33 OSA (OBSTRUCTIVE SLEEP APNEA): Primary | ICD-10-CM

## 2024-01-31 ENCOUNTER — TELEPHONE (OUTPATIENT)
Dept: FAMILY MEDICINE CLINIC | Age: 69
End: 2024-01-31

## 2024-01-31 DIAGNOSIS — K40.90 UNILATERAL INGUINAL HERNIA WITHOUT OBSTRUCTION OR GANGRENE, RECURRENCE NOT SPECIFIED: ICD-10-CM

## 2024-01-31 DIAGNOSIS — R19.09 GROIN SWELLING: Primary | ICD-10-CM

## 2024-02-05 ENCOUNTER — TELEPHONE (OUTPATIENT)
Dept: PULMONOLOGY | Age: 69
End: 2024-02-05

## 2024-02-05 NOTE — TELEPHONE ENCOUNTER
Can you please call in something for the pt to help him sleep the night of his sleep study. Pt is willing to do in lab instead of HST if so. Can you please send to [facility] on [address].

## 2024-02-08 ENCOUNTER — NURSE ONLY (OUTPATIENT)
Dept: FAMILY MEDICINE CLINIC | Age: 69
End: 2024-02-08
Payer: MEDICARE

## 2024-02-08 DIAGNOSIS — Z95.2 H/O MECHANICAL AORTIC VALVE REPLACEMENT: ICD-10-CM

## 2024-02-08 DIAGNOSIS — Z79.01 ANTICOAGULANT LONG-TERM USE: ICD-10-CM

## 2024-02-08 LAB
INTERNATIONAL NORMALIZATION RATIO, POC: 5.4
PROTHROMBIN TIME, POC: 0

## 2024-02-08 PROCEDURE — 85610 PROTHROMBIN TIME: CPT | Performed by: FAMILY MEDICINE

## 2024-02-08 NOTE — PROGRESS NOTES
Verified patient taking  10mg on Monday, Wednesday and  Friday 7.5 all other days. INR 5.4. Per Kiki patient to hold today, take 10 mg two days per week and 7.5 mg all other days. Recheck in 2 weeks.

## 2024-02-12 DIAGNOSIS — G47.00 INSOMNIA, UNSPECIFIED TYPE: Primary | ICD-10-CM

## 2024-02-12 RX ORDER — ZOLPIDEM TARTRATE 5 MG/1
5 TABLET ORAL NIGHTLY PRN
Qty: 14 TABLET | Refills: 0 | Status: SHIPPED | OUTPATIENT
Start: 2024-02-12 | End: 2024-02-26

## 2024-02-14 ENCOUNTER — OFFICE VISIT (OUTPATIENT)
Dept: SURGERY | Age: 69
End: 2024-02-14
Payer: MEDICARE

## 2024-02-14 VITALS
OXYGEN SATURATION: 98 % | HEART RATE: 68 BPM | BODY MASS INDEX: 26.74 KG/M2 | DIASTOLIC BLOOD PRESSURE: 64 MMHG | SYSTOLIC BLOOD PRESSURE: 98 MMHG | WEIGHT: 155.8 LBS

## 2024-02-14 DIAGNOSIS — K40.90 LEFT INGUINAL HERNIA: Primary | ICD-10-CM

## 2024-02-14 DIAGNOSIS — Z01.818 PRE-OP EVALUATION: Primary | ICD-10-CM

## 2024-02-14 PROCEDURE — 1124F ACP DISCUSS-NO DSCNMKR DOCD: CPT | Performed by: SURGERY

## 2024-02-14 PROCEDURE — 99204 OFFICE O/P NEW MOD 45 MIN: CPT | Performed by: SURGERY

## 2024-02-14 RX ORDER — SODIUM CHLORIDE 9 MG/ML
INJECTION, SOLUTION INTRAVENOUS CONTINUOUS
OUTPATIENT
Start: 2024-02-14

## 2024-02-14 RX ORDER — SODIUM CHLORIDE 9 MG/ML
INJECTION, SOLUTION INTRAVENOUS PRN
OUTPATIENT
Start: 2024-02-14

## 2024-02-14 RX ORDER — SODIUM CHLORIDE 0.9 % (FLUSH) 0.9 %
5-40 SYRINGE (ML) INJECTION PRN
OUTPATIENT
Start: 2024-02-14

## 2024-02-14 RX ORDER — SODIUM CHLORIDE 0.9 % (FLUSH) 0.9 %
5-40 SYRINGE (ML) INJECTION EVERY 12 HOURS SCHEDULED
OUTPATIENT
Start: 2024-02-14

## 2024-02-14 NOTE — PROGRESS NOTES
SUBJECTIVE:  HPI:     Patient presents with left inguinal hernia.  He complains of bulge in left groin worse with lifting/straining.  Has been reducible.  Denies changes in bowel habits or issues with urinary retention.  He had ultrasound showing left inguinal hernia.  Patient has history as noted below including aortic valve replacement/mechanical valve and is on Coumadin.    Past Surgical History:   Procedure Laterality Date    BRONCHOSCOPY  1996    \"Done Twice\"    CARDIAC VALVE REPLACEMENT  12/17/2003    \"Aortic Valve Replacement, Mechanical Valve\"    COLONOSCOPY  2006    Polyps Removed    DENTAL SURGERY      Teeth Extracted In Past    ENDOSCOPY, COLON, DIAGNOSTIC  In Past    HEMIARTHROPLASTY SHOULDER FRACTURE Right 1/29/2019    SHOULDER HEMIARTHROPLASTY performed by Berto Duran DO at [facility] OR    KNEE ARTHROSCOPY Left 1992    LUNG BIOPSY Right 1996    OTHER SURGICAL HISTORY  1992    \"Cauterized Because My Sperm Was Low\"    ROTATOR CUFF REPAIR Right 2008    TONSILLECTOMY  1959 Or 1960    TOTAL HIP ARTHROPLASTY Right 10/25/2021    RIGHT HIP TOTAL ARTHROPLASTY performed by Berto Duran DO at [facility] OR    UPPER GASTROINTESTINAL ENDOSCOPY N/A 11/10/2021    EGD BIOPSY performed by Haim Camara MD at [facility] ENDOSCOPY     Past Medical History:   Diagnosis Date    Acid reflux     Acute frontal sinusitis 07/22/2009    Alcohol abuse     \"I Drink Average 2 Beers A Day\"    Anesthesia     \" I get agitated\"    Anxiety     Arthritis     \"Right Shoulder, Neck, Left Knee\"    Atypical mycobacterial infection     Broken teeth     Upper And Lower     CHF (congestive heart failure) ([facility])     COPD (chronic obstructive pulmonary disease) ([facility])     Sees Dr. Díaz In [place], [place] [facility]     Cough     Frequent Cough With Green Sputum    Depression     Depression     Dyspnea 02/23/2018    H/O cardiac catheterization 05/17/2022    LAD: Moderate Lumen Irregularity.CALCIFIED mid LAD segment.    H/O cardiovascular MUGA

## 2024-02-21 ENCOUNTER — NURSE ONLY (OUTPATIENT)
Dept: FAMILY MEDICINE CLINIC | Age: 69
End: 2024-02-21
Payer: MEDICARE

## 2024-02-21 DIAGNOSIS — Z79.01 ANTICOAGULANT LONG-TERM USE: ICD-10-CM

## 2024-02-21 DIAGNOSIS — Z95.2 H/O MECHANICAL AORTIC VALVE REPLACEMENT: ICD-10-CM

## 2024-02-21 LAB
INTERNATIONAL NORMALIZATION RATIO, POC: 8
PROTHROMBIN TIME, POC: ABNORMAL

## 2024-02-21 PROCEDURE — 85610 PROTHROMBIN TIME: CPT | Performed by: FAMILY MEDICINE

## 2024-02-21 NOTE — PROGRESS NOTES
Patient taking 10mg on Monday/Friday and 7.5mg on other 5 days.  INR >8.0.  Dr Flores notified.  Patient to hold warfarin until Friday and return for recheck.  Patient expressed understanding

## 2024-02-26 ENCOUNTER — NURSE ONLY (OUTPATIENT)
Dept: FAMILY MEDICINE CLINIC | Age: 69
End: 2024-02-26

## 2024-02-26 DIAGNOSIS — I38 VALVULAR HEART DISEASE: Primary | ICD-10-CM

## 2024-02-26 LAB — INTERNATIONAL NORMALIZATION RATIO, POC: 1.2

## 2024-02-26 NOTE — PROGRESS NOTES
Patient held dose for 2 days (22nd &23rd) then 5 mg on Saturday and 7.5 mg on Sunday. INR 1.2   Per Dr. Martin 7.5 mg everyday and recheck on Friday, no later than Monday. Patient voiced understanding.

## 2024-02-27 ENCOUNTER — TELEPHONE (OUTPATIENT)
Dept: CARDIOLOGY CLINIC | Age: 69
End: 2024-02-27

## 2024-02-27 NOTE — TELEPHONE ENCOUNTER
Cardiologist: Dr. Young  Surgeon: Dr. Dunn    Surgery: Left Inguinal Hernia Repair possible right   Anesthesia: General  Date: Pending      Last OV 12/4/23 susanna/Hannah

## 2024-02-28 NOTE — TELEPHONE ENCOUNTER
Mechanical aortic valve does not need bridging for minimally invasive surgery. With his history of Cardiomyopathy and recent EF 30-35% I would consider bridging coumadin for stroke prevention. He's considered moderate to high risk

## 2024-02-28 NOTE — TELEPHONE ENCOUNTER
Dr Dunn Office is wanting to know if would like a lovenox bridge for him since he is on coumadin.

## 2024-03-01 ENCOUNTER — TELEPHONE (OUTPATIENT)
Dept: BARIATRICS/WEIGHT MGMT | Age: 69
End: 2024-03-01

## 2024-03-01 NOTE — TELEPHONE ENCOUNTER
SPOKE TO /Manas Walters REGARDING SURGERY selena mathew emma right SCHEDULED @ [facility]. NOTIFIED OF DATES, TIMES AND LOCATION    PHONE ASSESSMENT   SURGERY - 3/14/24 730   P/O -3/14/24 11    NPO AFTER MIDNIGHT    HOLD BLOOD THINNERS -    warfarin (COUMADIN) 5 MG tablet   aspirin 81 MG chewable tablet  hold both 5 days     Mechanical aortic valve does not need bridging for minimally invasive surgery. With his history of Cardiomyopathy and recent EF 30-35% I would consider bridging coumadin for stroke prevention. He's considered moderate to high risk

## 2024-03-04 ENCOUNTER — NURSE ONLY (OUTPATIENT)
Dept: FAMILY MEDICINE CLINIC | Age: 69
End: 2024-03-04
Payer: MEDICARE

## 2024-03-04 ENCOUNTER — ANTI-COAG VISIT (OUTPATIENT)
Dept: FAMILY MEDICINE CLINIC | Age: 69
End: 2024-03-04

## 2024-03-04 DIAGNOSIS — Z95.2 H/O MECHANICAL AORTIC VALVE REPLACEMENT: Primary | ICD-10-CM

## 2024-03-04 DIAGNOSIS — Z95.2 H/O MECHANICAL AORTIC VALVE REPLACEMENT: ICD-10-CM

## 2024-03-04 DIAGNOSIS — E78.2 MIXED HYPERLIPIDEMIA: ICD-10-CM

## 2024-03-04 DIAGNOSIS — Z79.01 ANTICOAGULANT LONG-TERM USE: ICD-10-CM

## 2024-03-04 LAB
INTERNATIONAL NORMALIZATION RATIO, POC: 2.7
PROTHROMBIN TIME, POC: 32.5

## 2024-03-04 PROCEDURE — 85610 PROTHROMBIN TIME: CPT | Performed by: FAMILY MEDICINE

## 2024-03-04 RX ORDER — SIMVASTATIN 40 MG
TABLET ORAL
Qty: 90 TABLET | Refills: 1 | Status: SHIPPED | OUTPATIENT
Start: 2024-03-04

## 2024-03-04 NOTE — PROGRESS NOTES
Confirmed pt taking warfarin 7.5 mg qd & has cut back on alcohol consumption. Inr 2.7. keep same recheck in 4 weeks

## 2024-03-06 ENCOUNTER — TELEPHONE (OUTPATIENT)
Dept: FAMILY MEDICINE CLINIC | Age: 69
End: 2024-03-06

## 2024-03-06 ENCOUNTER — TELEPHONE (OUTPATIENT)
Dept: SURGERY | Age: 69
End: 2024-03-06

## 2024-03-06 NOTE — TELEPHONE ENCOUNTER
Patient called about surgery and postponing till he speaks to someone about his lovenox bridging. He will call Dr. Flores's office. Surgery Scheduler notified

## 2024-03-11 ENCOUNTER — OFFICE VISIT (OUTPATIENT)
Dept: FAMILY MEDICINE CLINIC | Age: 69
End: 2024-03-11
Payer: MEDICARE

## 2024-03-11 VITALS
HEIGHT: 64 IN | WEIGHT: 154.5 LBS | DIASTOLIC BLOOD PRESSURE: 60 MMHG | HEART RATE: 78 BPM | SYSTOLIC BLOOD PRESSURE: 118 MMHG | OXYGEN SATURATION: 95 % | RESPIRATION RATE: 16 BRPM | BODY MASS INDEX: 26.38 KG/M2

## 2024-03-11 DIAGNOSIS — Z95.2 S/P AVR: Primary | Chronic | ICD-10-CM

## 2024-03-11 DIAGNOSIS — I42.9 CARDIOMYOPATHY, UNSPECIFIED TYPE (HCC): ICD-10-CM

## 2024-03-11 DIAGNOSIS — K40.90 INGUINAL HERNIA WITHOUT OBSTRUCTION OR GANGRENE, RECURRENCE NOT SPECIFIED, UNSPECIFIED LATERALITY: ICD-10-CM

## 2024-03-11 DIAGNOSIS — J44.9 CHRONIC OBSTRUCTIVE PULMONARY DISEASE, UNSPECIFIED COPD TYPE (HCC): Chronic | ICD-10-CM

## 2024-03-11 PROCEDURE — 1124F ACP DISCUSS-NO DSCNMKR DOCD: CPT | Performed by: FAMILY MEDICINE

## 2024-03-11 PROCEDURE — 99213 OFFICE O/P EST LOW 20 MIN: CPT | Performed by: FAMILY MEDICINE

## 2024-03-11 RX ORDER — FERROUS SULFATE 325(65) MG
TABLET ORAL
Qty: 90 TABLET | Refills: 1 | Status: SHIPPED | OUTPATIENT
Start: 2024-03-11

## 2024-03-11 SDOH — ECONOMIC STABILITY: FOOD INSECURITY: WITHIN THE PAST 12 MONTHS, THE FOOD YOU BOUGHT JUST DIDN'T LAST AND YOU DIDN'T HAVE MONEY TO GET MORE.: NEVER TRUE

## 2024-03-11 SDOH — ECONOMIC STABILITY: FOOD INSECURITY: WITHIN THE PAST 12 MONTHS, YOU WORRIED THAT YOUR FOOD WOULD RUN OUT BEFORE YOU GOT MONEY TO BUY MORE.: NEVER TRUE

## 2024-03-11 SDOH — ECONOMIC STABILITY: INCOME INSECURITY: HOW HARD IS IT FOR YOU TO PAY FOR THE VERY BASICS LIKE FOOD, HOUSING, MEDICAL CARE, AND HEATING?: NOT VERY HARD

## 2024-03-11 ASSESSMENT — PATIENT HEALTH QUESTIONNAIRE - PHQ9
9. THOUGHTS THAT YOU WOULD BE BETTER OFF DEAD, OR OF HURTING YOURSELF: 0
8. MOVING OR SPEAKING SO SLOWLY THAT OTHER PEOPLE COULD HAVE NOTICED. OR THE OPPOSITE, BEING SO FIGETY OR RESTLESS THAT YOU HAVE BEEN MOVING AROUND A LOT MORE THAN USUAL: 0
7. TROUBLE CONCENTRATING ON THINGS, SUCH AS READING THE NEWSPAPER OR WATCHING TELEVISION: 0
10. IF YOU CHECKED OFF ANY PROBLEMS, HOW DIFFICULT HAVE THESE PROBLEMS MADE IT FOR YOU TO DO YOUR WORK, TAKE CARE OF THINGS AT HOME, OR GET ALONG WITH OTHER PEOPLE: 0
SUM OF ALL RESPONSES TO PHQ QUESTIONS 1-9: 0
1. LITTLE INTEREST OR PLEASURE IN DOING THINGS: 0
2. FEELING DOWN, DEPRESSED OR HOPELESS: 0
4. FEELING TIRED OR HAVING LITTLE ENERGY: 0
SUM OF ALL RESPONSES TO PHQ QUESTIONS 1-9: 0
3. TROUBLE FALLING OR STAYING ASLEEP: 0
SUM OF ALL RESPONSES TO PHQ QUESTIONS 1-9: 0
SUM OF ALL RESPONSES TO PHQ QUESTIONS 1-9: 0
6. FEELING BAD ABOUT YOURSELF - OR THAT YOU ARE A FAILURE OR HAVE LET YOURSELF OR YOUR FAMILY DOWN: 0
SUM OF ALL RESPONSES TO PHQ9 QUESTIONS 1 & 2: 0
5. POOR APPETITE OR OVEREATING: 0

## 2024-03-11 ASSESSMENT — ENCOUNTER SYMPTOMS
NAUSEA: 0
VOMITING: 0
DIARRHEA: 0
BLOOD IN STOOL: 0
TROUBLE SWALLOWING: 0
SHORTNESS OF BREATH: 0
ABDOMINAL PAIN: 0
EYE PAIN: 0
CHEST TIGHTNESS: 0
WHEEZING: 0

## 2024-03-11 NOTE — PROGRESS NOTES
3/11/2024    Manas Walters    Chief Complaint   Patient presents with    3 Month Follow-Up     3 month.     Hernia     Having hernia surgery. Discuss Lovenox. Ajit Wu said he did not need the Lovenox.        HPI  History was obtained from the patient.  Manas is a 68 y.o. male who presents today with discussion of need for Lovenox bridging in perioperative period for inguinal hernia repair.  Patient does have a history of aortic valve replacement (mechanical) as well as a history of cardiomyopathy.  Per cardiology notes-he technically does not need Lovenox bridging because of the mechanical aortic valve because of the relatively noninvasive nature of the procedure, but because the cardiomyopathy -bridging would be helpful to prevent the risk of stroke.  I spent some time discussing that with the patient.  Patient's history of bipolar psychosis currently doing well.  No increased shortness of breath at this point.  He has a past history of interstitial lung disease and bronchiectasis.    REVIEW OF SYMPTOMS    Review of Systems   Constitutional:  Negative for activity change and fatigue.   HENT:  Negative for congestion, hearing loss, mouth sores and trouble swallowing.    Eyes:  Negative for pain and visual disturbance.   Respiratory:  Negative for chest tightness, shortness of breath and wheezing.         COPD has been doing fairly well.   Cardiovascular:  Negative for chest pain and palpitations.        Patient with history of cardiomyopathy as well as hypertension and mechanical aortic valve replacement   Gastrointestinal:  Negative for abdominal pain, blood in stool, diarrhea, nausea and vomiting.        Patient with left inguinal and possible right inguinal hernia.   Endocrine: Negative for polydipsia and polyuria.        Patient with history of diabetes mellitus type 2-last A1c was 5.3%.   Genitourinary:  Negative for dysuria, frequency and urgency.   Musculoskeletal:  Negative for arthralgias, gait

## 2024-03-15 ENCOUNTER — TELEPHONE (OUTPATIENT)
Dept: CARDIOLOGY CLINIC | Age: 69
End: 2024-03-15

## 2024-03-15 NOTE — TELEPHONE ENCOUNTER
Dr Dunn office called to clarify if patient should have a Lovenox bridge. Hernia surgery date pending. Please advice.

## 2024-03-18 ENCOUNTER — NURSE ONLY (OUTPATIENT)
Dept: FAMILY MEDICINE CLINIC | Age: 69
End: 2024-03-18

## 2024-03-18 DIAGNOSIS — I06.9 RHEUMATIC AORTIC VALVE DISEASE: Primary | ICD-10-CM

## 2024-03-18 LAB — INTERNATIONAL NORMALIZATION RATIO, POC: 4.4

## 2024-03-18 NOTE — PROGRESS NOTES
pt supposed to take warfarin 7.5 mg qd. Confirmed he was taking 10mg on M/F 7.5 mg on all others. Inr 4.4. 7.5 mg QD  recheck in 2 weeks per Mark.   Patient left without giving instructions. Will call him with directions.

## 2024-03-19 ENCOUNTER — TELEPHONE (OUTPATIENT)
Dept: FAMILY MEDICINE CLINIC | Age: 69
End: 2024-03-19

## 2024-03-19 NOTE — TELEPHONE ENCOUNTER
Spoke to Brandon and informed him Dr. Flores wants him to take 7.5 mg daily and recheck in 2 weeks. Voiced understanding.

## 2024-03-27 ENCOUNTER — TELEPHONE (OUTPATIENT)
Dept: CARDIOLOGY CLINIC | Age: 69
End: 2024-03-27

## 2024-03-27 NOTE — TELEPHONE ENCOUNTER
Please forward to Dr. Flores's office they are managing the coumadin. Weight base lovenox bridging is acceptable.

## 2024-03-27 NOTE — TELEPHONE ENCOUNTER
Dr. Dunn office called and is asking for a little more clarification on patient. Patient is having hernia surgery and they want to know if the Lovenox bridge going to be enough or will he need to be admitted to be on heparin?

## 2024-03-30 DIAGNOSIS — Z79.01 ANTICOAGULANT LONG-TERM USE: ICD-10-CM

## 2024-04-01 ENCOUNTER — TELEPHONE (OUTPATIENT)
Dept: FAMILY MEDICINE CLINIC | Age: 69
End: 2024-04-01

## 2024-04-01 RX ORDER — WARFARIN SODIUM 5 MG/1
TABLET ORAL
Qty: 60 TABLET | Refills: 2 | Status: SHIPPED | OUTPATIENT
Start: 2024-04-01

## 2024-04-01 NOTE — TELEPHONE ENCOUNTER
----- Message from Callie Benson sent at 4/1/2024  8:10 AM EDT -----  Subject: Message to Provider    QUESTIONS  Information for Provider? Patient needs to reschedule his appointment for   his shot.  ---------------------------------------------------------------------------  --------------  CALL BACK INFO  [phone]; OK to leave message on voicemail  ---------------------------------------------------------------------------  --------------  SCRIPT ANSWERS  Relationship to Patient? Self

## 2024-04-02 ENCOUNTER — TELEPHONE (OUTPATIENT)
Dept: SURGERY | Age: 69
End: 2024-04-02

## 2024-04-02 ENCOUNTER — TELEPHONE (OUTPATIENT)
Dept: FAMILY MEDICINE CLINIC | Age: 69
End: 2024-04-02

## 2024-04-02 NOTE — TELEPHONE ENCOUNTER
CALLED DR BAILEY'S OFFICE TO SEE IF PATIENT NEEDS ADMITTED WITH HEPARIN PRIOR TO SURGERY- WAITING FOR CALL BACK TO PROCEED WITH SCHEDULING

## 2024-04-02 NOTE — TELEPHONE ENCOUNTER
Yes, patient with mechanical heart valve.  Will then need to be restarted on Coumadin along with either Lovenox or heparin.

## 2024-04-02 NOTE — TELEPHONE ENCOUNTER
Kathrine from Dr Heath office called and needs to know if patient will need Heparin for his upcoming Hernia surgery. Call Kathrine and advise

## 2024-04-03 ENCOUNTER — NURSE ONLY (OUTPATIENT)
Dept: FAMILY MEDICINE CLINIC | Age: 69
End: 2024-04-03

## 2024-04-03 DIAGNOSIS — Z79.01 ANTICOAGULATION MONITORING, INR RANGE 2.5-3.5: Primary | ICD-10-CM

## 2024-04-03 LAB — INTERNATIONAL NORMALIZATION RATIO, POC: 1.8

## 2024-04-03 RX ORDER — ENOXAPARIN SODIUM 100 MG/ML
INJECTION SUBCUTANEOUS
Qty: 18 EACH | Refills: 0 | Status: SHIPPED | OUTPATIENT
Start: 2024-04-03

## 2024-04-03 NOTE — PROGRESS NOTES
Finger stick lt middle finger, pt supposed to take warfarin 7.5 mg qd. Confirmed he was taking 7.5 mg QD Inr 1.8, Add 1/2 tab for today only, then 7.5 mg QD  recheck in 2 weeks per Mark.    March 8, 2017      KADY Hoyt MD  [address] [facility]  [facility] [address]           [facility] Neurology  [address] [facility] [address] [place] [address]  Phone: [phone]  Fax: [phone]          Patient: Guadalupe Sharp   MR Number: [id number]   YOB: 1995   Date of Visit: 3/1/2017       Dear Dr. KADY Hoyt:    Thank you for referring Guadalupe Sharp to me for evaluation. Attached you will find relevant portions of my assessment and plan of care.    If you have questions, please do not hesitate to call me. I look forward to following Guadalupe Sharp along with you.    Sincerely,    Remy Quintanilla Jr., MD    Enclosure  CC:  No Recipients    If you would like to receive this communication electronically, please contact [email or web address] or [phone] to request more information on [employer] access.    For providers and/or their staff who would like to refer a patient to [facility], please contact us through our one-stop-shop provider referral line, [facility], at [phone].    If you feel you have received this communication in error or would no longer like to receive these types of communications, please e-mail [email or web address]

## 2024-04-05 ENCOUNTER — TELEPHONE (OUTPATIENT)
Age: 69
End: 2024-04-05

## 2024-04-05 NOTE — TELEPHONE ENCOUNTER
SPOKE TO FOR Manas Walters REGARDING SURGERY LEFT POSSIBLE RIGHT HERNIA INGUINAL REPAIR LAPAROSCOPIC ROBOTIC  SCHEDULED @ [facility]. NOTIFIED OF DATES, TIMES AND LOCATION    PHONE ASSESSMENT   SURGERY - 4/18/24 at 7:30   P/O - 5/1/24 at 9:00    NPO AFTER MIDNIGHT  HOLD BLOOD THINNERS - instructions given by Dr. Flores office- Sharon instructed PT on D/C of coumadin, when to start and stop Lovenox  , and when to resume Coumadin.

## 2024-04-09 ENCOUNTER — TELEPHONE (OUTPATIENT)
Dept: FAMILY MEDICINE CLINIC | Age: 69
End: 2024-04-09

## 2024-04-09 NOTE — TELEPHONE ENCOUNTER
Spoke to patient per Jaswinder' direction. He voiced understanding. Directions are correct in signature.   
To Dr. Navjot Momin:    enoxaparin (LOVENOX) 80 MG/0.8ML     Please call patient and let him know about the dosing of this medicine.  He said the pharmacist gave him 2 boxes and the directions are different than what he is use to taking when he has to take this.  (He has upcoming hernia surgery).      
Previously Declined (within the last year)

## 2024-04-12 ENCOUNTER — HOSPITAL ENCOUNTER (OUTPATIENT)
Age: 69
Discharge: HOME | End: 2024-04-12
Payer: MEDICARE

## 2024-04-12 ENCOUNTER — HOSPITAL ENCOUNTER (OUTPATIENT)
Dept: GENERAL RADIOLOGY | Age: 69
Discharge: HOME | End: 2024-04-12
Payer: MEDICARE

## 2024-04-12 DIAGNOSIS — Z01.818 PRE-OP TESTING: ICD-10-CM

## 2024-04-12 LAB
ANION GAP SERPL CALCULATED.3IONS-SCNC: 15 MMOL/L (ref 7–16)
BASOPHILS ABSOLUTE: 0 K/CU MM
BASOPHILS RELATIVE PERCENT: 0.3 % (ref 0–1)
BUN SERPL-MCNC: 19 MG/DL (ref 6–23)
CALCIUM SERPL-MCNC: 9.1 MG/DL (ref 8.3–10.6)
CHLORIDE BLD-SCNC: 101 MMOL/L (ref 99–110)
CO2: 21 MMOL/L (ref 21–32)
CREAT SERPL-MCNC: 0.8 MG/DL (ref 0.9–1.3)
DIFFERENTIAL TYPE: ABNORMAL
EOSINOPHILS ABSOLUTE: 0 K/CU MM
EOSINOPHILS RELATIVE PERCENT: 0.2 % (ref 0–3)
GFR SERPL CREATININE-BSD FRML MDRD: >90 ML/MIN/1.73M2
GLUCOSE SERPL-MCNC: 84 MG/DL (ref 70–99)
HCT VFR BLD CALC: 41.2 % (ref 42–52)
HEMOGLOBIN: 13.6 GM/DL (ref 13.5–18)
IMMATURE NEUTROPHIL %: 0.3 % (ref 0–0.43)
LYMPHOCYTES ABSOLUTE: 0.8 K/CU MM
LYMPHOCYTES RELATIVE PERCENT: 9.3 % (ref 24–44)
MCH RBC QN AUTO: 32.4 PG (ref 27–31)
MCHC RBC AUTO-ENTMCNC: 33 % (ref 32–36)
MCV RBC AUTO: 98.1 FL (ref 78–100)
MONOCYTES ABSOLUTE: 0.9 K/CU MM
MONOCYTES RELATIVE PERCENT: 10.1 % (ref 0–4)
NEUTROPHILS RELATIVE PERCENT: 79.8 % (ref 36–66)
NUCLEATED RBC %: 0 %
PDW BLD-RTO: 13.2 % (ref 11.7–14.9)
PLATELET # BLD: 252 K/CU MM (ref 140–440)
PMV BLD AUTO: 8.9 FL (ref 7.5–11.1)
POTASSIUM SERPL-SCNC: 4.6 MMOL/L (ref 3.5–5.1)
RBC # BLD: 4.2 M/CU MM (ref 4.6–6.2)
SEGMENTED NEUTROPHILS ABSOLUTE COUNT: 6.9 K/CU MM
SODIUM BLD-SCNC: 137 MMOL/L (ref 135–145)
TOTAL IMMATURE NEUTOROPHIL: 0.03 K/CU MM
TOTAL NUCLEATED RBC: 0 K/CU MM
WBC # BLD: 8.7 K/CU MM (ref 4–10.5)

## 2024-04-12 PROCEDURE — 71046 X-RAY EXAM CHEST 2 VIEWS: CPT

## 2024-04-12 PROCEDURE — 80048 BASIC METABOLIC PNL TOTAL CA: CPT

## 2024-04-12 PROCEDURE — 85025 COMPLETE CBC W/AUTO DIFF WBC: CPT

## 2024-04-17 ENCOUNTER — ANTI-COAG VISIT (OUTPATIENT)
Dept: FAMILY MEDICINE CLINIC | Age: 69
End: 2024-04-17

## 2024-04-17 ENCOUNTER — NURSE ONLY (OUTPATIENT)
Dept: FAMILY MEDICINE CLINIC | Age: 69
End: 2024-04-17
Payer: MEDICARE

## 2024-04-17 DIAGNOSIS — Z95.2 H/O MECHANICAL AORTIC VALVE REPLACEMENT: ICD-10-CM

## 2024-04-17 DIAGNOSIS — Z95.2 S/P AVR: Primary | Chronic | ICD-10-CM

## 2024-04-17 DIAGNOSIS — Z79.01 ANTICOAGULANT LONG-TERM USE: ICD-10-CM

## 2024-04-17 PROCEDURE — 85610 PROTHROMBIN TIME: CPT | Performed by: FAMILY MEDICINE

## 2024-04-17 NOTE — PROGRESS NOTES
pt states on 4/13/24 pt holding aspirin & warfarin started lovenox, scheduled for surgery 4/18/24. On 4/16/24 am lovenox injection, no blood thinner since. Pt drinks beer everyday 8 cans per day, did have only 3 4/16/24. Today has unexplained bruising left forearm. Pt cancelled hernia surgery scheduled 4/18/24. Inr 1.0. Per Dr Montes \"pt is going to take lovenox and warfarin 7.5 mg qd for 3 days & then just warfain 7.5 mg qd. Recheck inr end of next week

## 2024-04-22 ENCOUNTER — OFFICE VISIT (OUTPATIENT)
Dept: CARDIOLOGY CLINIC | Age: 69
End: 2024-04-22
Payer: MEDICARE

## 2024-04-22 VITALS
DIASTOLIC BLOOD PRESSURE: 68 MMHG | HEIGHT: 64 IN | HEART RATE: 68 BPM | BODY MASS INDEX: 27.49 KG/M2 | WEIGHT: 161 LBS | SYSTOLIC BLOOD PRESSURE: 124 MMHG

## 2024-04-22 DIAGNOSIS — I50.22 CHRONIC SYSTOLIC HEART FAILURE (HCC): Chronic | ICD-10-CM

## 2024-04-22 DIAGNOSIS — I42.6 ALCOHOLIC CARDIOMYOPATHY (HCC): ICD-10-CM

## 2024-04-22 DIAGNOSIS — E78.2 MIXED HYPERLIPIDEMIA: Chronic | ICD-10-CM

## 2024-04-22 DIAGNOSIS — I38 VALVULAR HEART DISEASE: Primary | ICD-10-CM

## 2024-04-22 DIAGNOSIS — F10.10 ETOH ABUSE: ICD-10-CM

## 2024-04-22 PROBLEM — I50.33 ACUTE ON CHRONIC DIASTOLIC (CONGESTIVE) HEART FAILURE (HCC): Status: RESOLVED | Noted: 2022-08-23 | Resolved: 2024-04-22

## 2024-04-22 PROCEDURE — 1124F ACP DISCUSS-NO DSCNMKR DOCD: CPT | Performed by: NURSE PRACTITIONER

## 2024-04-22 PROCEDURE — 99214 OFFICE O/P EST MOD 30 MIN: CPT | Performed by: NURSE PRACTITIONER

## 2024-04-22 ASSESSMENT — ENCOUNTER SYMPTOMS: SHORTNESS OF BREATH: 0

## 2024-04-22 NOTE — PROGRESS NOTES
Chronic systolic heart failure (HCC)  Appears euvolemic   Cont with current medications   Monitor weight daily  Limit sodium to < 2000 mg a day  Limit water to < 64 oz in a day  Call the office if a weight gain of >3 lbs in 2 days or > 5 lbs in a week is noted.     Cardiomyopathy  -Recent heart specialization for fluid overload.  EF significantly reduced 10-15%, in August of 2022. F/U echo shows some improvement in EF now 30-35% no ICD required at this time.  Continue with GDMT losartan 25 mg daily, Aldactone 25 mg daily, jardiance 10 mg, Toprol XL 25 mg BID.     -Increased ETOH 6-12 pack of beer a day. Recommended to quit, counseling offered.    ETOH abuse  Risks of further demise and heart function and complications with clotting times reviewed.  Patient voices understanding and reports he will try and cut back on drinking.      Mixed hyperlipidemia  -At or near goal Yes , triglycerides normal. Hold off on Omega 3 due to increased bleeding risk.    -He is to continue current medications (Zocor 40 mg) Hepatic function panel WNL. No abdominal pain. No myalgias.      -The nature of cardiac risk has been fully discussed with this patient. I have made him aware of his LDL target goal given his cardiovascular risk analysis. I have discussed the appropriate diet. The need for lifelong compliance in order to reduce risk is stressed. A regular exercise program is recommended to help achieve and maintain normal body weight, fitness and improve lipid balance. A written copy of a low fat, low cholesterol diet has been given to the patient.      Valvular heart disease  -Patient had aortic valve replacement in 2003. Patient on coumadin for mechanical valve. Echo shows stable valve, EF 30-35%. Patient had lovenox bridge for 5 days prior to hernia surgery given cardiomyopathy. Now back on coumadin.  Stat echo does not show any echogenic material on mitral valve.  EF 30-35%.  Mechanical valve well-seated noted to have mild

## 2024-04-24 NOTE — H&P (VIEW-ONLY)
SUBJECTIVE:  HPI:   Patient has large symptomatic left inguinal hernia.  Had been scheduled for surgery but canceled due to having bruising and concern for bleeding during surgery.  We have gone over the risk benefits alternatives of surgery today.  He is at high cardiac risk however given the large hernia he was still like to proceed with surgery.  We will arrange the Lovenox bridge again and plan for surgery.  We will obtain INR on day of surgery.  No other new questions today    Past Surgical History:   Procedure Laterality Date    BRONCHOSCOPY  1996    \"Done Twice\"    CARDIAC VALVE REPLACEMENT  12/17/2003    \"Aortic Valve Replacement, Mechanical Valve\"    COLONOSCOPY  2006    Polyps Removed    DENTAL SURGERY      Teeth Extracted In Past    ENDOSCOPY, COLON, DIAGNOSTIC  In Past    HEMIARTHROPLASTY SHOULDER FRACTURE Right 1/29/2019    SHOULDER HEMIARTHROPLASTY performed by Berto Duran DO at [facility] OR    KNEE ARTHROSCOPY Left 1992    LUNG BIOPSY Right 1996    OTHER SURGICAL HISTORY  1992    \"Cauterized Because My Sperm Was Low\"    ROTATOR CUFF REPAIR Right 2008    TONSILLECTOMY  1959 Or 1960    TOTAL HIP ARTHROPLASTY Right 10/25/2021    RIGHT HIP TOTAL ARTHROPLASTY performed by Berto Duran DO at [facility] OR    UPPER GASTROINTESTINAL ENDOSCOPY N/A 11/10/2021    EGD BIOPSY performed by Haim Camara MD at [facility] ENDOSCOPY     Past Medical History:   Diagnosis Date    Acid reflux     Acute frontal sinusitis 07/22/2009    Alcohol abuse     \"I Drink Average 2 Beers A Day\"    Anesthesia     \" I get agitated\"    Anxiety     Arthritis     \"Right Shoulder, Neck, Left Knee\"    Atypical mycobacterial infection     Broken teeth     Upper And Lower     CHF (congestive heart failure) ([facility])     COPD (chronic obstructive pulmonary disease) ([facility])     Sees Dr. Díaz In [place], [place] [facility]     Cough     Frequent Cough With Green Sputum    Depression     Depression     Dyspnea 02/23/2018    H/O cardiac  catheterization 05/17/2022    LAD: Moderate Lumen Irregularity.CALCIFIED mid LAD segment.    H/O cardiovascular MUGA stress test 05/14/2019    EF 50%, NORMAL LVEF, NORMAL WALL MOTION.    H/O echocardiogram 05/22/2014    EF=>50-55%, LV systolic function is low normal, mild aortic valve calcification, no pericardial effusion    H/O echocardiogram 12/12/2018    EF 45-50%    History of 24 hour EKG monitoring 06/06/2014    24 hr holter monitor.  Ventricular ectopics were noted in single and couplet forms. Supraventricular ectopics were noted in single and pair forms.    [place] (hard of hearing)     Bilateral Ears    Hx of Doppler echocardiogram 12/08/2022    EF 30-35% Mild TR.    Hyperlipidemia     Hypertension     Irritant contact dermatitis due to other chemical products 08/26/2019    Mycobacterium avium complex (HCC)     Other drug allergy(995.27) 07/22/2009    S/P AVR 12/2003    Mechanical valvue     Shortness of breath     Teeth missing     Upper And Lower    Type 2 diabetes mellitus 08/31/2022     Family History   Problem Relation Age of Onset    Cancer Mother         Lymphoma    Diabetes Mother     High Blood Pressure Mother     High Cholesterol Mother     Obesity Mother     Vision Loss Mother         Wore Glasses    Heart Disease Father         \"Heart Failure\"    COPD Father     Asthma Sister     High Blood Pressure Sister     High Cholesterol Sister     Other Sister         pre diabetic    COPD Sister     Diabetes Sister         \"Pre Diabetes\"    No Known Problems Son      Social History     Socioeconomic History    Marital status:      Spouse name: Not on file    Number of children: 1    Years of education: 12    Highest education level: Not on file   Occupational History    Not on file   Tobacco Use    Smoking status: Never    Smokeless tobacco: Current     Types: Snuff, Chew   Vaping Use    Vaping Use: Never used   Substance and Sexual Activity    Alcohol use: Not Currently     Alcohol/week: 10.0

## 2024-04-25 ENCOUNTER — NURSE ONLY (OUTPATIENT)
Dept: FAMILY MEDICINE CLINIC | Age: 69
End: 2024-04-25

## 2024-04-25 ENCOUNTER — TELEPHONE (OUTPATIENT)
Dept: CARDIOLOGY CLINIC | Age: 69
End: 2024-04-25

## 2024-04-25 DIAGNOSIS — Z79.01 ANTICOAGULANT LONG-TERM USE: ICD-10-CM

## 2024-04-25 DIAGNOSIS — Z95.2 H/O MECHANICAL AORTIC VALVE REPLACEMENT: ICD-10-CM

## 2024-04-25 LAB
INTERNATIONAL NORMALIZATION RATIO, POC: 1.5
PROTHROMBIN TIME, POC: 0

## 2024-04-25 NOTE — PATIENT INSTRUCTIONS
[facility]      RESOUS ALIMARILUZ HUFFMAN:      [employer] ([place]):  Randal payton ofri: Enfòmasyon angeles chris huffman, chris huffman mobil, pwogram repa maco ete, ak pwogram olena bri montoya  Nimewo Telefòn: [phone]  Sitwèb: [email or web address]      Kevin whitaker ([place]):  Randal payton ofri: yon chris huffman ak yon kwizin soup  Nimewo Telefòn: [phone]  [place]: [email or web address]      [employer] ([place]):  Randal sa payton ofri: Manje midi ak aswè, cho, frèt oswa glase, disponib olena livrezon Lendi-Vandredi olena yon don oswa yon ti frè. maikel gwoup nan sal manje kominotè a midi olena moun ki gen laj 60 ak pi bon  Nimewo Telefòn: [phone]  Sit[place]: [email or web address]      [employer]:  Randal payton ofri: deni livjeremilo kely yo  Nimewo Telefòn: [phone]  [place]: www.[facility].[email or web address]/programs/meals-on-wheels        [facility] [employer] ([employer] olena Deni):  Randal payton ofri: bay yon marija ki itilize ghadaou lajan kach olena achte atik manje an sante [place].  Nimewo Telefòn: [phone]  Urban Viera ranpli aplikasyon an nan [email or web address]  [place]: [email or web address]        Bezwen resous adisyonèl?  Rele 211  Olena Jwenn èd [email or web address].[email or web address]

## 2024-04-25 NOTE — TELEPHONE ENCOUNTER
Called pt to provide results from echo. Left VM for pt to return call.       Interpretation Summary         Left Ventricle: Reduced left ventricular systolic function with a visually estimated EF of 30 - 35%. Left ventricle is dilated. Normal wall thickness. Global hypokinesis present.    Aortic Valve: Mechanical valve that is well-seated. AV mean gradient is 8 mmHg. Mild paravalvular regurgitation with multiple jets from RCC and NCC. No stenosis.    Tricuspid Valve: Normal RVSP. The estimated RVSP is 30 mmHg.    Left Atrium: Left atrium is mildly dilated.    Image quality is adequate.

## 2024-04-25 NOTE — PROGRESS NOTES
Patient was taking 7.5mg and lovenox on 4/17/24-4/19/24. Starting on 4/20/24 taking 7.5. Inr= 1.5       Spoke to Dr Bryant and advised to take 10mg M,F and 7.5 Rest of week. Recheck in 2 weeks.     Patient voiced an understanding.

## 2024-04-26 ENCOUNTER — TELEPHONE (OUTPATIENT)
Dept: SURGERY | Age: 69
End: 2024-04-26

## 2024-04-26 DIAGNOSIS — Z95.2 H/O MECHANICAL AORTIC VALVE REPLACEMENT: ICD-10-CM

## 2024-04-26 RX ORDER — ENOXAPARIN SODIUM 100 MG/ML
1 INJECTION SUBCUTANEOUS EVERY 12 HOURS
Qty: 4.8 ML | Refills: 0 | Status: SHIPPED | OUTPATIENT
Start: 2024-04-26 | End: 2024-04-29

## 2024-04-26 NOTE — TELEPHONE ENCOUNTER
Called pt and provided echo results and reminded of next appt. Pt voiced understanding.       Interpretation Summary          Left Ventricle: Reduced left ventricular systolic function with a visually estimated EF of 30 - 35%. Left ventricle is dilated. Normal wall thickness. Global hypokinesis present.    Aortic Valve: Mechanical valve that is well-seated. AV mean gradient is 8 mmHg. Mild paravalvular regurgitation with multiple jets from RCC and NCC. No stenosis.    Tricuspid Valve: Normal RVSP. The estimated RVSP is 30 mmHg.    Left Atrium: Left atrium is mildly dilated.    Image quality is adequate.

## 2024-04-26 NOTE — TELEPHONE ENCOUNTER
Patient's surgery has been rescheduled to 5/23/24, so patient needs the Lovenox called in for him.    LV  3/11/24    [place]  7/12/24      [facility] on [address]

## 2024-04-26 NOTE — TELEPHONE ENCOUNTER
SPOKE TO Manas Walters REGARDING SURGERY (Robotic Left ) SCHEDULED @ [facility]. NOTIFIED OF DATES, TIMES AND LOCATION    PAT - PHONE ASSESSMENT  SURGERY - 5/23/24 @ 7:30 AM, 6:00 AM arrival  P/O -  6/5/24 at 9:00 AM    NPO AFTER MIDNIGHT  Prep: Lovenox Bridging 5 days prior, Dr Flores Handled filling.  HOLD BLOOD THINNERS -  Lovenox bridge per Dr Flores handling RX.

## 2024-05-02 ENCOUNTER — TELEPHONE (OUTPATIENT)
Dept: FAMILY MEDICINE CLINIC | Age: 69
End: 2024-05-02

## 2024-05-07 ENCOUNTER — TELEPHONE (OUTPATIENT)
Dept: CARDIOLOGY CLINIC | Age: 69
End: 2024-05-07

## 2024-05-07 ENCOUNTER — OFFICE VISIT (OUTPATIENT)
Dept: CARDIOLOGY CLINIC | Age: 69
End: 2024-05-07
Payer: MEDICARE

## 2024-05-07 VITALS
HEART RATE: 69 BPM | SYSTOLIC BLOOD PRESSURE: 120 MMHG | BODY MASS INDEX: 26.36 KG/M2 | WEIGHT: 154.4 LBS | DIASTOLIC BLOOD PRESSURE: 68 MMHG | HEIGHT: 64 IN

## 2024-05-07 DIAGNOSIS — L30.9 DERMATITIS: ICD-10-CM

## 2024-05-07 DIAGNOSIS — E78.2 MIXED HYPERLIPIDEMIA: Chronic | ICD-10-CM

## 2024-05-07 DIAGNOSIS — I42.6 ALCOHOLIC CARDIOMYOPATHY (HCC): Primary | ICD-10-CM

## 2024-05-07 DIAGNOSIS — F10.10 ETOH ABUSE: ICD-10-CM

## 2024-05-07 DIAGNOSIS — I38 VALVULAR HEART DISEASE: ICD-10-CM

## 2024-05-07 PROCEDURE — 93000 ELECTROCARDIOGRAM COMPLETE: CPT | Performed by: NURSE PRACTITIONER

## 2024-05-07 PROCEDURE — 99214 OFFICE O/P EST MOD 30 MIN: CPT | Performed by: NURSE PRACTITIONER

## 2024-05-07 PROCEDURE — 1124F ACP DISCUSS-NO DSCNMKR DOCD: CPT | Performed by: NURSE PRACTITIONER

## 2024-05-07 ASSESSMENT — ENCOUNTER SYMPTOMS: SHORTNESS OF BREATH: 0

## 2024-05-07 NOTE — TELEPHONE ENCOUNTER
To Dr. Flores-    This is Dr. Dunn's office:    SPOKE TO Manas Walters REGARDING SURGERY (Robotic Left ) SCHEDULED @ [facility]. NOTIFIED OF DATES, TIMES AND LOCATION     PAT - PHONE ASSESSMENT  SURGERY - 5/23/24 @ 7:30 AM, 6:00 AM arrival  P/O -  6/5/24 at 9:00 AM     NPO AFTER MIDNIGHT  Prep: Lovenox Bridging 5 days prior, Dr Flores Handled filling.  HOLD BLOOD THINNERS -  Lovenox bridge per Dr Flores handling RX.    --------------------------------------------------------------------    Please send in Lovenox along with the Kenalog

## 2024-05-07 NOTE — TELEPHONE ENCOUNTER
Cardiologist: Dr. Yuong  Surgeon: Dr. Dunn   Surgery: left possible right hernia inguinal repair    Anesthesia: General  Date: 5/23/2024  FAX# Epic        Last OV 5/7/24 susanna/Evan

## 2024-05-07 NOTE — PATIENT INSTRUCTIONS
Please be informed that if you contact our office outside of normal business hours the physician on call cannot help with any scheduling or rescheduling issues, procedure instruction questions or any type of medication issue.    We advise you for any urgent/emergency that you go to the nearest emergency room!    PLEASE CALL OUR OFFICE DURING NORMAL BUSINESS HOURS    Monday - Friday   8 am to 5 pm    [place]: [phone]    [place]: [phone]    [place]:  [phone]    **It is YOUR responsibilty to bring medication bottles and/or updated medication list to EACH APPOINTMENT. This will allow us to better serve you and all your healthcare needs**    Thank you for allowing us to care for you today!   We want to ensure we can follow your treatment plan and we strive to give you the best outcomes and experience possible.   If you ever have a life threatening emergency and call 911 - for an ambulance (EMS)   Our providers can only care for you at:   [facility] or [facility].   Even if you have someone take you or you drive yourself we can only care for you in a [facility] facility. Our providers are not setup at the other healthcare locations!

## 2024-05-07 NOTE — PROGRESS NOTES
Normal.     RCA: Normal.    The 10-year ASCVD risk score (Abby MORRISON, et al., 2019) is: 18.4%    Values used to calculate the score:      Age: 68 years      Sex: Male      Is Non- : No      Diabetic: Yes      Tobacco smoker: No      Systolic Blood Pressure: 120 mmHg      Is BP treated: No      HDL Cholesterol: 87 mg/dL      Total Cholesterol: 202 mg/dL      Assessment/ Plan:      Cardiomyopathy  -In August of 2022, EF significantly reduced 10-15% no CAD on LHC.  F/U echo shows some improvement in EF now 30-35% no ICD required at this time.  Continue with GDMT losartan 25 mg daily, Aldactone 25 mg daily, jardiance 10 mg, Toprol XL 25 mg BID.     -Increased ETOH 6-12 pack of beer a day. Recommended to quit, counseling offered.    ETOH abuse  Risks of further demise and heart function and complications with clotting times reviewed.  Patient voices understanding and reports he will try and cut back on drinking.      Mixed hyperlipidemia  -At or near goal Yes,   -He is to continue current medications (Zocor 40 mg) Hepatic function panel WNL. No abdominal pain. No myalgias.      Valvular heart disease  -Patient had aortic valve replacement in 2003. Patient on coumadin for mechanical valve. Echo shows stable valve, EF 30-35%, mechanical valve well-seated noted to have mild perivalvular leak. Patient had lovenox bridge for 5 days prior to hernia surgery given cardiomyopathy. Now back on coumadin. May 23 is pending surgery. EKG today NSR, LVH noted.  Moderate/high risk for surgery.             Patient seen, interviewed and examined. Testing was reviewed.      Patient is encouraged to exercise even a brisk walk for 30 minutes at least 3 to 4 times a week.  Lifestyle and risk factor modificatons discussed. Various goals are discussed and questions answered. Continue current medications. Appropriate prescriptions are addressed.  Questions answered and patient verbalizes understanding.     Call for

## 2024-05-08 RX ORDER — TRIAMCINOLONE ACETONIDE 1 MG/G
CREAM TOPICAL
Qty: 453.6 G | Refills: 1 | Status: SHIPPED | OUTPATIENT
Start: 2024-05-08

## 2024-05-09 ENCOUNTER — TELEPHONE (OUTPATIENT)
Dept: FAMILY MEDICINE CLINIC | Age: 69
End: 2024-05-09

## 2024-05-09 ENCOUNTER — NURSE ONLY (OUTPATIENT)
Dept: FAMILY MEDICINE CLINIC | Age: 69
End: 2024-05-09
Payer: MEDICARE

## 2024-05-09 DIAGNOSIS — Z79.01 ANTICOAGULANT LONG-TERM USE: ICD-10-CM

## 2024-05-09 DIAGNOSIS — Z95.2 H/O MECHANICAL AORTIC VALVE REPLACEMENT: ICD-10-CM

## 2024-05-09 LAB
INTERNATIONAL NORMALIZATION RATIO, POC: 5.9
PROTHROMBIN TIME, POC: 0

## 2024-05-09 PROCEDURE — 85610 PROTHROMBIN TIME: CPT | Performed by: FAMILY MEDICINE

## 2024-05-09 NOTE — PROGRESS NOTES
Confirmed dose of 10mg mon/fri and 7.5mg rest of days. INR = 5.9   Per Dr Bryant hold dose 3 days. 5mg Mon/Fri and 7.5 rest of days. Advised patient to minimize alcohol  and recheck in 10 days. Patient informed and voiced an understanding.

## 2024-05-09 NOTE — PATIENT INSTRUCTIONS
[facility] [place]      RESOUS ALIMARILUZ HUFFMAN:      [employer] ([place]):  Randal payton ofri: Enfòmasyon angeles chris huffman, chris huffman mobil, pwogram repa maco ete, ak pwogram olena bri montoya  Nimewo Telefòn: [phone]  Sitwèb: [email or web address]      Kevin whitaker ([place]):  Randal payton ofri: yon chris huffman ak yon kwizin soup  Nimewo Telefòn: [phone]  Sitwèb: [email or web address]      [employer] ([place]):  Randal sa payton ofri: Manje midi ak aswè, cho, frèt oswa glase, disponib olena livrezon Lendi-Vandredi olena yon don oswa yon ti frè. maikel gwoup nan sal manje kominotè a midi olena moun ki gen laj 60 ak pi bon  Nimewo Telefòn: [phone]  Sitwèb: [email or web address] [email or web address]      [employer] [place] Me[employer] Wheels:  Randal payton ofri: deni livjeremilo kely yo  Nimewo Telefòn: [phone]  Sitwèb: www.[facility].org/programs/meals-on-wheels        [facility] SNAP (Koupon olena Deni):  Randal payton ofri: bay yon marija ki itilize ghadaou lajan kach olena achte atik manje an sante [place].  Nimewo Telefòn: [phone]  Urban Viera ranpli aplikasyon an nan [email or web address]  Sitwèb: [email or web address].org/food-assistance.[email or web address]        Bezwen resous adisyonèl?  Rele 211  Olena Jwenn èd https://www.findhelp.org

## 2024-05-10 RX ORDER — SPIRONOLACTONE 25 MG/1
25 TABLET ORAL DAILY
Qty: 30 TABLET | Refills: 3 | Status: SHIPPED | OUTPATIENT
Start: 2024-05-10

## 2024-05-13 DIAGNOSIS — J44.9 CHRONIC OBSTRUCTIVE PULMONARY DISEASE, UNSPECIFIED COPD TYPE (HCC): Primary | ICD-10-CM

## 2024-05-13 RX ORDER — ALBUTEROL SULFATE 2.5 MG/3ML
2.5 SOLUTION RESPIRATORY (INHALATION) EVERY 6 HOURS PRN
Qty: 120 ML | Refills: 5 | Status: SHIPPED | OUTPATIENT
Start: 2024-05-13

## 2024-05-13 RX ORDER — ALBUTEROL SULFATE 2.5 MG/3ML
SOLUTION RESPIRATORY (INHALATION)
Qty: 360 ML | OUTPATIENT
Start: 2024-05-13

## 2024-05-13 RX ORDER — ALBUTEROL SULFATE 2.5 MG/3ML
SOLUTION RESPIRATORY (INHALATION)
Qty: 360 ML | Refills: 5 | OUTPATIENT
Start: 2024-05-13

## 2024-05-14 NOTE — PROGRESS NOTES
.Surgery @ [facility] on 5/23/24 you will be called 5/22/24 with times    NOTHING TO EAT OR DRINK AFTER MIDNIGHT DAY OF SURGERY    1. Enter thru the hospital main entrance on day of surgery, check in at the Information Desk. If you arrive prior to 6:00am, enter thru the ER entrance.    2. Follow the directions as prescribed by the doctor for your procedure and medications.         Morning of surgery take: Take  nebulizer treatment, use your inhalers, take Metoprolol, Protonix with sips of water          Hold Jardiance 5/22/2024         Stop vitamins, supplements and NSAIDS:  5/16/24    3. Check with your Doctor regarding stopping blood thinners and follow their instructions.    4. Do not smoke, vape or use chewing tobacco morning of surgery. Do not drink any alcoholic beverages 24 hours prior to surgery.       This includes NA Beer. No street drugs 7 days prior to surgery.    5. If you have dentures, contacts of glasses they will be removed before going to the OR; please bring a case.    6. Please bring picture ID, insurance card, paperwork from the doctor’s office (H & P, Consent, & card for implantable devices).    7. Take a shower with an antibacterial soap the night before surgery and the morning of surgery. Do not put anything on your skin      After your morning shower.    8. You will need a responsible adult to drive you home and check on you after surgery.

## 2024-05-17 ENCOUNTER — TELEPHONE (OUTPATIENT)
Dept: FAMILY MEDICINE CLINIC | Age: 69
End: 2024-05-17

## 2024-05-17 ENCOUNTER — ANTI-COAG VISIT (OUTPATIENT)
Dept: FAMILY MEDICINE CLINIC | Age: 69
End: 2024-05-17

## 2024-05-17 ENCOUNTER — NURSE ONLY (OUTPATIENT)
Dept: FAMILY MEDICINE CLINIC | Age: 69
End: 2024-05-17
Payer: MEDICARE

## 2024-05-17 DIAGNOSIS — Z95.2 H/O MECHANICAL AORTIC VALVE REPLACEMENT: ICD-10-CM

## 2024-05-17 DIAGNOSIS — Z79.01 ANTICOAGULATION MONITORING, INR RANGE 2.5-3.5: ICD-10-CM

## 2024-05-17 DIAGNOSIS — Z79.01 ANTICOAGULANT LONG-TERM USE: ICD-10-CM

## 2024-05-17 DIAGNOSIS — Z95.2 S/P AVR: Primary | Chronic | ICD-10-CM

## 2024-05-17 LAB
INTERNATIONAL NORMALIZATION RATIO, POC: 3.5
PROTHROMBIN TIME, POC: 42.2

## 2024-05-17 PROCEDURE — 85610 PROTHROMBIN TIME: CPT | Performed by: FAMILY MEDICINE

## 2024-05-17 RX ORDER — ENOXAPARIN SODIUM 100 MG/ML
INJECTION SUBCUTANEOUS
Qty: 14.4 ML | OUTPATIENT
Start: 2024-05-17

## 2024-05-17 RX ORDER — ENOXAPARIN SODIUM 100 MG/ML
INJECTION SUBCUTANEOUS
Qty: 18 EACH | Refills: 0 | Status: SHIPPED | OUTPATIENT
Start: 2024-05-17

## 2024-05-17 NOTE — TELEPHONE ENCOUNTER
Pt needs to start lovenox 5/18/24    Pharmacy [facility] refusing to release med d/t lovenox 40 mg being sent in vs 80 mg\" , they are wanting to confirm that our office did intentionally send 40 mg

## 2024-05-17 NOTE — TELEPHONE ENCOUNTER
Per Dr Montes \"should be 80 mg.  Spoke with Echo with [facility] lim[facility] informed per Dr Montes \"80 mg lovenox\" Echo requested rx be resent,     Resent rx pt informed

## 2024-05-22 ENCOUNTER — ANESTHESIA EVENT (OUTPATIENT)
Dept: OPERATING ROOM | Age: 69
End: 2024-05-22
Payer: MEDICARE

## 2024-05-22 NOTE — PROGRESS NOTES
Patient notified of surgery time at [facility] 5/23/2024 0730 arrival 0600, NPO instructions and DOS meds reviewed, understanding verbalized

## 2024-05-22 NOTE — ANESTHESIA PRE PROCEDURE
Department of Anesthesiology  Preprocedure Note       Name:  Manas Walters   Age:  68 y.o.  :  1955                                          MRN:  [id number]         Date:  2024      Surgeon: Surgeon(s):  Cameron Dunn DO    Procedure: Procedure(s):  LEFT POSSIBLE RIGHT HERNIA INGUINAL REPAIR LAPAROSCOPIC ROBOTIC    Medications prior to admission:   Prior to Admission medications    Medication Sig Start Date End Date Taking? Authorizing Provider   enoxaparin (LOVENOX) 80 MG/0.8ML Inject 80mg subq twice daily 5 days prior to procedure.  No injections day of procedure.  Inject twice daily for 4 days after procedure.  Restart warfarin when ok with surgeon 24   Yfn Flores MD   albuterol (PROVENTIL) (2.5 MG/3ML) 0.083% nebulizer solution Take 3 mLs by nebulization every 6 hours as needed for Wheezing or Shortness of Breath 24   Blake Slaughter MD   spironolactone (ALDACTONE) 25 MG tablet Take 1 tablet by mouth daily 5/10/24   Yfn Flores MD   triamcinolone (KENALOG) 0.1 % cream Apply topically 2 times daily. 24   Yfn Flores MD   empagliflozin (JARDIANCE) 10 MG tablet Take 1 tablet by mouth daily 24   Evan Wu APRN - CNP   warfarin (COUMADIN) 5 MG tablet take 1 TO 2 tablets by mouth once daily as directed  Patient taking differently: Take 1.5 tablets by mouth daily take 1 TO 2 tablets by mouth once daily as directed 24   Yfn Flores MD   FEROSUL 325 (65 Fe) MG tablet take 1 tablet by mouth once daily with breakfast 3/11/24   Yfn Flores MD   simvastatin (ZOCOR) 40 MG tablet take 1 tablet by mouth at bedtime 3/4/24   Lorrie Gardner APRN - CNP   venlafaxine (EFFEXOR XR) 150 MG extended release capsule take 1 capsule by mouth once daily 23   Yfn Flores MD   losartan (COZAAR) 25 MG tablet Take 1 tablet by mouth daily 23   Kenyon Young MD   pantoprazole (PROTONIX) 40 MG tablet TAKE 1  BILITOT 0.7 12/11/2023 03:07 PM    ALKPHOS 121 12/11/2023 03:07 PM    AST 50 12/11/2023 03:07 PM    ALT 26 12/11/2023 03:07 PM       POC Tests: No results for input(s): \"POCGLU\", \"POCNA\", \"POCK\", \"POCCL\", \"POCBUN\", \"POCHEMO\", \"POCHCT\" in the last 72 hours.    Coags:   Lab Results   Component Value Date/Time    PROTIME 42.2 05/17/2024 01:34 PM    INR 3.5 05/17/2024 01:34 PM    INR 1.72 08/26/2022 04:50 AM    APTT 31.2 08/23/2022 01:10 PM       HCG (If Applicable): No results found for: \"PREGTESTUR\", \"PREGSERUM\", \"HCG\", \"HCGQUANT\"     ABGs: No results found for: \"PHART\", \"PO2ART\", \"SBL7PWP\", \"AQM5WDJ\", \"BEART\", \"I8OJIVAB\"     Type & Screen (If Applicable):  No results found for: \"LABABO\"    Drug/Infectious Status (If Applicable):  No results found for: \"HIV\", \"HEPCAB\"    COVID-19 Screening (If Applicable):   Lab Results   Component Value Date/Time    COVID19 Not Detected 10/17/2023 01:44 PM           Anesthesia Evaluation  Patient summary reviewed   history of anesthetic complications (\" I get agitated\"):   Airway: Mallampati: III  TM distance: >3 FB   Neck ROM: full  Comment: Pt has chew residue and admits to use this am.   Mouth opening: > = 3 FB   Dental:      Comment: Poor dentition, numerous missing, loose, and chipped teeth    Pulmonary:normal exam    (+)  COPD:                                     Cardiovascular:  Exercise tolerance: poor (<4 METS)  (+) hypertension:, valvular problems/murmurs: MR and AI, CABG/stent:, CHF:, hyperlipidemia      ECG reviewed      Echocardiogram reviewed    Cleared by cardiology     Beta Blocker:  Not on Beta Blocker      ROS comment: Sinus Rhythm   -Nonspecific QRS widening.  -Nonspecific T-abnormality.    ABNORMAL 5/2024     Left Ventricle: Reduced left ventricular systolic function with a visually estimated EF of 30 - 35%. Left ventricle is dilated. Normal wall thickness. Global hypokinesis present.  ·  Aortic Valve: Mechanical valve that is well-seated. AV mean gradient is 8

## 2024-05-23 ENCOUNTER — ANESTHESIA (OUTPATIENT)
Dept: OPERATING ROOM | Age: 69
End: 2024-05-23
Payer: MEDICARE

## 2024-05-23 ENCOUNTER — HOSPITAL ENCOUNTER (OUTPATIENT)
Age: 69
Setting detail: OUTPATIENT SURGERY
Discharge: HOME | End: 2024-05-23
Attending: SURGERY | Admitting: SURGERY
Payer: MEDICARE

## 2024-05-23 VITALS
TEMPERATURE: 97 F | DIASTOLIC BLOOD PRESSURE: 69 MMHG | SYSTOLIC BLOOD PRESSURE: 140 MMHG | HEART RATE: 76 BPM | RESPIRATION RATE: 18 BRPM | OXYGEN SATURATION: 97 %

## 2024-05-23 DIAGNOSIS — G89.18 POST-OP PAIN: Primary | ICD-10-CM

## 2024-05-23 PROBLEM — K40.90 LEFT INGUINAL HERNIA: Status: ACTIVE | Noted: 2024-05-23

## 2024-05-23 LAB
APTT: 30 SECONDS (ref 25.1–37.1)
GLUCOSE BLD-MCNC: 111 MG/DL (ref 70–99)
GLUCOSE BLD-MCNC: 119 MG/DL (ref 70–99)
INR BLD: 1 INDEX
PROTHROMBIN TIME: 13.2 SECONDS (ref 11.7–14.5)

## 2024-05-23 PROCEDURE — 2709999900 HC NON-CHARGEABLE SUPPLY: Performed by: SURGERY

## 2024-05-23 PROCEDURE — S2900 ROBOTIC SURGICAL SYSTEM: HCPCS | Performed by: SURGERY

## 2024-05-23 PROCEDURE — 2580000003 HC RX 258: Performed by: ANESTHESIOLOGY

## 2024-05-23 PROCEDURE — 85730 THROMBOPLASTIN TIME PARTIAL: CPT

## 2024-05-23 PROCEDURE — 6360000002 HC RX W HCPCS: Performed by: NURSE ANESTHETIST, CERTIFIED REGISTERED

## 2024-05-23 PROCEDURE — 6360000002 HC RX W HCPCS: Performed by: ANESTHESIOLOGY

## 2024-05-23 PROCEDURE — C1781 MESH (IMPLANTABLE): HCPCS | Performed by: SURGERY

## 2024-05-23 PROCEDURE — 49650 LAP ING HERNIA REPAIR INIT: CPT | Performed by: SURGERY

## 2024-05-23 PROCEDURE — 3700000001 HC ADD 15 MINUTES (ANESTHESIA): Performed by: SURGERY

## 2024-05-23 PROCEDURE — 85610 PROTHROMBIN TIME: CPT

## 2024-05-23 PROCEDURE — 2500000003 HC RX 250 WO HCPCS: Performed by: ANESTHESIOLOGY

## 2024-05-23 PROCEDURE — 3600000019 HC SURGERY ROBOT ADDTL 15MIN: Performed by: SURGERY

## 2024-05-23 PROCEDURE — 2500000003 HC RX 250 WO HCPCS: Performed by: NURSE ANESTHETIST, CERTIFIED REGISTERED

## 2024-05-23 PROCEDURE — 3600000009 HC SURGERY ROBOT BASE: Performed by: SURGERY

## 2024-05-23 PROCEDURE — 7100000010 HC PHASE II RECOVERY - FIRST 15 MIN: Performed by: SURGERY

## 2024-05-23 PROCEDURE — 7100000000 HC PACU RECOVERY - FIRST 15 MIN: Performed by: SURGERY

## 2024-05-23 PROCEDURE — 82962 GLUCOSE BLOOD TEST: CPT

## 2024-05-23 PROCEDURE — 2580000003 HC RX 258: Performed by: SURGERY

## 2024-05-23 PROCEDURE — 7100000011 HC PHASE II RECOVERY - ADDTL 15 MIN: Performed by: SURGERY

## 2024-05-23 PROCEDURE — 6360000002 HC RX W HCPCS: Performed by: SURGERY

## 2024-05-23 PROCEDURE — 7100000001 HC PACU RECOVERY - ADDTL 15 MIN: Performed by: SURGERY

## 2024-05-23 PROCEDURE — 3700000000 HC ANESTHESIA ATTENDED CARE: Performed by: SURGERY

## 2024-05-23 DEVICE — MESH HERN W16XL12CM LAP MFIL POLY TEREPHTHALATE MFIL: Type: IMPLANTABLE DEVICE | Site: INGUINAL | Status: FUNCTIONAL

## 2024-05-23 RX ORDER — SODIUM CHLORIDE 0.9 % (FLUSH) 0.9 %
5-40 SYRINGE (ML) INJECTION PRN
Status: DISCONTINUED | OUTPATIENT
Start: 2024-05-23 | End: 2024-05-23 | Stop reason: HOSPADM

## 2024-05-23 RX ORDER — SODIUM CHLORIDE, SODIUM LACTATE, POTASSIUM CHLORIDE, CALCIUM CHLORIDE 600; 310; 30; 20 MG/100ML; MG/100ML; MG/100ML; MG/100ML
INJECTION, SOLUTION INTRAVENOUS CONTINUOUS PRN
Status: DISCONTINUED | OUTPATIENT
Start: 2024-05-23 | End: 2024-05-23 | Stop reason: SDUPTHER

## 2024-05-23 RX ORDER — HYDROCODONE BITARTRATE AND ACETAMINOPHEN 5; 325 MG/1; MG/1
1 TABLET ORAL EVERY 6 HOURS PRN
Qty: 20 TABLET | Refills: 0 | Status: SHIPPED | OUTPATIENT
Start: 2024-05-23 | End: 2024-05-28

## 2024-05-23 RX ORDER — SODIUM CHLORIDE 0.9 % (FLUSH) 0.9 %
5-40 SYRINGE (ML) INJECTION EVERY 12 HOURS SCHEDULED
Status: DISCONTINUED | OUTPATIENT
Start: 2024-05-23 | End: 2024-05-23 | Stop reason: HOSPADM

## 2024-05-23 RX ORDER — ROCURONIUM BROMIDE 10 MG/ML
INJECTION, SOLUTION INTRAVENOUS PRN
Status: DISCONTINUED | OUTPATIENT
Start: 2024-05-23 | End: 2024-05-23 | Stop reason: SDUPTHER

## 2024-05-23 RX ORDER — EPHEDRINE SULFATE 50 MG/ML
INJECTION INTRAVENOUS PRN
Status: DISCONTINUED | OUTPATIENT
Start: 2024-05-23 | End: 2024-05-23 | Stop reason: SDUPTHER

## 2024-05-23 RX ORDER — FENTANYL CITRATE 50 UG/ML
50 INJECTION, SOLUTION INTRAMUSCULAR; INTRAVENOUS EVERY 5 MIN PRN
Status: DISCONTINUED | OUTPATIENT
Start: 2024-05-23 | End: 2024-05-23 | Stop reason: HOSPADM

## 2024-05-23 RX ORDER — NALOXONE HYDROCHLORIDE 0.4 MG/ML
INJECTION, SOLUTION INTRAMUSCULAR; INTRAVENOUS; SUBCUTANEOUS PRN
Status: DISCONTINUED | OUTPATIENT
Start: 2024-05-23 | End: 2024-05-23 | Stop reason: HOSPADM

## 2024-05-23 RX ORDER — HYDRALAZINE HYDROCHLORIDE 20 MG/ML
10 INJECTION INTRAMUSCULAR; INTRAVENOUS
Status: DISCONTINUED | OUTPATIENT
Start: 2024-05-23 | End: 2024-05-23 | Stop reason: HOSPADM

## 2024-05-23 RX ORDER — MEPERIDINE HYDROCHLORIDE 25 MG/ML
12.5 INJECTION INTRAMUSCULAR; INTRAVENOUS; SUBCUTANEOUS EVERY 5 MIN PRN
Status: DISCONTINUED | OUTPATIENT
Start: 2024-05-23 | End: 2024-05-23 | Stop reason: HOSPADM

## 2024-05-23 RX ORDER — FENTANYL CITRATE 50 UG/ML
INJECTION, SOLUTION INTRAMUSCULAR; INTRAVENOUS PRN
Status: DISCONTINUED | OUTPATIENT
Start: 2024-05-23 | End: 2024-05-23 | Stop reason: SDUPTHER

## 2024-05-23 RX ORDER — DROPERIDOL 2.5 MG/ML
0.62 INJECTION, SOLUTION INTRAMUSCULAR; INTRAVENOUS
Status: DISCONTINUED | OUTPATIENT
Start: 2024-05-23 | End: 2024-05-23 | Stop reason: HOSPADM

## 2024-05-23 RX ORDER — ONDANSETRON 2 MG/ML
4 INJECTION INTRAMUSCULAR; INTRAVENOUS
Status: DISCONTINUED | OUTPATIENT
Start: 2024-05-23 | End: 2024-05-23 | Stop reason: HOSPADM

## 2024-05-23 RX ORDER — KETAMINE HCL 50MG/ML(1)
SYRINGE (ML) INTRAVENOUS PRN
Status: DISCONTINUED | OUTPATIENT
Start: 2024-05-23 | End: 2024-05-23 | Stop reason: SDUPTHER

## 2024-05-23 RX ORDER — KETOROLAC TROMETHAMINE 30 MG/ML
INJECTION, SOLUTION INTRAMUSCULAR; INTRAVENOUS PRN
Status: DISCONTINUED | OUTPATIENT
Start: 2024-05-23 | End: 2024-05-23 | Stop reason: SDUPTHER

## 2024-05-23 RX ORDER — DEXAMETHASONE SODIUM PHOSPHATE 4 MG/ML
INJECTION, SOLUTION INTRA-ARTICULAR; INTRALESIONAL; INTRAMUSCULAR; INTRAVENOUS; SOFT TISSUE PRN
Status: DISCONTINUED | OUTPATIENT
Start: 2024-05-23 | End: 2024-05-23 | Stop reason: SDUPTHER

## 2024-05-23 RX ORDER — OXYCODONE HYDROCHLORIDE 5 MG/1
5 TABLET ORAL
Status: DISCONTINUED | OUTPATIENT
Start: 2024-05-23 | End: 2024-05-23 | Stop reason: HOSPADM

## 2024-05-23 RX ORDER — SODIUM CHLORIDE 9 MG/ML
INJECTION, SOLUTION INTRAVENOUS PRN
Status: DISCONTINUED | OUTPATIENT
Start: 2024-05-23 | End: 2024-05-23 | Stop reason: HOSPADM

## 2024-05-23 RX ORDER — BUPIVACAINE HYDROCHLORIDE 2.5 MG/ML
INJECTION, SOLUTION EPIDURAL; INFILTRATION; INTRACAUDAL
Status: COMPLETED | OUTPATIENT
Start: 2024-05-23 | End: 2024-05-23

## 2024-05-23 RX ORDER — GLYCOPYRROLATE 0.2 MG/ML
INJECTION INTRAMUSCULAR; INTRAVENOUS PRN
Status: DISCONTINUED | OUTPATIENT
Start: 2024-05-23 | End: 2024-05-23 | Stop reason: SDUPTHER

## 2024-05-23 RX ORDER — LABETALOL HYDROCHLORIDE 5 MG/ML
10 INJECTION, SOLUTION INTRAVENOUS
Status: DISCONTINUED | OUTPATIENT
Start: 2024-05-23 | End: 2024-05-23 | Stop reason: HOSPADM

## 2024-05-23 RX ORDER — ONDANSETRON 2 MG/ML
INJECTION INTRAMUSCULAR; INTRAVENOUS PRN
Status: DISCONTINUED | OUTPATIENT
Start: 2024-05-23 | End: 2024-05-23 | Stop reason: SDUPTHER

## 2024-05-23 RX ORDER — SODIUM CHLORIDE 9 MG/ML
INJECTION, SOLUTION INTRAVENOUS CONTINUOUS
Status: DISCONTINUED | OUTPATIENT
Start: 2024-05-23 | End: 2024-05-23 | Stop reason: HOSPADM

## 2024-05-23 RX ORDER — MIDAZOLAM HYDROCHLORIDE 1 MG/ML
INJECTION INTRAMUSCULAR; INTRAVENOUS PRN
Status: DISCONTINUED | OUTPATIENT
Start: 2024-05-23 | End: 2024-05-23 | Stop reason: SDUPTHER

## 2024-05-23 RX ORDER — PROPOFOL 10 MG/ML
INJECTION, EMULSION INTRAVENOUS PRN
Status: DISCONTINUED | OUTPATIENT
Start: 2024-05-23 | End: 2024-05-23 | Stop reason: SDUPTHER

## 2024-05-23 RX ORDER — LIDOCAINE HYDROCHLORIDE 20 MG/ML
INJECTION, SOLUTION INTRAVENOUS PRN
Status: DISCONTINUED | OUTPATIENT
Start: 2024-05-23 | End: 2024-05-23 | Stop reason: SDUPTHER

## 2024-05-23 RX ADMIN — ROCURONIUM BROMIDE 30 MG: 10 INJECTION INTRAVENOUS at 14:18

## 2024-05-23 RX ADMIN — FENTANYL CITRATE 50 MCG: 50 INJECTION, SOLUTION INTRAMUSCULAR; INTRAVENOUS at 13:31

## 2024-05-23 RX ADMIN — LIDOCAINE HYDROCHLORIDE 100 MG: 20 INJECTION, SOLUTION INTRAVENOUS at 12:57

## 2024-05-23 RX ADMIN — PROPOFOL 100 MG: 10 INJECTION, EMULSION INTRAVENOUS at 12:59

## 2024-05-23 RX ADMIN — EPHEDRINE SULFATE 5 MG: 50 INJECTION INTRAVENOUS at 13:24

## 2024-05-23 RX ADMIN — KETOROLAC TROMETHAMINE 15 MG: 30 INJECTION, SOLUTION INTRAMUSCULAR; INTRAVENOUS at 14:40

## 2024-05-23 RX ADMIN — DEXAMETHASONE SODIUM PHOSPHATE 4 MG: 4 INJECTION, SOLUTION INTRAMUSCULAR; INTRAVENOUS at 13:09

## 2024-05-23 RX ADMIN — SUGAMMADEX 100 MG: 100 INJECTION, SOLUTION INTRAVENOUS at 14:40

## 2024-05-23 RX ADMIN — ONDANSETRON 4 MG: 2 INJECTION INTRAMUSCULAR; INTRAVENOUS at 14:40

## 2024-05-23 RX ADMIN — SUGAMMADEX 100 MG: 100 INJECTION, SOLUTION INTRAVENOUS at 14:43

## 2024-05-23 RX ADMIN — FENTANYL CITRATE 25 MCG: 50 INJECTION, SOLUTION INTRAMUSCULAR; INTRAVENOUS at 13:05

## 2024-05-23 RX ADMIN — ROCURONIUM BROMIDE 50 MG: 10 INJECTION INTRAVENOUS at 13:01

## 2024-05-23 RX ADMIN — SODIUM CHLORIDE: 9 INJECTION, SOLUTION INTRAVENOUS at 06:49

## 2024-05-23 RX ADMIN — PROPOFOL 40 MG: 10 INJECTION, EMULSION INTRAVENOUS at 13:05

## 2024-05-23 RX ADMIN — Medication 25 MG: at 12:55

## 2024-05-23 RX ADMIN — GLYCOPYRROLATE 0.4 MG: 0.2 INJECTION INTRAMUSCULAR; INTRAVENOUS at 14:43

## 2024-05-23 RX ADMIN — MIDAZOLAM 2 MG: 1 INJECTION INTRAMUSCULAR; INTRAVENOUS at 12:48

## 2024-05-23 RX ADMIN — FENTANYL CITRATE 25 MCG: 50 INJECTION, SOLUTION INTRAMUSCULAR; INTRAVENOUS at 12:55

## 2024-05-23 RX ADMIN — EPHEDRINE SULFATE 10 MG: 50 INJECTION INTRAVENOUS at 13:16

## 2024-05-23 RX ADMIN — SODIUM CHLORIDE, POTASSIUM CHLORIDE, SODIUM LACTATE AND CALCIUM CHLORIDE: 600; 310; 30; 20 INJECTION, SOLUTION INTRAVENOUS at 14:43

## 2024-05-23 ASSESSMENT — PAIN - FUNCTIONAL ASSESSMENT
PAIN_FUNCTIONAL_ASSESSMENT: 0-10
PAIN_FUNCTIONAL_ASSESSMENT: 0-10

## 2024-05-23 ASSESSMENT — PAIN SCALES - GENERAL: PAINLEVEL_OUTOF10: 1

## 2024-05-23 NOTE — OP NOTE
Operative Note      Patient: Manas Walters  YOB: 1955  MRN: [id number]    Date of Procedure: 5/23/2024    Pre-Op Diagnosis Codes:     * Left inguinal hernia [K40.90]    Post-Op Diagnosis: Same       Procedure(s):  LEFT HERNIA INGUINAL REPAIR LAPAROSCOPIC ROBOTIC    Surgeon(s):  Cameron Dunn DO    Assistant:   First Assistant: Salbador Staton RN    Anesthesia: General    Estimated Blood Loss (mL): Minimal    Complications: None    Specimens:   * No specimens in log *    Implants:  Implant Name Type Inv. Item Serial No.  Lot No. LRB No. Used Action   MESH ESTHER [id number] LAP MFIL POLY TEREPHTHALATE MFIL - [id number]  MESH ESTHER [id number] LAP MFIL POLY TEREPHTHALATE MFIL  [id number]  SURGICAL-WD [id number] Left 1 Implanted         Drains: * No LDAs found *    Findings:  Infection Present At Time Of Surgery (PATOS) (choose all levels that have infection present):  No infection present  Other Findings: Left inguinal hernia     Detailed Description of Procedure:       INDICATIONS:  The patient is a 68 y.o. year old male with a symptomatic left inguinal hernia who presents for elective robotic mesh repair.  If a contralateral inguinal hernia is found at the time of surgery, the patient would also opt for simultaneous repair of that as well.     TECHNIQUE:  The patient was brought to the operating room suite and was placed on the operating room table in the supine position.  After the adequate induction of general endotracheal anesthesia, The patient was prepped and draped in the usual sterile fashion.      After injecting local anesthetic, a small incision created in left upper quadrant.  A camera/5 mm Visiport was used to access abdomen.  Pneumoperitoneum achieved to 15 mmHg.  Under visualization a midline and right upper quadrant 8 mm trocar were placed.  Then the 5 mm port was replaced with an 8 mm robotic trocar. Cameral used to direct bilateral Tap block. The patient was

## 2024-05-23 NOTE — DISCHARGE INSTRUCTIONS
Patient Discharge Instructions  Dr. Cameron Dunn  [address]  [address]  [place] [id number]  [phone]      Discharge Date:  5/23/2024    Discharged To:  Home      RESUME ACTIVITY:      BATHING:   OK to shower but no bath tub or submerging incision under water.    Incisions:    You have glue over your incisions, which will come off on its own in 1-2 weeks.  Keep wound dry and clean.  May shower as instructed above.    DRIVING:   3-5 days. No driving until off narcotic pain medications and walking comfortably.    RETURN TO WORK: when cleared after your follow up office visit.    WALKING:    As tolerated.     STAIRS:    As tolerated.    LIFTING:   No lifting greater than 20 pounds for 6 weeks following surgery.    DIET:    [place] diet on day of surgery then regular diet.    Take stool softeners such as milk of magnesia or MiraLAX as needed to avoid constipation.    SPECIAL INSTRUCTIONS:     If you use a CPAP at home, continue to use it as normal.    Call the office at [phone]  if you have a fever greater than or equal to 101 F or if your incision becomes red, tender, or has drainage of pus.      If follow up appointment was not given to you, call the Surgical Clinic at [phone] for follow up appointment with Dr. Dunn in:  1-2 weeks.        [facility]  [phone]    Do not drive, work around machines or use equipment.  Do not drink any alcoholic beverages.  Do not smoke while alone.  Avoid making important decisions.  Plan to spend a quiet, relaxed evening @ home.  Resume normal activities as you begin to feel better.  Eat lightly for your first meal, then gradually increase your diet to what is normal for you.  In case of nausea, avoid food and drink only clear liquids.  Resume food as nausea ceases.  Notify your surgeon if you experience fever, chills, large amount of bleeding, difficulty breathing, persistent nausea and vomiting or any other  disturbing problem.  Call for a follow-up appointment with your surgeon.

## 2024-05-23 NOTE — PROGRESS NOTES
1455 Pt arrived from OR to PACU. Monitors applied and alarms in place. Report rec'd from Sherry GONZALEZ and Sim RN  1505 Pt alert and oriented. Pt sitting up in bed. Pt has non productive cough   1510   1515 Pt alert and oriented. Pt denies pain or nausea. Pt tolerating ice chips appropriately   1529 Pt transported to [facility] by this RN. Report given to Lorna PIERCE. Pt denies pain or nausea

## 2024-05-23 NOTE — PROGRESS NOTES
1531 patient arrives in [facility], bedside report received from ceasar wiggins. Patient sitting up with ice chips, denies pain. Vital signs obtained, call light in reach. Ice water given per patient request. Family at bedside.   1550 vital signs obtained and stable. Patient denies pain. Discharge instructions reviewed with patient and sister, both verbalized understanding. IV removed. Belongings retrieved and given to patient.   1605 patient dressed and taken via wheelchair to DC to car with family

## 2024-05-24 ENCOUNTER — TELEPHONE (OUTPATIENT)
Dept: SURGERY | Age: 69
End: 2024-05-24

## 2024-05-24 NOTE — TELEPHONE ENCOUNTER
Post-op Phone Call Follow-up  Dr Mona Walters is 1 days s/p Rocco Left Ing Hernia Repair.     I called the pt today to see how they were doing post-operatively.  The pt's pain is well controlled with current pain control regimen.   Pt's incisions are doing well. Denies erythema, swelling or drainage.   Pt is tolerating eating without N/V, and is  having bowel movements.   I reviewed the post-operative instructions, addressed any concerns and answered the patient's questions.     I confirmed the patient's post-op appointment on 6/5/2024        Marcin Kaur MA

## 2024-05-29 NOTE — ANESTHESIA POSTPROCEDURE EVALUATION
Department of Anesthesiology  Postprocedure Note    Patient: Manas Walters  MRN: [id number]  YOB: 1955  Date of evaluation: 5/29/2024    Procedure Summary       Date: 05/23/24 Room / Location: [address]    Anesthesia Start: 1248 Anesthesia Stop: 1458    Procedure: LEFT HERNIA INGUINAL REPAIR LAPAROSCOPIC ROBOTIC (Left: Abdomen) Diagnosis:       Left inguinal hernia      (Left inguinal hernia [K40.90])    Surgeons: Cameron Dunn DO Responsible Provider: Arron Aleman MD    Anesthesia Type: general ASA Status: 4            Anesthesia Type: No value filed.    Sloan Phase I: Sloan Score: 10    Sloan Phase II: Sloan Score: 10    Anesthesia Post Evaluation    Patient location during evaluation: PACU  Patient participation: complete - patient participated  Level of consciousness: awake  Pain score: 1  Airway patency: patent  Nausea & Vomiting: no nausea and no vomiting  Cardiovascular status: hemodynamically stable  Respiratory status: nasal cannula  Hydration status: euvolemic  Multimodal analgesia pain management approach    No notable events documented.

## 2024-06-04 ENCOUNTER — NURSE ONLY (OUTPATIENT)
Dept: FAMILY MEDICINE CLINIC | Age: 69
End: 2024-06-04
Payer: MEDICARE

## 2024-06-04 DIAGNOSIS — Z79.01 ANTICOAGULANT LONG-TERM USE: ICD-10-CM

## 2024-06-04 DIAGNOSIS — Z95.2 H/O MECHANICAL AORTIC VALVE REPLACEMENT: ICD-10-CM

## 2024-06-04 LAB
INTERNATIONAL NORMALIZATION RATIO, POC: 1.7
PROTHROMBIN TIME, POC: 0

## 2024-06-04 PROCEDURE — 85610 PROTHROMBIN TIME: CPT | Performed by: FAMILY MEDICINE

## 2024-06-04 NOTE — PATIENT INSTRUCTIONS
[facility] [place]      RESOUS ALIMARILUZ HUFFMAN:      [employer] ([place]):  Randal payton ofri: Enfòmasyon angeles chris huffman, chris huffman mobil, pwogram repa maco ete, ak pwogram olena bri montoya  Nimewo Telefòn: [phone]  Sitwèb: [email or web address]      Kevin whitaker ([place]):  Randal payton ofri: yon chris huffman ak yon kwizin soup  Nimewo Telefòn: [phone]  Sitwèb: [email or web address]      [employer] ([place]):  Randal sa payton ofri: Manje midi ak aswè, cho, frèt oswa glase, disponib olena livrezon Lendi-Vandredi olena yon don oswa yon ti frè. maikel gwoup nan sal manje kominotè a midi olena moun ki gen laj 60 ak pi bon  Nimewo Telefòn: [phone]  Sitwèb: [email or web address] [email or web address]      [employer] [place] [employer] Whe[employer]:  Randal payton ofri: deni livjeremilo kely yo  Nimewo Telefòn: [phone]  Sitwèb: www.[employer].org/programs/meals-on-wheels        [facility] SNAP ([employer] olena Deni):  Randal payton ofri: bay yon marija ki itilize ghadaou lajan kach olena achte atik manje an sante [place].  Nimewo Telefòn: [phone]  Urban Viera ranpli aplikasyon an nan [email or web address]  Sitwèb: [email or web address]/food-assistance.[email or web address]        Bezwen resous adisyonèl?  Rele 211  Olena Jwenn èd [email or web address]://www.[email or web address]

## 2024-06-04 NOTE — PROGRESS NOTES
confirmed current warfarin 5 mg mon & fri 7.5 mg rest. Inr 1.7 one time dose of 7.5mg this Friday and then back to normal schedule of 5 mg x 2 days and 7.5 the rest. Recheck in 2 weeks per dr Portillo

## 2024-06-12 ENCOUNTER — OFFICE VISIT (OUTPATIENT)
Dept: SURGERY | Age: 69
End: 2024-06-12

## 2024-06-12 VITALS
HEIGHT: 64 IN | OXYGEN SATURATION: 92 % | DIASTOLIC BLOOD PRESSURE: 84 MMHG | SYSTOLIC BLOOD PRESSURE: 130 MMHG | BODY MASS INDEX: 25.76 KG/M2 | WEIGHT: 150.9 LBS | HEART RATE: 50 BPM

## 2024-06-12 DIAGNOSIS — Z48.89 POSTOPERATIVE VISIT: Primary | ICD-10-CM

## 2024-06-12 PROCEDURE — 99024 POSTOP FOLLOW-UP VISIT: CPT | Performed by: SURGERY

## 2024-06-18 ENCOUNTER — NURSE ONLY (OUTPATIENT)
Dept: FAMILY MEDICINE CLINIC | Age: 69
End: 2024-06-18
Payer: MEDICARE

## 2024-06-18 DIAGNOSIS — Z95.2 H/O MECHANICAL AORTIC VALVE REPLACEMENT: ICD-10-CM

## 2024-06-18 DIAGNOSIS — Z79.01 ANTICOAGULANT LONG-TERM USE: ICD-10-CM

## 2024-06-18 LAB
INTERNATIONAL NORMALIZATION RATIO, POC: 2.7
PROTHROMBIN TIME, POC: 0

## 2024-06-18 PROCEDURE — 85610 PROTHROMBIN TIME: CPT | Performed by: FAMILY MEDICINE

## 2024-06-18 NOTE — PATIENT INSTRUCTIONS
[facility] [place]      RESOUS ALIMARILUZ HUFFMAN:      [employer] ([place]):  Randal payton ofri: Enfòmasyon angeles chris huffman, chris huffman mobil, pwogram repa maco ete, ak pwogram olena bri montoya  Nimewo Telefòn: [phone]  Sitwèb: [email or web address]      Kevin whitaker ([place]):  Randal payton ofri: yon chris huffman ak yon kwizin soup  Nimewo Telefòn: [phone]  Sitwèb: [email or web address]      [employer] ([place]):  Randal sa payton ofri: Manje midi ak aswè, cho, frèt oswa glase, disponib olena livrezon Lendi-Vandredi olena yon don oswa yon ti frè. maikel gwoup nan sal manje kominotè a midi olena moun ki gen laj 60 ak pi bon  Nimewo Telefòn: [phone]  Sitwèb: [email or web address] [email or web address]      [employer]:  Randal payton ofri: deni livjeremilo kely yo  Nimewo Telefòn: [phone]  Sitwèb: www.[facility].org/programs/meals-on-wheels        [facility] SNAP ([employer] olena Deni):  Randal payton ofri: bay yon marija ki itilize ghadaou lajan kach olena achte atik manje an sante [place].  Nimewo Telefòn: [phone]  Urban Viera ranpli aplikasyon an nan [email or web address]  Sitwèb: https://www.[email or web address].org/food-assistance.html        Bezwen resous adisyonèl?  Rele 211  Olena Jwenn èd https://www.[email or web address]

## 2024-06-18 NOTE — PROGRESS NOTES
confirmed current warfarin 5 mg mon & fri 7.5 mg rest. INR = 2.7 Recheck in 2 weeks per dr Portillo

## 2024-06-21 NOTE — PROGRESS NOTES
Chief Complaint   Patient presents with    Post-Op Check     1st P/O Rocco Lt Inguinal Hernia Repair         SUBJECTIVE:     Patient here status post robotic left inguinal hernia repair.  Overall doing well.  Tolerating diet.  Normal bowel movements.  No urinary retention.       Past Surgical History:   Procedure Laterality Date    BRONCHOSCOPY  1996    \"Done Twice\"    CARDIAC VALVE REPLACEMENT  12/17/2003    \"Aortic Valve Replacement, Mechanical Valve\"    COLONOSCOPY  2006    Polyps Removed    DENTAL SURGERY      Teeth Extracted In Past    ENDOSCOPY, COLON, DIAGNOSTIC  In Past    HEMIARTHROPLASTY SHOULDER FRACTURE Right 1/29/2019    SHOULDER HEMIARTHROPLASTY performed by Berto Duran DO at [facility] OR    HERNIA REPAIR Left 5/23/2024    LEFT HERNIA INGUINAL REPAIR LAPAROSCOPIC ROBOTIC performed by Cameron Dunn DO at [facility] OR    KNEE ARTHROSCOPY Left 1992    LUNG BIOPSY Right 1996    OTHER SURGICAL HISTORY  1992    \"Cauterized Because My Sperm Was Low\"    ROTATOR CUFF REPAIR Right 2008    TONSILLECTOMY  1959 Or 1960    TOTAL HIP ARTHROPLASTY Right 10/25/2021    RIGHT HIP TOTAL ARTHROPLASTY performed by Berto Duran DO at [facility] OR    UPPER GASTROINTESTINAL ENDOSCOPY N/A 11/10/2021    EGD BIOPSY performed by Haim Camara MD at [facility] ENDOSCOPY     Past Medical History:   Diagnosis Date    Acid reflux     Acute frontal sinusitis 07/22/2009    Alcohol abuse     \"I Drink Average 2 Beers A Day\"    Anesthesia     \" I get agitated\"    Anxiety     Arthritis     \"Right Shoulder, Neck, Left Knee\"    Atypical mycobacterial infection     Broken teeth     Upper And Lower     CHF (congestive heart failure) ([facility])     COPD (chronic obstructive pulmonary disease) ([facility])     Sees Dr. Díaz [place] [place] [facility]     Cough     Frequent Cough With Green Sputum    Depression     Depression     Dyspnea 02/23/2018    H/O cardiac catheterization 05/17/2022    LAD: Moderate Lumen Irregularity.CALCIFIED mid LAD segment.

## 2024-07-12 ENCOUNTER — OFFICE VISIT (OUTPATIENT)
Dept: FAMILY MEDICINE CLINIC | Age: 69
End: 2024-07-12

## 2024-07-12 ENCOUNTER — TELEPHONE (OUTPATIENT)
Dept: FAMILY MEDICINE CLINIC | Age: 69
End: 2024-07-12

## 2024-07-12 VITALS
BODY MASS INDEX: 25.97 KG/M2 | HEIGHT: 64 IN | WEIGHT: 152.1 LBS | OXYGEN SATURATION: 94 % | SYSTOLIC BLOOD PRESSURE: 110 MMHG | HEART RATE: 62 BPM | DIASTOLIC BLOOD PRESSURE: 70 MMHG | RESPIRATION RATE: 18 BRPM

## 2024-07-12 DIAGNOSIS — F33.3 SEVERE EPISODE OF RECURRENT MAJOR DEPRESSIVE DISORDER, WITH PSYCHOTIC FEATURES (HCC): ICD-10-CM

## 2024-07-12 DIAGNOSIS — Z79.01 ANTICOAGULANT LONG-TERM USE: ICD-10-CM

## 2024-07-12 DIAGNOSIS — Z95.2 H/O MECHANICAL AORTIC VALVE REPLACEMENT: ICD-10-CM

## 2024-07-12 DIAGNOSIS — Z91.81 AT HIGH RISK FOR FALLS: ICD-10-CM

## 2024-07-12 DIAGNOSIS — E78.2 MIXED HYPERLIPIDEMIA: ICD-10-CM

## 2024-07-12 DIAGNOSIS — J44.9 CHRONIC OBSTRUCTIVE PULMONARY DISEASE, UNSPECIFIED COPD TYPE (HCC): Chronic | ICD-10-CM

## 2024-07-12 DIAGNOSIS — F41.9 ANXIETY: Chronic | ICD-10-CM

## 2024-07-12 DIAGNOSIS — E11.9 TYPE 2 DIABETES MELLITUS WITHOUT COMPLICATION, WITHOUT LONG-TERM CURRENT USE OF INSULIN (HCC): ICD-10-CM

## 2024-07-12 DIAGNOSIS — J44.9 CHRONIC OBSTRUCTIVE PULMONARY DISEASE, UNSPECIFIED COPD TYPE (HCC): Primary | Chronic | ICD-10-CM

## 2024-07-12 LAB
DEPRECATED RDW RBC AUTO: 14.4 % (ref 12.4–15.4)
HCT VFR BLD AUTO: 38.5 % (ref 40.5–52.5)
HGB BLD-MCNC: 12.9 G/DL (ref 13.5–17.5)
INTERNATIONAL NORMALIZATION RATIO, POC: 4.4
MCH RBC QN AUTO: 33 PG (ref 26–34)
MCHC RBC AUTO-ENTMCNC: 33.6 G/DL (ref 31–36)
MCV RBC AUTO: 98.3 FL (ref 80–100)
PLATELET # BLD AUTO: 260 K/UL (ref 135–450)
PMV BLD AUTO: 7 FL (ref 5–10.5)
PROTHROMBIN TIME, POC: 0
RBC # BLD AUTO: 3.92 M/UL (ref 4.2–5.9)
WBC # BLD AUTO: 7 K/UL (ref 4–11)

## 2024-07-12 RX ORDER — WARFARIN SODIUM 5 MG/1
7.5 TABLET ORAL DAILY
Qty: 60 TABLET | Refills: 2 | Status: SHIPPED | OUTPATIENT
Start: 2024-07-12

## 2024-07-12 RX ORDER — SIMVASTATIN 40 MG
40 TABLET ORAL NIGHTLY
Qty: 90 TABLET | Refills: 1 | Status: SHIPPED | OUTPATIENT
Start: 2024-07-12

## 2024-07-12 RX ORDER — VENLAFAXINE HYDROCHLORIDE 150 MG/1
150 CAPSULE, EXTENDED RELEASE ORAL DAILY
Qty: 30 CAPSULE | Refills: 5 | Status: SHIPPED | OUTPATIENT
Start: 2024-07-12

## 2024-07-12 RX ORDER — FERROUS SULFATE 325(65) MG
1 TABLET ORAL
Qty: 90 TABLET | Refills: 1 | Status: SHIPPED | OUTPATIENT
Start: 2024-07-12

## 2024-07-12 NOTE — PROGRESS NOTES
confirmed current warfarin 5 mg mon & fri 7.5 mg rest.INR = 4.4  Per Dr Montes No alcohol, no change and repeat in 2 weeks.

## 2024-07-12 NOTE — TELEPHONE ENCOUNTER
Clarified warfarin scrip 5 mg 1-2 tabs as directed with  Christine (Pharmacist ) mikayla.  Christine voiced an understanding

## 2024-07-13 LAB
ALBUMIN SERPL-MCNC: 4.7 G/DL (ref 3.4–5)
ALBUMIN/GLOB SERPL: 1.8 {RATIO} (ref 1.1–2.2)
ALP SERPL-CCNC: 112 U/L (ref 40–129)
ALT SERPL-CCNC: 31 U/L (ref 10–40)
ANION GAP SERPL CALCULATED.3IONS-SCNC: 17 MMOL/L (ref 3–16)
AST SERPL-CCNC: 43 U/L (ref 15–37)
BILIRUB SERPL-MCNC: 0.4 MG/DL (ref 0–1)
BUN SERPL-MCNC: 10 MG/DL (ref 7–20)
CALCIUM SERPL-MCNC: 9.1 MG/DL (ref 8.3–10.6)
CHLORIDE SERPL-SCNC: 92 MMOL/L (ref 99–110)
CHOLEST SERPL-MCNC: 183 MG/DL (ref 0–199)
CO2 SERPL-SCNC: 20 MMOL/L (ref 21–32)
CREAT SERPL-MCNC: 0.8 MG/DL (ref 0.8–1.3)
EST. AVERAGE GLUCOSE BLD GHB EST-MCNC: 102.5 MG/DL
GFR SERPLBLD CREATININE-BSD FMLA CKD-EPI: >90 ML/MIN/{1.73_M2}
GLUCOSE SERPL-MCNC: 71 MG/DL (ref 70–99)
HBA1C MFR BLD: 5.2 %
HDLC SERPL-MCNC: 98 MG/DL (ref 40–60)
LDLC SERPL CALC-MCNC: 75 MG/DL
POTASSIUM SERPL-SCNC: 4.5 MMOL/L (ref 3.5–5.1)
PROT SERPL-MCNC: 7.3 G/DL (ref 6.4–8.2)
SODIUM SERPL-SCNC: 129 MMOL/L (ref 136–145)
TRIGL SERPL-MCNC: 48 MG/DL (ref 0–150)
VLDLC SERPL CALC-MCNC: 10 MG/DL

## 2024-07-15 ENCOUNTER — TELEPHONE (OUTPATIENT)
Dept: FAMILY MEDICINE CLINIC | Age: 69
End: 2024-07-15

## 2024-07-15 ENCOUNTER — TELEPHONE (OUTPATIENT)
Dept: CARDIOLOGY CLINIC | Age: 69
End: 2024-07-15

## 2024-07-15 DIAGNOSIS — E87.1 HYPONATREMIA: Primary | ICD-10-CM

## 2024-07-15 RX ORDER — METOPROLOL SUCCINATE 25 MG/1
25 TABLET, EXTENDED RELEASE ORAL 2 TIMES DAILY
Qty: 60 TABLET | Refills: 5 | Status: SHIPPED | OUTPATIENT
Start: 2024-07-15

## 2024-07-15 RX ORDER — PANTOPRAZOLE SODIUM 40 MG/1
TABLET, DELAYED RELEASE ORAL
Qty: 90 TABLET | Refills: 1 | Status: SHIPPED | OUTPATIENT
Start: 2024-07-15

## 2024-07-15 NOTE — TELEPHONE ENCOUNTER
Per Dr Flores note Lab order placed. Patient informed and voiced understanding.       ----- Message from Yfn Flores MD sent at 7/14/2024  8:31 AM EDT -----  Please inform patient that A1c looks great (5.2%).  Lipids are stable, CMP is fine except sodium remains low at 129, and CBC is also fairly stable.  Patient encouraged to drink some Gatorade and/or fruit juices daily not just water coffee and or beer.  Work on this and then lets recheck sodium level in 3 weeks.

## 2024-07-15 NOTE — TELEPHONE ENCOUNTER
Patient called in and requested refills of Pantoprazole and metoprolol.  
As per pt, c/o intermittent generalized ABD pain radiating to lower back since 12/2020 s/p colonoscopy procedure worsening the last three days w/ RLQ bruising w/ pain and tenderness since friday and nausea and lightheadedness today. LMP 2/9/2021. Pt denies any traumas, h/a, SOB, CP, f/c, v/d, hematuria, or cough.

## 2024-07-30 ENCOUNTER — TELEPHONE (OUTPATIENT)
Dept: CARDIOLOGY CLINIC | Age: 69
End: 2024-07-30

## 2024-07-30 ENCOUNTER — NURSE ONLY (OUTPATIENT)
Dept: FAMILY MEDICINE CLINIC | Age: 69
End: 2024-07-30
Payer: MEDICARE

## 2024-07-30 DIAGNOSIS — Z79.01 ANTICOAGULANT LONG-TERM USE: ICD-10-CM

## 2024-07-30 DIAGNOSIS — Z95.2 H/O MECHANICAL AORTIC VALVE REPLACEMENT: ICD-10-CM

## 2024-07-30 LAB
INTERNATIONAL NORMALIZATION RATIO, POC: 2
PROTHROMBIN TIME, POC: 24.4

## 2024-07-30 PROCEDURE — 85610 PROTHROMBIN TIME: CPT | Performed by: FAMILY MEDICINE

## 2024-07-30 NOTE — PROGRESS NOTES
confirmed current warfarin 5 mg Mon & Fri, 7.5 mg all other days. Inr 2.0. per Kiki PA change to 5 mg  on Mon & 7.5 mg all other days. Recheck in 2 weeks.

## 2024-07-30 NOTE — TELEPHONE ENCOUNTER
Patient called and requested appt change to Thursday. Returned call and left VM of appt change to Thursday.

## 2024-08-08 ENCOUNTER — OFFICE VISIT (OUTPATIENT)
Dept: CARDIOLOGY CLINIC | Age: 69
End: 2024-08-08
Payer: MEDICARE

## 2024-08-08 VITALS
HEART RATE: 80 BPM | HEIGHT: 64 IN | BODY MASS INDEX: 25.95 KG/M2 | DIASTOLIC BLOOD PRESSURE: 62 MMHG | OXYGEN SATURATION: 95 % | SYSTOLIC BLOOD PRESSURE: 130 MMHG | WEIGHT: 152 LBS

## 2024-08-08 DIAGNOSIS — I42.6 ALCOHOLIC CARDIOMYOPATHY (HCC): Primary | ICD-10-CM

## 2024-08-08 DIAGNOSIS — E78.2 MIXED HYPERLIPIDEMIA: Chronic | ICD-10-CM

## 2024-08-08 DIAGNOSIS — I38 VALVULAR HEART DISEASE: ICD-10-CM

## 2024-08-08 DIAGNOSIS — F10.10 ETOH ABUSE: ICD-10-CM

## 2024-08-08 PROCEDURE — 99214 OFFICE O/P EST MOD 30 MIN: CPT | Performed by: NURSE PRACTITIONER

## 2024-08-08 PROCEDURE — 1124F ACP DISCUSS-NO DSCNMKR DOCD: CPT | Performed by: NURSE PRACTITIONER

## 2024-08-08 ASSESSMENT — ENCOUNTER SYMPTOMS: SHORTNESS OF BREATH: 0

## 2024-08-08 NOTE — PROGRESS NOTES
[place] [address]  Phone: [phone]    Fax [phone]    Kenji Hurt MD, [facility]  Alexander Paez MD, [facility]   Jeannine Walters MD, [facility] MD Kenyon Fernandez MD, [facility]  Juan De La Vega MD, [facility]    Rajwinder Santiago MD, [facility]   Veronica Griffith, APRN  Sarah Manuel, APRN  Gladys Jones, APRN  Evan Wu, APRN    Cardiology Progress Note      8/8/2024    RE: Manas Walters  (1955)                             Primary cardiologist: Dr. Romain Young       Subjective:  CC:   1. Alcoholic cardiomyopathy (HCC)    2. Valvular heart disease    3. Mixed hyperlipidemia    4. ETOH abuse        HPI: Manas Walters, who is a  68 y.o. year old male with a past medical history as listed below.  Patient presents to the office for follow up on CHF, VHD(metallic aortic valve placed in 2003), and hyperlipidemia.  Patient had increased fatigue and shortness of breath resulting in hospitalization August 2022.  Patient found to have newly reduced EF 10-15% which was attributed to alcoholic cardiomyopathy.  LHC at that time showed moderate disease of LAD.  F/U echo showed LEVF improved to 30-35% and did not require ICD.      Past Medical History:   Diagnosis Date    Acid reflux     Acute frontal sinusitis 07/22/2009    Alcohol abuse     \"I Drink Average 2 Beers A Day\"    Anesthesia     \" I get agitated\"    Anxiety     Arthritis     \"Right Shoulder, Neck, Left Knee\"    Atypical mycobacterial infection     Broken teeth     Upper And Lower     CHF (congestive heart failure) ([facility])     COPD (chronic obstructive pulmonary disease) ([facility])     Sees Dr. Díaz In [place], [place] [facility]     Cough     Frequent Cough With Green Sputum    Depression     Depression     Dyspnea 02/23/2018    H/O cardiac catheterization 05/17/2022    LAD: Moderate Lumen Irregularity.CALCIFIED mid LAD segment.    H/O cardiovascular MUGA stress test 05/14/2019    EF 50%, NORMAL

## 2024-08-13 ENCOUNTER — NURSE ONLY (OUTPATIENT)
Dept: FAMILY MEDICINE CLINIC | Age: 69
End: 2024-08-13
Payer: MEDICARE

## 2024-08-13 DIAGNOSIS — Z79.01 ANTICOAGULANT LONG-TERM USE: ICD-10-CM

## 2024-08-13 DIAGNOSIS — Z95.2 H/O MECHANICAL AORTIC VALVE REPLACEMENT: ICD-10-CM

## 2024-08-13 LAB
INTERNATIONAL NORMALIZATION RATIO, POC: 3
PROTHROMBIN TIME, POC: NORMAL

## 2024-08-13 PROCEDURE — 85610 PROTHROMBIN TIME: CPT | Performed by: FAMILY MEDICINE

## 2024-08-27 ENCOUNTER — NURSE ONLY (OUTPATIENT)
Dept: FAMILY MEDICINE CLINIC | Age: 69
End: 2024-08-27
Payer: MEDICARE

## 2024-08-27 DIAGNOSIS — Z79.01 ANTICOAGULANT LONG-TERM USE: ICD-10-CM

## 2024-08-27 DIAGNOSIS — Z95.2 H/O MECHANICAL AORTIC VALVE REPLACEMENT: ICD-10-CM

## 2024-08-27 LAB
INTERNATIONAL NORMALIZATION RATIO, POC: 1.2
PROTHROMBIN TIME, POC: ABNORMAL

## 2024-08-27 PROCEDURE — 85610 PROTHROMBIN TIME: CPT | Performed by: FAMILY MEDICINE

## 2024-08-27 NOTE — PROGRESS NOTES
Confirmed current warfarin 5 mg Mon 7.5 mg all other days. Inr 1.2 Per Dr Flores keep the dose the same and remind patient to be sure he takes it every day no matter what and we'll recheck again in 2 weeks.

## 2024-09-09 ENCOUNTER — OFFICE VISIT (OUTPATIENT)
Dept: ORTHOPEDIC SURGERY | Age: 69
End: 2024-09-09
Payer: MEDICARE

## 2024-09-09 VITALS
OXYGEN SATURATION: 97 % | WEIGHT: 143 LBS | RESPIRATION RATE: 15 BRPM | HEART RATE: 89 BPM | BODY MASS INDEX: 24.41 KG/M2 | HEIGHT: 64 IN

## 2024-09-09 DIAGNOSIS — Z96.611 HISTORY OF HEMIARTHROPLASTY OF RIGHT SHOULDER: Primary | ICD-10-CM

## 2024-09-09 PROCEDURE — 1124F ACP DISCUSS-NO DSCNMKR DOCD: CPT | Performed by: ORTHOPAEDIC SURGERY

## 2024-09-09 PROCEDURE — 99214 OFFICE O/P EST MOD 30 MIN: CPT | Performed by: ORTHOPAEDIC SURGERY

## 2024-09-09 ASSESSMENT — ENCOUNTER SYMPTOMS
EYE REDNESS: 0
COLOR CHANGE: 0
ABDOMINAL PAIN: 0
SHORTNESS OF BREATH: 0

## 2024-09-10 ENCOUNTER — NURSE ONLY (OUTPATIENT)
Dept: FAMILY MEDICINE CLINIC | Age: 69
End: 2024-09-10
Payer: MEDICARE

## 2024-09-10 DIAGNOSIS — Z79.01 ANTICOAGULANT LONG-TERM USE: ICD-10-CM

## 2024-09-10 DIAGNOSIS — Z95.2 H/O MECHANICAL AORTIC VALVE REPLACEMENT: ICD-10-CM

## 2024-09-10 PROCEDURE — 85610 PROTHROMBIN TIME: CPT | Performed by: FAMILY MEDICINE

## 2024-09-24 ENCOUNTER — NURSE ONLY (OUTPATIENT)
Dept: FAMILY MEDICINE CLINIC | Age: 69
End: 2024-09-24
Payer: MEDICARE

## 2024-09-24 DIAGNOSIS — Z79.01 ANTICOAGULANT LONG-TERM USE: ICD-10-CM

## 2024-09-24 DIAGNOSIS — Z95.2 H/O MECHANICAL AORTIC VALVE REPLACEMENT: ICD-10-CM

## 2024-09-24 LAB
INTERNATIONAL NORMALIZATION RATIO, POC: 1.7
PROTHROMBIN TIME, POC: NORMAL

## 2024-09-24 PROCEDURE — 85610 PROTHROMBIN TIME: CPT | Performed by: FAMILY MEDICINE

## 2024-10-07 DIAGNOSIS — Z79.01 ANTICOAGULANT LONG-TERM USE: ICD-10-CM

## 2024-10-07 RX ORDER — WARFARIN SODIUM 5 MG/1
TABLET ORAL
Qty: 60 TABLET | Refills: 2 | Status: SHIPPED | OUTPATIENT
Start: 2024-10-07

## 2024-10-08 ENCOUNTER — NURSE ONLY (OUTPATIENT)
Dept: FAMILY MEDICINE CLINIC | Age: 69
End: 2024-10-08

## 2024-10-08 ENCOUNTER — ANTI-COAG VISIT (OUTPATIENT)
Dept: FAMILY MEDICINE CLINIC | Age: 69
End: 2024-10-08

## 2024-10-08 DIAGNOSIS — Z23 FLU VACCINE NEED: Primary | ICD-10-CM

## 2024-10-08 DIAGNOSIS — Z95.2 S/P AVR: Primary | Chronic | ICD-10-CM

## 2024-10-08 LAB
INTERNATIONAL NORMALIZATION RATIO, POC: 8
PROTHROMBIN TIME, POC: 96

## 2024-10-08 NOTE — PROGRESS NOTES
confirmed current warfarin 7.5 mg qd. Inr >8.0. per Dr Montes hold warfarin recheck 10/11/24. Pt voiced understanding

## 2024-10-11 ENCOUNTER — NURSE ONLY (OUTPATIENT)
Dept: FAMILY MEDICINE CLINIC | Age: 69
End: 2024-10-11
Payer: MEDICARE

## 2024-10-11 DIAGNOSIS — Z95.2 S/P AVR: Chronic | ICD-10-CM

## 2024-10-11 DIAGNOSIS — Z23 FLU VACCINE NEED: Primary | ICD-10-CM

## 2024-10-11 PROCEDURE — G0008 ADMIN INFLUENZA VIRUS VAC: HCPCS | Performed by: FAMILY MEDICINE

## 2024-10-11 PROCEDURE — 90653 IIV ADJUVANT VACCINE IM: CPT | Performed by: FAMILY MEDICINE

## 2024-10-11 NOTE — PROGRESS NOTES
Pt inr . Confirmed pt is holding warfarin. Inr 2.5. per Dr Montes restart 7.5 mg alternating with 5 mg every other day. Recheck in 2 weeks

## 2024-10-23 RX ORDER — PANTOPRAZOLE SODIUM 40 MG/1
TABLET, DELAYED RELEASE ORAL
Qty: 90 TABLET | Refills: 1 | Status: SHIPPED | OUTPATIENT
Start: 2024-10-23

## 2024-10-24 RX ORDER — SPIRONOLACTONE 25 MG/1
25 TABLET ORAL DAILY
Qty: 30 TABLET | Refills: 3 | Status: CANCELLED | OUTPATIENT
Start: 2024-10-24

## 2024-10-24 RX ORDER — SPIRONOLACTONE 25 MG/1
25 TABLET ORAL DAILY
Qty: 30 TABLET | Refills: 3 | Status: SHIPPED | OUTPATIENT
Start: 2024-10-24

## 2024-11-01 ENCOUNTER — NURSE ONLY (OUTPATIENT)
Dept: FAMILY MEDICINE CLINIC | Age: 69
End: 2024-11-01
Payer: MEDICARE

## 2024-11-01 DIAGNOSIS — Z79.01 ANTICOAGULANT LONG-TERM USE: ICD-10-CM

## 2024-11-01 DIAGNOSIS — Z95.2 H/O MECHANICAL AORTIC VALVE REPLACEMENT: ICD-10-CM

## 2024-11-01 LAB
INTERNATIONAL NORMALIZATION RATIO, POC: 1.4
PROTHROMBIN TIME, POC: ABNORMAL

## 2024-11-01 PROCEDURE — 85610 PROTHROMBIN TIME: CPT | Performed by: FAMILY MEDICINE

## 2024-11-01 NOTE — PROGRESS NOTES
Patient currently taking 5 mg on Sunday, 7.5 mg other 6 days.  INR 1.4.  Per Dr Flores change to 7.5 mg daily and recheck in 2 weeks.

## 2024-11-11 RX ORDER — METOPROLOL SUCCINATE 25 MG/1
25 TABLET, EXTENDED RELEASE ORAL 2 TIMES DAILY
Qty: 180 TABLET | Refills: 1 | Status: SHIPPED | OUTPATIENT
Start: 2024-11-11

## 2024-11-14 ENCOUNTER — NURSE ONLY (OUTPATIENT)
Dept: FAMILY MEDICINE CLINIC | Age: 69
End: 2024-11-14
Payer: MEDICARE

## 2024-11-14 DIAGNOSIS — Z95.2 H/O MECHANICAL AORTIC VALVE REPLACEMENT: ICD-10-CM

## 2024-11-14 DIAGNOSIS — Z79.01 ANTICOAGULANT LONG-TERM USE: ICD-10-CM

## 2024-11-14 LAB
INTERNATIONAL NORMALIZATION RATIO, POC: 2
PROTHROMBIN TIME, POC: ABNORMAL

## 2024-11-14 PROCEDURE — 85610 PROTHROMBIN TIME: CPT | Performed by: FAMILY MEDICINE

## 2024-11-14 NOTE — PROGRESS NOTES
Current does of 7.5 mg daily.  INR 2.0.  Per Kiki ok to keep same and recheck in 2 weeks per Pt preference.

## 2024-11-26 ENCOUNTER — NURSE ONLY (OUTPATIENT)
Dept: FAMILY MEDICINE CLINIC | Age: 69
End: 2024-11-26
Payer: MEDICARE

## 2024-11-26 DIAGNOSIS — Z95.2 S/P AVR: Primary | Chronic | ICD-10-CM

## 2024-11-26 LAB
INTERNATIONAL NORMALIZATION RATIO, POC: 1.7
PROTHROMBIN TIME, POC: 20.9

## 2024-11-26 PROCEDURE — 85610 PROTHROMBIN TIME: CPT | Performed by: FAMILY MEDICINE

## 2024-11-26 NOTE — PROGRESS NOTES
confirmed current warfarin dose 7.5 mg qd. Inr 1.7. Per Dr Montes change to 10 mg 1 day 7.5 mg 6 days recheck in 2 weeks.

## 2024-12-05 RX ORDER — LOSARTAN POTASSIUM 25 MG/1
25 TABLET ORAL DAILY
Qty: 90 TABLET | Refills: 3 | Status: SHIPPED | OUTPATIENT
Start: 2024-12-05

## 2024-12-05 RX ORDER — VENLAFAXINE HYDROCHLORIDE 150 MG/1
150 CAPSULE, EXTENDED RELEASE ORAL DAILY
Qty: 30 CAPSULE | Refills: 5 | Status: SHIPPED | OUTPATIENT
Start: 2024-12-05

## 2024-12-06 RX ORDER — LOSARTAN POTASSIUM 25 MG/1
25 TABLET ORAL DAILY
Qty: 90 TABLET | Refills: 3 | OUTPATIENT
Start: 2024-12-06

## 2024-12-12 ENCOUNTER — TELEPHONE (OUTPATIENT)
Dept: FAMILY MEDICINE CLINIC | Age: 69
End: 2024-12-12

## 2024-12-12 NOTE — TELEPHONE ENCOUNTER
----- Message from Reece CARBAJAL sent at 12/12/2024  2:13 PM EST -----  Regarding: ECC Escalation To Practice  ECC Escalation To Practice      Type of Escalation: Acute Care Symptom  --------------------------------------------------------------------------------------------------------------------------    Information for Provider:  Patient is looking for appointment for: Symptom Sinus Infection  Reasons for Message: Patient disconnected     Additional Information   --------------------------------------------------------------------------------------------------------------------------    Relationship to Patient: Self     Call Back Info: OK to leave message on voicemail  Preferred Call Back Number: Phone [phone]

## 2024-12-13 ENCOUNTER — OFFICE VISIT (OUTPATIENT)
Dept: FAMILY MEDICINE CLINIC | Age: 69
End: 2024-12-13

## 2024-12-13 VITALS
HEIGHT: 64 IN | DIASTOLIC BLOOD PRESSURE: 60 MMHG | HEART RATE: 80 BPM | SYSTOLIC BLOOD PRESSURE: 130 MMHG | WEIGHT: 149 LBS | BODY MASS INDEX: 25.44 KG/M2 | RESPIRATION RATE: 16 BRPM | OXYGEN SATURATION: 95 %

## 2024-12-13 DIAGNOSIS — J06.9 UPPER RESPIRATORY INFECTION WITH COUGH AND CONGESTION: Primary | ICD-10-CM

## 2024-12-13 DIAGNOSIS — Z95.2 S/P AVR: Chronic | ICD-10-CM

## 2024-12-13 LAB
INTERNATIONAL NORMALIZATION RATIO, POC: 2
PROTHROMBIN TIME, POC: 23.4

## 2024-12-13 RX ORDER — METHYLPREDNISOLONE 4 MG/1
TABLET ORAL
Qty: 1 KIT | Refills: 0 | Status: SHIPPED | OUTPATIENT
Start: 2024-12-13

## 2024-12-13 RX ORDER — AZITHROMYCIN 250 MG/1
TABLET, FILM COATED ORAL
Qty: 6 TABLET | Refills: 0 | Status: SHIPPED | OUTPATIENT
Start: 2024-12-13 | End: 2024-12-23

## 2024-12-13 ASSESSMENT — ENCOUNTER SYMPTOMS
SHORTNESS OF BREATH: 1
COUGH: 1
SINUS PAIN: 1
SINUS PRESSURE: 1

## 2024-12-13 NOTE — PROGRESS NOTES
confirmed current warfarin dose  10 mg Monday, 7.5 mg all other days. Inr 20.. Per Lorrie change to 10 mg Monday and Friday, 7.5 mg all other days.  recheck in 2 weeks.

## 2024-12-13 NOTE — PROGRESS NOTES
12/13/2024    Manas Walters    Chief Complaint   Patient presents with    Sinus Problem     Sinus issue began a week ago. Using nebulizer, inhaler, mucinex. Sinus headache. Thick yellow sputum and nasal discharge. At one point his right ear popped. Denies pain or fullness.     Coagulation Disorder     INR       HPI  History was obtained from patient.  Manas is a pleasant 69 y.o. male who presents today for sinus symptoms.     Used to get sinus infections often but he hasn't for a year or better. He reports that the the recent swings in weather have brought on new symptoms. He reports sinus headache, he reports increased SOB and productive cough of thick yellow sputum.     1. Upper respiratory infection with cough and congestion    2. S/P AVR         REVIEW OF SYMPTOMS    Review of Systems   HENT:  Positive for congestion, postnasal drip, sinus pressure and sinus pain.    Respiratory:  Positive for cough and shortness of breath.    Cardiovascular: Negative.    Neurological:  Positive for headaches.       No data recorded    The 10-year ASCVD risk score (Abby MORRISON, et al., 2019) is: 20.3%    Values used to calculate the score:      Age: 69 years      Sex: Male      Is Non- : No      Diabetic: Yes      Tobacco smoker: No      Systolic Blood Pressure: 130 mmHg      Is BP treated: No      HDL Cholesterol: 98 mg/dL      Total Cholesterol: 183 mg/dL    Last Weight Metrics:      12/13/2024    12:53 PM 9/9/2024     3:28 PM 8/8/2024     2:45 PM 7/12/2024     1:23 PM 6/12/2024    11:06 AM 5/7/2024    11:01 AM 4/24/2024     1:44 PM   Weight Loss Metrics   Height 5' 4\" 5' 4\" 5' 4\" 5' 4\" 5' 4\" 5' 4.016\" 5' 4\"   Weight - Scale 149 lbs 143 lbs 152 lbs 152 lbs 2 oz 150 lbs 14 oz 154 lbs 6 oz 166 lbs 11 oz   BMI (Calculated) 25.6 kg/m2 24.6 kg/m2 26.1 kg/m2 26.2 kg/m2 26 kg/m2 26.5 kg/m2 28.7 kg/m2       PAST MEDICAL HISTORY  Past Medical History:   Diagnosis Date    Acid reflux     Acute frontal sinusitis

## 2024-12-23 ENCOUNTER — NURSE ONLY (OUTPATIENT)
Dept: FAMILY MEDICINE CLINIC | Age: 69
End: 2024-12-23
Payer: MEDICARE

## 2024-12-23 DIAGNOSIS — Z95.2 S/P AVR: Chronic | ICD-10-CM

## 2024-12-23 LAB
INTERNATIONAL NORMALIZATION RATIO, POC: 1.8
PROTHROMBIN TIME, POC: 34.1

## 2024-12-23 PROCEDURE — 85610 PROTHROMBIN TIME: CPT | Performed by: FAMILY MEDICINE

## 2024-12-23 PROCEDURE — 36415 COLL VENOUS BLD VENIPUNCTURE: CPT | Performed by: FAMILY MEDICINE

## 2024-12-23 NOTE — PROGRESS NOTES
confirmed current warfarin 10 mg on M & F 7.5 mg all other days. Inr 2.8. keep same recheck in 2 weeks.

## 2024-12-24 ENCOUNTER — TELEPHONE (OUTPATIENT)
Dept: FAMILY MEDICINE CLINIC | Age: 69
End: 2024-12-24

## 2024-12-24 RX ORDER — FLUTICASONE PROPIONATE 50 MCG
1 SPRAY, SUSPENSION (ML) NASAL DAILY
Qty: 1 EACH | Refills: 0 | Status: SHIPPED | OUTPATIENT
Start: 2024-12-24

## 2024-12-24 NOTE — TELEPHONE ENCOUNTER
Appt 25    Requested Prescriptions     Signed Prescriptions Disp Refills    fluticasone (FLONASE) 50 MCG/ACT nasal spray 1 each 0     Si spray by Each Nostril route daily     Authorizing Provider: ADILENE BAILEY     Ordering User: AVERY HURT

## 2025-01-06 RX ORDER — VENLAFAXINE HYDROCHLORIDE 150 MG/1
150 CAPSULE, EXTENDED RELEASE ORAL DAILY
Qty: 30 CAPSULE | Refills: 5 | Status: SHIPPED | OUTPATIENT
Start: 2025-01-06

## 2025-01-16 ENCOUNTER — NURSE ONLY (OUTPATIENT)
Dept: FAMILY MEDICINE CLINIC | Age: 70
End: 2025-01-16
Payer: MEDICARE

## 2025-01-16 DIAGNOSIS — Z95.2 S/P AVR: Chronic | ICD-10-CM

## 2025-01-16 LAB
INTERNATIONAL NORMALIZATION RATIO, POC: 2.9
PROTHROMBIN TIME, POC: 34.8

## 2025-01-16 PROCEDURE — 85610 PROTHROMBIN TIME: CPT | Performed by: FAMILY MEDICINE

## 2025-01-16 PROCEDURE — 36415 COLL VENOUS BLD VENIPUNCTURE: CPT | Performed by: FAMILY MEDICINE

## 2025-01-16 NOTE — PROGRESS NOTES
confirmed current warfarin 10 mg on M & F 7.5 mg all other days.INR= 2.9 Keep same and repeat in 2 weeks

## 2025-01-29 ENCOUNTER — NURSE ONLY (OUTPATIENT)
Dept: FAMILY MEDICINE CLINIC | Age: 70
End: 2025-01-29

## 2025-01-29 DIAGNOSIS — Z51.81 ANTICOAGULATION GOAL OF INR 2.5 TO 3.5: Primary | ICD-10-CM

## 2025-01-29 DIAGNOSIS — Z79.01 ANTICOAGULATION GOAL OF INR 2.5 TO 3.5: Primary | ICD-10-CM

## 2025-01-29 LAB — INTERNATIONAL NORMALIZATION RATIO, POC: 3.5

## 2025-01-29 NOTE — PROGRESS NOTES
confirmed current warfarin 10 mg on M & F 7.5 mg all other days. Inr =3.5 keep same recheck in 2 weeks.     Fingerstick left middle finger, pt tolerated well

## 2025-02-12 ENCOUNTER — OFFICE VISIT (OUTPATIENT)
Dept: CARDIOLOGY CLINIC | Age: 70
End: 2025-02-12
Payer: MEDICARE

## 2025-02-12 ENCOUNTER — NURSE ONLY (OUTPATIENT)
Dept: FAMILY MEDICINE CLINIC | Age: 70
End: 2025-02-12
Payer: MEDICARE

## 2025-02-12 VITALS
DIASTOLIC BLOOD PRESSURE: 76 MMHG | OXYGEN SATURATION: 98 % | BODY MASS INDEX: 25.95 KG/M2 | WEIGHT: 152 LBS | HEIGHT: 64 IN | SYSTOLIC BLOOD PRESSURE: 122 MMHG | HEART RATE: 65 BPM

## 2025-02-12 DIAGNOSIS — F10.10 ETOH ABUSE: ICD-10-CM

## 2025-02-12 DIAGNOSIS — I50.22 CHRONIC SYSTOLIC HEART FAILURE (HCC): Primary | Chronic | ICD-10-CM

## 2025-02-12 DIAGNOSIS — Z95.2 S/P AVR: Chronic | ICD-10-CM

## 2025-02-12 DIAGNOSIS — I38 VALVULAR HEART DISEASE: ICD-10-CM

## 2025-02-12 DIAGNOSIS — E78.2 MIXED HYPERLIPIDEMIA: Chronic | ICD-10-CM

## 2025-02-12 LAB
INTERNATIONAL NORMALIZATION RATIO, POC: 2.7
PROTHROMBIN TIME, POC: 32.1

## 2025-02-12 PROCEDURE — 85610 PROTHROMBIN TIME: CPT | Performed by: FAMILY MEDICINE

## 2025-02-12 PROCEDURE — 36415 COLL VENOUS BLD VENIPUNCTURE: CPT | Performed by: FAMILY MEDICINE

## 2025-02-12 PROCEDURE — 99214 OFFICE O/P EST MOD 30 MIN: CPT | Performed by: NURSE PRACTITIONER

## 2025-02-12 PROCEDURE — 1124F ACP DISCUSS-NO DSCNMKR DOCD: CPT | Performed by: NURSE PRACTITIONER

## 2025-02-12 PROCEDURE — 1159F MED LIST DOCD IN RCRD: CPT | Performed by: NURSE PRACTITIONER

## 2025-02-12 ASSESSMENT — ENCOUNTER SYMPTOMS: SHORTNESS OF BREATH: 0

## 2025-02-12 NOTE — PROGRESS NOTES
CLINICAL STAFF DOCUMENTATION    Evan Wu CNP     Manas Walters  1955  [id number]    Have you had any Chest Pain recently that is not new? - No  Have you had any Shortness of Breath - No  Have you had any dizziness - No  Have you had any palpitations that are not new? - No  Is the patient on any of the following medications -  None  Do you have any edema - swelling in No      Do you have a surgery or procedure scheduled in the near future - No    Pt drinks and chews tobacco. Pt drinks coffee daily     Check medication list thoroughly!!! AND RECONCILE OUTSIDE MEDICATIONS  If dose has changed change the entire order not just the MG  BE SURE TO ASK PATIENT IF THEY NEED MEDICATION REFILLS  Verify Pharmacy and update if incorrect    At check out add to every patient's \"wrap up\" the following dot phrase AFTERVISITCARDIOHEARTHOUSE and ensure we explain this to our patients

## 2025-02-12 NOTE — PROGRESS NOTES
confirmed current warfarin 10 mg on M & F 7.5 mg all other days. Patient states since he was at 3.5 last time he cut back to 5 mg instead of 10 mg every Monday and Friday because he felt like 10 mg was too much. Inr 2.7 Per Kiki continue with 5 mg on Monday's & Friday's, 7.5 mg all others, recheck in 2 weeks.

## 2025-02-12 NOTE — PROGRESS NOTES
[place] [address]  Phone: [phone]    Fax [phone]    Kenji Hurt MD, [facility]  Alexander Paez MD, [facility]   Jeannine Walters MD, [facility] MD Kenyon Fernandez MD, [facility]  Juan De La Vega MD, [facility]    Rajwinder Santiago MD, [facility]   Veronica Griffith, APRN  Sarah Manuel, APRN  Gladys Jones, APRN  Evan Wu, APRN    Cardiology Progress Note      2/12/2025    RE: Manas Walters  (1955)                             Primary cardiologist: Dr. Romain Young       Subjective:  CC:   1. Chronic systolic heart failure (HCC)    2. Mixed hyperlipidemia    3. Valvular heart disease    4. ETOH abuse          HPI: Manas Walters, who is a  69 y.o. year old male with a past medical history as listed below.  Patient presents to the office for follow up on CHF, VHD(metallic aortic valve placed in 2003), and hyperlipidemia.  Patient had increased fatigue and shortness of breath resulting in hospitalization August 2022.  Patient found to have newly reduced EF 10-15% which was attributed to alcoholic cardiomyopathy.  LHC at that time showed moderate disease of LAD.  F/U echo showed LEVF improved to 30-35% and did not require ICD.      Past Medical History:   Diagnosis Date    Acid reflux     Acute frontal sinusitis 07/22/2009    Alcohol abuse     \"I Drink Average 2 Beers A Day\"    Anesthesia     \" I get agitated\"    Anxiety     Arthritis     \"Right Shoulder, Neck, Left Knee\"    Atypical mycobacterial infection     Broken teeth     Upper And Lower     CHF (congestive heart failure) ([facility])     COPD (chronic obstructive pulmonary disease) ([facility])     Sees Dr. Díaz [place], [place] [facility]     Cough     Frequent Cough With Green Sputum    Depression     Depression     Dyspnea 02/23/2018    H/O cardiac catheterization 05/17/2022    LAD: Moderate Lumen Irregularity.CALCIFIED mid LAD segment.    H/O cardiovascular MUGA stress test 05/14/2019    EF

## 2025-02-12 NOTE — PATIENT INSTRUCTIONS
Thank you for allowing us to care for you today!   We want to ensure we can follow your treatment plan and we strive to give you the best outcomes and experience possible.   If you ever have a life threatening emergency and call 911 - for an ambulance (EMS)  REMEMBER  Our providers can only care for you at:   [facility] or [facility]   Even if you have someone take you or you drive yourself we can only care for you in a [facility] facility. Our providers are not setup at the other healthcare locations!    PLEASE CALL OUR OFFICE DURING NORMAL BUSINESS HOURS  Monday through Friday 8 am to 5 pm  AFTER HOURS the physician on-call cannot help with scheduling, rescheduling, procedure instruction questions or any type of medication need or issue.  [facility] P:[phone] - [facility] P:[phone] - [facility] P:[phone]      If you receive a survey:  We would appreciate you taking the time to share your experience concerning your provider visit in the office.    These surveys are confidential!  We are eager to improve and are counting on you to share your feedback so we can ensure you get the best care possible.

## 2025-02-18 ENCOUNTER — TELEPHONE (OUTPATIENT)
Dept: CARDIOLOGY CLINIC | Age: 70
End: 2025-02-18

## 2025-02-18 RX ORDER — PANTOPRAZOLE SODIUM 40 MG/1
TABLET, DELAYED RELEASE ORAL
Qty: 90 TABLET | Refills: 1 | OUTPATIENT
Start: 2025-02-18

## 2025-02-18 NOTE — TELEPHONE ENCOUNTER
Pt gets his jardiance from [employer] for free and he has not had any for a couple weeks and wants to know if we could get with them on these, because he has called them and gets no response   [phone] prompted choose option 2

## 2025-02-18 NOTE — TELEPHONE ENCOUNTER
Left message on voicemail for patient to return my call as he will need to complete a new application for 2025. Awaiting callback.

## 2025-02-19 DIAGNOSIS — Z79.01 ANTICOAGULANT LONG-TERM USE: ICD-10-CM

## 2025-02-19 DIAGNOSIS — E78.2 MIXED HYPERLIPIDEMIA: ICD-10-CM

## 2025-02-19 RX ORDER — SPIRONOLACTONE 25 MG/1
25 TABLET ORAL DAILY
Qty: 30 TABLET | Refills: 3 | Status: SHIPPED | OUTPATIENT
Start: 2025-02-19

## 2025-02-19 RX ORDER — WARFARIN SODIUM 5 MG/1
5 TABLET ORAL 2 TIMES DAILY
Qty: 60 TABLET | Refills: 2 | Status: SHIPPED | OUTPATIENT
Start: 2025-02-19

## 2025-02-19 RX ORDER — SIMVASTATIN 40 MG
40 TABLET ORAL NIGHTLY
Qty: 90 TABLET | Refills: 1 | Status: SHIPPED | OUTPATIENT
Start: 2025-02-19

## 2025-02-19 NOTE — TELEPHONE ENCOUNTER
Patient has requested that we apply for a salvador through the [employer] and has been approved.  Pharmacist advised and there will be no cost to patient. Patient advised that the pharmacy will notify him once it is ready.     Card No.  [id number]    Card Status  Active    BIN  [id number]    PCN  [id number]     Group  [id number]

## 2025-02-20 RX ORDER — SPIRONOLACTONE 25 MG/1
25 TABLET ORAL DAILY
Qty: 90 TABLET | OUTPATIENT
Start: 2025-02-20

## 2025-02-25 RX ORDER — METOPROLOL SUCCINATE 25 MG/1
25 TABLET, EXTENDED RELEASE ORAL 2 TIMES DAILY
Qty: 180 TABLET | Refills: 1 | Status: SHIPPED | OUTPATIENT
Start: 2025-02-25

## 2025-02-25 RX ORDER — PANTOPRAZOLE SODIUM 40 MG/1
TABLET, DELAYED RELEASE ORAL
Qty: 90 TABLET | Refills: 1 | OUTPATIENT
Start: 2025-02-25

## 2025-02-27 RX ORDER — PANTOPRAZOLE SODIUM 40 MG/1
TABLET, DELAYED RELEASE ORAL
Qty: 90 TABLET | Refills: 1 | Status: SHIPPED | OUTPATIENT
Start: 2025-02-27

## 2025-03-05 ENCOUNTER — NURSE ONLY (OUTPATIENT)
Dept: FAMILY MEDICINE CLINIC | Age: 70
End: 2025-03-05
Payer: MEDICARE

## 2025-03-05 DIAGNOSIS — Z95.2 S/P AVR: Chronic | ICD-10-CM

## 2025-03-05 LAB
INTERNATIONAL NORMALIZATION RATIO, POC: 1.8
PROTHROMBIN TIME, POC: 21.2

## 2025-03-05 PROCEDURE — 36415 COLL VENOUS BLD VENIPUNCTURE: CPT | Performed by: FAMILY MEDICINE

## 2025-03-05 PROCEDURE — 85610 PROTHROMBIN TIME: CPT | Performed by: FAMILY MEDICINE

## 2025-03-05 NOTE — PROGRESS NOTES
confirmed current warfarin is 5 mg on M & F, 7.5 mg all other days. Inr 1.8. per dr Montes keeps same recheck in 1 weeks

## 2025-03-12 ENCOUNTER — OFFICE VISIT (OUTPATIENT)
Dept: FAMILY MEDICINE CLINIC | Age: 70
End: 2025-03-12

## 2025-03-12 VITALS
BODY MASS INDEX: 25.74 KG/M2 | HEART RATE: 60 BPM | RESPIRATION RATE: 16 BRPM | DIASTOLIC BLOOD PRESSURE: 60 MMHG | WEIGHT: 150.8 LBS | HEIGHT: 64 IN | OXYGEN SATURATION: 96 % | SYSTOLIC BLOOD PRESSURE: 110 MMHG

## 2025-03-12 DIAGNOSIS — F31.75 BIPOLAR DISORDER, IN PARTIAL REMISSION, MOST RECENT EPISODE DEPRESSED (HCC): ICD-10-CM

## 2025-03-12 DIAGNOSIS — E11.9 TYPE 2 DIABETES MELLITUS WITHOUT COMPLICATION, WITHOUT LONG-TERM CURRENT USE OF INSULIN: ICD-10-CM

## 2025-03-12 DIAGNOSIS — I42.6 ALCOHOLIC CARDIOMYOPATHY (HCC): ICD-10-CM

## 2025-03-12 DIAGNOSIS — Z95.2 H/O MECHANICAL AORTIC VALVE REPLACEMENT: ICD-10-CM

## 2025-03-12 DIAGNOSIS — Z95.2 S/P AVR: Chronic | ICD-10-CM

## 2025-03-12 DIAGNOSIS — J44.9 CHRONIC OBSTRUCTIVE PULMONARY DISEASE, UNSPECIFIED COPD TYPE (HCC): Primary | Chronic | ICD-10-CM

## 2025-03-12 DIAGNOSIS — F33.41 RECURRENT MAJOR DEPRESSIVE DISORDER, IN PARTIAL REMISSION: Chronic | ICD-10-CM

## 2025-03-12 DIAGNOSIS — F33.3 SEVERE EPISODE OF RECURRENT MAJOR DEPRESSIVE DISORDER, WITH PSYCHOTIC FEATURES (HCC): ICD-10-CM

## 2025-03-12 DIAGNOSIS — E11.9 TYPE 2 DIABETES MELLITUS WITHOUT COMPLICATION, WITHOUT LONG-TERM CURRENT USE OF INSULIN (HCC): ICD-10-CM

## 2025-03-12 LAB
INTERNATIONAL NORMALIZATION RATIO, POC: 1.2
PROTHROMBIN TIME, POC: 0

## 2025-03-12 SDOH — ECONOMIC STABILITY: FOOD INSECURITY: WITHIN THE PAST 12 MONTHS, THE FOOD YOU BOUGHT JUST DIDN'T LAST AND YOU DIDN'T HAVE MONEY TO GET MORE.: NEVER TRUE

## 2025-03-12 SDOH — ECONOMIC STABILITY: FOOD INSECURITY: WITHIN THE PAST 12 MONTHS, YOU WORRIED THAT YOUR FOOD WOULD RUN OUT BEFORE YOU GOT MONEY TO BUY MORE.: NEVER TRUE

## 2025-03-12 ASSESSMENT — PATIENT HEALTH QUESTIONNAIRE - PHQ9
SUM OF ALL RESPONSES TO PHQ QUESTIONS 1-9: 0
SUM OF ALL RESPONSES TO PHQ QUESTIONS 1-9: 0
9. THOUGHTS THAT YOU WOULD BE BETTER OFF DEAD, OR OF HURTING YOURSELF: NOT AT ALL
10. IF YOU CHECKED OFF ANY PROBLEMS, HOW DIFFICULT HAVE THESE PROBLEMS MADE IT FOR YOU TO DO YOUR WORK, TAKE CARE OF THINGS AT HOME, OR GET ALONG WITH OTHER PEOPLE: NOT DIFFICULT AT ALL
5. POOR APPETITE OR OVEREATING: NOT AT ALL
SUM OF ALL RESPONSES TO PHQ QUESTIONS 1-9: 0
8. MOVING OR SPEAKING SO SLOWLY THAT OTHER PEOPLE COULD HAVE NOTICED. OR THE OPPOSITE, BEING SO FIGETY OR RESTLESS THAT YOU HAVE BEEN MOVING AROUND A LOT MORE THAN USUAL: NOT AT ALL
2. FEELING DOWN, DEPRESSED OR HOPELESS: NOT AT ALL
6. FEELING BAD ABOUT YOURSELF - OR THAT YOU ARE A FAILURE OR HAVE LET YOURSELF OR YOUR FAMILY DOWN: NOT AT ALL
3. TROUBLE FALLING OR STAYING ASLEEP: NOT AT ALL
7. TROUBLE CONCENTRATING ON THINGS, SUCH AS READING THE NEWSPAPER OR WATCHING TELEVISION: NOT AT ALL
4. FEELING TIRED OR HAVING LITTLE ENERGY: NOT AT ALL
SUM OF ALL RESPONSES TO PHQ QUESTIONS 1-9: 0
1. LITTLE INTEREST OR PLEASURE IN DOING THINGS: NOT AT ALL

## 2025-03-12 ASSESSMENT — ENCOUNTER SYMPTOMS
BLOOD IN STOOL: 0
DIARRHEA: 0
CHEST TIGHTNESS: 0
TROUBLE SWALLOWING: 0
NAUSEA: 0
WHEEZING: 0
SHORTNESS OF BREATH: 0
EYE PAIN: 0
VOMITING: 0
APNEA: 1
ABDOMINAL PAIN: 0

## 2025-03-12 NOTE — PROGRESS NOTES
3/12/2025    Manas Walters    Chief Complaint   Patient presents with    4 month f/u      No complaints     Coagulation Disorder       HPI  History was obtained from the patient.  Manas is a 69 y.o. male who presents today with recheck.  Patient had an INR that was only 1.2 has a history aortic valve replacement he states he has not been drinking alcohol at all but has been eating a lot of greens in [place] that were readily available to him almost daily.  He is not missed any doses of Coumadin at 5 mg 2 days a week and 7.5 mg 5 days/week.  He denies chest pain no shortness of breath mood is fairly stable for him.  Sleep apnea is unchanged and overall he is feeling well.  He was seen today with his sister..    REVIEW OF SYMPTOMS    Review of Systems   Constitutional:  Negative for activity change and fatigue.   HENT:  Negative for congestion, hearing loss, mouth sores and trouble swallowing.    Eyes:  Negative for pain and visual disturbance.   Respiratory:  Positive for apnea. Negative for chest tightness, shortness of breath and wheezing.         Patient with possible history of interstitial lung disease.   Cardiovascular:  Negative for chest pain and palpitations.        Patient with history of aortic valve replacement and hyperlipidemia   Gastrointestinal:  Negative for abdominal pain, blood in stool, diarrhea, nausea and vomiting.   Endocrine: Negative for polydipsia.        Patient with diabetes mellitus type 2.   Genitourinary:  Negative for dysuria, frequency and urgency.   Musculoskeletal:  Negative for arthralgias, gait problem and neck stiffness.   Skin:  Negative for rash.   Allergic/Immunologic: Negative for environmental allergies.   Neurological:  Negative for dizziness, seizures, speech difficulty and weakness.   Hematological:  Does not bruise/bleed easily.   Psychiatric/Behavioral:  Positive for dysphoric mood. Negative for agitation, confusion, hallucinations, self-injury and suicidal ideas.

## 2025-03-12 NOTE — PROGRESS NOTES
confirmed current warfarin is 5 mg on M & F, 7.5 mg all other days. Inr  = below 1.2 Per Dr Jyoti brady and take 10 mg today 3/12/25 and tomorrow 3/13/25. Recheck in 2 days.

## 2025-03-13 LAB
ALBUMIN SERPL-MCNC: 4.2 G/DL (ref 3.4–5)
ALBUMIN/GLOB SERPL: 1.8 {RATIO} (ref 1.1–2.2)
ALP SERPL-CCNC: 74 U/L (ref 40–129)
ALT SERPL-CCNC: 22 U/L (ref 10–40)
ANION GAP SERPL CALCULATED.3IONS-SCNC: 11 MMOL/L (ref 3–16)
AST SERPL-CCNC: 31 U/L (ref 15–37)
BILIRUB SERPL-MCNC: <0.2 MG/DL (ref 0–1)
BUN SERPL-MCNC: 24 MG/DL (ref 7–20)
CALCIUM SERPL-MCNC: 9.1 MG/DL (ref 8.3–10.6)
CHLORIDE SERPL-SCNC: 100 MMOL/L (ref 99–110)
CHOLEST SERPL-MCNC: 173 MG/DL (ref 0–199)
CO2 SERPL-SCNC: 23 MMOL/L (ref 21–32)
CREAT SERPL-MCNC: 0.8 MG/DL (ref 0.8–1.3)
DEPRECATED RDW RBC AUTO: 14.7 % (ref 12.4–15.4)
EST. AVERAGE GLUCOSE BLD GHB EST-MCNC: 108.3 MG/DL
GFR SERPLBLD CREATININE-BSD FMLA CKD-EPI: >90 ML/MIN/{1.73_M2}
GLUCOSE SERPL-MCNC: 112 MG/DL (ref 70–99)
HBA1C MFR BLD: 5.4 %
HCT VFR BLD AUTO: 37.3 % (ref 40.5–52.5)
HDLC SERPL-MCNC: 69 MG/DL (ref 40–60)
HGB BLD-MCNC: 12.6 G/DL (ref 13.5–17.5)
INR PPP: 1.09 (ref 0.85–1.15)
LDLC SERPL CALC-MCNC: 74 MG/DL
MCH RBC QN AUTO: 33.3 PG (ref 26–34)
MCHC RBC AUTO-ENTMCNC: 33.6 G/DL (ref 31–36)
MCV RBC AUTO: 99.1 FL (ref 80–100)
PLATELET # BLD AUTO: 263 K/UL (ref 135–450)
PMV BLD AUTO: 7.3 FL (ref 5–10.5)
POTASSIUM SERPL-SCNC: 4.8 MMOL/L (ref 3.5–5.1)
PROT SERPL-MCNC: 6.6 G/DL (ref 6.4–8.2)
PROTHROMBIN TIME: 14.3 SEC (ref 11.9–14.9)
RBC # BLD AUTO: 3.76 M/UL (ref 4.2–5.9)
SODIUM SERPL-SCNC: 134 MMOL/L (ref 136–145)
TRIGL SERPL-MCNC: 151 MG/DL (ref 0–150)
VLDLC SERPL CALC-MCNC: 30 MG/DL
WBC # BLD AUTO: 6.2 K/UL (ref 4–11)

## 2025-03-14 ENCOUNTER — NURSE ONLY (OUTPATIENT)
Dept: FAMILY MEDICINE CLINIC | Age: 70
End: 2025-03-14
Payer: MEDICARE

## 2025-03-14 ENCOUNTER — RESULTS FOLLOW-UP (OUTPATIENT)
Dept: FAMILY MEDICINE CLINIC | Age: 70
End: 2025-03-14

## 2025-03-14 ENCOUNTER — TELEPHONE (OUTPATIENT)
Dept: FAMILY MEDICINE CLINIC | Age: 70
End: 2025-03-14

## 2025-03-14 DIAGNOSIS — Z95.2 S/P AVR: Chronic | ICD-10-CM

## 2025-03-14 LAB
INTERNATIONAL NORMALIZATION RATIO, POC: 1.4
PROTHROMBIN TIME, POC: 0

## 2025-03-14 PROCEDURE — 36415 COLL VENOUS BLD VENIPUNCTURE: CPT | Performed by: FAMILY MEDICINE

## 2025-03-14 PROCEDURE — 85610 PROTHROMBIN TIME: CPT | Performed by: FAMILY MEDICINE

## 2025-03-14 NOTE — PROGRESS NOTES
Patient is currently taking 10 mg a day.  Per Lorrie patient needs to continue 10 mg a day and recheck Monday 3-.

## 2025-03-18 ENCOUNTER — NURSE ONLY (OUTPATIENT)
Dept: FAMILY MEDICINE CLINIC | Age: 70
End: 2025-03-18
Payer: MEDICARE

## 2025-03-18 DIAGNOSIS — Z95.2 S/P AVR: Chronic | ICD-10-CM

## 2025-03-18 LAB
INTERNATIONAL NORMALIZATION RATIO, POC: 1.2
PROTHROMBIN TIME, POC: 14.8

## 2025-03-18 PROCEDURE — 85610 PROTHROMBIN TIME: CPT | Performed by: FAMILY MEDICINE

## 2025-03-18 PROCEDURE — 36415 COLL VENOUS BLD VENIPUNCTURE: CPT | Performed by: FAMILY MEDICINE

## 2025-03-21 ENCOUNTER — CLINICAL SUPPORT (OUTPATIENT)
Dept: FAMILY MEDICINE CLINIC | Age: 70
End: 2025-03-21

## 2025-03-21 DIAGNOSIS — Z79.01 ANTICOAGULATION MONITORING, INR RANGE 2.5-3.5: Primary | ICD-10-CM

## 2025-03-21 DIAGNOSIS — Z95.2 H/O MECHANICAL AORTIC VALVE REPLACEMENT: ICD-10-CM

## 2025-03-21 LAB — INTERNATIONAL NORMALIZATION RATIO, POC: 2.2

## 2025-03-21 NOTE — PROGRESS NOTES
Confirmed current warfarin dose is 10 mg qd. Inr 2.2 per Dr Montes change to 7.5 mg qd recheck in 1 week.

## 2025-03-28 ENCOUNTER — CLINICAL SUPPORT (OUTPATIENT)
Dept: FAMILY MEDICINE CLINIC | Age: 70
End: 2025-03-28
Payer: MEDICARE

## 2025-03-28 DIAGNOSIS — Z51.81 ANTICOAGULATION GOAL OF INR 2.5 TO 3.5: Primary | ICD-10-CM

## 2025-03-28 DIAGNOSIS — Z79.01 ANTICOAGULATION GOAL OF INR 2.5 TO 3.5: Primary | ICD-10-CM

## 2025-03-28 DIAGNOSIS — Z95.2 S/P AVR: Chronic | ICD-10-CM

## 2025-03-28 LAB
INTERNATIONAL NORMALIZATION RATIO, POC: 3
PROTHROMBIN TIME, POC: 0

## 2025-03-28 PROCEDURE — 85610 PROTHROMBIN TIME: CPT | Performed by: FAMILY MEDICINE

## 2025-03-28 PROCEDURE — 36415 COLL VENOUS BLD VENIPUNCTURE: CPT | Performed by: FAMILY MEDICINE

## 2025-03-28 RX ORDER — SODIUM CHLORIDE FOR INHALATION 3 %
3 VIAL, NEBULIZER (ML) INHALATION PRN
Qty: 125 ML | Refills: 0 | Status: SHIPPED | OUTPATIENT
Start: 2025-03-28

## 2025-03-28 NOTE — PROGRESS NOTES
Patient states he is currently taking 7.5 mg qd.  INR was 3.0 Per Dr. Flores continue the same dose and recheck in 2 weeks.  Patient was informed and voiced understanding

## 2025-04-02 ENCOUNTER — TELEPHONE (OUTPATIENT)
Dept: FAMILY MEDICINE CLINIC | Age: 70
End: 2025-04-02

## 2025-04-04 ENCOUNTER — TELEPHONE (OUTPATIENT)
Dept: FAMILY MEDICINE CLINIC | Age: 70
End: 2025-04-04

## 2025-04-04 NOTE — TELEPHONE ENCOUNTER
Spoke to Morena Montalvo pharmacist. Gave verbal to change Sodium Chloride to 4 mL vial every 6 hours as needed. Received fax that they only come in 4 ml vials. He voiced understanding.

## 2025-04-08 ENCOUNTER — CLINICAL SUPPORT (OUTPATIENT)
Dept: FAMILY MEDICINE CLINIC | Age: 70
End: 2025-04-08
Payer: MEDICARE

## 2025-04-08 DIAGNOSIS — Z95.2 S/P AVR: Chronic | ICD-10-CM

## 2025-04-08 LAB
INTERNATIONAL NORMALIZATION RATIO, POC: 2.1
PROTHROMBIN TIME, POC: 24.7

## 2025-04-08 PROCEDURE — 85610 PROTHROMBIN TIME: CPT | Performed by: FAMILY MEDICINE

## 2025-04-08 PROCEDURE — 36415 COLL VENOUS BLD VENIPUNCTURE: CPT | Performed by: FAMILY MEDICINE

## 2025-04-08 NOTE — PROGRESS NOTES
confirmed current warfarin 7.5 mg qd. Inr 2.1. per Dr Montes take 10 mg today then resume 7.5 mg qd recheck in 2 weeks.

## 2025-04-10 ENCOUNTER — TELEPHONE (OUTPATIENT)
Dept: PULMONOLOGY | Age: 70
End: 2025-04-10

## 2025-04-17 ENCOUNTER — OFFICE VISIT (OUTPATIENT)
Dept: FAMILY MEDICINE CLINIC | Age: 70
End: 2025-04-17
Payer: MEDICARE

## 2025-04-17 VITALS
BODY MASS INDEX: 27.49 KG/M2 | OXYGEN SATURATION: 97 % | DIASTOLIC BLOOD PRESSURE: 86 MMHG | WEIGHT: 161 LBS | HEIGHT: 64 IN | HEART RATE: 65 BPM | SYSTOLIC BLOOD PRESSURE: 130 MMHG

## 2025-04-17 DIAGNOSIS — M25.561 RIGHT MEDIAL KNEE PAIN: ICD-10-CM

## 2025-04-17 DIAGNOSIS — Z79.01 ANTICOAGULATION GOAL OF INR 2.5 TO 3.5: ICD-10-CM

## 2025-04-17 DIAGNOSIS — Z51.81 ANTICOAGULATION GOAL OF INR 2.5 TO 3.5: ICD-10-CM

## 2025-04-17 DIAGNOSIS — Z95.2 S/P AVR: Primary | Chronic | ICD-10-CM
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
## 2025-04-17 DIAGNOSIS — M79.641 RIGHT HAND PAIN: ICD-10-CM

## 2025-04-17 LAB
INTERNATIONAL NORMALIZATION RATIO, POC: 1.8
PROTHROMBIN TIME, POC: ABNORMAL

## 2025-04-17 PROCEDURE — 85610 PROTHROMBIN TIME: CPT

## 2025-04-17 ASSESSMENT — ENCOUNTER SYMPTOMS: RESPIRATORY NEGATIVE: 1

## 2025-04-17 NOTE — PROGRESS NOTES
4/17/2025    Manas Walters    Chief Complaint   Patient presents with    Fall    Office Visit for Anticoagulation Management    Hand Pain     Right, not working well, having trouble holding things.     Knee Pain     Right. From fall couple weeks ago, ongoing pain.        HPI  History was obtained from patient.  Manas is a pleasant 69 y.o. male who presents today for fall and hand pain.     History of Present Illness  The patient presents for evaluation of right hand weakness and knee pain.    Approximately 2 weeks ago, a fall occurred, resulting in an impact to the inner side of the knee. No bruising or swelling is reported, but stiffness is noted upon rising from bed or after periods of rest. [facility] IcyHot and Tylenol have been used for managing the discomfort. A history of a torn meniscus in high school, treated with cauterization in 1993, is mentioned.    A decrease in strength in the right hand has been ongoing for 4 to 5 weeks prior to the fall, causing difficulty in performing tasks such as holding a pencil. Efforts to regain strength include the use of a hand exerciser. The patient is currently on a regimen of Coumadin 7.5 mg nightly.    PAST SURGICAL HISTORY:  Shoulder replacement, right hip replacement, meniscus surgery, cauterization procedure in 1993.      1. S/P AVR    2. Anticoagulation goal of INR 2.5 to 3.5 [Z51.81, Z79.01]    3. Fall, initial encounter    4. Right medial knee pain    5. Right hand pain         REVIEW OF SYMPTOMS    Review of Systems   Respiratory: Negative.     Cardiovascular: Negative.    Musculoskeletal:  Positive for arthralgias.       No data recorded    The 10-year ASCVD risk score (Abby DK, et al., 2019) is: 23.1%    Values used to calculate the score:      Age: 69 years      Sex: Male      Is Non- : No      Diabetic: Yes      Tobacco smoker: No      Systolic Blood Pressure: 130 mmHg      Is BP treated: No      HDL Cholesterol: 69 mg/dL      Total

## 2025-04-17 NOTE — PATIENT INSTRUCTIONS
[facility] ([facility])   [address]   [address]   [facility] [address]     (W) [phone]         Dr. Giovanni Nathan MD

## 2025-04-23 ENCOUNTER — OFFICE VISIT (OUTPATIENT)
Dept: PULMONOLOGY | Age: 70
End: 2025-04-23
Payer: MEDICARE

## 2025-04-23 VITALS
SYSTOLIC BLOOD PRESSURE: 130 MMHG | OXYGEN SATURATION: 97 % | BODY MASS INDEX: 26.5 KG/M2 | HEART RATE: 63 BPM | WEIGHT: 155.2 LBS | HEIGHT: 64 IN | DIASTOLIC BLOOD PRESSURE: 70 MMHG

## 2025-04-23 DIAGNOSIS — J47.1 BRONCHIECTASIS WITH ACUTE EXACERBATION (HCC): ICD-10-CM

## 2025-04-23 DIAGNOSIS — J44.1 COPD EXACERBATION (HCC): ICD-10-CM

## 2025-04-23 DIAGNOSIS — G47.33 OSA (OBSTRUCTIVE SLEEP APNEA): Primary | ICD-10-CM

## 2025-04-23 DIAGNOSIS — J44.9 CHRONIC OBSTRUCTIVE PULMONARY DISEASE, UNSPECIFIED COPD TYPE (HCC): ICD-10-CM

## 2025-04-23 DIAGNOSIS — G47.10 HYPERSOMNIA: ICD-10-CM

## 2025-04-23 DIAGNOSIS — E66.3 OVERWEIGHT (BMI 25.0-29.9): ICD-10-CM

## 2025-04-23 PROCEDURE — 1124F ACP DISCUSS-NO DSCNMKR DOCD: CPT | Performed by: INTERNAL MEDICINE

## 2025-04-23 PROCEDURE — 99214 OFFICE O/P EST MOD 30 MIN: CPT | Performed by: INTERNAL MEDICINE

## 2025-04-23 PROCEDURE — 1159F MED LIST DOCD IN RCRD: CPT | Performed by: INTERNAL MEDICINE

## 2025-04-23 RX ORDER — ALBUTEROL SULFATE 90 UG/1
INHALANT RESPIRATORY (INHALATION)
Qty: 3 EACH | Refills: 3 | Status: SHIPPED | OUTPATIENT
Start: 2025-04-23

## 2025-04-23 RX ORDER — ALBUTEROL SULFATE 0.83 MG/ML
2.5 SOLUTION RESPIRATORY (INHALATION) EVERY 6 HOURS PRN
Qty: 360 ML | Refills: 3 | Status: SHIPPED | OUTPATIENT
Start: 2025-04-23

## 2025-04-23 ASSESSMENT — ENCOUNTER SYMPTOMS
EYE DISCHARGE: 0
ABDOMINAL PAIN: 0
COUGH: 0
SHORTNESS OF BREATH: 0
BACK PAIN: 0
EYE ITCHING: 0
ABDOMINAL DISTENTION: 0

## 2025-04-23 NOTE — PROGRESS NOTES
ARTHROPLASTY Right 10/25/2021    RIGHT HIP TOTAL ARTHROPLASTY performed by Berto Duran DO at [facility] OR    UPPER GASTROINTESTINAL ENDOSCOPY N/A 11/10/2021    EGD BIOPSY performed by Haim Camara MD at [facility] ENDOSCOPY       Social History     Socioeconomic History    Marital status:      Spouse name: None    Number of children: 1    Years of education: 12    Highest education level: None   Tobacco Use    Smoking status: Never    Smokeless tobacco: Current     Types: Snuff, Chew   Vaping Use    Vaping status: Never Used   Substance and Sexual Activity    Alcohol use: Not Currently     Alcohol/week: 10.0 standard drinks of alcohol     Types: 10 Cans of beer per week     Comment: 10 beers per wk, caffeine: coffee 5 cups daily, 1 soda a day    Drug use: No    Sexual activity: Not Currently     Social Drivers of Health     Financial Resource Strain: Low Risk  (3/11/2024)    Overall Financial Resource Strain (CARDIA)     Difficulty of Paying Living Expenses: Not very hard   Food Insecurity: No Food Insecurity (3/12/2025)    Hunger Vital Sign     Worried About Running Out of Food in the Last Year: Never true     Ran Out of Food in the Last Year: Never true   Transportation Needs: No Transportation Needs (3/12/2025)    PRAPARE - Transportation     Lack of Transportation (Medical): No     Lack of Transportation (Non-Medical): No   Physical Activity: Sufficiently Active (4/27/2023)    Exercise Vital Sign     Days of Exercise per Week: 7 days     Minutes of Exercise per Session: 30 min   Stress: No Stress Concern Present (1/24/2023)    [employer] [employer] - Occupational Stress Questionnaire     Feeling of Stress : Only a little   Social Connections: Socially Isolated (1/24/2023)    Social Connection and Isolation Panel [NHANES]     Frequency of Communication with Friends and Family: More than three times a week     Frequency of Social Gatherings with Friends and Family: Once a week     Attends

## 2025-04-29 ENCOUNTER — CLINICAL SUPPORT (OUTPATIENT)
Dept: FAMILY MEDICINE CLINIC | Age: 70
End: 2025-04-29
Payer: MEDICARE

## 2025-04-29 DIAGNOSIS — Z95.2 S/P AVR: Chronic | ICD-10-CM

## 2025-04-29 LAB
INTERNATIONAL NORMALIZATION RATIO, POC: 2.6
PROTHROMBIN TIME, POC: 31.6

## 2025-04-29 PROCEDURE — 36415 COLL VENOUS BLD VENIPUNCTURE: CPT | Performed by: FAMILY MEDICINE

## 2025-04-29 PROCEDURE — 85610 PROTHROMBIN TIME: CPT | Performed by: FAMILY MEDICINE

## 2025-04-29 NOTE — PROGRESS NOTES
confirmed current warfarin 10 mg on Mon & Thurs 7.5 mg all other days. In 2.6. keep same recheck in 2 weeks

## 2025-05-07 ENCOUNTER — OFFICE VISIT (OUTPATIENT)
Dept: ORTHOPEDIC SURGERY | Age: 70
End: 2025-05-07

## 2025-05-07 VITALS
HEART RATE: 66 BPM | RESPIRATION RATE: 16 BRPM | OXYGEN SATURATION: 94 % | WEIGHT: 153.6 LBS | BODY MASS INDEX: 26.22 KG/M2 | HEIGHT: 64 IN

## 2025-05-07 DIAGNOSIS — M17.11 ARTHRITIS OF KNEE, RIGHT: Primary | ICD-10-CM

## 2025-05-07 ASSESSMENT — ENCOUNTER SYMPTOMS
EYES NEGATIVE: 1
RESPIRATORY NEGATIVE: 1
GASTROINTESTINAL NEGATIVE: 1

## 2025-05-07 NOTE — PATIENT INSTRUCTIONS
Continue weight-bearing as tolerated.  Continue range of motion exercises as instructed.  Ice and elevate as needed.  Tylenol or Motrin for pain.  Follow up as needed.     We are committed to providing you the best care possible.  If you receive a survey after visiting one of our offices, please take time to share your experience concerning your physician office visit.  These surveys are confidential and no health information about you is shared.  We are eager to improve for you and we are counting on your feedback to help make that happen.

## 2025-05-07 NOTE — PROGRESS NOTES
Review of Systems   Constitutional: Negative.    HENT: Negative.     Eyes: Negative.    Respiratory: Negative.     Cardiovascular: Negative.    Gastrointestinal: Negative.    Genitourinary: Negative.    Musculoskeletal:  Positive for arthralgias.   Skin: Negative.  Negative for rash and wound.   Neurological: Negative.    Psychiatric/Behavioral: Negative.           HPI:  Manas Walters is a 69 y.o. male that is to the office today to have his right knee evaluated.  He states that about a month ago he fell and hit the inside part of his right knee on concrete.  He states that for 2 to 3 weeks he was having significant pain in the knee but then started to get better but he wanted to keep his appointment just to make sure nothing was seriously wrong with the knee.  Today he rates his knee pain at a 0/10.    Past Medical History:   Diagnosis Date    Acid reflux     Acute frontal sinusitis 07/22/2009    Alcohol abuse     \"I Drink Average 2 Beers A Day\"    Anesthesia     \" I get agitated\"    Anxiety     Arthritis     \"Right Shoulder, Neck, Left Knee\"    Atypical mycobacterial infection     Broken teeth     Upper And Lower     CHF (congestive heart failure) ([facility])     COPD (chronic obstructive pulmonary disease) ([facility])     Sees Dr. Díaz [place], [place] [facility]     Cough     Frequent Cough With Green Sputum    Depression     Depression     Dyspnea 02/23/2018    H/O cardiac catheterization 05/17/2022    LAD: Moderate Lumen Irregularity.CALCIFIED mid LAD segment.    H/O cardiovascular MUGA stress test 05/14/2019    EF 50%, NORMAL LVEF, NORMAL WALL MOTION.    H/O echocardiogram 05/22/2014    EF=>50-55%, LV systolic function is low normal, mild aortic valve calcification, no pericardial effusion    H/O echocardiogram 12/12/2018    EF 45-50%    History of 24 hour EKG monitoring 06/06/2014    24 hr holter monitor.  Ventricular ectopics were noted in single and couplet forms. Supraventricular ectopics were noted

## 2025-05-07 NOTE — PROGRESS NOTES
Patient seen in office today for right knee pain     DOI: around April 3rd  Fell on concrete     Patient reports 0/10 pain.  RICE and medication are effective to alleviate pain and reduce swelling.   Pain worsened by: Patient reports painful ROM & weight bearing.

## 2025-05-12 ENCOUNTER — CLINICAL SUPPORT (OUTPATIENT)
Dept: FAMILY MEDICINE CLINIC | Age: 70
End: 2025-05-12
Payer: MEDICARE

## 2025-05-12 DIAGNOSIS — Z95.2 S/P AVR: Chronic | ICD-10-CM

## 2025-05-12 LAB
INTERNATIONAL NORMALIZATION RATIO, POC: 1.4
PROTHROMBIN TIME, POC: 16.5

## 2025-05-12 PROCEDURE — 36415 COLL VENOUS BLD VENIPUNCTURE: CPT | Performed by: FAMILY MEDICINE

## 2025-05-12 PROCEDURE — 85610 PROTHROMBIN TIME: CPT | Performed by: FAMILY MEDICINE

## 2025-05-12 NOTE — PROGRESS NOTES
confirmed current warfarin 10 mg on Mon & Thurs 7.5 mg all other days. In 1.4. Per Dr. Flores take 10 mg today and tomorrow, keep same recheck in 2 days.

## 2025-05-15 DIAGNOSIS — E78.2 MIXED HYPERLIPIDEMIA: ICD-10-CM

## 2025-05-16 RX ORDER — SPIRONOLACTONE 25 MG/1
25 TABLET ORAL DAILY
Qty: 90 TABLET | Refills: 1 | Status: SHIPPED | OUTPATIENT
Start: 2025-05-16

## 2025-05-19 NOTE — PROGRESS NOTES
significant comorbidities requiring additional attention/ care.      OTHER CRITERIA: (Acuity score must accompany comments in this section)   Level 1

## 2025-05-21 DIAGNOSIS — E78.2 MIXED HYPERLIPIDEMIA: ICD-10-CM

## 2025-05-21 RX ORDER — SIMVASTATIN 40 MG
40 TABLET ORAL NIGHTLY
Qty: 90 TABLET | Refills: 1 | Status: SHIPPED | OUTPATIENT
Start: 2025-05-21

## 2025-05-28 ENCOUNTER — CLINICAL SUPPORT (OUTPATIENT)
Dept: FAMILY MEDICINE CLINIC | Age: 70
End: 2025-05-28
Payer: MEDICARE

## 2025-05-28 DIAGNOSIS — Z95.2 S/P AVR: Chronic | ICD-10-CM

## 2025-05-28 LAB
INTERNATIONAL NORMALIZATION RATIO, POC: 2.6
PROTHROMBIN TIME, POC: NORMAL

## 2025-05-28 PROCEDURE — 85610 PROTHROMBIN TIME: CPT | Performed by: FAMILY MEDICINE

## 2025-05-28 PROCEDURE — 36415 COLL VENOUS BLD VENIPUNCTURE: CPT | Performed by: FAMILY MEDICINE

## 2025-05-28 NOTE — PROGRESS NOTES
Current warfarin 10 mg on Mon & Thurs 7.5 mg all other days. In 2.6. Keep the same and recheck in 2 weeks.

## 2025-05-30 RX ORDER — FERROUS SULFATE 325(65) MG
1 TABLET ORAL
Qty: 90 TABLET | Refills: 1 | Status: SHIPPED | OUTPATIENT
Start: 2025-05-30

## 2025-06-03 RX ORDER — FERROUS SULFATE 325(65) MG
1 TABLET ORAL
Qty: 90 TABLET | Refills: 1 | OUTPATIENT
Start: 2025-06-03

## 2025-06-06 ENCOUNTER — HOSPITAL ENCOUNTER (OUTPATIENT)
Dept: SLEEP CENTER | Age: 70
Discharge: HOME | End: 2025-06-06

## 2025-06-10 ENCOUNTER — CLINICAL SUPPORT (OUTPATIENT)
Dept: FAMILY MEDICINE CLINIC | Age: 70
End: 2025-06-10
Payer: MEDICARE

## 2025-06-10 DIAGNOSIS — Z95.2 S/P AVR: Chronic | ICD-10-CM

## 2025-06-10 LAB
INTERNATIONAL NORMALIZATION RATIO, POC: 3.2
PROTHROMBIN TIME, POC: 38.5

## 2025-06-10 PROCEDURE — 36415 COLL VENOUS BLD VENIPUNCTURE: CPT | Performed by: FAMILY MEDICINE

## 2025-06-10 PROCEDURE — 85610 PROTHROMBIN TIME: CPT | Performed by: FAMILY MEDICINE

## 2025-06-10 NOTE — PROGRESS NOTES
Confirmed current warfarin 10 mg on Mon & Thurs 7.5 mg all other days. Keep same recheck in 4 weeks

## 2025-06-12 DIAGNOSIS — Z79.01 ANTICOAGULANT LONG-TERM USE: ICD-10-CM

## 2025-06-12 RX ORDER — WARFARIN SODIUM 5 MG/1
5 TABLET ORAL 2 TIMES DAILY
Qty: 60 TABLET | Refills: 2 | Status: SHIPPED | OUTPATIENT
Start: 2025-06-12

## 2025-06-23 ENCOUNTER — OFFICE VISIT (OUTPATIENT)
Dept: FAMILY MEDICINE CLINIC | Age: 70
End: 2025-06-23
Payer: MEDICARE

## 2025-06-23 VITALS
BODY MASS INDEX: 26.8 KG/M2 | HEIGHT: 64 IN | TEMPERATURE: 97.2 F | DIASTOLIC BLOOD PRESSURE: 84 MMHG | SYSTOLIC BLOOD PRESSURE: 138 MMHG | HEART RATE: 72 BPM | WEIGHT: 157 LBS | OXYGEN SATURATION: 96 %

## 2025-06-23 DIAGNOSIS — J44.1 COPD WITH ACUTE EXACERBATION (HCC): Primary | ICD-10-CM

## 2025-06-23 DIAGNOSIS — Z01.20 NEED FOR ASSESSMENT BY DENTISTRY FOR POOR DENTITION: ICD-10-CM

## 2025-06-23 DIAGNOSIS — K14.0 TONGUE ULCERATION: ICD-10-CM

## 2025-06-23 DIAGNOSIS — Z95.2 S/P AVR: Chronic | ICD-10-CM

## 2025-06-23 LAB
INTERNATIONAL NORMALIZATION RATIO, POC: 1.4
PROTHROMBIN TIME, POC: 17.1

## 2025-06-23 PROCEDURE — 85610 PROTHROMBIN TIME: CPT | Performed by: PHYSICIAN ASSISTANT

## 2025-06-23 PROCEDURE — 99214 OFFICE O/P EST MOD 30 MIN: CPT | Performed by: PHYSICIAN ASSISTANT

## 2025-06-23 PROCEDURE — 1160F RVW MEDS BY RX/DR IN RCRD: CPT | Performed by: PHYSICIAN ASSISTANT

## 2025-06-23 PROCEDURE — 1159F MED LIST DOCD IN RCRD: CPT | Performed by: PHYSICIAN ASSISTANT

## 2025-06-23 PROCEDURE — 1124F ACP DISCUSS-NO DSCNMKR DOCD: CPT | Performed by: PHYSICIAN ASSISTANT

## 2025-06-23 RX ORDER — LIDOCAINE HYDROCHLORIDE 20 MG/ML
SOLUTION OROPHARYNGEAL
Qty: 200 ML | Refills: 2 | Status: SHIPPED | OUTPATIENT
Start: 2025-06-23

## 2025-06-23 RX ORDER — METHYLPREDNISOLONE 4 MG/1
TABLET ORAL
Qty: 1 KIT | Refills: 0 | Status: SHIPPED | OUTPATIENT
Start: 2025-06-23

## 2025-06-23 NOTE — PROGRESS NOTES
6/23/2025    Manas Walters    Chief Complaint   Patient presents with    Sinus Problem     - sinus pain & pressure, denies ear sx's, runny nose, sore throat, productive cough - reddish sputum, having \"tightness\" chest, sob, denies body body aches or chilling, mild diarrhea denies nausea or vomiting. Tried nebulizer tx - relieves chest tightness & sob, albuterol inhaler - helps cough & sob temporarily. Sx x 8 days    Coagulation Disorder     - confirmed current warfarin  10 mg Mon & Thurs 7.5 mg all other days. Inr 1.4. range 2.5 - 3.5. no dietary changes. No missed doses.      HPI  History was obtained from patient  Manas is a 69 y.o. male who presents today with complaints of 8-day history of sinus pressure, nasal congestion, runny nose, sore throat, productive cough, chest tightness, shortness of breath.  He has had some looser stools.  He denies any hemoptysis, fever, chills or myalgias.  He denies any abdominal pain, nausea or vomiting.  History of chronic interstitial lung disease, COPD, RADHA.  He has tried albuterol and nebulizer treatments.  He has also tried Zyrtec    He later mentions he needs all of his teeth pulled but cannot afford it.  He reports a recent left lower broken tooth that is rubbing on his tongue and creating a sore on his tongue.  Sometimes the tongue will bleed.  [facility] a [facility] or go to the emergency department.    Patient is anticoagulated, status post AVR.  Subtherapeutic at 1.4-will adjust Coumadin dose        PAST MEDICAL HISTORY  Past Medical History:   Diagnosis Date    Acid reflux     Acute frontal sinusitis 07/22/2009    Alcohol abuse     \"I Drink Average 2 Beers A Day\"    Anesthesia     \" I get agitated\"    Anxiety     Arthritis     \"Right Shoulder, Neck, Left Knee\"    Atypical mycobacterial infection     Broken teeth     Upper And Lower     CHF (congestive heart failure) ([facility])     COPD (chronic obstructive pulmonary disease) ([facility])     Sees

## 2025-07-07 ENCOUNTER — OFFICE VISIT (OUTPATIENT)
Dept: FAMILY MEDICINE CLINIC | Age: 70
End: 2025-07-07
Payer: MEDICARE

## 2025-07-07 VITALS
HEART RATE: 62 BPM | HEIGHT: 64 IN | DIASTOLIC BLOOD PRESSURE: 74 MMHG | RESPIRATION RATE: 18 BRPM | OXYGEN SATURATION: 97 % | SYSTOLIC BLOOD PRESSURE: 128 MMHG | BODY MASS INDEX: 26.46 KG/M2 | WEIGHT: 155 LBS

## 2025-07-07 DIAGNOSIS — I50.22 CHRONIC SYSTOLIC HEART FAILURE (HCC): Primary | Chronic | ICD-10-CM

## 2025-07-07 DIAGNOSIS — Z79.01 ANTICOAGULATION MONITORING, INR RANGE 2.5-3.5: ICD-10-CM

## 2025-07-07 DIAGNOSIS — J44.9 CHRONIC OBSTRUCTIVE PULMONARY DISEASE, UNSPECIFIED COPD TYPE (HCC): Chronic | ICD-10-CM

## 2025-07-07 DIAGNOSIS — Z95.2 S/P AVR: Chronic | ICD-10-CM

## 2025-07-07 DIAGNOSIS — E78.2 MIXED HYPERLIPIDEMIA: Chronic | ICD-10-CM

## 2025-07-07 DIAGNOSIS — R53.82 CHRONIC FATIGUE: ICD-10-CM

## 2025-07-07 LAB
INTERNATIONAL NORMALIZATION RATIO, POC: 2.6
PROTHROMBIN TIME, POC: NORMAL

## 2025-07-07 PROCEDURE — 1160F RVW MEDS BY RX/DR IN RCRD: CPT | Performed by: INTERNAL MEDICINE

## 2025-07-07 PROCEDURE — 1124F ACP DISCUSS-NO DSCNMKR DOCD: CPT | Performed by: INTERNAL MEDICINE

## 2025-07-07 PROCEDURE — 85610 PROTHROMBIN TIME: CPT | Performed by: INTERNAL MEDICINE

## 2025-07-07 PROCEDURE — 99214 OFFICE O/P EST MOD 30 MIN: CPT | Performed by: INTERNAL MEDICINE

## 2025-07-07 PROCEDURE — 1159F MED LIST DOCD IN RCRD: CPT | Performed by: INTERNAL MEDICINE

## 2025-07-07 NOTE — PROGRESS NOTES
Outpatient Clinic Visit Note    Patient: Manas Walters  : 1955 (69 y.o.)  Date: 2025    CC:  Chief Complaint   Patient presents with    3 Month Follow-Up     NTP         HPI: HE IS FEELING WELL TODAY.  He denies chest pain or shortness of breath. INR in therapeutic range today.      Past Medical History:    Past Medical History:   Diagnosis Date    Acid reflux     Acute frontal sinusitis 2009    Alcohol abuse     \"I Drink Average 2 Beers A Day\"    Anesthesia     \" I get agitated\"    Anxiety     Arthritis     \"Right Shoulder, Neck, Left Knee\"    Atypical mycobacterial infection     Broken teeth     Upper And Lower     CHF (congestive heart failure) ([facility])     COPD (chronic obstructive pulmonary disease) ([facility])     Sees Dr. Díaz In [place], [place] [facility]     Cough     Frequent Cough With Green Sputum    Depression     Depression     Dyspnea 2018    H/O cardiac catheterization 2022    LAD: Moderate Lumen Irregularity.CALCIFIED mid LAD segment.    H/O cardiovascular MUGA stress test 2019    EF 50%, NORMAL LVEF, NORMAL WALL MOTION.    H/O echocardiogram 2014    EF=>50-55%, LV systolic function is low normal, mild aortic valve calcification, no pericardial effusion    H/O echocardiogram 2018    EF 45-50%    History of 24 hour EKG monitoring 2014    24 hr holter monitor.  Ventricular ectopics were noted in single and couplet forms. Supraventricular ectopics were noted in single and pair forms.    [place] (hard of hearing)     Bilateral Ears    Hx of Doppler echocardiogram 2022    EF 30-35% Mild TR.    Hyperlipidemia     Hypertension     Irritant contact dermatitis due to other chemical products 2019    Mycobacterium avium complex ([facility])     Other drug allergy(995.27) 2009    S/P AVR 2003    Mechanical valvue     Shortness of breath     Teeth missing     Upper And Lower    Type 2 diabetes mellitus 2022       Past Surgical

## 2025-07-09 RX ORDER — VENLAFAXINE HYDROCHLORIDE 150 MG/1
150 CAPSULE, EXTENDED RELEASE ORAL DAILY
Qty: 30 CAPSULE | Refills: 5 | Status: SHIPPED | OUTPATIENT
Start: 2025-07-09

## 2025-07-10 DIAGNOSIS — Z79.01 ANTICOAGULANT LONG-TERM USE: ICD-10-CM

## 2025-07-10 DIAGNOSIS — J44.9 CHRONIC OBSTRUCTIVE PULMONARY DISEASE, UNSPECIFIED COPD TYPE (HCC): ICD-10-CM

## 2025-07-11 RX ORDER — WARFARIN SODIUM 5 MG/1
TABLET ORAL
Qty: 60 TABLET | Refills: 2 | Status: SHIPPED | OUTPATIENT
Start: 2025-07-11

## 2025-07-11 RX ORDER — ALBUTEROL SULFATE 0.83 MG/ML
SOLUTION RESPIRATORY (INHALATION)
Qty: 120 ML | Refills: 3 | Status: SHIPPED | OUTPATIENT
Start: 2025-07-11

## 2025-07-22 ENCOUNTER — CLINICAL SUPPORT (OUTPATIENT)
Dept: FAMILY MEDICINE CLINIC | Age: 70
End: 2025-07-22

## 2025-07-22 DIAGNOSIS — Z95.2 S/P AVR: Primary | Chronic | ICD-10-CM

## 2025-07-22 NOTE — PROGRESS NOTES
confirmed current warfarin 10 mg on Mon & Thurs 7.5 mg all other days. Inr 4.9. per Dr Martin refer to coumadin clinic. Also in the meantime hold 1 dose resume same recheck in 2 weeks

## 2025-07-24 ENCOUNTER — TELEPHONE (OUTPATIENT)
Dept: PHARMACY | Age: 70
End: 2025-07-24

## 2025-07-27 DIAGNOSIS — J44.9 CHRONIC OBSTRUCTIVE PULMONARY DISEASE, UNSPECIFIED COPD TYPE (HCC): ICD-10-CM

## 2025-07-28 ENCOUNTER — TELEPHONE (OUTPATIENT)
Dept: PHARMACY | Facility: CLINIC | Age: 70
End: 2025-07-28

## 2025-07-28 RX ORDER — ALBUTEROL SULFATE 0.83 MG/ML
SOLUTION RESPIRATORY (INHALATION)
Qty: 360 ML | Refills: 11 | OUTPATIENT
Start: 2025-07-28

## 2025-07-28 NOTE — TELEPHONE ENCOUNTER
Manas Walters was contacted to schedule an appointment with our clinical pharmacy team per referral on 07/28/25 and was not able to be reached.

## 2025-08-05 ENCOUNTER — CLINICAL SUPPORT (OUTPATIENT)
Dept: FAMILY MEDICINE CLINIC | Age: 70
End: 2025-08-05
Payer: MEDICARE

## 2025-08-05 DIAGNOSIS — Z95.2 S/P AVR: Chronic | ICD-10-CM

## 2025-08-05 LAB
INTERNATIONAL NORMALIZATION RATIO, POC: 2.8
PROTHROMBIN TIME, POC: NORMAL

## 2025-08-05 PROCEDURE — 85610 PROTHROMBIN TIME: CPT | Performed by: FAMILY MEDICINE

## 2025-08-05 PROCEDURE — 36415 COLL VENOUS BLD VENIPUNCTURE: CPT | Performed by: FAMILY MEDICINE

## 2025-08-22 RX ORDER — FLUTICASONE PROPIONATE 50 MCG
1 SPRAY, SUSPENSION (ML) NASAL DAILY
Qty: 1 EACH | Refills: 0 | Status: SHIPPED | OUTPATIENT
Start: 2025-08-22

## (undated) DEVICE — SUTURE FIBERWIRE SZ 5 L38IN NONABSORBABLE BLU L48MM 1/2 AR7211

## (undated) DEVICE — TUBING, SUCTION, 9/32" X 10', STRAIGHT: Brand: MEDLINE

## (undated) DEVICE — FAN SPRAY KIT: Brand: PULSAVAC®

## (undated) DEVICE — GLOVE ORANGE PI 8   MSG9080

## (undated) DEVICE — YANKAUER,FLEXIBLE HANDLE,REGLR CAPACITY: Brand: MEDLINE INDUSTRIES, INC.

## (undated) DEVICE — 3M™ COBAN™ STERILE SELF-ADHERENT WRAP, 1584S, 4 IN X 5 YD (10 CM X 4,5 M), 18 ROLLS/CASE: Brand: 3M™ COBAN™

## (undated) DEVICE — PACK SURG LAP CHOLE

## (undated) DEVICE — DRAPE,U/ SHT,SPLIT,PLAS,STERIL: Brand: MEDLINE

## (undated) DEVICE — BANDAGE ADH W2XL4IN NITRL FAB STRP CURAD

## (undated) DEVICE — GLOVE SURG SZ 65 THK91MIL LTX FREE SYN POLYISOPRENE

## (undated) DEVICE — TOWEL,OR,DSP,ST,BLUE,STD,6/PK,12PK/CS: Brand: MEDLINE

## (undated) DEVICE — PIN DRL DIA1/8IN QUIK REL FOR VANGUARD UNIV INSTR TOT KNEE 32486265] ZIMMER BIOMET ORTHOPEDICS]

## (undated) DEVICE — GLOVE SURG SZ 65 CRM LTX FREE POLYISOPRENE POLYMER BEAD ANTI

## (undated) DEVICE — PENCIL ES CRD L10FT HND SWCHING ROCK SWCH W/ EDGE COAT BLDE

## (undated) DEVICE — CONVERTORS STOCKINETTE: Brand: CONVERTORS

## (undated) DEVICE — GOWN,SIRUS,FABRNF,RAGLAN,L,ST,30/CS: Brand: MEDLINE

## (undated) DEVICE — GOWN,SIRUS,POLYRNF,BRTHSLV,XLN/XL,20/CS: Brand: MEDLINE

## (undated) DEVICE — GLOVE ORANGE PI 7   MSG9070

## (undated) DEVICE — NEEDLE HYPO 20GA L1.5IN YEL POLYPR HUB S STL REG BVL STR

## (undated) DEVICE — INTENDED FOR TISSUE SEPARATION, AND OTHER PROCEDURES THAT REQUIRE A SHARP SURGICAL BLADE TO PUNCTURE OR CUT.: Brand: BARD-PARKER ® STAINLESS STEEL BLADES

## (undated) DEVICE — CHLORAPREP 26ML ORANGE

## (undated) DEVICE — ELECTRODE ES AD CRDLSS PT RET REM POLYHESIVE

## (undated) DEVICE — CIRCUIT BREATHING SINGLE LIMB AD 72 IN 3 LT 2 FILTER LIMBO

## (undated) DEVICE — NEEDLE SPNL L3.5IN PNK HUB S STL REG WALL FIT STYL W/ QNCKE

## (undated) DEVICE — SUTURE VCRL SZ 2-0 L18IN ABSRB UD CT-1 L36MM 1/2 CIR J839D

## (undated) DEVICE — GLOVE SURG SZ 65 L12IN FNGR THK79MIL GRN LTX FREE

## (undated) DEVICE — GLOVE ORANGE PI 7 1/2   MSG9075

## (undated) DEVICE — GOWN,ECLIPSE,POLYRNF,BRTHSLV,L,30/CS: Brand: MEDLINE

## (undated) DEVICE — 3M™ STERI-DRAPE™ U-DRAPE 1015: Brand: STERI-DRAPE™

## (undated) DEVICE — POSITIONER HD AD W4.5XH8XL9IN HIGHLY RESILIENT FOAM CMFRT

## (undated) DEVICE — STERILE LATEX POWDER-FREE SURGICAL GLOVESWITH NITRILE COATING: Brand: PROTEXIS

## (undated) DEVICE — Z DISCONTINUED (USE MFG CAT MVABO)  TUBING GAS SAMPLING STD 6.5 FT FEMALE CONN SMRT CAPNOLINE

## (undated) DEVICE — SYRINGE BLB 50CC IRRIG PLIABLE FNGR FLNG GRAD FLSK DISP

## (undated) DEVICE — SYRINGE IRRIG 60ML SFT PLIABLE BLB EZ TO GRP 1 HND USE W/

## (undated) DEVICE — COVER,TABLE,44X90,STERILE: Brand: MEDLINE

## (undated) DEVICE — KNIFE SURG 15 DEG STBL BLADE

## (undated) DEVICE — PACK PROCEDURE SURG TOT HIP LF

## (undated) DEVICE — BIT DRL DIA2.7MM PERIPH SCR REUSE FOR COMPHSVE REV SHLDR

## (undated) DEVICE — Z INACTIVE NO ACTIVE SUPPLIER APPLICATOR MEDICATED 26 CC TINT HI-LITE ORNG STRL CHLORAPREP

## (undated) DEVICE — SUTURE ABSORBABLE 3-0 PS-1 18 IN UD MONOCRYL + STRATAFIX SXMP1B102

## (undated) DEVICE — HOOD, PEEL-AWAY: Brand: FLYTE

## (undated) DEVICE — DRAPE SHEET ULTRAGARD: Brand: MEDLINE

## (undated) DEVICE — SUTURE STRATAFIX SZ 3-0 L20CM ABSRB VLT NDL SH-1 L22MM 1/2 SXPP1B453

## (undated) DEVICE — Device

## (undated) DEVICE — SPONGE LAP W18XL18IN WHT COT 4 PLY FLD STRUNG RADPQ DISP ST

## (undated) DEVICE — MANIFOLD CART ULT HI FLOW W 3 PRT FOR SUCT

## (undated) DEVICE — MARKER,SKIN,WI/RULER AND LABELS: Brand: MEDLINE

## (undated) DEVICE — SPONGE GZ W4XL8IN COT WVN 12 PLY

## (undated) DEVICE — 3M™ STERI-DRAPE™ INSTRUMENT POUCH 1018: Brand: STERI-DRAPE™

## (undated) DEVICE — SUTURE VICRYL SZ 2-0 L27IN ABSRB VLT L26MM SH 1/2 CIR J317H

## (undated) DEVICE — COLUMN DRAPE

## (undated) DEVICE — SYRINGE MED 10ML LUERLOCK TIP W/O SFTY DISP

## (undated) DEVICE — HEWSON SUTURE RETRIEVER: Brand: HEWSON SUTURE RETRIEVER

## (undated) DEVICE — DRESSING PETRO W3XL3IN OIL EMUL N ADH GZ KNIT IMPREG CELOS

## (undated) DEVICE — PAD,ABDOMINAL,5"X9",ST,LF,25/BX: Brand: MEDLINE INDUSTRIES, INC.

## (undated) DEVICE — BLADE CLIPPER GEN PURP NS

## (undated) DEVICE — MAT FLOOR ULTRA ABS 28X48IN

## (undated) DEVICE — POSITIONER,HEAD,RING CUSHION,9IN,32CS: Brand: MEDLINE

## (undated) DEVICE — BANDAGE,SELF ADHRNT,COFLEX,4"X5YD,STRL: Brand: COLABEL

## (undated) DEVICE — SKIN MARKER REGULAR TIP WITH RULER CAP AND LABELS: Brand: DEVON

## (undated) DEVICE — DUAL CUT SAGITTAL BLADE

## (undated) DEVICE — SYSTEM SKIN CLSR 22CM DERMBND PRINEO

## (undated) DEVICE — SYRINGE MED 20ML STD CLR PLAS LUERLOCK TIP N CTRL DISP

## (undated) DEVICE — CORD ES L15FT PT RET REUSE VALLEYLAB REM

## (undated) DEVICE — SEAL

## (undated) DEVICE — CSTM HIP POUCH PACK: Brand: MEDLINE INDUSTRIES, INC.

## (undated) DEVICE — COUNTER NDL 60 COUNT FOAM STRP SGL MAG

## (undated) DEVICE — Z INACTIVE USE 2660664 SOLUTION IRRIG 3000ML 0.9% SOD CHL USP UROMATIC PLAS CONT

## (undated) DEVICE — 3M™ MICROFOAM™ SURGICAL TAPE 4 ROLLS/CARTON 6 CARTONS/CASE 1528-3: Brand: 3M™ MICROFOAM™

## (undated) DEVICE — BLADELESS OBTURATOR: Brand: WECK VISTA

## (undated) DEVICE — SUTURE ABSORBABLE BRAIDED 2-0 CT-1 27 IN UD VICRYL J259H

## (undated) DEVICE — PROTECTOR EYE PT SELF ADH NS OPT GRD LF

## (undated) DEVICE — SUTURE VCRL SZ 1 L27IN ABSRB UD L36MM CT-1 1/2 CIR JJ40G

## (undated) DEVICE — SOLUTION IV IRRIG WATER 1000ML POUR BRL 2F7114

## (undated) DEVICE — IMPLANTABLE DEVICE
Type: IMPLANTABLE DEVICE | Site: SHOULDER | Status: NON-FUNCTIONAL
Removed: 2019-01-29

## (undated) DEVICE — ADHESIVE SKIN CLSR 0.7ML TOP DERMBND ADV

## (undated) DEVICE — TIP COVER ACCESSORY

## (undated) DEVICE — LINER SUCT CANSTR 1500CC SEMI RIG W/ POR HYDROPHOBIC SHUT

## (undated) DEVICE — TROCAR: Brand: KII FIOS FIRST ENTRY

## (undated) DEVICE — SUTURE ABSRB L18IN SZ 2-0 WHT CTB-1L36MM 1/2 CIR BLNT PNT JB840

## (undated) DEVICE — SOLUTION PREP POVIDONE IOD FOR SKIN MUCOUS MEM PRIOR TO

## (undated) DEVICE — DRESSING,GAUZE,XEROFORM,CURAD,1"X8",ST: Brand: CURAD

## (undated) DEVICE — SUTURE ETHBND SZ 1 L30IN NONABSORBABLE GRN OS-6 L36MM 1/2 X538H

## (undated) DEVICE — HIGH CAPACITY FAN SPRAY TIP WITH SHIELD: Brand: PULSAVAC®

## (undated) DEVICE — GLOVE SURG SZ 8 L12IN THK75MIL DK GRN LTX FREE

## (undated) DEVICE — IMMOBILIZER SHLDR SWTH UNIV DEV BLK P.A.D. III

## (undated) DEVICE — EXCEL 10FT (3.05 M) INSUFFLATION TUBING SET WITH 0.1 MICRON FILTER: Brand: EXCEL

## (undated) DEVICE — SUTURE MONOCRYL SZ 4-0 L18IN ABSRB UD L19MM PS-2 3/8 CIR PRIM Y496G

## (undated) DEVICE — CLASSIC CLD THER SYS

## (undated) DEVICE — SOLUTION IV 1000ML 0.9% SOD CHL FOR IRRIG PLAS CONT

## (undated) DEVICE — LINER,SEMI-RIGID,3000CC,50EA/CS: Brand: MEDLINE

## (undated) DEVICE — ARM DRAPE

## (undated) DEVICE — BIT DRL L30MM DIA3.2MM DISP FOR G7 2 MOBILITY CONSTRUCT